# Patient Record
Sex: FEMALE | Race: WHITE | NOT HISPANIC OR LATINO | Employment: OTHER | URBAN - METROPOLITAN AREA
[De-identification: names, ages, dates, MRNs, and addresses within clinical notes are randomized per-mention and may not be internally consistent; named-entity substitution may affect disease eponyms.]

---

## 2018-04-26 LAB — VIT B12 SERPL-MCNC: 491 PG/ML (ref 180–914)

## 2018-09-21 ENCOUNTER — TRANSCRIBE ORDERS (OUTPATIENT)
Dept: ADMINISTRATIVE | Facility: HOSPITAL | Age: 73
End: 2018-09-21

## 2018-09-21 ENCOUNTER — APPOINTMENT (OUTPATIENT)
Dept: LAB | Facility: HOSPITAL | Age: 73
End: 2018-09-21
Payer: MEDICARE

## 2018-09-21 DIAGNOSIS — E78.2 MIXED HYPERLIPIDEMIA: ICD-10-CM

## 2018-09-21 DIAGNOSIS — E11.9 TYPE 2 DIABETES MELLITUS WITHOUT COMPLICATION, WITH LONG-TERM CURRENT USE OF INSULIN (HCC): ICD-10-CM

## 2018-09-21 DIAGNOSIS — R53.83 FATIGUE, UNSPECIFIED TYPE: Primary | ICD-10-CM

## 2018-09-21 DIAGNOSIS — E55.9 VITAMIN D DEFICIENCY: ICD-10-CM

## 2018-09-21 DIAGNOSIS — Z79.4 TYPE 2 DIABETES MELLITUS WITHOUT COMPLICATION, WITH LONG-TERM CURRENT USE OF INSULIN (HCC): ICD-10-CM

## 2018-09-21 DIAGNOSIS — R53.83 FATIGUE, UNSPECIFIED TYPE: ICD-10-CM

## 2018-09-21 LAB
25(OH)D3 SERPL-MCNC: 16.4 NG/ML (ref 30–100)
ALBUMIN SERPL BCP-MCNC: 3.9 G/DL (ref 3.5–5.7)
ALP SERPL-CCNC: 34 U/L (ref 55–165)
ALT SERPL W P-5'-P-CCNC: 17 U/L (ref 7–52)
ANION GAP SERPL CALCULATED.3IONS-SCNC: 9 MMOL/L (ref 4–13)
AST SERPL W P-5'-P-CCNC: 15 U/L (ref 13–39)
BACTERIA UR QL AUTO: ABNORMAL /HPF
BASOPHILS # BLD AUTO: 0 THOUSANDS/ΜL (ref 0–0.1)
BASOPHILS NFR BLD AUTO: 0 % (ref 0–1)
BILIRUB DIRECT SERPL-MCNC: 0.2 MG/DL (ref 0–0.2)
BILIRUB SERPL-MCNC: 0.5 MG/DL (ref 0.2–1)
BILIRUB UR QL STRIP: NEGATIVE
BUN SERPL-MCNC: 24 MG/DL (ref 7–25)
CALCIUM SERPL-MCNC: 9.3 MG/DL (ref 8.6–10.5)
CHLORIDE SERPL-SCNC: 101 MMOL/L (ref 98–107)
CHOLEST SERPL-MCNC: 168 MG/DL (ref 0–200)
CLARITY UR: ABNORMAL
CO2 SERPL-SCNC: 27 MMOL/L (ref 21–31)
COLOR UR: YELLOW
CREAT SERPL-MCNC: 1.2 MG/DL (ref 0.6–1.2)
EOSINOPHIL # BLD AUTO: 0.1 THOUSAND/ΜL (ref 0–0.61)
EOSINOPHIL NFR BLD AUTO: 1 % (ref 0–6)
ERYTHROCYTE [DISTWIDTH] IN BLOOD BY AUTOMATED COUNT: 14.1 % (ref 11.6–15.1)
EST. AVERAGE GLUCOSE BLD GHB EST-MCNC: 166 MG/DL
FERRITIN SERPL-MCNC: 88 NG/ML (ref 8–388)
GFR SERPL CREATININE-BSD FRML MDRD: 45 ML/MIN/1.73SQ M
GLUCOSE P FAST SERPL-MCNC: 133 MG/DL (ref 65–99)
GLUCOSE UR STRIP-MCNC: NEGATIVE MG/DL
HBA1C MFR BLD: 7.4 % (ref 4.2–6.3)
HCT VFR BLD AUTO: 39.7 % (ref 37–47)
HDLC SERPL-MCNC: 42 MG/DL (ref 40–60)
HGB BLD-MCNC: 12.8 G/DL (ref 11.5–15.4)
HGB UR QL STRIP.AUTO: ABNORMAL
IRON SATN MFR SERPL: 22 %
IRON SERPL-MCNC: 71 UG/DL (ref 50–170)
KETONES UR STRIP-MCNC: NEGATIVE MG/DL
LDLC SERPL CALC-MCNC: 83 MG/DL (ref 0–100)
LEUKOCYTE ESTERASE UR QL STRIP: ABNORMAL
LYMPHOCYTES # BLD AUTO: 1.9 THOUSANDS/ΜL (ref 0.6–4.47)
LYMPHOCYTES NFR BLD AUTO: 23 % (ref 14–44)
MCH RBC QN AUTO: 30.3 PG (ref 26.8–34.3)
MCHC RBC AUTO-ENTMCNC: 32.2 G/DL (ref 31.4–37.4)
MCV RBC AUTO: 94 FL (ref 82–98)
MONOCYTES # BLD AUTO: 0.5 THOUSAND/ΜL (ref 0.17–1.22)
MONOCYTES NFR BLD AUTO: 7 % (ref 4–12)
NEUTROPHILS # BLD AUTO: 5.6 THOUSANDS/ΜL (ref 1.85–7.62)
NEUTS SEG NFR BLD AUTO: 69 % (ref 43–75)
NITRITE UR QL STRIP: POSITIVE
NON-SQ EPI CELLS URNS QL MICRO: ABNORMAL /HPF
NONHDLC SERPL-MCNC: 126 MG/DL
NRBC BLD AUTO-RTO: 0 /100 WBCS
PH UR STRIP.AUTO: 6 [PH] (ref 5–8)
PLATELET # BLD AUTO: 211 THOUSANDS/UL (ref 149–390)
PMV BLD AUTO: 8.8 FL (ref 8.9–12.7)
POTASSIUM SERPL-SCNC: 4.2 MMOL/L (ref 3.5–5.5)
PROT SERPL-MCNC: 6.9 G/DL (ref 6.4–8.9)
PROT UR STRIP-MCNC: NEGATIVE MG/DL
RBC # BLD AUTO: 4.22 MILLION/UL (ref 3.81–5.12)
RBC #/AREA URNS AUTO: ABNORMAL /HPF
SODIUM SERPL-SCNC: 137 MMOL/L (ref 134–143)
SP GR UR STRIP.AUTO: 1.02 (ref 1–1.03)
T4 FREE SERPL-MCNC: 1.22 NG/DL (ref 0.76–1.46)
TIBC SERPL-MCNC: 329 UG/DL (ref 250–450)
TRIGL SERPL-MCNC: 216 MG/DL (ref 44–166)
TSH SERPL DL<=0.05 MIU/L-ACNC: 2.14 UIU/ML (ref 0.45–5.33)
UROBILINOGEN UR QL STRIP.AUTO: 0.2 E.U./DL
VIT B12 SERPL-MCNC: 468 PG/ML (ref 100–900)
WBC # BLD AUTO: 8.2 THOUSAND/UL (ref 4.31–10.16)
WBC #/AREA URNS AUTO: ABNORMAL /HPF

## 2018-09-21 PROCEDURE — 82570 ASSAY OF URINE CREATININE: CPT | Performed by: NURSE PRACTITIONER

## 2018-09-21 PROCEDURE — 80048 BASIC METABOLIC PNL TOTAL CA: CPT

## 2018-09-21 PROCEDURE — 81001 URINALYSIS AUTO W/SCOPE: CPT | Performed by: NURSE PRACTITIONER

## 2018-09-21 PROCEDURE — 83550 IRON BINDING TEST: CPT

## 2018-09-21 PROCEDURE — 82043 UR ALBUMIN QUANTITATIVE: CPT | Performed by: NURSE PRACTITIONER

## 2018-09-21 PROCEDURE — 83036 HEMOGLOBIN GLYCOSYLATED A1C: CPT

## 2018-09-21 PROCEDURE — 85025 COMPLETE CBC W/AUTO DIFF WBC: CPT

## 2018-09-21 PROCEDURE — 82607 VITAMIN B-12: CPT

## 2018-09-21 PROCEDURE — 82306 VITAMIN D 25 HYDROXY: CPT

## 2018-09-21 PROCEDURE — 83540 ASSAY OF IRON: CPT

## 2018-09-21 PROCEDURE — 84443 ASSAY THYROID STIM HORMONE: CPT

## 2018-09-21 PROCEDURE — 80076 HEPATIC FUNCTION PANEL: CPT

## 2018-09-21 PROCEDURE — 82728 ASSAY OF FERRITIN: CPT

## 2018-09-21 PROCEDURE — 84439 ASSAY OF FREE THYROXINE: CPT

## 2018-09-21 PROCEDURE — 80061 LIPID PANEL: CPT

## 2018-09-21 PROCEDURE — 36415 COLL VENOUS BLD VENIPUNCTURE: CPT

## 2018-09-22 LAB
CREAT UR-MCNC: 218 MG/DL
MICROALBUMIN UR-MCNC: 16.8 MG/L (ref 0–20)
MICROALBUMIN/CREAT 24H UR: 8 MG/G CREATININE (ref 0–30)

## 2018-12-04 ENCOUNTER — TRANSCRIBE ORDERS (OUTPATIENT)
Dept: ADMINISTRATIVE | Facility: HOSPITAL | Age: 73
End: 2018-12-04

## 2018-12-04 DIAGNOSIS — Z12.39 SCREENING BREAST EXAMINATION: Primary | ICD-10-CM

## 2018-12-12 ENCOUNTER — HOSPITAL ENCOUNTER (OUTPATIENT)
Dept: MAMMOGRAPHY | Facility: HOSPITAL | Age: 73
Discharge: HOME | End: 2018-12-12
Payer: MEDICARE

## 2018-12-12 DIAGNOSIS — Z12.39 SCREENING BREAST EXAMINATION: ICD-10-CM

## 2018-12-12 PROCEDURE — 77063 BREAST TOMOSYNTHESIS BI: CPT

## 2018-12-12 PROCEDURE — 77067 SCR MAMMO BI INCL CAD: CPT

## 2019-01-23 ENCOUNTER — APPOINTMENT (OUTPATIENT)
Dept: LAB | Facility: HOSPITAL | Age: 74
End: 2019-01-23
Payer: MEDICARE

## 2019-01-23 ENCOUNTER — TRANSCRIBE ORDERS (OUTPATIENT)
Dept: ADMINISTRATIVE | Facility: HOSPITAL | Age: 74
End: 2019-01-23

## 2019-01-23 DIAGNOSIS — R53.83 FATIGUE, UNSPECIFIED TYPE: Primary | ICD-10-CM

## 2019-01-23 DIAGNOSIS — E11.9 TYPE 2 DIABETES MELLITUS WITHOUT COMPLICATION, WITHOUT LONG-TERM CURRENT USE OF INSULIN (HCC): ICD-10-CM

## 2019-01-23 DIAGNOSIS — E55.9 VITAMIN D DEFICIENCY: ICD-10-CM

## 2019-01-23 DIAGNOSIS — E78.2 MIXED HYPERLIPIDEMIA: ICD-10-CM

## 2019-01-23 DIAGNOSIS — R53.83 FATIGUE, UNSPECIFIED TYPE: ICD-10-CM

## 2019-01-23 LAB
25(OH)D3 SERPL-MCNC: 45.1 NG/ML (ref 30–100)
ALBUMIN SERPL BCP-MCNC: 4.1 G/DL (ref 3.5–5.7)
ALP SERPL-CCNC: 36 U/L (ref 55–165)
ALT SERPL W P-5'-P-CCNC: 16 U/L (ref 7–52)
ANION GAP SERPL CALCULATED.3IONS-SCNC: 9 MMOL/L (ref 4–13)
AST SERPL W P-5'-P-CCNC: 13 U/L (ref 13–39)
BACTERIA UR QL AUTO: ABNORMAL /HPF
BILIRUB DIRECT SERPL-MCNC: 0.1 MG/DL (ref 0–0.2)
BILIRUB SERPL-MCNC: 0.3 MG/DL (ref 0.2–1)
BILIRUB UR QL STRIP: NEGATIVE
BUN SERPL-MCNC: 20 MG/DL (ref 7–25)
CALCIUM SERPL-MCNC: 9.2 MG/DL (ref 8.6–10.5)
CAOX CRY URNS QL MICRO: ABNORMAL /HPF
CHLORIDE SERPL-SCNC: 101 MMOL/L (ref 98–107)
CHOLEST SERPL-MCNC: 169 MG/DL (ref 0–200)
CLARITY UR: ABNORMAL
CO2 SERPL-SCNC: 27 MMOL/L (ref 21–31)
COLOR UR: YELLOW
CREAT SERPL-MCNC: 1.18 MG/DL (ref 0.6–1.2)
CREAT UR-MCNC: 231 MG/DL
ERYTHROCYTE [DISTWIDTH] IN BLOOD BY AUTOMATED COUNT: 14.2 % (ref 11.6–15.1)
FERRITIN SERPL-MCNC: 79 NG/ML (ref 8–388)
GFR SERPL CREATININE-BSD FRML MDRD: 46 ML/MIN/1.73SQ M
GLUCOSE P FAST SERPL-MCNC: 176 MG/DL (ref 65–99)
GLUCOSE UR STRIP-MCNC: NEGATIVE MG/DL
HCT VFR BLD AUTO: 39.8 % (ref 37–47)
HDLC SERPL-MCNC: 49 MG/DL (ref 40–60)
HGB BLD-MCNC: 13.1 G/DL (ref 11.5–15.4)
HGB UR QL STRIP.AUTO: ABNORMAL
IRON SATN MFR SERPL: 21 %
IRON SERPL-MCNC: 72 UG/DL (ref 50–170)
KETONES UR STRIP-MCNC: ABNORMAL MG/DL
LDLC SERPL CALC-MCNC: 80 MG/DL (ref 0–100)
LEUKOCYTE ESTERASE UR QL STRIP: ABNORMAL
MCH RBC QN AUTO: 30.6 PG (ref 26.8–34.3)
MCHC RBC AUTO-ENTMCNC: 33.1 G/DL (ref 31.4–37.4)
MCV RBC AUTO: 93 FL (ref 82–98)
MICROALBUMIN UR-MCNC: 64.6 MG/L (ref 0–20)
MICROALBUMIN/CREAT 24H UR: 28 MG/G CREATININE (ref 0–30)
MUCOUS THREADS UR QL AUTO: ABNORMAL
NITRITE UR QL STRIP: NEGATIVE
NON-SQ EPI CELLS URNS QL MICRO: ABNORMAL /HPF
NONHDLC SERPL-MCNC: 120 MG/DL
PH UR STRIP.AUTO: 5.5 [PH] (ref 5–8)
PLATELET # BLD AUTO: 216 THOUSANDS/UL (ref 149–390)
PMV BLD AUTO: 8.6 FL (ref 8.9–12.7)
POTASSIUM SERPL-SCNC: 4.2 MMOL/L (ref 3.5–5.5)
PROT SERPL-MCNC: 7 G/DL (ref 6.4–8.9)
PROT UR STRIP-MCNC: NEGATIVE MG/DL
RBC # BLD AUTO: 4.29 MILLION/UL (ref 3.81–5.12)
RBC #/AREA URNS AUTO: ABNORMAL /HPF
SODIUM SERPL-SCNC: 137 MMOL/L (ref 134–143)
SP GR UR STRIP.AUTO: >=1.03 (ref 1–1.03)
T4 FREE SERPL-MCNC: 1.25 NG/DL (ref 0.76–1.46)
TIBC SERPL-MCNC: 345 UG/DL (ref 250–450)
TRIGL SERPL-MCNC: 198 MG/DL (ref 44–166)
TSH SERPL DL<=0.05 MIU/L-ACNC: 2.01 UIU/ML (ref 0.45–5.33)
UROBILINOGEN UR QL STRIP.AUTO: 0.2 E.U./DL
VIT B12 SERPL-MCNC: 450 PG/ML (ref 100–900)
WBC # BLD AUTO: 6.5 THOUSAND/UL (ref 4.31–10.16)
WBC #/AREA URNS AUTO: ABNORMAL /HPF

## 2019-01-23 PROCEDURE — 80061 LIPID PANEL: CPT

## 2019-01-23 PROCEDURE — 80076 HEPATIC FUNCTION PANEL: CPT

## 2019-01-23 PROCEDURE — 85027 COMPLETE CBC AUTOMATED: CPT

## 2019-01-23 PROCEDURE — 83550 IRON BINDING TEST: CPT

## 2019-01-23 PROCEDURE — 36415 COLL VENOUS BLD VENIPUNCTURE: CPT

## 2019-01-23 PROCEDURE — 84443 ASSAY THYROID STIM HORMONE: CPT

## 2019-01-23 PROCEDURE — 82728 ASSAY OF FERRITIN: CPT

## 2019-01-23 PROCEDURE — 82306 VITAMIN D 25 HYDROXY: CPT

## 2019-01-23 PROCEDURE — 83036 HEMOGLOBIN GLYCOSYLATED A1C: CPT

## 2019-01-23 PROCEDURE — 82570 ASSAY OF URINE CREATININE: CPT | Performed by: NURSE PRACTITIONER

## 2019-01-23 PROCEDURE — 82043 UR ALBUMIN QUANTITATIVE: CPT | Performed by: NURSE PRACTITIONER

## 2019-01-23 PROCEDURE — 82607 VITAMIN B-12: CPT

## 2019-01-23 PROCEDURE — 81001 URINALYSIS AUTO W/SCOPE: CPT | Performed by: NURSE PRACTITIONER

## 2019-01-23 PROCEDURE — 83540 ASSAY OF IRON: CPT

## 2019-01-23 PROCEDURE — 80048 BASIC METABOLIC PNL TOTAL CA: CPT

## 2019-01-23 PROCEDURE — 84439 ASSAY OF FREE THYROXINE: CPT

## 2019-01-24 LAB
EST. AVERAGE GLUCOSE BLD GHB EST-MCNC: 160 MG/DL
HBA1C MFR BLD: 7.2 % (ref 4.2–6.3)

## 2019-05-31 ENCOUNTER — TRANSCRIBE ORDERS (OUTPATIENT)
Dept: ADMINISTRATIVE | Facility: HOSPITAL | Age: 74
End: 2019-05-31

## 2019-05-31 ENCOUNTER — APPOINTMENT (OUTPATIENT)
Dept: LAB | Facility: HOSPITAL | Age: 74
End: 2019-05-31
Attending: INTERNAL MEDICINE
Payer: MEDICARE

## 2019-05-31 DIAGNOSIS — E11.9 TYPE 2 DIABETES MELLITUS WITHOUT COMPLICATION, WITHOUT LONG-TERM CURRENT USE OF INSULIN (HCC): ICD-10-CM

## 2019-05-31 DIAGNOSIS — R53.83 FATIGUE, UNSPECIFIED TYPE: ICD-10-CM

## 2019-05-31 DIAGNOSIS — E78.2 MIXED HYPERLIPIDEMIA: ICD-10-CM

## 2019-05-31 DIAGNOSIS — E55.9 VITAMIN D DEFICIENCY: ICD-10-CM

## 2019-05-31 DIAGNOSIS — R53.83 FATIGUE, UNSPECIFIED TYPE: Primary | ICD-10-CM

## 2019-05-31 LAB
25(OH)D3 SERPL-MCNC: 37.5 NG/ML (ref 30–100)
ALBUMIN SERPL BCP-MCNC: 4 G/DL (ref 3.5–5.7)
ALP SERPL-CCNC: 25 U/L (ref 55–165)
ALT SERPL W P-5'-P-CCNC: 20 U/L (ref 7–52)
ANION GAP SERPL CALCULATED.3IONS-SCNC: 9 MMOL/L (ref 4–13)
AST SERPL W P-5'-P-CCNC: 19 U/L (ref 13–39)
BACTERIA UR QL AUTO: ABNORMAL /HPF
BASOPHILS # BLD AUTO: 0 THOUSANDS/ΜL (ref 0–0.1)
BASOPHILS NFR BLD AUTO: 1 % (ref 0–1)
BILIRUB DIRECT SERPL-MCNC: 0.1 MG/DL (ref 0–0.2)
BILIRUB SERPL-MCNC: 0.4 MG/DL (ref 0.2–1)
BILIRUB UR QL STRIP: NEGATIVE
BUN SERPL-MCNC: 21 MG/DL (ref 7–25)
CALCIUM SERPL-MCNC: 9.8 MG/DL (ref 8.6–10.5)
CHLORIDE SERPL-SCNC: 103 MMOL/L (ref 98–107)
CHOLEST SERPL-MCNC: 160 MG/DL (ref 0–200)
CLARITY UR: ABNORMAL
CO2 SERPL-SCNC: 26 MMOL/L (ref 21–31)
COLOR UR: YELLOW
CREAT SERPL-MCNC: 1.34 MG/DL (ref 0.6–1.2)
CREAT UR-MCNC: 202 MG/DL
EOSINOPHIL # BLD AUTO: 0.1 THOUSAND/ΜL (ref 0–0.61)
EOSINOPHIL NFR BLD AUTO: 1 % (ref 0–6)
ERYTHROCYTE [DISTWIDTH] IN BLOOD BY AUTOMATED COUNT: 14.7 % (ref 11.6–15.1)
EST. AVERAGE GLUCOSE BLD GHB EST-MCNC: 157 MG/DL
FERRITIN SERPL-MCNC: 101 NG/ML (ref 8–388)
GFR SERPL CREATININE-BSD FRML MDRD: 39 ML/MIN/1.73SQ M
GLUCOSE P FAST SERPL-MCNC: 155 MG/DL (ref 65–99)
GLUCOSE UR STRIP-MCNC: NEGATIVE MG/DL
HBA1C MFR BLD: 7.1 % (ref 4.2–6.3)
HCT VFR BLD AUTO: 38 % (ref 37–47)
HDLC SERPL-MCNC: 43 MG/DL (ref 40–60)
HGB BLD-MCNC: 12.7 G/DL (ref 11.5–15.4)
HGB UR QL STRIP.AUTO: ABNORMAL
IRON SATN MFR SERPL: 24 %
IRON SERPL-MCNC: 88 UG/DL (ref 50–170)
KETONES UR STRIP-MCNC: NEGATIVE MG/DL
LDLC SERPL CALC-MCNC: 85 MG/DL (ref 0–100)
LEUKOCYTE ESTERASE UR QL STRIP: ABNORMAL
LYMPHOCYTES # BLD AUTO: 1.7 THOUSANDS/ΜL (ref 0.6–4.47)
LYMPHOCYTES NFR BLD AUTO: 24 % (ref 14–44)
MCH RBC QN AUTO: 30.9 PG (ref 26.8–34.3)
MCHC RBC AUTO-ENTMCNC: 33.3 G/DL (ref 31.4–37.4)
MCV RBC AUTO: 93 FL (ref 82–98)
MICROALBUMIN UR-MCNC: 46.3 MG/L (ref 0–20)
MICROALBUMIN/CREAT 24H UR: 23 MG/G CREATININE (ref 0–30)
MONOCYTES # BLD AUTO: 0.5 THOUSAND/ΜL (ref 0.17–1.22)
MONOCYTES NFR BLD AUTO: 7 % (ref 4–12)
NEUTROPHILS # BLD AUTO: 4.7 THOUSANDS/ΜL (ref 1.85–7.62)
NEUTS SEG NFR BLD AUTO: 67 % (ref 43–75)
NITRITE UR QL STRIP: POSITIVE
NON-SQ EPI CELLS URNS QL MICRO: ABNORMAL /HPF
NONHDLC SERPL-MCNC: 117 MG/DL
PH UR STRIP.AUTO: 6 [PH]
PLATELET # BLD AUTO: 226 THOUSANDS/UL (ref 149–390)
PMV BLD AUTO: 9.1 FL (ref 8.9–12.7)
POTASSIUM SERPL-SCNC: 4.3 MMOL/L (ref 3.5–5.5)
PROT SERPL-MCNC: 6.8 G/DL (ref 6.4–8.9)
PROT UR STRIP-MCNC: NEGATIVE MG/DL
RBC # BLD AUTO: 4.1 MILLION/UL (ref 3.81–5.12)
RBC #/AREA URNS AUTO: ABNORMAL /HPF
SODIUM SERPL-SCNC: 138 MMOL/L (ref 134–143)
SP GR UR STRIP.AUTO: 1.02 (ref 1–1.03)
T4 FREE SERPL-MCNC: 1.31 NG/DL (ref 0.76–1.46)
TIBC SERPL-MCNC: 365 UG/DL (ref 250–450)
TRIGL SERPL-MCNC: 160 MG/DL (ref 44–166)
TSH SERPL DL<=0.05 MIU/L-ACNC: 1.85 UIU/ML (ref 0.45–5.33)
UROBILINOGEN UR QL STRIP.AUTO: 0.2 E.U./DL
VIT B12 SERPL-MCNC: 356 PG/ML (ref 100–900)
WBC # BLD AUTO: 7 THOUSAND/UL (ref 4.31–10.16)
WBC #/AREA URNS AUTO: ABNORMAL /HPF

## 2019-05-31 PROCEDURE — 85025 COMPLETE CBC W/AUTO DIFF WBC: CPT

## 2019-05-31 PROCEDURE — 82607 VITAMIN B-12: CPT

## 2019-05-31 PROCEDURE — 80061 LIPID PANEL: CPT

## 2019-05-31 PROCEDURE — 81001 URINALYSIS AUTO W/SCOPE: CPT | Performed by: INTERNAL MEDICINE

## 2019-05-31 PROCEDURE — 82043 UR ALBUMIN QUANTITATIVE: CPT | Performed by: INTERNAL MEDICINE

## 2019-05-31 PROCEDURE — 83540 ASSAY OF IRON: CPT

## 2019-05-31 PROCEDURE — 80076 HEPATIC FUNCTION PANEL: CPT

## 2019-05-31 PROCEDURE — 82306 VITAMIN D 25 HYDROXY: CPT

## 2019-05-31 PROCEDURE — 82570 ASSAY OF URINE CREATININE: CPT | Performed by: INTERNAL MEDICINE

## 2019-05-31 PROCEDURE — 83550 IRON BINDING TEST: CPT

## 2019-05-31 PROCEDURE — 83036 HEMOGLOBIN GLYCOSYLATED A1C: CPT

## 2019-05-31 PROCEDURE — 84439 ASSAY OF FREE THYROXINE: CPT

## 2019-05-31 PROCEDURE — 80048 BASIC METABOLIC PNL TOTAL CA: CPT

## 2019-05-31 PROCEDURE — 84443 ASSAY THYROID STIM HORMONE: CPT

## 2019-05-31 PROCEDURE — 36415 COLL VENOUS BLD VENIPUNCTURE: CPT

## 2019-05-31 PROCEDURE — 82728 ASSAY OF FERRITIN: CPT

## 2019-10-30 ENCOUNTER — TELEPHONE (OUTPATIENT)
Dept: NEPHROLOGY | Facility: CLINIC | Age: 74
End: 2019-10-30

## 2019-10-30 DIAGNOSIS — E11.9 TYPE 2 DIABETES MELLITUS WITHOUT COMPLICATION, WITHOUT LONG-TERM CURRENT USE OF INSULIN (HCC): Primary | ICD-10-CM

## 2019-10-30 NOTE — TELEPHONE ENCOUNTER
Patient called she needs a refill on:      Victoza 18mg/3ml solution pen-inject    Inject 1 2 milligrams subcutaneously daily        patient is out of medication

## 2019-11-13 ENCOUNTER — TRANSCRIBE ORDERS (OUTPATIENT)
Dept: URGENT CARE | Facility: HOSPITAL | Age: 74
End: 2019-11-13

## 2019-11-13 ENCOUNTER — APPOINTMENT (OUTPATIENT)
Dept: LAB | Facility: HOSPITAL | Age: 74
End: 2019-11-13
Payer: MEDICARE

## 2019-11-13 DIAGNOSIS — N18.30 CHRONIC KIDNEY DISEASE, STAGE III (MODERATE) (HCC): ICD-10-CM

## 2019-11-13 DIAGNOSIS — E03.9 MYXEDEMA HEART DISEASE: ICD-10-CM

## 2019-11-13 DIAGNOSIS — D51.9 ANEMIA DUE TO VITAMIN B12 DEFICIENCY, UNSPECIFIED B12 DEFICIENCY TYPE: ICD-10-CM

## 2019-11-13 DIAGNOSIS — I51.9 MYXEDEMA HEART DISEASE: ICD-10-CM

## 2019-11-13 DIAGNOSIS — D51.9 ANEMIA DUE TO VITAMIN B12 DEFICIENCY, UNSPECIFIED B12 DEFICIENCY TYPE: Primary | ICD-10-CM

## 2019-11-13 LAB
ALBUMIN SERPL BCP-MCNC: 3.7 G/DL (ref 3.5–5)
ALP SERPL-CCNC: 34 U/L (ref 46–116)
ALT SERPL W P-5'-P-CCNC: 35 U/L (ref 12–78)
ANION GAP SERPL CALCULATED.3IONS-SCNC: 8 MMOL/L (ref 4–13)
AST SERPL W P-5'-P-CCNC: 22 U/L (ref 5–45)
BACTERIA UR QL AUTO: ABNORMAL /HPF
BASOPHILS # BLD AUTO: 0.06 THOUSANDS/ΜL (ref 0–0.1)
BASOPHILS NFR BLD AUTO: 1 % (ref 0–1)
BILIRUB SERPL-MCNC: 0.34 MG/DL (ref 0.2–1)
BILIRUB UR QL STRIP: NEGATIVE
BUN SERPL-MCNC: 18 MG/DL (ref 5–25)
CALCIUM SERPL-MCNC: 9.7 MG/DL (ref 8.3–10.1)
CHLORIDE SERPL-SCNC: 104 MMOL/L (ref 100–108)
CHOLEST SERPL-MCNC: 159 MG/DL (ref 50–200)
CLARITY UR: ABNORMAL
CO2 SERPL-SCNC: 26 MMOL/L (ref 21–32)
COLOR UR: YELLOW
CREAT SERPL-MCNC: 1.3 MG/DL (ref 0.6–1.3)
EOSINOPHIL # BLD AUTO: 0.08 THOUSAND/ΜL (ref 0–0.61)
EOSINOPHIL NFR BLD AUTO: 1 % (ref 0–6)
ERYTHROCYTE [DISTWIDTH] IN BLOOD BY AUTOMATED COUNT: 13.7 % (ref 11.6–15.1)
GFR SERPL CREATININE-BSD FRML MDRD: 41 ML/MIN/1.73SQ M
GLUCOSE P FAST SERPL-MCNC: 155 MG/DL (ref 65–99)
GLUCOSE UR STRIP-MCNC: NEGATIVE MG/DL
HCT VFR BLD AUTO: 38.7 % (ref 34.8–46.1)
HDLC SERPL-MCNC: 38 MG/DL
HGB BLD-MCNC: 12 G/DL (ref 11.5–15.4)
HGB UR QL STRIP.AUTO: NEGATIVE
IMM GRANULOCYTES # BLD AUTO: 0.04 THOUSAND/UL (ref 0–0.2)
IMM GRANULOCYTES NFR BLD AUTO: 1 % (ref 0–2)
KETONES UR STRIP-MCNC: NEGATIVE MG/DL
LDLC SERPL CALC-MCNC: 82 MG/DL (ref 0–100)
LEUKOCYTE ESTERASE UR QL STRIP: ABNORMAL
LYMPHOCYTES # BLD AUTO: 1.94 THOUSANDS/ΜL (ref 0.6–4.47)
LYMPHOCYTES NFR BLD AUTO: 26 % (ref 14–44)
MCH RBC QN AUTO: 30.2 PG (ref 26.8–34.3)
MCHC RBC AUTO-ENTMCNC: 31 G/DL (ref 31.4–37.4)
MCV RBC AUTO: 98 FL (ref 82–98)
MONOCYTES # BLD AUTO: 0.58 THOUSAND/ΜL (ref 0.17–1.22)
MONOCYTES NFR BLD AUTO: 8 % (ref 4–12)
NEUTROPHILS # BLD AUTO: 4.92 THOUSANDS/ΜL (ref 1.85–7.62)
NEUTS SEG NFR BLD AUTO: 63 % (ref 43–75)
NITRITE UR QL STRIP: POSITIVE
NON-SQ EPI CELLS URNS QL MICRO: ABNORMAL /HPF
NONHDLC SERPL-MCNC: 121 MG/DL
NRBC BLD AUTO-RTO: 0 /100 WBCS
PH UR STRIP.AUTO: 5.5 [PH]
PLATELET # BLD AUTO: 217 THOUSANDS/UL (ref 149–390)
PMV BLD AUTO: 11.2 FL (ref 8.9–12.7)
POTASSIUM SERPL-SCNC: 4.4 MMOL/L (ref 3.5–5.3)
PROT SERPL-MCNC: 6.9 G/DL (ref 6.4–8.2)
PROT UR STRIP-MCNC: NEGATIVE MG/DL
RBC # BLD AUTO: 3.97 MILLION/UL (ref 3.81–5.12)
RBC #/AREA URNS AUTO: ABNORMAL /HPF
SODIUM SERPL-SCNC: 138 MMOL/L (ref 136–145)
SP GR UR STRIP.AUTO: 1.02 (ref 1–1.03)
TRIGL SERPL-MCNC: 194 MG/DL
TSH SERPL DL<=0.05 MIU/L-ACNC: 1.9 UIU/ML (ref 0.36–3.74)
UROBILINOGEN UR QL STRIP.AUTO: 0.2 E.U./DL
WBC # BLD AUTO: 7.62 THOUSAND/UL (ref 4.31–10.16)
WBC #/AREA URNS AUTO: ABNORMAL /HPF

## 2019-11-13 PROCEDURE — 81001 URINALYSIS AUTO W/SCOPE: CPT | Performed by: NURSE PRACTITIONER

## 2019-11-13 PROCEDURE — 80061 LIPID PANEL: CPT

## 2019-11-13 PROCEDURE — 80053 COMPREHEN METABOLIC PANEL: CPT

## 2019-11-13 PROCEDURE — 85025 COMPLETE CBC W/AUTO DIFF WBC: CPT | Performed by: NURSE PRACTITIONER

## 2019-11-13 PROCEDURE — 36415 COLL VENOUS BLD VENIPUNCTURE: CPT | Performed by: NURSE PRACTITIONER

## 2019-11-13 PROCEDURE — 84443 ASSAY THYROID STIM HORMONE: CPT

## 2019-11-20 ENCOUNTER — OFFICE VISIT (OUTPATIENT)
Dept: NEPHROLOGY | Facility: CLINIC | Age: 74
End: 2019-11-20
Payer: MEDICARE

## 2019-11-20 VITALS
HEART RATE: 92 BPM | SYSTOLIC BLOOD PRESSURE: 146 MMHG | WEIGHT: 171 LBS | DIASTOLIC BLOOD PRESSURE: 72 MMHG | HEIGHT: 61 IN | BODY MASS INDEX: 32.28 KG/M2

## 2019-11-20 DIAGNOSIS — E78.2 MIXED HYPERLIPIDEMIA: ICD-10-CM

## 2019-11-20 DIAGNOSIS — I10 ESSENTIAL HYPERTENSION: ICD-10-CM

## 2019-11-20 DIAGNOSIS — N18.30 STAGE 3 CHRONIC KIDNEY DISEASE (HCC): Primary | ICD-10-CM

## 2019-11-20 DIAGNOSIS — E11.9 TYPE 2 DIABETES MELLITUS WITHOUT COMPLICATION, WITHOUT LONG-TERM CURRENT USE OF INSULIN (HCC): ICD-10-CM

## 2019-11-20 PROCEDURE — 99214 OFFICE O/P EST MOD 30 MIN: CPT | Performed by: INTERNAL MEDICINE

## 2019-11-20 RX ORDER — AMLODIPINE BESYLATE 5 MG/1
5 TABLET ORAL DAILY
Qty: 90 TABLET | Refills: 3 | Status: SHIPPED | OUTPATIENT
Start: 2019-11-20 | End: 2020-10-27

## 2019-11-20 RX ORDER — LISINOPRIL 20 MG/1
20 TABLET ORAL DAILY
Qty: 90 TABLET | Refills: 3 | Status: SHIPPED | OUTPATIENT
Start: 2019-11-20 | End: 2021-01-22

## 2019-11-20 RX ORDER — METOPROLOL SUCCINATE 50 MG/1
TABLET, EXTENDED RELEASE ORAL
Refills: 0 | COMMUNITY
Start: 2019-09-13 | End: 2020-06-20

## 2019-11-20 RX ORDER — FENOFIBRATE 145 MG/1
145 TABLET, COATED ORAL DAILY
Qty: 90 TABLET | Refills: 3 | Status: SHIPPED | OUTPATIENT
Start: 2019-11-20 | End: 2020-02-24

## 2019-11-20 RX ORDER — LISINOPRIL 20 MG/1
TABLET ORAL
Refills: 0 | COMMUNITY
Start: 2019-10-29 | End: 2019-11-20 | Stop reason: SDUPTHER

## 2019-11-20 RX ORDER — LEVOTHYROXINE SODIUM 0.05 MG/1
TABLET ORAL
Refills: 0 | COMMUNITY
Start: 2019-08-26 | End: 2019-11-20 | Stop reason: SDUPTHER

## 2019-11-20 RX ORDER — LEVOTHYROXINE SODIUM 0.05 MG/1
50 TABLET ORAL DAILY
Qty: 90 TABLET | Refills: 3 | Status: SHIPPED | OUTPATIENT
Start: 2019-11-20 | End: 2020-10-27

## 2019-11-20 RX ORDER — GLIPIZIDE 5 MG/1
5 TABLET ORAL
Qty: 180 TABLET | Refills: 3 | Status: SHIPPED | OUTPATIENT
Start: 2019-11-20 | End: 2020-06-30

## 2019-11-20 RX ORDER — GLIPIZIDE 5 MG/1
TABLET ORAL
Refills: 0 | COMMUNITY
Start: 2019-10-29 | End: 2019-11-20 | Stop reason: SDUPTHER

## 2019-11-20 RX ORDER — FENOFIBRATE 145 MG/1
TABLET, COATED ORAL
Refills: 0 | COMMUNITY
Start: 2019-10-28 | End: 2019-11-20 | Stop reason: SDUPTHER

## 2019-11-20 NOTE — PROGRESS NOTES
[address] [facility] [place], [place]    Name: Andres Lloyd  YOB: 1945      Assessment/Plan:    No problem-specific Assessment & Plan notes found for this encounter           Problem List Items Addressed This Visit        Endocrine    Type 2 diabetes mellitus without complication, without long-term current use of insulin (HCC)    Relevant Medications    metFORMIN (GLUCOPHAGE) 1000 MG tablet    levothyroxine 50 mcg tablet    fenofibrate (TRICOR) 145 mg tablet    glipiZIDE (GLUCOTROL) 5 mg tablet    lisinopril (ZESTRIL) 20 mg tablet    liraglutide (VICTOZA) injection    Other Relevant Orders    CBC and differential    Comprehensive metabolic panel    Lipid panel    Hemoglobin A1C    Microalbumin / creatinine urine ratio    Protein / creatinine ratio, urine    Uric acid    Urinalysis with microscopic       Cardiovascular and Mediastinum    Essential hypertension    Relevant Medications    metoprolol succinate (TOPROL-XL) 50 mg 24 hr tablet    amLODIPine (NORVASC) 5 mg tablet    lisinopril (ZESTRIL) 20 mg tablet    Other Relevant Orders    CBC and differential    Comprehensive metabolic panel    Lipid panel    Hemoglobin A1C    Microalbumin / creatinine urine ratio    Protein / creatinine ratio, urine    Uric acid    Urinalysis with microscopic       Genitourinary    Stage 3 chronic kidney disease ([facility] [address] ) - Primary    Relevant Orders    CBC and differential    Comprehensive metabolic panel    Lipid panel    Hemoglobin A1C    Microalbumin / creatinine urine ratio    Protein / creatinine ratio, urine    Uric acid    Urinalysis with microscopic      Other Visit Diagnoses     Mixed hyperlipidemia        Relevant Medications    levothyroxine 50 mcg tablet    fenofibrate (TRICOR) 145 mg tablet    glipiZIDE (GLUCOTROL) 5 mg tablet    lisinopril (ZESTRIL) 20 mg tablet    Other Relevant Orders    CBC and differential    Comprehensive metabolic panel    Lipid panel    Hemoglobin A1C Microalbumin / creatinine urine ratio    Protein / creatinine ratio, urine    Uric acid    Urinalysis with microscopic            Subjective:      Patient ID: Lesa Lopez is a 76 y o  female  HPI She has been feeling well  DM- sugars range 130-170  She checks before dinner and before breakfast   She is trying to follow a diabetic diet  Blood pressure is fair  She says her her appetite is less  She lost 30 lbs in a year  She has early satiety  She has been on Victoza and eats less  BP fair    The following portions of the patient's history were reviewed and updated as appropriate: allergies, current medications, past family history, past medical history, past social history, past surgical history and problem list     Review of Systems   Constitutional: Negative for activity change, appetite change, fatigue and unexpected weight change  HENT: Negative for congestion, ear discharge, ear pain, trouble swallowing and voice change  Eyes: Negative for pain, discharge and visual disturbance  Respiratory: Negative for cough, chest tightness, shortness of breath and wheezing  Cardiovascular: Negative for chest pain, palpitations and leg swelling  Gastrointestinal: Positive for diarrhea  Negative for abdominal pain, blood in stool, constipation, nausea and vomiting  Bile diarrhea three times a week since her GB was removed   Endocrine: Negative for heat intolerance, polydipsia, polyphagia and polyuria  Genitourinary: Negative for decreased urine volume, difficulty urinating, frequency and urgency  Musculoskeletal: Negative for arthralgias, back pain and gait problem  Skin: Negative for color change and rash  Neurological: Negative for dizziness, weakness and headaches           Social History     Socioeconomic History    Marital status: /Civil Union     Spouse name: Livier Qiu Number of children: None    Years of education: None    Highest education level: None   Occupational History  Occupation: Retired    Social Needs    Financial resource strain: None    Food insecurity:     Worry: None     Inability: None    Transportation needs:     Medical: None     Non-medical: None   Tobacco Use    Smoking status: Former Smoker    Smokeless tobacco: Never Used   Substance and Sexual Activity    Alcohol use: Yes     Comment: Occasionally     Drug use: None     Comment: Denies drug use - As per [employer] Sexual activity: None   Lifestyle    Physical activity:     Days per week: None     Minutes per session: None    Stress: None   Relationships    Social connections:     Talks on phone: None     Gets together: None     Attends Oriental orthodox service: None     Active member of club or organization: None     Attends meetings of clubs or organizations: None     Relationship status: None    Intimate partner violence:     Fear of current or ex partner: None     Emotionally abused: None     Physically abused: None     Forced sexual activity: None   Other Topics Concern    None   Social History Narrative     - Rhae Pencil is [place] and speaks [place], common law marriage    Does not consume caffeine      Past Medical History:   Diagnosis Date    Benign essential hypertension     Chronic kidney disease, stage 2 (mild)     Chronic kidney disease, stage 3 (HCC)     Disorder of gallbladder     Disorder of skin and subcutaneous tissue     Dyspnea     Essential hypertension     Hyperlipidemia     Hypokalemia     Hypothyroidism     Malaise and fatigue     Mixed hyperlipidemia     Morbid obesity ([address] )     Pernicious anemia     Type 2 diabetes mellitus ([address] )      Past Surgical History:   Procedure Laterality Date    BREAST BIOPSY Right     CARPAL TUNNEL RELEASE Bilateral     CHOLECYSTECTOMY      COLONOSCOPY        [address]      scalp        Current Outpatient Medications:     fenofibrate (TRICOR) 145 mg tablet, Take 1 tablet (145 mg total) by mouth daily, Disp: 90 tablet, Rfl: 3    glipiZIDE (GLUCOTROL) 5 mg tablet, Take 1 tablet (5 mg total) by mouth 2 (two) times a day before meals, Disp: 180 tablet, Rfl: 3    levothyroxine 50 mcg tablet, Take 1 tablet (50 mcg total) by mouth daily, Disp: 90 tablet, Rfl: 3    liraglutide (VICTOZA) injection, Inject 0 2 mL (1 2 mg total) under the skin daily, Disp: 6 pen, Rfl: 4    lisinopril (ZESTRIL) 20 mg tablet, Take 1 tablet (20 mg total) by mouth daily, Disp: 90 tablet, Rfl: 3    metFORMIN (GLUCOPHAGE) 1000 MG tablet, , Disp: , Rfl: 0    metoprolol succinate (TOPROL-XL) 50 mg 24 hr tablet, , Disp: , Rfl: 0    amLODIPine (NORVASC) 5 mg tablet, Take 1 tablet (5 mg total) by mouth daily, Disp: 90 tablet, Rfl: 3    Lab Results   Component Value Date    SODIUM 138 11/13/2019    K 4 4 11/13/2019     11/13/2019    CO2 26 11/13/2019    AGAP 8 11/13/2019    BUN 18 11/13/2019    CREATININE 1 30 11/13/2019    GLUF 155 (H) 11/13/2019    CALCIUM 9 7 11/13/2019    AST 22 11/13/2019    ALT 35 11/13/2019    ALKPHOS 34 (L) 11/13/2019    TP 6 9 11/13/2019    TBILI 0 34 11/13/2019    EGFR 41 11/13/2019     Lab Results   Component Value Date    WBC 7 62 11/13/2019    HGB 12 0 11/13/2019    HCT 38 7 11/13/2019    MCV 98 11/13/2019     11/13/2019     Lab Results   Component Value Date    CHOLESTEROL 159 11/13/2019    CHOLESTEROL 160 05/31/2019    CHOLESTEROL 169 01/23/2019     Lab Results   Component Value Date    HDL 38 (L) 11/13/2019    HDL 43 05/31/2019    HDL 49 01/23/2019     Lab Results   Component Value Date    LDLCALC 82 11/13/2019    LDLCALC 85 05/31/2019    LDLCALC 80 01/23/2019     Lab Results   Component Value Date    TRIG 194 (H) 11/13/2019    TRIG 160 05/31/2019    TRIG 198 (H) 01/23/2019     No results found for: [place], [place]  Lab Results   Component Value Date    JUS5DTYIISMG 1 900 11/13/2019     Lab Results   Component Value Date    CALCIUM 9 7 11/13/2019     No results found for: HERMINIA, UPEP  No results found for: JAY [id number]        Objective:      /72 (BP Location: Right arm, Patient Position: Sitting, Cuff Size: Standard)   Pulse 92   Ht 5' 1" (1 549 m)   Wt 77 6 kg (171 lb)   BMI 32 31 kg/m²     160/90 end exam     Physical Exam   Constitutional: She is oriented to person, place, and time  She appears well-developed and well-nourished  No distress  HENT:   Head: Normocephalic  Right Ear: External ear normal    Left Ear: External ear normal    Nose: Nose normal    Mouth/Throat: Oropharynx is clear and moist    Eyes: Pupils are equal, round, and reactive to light  Conjunctivae are normal    Cardiovascular: Normal rate, regular rhythm and normal heart sounds  No murmur heard  Pulmonary/Chest: Effort normal and breath sounds normal  No respiratory distress  She has no wheezes  She has no rales  Abdominal: Soft  Bowel sounds are normal  She exhibits no distension  There is no tenderness  There is no rebound and no guarding  Musculoskeletal: Normal range of motion  She exhibits no edema  Neurological: She is alert and oriented to person, place, and time  Skin: Skin is warm and dry  Capillary refill takes less than 2 seconds  She is not diaphoretic  Psychiatric: She has a normal mood and affect

## 2019-12-31 DIAGNOSIS — E11.9 TYPE 2 DIABETES MELLITUS WITHOUT COMPLICATION, WITHOUT LONG-TERM CURRENT USE OF INSULIN (HCC): Primary | ICD-10-CM

## 2020-02-18 ENCOUNTER — APPOINTMENT (OUTPATIENT)
Dept: LAB | Facility: CLINIC | Age: 75
End: 2020-02-18
Payer: MEDICARE

## 2020-02-18 DIAGNOSIS — E78.2 MIXED HYPERLIPIDEMIA: ICD-10-CM

## 2020-02-18 DIAGNOSIS — I10 ESSENTIAL HYPERTENSION: ICD-10-CM

## 2020-02-18 DIAGNOSIS — N18.30 STAGE 3 CHRONIC KIDNEY DISEASE (HCC): ICD-10-CM

## 2020-02-18 DIAGNOSIS — E11.9 TYPE 2 DIABETES MELLITUS WITHOUT COMPLICATION, WITHOUT LONG-TERM CURRENT USE OF INSULIN (HCC): ICD-10-CM

## 2020-02-18 LAB
ALBUMIN SERPL BCP-MCNC: 3.3 G/DL (ref 3.5–5)
ALP SERPL-CCNC: 34 U/L (ref 46–116)
ALT SERPL W P-5'-P-CCNC: 20 U/L (ref 12–78)
ANION GAP SERPL CALCULATED.3IONS-SCNC: 5 MMOL/L (ref 4–13)
AST SERPL W P-5'-P-CCNC: 14 U/L (ref 5–45)
BACTERIA UR QL AUTO: ABNORMAL /HPF
BASOPHILS # BLD AUTO: 0.03 THOUSANDS/ΜL (ref 0–0.1)
BASOPHILS NFR BLD AUTO: 0 % (ref 0–1)
BILIRUB SERPL-MCNC: 0.27 MG/DL (ref 0.2–1)
BILIRUB UR QL STRIP: NEGATIVE
BUN SERPL-MCNC: 24 MG/DL (ref 5–25)
CALCIUM SERPL-MCNC: 9.3 MG/DL (ref 8.3–10.1)
CHLORIDE SERPL-SCNC: 105 MMOL/L (ref 100–108)
CHOLEST SERPL-MCNC: 159 MG/DL (ref 50–200)
CLARITY UR: ABNORMAL
CO2 SERPL-SCNC: 27 MMOL/L (ref 21–32)
COLOR UR: YELLOW
CREAT SERPL-MCNC: 1.4 MG/DL (ref 0.6–1.3)
CREAT UR-MCNC: 231 MG/DL
CREAT UR-MCNC: 231 MG/DL
EOSINOPHIL # BLD AUTO: 0.1 THOUSAND/ΜL (ref 0–0.61)
EOSINOPHIL NFR BLD AUTO: 1 % (ref 0–6)
ERYTHROCYTE [DISTWIDTH] IN BLOOD BY AUTOMATED COUNT: 13.6 % (ref 11.6–15.1)
EST. AVERAGE GLUCOSE BLD GHB EST-MCNC: 151 MG/DL
GFR SERPL CREATININE-BSD FRML MDRD: 37 ML/MIN/1.73SQ M
GLUCOSE P FAST SERPL-MCNC: 171 MG/DL (ref 65–99)
GLUCOSE UR STRIP-MCNC: NEGATIVE MG/DL
HBA1C MFR BLD: 6.9 %
HCT VFR BLD AUTO: 36.2 % (ref 34.8–46.1)
HDLC SERPL-MCNC: 37 MG/DL
HGB BLD-MCNC: 11.2 G/DL (ref 11.5–15.4)
HGB UR QL STRIP.AUTO: NEGATIVE
HYALINE CASTS #/AREA URNS LPF: ABNORMAL /LPF
IMM GRANULOCYTES # BLD AUTO: 0.02 THOUSAND/UL (ref 0–0.2)
IMM GRANULOCYTES NFR BLD AUTO: 0 % (ref 0–2)
KETONES UR STRIP-MCNC: NEGATIVE MG/DL
LDLC SERPL CALC-MCNC: 86 MG/DL (ref 0–100)
LEUKOCYTE ESTERASE UR QL STRIP: ABNORMAL
LYMPHOCYTES # BLD AUTO: 1.77 THOUSANDS/ΜL (ref 0.6–4.47)
LYMPHOCYTES NFR BLD AUTO: 24 % (ref 14–44)
MCH RBC QN AUTO: 30.4 PG (ref 26.8–34.3)
MCHC RBC AUTO-ENTMCNC: 30.9 G/DL (ref 31.4–37.4)
MCV RBC AUTO: 98 FL (ref 82–98)
MICROALBUMIN UR-MCNC: 39.4 MG/L (ref 0–20)
MICROALBUMIN/CREAT 24H UR: 17 MG/G CREATININE (ref 0–30)
MONOCYTES # BLD AUTO: 0.55 THOUSAND/ΜL (ref 0.17–1.22)
MONOCYTES NFR BLD AUTO: 7 % (ref 4–12)
NEUTROPHILS # BLD AUTO: 5.04 THOUSANDS/ΜL (ref 1.85–7.62)
NEUTS SEG NFR BLD AUTO: 68 % (ref 43–75)
NITRITE UR QL STRIP: POSITIVE
NON-SQ EPI CELLS URNS QL MICRO: ABNORMAL /HPF
NONHDLC SERPL-MCNC: 122 MG/DL
NRBC BLD AUTO-RTO: 0 /100 WBCS
OTHER STN SPEC: ABNORMAL
PH UR STRIP.AUTO: 5.5 [PH]
PLATELET # BLD AUTO: 230 THOUSANDS/UL (ref 149–390)
PMV BLD AUTO: 10.5 FL (ref 8.9–12.7)
POTASSIUM SERPL-SCNC: 4.9 MMOL/L (ref 3.5–5.3)
PROT SERPL-MCNC: 6.9 G/DL (ref 6.4–8.2)
PROT UR STRIP-MCNC: NEGATIVE MG/DL
PROT UR-MCNC: 18 MG/DL
PROT/CREAT UR: 0.08 MG/G{CREAT} (ref 0–0.1)
RBC # BLD AUTO: 3.68 MILLION/UL (ref 3.81–5.12)
RBC #/AREA URNS AUTO: ABNORMAL /HPF
SODIUM SERPL-SCNC: 137 MMOL/L (ref 136–145)
SP GR UR STRIP.AUTO: 1.02 (ref 1–1.03)
TRIGL SERPL-MCNC: 179 MG/DL
URATE SERPL-MCNC: 4.1 MG/DL (ref 2–6.8)
UROBILINOGEN UR QL STRIP.AUTO: 0.2 E.U./DL
WBC # BLD AUTO: 7.51 THOUSAND/UL (ref 4.31–10.16)
WBC #/AREA URNS AUTO: ABNORMAL /HPF

## 2020-02-18 PROCEDURE — 81001 URINALYSIS AUTO W/SCOPE: CPT

## 2020-02-18 PROCEDURE — 82043 UR ALBUMIN QUANTITATIVE: CPT

## 2020-02-18 PROCEDURE — 80053 COMPREHEN METABOLIC PANEL: CPT

## 2020-02-18 PROCEDURE — 80061 LIPID PANEL: CPT

## 2020-02-18 PROCEDURE — 83036 HEMOGLOBIN GLYCOSYLATED A1C: CPT

## 2020-02-18 PROCEDURE — 82570 ASSAY OF URINE CREATININE: CPT

## 2020-02-18 PROCEDURE — 85025 COMPLETE CBC W/AUTO DIFF WBC: CPT

## 2020-02-18 PROCEDURE — 84156 ASSAY OF PROTEIN URINE: CPT

## 2020-02-18 PROCEDURE — 84550 ASSAY OF BLOOD/URIC ACID: CPT

## 2020-02-18 PROCEDURE — 36415 COLL VENOUS BLD VENIPUNCTURE: CPT

## 2020-02-20 DIAGNOSIS — R30.0 DYSURIA: Primary | ICD-10-CM

## 2020-02-21 ENCOUNTER — APPOINTMENT (OUTPATIENT)
Dept: LAB | Facility: CLINIC | Age: 75
End: 2020-02-21
Payer: MEDICARE

## 2020-02-21 DIAGNOSIS — R30.0 DYSURIA: ICD-10-CM

## 2020-02-21 PROCEDURE — 87086 URINE CULTURE/COLONY COUNT: CPT

## 2020-02-21 PROCEDURE — 87077 CULTURE AEROBIC IDENTIFY: CPT

## 2020-02-21 PROCEDURE — 87186 SC STD MICRODIL/AGAR DIL: CPT

## 2020-02-23 LAB — BACTERIA UR CULT: ABNORMAL

## 2020-02-24 ENCOUNTER — OFFICE VISIT (OUTPATIENT)
Dept: NEPHROLOGY | Facility: CLINIC | Age: 75
End: 2020-02-24
Payer: MEDICARE

## 2020-02-24 ENCOUNTER — TELEPHONE (OUTPATIENT)
Dept: NEPHROLOGY | Facility: CLINIC | Age: 75
End: 2020-02-24

## 2020-02-24 VITALS
SYSTOLIC BLOOD PRESSURE: 138 MMHG | HEIGHT: 61 IN | HEART RATE: 86 BPM | WEIGHT: 170 LBS | BODY MASS INDEX: 32.1 KG/M2 | DIASTOLIC BLOOD PRESSURE: 78 MMHG | OXYGEN SATURATION: 98 %

## 2020-02-24 DIAGNOSIS — E78.2 MIXED HYPERLIPIDEMIA: ICD-10-CM

## 2020-02-24 DIAGNOSIS — N39.0 URINARY TRACT INFECTION WITHOUT HEMATURIA, SITE UNSPECIFIED: Primary | ICD-10-CM

## 2020-02-24 DIAGNOSIS — E11.9 TYPE 2 DIABETES MELLITUS WITHOUT COMPLICATION, WITHOUT LONG-TERM CURRENT USE OF INSULIN (HCC): ICD-10-CM

## 2020-02-24 DIAGNOSIS — I10 ESSENTIAL HYPERTENSION: ICD-10-CM

## 2020-02-24 DIAGNOSIS — E03.9 ACQUIRED HYPOTHYROIDISM: ICD-10-CM

## 2020-02-24 DIAGNOSIS — N18.30 STAGE 3 CHRONIC KIDNEY DISEASE (HCC): ICD-10-CM

## 2020-02-24 PROCEDURE — 99214 OFFICE O/P EST MOD 30 MIN: CPT | Performed by: INTERNAL MEDICINE

## 2020-02-24 RX ORDER — AMOXICILLIN AND CLAVULANATE POTASSIUM 500; 125 MG/1; MG/1
1 TABLET, FILM COATED ORAL EVERY 12 HOURS SCHEDULED
Qty: 14 TABLET | Refills: 0 | Status: SHIPPED | OUTPATIENT
Start: 2020-02-24 | End: 2020-03-02

## 2020-02-24 RX ORDER — FENOFIBRATE 40 MG/1
40 TABLET ORAL DAILY
Qty: 90 TABLET | Refills: 3 | Status: SHIPPED | OUTPATIENT
Start: 2020-02-24 | End: 2020-06-30

## 2020-02-24 NOTE — PROGRESS NOTES
[address] [facility] [place], [place]    Name: Supriya Richardson  YOB: 1945      Assessment/Plan:  Kidney function is reduced  She will stop metformin and will reduce the dose of fenofibrate to 40 mg at bedtime  Remainder of labs are fine  She has an E coli urinary tract infection and will take Augmentin for 7 days twice a day with a lot of water  She will take it with food to avoid diarrhea  She will check a urine culture in a week after completing the antibiotic for complete resolution  She has lost 30 lb with the use of Actos and following the diet  Will see her back in start reducing the medications  If she she starts having low fasting sugars will cut back the glipizide dose at night         Problem List Items Addressed This Visit        Endocrine    Type 2 diabetes mellitus without complication, without long-term current use of insulin (HCC)    Relevant Medications    fenofibrate (FENOGLIDE) 40 MG TABS    amoxicillin-clavulanate (AUGMENTIN) 500-125 mg per tablet    Other Relevant Orders    CBC and differential    Comprehensive metabolic panel    LDL cholesterol, direct    Triglycerides    Microalbumin / creatinine urine ratio       Cardiovascular and Mediastinum    Essential hypertension       Genitourinary    Stage 3 chronic kidney disease (HCC)    Urinary tract infection without hematuria - Primary    Relevant Medications    amoxicillin-clavulanate (AUGMENTIN) 500-125 mg per tablet       Other    Mixed hyperlipidemia    Relevant Medications    fenofibrate (FENOGLIDE) 40 MG TABS    Other Relevant Orders    LDL cholesterol, direct    Triglycerides            Subjective:      Patient ID: Supriya Richardson is a 76 y o  female  HPI  She has a history of hypertension and type 2 DM  She lost 30 lbs with Victoza and diet  She is avoiding salt and trying to exercise  She has CKD 3   Last A12 is 6 9  Blood pressure is controlled  She has a UTI 80-89,000 E   Coli sensitive to Augmentin  Uric acid 4 1 MAC 17  PCR neg  LDL cholesterol 86    The following portions of the patient's history were reviewed and updated as appropriate: allergies, current medications, past family history, past medical history, past social history, past surgical history and problem list     Review of Systems   Constitutional: Negative for activity change, appetite change, fatigue and unexpected weight change  HENT: Negative for congestion, ear discharge, ear pain, trouble swallowing and voice change  Eyes: Negative for pain, discharge and visual disturbance  Respiratory: Negative for cough, chest tightness, shortness of breath and wheezing  Cardiovascular: Negative for chest pain, palpitations and leg swelling  Gastrointestinal: Negative for abdominal pain, blood in stool, constipation, diarrhea, nausea and vomiting  Endocrine: Negative for heat intolerance, polydipsia, polyphagia and polyuria  Genitourinary: Positive for difficulty urinating and dysuria  Negative for decreased urine volume, frequency and urgency  Taking cranberry juice   Musculoskeletal: Negative for arthralgias, back pain and gait problem  Skin: Negative for color change and rash  Neurological: Negative for dizziness, weakness and headaches           Social History     Socioeconomic History    Marital status: /Civil Union     Spouse name: Bruna Qiu Number of children: None    Years of education: None    Highest education level: None   Occupational History    Occupation: Retired    Social Needs    Financial resource strain: None    Food insecurity:     Worry: None     Inability: None    Transportation needs:     Medical: None     Non-medical: None   Tobacco Use    Smoking status: Former Smoker    Smokeless tobacco: Never Used   Substance and Sexual Activity    Alcohol use: Yes     Comment: Occasionally     Drug use: None     Comment: Denies drug use - As per [employer] Sexual activity: None Lifestyle    Physical activity:     Days per week: None     Minutes per session: None    Stress: None   Relationships    Social connections:     Talks on phone: None     Gets together: None     Attends Evangelical service: None     Active member of club or organization: None     Attends meetings of clubs or organizations: None     Relationship status: None    Intimate partner violence:     Fear of current or ex partner: None     Emotionally abused: None     Physically abused: None     Forced sexual activity: None   Other Topics Concern    None   Social History Narrative     - Karolina Olson is [place] and speaks [place], common law marriage    Does not consume caffeine      Past Medical History:   Diagnosis Date    Benign essential hypertension     Chronic kidney disease, stage 2 (mild)     Chronic kidney disease, stage 3 (HCC)     Disorder of gallbladder     Disorder of skin and subcutaneous tissue     Dyspnea     Essential hypertension     Hyperlipidemia     Hypokalemia     Hypothyroidism     Malaise and fatigue     Mixed hyperlipidemia     Morbid obesity ([address] )     Pernicious anemia     Type 2 diabetes mellitus ([facility] [address] )      Past Surgical History:   Procedure Laterality Date    BREAST BIOPSY Right     CARPAL TUNNEL RELEASE Bilateral    Gurinder Angle      Dr Katerina Carlson    [facility]        Current Outpatient Medications:     amLODIPine (NORVASC) 5 mg tablet, Take 1 tablet (5 mg total) by mouth daily, Disp: 90 tablet, Rfl: 3    fenofibrate (FENOGLIDE) 40 MG TABS, Take 1 tablet (40 mg total) by mouth daily At bedtime, Disp: 90 tablet, Rfl: 3    glipiZIDE (GLUCOTROL) 5 mg tablet, Take 1 tablet (5 mg total) by mouth 2 (two) times a day before meals, Disp: 180 tablet, Rfl: 3    levothyroxine 50 mcg tablet, Take 1 tablet (50 mcg total) by mouth daily, Disp: 90 tablet, Rfl: 3    liraglutide (VICTOZA) injection, Inject 0 2 mL (1 2 mg total) under the skin daily, Disp: 6 pen, Rfl: 4    lisinopril (ZESTRIL) 20 mg tablet, Take 1 tablet (20 mg total) by mouth daily, Disp: 90 tablet, Rfl: 3    metoprolol succinate (TOPROL-XL) 50 mg 24 hr tablet, , Disp: , Rfl: 0    amoxicillin-clavulanate (AUGMENTIN) 500-125 mg per tablet, Take 1 tablet by mouth every 12 (twelve) hours for 7 days, Disp: 14 tablet, Rfl: 0    Lab Results   Component Value Date    SODIUM 137 02/18/2020    K 4 9 02/18/2020     02/18/2020    CO2 27 02/18/2020    AGAP 5 02/18/2020    BUN 24 02/18/2020    CREATININE 1 40 (H) 02/18/2020    GLUF 171 (H) 02/18/2020    CALCIUM 9 3 02/18/2020    AST 14 02/18/2020    ALT 20 02/18/2020    ALKPHOS 34 (L) 02/18/2020    TP 6 9 02/18/2020    TBILI 0 27 02/18/2020    EGFR 37 02/18/2020     Lab Results   Component Value Date    WBC 7 51 02/18/2020    HGB 11 2 (L) 02/18/2020    HCT 36 2 02/18/2020    MCV 98 02/18/2020     02/18/2020     Lab Results   Component Value Date    CHOLESTEROL 159 02/18/2020    CHOLESTEROL 159 11/13/2019    CHOLESTEROL 160 05/31/2019     Lab Results   Component Value Date    HDL 37 (L) 02/18/2020    HDL 38 (L) 11/13/2019    HDL 43 05/31/2019     Lab Results   Component Value Date    LDLCALC 86 02/18/2020    LDLCALC 82 11/13/2019    LDLCALC 85 05/31/2019     Lab Results   Component Value Date    TRIG 179 (H) 02/18/2020    TRIG 194 (H) 11/13/2019    TRIG 160 05/31/2019     No results found for: CHOLHDL  Lab Results   Component Value Date    [id number] 1 900 11/13/2019     Lab Results   Component Value Date    CALCIUM 9 3 02/18/2020     No results found for: SPEP, UPEP  No results found for: MICROALBUR, [id number]        Objective:      /78 (BP Location: Left arm, Patient Position: Sitting, Cuff Size: Standard)   Pulse 86   Ht 5' 1" (1 549 m)   Wt 77 1 kg (170 lb)   SpO2 98%   BMI 32 12 kg/m²          Physical Exam   Constitutional: She is oriented to person, place, and time   She appears well-developed and well-nourished  No distress  HENT:   Head: Normocephalic  Right Ear: External ear normal    Left Ear: External ear normal    Nose: Nose normal    Mouth/Throat: Oropharynx is clear and moist    Eyes: Pupils are equal, round, and reactive to light  Conjunctivae are normal    Cardiovascular: Normal rate, regular rhythm and normal heart sounds  No murmur heard  Pulmonary/Chest: Effort normal and breath sounds normal  No respiratory distress  She has no wheezes  She has no rales  Abdominal: Soft  Bowel sounds are normal  She exhibits no distension  There is no tenderness  There is no rebound and no guarding  Musculoskeletal: Normal range of motion  She exhibits no edema  Neurological: She is alert and oriented to person, place, and time  Skin: Skin is warm and dry  Capillary refill takes less than 2 seconds  She is not diaphoretic  Psychiatric: She has a normal mood and affect

## 2020-02-24 NOTE — TELEPHONE ENCOUNTER
Pt states that she cannot afford the fenofibrate  It would cost her $115 for a 30 day supply  Please recommend something different for patient

## 2020-03-12 ENCOUNTER — APPOINTMENT (OUTPATIENT)
Dept: LAB | Facility: CLINIC | Age: 75
End: 2020-03-12
Payer: MEDICARE

## 2020-03-12 DIAGNOSIS — N39.0 URINARY TRACT INFECTION WITHOUT HEMATURIA, SITE UNSPECIFIED: ICD-10-CM

## 2020-03-12 DIAGNOSIS — E11.9 TYPE 2 DIABETES MELLITUS WITHOUT COMPLICATION, WITHOUT LONG-TERM CURRENT USE OF INSULIN (HCC): ICD-10-CM

## 2020-03-12 PROCEDURE — 87086 URINE CULTURE/COLONY COUNT: CPT

## 2020-03-14 LAB — BACTERIA UR CULT: NORMAL

## 2020-03-20 ENCOUNTER — TELEPHONE (OUTPATIENT)
Dept: NEPHROLOGY | Facility: CLINIC | Age: 75
End: 2020-03-20

## 2020-03-20 NOTE — TELEPHONE ENCOUNTER
Patient called stating that her blood sugar has been over 200  She states that a couple of times last week it was over 300  She states that at her last visit she was taken off the metformin  She wants to know what she should do?

## 2020-03-23 NOTE — TELEPHONE ENCOUNTER
Patient is wondering if she can just go back on the metformin  She states that her sugars were controlled then

## 2020-03-24 DIAGNOSIS — E11.9 TYPE 2 DIABETES MELLITUS WITHOUT COMPLICATION, WITHOUT LONG-TERM CURRENT USE OF INSULIN (HCC): Primary | ICD-10-CM

## 2020-03-31 ENCOUNTER — TELEPHONE (OUTPATIENT)
Dept: NEPHROLOGY | Facility: CLINIC | Age: 75
End: 2020-03-31

## 2020-03-31 DIAGNOSIS — E11.9 TYPE 2 DIABETES MELLITUS WITHOUT COMPLICATION, WITHOUT LONG-TERM CURRENT USE OF INSULIN (HCC): Primary | ICD-10-CM

## 2020-04-01 ENCOUNTER — TELEPHONE (OUTPATIENT)
Dept: NEPHROLOGY | Facility: CLINIC | Age: 75
End: 2020-04-01

## 2020-04-09 ENCOUNTER — TELEPHONE (OUTPATIENT)
Dept: NEPHROLOGY | Facility: CLINIC | Age: 75
End: 2020-04-09

## 2020-04-14 ENCOUNTER — TELEPHONE (OUTPATIENT)
Dept: NEPHROLOGY | Facility: CLINIC | Age: 75
End: 2020-04-14

## 2020-04-15 ENCOUNTER — TELEPHONE (OUTPATIENT)
Dept: NEPHROLOGY | Facility: CLINIC | Age: 75
End: 2020-04-15

## 2020-04-16 ENCOUNTER — TELEPHONE (OUTPATIENT)
Dept: NEPHROLOGY | Facility: CLINIC | Age: 75
End: 2020-04-16

## 2020-04-17 ENCOUNTER — TELEPHONE (OUTPATIENT)
Dept: NEPHROLOGY | Facility: CLINIC | Age: 75
End: 2020-04-17

## 2020-04-20 ENCOUNTER — TELEPHONE (OUTPATIENT)
Dept: NEPHROLOGY | Facility: CLINIC | Age: 75
End: 2020-04-20

## 2020-04-24 ENCOUNTER — TELEPHONE (OUTPATIENT)
Dept: NEPHROLOGY | Facility: CLINIC | Age: 75
End: 2020-04-24

## 2020-04-24 DIAGNOSIS — E11.9 TYPE 2 DIABETES MELLITUS WITHOUT COMPLICATION, WITHOUT LONG-TERM CURRENT USE OF INSULIN (HCC): ICD-10-CM

## 2020-04-26 RX ORDER — SITAGLIPTIN 50 MG/1
TABLET, FILM COATED ORAL
Qty: 30 TABLET | Refills: 1 | Status: SHIPPED | OUTPATIENT
Start: 2020-04-26 | End: 2020-05-18

## 2020-05-05 ENCOUNTER — TELEPHONE (OUTPATIENT)
Dept: NEPHROLOGY | Facility: CLINIC | Age: 75
End: 2020-05-05

## 2020-05-05 DIAGNOSIS — E11.9 TYPE 2 DIABETES MELLITUS WITHOUT COMPLICATION, WITHOUT LONG-TERM CURRENT USE OF INSULIN (HCC): ICD-10-CM

## 2020-05-16 DIAGNOSIS — E11.9 TYPE 2 DIABETES MELLITUS WITHOUT COMPLICATION, WITHOUT LONG-TERM CURRENT USE OF INSULIN (HCC): ICD-10-CM

## 2020-05-18 RX ORDER — SITAGLIPTIN 50 MG/1
TABLET, FILM COATED ORAL
Qty: 30 TABLET | Refills: 1 | Status: SHIPPED | OUTPATIENT
Start: 2020-05-18 | End: 2020-08-28

## 2020-06-03 ENCOUNTER — TELEPHONE (OUTPATIENT)
Dept: NEPHROLOGY | Facility: CLINIC | Age: 75
End: 2020-06-03

## 2020-06-03 DIAGNOSIS — E11.9 TYPE 2 DIABETES MELLITUS WITHOUT COMPLICATION, WITHOUT LONG-TERM CURRENT USE OF INSULIN (HCC): Primary | ICD-10-CM

## 2020-06-03 RX ORDER — LANCETS 28 GAUGE
EACH MISCELLANEOUS
Qty: 30 EACH | Refills: 5 | Status: SHIPPED | OUTPATIENT
Start: 2020-06-03 | End: 2021-09-07 | Stop reason: SDUPTHER

## 2020-06-03 RX ORDER — BLOOD-GLUCOSE METER
1 KIT MISCELLANEOUS DAILY
Qty: 1 EACH | Refills: 0 | Status: SHIPPED | OUTPATIENT
Start: 2020-06-03

## 2020-06-12 ENCOUNTER — TELEPHONE (OUTPATIENT)
Dept: NEPHROLOGY | Facility: CLINIC | Age: 75
End: 2020-06-12

## 2020-06-15 ENCOUNTER — TELEPHONE (OUTPATIENT)
Dept: NEPHROLOGY | Facility: CLINIC | Age: 75
End: 2020-06-15

## 2020-06-16 ENCOUNTER — TELEPHONE (OUTPATIENT)
Dept: NEPHROLOGY | Facility: CLINIC | Age: 75
End: 2020-06-16

## 2020-06-18 ENCOUNTER — TELEPHONE (OUTPATIENT)
Dept: NEPHROLOGY | Facility: CLINIC | Age: 75
End: 2020-06-18

## 2020-06-19 ENCOUNTER — TELEPHONE (OUTPATIENT)
Dept: NEPHROLOGY | Facility: CLINIC | Age: 75
End: 2020-06-19

## 2020-06-20 DIAGNOSIS — I10 ESSENTIAL HYPERTENSION: Primary | ICD-10-CM

## 2020-06-20 RX ORDER — METOPROLOL SUCCINATE 50 MG/1
TABLET, EXTENDED RELEASE ORAL
Qty: 90 TABLET | Refills: 5 | Status: SHIPPED | OUTPATIENT
Start: 2020-06-20 | End: 2021-09-17

## 2020-06-23 ENCOUNTER — TELEPHONE (OUTPATIENT)
Dept: NEPHROLOGY | Facility: CLINIC | Age: 75
End: 2020-06-23

## 2020-06-24 ENCOUNTER — TELEPHONE (OUTPATIENT)
Dept: NEPHROLOGY | Facility: CLINIC | Age: 75
End: 2020-06-24

## 2020-06-24 ENCOUNTER — APPOINTMENT (OUTPATIENT)
Dept: LAB | Facility: CLINIC | Age: 75
End: 2020-06-24
Payer: MEDICARE

## 2020-06-24 DIAGNOSIS — E78.2 MIXED HYPERLIPIDEMIA: ICD-10-CM

## 2020-06-24 DIAGNOSIS — E11.9 TYPE 2 DIABETES MELLITUS WITHOUT COMPLICATION, WITHOUT LONG-TERM CURRENT USE OF INSULIN (HCC): ICD-10-CM

## 2020-06-24 LAB
ALBUMIN SERPL BCP-MCNC: 3 G/DL (ref 3.5–5)
ALP SERPL-CCNC: 66 U/L (ref 46–116)
ALT SERPL W P-5'-P-CCNC: 29 U/L (ref 12–78)
ANION GAP SERPL CALCULATED.3IONS-SCNC: 2 MMOL/L (ref 4–13)
AST SERPL W P-5'-P-CCNC: 18 U/L (ref 5–45)
BASOPHILS # BLD AUTO: 0.06 THOUSANDS/ΜL (ref 0–0.1)
BASOPHILS NFR BLD AUTO: 1 % (ref 0–1)
BILIRUB SERPL-MCNC: 0.27 MG/DL (ref 0.2–1)
BUN SERPL-MCNC: 17 MG/DL (ref 5–25)
CALCIUM SERPL-MCNC: 8.6 MG/DL (ref 8.3–10.1)
CHLORIDE SERPL-SCNC: 109 MMOL/L (ref 100–108)
CO2 SERPL-SCNC: 26 MMOL/L (ref 21–32)
CREAT SERPL-MCNC: 1.34 MG/DL (ref 0.6–1.3)
EOSINOPHIL # BLD AUTO: 0.05 THOUSAND/ΜL (ref 0–0.61)
EOSINOPHIL NFR BLD AUTO: 1 % (ref 0–6)
ERYTHROCYTE [DISTWIDTH] IN BLOOD BY AUTOMATED COUNT: 13.5 % (ref 11.6–15.1)
GFR SERPL CREATININE-BSD FRML MDRD: 39 ML/MIN/1.73SQ M
GLUCOSE P FAST SERPL-MCNC: 83 MG/DL (ref 65–99)
HCT VFR BLD AUTO: 38.5 % (ref 34.8–46.1)
HGB BLD-MCNC: 11.8 G/DL (ref 11.5–15.4)
IMM GRANULOCYTES # BLD AUTO: 0.02 THOUSAND/UL (ref 0–0.2)
IMM GRANULOCYTES NFR BLD AUTO: 0 % (ref 0–2)
LDLC SERPL DIRECT ASSAY-MCNC: 94 MG/DL (ref 0–100)
LYMPHOCYTES # BLD AUTO: 1.75 THOUSANDS/ΜL (ref 0.6–4.47)
LYMPHOCYTES NFR BLD AUTO: 26 % (ref 14–44)
MCH RBC QN AUTO: 29.6 PG (ref 26.8–34.3)
MCHC RBC AUTO-ENTMCNC: 30.6 G/DL (ref 31.4–37.4)
MCV RBC AUTO: 97 FL (ref 82–98)
MONOCYTES # BLD AUTO: 0.62 THOUSAND/ΜL (ref 0.17–1.22)
MONOCYTES NFR BLD AUTO: 9 % (ref 4–12)
NEUTROPHILS # BLD AUTO: 4.23 THOUSANDS/ΜL (ref 1.85–7.62)
NEUTS SEG NFR BLD AUTO: 63 % (ref 43–75)
NRBC BLD AUTO-RTO: 0 /100 WBCS
PLATELET # BLD AUTO: 132 THOUSANDS/UL (ref 149–390)
PMV BLD AUTO: 12.8 FL (ref 8.9–12.7)
POTASSIUM SERPL-SCNC: 4.2 MMOL/L (ref 3.5–5.3)
PROT SERPL-MCNC: 6.5 G/DL (ref 6.4–8.2)
RBC # BLD AUTO: 3.99 MILLION/UL (ref 3.81–5.12)
SODIUM SERPL-SCNC: 137 MMOL/L (ref 136–145)
TRIGL SERPL-MCNC: 183 MG/DL
WBC # BLD AUTO: 6.73 THOUSAND/UL (ref 4.31–10.16)

## 2020-06-24 PROCEDURE — 84478 ASSAY OF TRIGLYCERIDES: CPT

## 2020-06-24 PROCEDURE — 85025 COMPLETE CBC W/AUTO DIFF WBC: CPT

## 2020-06-24 PROCEDURE — 80053 COMPREHEN METABOLIC PANEL: CPT

## 2020-06-24 PROCEDURE — 36415 COLL VENOUS BLD VENIPUNCTURE: CPT

## 2020-06-24 PROCEDURE — 83721 ASSAY OF BLOOD LIPOPROTEIN: CPT

## 2020-06-29 ENCOUNTER — TELEPHONE (OUTPATIENT)
Dept: NEPHROLOGY | Facility: CLINIC | Age: 75
End: 2020-06-29

## 2020-06-30 ENCOUNTER — OFFICE VISIT (OUTPATIENT)
Dept: NEPHROLOGY | Facility: CLINIC | Age: 75
End: 2020-06-30
Payer: MEDICARE

## 2020-06-30 VITALS
OXYGEN SATURATION: 98 % | SYSTOLIC BLOOD PRESSURE: 162 MMHG | BODY MASS INDEX: 34.74 KG/M2 | HEIGHT: 61 IN | WEIGHT: 184 LBS | TEMPERATURE: 97 F | HEART RATE: 81 BPM | DIASTOLIC BLOOD PRESSURE: 84 MMHG

## 2020-06-30 DIAGNOSIS — E78.2 MIXED HYPERLIPIDEMIA: ICD-10-CM

## 2020-06-30 DIAGNOSIS — E03.9 ACQUIRED HYPOTHYROIDISM: Primary | ICD-10-CM

## 2020-06-30 DIAGNOSIS — N18.30 STAGE 3 CHRONIC KIDNEY DISEASE (HCC): ICD-10-CM

## 2020-06-30 DIAGNOSIS — E11.9 TYPE 2 DIABETES MELLITUS WITHOUT COMPLICATION, WITHOUT LONG-TERM CURRENT USE OF INSULIN (HCC): ICD-10-CM

## 2020-06-30 LAB — SL AMB POCT HEMOGLOBIN AIC: 8.1 (ref ?–6.5)

## 2020-06-30 PROCEDURE — 83036 HEMOGLOBIN GLYCOSYLATED A1C: CPT | Performed by: INTERNAL MEDICINE

## 2020-06-30 PROCEDURE — 99215 OFFICE O/P EST HI 40 MIN: CPT | Performed by: INTERNAL MEDICINE

## 2020-06-30 RX ORDER — GLIPIZIDE 5 MG/1
7.5 TABLET ORAL DAILY
Qty: 45 TABLET | Refills: 3 | Status: SHIPPED | OUTPATIENT
Start: 2020-06-30 | End: 2021-02-22

## 2020-07-06 ENCOUNTER — HOSPITAL ENCOUNTER (OUTPATIENT)
Dept: ULTRASOUND IMAGING | Facility: HOSPITAL | Age: 75
Discharge: HOME | End: 2020-07-06
Attending: INTERNAL MEDICINE
Payer: MEDICARE

## 2020-07-06 ENCOUNTER — APPOINTMENT (OUTPATIENT)
Dept: LAB | Facility: HOSPITAL | Age: 75
End: 2020-07-06
Attending: INTERNAL MEDICINE
Payer: MEDICARE

## 2020-07-06 DIAGNOSIS — N18.30 STAGE 3 CHRONIC KIDNEY DISEASE (HCC): ICD-10-CM

## 2020-07-06 DIAGNOSIS — E11.9 TYPE 2 DIABETES MELLITUS WITHOUT COMPLICATION, WITHOUT LONG-TERM CURRENT USE OF INSULIN (HCC): ICD-10-CM

## 2020-07-06 PROCEDURE — 84681 ASSAY OF C-PEPTIDE: CPT

## 2020-07-06 PROCEDURE — 76770 US EXAM ABDO BACK WALL COMP: CPT

## 2020-07-06 PROCEDURE — 76705 ECHO EXAM OF ABDOMEN: CPT

## 2020-07-06 PROCEDURE — 36415 COLL VENOUS BLD VENIPUNCTURE: CPT

## 2020-07-08 LAB — C PEPTIDE SERPL-MCNC: 8.3 NG/ML (ref 1.1–4.4)

## 2020-07-21 ENCOUNTER — TELEPHONE (OUTPATIENT)
Dept: NEPHROLOGY | Facility: CLINIC | Age: 75
End: 2020-07-21

## 2020-07-21 DIAGNOSIS — N39.0 URINARY TRACT INFECTION WITHOUT HEMATURIA, SITE UNSPECIFIED: Primary | ICD-10-CM

## 2020-07-21 NOTE — TELEPHONE ENCOUNTER
Blood sugars just recently have been elevated  Does not have any symptoms of UTI but will go for U/A to be sure   (please enter order) She will increase her long acting insulin by 4 units

## 2020-07-21 NOTE — TELEPHONE ENCOUNTER
Are these blood sugars elevated just recently, meaning that prior her blood sugars well controlled and now there are no longer controlled? If so, this she feel that she may have a urinary tract infection? If she has no symptoms we should check a urinalysis just to make sure, and then increase her long-acting insulin by 4 units

## 2020-07-27 ENCOUNTER — TRANSCRIBE ORDERS (OUTPATIENT)
Dept: URGENT CARE | Facility: CLINIC | Age: 75
End: 2020-07-27

## 2020-07-27 ENCOUNTER — APPOINTMENT (OUTPATIENT)
Dept: LAB | Facility: CLINIC | Age: 75
End: 2020-07-27
Payer: MEDICARE

## 2020-07-27 DIAGNOSIS — N39.0 URINARY TRACT INFECTION WITHOUT HEMATURIA, SITE UNSPECIFIED: Primary | ICD-10-CM

## 2020-07-27 LAB
BACTERIA UR QL AUTO: ABNORMAL /HPF
BILIRUB UR QL STRIP: NEGATIVE
CLARITY UR: CLEAR
COLOR UR: YELLOW
GLUCOSE UR STRIP-MCNC: ABNORMAL MG/DL
HGB UR QL STRIP.AUTO: NEGATIVE
HYALINE CASTS #/AREA URNS LPF: ABNORMAL /LPF
KETONES UR STRIP-MCNC: NEGATIVE MG/DL
LEUKOCYTE ESTERASE UR QL STRIP: NEGATIVE
NITRITE UR QL STRIP: NEGATIVE
NON-SQ EPI CELLS URNS QL MICRO: ABNORMAL /HPF
PH UR STRIP.AUTO: 6 [PH]
PROT UR STRIP-MCNC: NEGATIVE MG/DL
RBC #/AREA URNS AUTO: ABNORMAL /HPF
SP GR UR STRIP.AUTO: 1.01 (ref 1–1.03)
UROBILINOGEN UR QL STRIP.AUTO: 0.2 E.U./DL
WBC #/AREA URNS AUTO: ABNORMAL /HPF

## 2020-07-27 PROCEDURE — 81001 URINALYSIS AUTO W/SCOPE: CPT | Performed by: INTERNAL MEDICINE

## 2020-07-27 PROCEDURE — 87086 URINE CULTURE/COLONY COUNT: CPT

## 2020-07-31 ENCOUNTER — TELEPHONE (OUTPATIENT)
Dept: NEPHROLOGY | Facility: CLINIC | Age: 75
End: 2020-07-31

## 2020-07-31 NOTE — TELEPHONE ENCOUNTER
Increase lantus by 2 units every other day till the fasting sugar the following morning is 130 range

## 2020-07-31 NOTE — TELEPHONE ENCOUNTER
Patient called with sugars  177 before dinner last night  170 before breakfast today  She would like to know what you'd like her to do

## 2020-08-05 ENCOUNTER — TELEPHONE (OUTPATIENT)
Dept: NEPHROLOGY | Facility: CLINIC | Age: 75
End: 2020-08-05

## 2020-08-05 NOTE — TELEPHONE ENCOUNTER
Patient called asking if you recommend the shingles vaccine and if so can you please place a script in the computer for it

## 2020-08-06 DIAGNOSIS — B02.9 HERPES ZOSTER WITHOUT COMPLICATION: Primary | ICD-10-CM

## 2020-08-06 DIAGNOSIS — Z23 ENCOUNTER FOR ADMINISTRATION OF VACCINE: Primary | ICD-10-CM

## 2020-08-28 DIAGNOSIS — E11.9 TYPE 2 DIABETES MELLITUS WITHOUT COMPLICATION, WITHOUT LONG-TERM CURRENT USE OF INSULIN (HCC): ICD-10-CM

## 2020-08-28 RX ORDER — SITAGLIPTIN 50 MG/1
TABLET, FILM COATED ORAL
Qty: 30 TABLET | Refills: 1 | Status: SHIPPED | OUTPATIENT
Start: 2020-08-28 | End: 2020-11-16

## 2020-09-30 ENCOUNTER — TELEPHONE (OUTPATIENT)
Dept: NEPHROLOGY | Facility: CLINIC | Age: 75
End: 2020-09-30

## 2020-10-08 DIAGNOSIS — N18.31 STAGE 3A CHRONIC KIDNEY DISEASE (HCC): ICD-10-CM

## 2020-10-08 DIAGNOSIS — E78.2 MIXED HYPERLIPIDEMIA: ICD-10-CM

## 2020-10-08 DIAGNOSIS — E03.9 ACQUIRED HYPOTHYROIDISM: Primary | ICD-10-CM

## 2020-10-21 ENCOUNTER — LAB (OUTPATIENT)
Dept: LAB | Facility: CLINIC | Age: 75
End: 2020-10-21
Payer: MEDICARE

## 2020-10-21 DIAGNOSIS — E78.2 MIXED HYPERLIPIDEMIA: ICD-10-CM

## 2020-10-21 DIAGNOSIS — E03.9 ACQUIRED HYPOTHYROIDISM: ICD-10-CM

## 2020-10-21 DIAGNOSIS — N18.31 STAGE 3A CHRONIC KIDNEY DISEASE (HCC): ICD-10-CM

## 2020-10-21 LAB
ALBUMIN SERPL BCP-MCNC: 3.2 G/DL (ref 3.5–5)
ALP SERPL-CCNC: 61 U/L (ref 46–116)
ALT SERPL W P-5'-P-CCNC: 18 U/L (ref 12–78)
ANION GAP SERPL CALCULATED.3IONS-SCNC: 5 MMOL/L (ref 4–13)
AST SERPL W P-5'-P-CCNC: 11 U/L (ref 5–45)
BASOPHILS # BLD AUTO: 0.04 THOUSANDS/ΜL (ref 0–0.1)
BASOPHILS NFR BLD AUTO: 1 % (ref 0–1)
BILIRUB SERPL-MCNC: 0.34 MG/DL (ref 0.2–1)
BUN SERPL-MCNC: 28 MG/DL (ref 5–25)
CALCIUM ALBUM COR SERPL-MCNC: 10 MG/DL (ref 8.3–10.1)
CALCIUM SERPL-MCNC: 9.4 MG/DL (ref 8.3–10.1)
CHLORIDE SERPL-SCNC: 104 MMOL/L (ref 100–108)
CO2 SERPL-SCNC: 28 MMOL/L (ref 21–32)
CREAT SERPL-MCNC: 1.34 MG/DL (ref 0.6–1.3)
EOSINOPHIL # BLD AUTO: 0.12 THOUSAND/ΜL (ref 0–0.61)
EOSINOPHIL NFR BLD AUTO: 2 % (ref 0–6)
ERYTHROCYTE [DISTWIDTH] IN BLOOD BY AUTOMATED COUNT: 14.1 % (ref 11.6–15.1)
GFR SERPL CREATININE-BSD FRML MDRD: 39 ML/MIN/1.73SQ M
GLUCOSE P FAST SERPL-MCNC: 197 MG/DL (ref 65–99)
HCT VFR BLD AUTO: 37.4 % (ref 34.8–46.1)
HGB BLD-MCNC: 11.7 G/DL (ref 11.5–15.4)
IMM GRANULOCYTES # BLD AUTO: 0.04 THOUSAND/UL (ref 0–0.2)
IMM GRANULOCYTES NFR BLD AUTO: 1 % (ref 0–2)
LDLC SERPL DIRECT ASSAY-MCNC: 123 MG/DL (ref 0–100)
LYMPHOCYTES # BLD AUTO: 1.99 THOUSANDS/ΜL (ref 0.6–4.47)
LYMPHOCYTES NFR BLD AUTO: 26 % (ref 14–44)
MCH RBC QN AUTO: 29.3 PG (ref 26.8–34.3)
MCHC RBC AUTO-ENTMCNC: 31.3 G/DL (ref 31.4–37.4)
MCV RBC AUTO: 94 FL (ref 82–98)
MONOCYTES # BLD AUTO: 0.53 THOUSAND/ΜL (ref 0.17–1.22)
MONOCYTES NFR BLD AUTO: 7 % (ref 4–12)
NEUTROPHILS # BLD AUTO: 4.88 THOUSANDS/ΜL (ref 1.85–7.62)
NEUTS SEG NFR BLD AUTO: 63 % (ref 43–75)
NRBC BLD AUTO-RTO: 0 /100 WBCS
PLATELET # BLD AUTO: 192 THOUSANDS/UL (ref 149–390)
PMV BLD AUTO: 11.2 FL (ref 8.9–12.7)
POTASSIUM SERPL-SCNC: 4.3 MMOL/L (ref 3.5–5.3)
PROT SERPL-MCNC: 7.2 G/DL (ref 6.4–8.2)
RBC # BLD AUTO: 4 MILLION/UL (ref 3.81–5.12)
SODIUM SERPL-SCNC: 137 MMOL/L (ref 136–145)
T4 FREE SERPL-MCNC: 1.03 NG/DL (ref 0.76–1.46)
TRIGL SERPL-MCNC: 235 MG/DL
TSH SERPL DL<=0.05 MIU/L-ACNC: 2.72 UIU/ML (ref 0.36–3.74)
URATE SERPL-MCNC: 6.6 MG/DL (ref 2–6.8)
WBC # BLD AUTO: 7.6 THOUSAND/UL (ref 4.31–10.16)

## 2020-10-21 PROCEDURE — 84478 ASSAY OF TRIGLYCERIDES: CPT

## 2020-10-21 PROCEDURE — 84550 ASSAY OF BLOOD/URIC ACID: CPT

## 2020-10-21 PROCEDURE — 80053 COMPREHEN METABOLIC PANEL: CPT

## 2020-10-21 PROCEDURE — 84443 ASSAY THYROID STIM HORMONE: CPT

## 2020-10-21 PROCEDURE — 36415 COLL VENOUS BLD VENIPUNCTURE: CPT

## 2020-10-21 PROCEDURE — 85025 COMPLETE CBC W/AUTO DIFF WBC: CPT

## 2020-10-21 PROCEDURE — 84439 ASSAY OF FREE THYROXINE: CPT

## 2020-10-21 PROCEDURE — 83721 ASSAY OF BLOOD LIPOPROTEIN: CPT

## 2020-10-22 ENCOUNTER — TRANSCRIBE ORDERS (OUTPATIENT)
Dept: URGENT CARE | Facility: CLINIC | Age: 75
End: 2020-10-22

## 2020-10-22 ENCOUNTER — APPOINTMENT (OUTPATIENT)
Dept: LAB | Facility: CLINIC | Age: 75
End: 2020-10-22
Payer: MEDICARE

## 2020-10-22 DIAGNOSIS — N18.31 CHRONIC KIDNEY DISEASE (CKD) STAGE G3A/A1, MODERATELY DECREASED GLOMERULAR FILTRATION RATE (GFR) BETWEEN 45-59 ML/MIN/1.73 SQUARE METER AND ALBUMINURIA CREATININE RATIO LESS THAN 30 MG/G (HCC): Primary | ICD-10-CM

## 2020-10-22 LAB
BACTERIA UR QL AUTO: ABNORMAL /HPF
BILIRUB UR QL STRIP: NEGATIVE
CLARITY UR: CLEAR
COLOR UR: YELLOW
CREAT UR-MCNC: 61.9 MG/DL
CREAT UR-MCNC: 61.9 MG/DL
GLUCOSE UR STRIP-MCNC: ABNORMAL MG/DL
HGB UR QL STRIP.AUTO: NEGATIVE
KETONES UR STRIP-MCNC: NEGATIVE MG/DL
LEUKOCYTE ESTERASE UR QL STRIP: NEGATIVE
MICROALBUMIN UR-MCNC: <5 MG/L (ref 0–20)
MICROALBUMIN/CREAT 24H UR: <8 MG/G CREATININE (ref 0–30)
MUCOUS THREADS UR QL AUTO: ABNORMAL
NITRITE UR QL STRIP: NEGATIVE
NON-SQ EPI CELLS URNS QL MICRO: ABNORMAL /HPF
OTHER STN SPEC: ABNORMAL
PH UR STRIP.AUTO: 6 [PH]
PROT UR STRIP-MCNC: NEGATIVE MG/DL
PROT UR-MCNC: <6 MG/DL
PROT/CREAT UR: <0.1 MG/G{CREAT} (ref 0–0.1)
RBC #/AREA URNS AUTO: ABNORMAL /HPF
SP GR UR STRIP.AUTO: 1.01 (ref 1–1.03)
UROBILINOGEN UR QL STRIP.AUTO: 0.2 E.U./DL
WBC #/AREA URNS AUTO: ABNORMAL /HPF

## 2020-10-22 PROCEDURE — 84156 ASSAY OF PROTEIN URINE: CPT

## 2020-10-22 PROCEDURE — 82570 ASSAY OF URINE CREATININE: CPT

## 2020-10-22 PROCEDURE — 82043 UR ALBUMIN QUANTITATIVE: CPT

## 2020-10-22 PROCEDURE — 81001 URINALYSIS AUTO W/SCOPE: CPT

## 2020-10-26 ENCOUNTER — OFFICE VISIT (OUTPATIENT)
Dept: NEPHROLOGY | Facility: CLINIC | Age: 75
End: 2020-10-26
Payer: MEDICARE

## 2020-10-26 VITALS
WEIGHT: 199.2 LBS | TEMPERATURE: 97.2 F | DIASTOLIC BLOOD PRESSURE: 76 MMHG | HEART RATE: 86 BPM | SYSTOLIC BLOOD PRESSURE: 130 MMHG | OXYGEN SATURATION: 96 % | BODY MASS INDEX: 37.61 KG/M2 | HEIGHT: 61 IN

## 2020-10-26 DIAGNOSIS — I10 ESSENTIAL HYPERTENSION: ICD-10-CM

## 2020-10-26 DIAGNOSIS — E11.9 TYPE 2 DIABETES MELLITUS WITHOUT COMPLICATION, WITHOUT LONG-TERM CURRENT USE OF INSULIN (HCC): Primary | ICD-10-CM

## 2020-10-26 DIAGNOSIS — E78.2 MIXED HYPERLIPIDEMIA: ICD-10-CM

## 2020-10-26 DIAGNOSIS — E03.9 ACQUIRED HYPOTHYROIDISM: ICD-10-CM

## 2020-10-26 DIAGNOSIS — N18.32 STAGE 3B CHRONIC KIDNEY DISEASE (HCC): ICD-10-CM

## 2020-10-26 LAB — SL AMB POCT HEMOGLOBIN AIC: 9 (ref ?–6.5)

## 2020-10-26 PROCEDURE — 99215 OFFICE O/P EST HI 40 MIN: CPT | Performed by: INTERNAL MEDICINE

## 2020-10-26 PROCEDURE — 83036 HEMOGLOBIN GLYCOSYLATED A1C: CPT | Performed by: INTERNAL MEDICINE

## 2020-10-26 RX ORDER — ROSUVASTATIN CALCIUM 10 MG/1
10 TABLET, COATED ORAL DAILY
Qty: 90 TABLET | Refills: 1 | Status: SHIPPED | OUTPATIENT
Start: 2020-10-26 | End: 2021-04-23

## 2020-10-27 DIAGNOSIS — I10 ESSENTIAL HYPERTENSION: ICD-10-CM

## 2020-10-27 DIAGNOSIS — E78.2 MIXED HYPERLIPIDEMIA: ICD-10-CM

## 2020-10-27 DIAGNOSIS — E11.9 TYPE 2 DIABETES MELLITUS WITHOUT COMPLICATION, WITHOUT LONG-TERM CURRENT USE OF INSULIN (HCC): ICD-10-CM

## 2020-10-27 RX ORDER — LEVOTHYROXINE SODIUM 0.05 MG/1
TABLET ORAL
Qty: 90 TABLET | Refills: 3 | Status: SHIPPED | OUTPATIENT
Start: 2020-10-27 | End: 2021-07-27 | Stop reason: SDUPTHER

## 2020-10-27 RX ORDER — AMLODIPINE BESYLATE 5 MG/1
TABLET ORAL
Qty: 90 TABLET | Refills: 3 | Status: SHIPPED | OUTPATIENT
Start: 2020-10-27 | End: 2021-07-27 | Stop reason: SDUPTHER

## 2020-10-27 RX ORDER — INSULIN GLARGINE 100 [IU]/ML
INJECTION, SOLUTION SUBCUTANEOUS
Qty: 15 ML | Refills: 3 | Status: SHIPPED | OUTPATIENT
Start: 2020-10-27 | End: 2021-02-25

## 2020-11-13 DIAGNOSIS — E11.9 TYPE 2 DIABETES MELLITUS WITHOUT COMPLICATION, WITHOUT LONG-TERM CURRENT USE OF INSULIN (HCC): ICD-10-CM

## 2020-11-16 RX ORDER — SITAGLIPTIN 50 MG/1
TABLET, FILM COATED ORAL
Qty: 30 TABLET | Refills: 1 | Status: SHIPPED | OUTPATIENT
Start: 2020-11-16 | End: 2021-02-25

## 2020-12-24 ENCOUNTER — APPOINTMENT (EMERGENCY)
Dept: NON INVASIVE DIAGNOSTICS | Facility: HOSPITAL | Age: 75
End: 2020-12-24
Payer: MEDICARE

## 2020-12-24 ENCOUNTER — TELEPHONE (OUTPATIENT)
Dept: NEPHROLOGY | Facility: CLINIC | Age: 75
End: 2020-12-24

## 2020-12-24 ENCOUNTER — OFFICE VISIT (OUTPATIENT)
Dept: URGENT CARE | Facility: CLINIC | Age: 75
End: 2020-12-24
Payer: MEDICARE

## 2020-12-24 ENCOUNTER — HOSPITAL ENCOUNTER (EMERGENCY)
Facility: HOSPITAL | Age: 75
Discharge: HOME | End: 2020-12-24
Attending: EMERGENCY MEDICINE | Admitting: EMERGENCY MEDICINE
Payer: MEDICARE

## 2020-12-24 VITALS
TEMPERATURE: 97.2 F | OXYGEN SATURATION: 98 % | RESPIRATION RATE: 18 BRPM | HEART RATE: 83 BPM | SYSTOLIC BLOOD PRESSURE: 180 MMHG | WEIGHT: 199 LBS | DIASTOLIC BLOOD PRESSURE: 78 MMHG | HEIGHT: 62 IN | BODY MASS INDEX: 36.62 KG/M2

## 2020-12-24 VITALS
BODY MASS INDEX: 36.62 KG/M2 | RESPIRATION RATE: 16 BRPM | OXYGEN SATURATION: 98 % | HEIGHT: 62 IN | SYSTOLIC BLOOD PRESSURE: 183 MMHG | TEMPERATURE: 97.8 F | DIASTOLIC BLOOD PRESSURE: 82 MMHG | WEIGHT: 199 LBS | HEART RATE: 89 BPM

## 2020-12-24 DIAGNOSIS — R73.09 ELEVATED GLUCOSE: ICD-10-CM

## 2020-12-24 DIAGNOSIS — M79.89 LEG SWELLING: Primary | ICD-10-CM

## 2020-12-24 DIAGNOSIS — M79.89 LEFT LEG SWELLING: Primary | ICD-10-CM

## 2020-12-24 LAB — GLUCOSE SERPL-MCNC: 211 MG/DL (ref 65–140)

## 2020-12-24 PROCEDURE — 93971 EXTREMITY STUDY: CPT

## 2020-12-24 PROCEDURE — G0463 HOSPITAL OUTPT CLINIC VISIT: HCPCS | Performed by: NURSE PRACTITIONER

## 2020-12-24 PROCEDURE — 99284 EMERGENCY DEPT VISIT MOD MDM: CPT | Performed by: EMERGENCY MEDICINE

## 2020-12-24 PROCEDURE — 99284 EMERGENCY DEPT VISIT MOD MDM: CPT

## 2020-12-24 PROCEDURE — 99213 OFFICE O/P EST LOW 20 MIN: CPT | Performed by: NURSE PRACTITIONER

## 2020-12-24 PROCEDURE — 82948 REAGENT STRIP/BLOOD GLUCOSE: CPT

## 2020-12-25 PROCEDURE — 93971 EXTREMITY STUDY: CPT | Performed by: SURGERY

## 2021-01-22 DIAGNOSIS — E11.9 TYPE 2 DIABETES MELLITUS WITHOUT COMPLICATION, WITHOUT LONG-TERM CURRENT USE OF INSULIN (HCC): ICD-10-CM

## 2021-01-22 DIAGNOSIS — E78.2 MIXED HYPERLIPIDEMIA: ICD-10-CM

## 2021-01-22 RX ORDER — LISINOPRIL 20 MG/1
TABLET ORAL
Qty: 90 TABLET | Refills: 3 | Status: SHIPPED | OUTPATIENT
Start: 2021-01-22 | End: 2021-07-27 | Stop reason: SDUPTHER

## 2021-01-22 NOTE — TELEPHONE ENCOUNTER
Tell Layne Chowdhury that she needs to find a new PCP -She was told this  Give her a name of one near her  I will fill her lisinopril

## 2021-02-22 DIAGNOSIS — E11.9 TYPE 2 DIABETES MELLITUS WITHOUT COMPLICATION, WITHOUT LONG-TERM CURRENT USE OF INSULIN (HCC): ICD-10-CM

## 2021-02-22 RX ORDER — GLIPIZIDE 5 MG/1
TABLET ORAL
Qty: 45 TABLET | Refills: 3 | Status: SHIPPED | OUTPATIENT
Start: 2021-02-22 | End: 2021-04-27

## 2021-02-25 ENCOUNTER — TELEPHONE (OUTPATIENT)
Dept: FAMILY MEDICINE CLINIC | Facility: CLINIC | Age: 76
End: 2021-02-25

## 2021-02-25 ENCOUNTER — LAB (OUTPATIENT)
Dept: LAB | Facility: CLINIC | Age: 76
End: 2021-02-25
Payer: MEDICARE

## 2021-02-25 ENCOUNTER — OFFICE VISIT (OUTPATIENT)
Dept: FAMILY MEDICINE CLINIC | Facility: CLINIC | Age: 76
End: 2021-02-25
Payer: MEDICARE

## 2021-02-25 VITALS
HEIGHT: 62 IN | BODY MASS INDEX: 37.39 KG/M2 | TEMPERATURE: 97.4 F | RESPIRATION RATE: 20 BRPM | DIASTOLIC BLOOD PRESSURE: 64 MMHG | SYSTOLIC BLOOD PRESSURE: 126 MMHG | OXYGEN SATURATION: 98 % | HEART RATE: 79 BPM | WEIGHT: 203.2 LBS

## 2021-02-25 DIAGNOSIS — I10 ESSENTIAL HYPERTENSION: ICD-10-CM

## 2021-02-25 DIAGNOSIS — E66.09 CLASS 2 OBESITY DUE TO EXCESS CALORIES WITH BODY MASS INDEX (BMI) OF 37.0 TO 37.9 IN ADULT, UNSPECIFIED WHETHER SERIOUS COMORBIDITY PRESENT: ICD-10-CM

## 2021-02-25 DIAGNOSIS — E11.9 TYPE 2 DIABETES MELLITUS WITHOUT COMPLICATION, WITHOUT LONG-TERM CURRENT USE OF INSULIN (HCC): ICD-10-CM

## 2021-02-25 DIAGNOSIS — E03.9 ACQUIRED HYPOTHYROIDISM: ICD-10-CM

## 2021-02-25 DIAGNOSIS — E11.9 TYPE 2 DIABETES MELLITUS WITHOUT COMPLICATION, WITHOUT LONG-TERM CURRENT USE OF INSULIN (HCC): Primary | ICD-10-CM

## 2021-02-25 DIAGNOSIS — G89.29 CHRONIC MIDLINE LOW BACK PAIN WITHOUT SCIATICA: ICD-10-CM

## 2021-02-25 DIAGNOSIS — E78.2 MIXED HYPERLIPIDEMIA: ICD-10-CM

## 2021-02-25 DIAGNOSIS — M54.50 CHRONIC MIDLINE LOW BACK PAIN WITHOUT SCIATICA: ICD-10-CM

## 2021-02-25 DIAGNOSIS — N18.32 STAGE 3B CHRONIC KIDNEY DISEASE (HCC): ICD-10-CM

## 2021-02-25 DIAGNOSIS — Z00.00 MEDICARE ANNUAL WELLNESS VISIT, SUBSEQUENT: ICD-10-CM

## 2021-02-25 LAB
ALBUMIN SERPL BCP-MCNC: 3.5 G/DL (ref 3.5–5)
ALP SERPL-CCNC: 68 U/L (ref 46–116)
ALT SERPL W P-5'-P-CCNC: 21 U/L (ref 12–78)
ANION GAP SERPL CALCULATED.3IONS-SCNC: 4 MMOL/L (ref 4–13)
AST SERPL W P-5'-P-CCNC: 14 U/L (ref 5–45)
BILIRUB SERPL-MCNC: 0.49 MG/DL (ref 0.2–1)
BUN SERPL-MCNC: 23 MG/DL (ref 5–25)
CALCIUM SERPL-MCNC: 9.6 MG/DL (ref 8.3–10.1)
CHLORIDE SERPL-SCNC: 105 MMOL/L (ref 100–108)
CHOLEST SERPL-MCNC: 107 MG/DL (ref 50–200)
CO2 SERPL-SCNC: 28 MMOL/L (ref 21–32)
CREAT SERPL-MCNC: 1.58 MG/DL (ref 0.6–1.3)
CREAT UR-MCNC: 403 MG/DL
GFR SERPL CREATININE-BSD FRML MDRD: 32 ML/MIN/1.73SQ M
GLUCOSE P FAST SERPL-MCNC: 211 MG/DL (ref 65–99)
HDLC SERPL-MCNC: 40 MG/DL
LDLC SERPL CALC-MCNC: 32 MG/DL (ref 0–100)
MICROALBUMIN UR-MCNC: 55.2 MG/L (ref 0–20)
MICROALBUMIN/CREAT 24H UR: 14 MG/G CREATININE (ref 0–30)
POTASSIUM SERPL-SCNC: 4.7 MMOL/L (ref 3.5–5.3)
PROT SERPL-MCNC: 7.1 G/DL (ref 6.4–8.2)
SL AMB POCT HEMOGLOBIN AIC: 11.1 (ref ?–6.5)
SODIUM SERPL-SCNC: 137 MMOL/L (ref 136–145)
TRIGL SERPL-MCNC: 177 MG/DL

## 2021-02-25 PROCEDURE — 83036 HEMOGLOBIN GLYCOSYLATED A1C: CPT | Performed by: INTERNAL MEDICINE

## 2021-02-25 PROCEDURE — 99204 OFFICE O/P NEW MOD 45 MIN: CPT | Performed by: INTERNAL MEDICINE

## 2021-02-25 PROCEDURE — 80061 LIPID PANEL: CPT

## 2021-02-25 PROCEDURE — 82570 ASSAY OF URINE CREATININE: CPT

## 2021-02-25 PROCEDURE — 93000 ELECTROCARDIOGRAM COMPLETE: CPT | Performed by: INTERNAL MEDICINE

## 2021-02-25 PROCEDURE — 82043 UR ALBUMIN QUANTITATIVE: CPT

## 2021-02-25 PROCEDURE — G0439 PPPS, SUBSEQ VISIT: HCPCS | Performed by: INTERNAL MEDICINE

## 2021-02-25 PROCEDURE — 36415 COLL VENOUS BLD VENIPUNCTURE: CPT

## 2021-02-25 PROCEDURE — 80053 COMPREHEN METABOLIC PANEL: CPT

## 2021-02-25 PROCEDURE — 1123F ACP DISCUSS/DSCN MKR DOCD: CPT | Performed by: INTERNAL MEDICINE

## 2021-02-25 RX ORDER — INSULIN GLARGINE 100 [IU]/ML
46 INJECTION, SOLUTION SUBCUTANEOUS
Qty: 15 ML | Refills: 3
Start: 2021-02-25 | End: 2021-03-25 | Stop reason: SDUPTHER

## 2021-02-25 NOTE — PROGRESS NOTES
[facility]        NAME: Dorina Batista is a 76 y o  female  : 1945    MRN: [id number]  DATE: 2021  TIME: 11:50 AM    Assessment and Plan   1  Type 2 diabetes mellitus without complication, without long-term current use of insulin ([facility])  - A1c is now 11 1, she had stopped Januvia due to renal function  Recommend she increase basal insulin to 46 units and will add linagliptin, refer to DM educator  Follow up 1 month  If BG not improved will add prandial insulin with dinner  -  POCT hemoglobin A1c  -     Ambulatory referral to Diabetic Education; Future  -     Comprehensive metabolic panel; Future; Expected date: 2021  -     Microalbumin / creatinine urine ratio; Future; Expected date: 2021  -     insulin glargine (Lantus SoloStar) 100 units/mL injection pen; Inject 46 Units under the skin daily at bedtime  -     POCT ECG  -     linaGLIPtin 5 MG TABS; Take 5 mg by mouth daily    2  Acquired hypothyroidism  - TSH 2 72 in October    3  Essential hypertension  - ECG sinus rhythm but shows Rt axis deviation, no prior for comparison  No known history of COPD or lung disease  She is a former smoker but quit 1970s   -    POCT ECG    4  Stage 3b chronic kidney disease  - baseline Cr 1 3-1 4, GFR ~ 40  No albuminuria in October  -     Basic metabolic panel; Future    5  Mixed hyperlipidemia  -  in October,   She is Lovaza and statin   -    Lipid Panel with Direct LDL reflex; Future  -     POCT ECG    6  Chronic midline low back pain without sciatica  - suspect lumbar strain, no radiculopathy or myelopathy  Discussed home exercises and PT referral, tylenol prn, avoid NSAIDs due to renal function  7  Medicare annual wellness visit, subsequent  - refer to AWV note    8   Class 2 obesity due to excess calories with body mass index (BMI) of 37 0 to 37 9 in adult, unspecified whether serious comorbidity present  - discussed portion control, limiting sweets and carbs, daily exercise           Chief Complaint     Chief Complaint   Patient presents with    Establish Care     a1c, dm foot, ekg    Medicare Wellness Visit         History of Present Illness       Here to establish care  Previous PCP Dr Karime Evans  Past medical history remarkable for the following:    Diabetes: diagnosed about 10 yrs ago, insulin for past year  Currently 42 units at bedtime  FBG - 202 today, > 200 usually  Glipizide 7 5mg with breakfast  Mon and Thurs checks BG before dinner usually 180    Meals as follows:  B: cereal raisin bran with 2% lactaid, 1/2 banana  L: salad, smoked turkey, croutons, blue cheese dressing  D: fried chicken, 1/2 baked potato, broccoli or cauliflower  Drinks crystal light, seltzer  No soda or juice  Bedtime snack: ice cream  Snacks: chips, peanuts    HLD: she is taking crestor and lovaza    Hypothyroidism: diagnosed over 10 years ago, currently levothyroxine 50 mcg daily  Back pain: located at lower back, started about a month ago  Denies antecedent injury or trauma  Does not radiate  Denies leg weakness  Worse with prolonged sitting, relieved by standing and moving, does not bother her while walking around  Social hx: she has a significant other, they've been together 50 years  Former smoker, quit in 1977  Does not drink alcohol  Review of Systems   Review of Systems   Constitutional: Negative for chills, fatigue, fever and unexpected weight change  Eyes: Negative for pain and visual disturbance  Respiratory: Negative for cough, chest tightness, shortness of breath and wheezing  Cardiovascular: Negative for chest pain, palpitations and leg swelling  Gastrointestinal: Negative for abdominal distention, abdominal pain, constipation, diarrhea, nausea and vomiting  Genitourinary: Negative for difficulty urinating and dysuria  Musculoskeletal: Positive for back pain  Neurological: Negative for dizziness, light-headedness, numbness and headaches  Psychiatric/Behavioral: Negative for dysphoric mood and sleep disturbance  The patient is not nervous/anxious            Current Medications       Current Outpatient Medications:     amLODIPine (NORVASC) 5 mg tablet, take 1 tablet by mouth once daily, Disp: 90 tablet, Rfl: 3    aspirin (ECOTRIN LOW STRENGTH) 81 mg EC tablet, Take 81 mg by mouth daily, Disp: , Rfl:     cholecalciferol (VITAMIN D3) 1,000 units tablet, Take 1,000 Units by mouth 2 (two) times a day, Disp: , Rfl:     folic acid (FOLVITE) 277 mcg tablet, Take 400 mcg by mouth daily, Disp: , Rfl:     glipiZIDE (GLUCOTROL) 5 mg tablet, take 1 and 1/2 tablets by mouth once daily, Disp: 45 tablet, Rfl: 3    glucose monitoring kit (FREESTYLE) monitoring kit, 1 each by Does not apply route daily, Disp: 1 each, Rfl: 0    insulin glargine (Lantus SoloStar) 100 units/mL injection pen, Inject 46 Units under the skin daily at bedtime, Disp: 15 mL, Rfl: 3    levothyroxine 50 mcg tablet, take 1 tablet by mouth once daily, Disp: 90 tablet, Rfl: 3    lisinopril (ZESTRIL) 20 mg tablet, take 1 tablet by mouth once daily, Disp: 90 tablet, Rfl: 3    metoprolol succinate (TOPROL-XL) 50 mg 24 hr tablet, take 1 tablet by mouth once daily, Disp: 90 tablet, Rfl: 5    omega-3-acid ethyl esters (LOVAZA) 1 g capsule, Take 2 g by mouth 2 (two) times a day, Disp: , Rfl:     Psyllium (FIBER) 0 52 g CAPS, Take by mouth, Disp: , Rfl:     rosuvastatin (CRESTOR) 10 MG tablet, Take 1 tablet (10 mg total) by mouth daily At bedtime, Disp: 90 tablet, Rfl: 1    Lancets (FREESTYLE) lancets, Use as instructed - to test daily, Disp: 30 each, Rfl: 5    linaGLIPtin 5 MG TABS, Take 5 mg by mouth daily, Disp: 30 tablet, Rfl: 1    Current Allergies     Allergies as of 02/25/2021    (No Known Allergies)            The following portions of the patient's history were reviewed and updated as appropriate: allergies, current medications, past family history, past medical history, past social history, past surgical history and problem list      Past Medical History:   Diagnosis Date    Benign essential hypertension     Chronic kidney disease, stage 2 (mild)     Chronic kidney disease, stage 3     Disorder of gallbladder     Disorder of skin and subcutaneous tissue     Dyspnea     Essential hypertension     Hyperlipidemia     Hypokalemia     Hypothyroidism     Malaise and fatigue     Mixed hyperlipidemia     Morbid obesity (HCC)     Pernicious anemia     Type 2 diabetes mellitus (HCC)        Past Surgical History:   Procedure Laterality Date    BREAST BIOPSY Right     CARPAL TUNNEL RELEASE Bilateral     CHOLECYSTECTOMY      COLONOSCOPY      Dr Ngoc Vazquez PARTIAL HYSTERECTOMY      SKIN LESION EXCISION      scalp        Family History   Problem Relation Age of Onset    Stroke Mother     Atrial fibrillation Mother     Brain cancer Father     Other Brother         Twin brothers - Open heart    Other Brother         Twin brothers - Open heart         Medications have been verified  Objective   /64   Pulse 79   Temp (!) 97 4 °F (36 3 °C)   Resp 20   Ht 5' 2" (1 575 m)   Wt 92 2 kg (203 lb 3 2 oz)   SpO2 98%   BMI 37 17 kg/m²        Physical Exam     Physical Exam  Vitals signs reviewed  Constitutional:       General: She is not in acute distress  Appearance: Normal appearance  She is obese  HENT:      Head: Normocephalic and atraumatic  Neck:      Musculoskeletal: Full passive range of motion without pain  Thyroid: No thyromegaly  Vascular: No carotid bruit or JVD  Cardiovascular:      Rate and Rhythm: Normal rate and regular rhythm  Pulses: Pulses are weak  Decreased pulses  Dorsalis pedis pulses are 1+ on the right side and 1+ on the left side  Posterior tibial pulses are 1+ on the right side and 1+ on the left side  Heart sounds: S1 normal and S2 normal  No murmur  No friction rub  No gallop      Pulmonary: Effort: Pulmonary effort is normal  No accessory muscle usage  Breath sounds: Normal breath sounds  No wheezing, rhonchi or rales  Abdominal:      General: Abdomen is protuberant  A surgical scar is present  Bowel sounds are normal       Palpations: Abdomen is soft  There is no hepatomegaly  Tenderness: There is no abdominal tenderness  There is no guarding or rebound  Hernia: No hernia is present  Musculoskeletal:      Lumbar back: She exhibits tenderness  She exhibits no bony tenderness and no deformity  Right lower le+ Pitting Edema present  Left lower le+ Pitting Edema present  Comments: SLR negative bilaterally   Feet:      Right foot:      Skin integrity: Callus and dry skin present  Left foot:      Skin integrity: Callus and dry skin present  Lymphadenopathy:      Cervical: No cervical adenopathy  Neurological:      Mental Status: She is alert  Motor: Motor function is intact  Gait: Gait is intact  Psychiatric:         Mood and Affect: Mood and affect normal          Speech: Speech normal          Behavior: Behavior normal  Behavior is cooperative  Thought Content: Thought content normal      Diabetic Foot Exam    Patient's shoes and socks removed  Right Foot/Ankle   Right Foot Inspection  Skin Exam: dry skin, callus and callus                          Toe Exam: ROM and strength within normal limits  Sensory       Monofilament testing: intact  Vascular  Capillary refills: elevated  The right DP pulse is 1+  The right PT pulse is 1+  Left Foot/Ankle  Left Foot Inspection  Skin Exam: dry skin and callus                         Toe Exam: ROM and strength within normal limits                   Sensory       Monofilament: intact  Vascular  Capillary refills: elevated  The left DP pulse is 1+  The left PT pulse is 1+  Assign Risk Category:  No deformity present;  No loss of protective sensation; Weak pulses       Risk: 0          Results:  Lab Results   Component Value Date    SODIUM 137 10/21/2020    K 4 3 10/21/2020     10/21/2020    CO2 28 10/21/2020    BUN 28 (H) 10/21/2020    CREATININE 1 34 (H) 10/21/2020    CALCIUM 9 4 10/21/2020       Lab Results   Component Value Date    HGBA1C 11 1 (A) 02/25/2021       Lab Results   Component Value Date    WBC 7 60 10/21/2020    HGB 11 7 10/21/2020    HCT 37 4 10/21/2020    MCV 94 10/21/2020     10/21/2020

## 2021-02-25 NOTE — PATIENT INSTRUCTIONS
Medicare Preventive Visit Patient Instructions  Thank you for completing your Welcome to Medicare Visit or Medicare Annual Wellness Visit today  Your next wellness visit will be due in one year (2/25/2022)  The screening/preventive services that you may require over the next 5-10 years are detailed below  Some tests may not apply to you based off risk factors and/or age  Screening tests ordered at today's visit but not completed yet may show as past due  Also, please note that scanned in results may not display below  Preventive Screenings:  Service Recommendations Previous Testing/Comments   Colorectal Cancer Screening  * Colonoscopy    * Fecal Occult Blood Test (FOBT)/Fecal Immunochemical Test (FIT)  * Fecal DNA/Cologuard Test  * Flexible Sigmoidoscopy Age: 54-65 years old   Colonoscopy: every 10 years (may be performed more frequently if at higher risk)  OR  FOBT/FIT: every 1 year  OR  Cologuard: every 3 years  OR  Sigmoidoscopy: every 5 years  Screening may be recommended earlier than age 48 if at higher risk for colorectal cancer  Also, an individualized decision between you and your healthcare provider will decide whether screening between the ages of 74-80 would be appropriate  Colonoscopy: Not on file  FOBT/FIT: Not on file  Cologuard: Not on file  Sigmoidoscopy: Not on file         Breast Cancer Screening Age: 36 years old  Frequency: every 1-2 years  Not required if history of left and right mastectomy Mammogram: 12/12/2018       Cervical Cancer Screening Between the ages of 21-29, pap smear recommended once every 3 years  Between the ages of 33-67, can perform pap smear with HPV co-testing every 5 years     Recommendations may differ for women with a history of total hysterectomy, cervical cancer, or abnormal pap smears in past  Pap Smear: Not on file    Screening Not Indicated   Hepatitis C Screening Once for adults born between [facility]  More frequently in patients at high risk for Hepatitis C Hep C Antibody: Not on file       Diabetes Screening 1-2 times per year if you're at risk for diabetes or have pre-diabetes Fasting glucose: 197 mg/dL   A1C: 9 0    Screening Not Indicated  History Diabetes   Cholesterol Screening Once every 5 years if you don't have a lipid disorder  May order more often based on risk factors  Lipid panel: 02/18/2020    Screening Not Indicated  History Lipid Disorder     Other Preventive Screenings Covered by Medicare:  1  Abdominal Aortic Aneurysm (AAA) Screening: covered once if your at risk  You're considered to be at risk if you have a family history of AAA  2  Lung Cancer Screening: covers low dose CT scan once per year if you meet all of the following conditions: (1) Age 50-69; (2) No signs or symptoms of lung cancer; (3) Current smoker or have quit smoking within the last 15 years; (4) You have a tobacco smoking history of at least 30 pack years (packs per day multiplied by number of years you smoked); (5) You get a written order from a healthcare provider  3  Glaucoma Screening: covered annually if you're considered high risk: (1) You have diabetes OR (2) Family history of glaucoma OR (3)  aged 48 and older OR (3)  American aged 72 and older  3  Osteoporosis Screening: covered every 2 years if you meet one of the following conditions: (1) You're estrogen deficient and at risk for osteoporosis based off medical history and other findings; (2) Have a vertebral abnormality; (3) On glucocorticoid therapy for more than 3 months; (4) Have primary hyperparathyroidism; (5) On osteoporosis medications and need to assess response to drug therapy  · Last bone density test (DXA Scan): Not on file  5  HIV Screening: covered annually if you're between the age of 12-76  Also covered annually if you are younger than 13 and older than 72 with risk factors for HIV infection   For pregnant patients, it is covered up to 3 times per pregnancy  Immunizations:  Immunization Recommendations   Influenza Vaccine Annual influenza vaccination during flu season is recommended for all persons aged >= 6 months who do not have contraindications   Pneumococcal Vaccine (Prevnar and Pneumovax)  * Prevnar = PCV13  * Pneumovax = PPSV23   Adults 25-60 years old: 1-3 doses may be recommended based on certain risk factors  Adults 72 years old: Prevnar (PCV13) vaccine recommended followed by Pneumovax (PPSV23) vaccine  If already received PPSV23 since turning 65, then PCV13 recommended at least one year after PPSV23 dose  Hepatitis B Vaccine 3 dose series if at intermediate or high risk (ex: diabetes, end stage renal disease, liver disease)   Tetanus (Td) Vaccine - COST NOT COVERED BY MEDICARE PART B Following completion of primary series, a booster dose should be given every 10 years to maintain immunity against tetanus  Td may also be given as tetanus wound prophylaxis  Tdap Vaccine - COST NOT COVERED BY MEDICARE PART B Recommended at least once for all adults  For pregnant patients, recommended with each pregnancy  Shingles Vaccine (Shingrix) - COST NOT COVERED BY MEDICARE PART B  2 shot series recommended in those aged 48 and above     Health Maintenance Due:      Topic Date Due    Hepatitis C Screening  1945    Colorectal Cancer Screening  05/07/1995     Immunizations Due:      Topic Date Due    DTaP,Tdap,and Td Vaccines (1 - Tdap) 05/07/1966     Advance Directives   What are advance directives? Advance directives are legal documents that state your wishes and plans for medical care  These plans are made ahead of time in case you lose your ability to make decisions for yourself  Advance directives can apply to any medical decision, such as the treatments you want, and if you want to donate organs  What are the types of advance directives? There are many types of advance directives, and each state has rules about how to use them   You may choose a combination of any of the following:  · Living will: This is a written record of the treatment you want  You can also choose which treatments you do not want, which to limit, and which to stop at a certain time  This includes surgery, medicine, IV fluid, and tube feedings  · Durable power of  for healthcare [place] [facility]): This is a written record that states who you want to make healthcare choices for you when you are unable to make them for yourself  This person, called a proxy, is usually a family member or a friend  You may choose more than 1 proxy  · Do not resuscitate (DNR) order:  A DNR order is used in case your heart stops beating or you stop breathing  It is a request not to have certain forms of treatment, such as CPR  A DNR order may be included in other types of advance directives  · Medical directive: This covers the care that you want if you are in a coma, near death, or unable to make decisions for yourself  You can list the treatments you want for each condition  Treatment may include pain medicine, surgery, blood transfusions, dialysis, IV or tube feedings, and a ventilator (breathing machine)  · Values history: This document has questions about your views, beliefs, and how you feel and think about life  This information can help others choose the care that you would choose  Why are advance directives important? An advance directive helps you control your care  Although spoken wishes may be used, it is better to have your wishes written down  Spoken wishes can be misunderstood, or not followed  Treatments may be given even if you do not want them  An advance directive may make it easier for your family to make difficult choices about your care  Fall Prevention    Fall prevention  includes ways to make your home and other areas safer  It also includes ways you can move more carefully to prevent a fall   Health conditions that cause changes in your blood pressure, vision, or muscle strength and coordination may increase your risk for falls  Medicines may also increase your risk for falls if they make you dizzy, weak, or sleepy  Fall prevention tips:   · Stand or sit up slowly  · Use assistive devices as directed  · Wear shoes that fit well and have soles that   · Wear a personal alarm  · Stay active  · Manage your medical conditions  Home Safety Tips:  · Add items to prevent falls in the bathroom  · Keep paths clear  · Install bright lights in your home  · Keep items you use often on shelves within reach  · Paint or place reflective tape on the edges of your stairs  Urinary Incontinence   Urinary incontinence (UI)  is when you lose control of your bladder  UI develops because your bladder cannot store or empty urine properly  The 3 most common types of UI are stress incontinence, urge incontinence, or both  Medicines:   · May be given to help strengthen your bladder control  Report any side effects of medication to your healthcare provider  Do pelvic muscle exercises often:  Your pelvic muscles help you stop urinating  Squeeze these muscles tight for 5 seconds, then relax for 5 seconds  Gradually work up to squeezing for 10 seconds  Do 3 sets of 15 repetitions a day, or as directed  This will help strengthen your pelvic muscles and improve bladder control  Train your bladder:  Go to the bathroom at set times, such as every 2 hours, even if you do not feel the urge to go  You can also try to hold your urine when you feel the urge to go  For example, hold your urine for 5 minutes when you feel the urge to go  As that becomes easier, hold your urine for 10 minutes  Self-care:   · Keep a UI record  Write down how often you leak urine and how much you leak  Make a note of what you were doing when you leaked urine  · Drink liquids as directed  You may need to limit the amount of liquid you drink to help control your urine leakage   Do not drink any liquid right before you go to bed  Limit or do not have drinks that contain caffeine or alcohol  · Prevent constipation  Eat a variety of high-fiber foods  Good examples are high-fiber cereals, beans, vegetables, and whole-grain breads  Walking is the best way to trigger your intestines to have a bowel movement  · Exercise regularly and maintain a healthy weight  Weight loss and exercise will decrease pressure on your bladder and help you control your leakage  · Use a catheter as directed  to help empty your bladder  A catheter is a tiny, plastic tube that is put into your bladder to drain your urine  · Go to behavior therapy as directed  Behavior therapy may be used to help you learn to control your urge to urinate  Weight Management   Why it is important to manage your weight:  Being overweight increases your risk of health conditions such as heart disease, high blood pressure, type 2 diabetes, and certain types of cancer  It can also increase your risk for osteoarthritis, sleep apnea, and other respiratory problems  Aim for a slow, steady weight loss  Even a small amount of weight loss can lower your risk of health problems  How to lose weight safely:  A safe and healthy way to lose weight is to eat fewer calories and get regular exercise  You can lose up about 1 pound a week by decreasing the number of calories you eat by 500 calories each day  Healthy meal plan for weight management:  A healthy meal plan includes a variety of foods, contains fewer calories, and helps you stay healthy  A healthy meal plan includes the following:  · Eat whole-grain foods more often  A healthy meal plan should contain fiber  Fiber is the part of grains, fruits, and vegetables that is not broken down by your body  Whole-grain foods are healthy and provide extra fiber in your diet  Some examples of whole-grain foods are whole-wheat breads and pastas, oatmeal, brown rice, and bulgur    · Eat a variety of vegetables every day  Include dark, leafy greens such as spinach, kale, campbell greens, and mustard greens  Eat yellow and orange vegetables such as carrots, sweet potatoes, and winter squash  · Eat a variety of fruits every day  Choose fresh or canned fruit (canned in its own juice or light syrup) instead of juice  Fruit juice has very little or no fiber  · Eat low-fat dairy foods  Drink fat-free (skim) milk or 1% milk  Eat fat-free yogurt and low-fat cottage cheese  Try low-fat cheeses such as mozzarella and other reduced-fat cheeses  · Choose meat and other protein foods that are low in fat  Choose beans or other legumes such as split peas or lentils  Choose fish, skinless poultry (chicken or turkey), or lean cuts of red meat (beef or pork)  Before you cook meat or poultry, cut off any visible fat  · Use less fat and oil  Try baking foods instead of frying them  Add less fat, such as margarine, sour cream, regular salad dressing and mayonnaise to foods  Eat fewer high-fat foods  Some examples of high-fat foods include french fries, doughnuts, ice cream, and cakes  · Eat fewer sweets  Limit foods and drinks that are high in sugar  This includes candy, cookies, regular soda, and sweetened drinks  Exercise:  Exercise at least 30 minutes per day on most days of the week  Some examples of exercise include walking, biking, dancing, and swimming  You can also fit in more physical activity by taking the stairs instead of the elevator or parking farther away from stores  Ask your healthcare provider about the best exercise plan for you  © Copyright [employer] 2018 Information is for End User's use only and may not be sold, redistributed or otherwise used for commercial purposes   All illustrations and images included in CareNotes® are the copyrighted property of [employer]  or [facility] Preventive Visit Patient Instructions  Thank you for completing your Welcome to Medicare Visit or Medicare Annual Wellness Visit today  Your next wellness visit will be due in one year (2/25/2022)  The screening/preventive services that you may require over the next 5-10 years are detailed below  Some tests may not apply to you based off risk factors and/or age  Screening tests ordered at today's visit but not completed yet may show as past due  Also, please note that scanned in results may not display below  Preventive Screenings:  Service Recommendations Previous Testing/Comments   Colorectal Cancer Screening  * Colonoscopy    * Fecal Occult Blood Test (FOBT)/Fecal Immunochemical Test (FIT)  * Fecal DNA/Cologuard Test  * Flexible Sigmoidoscopy Age: 54-65 years old   Colonoscopy: every 10 years (may be performed more frequently if at higher risk)  OR  FOBT/FIT: every 1 year  OR  Cologuard: every 3 years  OR  Sigmoidoscopy: every 5 years  Screening may be recommended earlier than age 48 if at higher risk for colorectal cancer  Also, an individualized decision between you and your healthcare provider will decide whether screening between the ages of 74-80 would be appropriate  Colonoscopy: Not on file  FOBT/FIT: Not on file  Cologuard: Not on file  Sigmoidoscopy: Not on file         Breast Cancer Screening Age: 36 years old  Frequency: every 1-2 years  Not required if history of left and right mastectomy Mammogram: 12/12/2018       Cervical Cancer Screening Between the ages of 21-29, pap smear recommended once every 3 years  Between the ages of 33-67, can perform pap smear with HPV co-testing every 5 years     Recommendations may differ for women with a history of total hysterectomy, cervical cancer, or abnormal pap smears in past  Pap Smear: Not on file    Screening Not Indicated   Hepatitis C Screening Once for adults born between [facility]  More frequently in patients at high risk for Hepatitis C Hep C Antibody: Not on file       Diabetes Screening 1-2 times per year if you're at risk for diabetes or have pre-diabetes Fasting glucose: 197 mg/dL   A1C: 9 0    Screening Not Indicated  History Diabetes   Cholesterol Screening Once every 5 years if you don't have a lipid disorder  May order more often based on risk factors  Lipid panel: 02/18/2020    Screening Not Indicated  History Lipid Disorder     Other Preventive Screenings Covered by Medicare:  6  Abdominal Aortic Aneurysm (AAA) Screening: covered once if your at risk  You're considered to be at risk if you have a family history of AAA  7  Lung Cancer Screening: covers low dose CT scan once per year if you meet all of the following conditions: (1) Age 50-69; (2) No signs or symptoms of lung cancer; (3) Current smoker or have quit smoking within the last 15 years; (4) You have a tobacco smoking history of at least 30 pack years (packs per day multiplied by number of years you smoked); (5) You get a written order from a healthcare provider  8  Glaucoma Screening: covered annually if you're considered high risk: (1) You have diabetes OR (2) Family history of glaucoma OR (3)  aged 48 and older OR (3)  American aged 72 and older  5  Osteoporosis Screening: covered every 2 years if you meet one of the following conditions: (1) You're estrogen deficient and at risk for osteoporosis based off medical history and other findings; (2) Have a vertebral abnormality; (3) On glucocorticoid therapy for more than 3 months; (4) Have primary hyperparathyroidism; (5) On osteoporosis medications and need to assess response to drug therapy  · Last bone density test (DXA Scan): Not on file  10  HIV Screening: covered annually if you're between the age of 12-76  Also covered annually if you are younger than 13 and older than 72 with risk factors for HIV infection  For pregnant patients, it is covered up to 3 times per pregnancy      Immunizations:  Immunization Recommendations   Influenza Vaccine Annual influenza vaccination during flu season is recommended for all persons aged >= 6 months who do not have contraindications   Pneumococcal Vaccine (Prevnar and Pneumovax)  * Prevnar = PCV13  * Pneumovax = PPSV23   Adults 25-60 years old: 1-3 doses may be recommended based on certain risk factors  Adults 72 years old: Prevnar (PCV13) vaccine recommended followed by Pneumovax (PPSV23) vaccine  If already received PPSV23 since turning 65, then PCV13 recommended at least one year after PPSV23 dose  Hepatitis B Vaccine 3 dose series if at intermediate or high risk (ex: diabetes, end stage renal disease, liver disease)   Tetanus (Td) Vaccine - COST NOT COVERED BY MEDICARE PART B Following completion of primary series, a booster dose should be given every 10 years to maintain immunity against tetanus  Td may also be given as tetanus wound prophylaxis  Tdap Vaccine - COST NOT COVERED BY MEDICARE PART B Recommended at least once for all adults  For pregnant patients, recommended with each pregnancy  Shingles Vaccine (Shingrix) - COST NOT COVERED BY MEDICARE PART B  2 shot series recommended in those aged 48 and above     Health Maintenance Due:      Topic Date Due    Hepatitis C Screening  1945    Colorectal Cancer Screening  05/07/1995     Immunizations Due:      Topic Date Due    DTaP,Tdap,and Td Vaccines (1 - Tdap) 05/07/1966     Advance Directives   What are advance directives? Advance directives are legal documents that state your wishes and plans for medical care  These plans are made ahead of time in case you lose your ability to make decisions for yourself  Advance directives can apply to any medical decision, such as the treatments you want, and if you want to donate organs  What are the types of advance directives? There are many types of advance directives, and each state has rules about how to use them  You may choose a combination of any of the following:  · Living will: This is a written record of the treatment you want   You can also choose which treatments you do not want, which to limit, and which to stop at a certain time  This includes surgery, medicine, IV fluid, and tube feedings  · Durable power of  for healthcare [place] [facility]): This is a written record that states who you want to make healthcare choices for you when you are unable to make them for yourself  This person, called a proxy, is usually a family member or a friend  You may choose more than 1 proxy  · Do not resuscitate (DNR) order:  A DNR order is used in case your heart stops beating or you stop breathing  It is a request not to have certain forms of treatment, such as CPR  A DNR order may be included in other types of advance directives  · Medical directive: This covers the care that you want if you are in a coma, near death, or unable to make decisions for yourself  You can list the treatments you want for each condition  Treatment may include pain medicine, surgery, blood transfusions, dialysis, IV or tube feedings, and a ventilator (breathing machine)  · Values history: This document has questions about your views, beliefs, and how you feel and think about life  This information can help others choose the care that you would choose  Why are advance directives important? An advance directive helps you control your care  Although spoken wishes may be used, it is better to have your wishes written down  Spoken wishes can be misunderstood, or not followed  Treatments may be given even if you do not want them  An advance directive may make it easier for your family to make difficult choices about your care  Fall Prevention    Fall prevention  includes ways to make your home and other areas safer  It also includes ways you can move more carefully to prevent a fall  Health conditions that cause changes in your blood pressure, vision, or muscle strength and coordination may increase your risk for falls   Medicines may also increase your risk for falls if they make you dizzy, weak, or sleepy  Fall prevention tips:   · Stand or sit up slowly  · Use assistive devices as directed  · Wear shoes that fit well and have soles that   · Wear a personal alarm  · Stay active  · Manage your medical conditions  Home Safety Tips:  · Add items to prevent falls in the bathroom  · Keep paths clear  · Install bright lights in your home  · Keep items you use often on shelves within reach  · Paint or place reflective tape on the edges of your stairs  Urinary Incontinence   Urinary incontinence (UI)  is when you lose control of your bladder  UI develops because your bladder cannot store or empty urine properly  The 3 most common types of UI are stress incontinence, urge incontinence, or both  Medicines:   · May be given to help strengthen your bladder control  Report any side effects of medication to your healthcare provider  Do pelvic muscle exercises often:  Your pelvic muscles help you stop urinating  Squeeze these muscles tight for 5 seconds, then relax for 5 seconds  Gradually work up to squeezing for 10 seconds  Do 3 sets of 15 repetitions a day, or as directed  This will help strengthen your pelvic muscles and improve bladder control  Train your bladder:  Go to the bathroom at set times, such as every 2 hours, even if you do not feel the urge to go  You can also try to hold your urine when you feel the urge to go  For example, hold your urine for 5 minutes when you feel the urge to go  As that becomes easier, hold your urine for 10 minutes  Self-care:   · Keep a UI record  Write down how often you leak urine and how much you leak  Make a note of what you were doing when you leaked urine  · Drink liquids as directed  You may need to limit the amount of liquid you drink to help control your urine leakage  Do not drink any liquid right before you go to bed  Limit or do not have drinks that contain caffeine or alcohol  · Prevent constipation    Eat a variety of high-fiber foods  Good examples are high-fiber cereals, beans, vegetables, and whole-grain breads  Walking is the best way to trigger your intestines to have a bowel movement  · Exercise regularly and maintain a healthy weight  Weight loss and exercise will decrease pressure on your bladder and help you control your leakage  · Use a catheter as directed  to help empty your bladder  A catheter is a tiny, plastic tube that is put into your bladder to drain your urine  · Go to behavior therapy as directed  Behavior therapy may be used to help you learn to control your urge to urinate  Weight Management   Why it is important to manage your weight:  Being overweight increases your risk of health conditions such as heart disease, high blood pressure, type 2 diabetes, and certain types of cancer  It can also increase your risk for osteoarthritis, sleep apnea, and other respiratory problems  Aim for a slow, steady weight loss  Even a small amount of weight loss can lower your risk of health problems  How to lose weight safely:  A safe and healthy way to lose weight is to eat fewer calories and get regular exercise  You can lose up about 1 pound a week by decreasing the number of calories you eat by 500 calories each day  Healthy meal plan for weight management:  A healthy meal plan includes a variety of foods, contains fewer calories, and helps you stay healthy  A healthy meal plan includes the following:  · Eat whole-grain foods more often  A healthy meal plan should contain fiber  Fiber is the part of grains, fruits, and vegetables that is not broken down by your body  Whole-grain foods are healthy and provide extra fiber in your diet  Some examples of whole-grain foods are whole-wheat breads and pastas, oatmeal, brown rice, and bulgur  · Eat a variety of vegetables every day  Include dark, leafy greens such as spinach, kale, campbell greens, and mustard greens   Eat yellow and orange vegetables such as carrots, sweet potatoes, and winter squash  · Eat a variety of fruits every day  Choose fresh or canned fruit (canned in its own juice or light syrup) instead of juice  Fruit juice has very little or no fiber  · Eat low-fat dairy foods  Drink fat-free (skim) milk or 1% milk  Eat fat-free yogurt and low-fat cottage cheese  Try low-fat cheeses such as mozzarella and other reduced-fat cheeses  · Choose meat and other protein foods that are low in fat  Choose beans or other legumes such as split peas or lentils  Choose fish, skinless poultry (chicken or turkey), or lean cuts of red meat (beef or pork)  Before you cook meat or poultry, cut off any visible fat  · Use less fat and oil  Try baking foods instead of frying them  Add less fat, such as margarine, sour cream, regular salad dressing and mayonnaise to foods  Eat fewer high-fat foods  Some examples of high-fat foods include french fries, doughnuts, ice cream, and cakes  · Eat fewer sweets  Limit foods and drinks that are high in sugar  This includes candy, cookies, regular soda, and sweetened drinks  Exercise:  Exercise at least 30 minutes per day on most days of the week  Some examples of exercise include walking, biking, dancing, and swimming  You can also fit in more physical activity by taking the stairs instead of the elevator or parking farther away from stores  Ask your healthcare provider about the best exercise plan for you  © Copyright [employer] 2018 Information is for End User's use only and may not be sold, redistributed or otherwise used for commercial purposes  All illustrations and images included in CareNotes® are the copyrighted property of A D A M , Inc  or Gaurav Lewis      Lower Back Exercises   AMBULATORY CARE:   Lower back exercises  help heal and strengthen your back muscles to prevent another injury  Ask your healthcare provider if you need to see a physical therapist for more advanced exercises    Seek care immediately if:   · You have severe pain that prevents you from moving  Contact your healthcare provider if:   · Your pain becomes worse  · You have new pain  · You have questions or concerns about your condition or care  Do lower back exercises safely:   · Do the exercises on a mat or firm surface  (not on a bed) to support your spine and prevent low back pain  · Move slowly and smoothly  Avoid fast or jerky motions  · Breathe normally  Do not hold your breath  · Stop if you feel pain  It is normal to feel some discomfort at first  Regular exercise will help decrease your discomfort over time  Lower back exercises: Your healthcare provider may recommend that you do back exercises 10 to 30 minutes each day  He may also recommend that you do exercises 1 to 3 times each day  Ask your healthcare provider which exercises are best for you and how often to do them  · Ankle pumps:  Lie on your back  Move your foot up (with your toes pointing toward your head)  Then, move your foot down (with your toes pointing away from you)  Repeat this exercise 10 times on each side  · Heel slides:  Lie on your back  Slowly bend one leg and then straighten it  Next, bend the other leg and then straighten it  Repeat 10 times on each side  · Pelvic tilt:  Lie on your back with your knees bent and feet flat on the floor  Place your arms in a relaxed position beside your body  Tighten the muscles of your abdomen and flatten your back against the floor  Hold for 5 seconds  Repeat 5 times  · Back stretch:  Lie on your back with your hands behind your head  Bend your knees and turn the lower half of your body to one side  Hold this position for 10 seconds  Repeat 3 times on each side  · Straight leg raises:  Lie on your back with one leg straight  Bend the other knee  Tighten your abdomen and then slowly lift the straight leg up about 6 to 12 inches off the floor   Hold for 1 to 5 seconds  Lower your leg slowly  Repeat 10 times on each leg  · Knee-to-chest:  Lie on your back with your knees bent and feet flat on the floor  Pull one of your knees toward your chest and hold it there for 5 seconds  Return your leg to the starting position  Lift the other knee toward your chest and hold for 5 seconds  Do this 5 times on each side  · Cat and camel:  Place your hands and knees on the floor  Arch your back upward toward the ceiling and lower your head  Round out your spine as much as you can  Hold for 5 seconds  Lift your head upward and push your chest downward toward the floor  Hold for 5 seconds  Do 3 sets or as directed  · Wall squats:  Stand with your back against a wall  Tighten the muscles of your abdomen  Slowly lower your body until your knees are bent at a 45 degree angle  Hold this position for 5 seconds  Slowly move back up to a standing position  Repeat 10 times  · Curl up:  Lie on your back with your knees bent and feet flat on the floor  Place your hands, palms down, underneath the curve in your lower back  Next, with your elbows on the floor, lift your shoulders and chest 2 to 3 inches  Keep your head in line with your shoulders  Hold this position for 5 seconds  When you can do this exercise without pain for 10 to 15 seconds, you may add a rotation  While your shoulders and chest are lifted off the ground, turn slightly to the left and hold  Repeat on the other side  · Bird dog:  Place your hands and knees on the floor  Keep your wrists directly below your shoulders and your knees directly below your hips  Pull your belly button in toward your spine  Do not flatten or arch your back  Tighten your abdominal muscles  Raise one arm straight out so that it is aligned with your head  Next, raise the leg opposite your arm  Hold this position for 15 seconds  Lower your arm and leg slowly and change sides  Do 5 sets         © Copyright [employer] [facility] Information is for [employer] use only and may not be sold, redistributed or otherwise used for commercial purposes  All illustrations and images included in CareNotes® are the copyrighted property of A D A M , Inc  or Gaurav Murrell   The above information is an  only  It is not intended as medical advice for individual conditions or treatments  Talk to your doctor, nurse or pharmacist before following any medical regimen to see if it is safe and effective for you

## 2021-02-25 NOTE — TELEPHONE ENCOUNTER
She can't take others due to her renal function  Can we try patient assistance programs? I've copied Negra Cheney on this message

## 2021-02-25 NOTE — TELEPHONE ENCOUNTER
Pt called stating that the pharmacy informed her that her rx for linagliptin would cost $500 even with her insurance coverage  Do you want to try a different medicaton?

## 2021-02-25 NOTE — PROGRESS NOTES
Assessment and Plan:     Problem List Items Addressed This Visit        Endocrine    Type 2 diabetes mellitus without complication, without long-term current use of insulin (HCC)    Relevant Medications    insulin glargine (Lantus SoloStar) 100 units/mL injection pen    linaGLIPtin 5 MG TABS    Other Relevant Orders    POCT hemoglobin A1c (Completed)    Ambulatory referral to Diabetic Education    Comprehensive metabolic panel    Microalbumin / creatinine urine ratio    POCT ECG    Acquired hypothyroidism       Cardiovascular and Mediastinum    Essential hypertension    Relevant Orders    POCT ECG       Genitourinary    Stage 3 chronic kidney disease    Relevant Orders    Basic metabolic panel       Other    Mixed hyperlipidemia    Relevant Orders    Lipid Panel with Direct LDL reflex    POCT ECG      Other Visit Diagnoses     Medicare annual wellness visit, subsequent    -  Primary    Chronic midline low back pain without sciatica        Class 2 obesity due to excess calories with body mass index (BMI) of 37 0 to 37 9 in adult, unspecified whether serious comorbidity present            BMI Counseling: Body mass index is 37 17 kg/m²  The BMI is above normal  Nutrition recommendations include encouraging healthy choices of fruits and vegetables, consuming healthier snacks and moderation in carbohydrate intake  Exercise recommendations include moderate physical activity 150 minutes/week  Falls Plan of Care: balance, strength, and gait training instructions were provided  Preventive health issues were discussed with patient, and age appropriate screening tests were ordered as noted in patient's After Visit Summary  Personalized health advice and appropriate referrals for health education or preventive services given if needed, as noted in patient's After Visit Summary       History of Present Illness:     Patient presents for Medicare Annual Wellness visit    Patient Care Team:  Aspen Serna MD as PCP - General (Internal Medicine)     Problem List:     Patient Active Problem List   Diagnosis    Type 2 diabetes mellitus without complication, without long-term current use of insulin ([facility] )    Stage 3 chronic kidney disease    Essential hypertension    Urinary tract infection without hematuria    Mixed hyperlipidemia    Acquired hypothyroidism      Past Medical and Surgical History:     Past Medical History:   Diagnosis Date    Benign essential hypertension     Chronic kidney disease, stage 2 (mild)     Chronic kidney disease, stage 3     Disorder of gallbladder     Disorder of skin and subcutaneous tissue     Dyspnea     Essential hypertension     Hyperlipidemia     Hypokalemia     Hypothyroidism     Malaise and fatigue     Mixed hyperlipidemia     Morbid obesity ([facility] )     Pernicious anemia     Type 2 diabetes mellitus ([facility] )      Past Surgical History:   Procedure Laterality Date    BREAST BIOPSY Right     CARPAL TUNNEL RELEASE Bilateral    [facility] Tom COLONOSCOPY      Dr Jerry Rivas PARTIAL HYSTERECTOMY      SKIN LESION EXCISION      scalp       Family History:     Family History   Problem Relation Age of Onset    Stroke Mother     Atrial fibrillation Mother     Brain cancer Father     Other Brother         Twin brothers - Open heart    Other Brother         Twin brothers - Open heart      Social History:     E-Cigarette/Vaping    E-Cigarette Use Never User      E-Cigarette/Vaping Substances    Nicotine No     THC No     CBD No     Flavoring No     Other No     Unknown No      Social History     Socioeconomic History    Marital status: /Civil Union     Spouse name: Severo Hazel Number of children: None    Years of education: None    Highest education level: None   Occupational History    Occupation: Retired    Social Needs    Financial resource strain: None    Food insecurity     Worry: None     Inability: None    Transportation needs     Medical: None Non-medical: None   Tobacco Use    Smoking status: Former Smoker     Packs/day: 1 00     Years: 14 00     Pack years: 14 00     Types: Cigarettes     Quit date: 0     Years since quittin 1    Smokeless tobacco: Never Used   Substance and Sexual Activity    Alcohol use: Yes     Comment: Occasionally     Drug use: Never     Comment: Denies drug use - As per Khoi Lee     Sexual activity: None   Lifestyle    Physical activity     Days per week: None     Minutes per session: None    Stress: None   Relationships    Social connections     Talks on phone: None     Gets together: None     Attends Yazdanism service: None     Active member of club or organization: None     Attends meetings of clubs or organizations: None     Relationship status: None    Intimate partner violence     Fear of current or ex partner: None     Emotionally abused: None     Physically abused: None     Forced sexual activity: None   Other Topics Concern    None   Social History Narrative     - Himanshu Martins is [place] and speaks Polish, common law marriage    Does not consume caffeine       Medications and Allergies:     Current Outpatient Medications   Medication Sig Dispense Refill    amLODIPine (NORVASC) 5 mg tablet take 1 tablet by mouth once daily 90 tablet 3    aspirin (ECOTRIN LOW STRENGTH) 81 mg EC tablet Take 81 mg by mouth daily      cholecalciferol (VITAMIN D3) 1,000 units tablet Take 1,000 Units by mouth 2 (two) times a day      folic acid (FOLVITE) 377 mcg tablet Take 400 mcg by mouth daily      glipiZIDE (GLUCOTROL) 5 mg tablet take 1 and 1/2 tablets by mouth once daily 45 tablet 3    glucose monitoring kit (FREESTYLE) monitoring kit 1 each by Does not apply route daily 1 each 0    insulin glargine (Lantus SoloStar) 100 units/mL injection pen Inject 46 Units under the skin daily at bedtime 15 mL 3    levothyroxine 50 mcg tablet take 1 tablet by mouth once daily 90 tablet 3    lisinopril (ZESTRIL) 20 mg tablet take 1 tablet by mouth once daily 90 tablet 3    metoprolol succinate (TOPROL-XL) 50 mg 24 hr tablet take 1 tablet by mouth once daily 90 tablet 5    omega-3-acid ethyl esters (LOVAZA) 1 g capsule Take 2 g by mouth 2 (two) times a day      Psyllium (FIBER) 0 52 g CAPS Take by mouth      rosuvastatin (CRESTOR) 10 MG tablet Take 1 tablet (10 mg total) by mouth daily At bedtime 90 tablet 1    Lancets (FREESTYLE) lancets Use as instructed - to test daily 30 each 5    linaGLIPtin 5 MG TABS Take 5 mg by mouth daily 30 tablet 1     No current facility-administered medications for this visit  No Known Allergies   Immunizations: There is no immunization history for the selected administration types on file for this patient  Health Maintenance:         Topic Date Due    Hepatitis C Screening  1945    Colorectal Cancer Screening  05/07/1995         Topic Date Due    DTaP,Tdap,and Td Vaccines (1 - Tdap) 05/07/1966      Medicare Health Risk Assessment:     /64   Pulse 79   Temp (!) 97 4 °F (36 3 °C)   Resp 20   Ht 5' 2" (1 575 m)   Wt 92 2 kg (203 lb 3 2 oz)   SpO2 98%   BMI 37 17 kg/m²      Roise Mail is here for her Subsequent Wellness visit  Health Risk Assessment:   Patient rates overall health as good  Patient feels that their physical health rating is same  Eyesight was rated as same  Hearing was rated as same  Patient feels that their emotional and mental health rating is same  Pain experienced in the last 7 days has been none  Patient states that she has experienced weight loss or gain in last 6 months  Depression Screening:   PHQ-2 Score: 0      Fall Risk Screening: In the past year, patient has experienced: history of falling in past year    Number of falls: 1  Injured during fall?: Yes    Feels unsteady when standing or walking?: No    Worried about falling?: No      Urinary Incontinence Screening:   Patient has leaked urine accidently in the last six months   Wears pads during long trips, leaks with laughter    Home Safety:  Patient does not have trouble with stairs inside or outside of their home  Patient has working smoke alarms and has working carbon monoxide detector  Home safety hazards include: none  Nutrition:   Current diet is Diabetic  Medications:   Patient is currently taking over-the-counter supplements  OTC medications include: see medication list  Patient is able to manage medications  Activities of Daily Living (ADLs)/Instrumental Activities of Daily Living (IADLs):   Walk and transfer into and out of bed and chair?: Yes  Dress and groom yourself?: Yes    Bathe or shower yourself?: Yes    Feed yourself? Yes  Do your laundry/housekeeping?: Yes  Manage your money, pay your bills and track your expenses?: Yes  Make your own meals?: Yes    Do your own shopping?: Yes    Previous Hospitalizations:   Any hospitalizations or ED visits within the last 12 months?: Yes    How many hospitalizations have you had in the last year?: 1-2    Hospitalization Comments: ED in December for leg swelling    Advance Care Planning:   Living will: Yes    Durable POA for healthcare:  Yes    Advanced directive: Yes    Advanced directive counseling given: Yes    Five wishes given: No    Patient declined ACP directive: No    End of Life Decisions reviewed with patient: Yes      Comments: POA is Beck Rome, significant other    Cognitive Screening:   Provider or family/friend/caregiver concerned regarding cognition?: No    PREVENTIVE SCREENINGS      Cardiovascular Screening:    General: Screening Not Indicated and History Lipid Disorder      Diabetes Screening:     General: Screening Not Indicated and History Diabetes      Colorectal Cancer Screening:     General: Screening Current      Breast Cancer Screening:     General: Patient Declines      Cervical Cancer Screening:    General: Screening Not Indicated      Osteoporosis Screening:    General: Screening Current      Abdominal Aortic Aneurysm (AAA) Screening:        General: Screening Not Indicated      Lung Cancer Screening:     General: Screening Not Indicated      Hepatitis C Screening:    General: Screening Current    Other Counseling Topics:   Alcohol use counseling, car/seat belt/driving safety and calcium and vitamin D intake and regular weightbearing exercise         Mounika Larkin MD

## 2021-02-26 ENCOUNTER — TELEPHONE (OUTPATIENT)
Dept: ADMINISTRATIVE | Facility: OTHER | Age: 76
End: 2021-02-26

## 2021-02-26 NOTE — RESULT ENCOUNTER NOTE
Please advise pt that her labs show a slight decrease in kidney function but there is no significant protein in the urine  Triglyceride level have improved so I think the lovaza and crestor are helping  If she cannot afford the Tradjenta, we can increase glipizide and/or add insulin with largest meal  Start by increasing lantus to 46 units, monitor fasting BG in the morning and bedtime BG for one week and call with an update  I also strongly encourage her to meet with diabetes educator   Mirna

## 2021-02-26 NOTE — TELEPHONE ENCOUNTER
----- Message from Charisma Last sent at 2/25/2021 11:47 AM EST -----  Regarding: Care Gap Request  02/25/21 11:47 AM    Cristhian, our patient Evangelist Martinez has had CRC: Colonoscopy completed/performed  Please assist in updating the patient chart by making an External outreach to Dr Jagjit Patricia facility located in [place]       Thank you,  Jewels Patterson

## 2021-02-26 NOTE — TELEPHONE ENCOUNTER
Upon review of the In Basket request we have found as a result of outreach that patient did not have the requested item(s) completed  Not a patient at [facility] and [facility]     Any additional questions or concerns should be emailed to the Practice Liaisons via [email or web address] email, please do not reply via In Basket      Thank you  Cesario Crane MA

## 2021-02-26 NOTE — LETTER
Diabetic Eye Exam Form    Date Requested: 21  Patient: Salud Maya  Patient : 1945   Referring Provider: Gertrude Lindsay MD    Dilated Retinal Exam, Optomap-Iris Exam, or Fundus Photography Done         Yes ([place] one above)         No     Date of Diabetic Eye Exam ______________________________  Left Eye      Exam did show retinopathy    Exam did not show retinopathy         Mild       Moderate       None       Proliferative       Severe     Right Eye     Exam did show retinopathy    Exam did not show retinopathy         Mild       Moderate       None       Proliferative       Severe     Comments __________________________________________________________    Practice Providing Exam ______________________________________________    Exam Performed By (print name) _______________________________________      Provider Signature ___________________________________________________      These reports are needed for  compliance    Please fax this completed form and a copy of the Diabetic Eye Exam report to our office located at [place] [address] as soon as possible to [phone] attention Yola: Phone [phone]    We thank you for your assistance in treating our mutual patient

## 2021-02-26 NOTE — LETTER
Procedure Request Form: Colonoscopy      Date Requested: 21  Patient: Denisse Evansar  Patient : 1945   Referring Provider: Lara Orozco, MD        Date of Procedure ______________________________       The above patient has informed us that they have completed their   most recent Colonoscopy at your facility  Please complete   this form and attach all corresponding procedure reports/results  Comments __________________________________________________________  ____________________________________________________________________  ____________________________________________________________________  ____________________________________________________________________    Facility Completing Procedure _________________________________________    Form Completed By (print name) _______________________________________      Signature __________________________________________________________      These reports are needed for  compliance    Please fax this completed form and a copy of the procedure report to our office located at [place] [address] as soon as possible to [phone] attention Yola: Phone [phone]    We thank you for your assistance in treating our mutual patient

## 2021-02-26 NOTE — TELEPHONE ENCOUNTER
Upon review of the In Basket request we were able to locate, review, and update the patient chart as requested for CRC: Colonoscopy  Any additional questions or concerns should be emailed to the Practice Liaisons via [email or web address] email, please do not reply via In Basket      Thank you  Lencho Evans MA

## 2021-02-26 NOTE — TELEPHONE ENCOUNTER
Upon review of the In Basket request and the patient's chart, initial outreach has been made via fax, please see Contacts section for details       Thank you  Feli Jones MA

## 2021-03-02 ENCOUNTER — PATIENT OUTREACH (OUTPATIENT)
Dept: FAMILY MEDICINE CLINIC | Facility: CLINIC | Age: 76
End: 2021-03-02

## 2021-03-02 ENCOUNTER — TELEPHONE (OUTPATIENT)
Dept: NEPHROLOGY | Facility: CLINIC | Age: 76
End: 2021-03-02

## 2021-03-02 DIAGNOSIS — N25.81 SECONDARY HYPERPARATHYROIDISM OF RENAL ORIGIN (HCC): ICD-10-CM

## 2021-03-02 DIAGNOSIS — E55.9 VITAMIN D DEFICIENCY: ICD-10-CM

## 2021-03-02 DIAGNOSIS — N18.32 STAGE 3B CHRONIC KIDNEY DISEASE (HCC): Primary | ICD-10-CM

## 2021-03-02 NOTE — PROGRESS NOTES
Return call received from Hiro Evans  She obtains her insulin through a patient assistance program also  Required information obtained  Form for Tradjenta patient assistance completed  Hiro Evans will stop at the office to sign the forms

## 2021-03-02 NOTE — TELEPHONE ENCOUNTER
Call from patient checking to see if you want any lab work done before her appt on 3/8? She had recent labs for Dr Henri Kussmaul, please review and add any orders to Epic needed

## 2021-03-02 NOTE — PROGRESS NOTES
Outpatient Care Management Note:  In basket message from PCP obtained  Alf Nelson needs assistance paying for Tradjenta  She is unable to take Januvia due to her renal function  Outreach call to Alf Nelson attempted  Message left for patient to please return call  Contact information left on message  Prescription assistance form printed

## 2021-03-04 ENCOUNTER — LAB (OUTPATIENT)
Dept: LAB | Facility: CLINIC | Age: 76
End: 2021-03-04
Payer: MEDICARE

## 2021-03-04 DIAGNOSIS — N18.32 STAGE 3B CHRONIC KIDNEY DISEASE (HCC): ICD-10-CM

## 2021-03-04 DIAGNOSIS — N25.81 SECONDARY HYPERPARATHYROIDISM OF RENAL ORIGIN (HCC): ICD-10-CM

## 2021-03-04 LAB
25(OH)D3 SERPL-MCNC: 60 NG/ML (ref 30–100)
BACTERIA UR QL AUTO: ABNORMAL /HPF
BASOPHILS # BLD AUTO: 0.07 THOUSANDS/ΜL (ref 0–0.1)
BASOPHILS NFR BLD AUTO: 1 % (ref 0–1)
BILIRUB UR QL STRIP: NEGATIVE
CLARITY UR: CLEAR
COLOR UR: YELLOW
EOSINOPHIL # BLD AUTO: 0.24 THOUSAND/ΜL (ref 0–0.61)
EOSINOPHIL NFR BLD AUTO: 3 % (ref 0–6)
ERYTHROCYTE [DISTWIDTH] IN BLOOD BY AUTOMATED COUNT: 14.3 % (ref 11.6–15.1)
GLUCOSE UR STRIP-MCNC: ABNORMAL MG/DL
HCT VFR BLD AUTO: 39.6 % (ref 34.8–46.1)
HGB BLD-MCNC: 12.2 G/DL (ref 11.5–15.4)
HGB UR QL STRIP.AUTO: NEGATIVE
HYALINE CASTS #/AREA URNS LPF: ABNORMAL /LPF
IMM GRANULOCYTES # BLD AUTO: 0.02 THOUSAND/UL (ref 0–0.2)
IMM GRANULOCYTES NFR BLD AUTO: 0 % (ref 0–2)
KETONES UR STRIP-MCNC: NEGATIVE MG/DL
LEUKOCYTE ESTERASE UR QL STRIP: NEGATIVE
LYMPHOCYTES # BLD AUTO: 2.37 THOUSANDS/ΜL (ref 0.6–4.47)
LYMPHOCYTES NFR BLD AUTO: 30 % (ref 14–44)
MCH RBC QN AUTO: 29.5 PG (ref 26.8–34.3)
MCHC RBC AUTO-ENTMCNC: 30.8 G/DL (ref 31.4–37.4)
MCV RBC AUTO: 96 FL (ref 82–98)
MONOCYTES # BLD AUTO: 0.59 THOUSAND/ΜL (ref 0.17–1.22)
MONOCYTES NFR BLD AUTO: 7 % (ref 4–12)
NEUTROPHILS # BLD AUTO: 4.64 THOUSANDS/ΜL (ref 1.85–7.62)
NEUTS SEG NFR BLD AUTO: 59 % (ref 43–75)
NITRITE UR QL STRIP: NEGATIVE
NON-SQ EPI CELLS URNS QL MICRO: ABNORMAL /HPF
NRBC BLD AUTO-RTO: 0 /100 WBCS
PH UR STRIP.AUTO: 6 [PH]
PLATELET # BLD AUTO: 166 THOUSANDS/UL (ref 149–390)
PMV BLD AUTO: 11.3 FL (ref 8.9–12.7)
PROT UR STRIP-MCNC: NEGATIVE MG/DL
PTH-INTACT SERPL-MCNC: 51.8 PG/ML (ref 18.4–80.1)
RBC # BLD AUTO: 4.13 MILLION/UL (ref 3.81–5.12)
RBC #/AREA URNS AUTO: ABNORMAL /HPF
SP GR UR STRIP.AUTO: 1.02 (ref 1–1.03)
URATE SERPL-MCNC: 6.3 MG/DL (ref 2–6.8)
UROBILINOGEN UR QL STRIP.AUTO: 0.2 E.U./DL
WBC # BLD AUTO: 7.93 THOUSAND/UL (ref 4.31–10.16)
WBC #/AREA URNS AUTO: ABNORMAL /HPF

## 2021-03-04 PROCEDURE — 83970 ASSAY OF PARATHORMONE: CPT

## 2021-03-04 PROCEDURE — 84550 ASSAY OF BLOOD/URIC ACID: CPT

## 2021-03-04 PROCEDURE — 81001 URINALYSIS AUTO W/SCOPE: CPT

## 2021-03-04 PROCEDURE — 36415 COLL VENOUS BLD VENIPUNCTURE: CPT | Performed by: INTERNAL MEDICINE

## 2021-03-04 PROCEDURE — 85025 COMPLETE CBC W/AUTO DIFF WBC: CPT

## 2021-03-04 PROCEDURE — 82306 VITAMIN D 25 HYDROXY: CPT | Performed by: INTERNAL MEDICINE

## 2021-03-05 ENCOUNTER — PATIENT OUTREACH (OUTPATIENT)
Dept: FAMILY MEDICINE CLINIC | Facility: CLINIC | Age: 76
End: 2021-03-05

## 2021-03-05 NOTE — PROGRESS NOTES
Outpatient Care Management Note:  Signed BI patient assistance form for Kirsten Fenton obtained from office and faxed

## 2021-03-08 ENCOUNTER — TELEPHONE (OUTPATIENT)
Dept: FAMILY MEDICINE CLINIC | Facility: CLINIC | Age: 76
End: 2021-03-08

## 2021-03-08 ENCOUNTER — OFFICE VISIT (OUTPATIENT)
Dept: NEPHROLOGY | Facility: CLINIC | Age: 76
End: 2021-03-08
Payer: MEDICARE

## 2021-03-08 VITALS
TEMPERATURE: 98.4 F | BODY MASS INDEX: 37.17 KG/M2 | DIASTOLIC BLOOD PRESSURE: 82 MMHG | WEIGHT: 202 LBS | HEART RATE: 74 BPM | SYSTOLIC BLOOD PRESSURE: 170 MMHG | HEIGHT: 62 IN | OXYGEN SATURATION: 98 %

## 2021-03-08 DIAGNOSIS — E78.2 MIXED HYPERLIPIDEMIA: ICD-10-CM

## 2021-03-08 DIAGNOSIS — N18.32 STAGE 3B CHRONIC KIDNEY DISEASE (HCC): Primary | ICD-10-CM

## 2021-03-08 DIAGNOSIS — E03.9 ACQUIRED HYPOTHYROIDISM: ICD-10-CM

## 2021-03-08 DIAGNOSIS — E11.9 TYPE 2 DIABETES MELLITUS WITHOUT COMPLICATION, WITHOUT LONG-TERM CURRENT USE OF INSULIN (HCC): ICD-10-CM

## 2021-03-08 PROBLEM — E11.22 CKD STAGE 3 DUE TO TYPE 2 DIABETES MELLITUS (HCC): Status: ACTIVE | Noted: 2019-11-20

## 2021-03-08 PROCEDURE — 99214 OFFICE O/P EST MOD 30 MIN: CPT | Performed by: INTERNAL MEDICINE

## 2021-03-08 NOTE — TELEPHONE ENCOUNTER
Called and spoke with pt  She verbalized an understanding to your instructions  She states she is using 46 units of insulin daily  She states she has not been able to get her linagliptin yet

## 2021-03-08 NOTE — PROGRESS NOTES
[facility] MD    Name: Rafael Mcclellan  YOB: 1945      Assessment/Plan:           Problem List Items Addressed This Visit        Endocrine    Type 2 diabetes mellitus without complication, without long-term current use of insulin ([address] )       Lab Results   Component Value Date    HGBA1C 11 1 (A) 02/25/2021   Diabetic control is very poor  We discussed food choices and she snacks on chips and peanuts in between meals           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 mellitus [facility]) - Primary     Lab Results   Component Value Date    EGFR 32 02/25/2021    EGFR 39 10/21/2020    EGFR 39 06/24/2020    CREATININE 1 58 (H) 02/25/2021    CREATININE 1 34 (H) 10/21/2020    CREATININE 1 34 (H) 06/24/2020   She has worsening kidney function with a drop in her GFR to 30 mils per minute  She is also has uncontrolled sugars  She is not spilling any protein in the urine and has a normal kidney ultrasound from last year  Will do a short interval follow-up to evaluate for stability   I have read viewed all of her medications for potential nephrotoxins  She does not take any NSAIDs         Acquired hypothyroidism     Continue levothryoxine            Other    Mixed hyperlipidemia     Aim for an LDL < 70                 Subjective:      Patient ID: Rafael Mcclellan is a 76 y o  female  HPI  Lenkahlil Askew has Stage 2/3 CKD, diabetes, hypertension and dyslipidemia and hypothyroidism  She has low back pain when she is walking around  She is doing PT to strengthen her back    The following portions of the patient's history were reviewed and updated as appropriate: allergies, current medications, past family history, past medical history, past social history, past surgical history and problem list     Review of Systems   Constitutional: Negative for activity change, appetite change, chills, fatigue and fever  HENT: Negative for hearing loss      Eyes: Negative for visual disturbance  Respiratory: Negative for cough, chest tightness and shortness of breath  Cardiovascular: Positive for leg swelling  Negative for chest pain and palpitations  Had left leg swelling after falling OOB and injuring her left knee  A venous duplex was negative  She kept her leg elevated and it resolved in 2 days   Gastrointestinal: Negative for abdominal pain, blood in stool and constipation  Genitourinary: Negative for difficulty urinating, dysuria, hematuria and urgency  Nocturia X 2 with complete emptying   Musculoskeletal: Positive for arthralgias, back pain and myalgias  Neurological: Negative for dizziness, weakness and light-headedness  Hematological: Does not bruise/bleed easily           Social History     Socioeconomic History    Marital status: /Civil Union     Spouse name: Vidhi Leggett Number of children: None    Years of education: None    Highest education level: None   Occupational History    Occupation: Retired    Social Needs    Financial resource strain: None    Food insecurity     Worry: None     Inability: None    Transportation needs     Medical: None     Non-medical: None   Tobacco Use    Smoking status: Former Smoker     Packs/day: 1 00     Years: 14 00     Pack years: 14 00     Types: Cigarettes     Quit date:      Years since quittin 2    Smokeless tobacco: Never Used   Substance and Sexual Activity    Alcohol use: Yes     Comment: Occasionally     Drug use: Never     Comment: Denies drug use - As per [address]     Sexual activity: None   Lifestyle    Physical activity     Days per week: None     Minutes per session: None    Stress: None   Relationships    Social connections     Talks on phone: None     Gets together: None     Attends Zoroastrianism service: None     Active member of club or organization: None     Attends meetings of clubs or organizations: None     Relationship status: None    Intimate partner violence     Fear of current or ex partner: None     Emotionally abused: None     Physically abused: None     Forced sexual activity: None   Other Topics Concern    None   Social History Narrative     - Art Pacheco is [place] and speaks Khmer, common law marriage    Does not consume caffeine      Past Medical History:   Diagnosis Date    Benign essential hypertension     Chronic kidney disease, stage 2 (mild)     Chronic kidney disease, stage 3     Disorder of gallbladder     Disorder of skin and subcutaneous tissue     Dyspnea     Essential hypertension     Hyperlipidemia     Hypokalemia     Hypothyroidism     Malaise and fatigue     Mixed hyperlipidemia     Morbid obesity ([address] )     Pernicious anemia     Type 2 diabetes mellitus (HCC)      Past Surgical History:   Procedure Laterality Date    BREAST BIOPSY Right     CARPAL TUNNEL RELEASE Bilateral     CHOLECYSTECTOMY      COLONOSCOPY      Dr Rocky Shelley PARTIAL HYSTERECTOMY      SKIN LESION EXCISION      scalp        Current Outpatient Medications:     amLODIPine (NORVASC) 5 mg tablet, take 1 tablet by mouth once daily, Disp: 90 tablet, Rfl: 3    aspirin (ECOTRIN LOW STRENGTH) 81 mg EC tablet, Take 81 mg by mouth daily, Disp: , Rfl:     cholecalciferol (VITAMIN D3) 1,000 units tablet, Take 1,000 Units by mouth 2 (two) times a day, Disp: , Rfl:     folic acid (FOLVITE) 854 mcg tablet, Take 400 mcg by mouth daily, Disp: , Rfl:     glipiZIDE (GLUCOTROL) 5 mg tablet, take 1 and 1/2 tablets by mouth once daily (Patient taking differently: 1 tablet in the am and 1 tablet in the pm), Disp: 45 tablet, Rfl: 3    glucose monitoring kit (FREESTYLE) monitoring kit, 1 each by Does not apply route daily, Disp: 1 each, Rfl: 0    insulin glargine (Lantus SoloStar) 100 units/mL injection pen, Inject 46 Units under the skin daily at bedtime, Disp: 15 mL, Rfl: 3    Lancets (FREESTYLE) lancets, Use as instructed - to test daily, Disp: 30 each, Rfl: 5    levothyroxine 50 mcg tablet, take 1 tablet by mouth once daily, Disp: 90 tablet, Rfl: 3    linaGLIPtin 5 MG TABS, Take 5 mg by mouth daily, Disp: 30 tablet, Rfl: 1    lisinopril (ZESTRIL) 20 mg tablet, take 1 tablet by mouth once daily, Disp: 90 tablet, Rfl: 3    metoprolol succinate (TOPROL-XL) 50 mg 24 hr tablet, take 1 tablet by mouth once daily, Disp: 90 tablet, Rfl: 5    omega-3-acid ethyl esters (LOVAZA) 1 g capsule, Take 2 g by mouth 2 (two) times a day, Disp: , Rfl:     Psyllium (FIBER) 0 52 g CAPS, Take by mouth, Disp: , Rfl:     rosuvastatin (CRESTOR) 10 MG tablet, Take 1 tablet (10 mg total) by mouth daily At bedtime, Disp: 90 tablet, Rfl: 1    Lab Results   Component Value Date    SODIUM 137 02/25/2021    K 4 7 02/25/2021     02/25/2021    CO2 28 02/25/2021    AGAP 4 02/25/2021    BUN 23 02/25/2021    CREATININE 1 58 (H) 02/25/2021    GLUF 211 (H) 02/25/2021    CALCIUM 9 6 02/25/2021    AST 14 02/25/2021    ALT 21 02/25/2021    ALKPHOS 68 02/25/2021    TP 7 1 02/25/2021    TBILI 0 49 02/25/2021    EGFR 32 02/25/2021     Lab Results   Component Value Date    WBC 7 93 03/04/2021    HGB 12 2 03/04/2021    HCT 39 6 03/04/2021    MCV 96 03/04/2021     03/04/2021     Lab Results   Component Value Date    CHOLESTEROL 107 02/25/2021    CHOLESTEROL 159 02/18/2020    CHOLESTEROL 159 11/13/2019     Lab Results   Component Value Date    HDL 40 02/25/2021    HDL 37 (L) 02/18/2020    HDL 38 (L) 11/13/2019     Lab Results   Component Value Date    LDLCALC 32 02/25/2021    LDLCALC 86 02/18/2020    LDLCALC 82 11/13/2019     Lab Results   Component Value Date    TRIG 177 (H) 02/25/2021    TRIG 235 (H) 10/21/2020    TRIG 183 (H) 06/24/2020     No results found for: [place], [place]  Lab Results   Component Value Date    YCF2OYTKQJER 2 720 10/21/2020     Lab Results   Component Value Date    PTH 51 8 03/04/2021    CALCIUM 9 6 02/25/2021     No results found for: SPEP, UPEP  No results found for: Dana Cosme  10/20 creat 1 34 -eGFR 39 ml/min  MAC neg  Vitamin D 60   LDL 32    US kidneys 7//20 WNL  A1c 11 1    Objective:      /82 (BP Location: Left arm, Patient Position: Sitting, Cuff Size: Large)   Pulse 74   Temp 98 4 °F (36 9 °C) (Temporal)   Ht 5' 2" (1 575 m)   Wt 91 6 kg (202 lb)   SpO2 98%   BMI 36 95 kg/m²     End exam 170/80   Home /65     Physical Exam  Constitutional:       General: She is not in acute distress  Appearance: She is obese  She is not toxic-appearing  HENT:      Head: Atraumatic  Right Ear: External ear normal       Left Ear: External ear normal    Eyes:      Extraocular Movements: Extraocular movements intact  Pupils: Pupils are equal, round, and reactive to light  Neck:      Musculoskeletal: Normal range of motion  No neck rigidity  Vascular: No carotid bruit  Cardiovascular:      Heart sounds: No murmur  Pulmonary:      Effort: Pulmonary effort is normal  No respiratory distress  Breath sounds: Normal breath sounds  No wheezing  Abdominal:      General: There is no distension  Tenderness: There is no abdominal tenderness  Musculoskeletal:      Left lower leg: No edema  Lymphadenopathy:      Cervical: No cervical adenopathy  Skin:     General: Skin is warm and dry  Neurological:      General: No focal deficit present  Mental Status: She is alert  Gait: Gait normal    Psychiatric:         Mood and Affect: Mood normal          Behavior: Behavior normal          Thought Content:  Thought content normal          Judgment: Judgment normal

## 2021-03-08 NOTE — ASSESSMENT & PLAN NOTE
Lab Results   Component Value Date    EGFR 32 02/25/2021    EGFR 39 10/21/2020    EGFR 39 06/24/2020    CREATININE 1 58 (H) 02/25/2021    CREATININE 1 34 (H) 10/21/2020    CREATININE 1 34 (H) 06/24/2020   She has worsening kidney function with a drop in her GFR to 30 mils per minute  She is also has uncontrolled sugars  She is not spilling any protein in the urine and has a normal kidney ultrasound from last year  Will do a short interval follow-up to evaluate for stability   I have read viewed all of her medications for potential nephrotoxins      She does not take any NSAIDs

## 2021-03-08 NOTE — TELEPHONE ENCOUNTER
Pt is calling with her glucose readings for the week  They are as follows:    3/1/21: 213 morning  257 afternoon    3/2/21: 147 morning  220 afternoon    3/3/21: 199 morning  200 afternoon    3/4/21 143 morning  198 afternoon    3/5/21: 163 morning  212 afternoon    3/6/21 188 morning  201 afternoon    3/7/21 155 morning  180 afternoon     3/8/21 142 morning

## 2021-03-08 NOTE — TELEPHONE ENCOUNTER
Sugars in morning are fasting  After speaking with you, I will call pt and advise her of the following:    Increase glipizide to 1 tablet (5 mg) in morning with breakfast and 1 tablet (5 mg) in afternoon with dinner    Confirm she was able to get linagliptin    Confirm she is using 46 units of insulin daily

## 2021-03-09 ENCOUNTER — PATIENT OUTREACH (OUTPATIENT)
Dept: FAMILY MEDICINE CLINIC | Facility: CLINIC | Age: 76
End: 2021-03-09

## 2021-03-09 DIAGNOSIS — E11.9 TYPE 2 DIABETES MELLITUS WITHOUT COMPLICATION, WITHOUT LONG-TERM CURRENT USE OF INSULIN (HCC): Primary | ICD-10-CM

## 2021-03-09 NOTE — PROGRESS NOTES
Outpatient Care Management Note:  Call placed to [facility] patient assistance program to check on status of application for Tradjenta  Application processed during call  Regulo Villegas will receive a 90 day supply of the medication  They are requiring her to apply for MA  If this is declined, they will provide a one year supply  Regulo Villegas made aware of same  She is accepting of Rene Suárez, the CHW, assisting with the MA application

## 2021-03-15 ENCOUNTER — PATIENT OUTREACH (OUTPATIENT)
Dept: CASE MANAGEMENT | Facility: HOSPITAL | Age: 76
End: 2021-03-15

## 2021-03-16 ENCOUNTER — OFFICE VISIT (OUTPATIENT)
Dept: DIABETES SERVICES | Facility: CLINIC | Age: 76
End: 2021-03-16
Payer: MEDICARE

## 2021-03-16 VITALS — HEIGHT: 62 IN | WEIGHT: 202 LBS | BODY MASS INDEX: 37.17 KG/M2

## 2021-03-16 DIAGNOSIS — E11.9 TYPE 2 DIABETES MELLITUS WITHOUT COMPLICATION, WITHOUT LONG-TERM CURRENT USE OF INSULIN (HCC): ICD-10-CM

## 2021-03-16 PROCEDURE — 97802 MEDICAL NUTRITION INDIV IN: CPT | Performed by: DIETITIAN, REGISTERED

## 2021-03-16 NOTE — PROGRESS NOTES
CHW made successful outreach with patient  CHW introduced self and role  CHW explained she will be assisting with Medical assistance application  Mirna Simon is agreeable and thankful for the help   CHW will submit application VIA compass and follow up with patient

## 2021-03-16 NOTE — PROGRESS NOTES
Medical Nutrition Therapy        Assessment    Visit Type: Initial visit    Chief complaint Benitez Salinas reports having diabetes for several years but says she doesn't have "the bad kind"  She brought in her BG readings, morning readings are 137-207 and predinner 190-240  HPI: Freddy diet history reveals excessive carbohydrates at times and some meals are too close together  Her breakfast is excessive, especially when including a dense cereal and fruit nectar to drink  She does include fruits, vegetables, whole grains, and mostly lean proteins  She does split portions of some items with her significant other  Currently meals range from 35 to 90 grams of carbohydrate  Explained basic pathophysiology of diabetes and impact of diet on blood glucose levels  Together we discussed what foods contain CHO, reading a food label, and serving sizes  Used the portion booklet to teach Benitez Salinas more about food groups and basic carbohydrate counting  Created an individualized meal plan for Benitez Salinas with 3 meals and 1 snacks providing 45 g carb per meal and 15-20 g carb per snack  This plan will help promote weight loss  Put together sample meals for Jina's reference  Benitez Salinas demonstrated good understanding, Benitez Salinas will call with questions or for more education  Ht Readings from Last 1 Encounters:   03/08/21 5' 2" (1 575 m)     Wt Readings from Last 2 Encounters:   03/08/21 91 6 kg (202 lb)   02/25/21 92 2 kg (203 lb 3 2 oz)     Weight Change: Yes has gained about 30# in the past year    Medical Diagnosis/ICD10: E11 65    Barriers to Learning: no barriers    Do you follow any special diet presently?: Yes - trying to stop snacking, was having ice cream after dinner   Drinks crystal light and sarah  Who shops: patient  Who cooks: patient    Food Log: Completed via the method of food recall  Sleeps until 9  Breakfast: usually eats around 10, 1 cup raisin bran with 1/2 banana and lowfat Lactaid milk, small juice glass (2 oz ) of og nectar or apple juice for pills OR skips  Morning Snack:if no breakfast, takes pills with a little juice  Lunch:12-1:30 (later if she ate breakfast), grilled cheese sandwich w/ ham on 2 whole wheat w/ I can't believe it's not butter, 5 chips, diet peach tea, sometimes might have grapes  Afternoon Snack: none  Dinner: 5:30-6, 1/2 pork chop w/ a little breading, pan fried in a little oil, 1/2 baked potato, vegetables like broccoli or string beans, corn  Evening Snack:8:30-9, about 1 cup of ice cream - coffee or might have a few little pretzels  Beverages: Crystal light, seltzer water or club soda w/ ice   Eating out/Take out:did not discuss  Exercise has low back pain, can do some seated exercises from PT    Calorie needs 1390kcals/day Carbs: 45g/meal, 15-20g/snack         Nutrition Diagnosis:  Inconsistent carbohydrate intake  intake related to Physiological causes requiring careful timing and consistency in the amount of carbohydrate (i e  diabetes mellitus, hypoglycemia) as evidenced by  Estimated carbohydrate intake that is different from recommended types or ingested on an irregular basis    Intervention: plate method, carbohydrate counting, meal timing, meal planning, individualized meal plan, monitoring portion control and food diary     Treatment Goals: Patient understands education and recommendations, Patient will consume 3 meals a day and Patient will exercise    Monitoring and evaluation:    Term code indicator  FH 1 3 2 Food Intake Criteria: Eat 3 meals daily 4-5 hours apart  Term code indicator  CH 2 2 Treatments/Therapy/Alternative Medicine Criteria: Do PT exercises daily, add walking with grocery cart when out at stores    Patients Response to Instruction:  Comprehensiongood  Motivationgood  Expected Compliancegood    Thank you for coming to the [facility] for education today  Please feel free to call with any questions or concerns      Alberta Bills  [address] [place] [address] [address] [facility] [id number] [phone]

## 2021-03-16 NOTE — PATIENT INSTRUCTIONS
1  Follow meal plan  2  Take Glipizide 15 minutes before meals  3  Keep a 3 day food diary  4   Return for follow-up in 2 months

## 2021-03-19 ENCOUNTER — PATIENT OUTREACH (OUTPATIENT)
Dept: FAMILY MEDICINE CLINIC | Facility: CLINIC | Age: 76
End: 2021-03-19

## 2021-03-19 NOTE — PROGRESS NOTES
Outpatient Care Management Note:  Follow up call placed to [facility] to verify that she received her Tradjenta  Message left for patient to please return call  Contact information left on message  She has been contacted by the CHW and her application for MA has been started

## 2021-03-19 NOTE — PROGRESS NOTES
Outpatient Care Management Note:  Received voicemail from [facility]  She did receive her Tradjenta through the patient assistance program   She plans on starting it today  A 90 day supply was received  She was seen recently by the DM educator  [facility] will be away most of the day and unavailable for a return call

## 2021-03-24 DIAGNOSIS — E11.9 TYPE 2 DIABETES MELLITUS WITHOUT COMPLICATION, WITHOUT LONG-TERM CURRENT USE OF INSULIN (HCC): ICD-10-CM

## 2021-03-25 ENCOUNTER — OFFICE VISIT (OUTPATIENT)
Dept: FAMILY MEDICINE CLINIC | Facility: CLINIC | Age: 76
End: 2021-03-25
Payer: MEDICARE

## 2021-03-25 VITALS
BODY MASS INDEX: 37.47 KG/M2 | SYSTOLIC BLOOD PRESSURE: 124 MMHG | WEIGHT: 203.6 LBS | TEMPERATURE: 97.4 F | RESPIRATION RATE: 20 BRPM | DIASTOLIC BLOOD PRESSURE: 62 MMHG | HEART RATE: 75 BPM | HEIGHT: 62 IN | OXYGEN SATURATION: 98 %

## 2021-03-25 DIAGNOSIS — I10 ESSENTIAL HYPERTENSION: ICD-10-CM

## 2021-03-25 DIAGNOSIS — E11.9 TYPE 2 DIABETES MELLITUS WITHOUT COMPLICATION, WITHOUT LONG-TERM CURRENT USE OF INSULIN (HCC): Primary | ICD-10-CM

## 2021-03-25 DIAGNOSIS — N18.30 CKD STAGE 3 DUE TO TYPE 2 DIABETES MELLITUS (HCC): ICD-10-CM

## 2021-03-25 DIAGNOSIS — E11.22 CKD STAGE 3 DUE TO TYPE 2 DIABETES MELLITUS (HCC): ICD-10-CM

## 2021-03-25 LAB — SL AMB POCT HEMOGLOBIN AIC: 10.6 (ref ?–6.5)

## 2021-03-25 PROCEDURE — 99214 OFFICE O/P EST MOD 30 MIN: CPT | Performed by: INTERNAL MEDICINE

## 2021-03-25 PROCEDURE — 83036 HEMOGLOBIN GLYCOSYLATED A1C: CPT | Performed by: INTERNAL MEDICINE

## 2021-03-25 RX ORDER — INSULIN GLARGINE 100 [IU]/ML
INJECTION, SOLUTION SUBCUTANEOUS
Qty: 15 ML | Refills: 3 | Status: SHIPPED | OUTPATIENT
Start: 2021-03-25 | End: 2021-04-27

## 2021-03-25 RX ORDER — INSULIN GLARGINE 100 [IU]/ML
46 INJECTION, SOLUTION SUBCUTANEOUS
Qty: 15 ML | Refills: 5 | Status: SHIPPED | OUTPATIENT
Start: 2021-03-25 | End: 2021-03-25

## 2021-03-25 NOTE — PATIENT INSTRUCTIONS
Continue Lantus 46 units at bedtime, glipizide 5mg twice daily with meals, linagliptin       See Dr Caryle Forth for eye exam for diabetes

## 2021-03-25 NOTE — PROGRESS NOTES
[address] [place] [address] Primary Care        NAME: Rafael Mcclellan is a 76 y o  female  : 1945    MRN: [id number]  DATE: 2021  TIME: 11:34 AM    Assessment and Plan   1  Type 2 diabetes mellitus without complication, without long-term current use of insulin ([facility])  - A1c has improved from 11 1, continue lantus 46 units, portion control, limiting sweets and carbs, also recently started tradjenta  Needs dilated eye exam, follow up 1 month    -   POCT hemoglobin A1c  -     insulin glargine (Lantus SoloStar) 100 units/mL injection pen; Inject 46 Units under the skin daily at bedtime  -     Ambulatory referral to Ophthalmology; Future    2  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 mellitus ([facility] )  - saw Dr Bernice Lynn recently, renal function has declined, no proteinuria    3  Essential hypertension  -BP is well-controlled today,            Chief Complaint     Chief Complaint   Patient presents with    Follow-up         History of Present Illness       Here for 1 month diabetes follow up  Met with diabetic educator: cut down on snacks, limiting candies and ice cream  Lantus 46 units at bedtime   today  Glipizide 5mg BID, just received tradjenta a few days ago  No hypoglycemia  Not exercising formally, does leg stretches at home and likes to walk in warmer weather  Review of Systems   Review of Systems   Constitutional: Negative for appetite change, chills, fatigue and fever  Respiratory: Negative for cough, chest tightness and shortness of breath  Gastrointestinal: Negative for abdominal pain, diarrhea, nausea and vomiting           Current Medications       Current Outpatient Medications:     amLODIPine (NORVASC) 5 mg tablet, take 1 tablet by mouth once daily, Disp: 90 tablet, Rfl: 3    aspirin (ECOTRIN LOW STRENGTH) 81 mg EC tablet, Take 81 mg by mouth daily, Disp: , Rfl:     folic acid (FOLVITE) 903 mcg tablet, Take 400 mcg by mouth daily, Disp: , Rfl:     glipiZIDE (GLUCOTROL) 5 mg tablet, take 1 and 1/2 tablets by mouth once daily (Patient taking differently: 1 tablet in the am and 1 tablet in the pm), Disp: 45 tablet, Rfl: 3    glucose monitoring kit (FREESTYLE) monitoring kit, 1 each by Does not apply route daily, Disp: 1 each, Rfl: 0    insulin glargine (Lantus SoloStar) 100 units/mL injection pen, Inject 46 Units under the skin daily at bedtime, Disp: 15 mL, Rfl: 5    Lancets (FREESTYLE) lancets, Use as instructed - to test daily, Disp: 30 each, Rfl: 5    levothyroxine 50 mcg tablet, take 1 tablet by mouth once daily, Disp: 90 tablet, Rfl: 3    linaGLIPtin 5 MG TABS, Take 5 mg by mouth daily, Disp: 30 tablet, Rfl: 1    lisinopril (ZESTRIL) 20 mg tablet, take 1 tablet by mouth once daily, Disp: 90 tablet, Rfl: 3    metoprolol succinate (TOPROL-XL) 50 mg 24 hr tablet, take 1 tablet by mouth once daily, Disp: 90 tablet, Rfl: 5    omega-3-acid ethyl esters (LOVAZA) 1 g capsule, Take 2 g by mouth 2 (two) times a day, Disp: , Rfl:     Psyllium (FIBER) 0 52 g CAPS, Take by mouth, Disp: , Rfl:     rosuvastatin (CRESTOR) 10 MG tablet, Take 1 tablet (10 mg total) by mouth daily At bedtime, Disp: 90 tablet, Rfl: 1    cholecalciferol (VITAMIN D3) 1,000 units tablet, Take 1,000 Units by mouth 2 (two) times a day, Disp: , Rfl:     Current Allergies     Allergies as of 03/25/2021    (No Known Allergies)            The following portions of the patient's history were reviewed and updated as appropriate: allergies, current medications, past family history, past medical history, past social history, past surgical history and problem list      Past Medical History:   Diagnosis Date    Benign essential hypertension     Chronic kidney disease, stage 2 (mild)     Chronic kidney disease, stage 3     Disorder of gallbladder     Disorder of skin and subcutaneous tissue     Dyspnea     Essential hypertension     Hyperlipidemia     Hypokalemia     Hypothyroidism     Malaise and fatigue     Mixed hyperlipidemia     Morbid obesity (HCC)     Pernicious anemia     Type 2 diabetes mellitus (HCC)        Past Surgical History:   Procedure Laterality Date    BREAST BIOPSY Right     CARPAL TUNNEL RELEASE Bilateral     CHOLECYSTECTOMY      COLONOSCOPY      Dr Marc Bansal PARTIAL HYSTERECTOMY      SKIN LESION EXCISION      scalp        Family History   Problem Relation Age of Onset    Stroke Mother     Atrial fibrillation Mother     Brain cancer Father     Other Brother         Twin brothers - Open heart    Other Brother         Twin brothers - Open heart         Medications have been verified  Objective   /62   Pulse 75   Temp (!) 97 4 °F (36 3 °C)   Resp 20   Ht 5' 2" (1 575 m)   Wt 92 4 kg (203 lb 9 6 oz)   SpO2 98%   BMI 37 24 kg/m²        Physical Exam     Physical Exam  Vitals signs reviewed  Constitutional:       General: She is not in acute distress  Appearance: She is obese  Cardiovascular:      Rate and Rhythm: Normal rate and regular rhythm  Pulses: Normal pulses  Heart sounds: Normal heart sounds  No murmur  No friction rub  No gallop  Pulmonary:      Effort: Pulmonary effort is normal  No respiratory distress  Breath sounds: Normal breath sounds  No wheezing, rhonchi or rales  Neurological:      General: No focal deficit present  Mental Status: She is alert  Psychiatric:         Mood and Affect: Mood normal          Behavior: Behavior normal          Thought Content:  Thought content normal          Judgment: Judgment normal              Results:  Lab Results   Component Value Date    SODIUM 137 02/25/2021    K 4 7 02/25/2021     02/25/2021    CO2 28 02/25/2021    BUN 23 02/25/2021    CREATININE 1 58 (H) 02/25/2021    CALCIUM 9 6 02/25/2021       Lab Results   Component Value Date    HGBA1C 10 6 (A) 03/25/2021       Lab Results   Component Value Date    WBC 7 93 03/04/2021    HGB 12 2 03/04/2021    HCT 39 6 03/04/2021    MCV 96 03/04/2021     03/04/2021

## 2021-03-30 ENCOUNTER — PATIENT OUTREACH (OUTPATIENT)
Dept: CASE MANAGEMENT | Facility: HOSPITAL | Age: 76
End: 2021-03-30

## 2021-03-30 NOTE — PROGRESS NOTES
JAIMIEW called pt to verify income info   MA application pending but will not be able to fully submit until SS income is documented   LVM    Advised pt to give a call back     e-Form # [id number]

## 2021-04-01 LAB
LEFT EYE DIABETIC RETINOPATHY: NORMAL
RIGHT EYE DIABETIC RETINOPATHY: NORMAL

## 2021-04-02 ENCOUNTER — TELEPHONE (OUTPATIENT)
Dept: FAMILY MEDICINE CLINIC | Facility: CLINIC | Age: 76
End: 2021-04-02

## 2021-04-02 ENCOUNTER — PATIENT OUTREACH (OUTPATIENT)
Dept: FAMILY MEDICINE CLINIC | Facility: CLINIC | Age: 76
End: 2021-04-02

## 2021-04-02 NOTE — PROGRESS NOTES
Outpatient Care Management Note:  Follow up call to [facility] attempted  Message left for patient to please return call  Contact information left on message

## 2021-04-02 NOTE — TELEPHONE ENCOUNTER
Have her cut back the glipizide to once daily with breakfast, since she's now on the tradjenta may not need higher doses of glipizide  Keep watching BG and call if fasting still 80s

## 2021-04-02 NOTE — TELEPHONE ENCOUNTER
Pt called and stated that her sugar was 81 this morning, before breakfast   She wanted to let you know that she took 1 of the glipizide  And tested her sugar after breakfast was 115    She was told to call if sugar was low    They did change her medication on the glipizide from 1 1/2 to 2 pills    And she started the tradjenta 1 week ago    Bill Doran it was 105  The day before 135    Please advise    Phone: [phone]  Call her back to make sure she was ok to change the glipizide pills for the day

## 2021-04-16 ENCOUNTER — PATIENT OUTREACH (OUTPATIENT)
Dept: CASE MANAGEMENT | Facility: HOSPITAL | Age: 76
End: 2021-04-16

## 2021-04-16 ENCOUNTER — PATIENT OUTREACH (OUTPATIENT)
Dept: FAMILY MEDICINE CLINIC | Facility: CLINIC | Age: 76
End: 2021-04-16

## 2021-04-16 NOTE — PROGRESS NOTES
Outpatient Care Management Note:  Follow up call to [facility] attempted  Message left for patient to please return call  Contact information left on message

## 2021-04-19 NOTE — PROGRESS NOTES
CHW spoke with APURVA Escudero   Pt has not returned voicemail  Still requiring SSI information     CHW called and left  again for response

## 2021-04-20 ENCOUNTER — PATIENT OUTREACH (OUTPATIENT)
Dept: FAMILY MEDICINE CLINIC | Facility: CLINIC | Age: 76
End: 2021-04-20

## 2021-04-20 NOTE — PROGRESS NOTES
Outpatient Care Management Note:  In basket message sent to PCP to make her aware that myself and the CHW have left multiple messages asking for a return call so that SSI verification can be obtained to complete MA application  PCP also made aware that Jade Craig was only provided a 90 day supply of Tradjenta pending outcome of MA application  Jade Craig is aware of the need for MA to be processed in order for patient assistance to continue

## 2021-04-23 DIAGNOSIS — E78.2 MIXED HYPERLIPIDEMIA: ICD-10-CM

## 2021-04-23 RX ORDER — ROSUVASTATIN CALCIUM 10 MG/1
TABLET, COATED ORAL
Qty: 90 TABLET | Refills: 1 | Status: SHIPPED | OUTPATIENT
Start: 2021-04-23 | End: 2021-07-27 | Stop reason: SDUPTHER

## 2021-04-24 ENCOUNTER — APPOINTMENT (OUTPATIENT)
Dept: LAB | Facility: CLINIC | Age: 76
End: 2021-04-24
Payer: MEDICARE

## 2021-04-24 DIAGNOSIS — E78.2 MIXED HYPERLIPIDEMIA: ICD-10-CM

## 2021-04-24 DIAGNOSIS — E11.9 TYPE 2 DIABETES MELLITUS WITHOUT COMPLICATION, WITHOUT LONG-TERM CURRENT USE OF INSULIN (HCC): ICD-10-CM

## 2021-04-24 DIAGNOSIS — N18.32 STAGE 3B CHRONIC KIDNEY DISEASE (HCC): ICD-10-CM

## 2021-04-24 LAB
ALBUMIN SERPL BCP-MCNC: 3.4 G/DL (ref 3.5–5)
ALP SERPL-CCNC: 50 U/L (ref 46–116)
ALT SERPL W P-5'-P-CCNC: 20 U/L (ref 12–78)
ANION GAP SERPL CALCULATED.3IONS-SCNC: 7 MMOL/L (ref 4–13)
AST SERPL W P-5'-P-CCNC: 12 U/L (ref 5–45)
BASOPHILS # BLD AUTO: 0.04 THOUSANDS/ΜL (ref 0–0.1)
BASOPHILS NFR BLD AUTO: 1 % (ref 0–1)
BILIRUB SERPL-MCNC: 0.4 MG/DL (ref 0.2–1)
BUN SERPL-MCNC: 20 MG/DL (ref 5–25)
CALCIUM ALBUM COR SERPL-MCNC: 9.7 MG/DL (ref 8.3–10.1)
CALCIUM SERPL-MCNC: 9.2 MG/DL (ref 8.3–10.1)
CHLORIDE SERPL-SCNC: 106 MMOL/L (ref 100–108)
CO2 SERPL-SCNC: 25 MMOL/L (ref 21–32)
CREAT SERPL-MCNC: 1.39 MG/DL (ref 0.6–1.3)
EOSINOPHIL # BLD AUTO: 0.11 THOUSAND/ΜL (ref 0–0.61)
EOSINOPHIL NFR BLD AUTO: 2 % (ref 0–6)
ERYTHROCYTE [DISTWIDTH] IN BLOOD BY AUTOMATED COUNT: 14.1 % (ref 11.6–15.1)
GFR SERPL CREATININE-BSD FRML MDRD: 37 ML/MIN/1.73SQ M
GLUCOSE P FAST SERPL-MCNC: 164 MG/DL (ref 65–99)
HCT VFR BLD AUTO: 40.4 % (ref 34.8–46.1)
HGB BLD-MCNC: 12.8 G/DL (ref 11.5–15.4)
IMM GRANULOCYTES # BLD AUTO: 0.03 THOUSAND/UL (ref 0–0.2)
IMM GRANULOCYTES NFR BLD AUTO: 0 % (ref 0–2)
LDLC SERPL DIRECT ASSAY-MCNC: 54 MG/DL (ref 0–100)
LYMPHOCYTES # BLD AUTO: 1.8 THOUSANDS/ΜL (ref 0.6–4.47)
LYMPHOCYTES NFR BLD AUTO: 24 % (ref 14–44)
MCH RBC QN AUTO: 30 PG (ref 26.8–34.3)
MCHC RBC AUTO-ENTMCNC: 31.7 G/DL (ref 31.4–37.4)
MCV RBC AUTO: 95 FL (ref 82–98)
MONOCYTES # BLD AUTO: 0.48 THOUSAND/ΜL (ref 0.17–1.22)
MONOCYTES NFR BLD AUTO: 7 % (ref 4–12)
NEUTROPHILS # BLD AUTO: 4.93 THOUSANDS/ΜL (ref 1.85–7.62)
NEUTS SEG NFR BLD AUTO: 66 % (ref 43–75)
NRBC BLD AUTO-RTO: 0 /100 WBCS
PLATELET # BLD AUTO: 187 THOUSANDS/UL (ref 149–390)
PMV BLD AUTO: 10.9 FL (ref 8.9–12.7)
POTASSIUM SERPL-SCNC: 4.6 MMOL/L (ref 3.5–5.3)
PROT SERPL-MCNC: 6.9 G/DL (ref 6.4–8.2)
RBC # BLD AUTO: 4.27 MILLION/UL (ref 3.81–5.12)
SODIUM SERPL-SCNC: 138 MMOL/L (ref 136–145)
URATE SERPL-MCNC: 5.6 MG/DL (ref 2–6.8)
WBC # BLD AUTO: 7.39 THOUSAND/UL (ref 4.31–10.16)

## 2021-04-24 PROCEDURE — 83721 ASSAY OF BLOOD LIPOPROTEIN: CPT

## 2021-04-24 PROCEDURE — 36415 COLL VENOUS BLD VENIPUNCTURE: CPT

## 2021-04-24 PROCEDURE — 84550 ASSAY OF BLOOD/URIC ACID: CPT

## 2021-04-24 PROCEDURE — 80053 COMPREHEN METABOLIC PANEL: CPT

## 2021-04-24 PROCEDURE — 85025 COMPLETE CBC W/AUTO DIFF WBC: CPT

## 2021-04-27 ENCOUNTER — OFFICE VISIT (OUTPATIENT)
Dept: FAMILY MEDICINE CLINIC | Facility: CLINIC | Age: 76
End: 2021-04-27
Payer: MEDICARE

## 2021-04-27 VITALS
TEMPERATURE: 98.3 F | DIASTOLIC BLOOD PRESSURE: 66 MMHG | HEIGHT: 62 IN | SYSTOLIC BLOOD PRESSURE: 122 MMHG | OXYGEN SATURATION: 99 % | WEIGHT: 196.8 LBS | RESPIRATION RATE: 20 BRPM | BODY MASS INDEX: 36.22 KG/M2 | HEART RATE: 91 BPM

## 2021-04-27 DIAGNOSIS — I10 ESSENTIAL HYPERTENSION: ICD-10-CM

## 2021-04-27 DIAGNOSIS — E11.9 TYPE 2 DIABETES MELLITUS WITHOUT COMPLICATION, WITHOUT LONG-TERM CURRENT USE OF INSULIN (HCC): Primary | ICD-10-CM

## 2021-04-27 DIAGNOSIS — E11.22 CKD STAGE 3 DUE TO TYPE 2 DIABETES MELLITUS (HCC): ICD-10-CM

## 2021-04-27 DIAGNOSIS — N18.30 CKD STAGE 3 DUE TO TYPE 2 DIABETES MELLITUS (HCC): ICD-10-CM

## 2021-04-27 LAB — SL AMB POCT HEMOGLOBIN AIC: 8.9 (ref ?–6.5)

## 2021-04-27 PROCEDURE — 83036 HEMOGLOBIN GLYCOSYLATED A1C: CPT | Performed by: INTERNAL MEDICINE

## 2021-04-27 PROCEDURE — 99214 OFFICE O/P EST MOD 30 MIN: CPT | Performed by: INTERNAL MEDICINE

## 2021-04-27 RX ORDER — INSULIN GLARGINE 100 [IU]/ML
46 INJECTION, SOLUTION SUBCUTANEOUS
Qty: 15 ML | Refills: 3
Start: 2021-04-27 | End: 2021-07-27 | Stop reason: SDUPTHER

## 2021-04-27 RX ORDER — GLIPIZIDE 5 MG/1
5 TABLET ORAL DAILY
Qty: 30 TABLET | Refills: 3
Start: 2021-04-27 | End: 2021-06-18 | Stop reason: SDUPTHER

## 2021-04-27 NOTE — PROGRESS NOTES
[facility]        NAME: Héctor Schwartz is a 76 y o  female  : 1945    MRN: [id number]  DATE: 2021  TIME: 3:34 PM    Assessment and Plan   1  Type 2 diabetes mellitus without complication, without long-term current use of insulin ([facility])  - A1c improved today, continue current dose of insulin, tradjenta and glipizide  She is agreeable to MA for helping with cost of tradjenta  Follow up again in 1 month  -   POCT hemoglobin A1c  -     insulin glargine (Lantus SoloStar) 100 units/mL injection pen; Inject 46 Units under the skin daily at bedtime  -     glipiZIDE (GLUCOTROL) 5 mg tablet; Take 1 tablet (5 mg total) by mouth daily    2  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 mellitus ([facility] [address] )  - recent labs show stable renal function  3  Essential hypertension  - well-controlled           Chief Complaint     Chief Complaint   Patient presents with    Follow-up         History of Present Illness       Here for 1 month diabetes follow up  Insulin 46 units at bedtime, tradjenta and glipizide once daily, fasting BG 130s  Lowest BG 98  Denies hypoglycemia symptoms like diaphoresis, dizziness, tremors  Lost 7 lbs since last visit, reports decreased appetite, cutting down on snacks  Walking more, at least 30 minutes per day  Had eye exam recently, no retinopathy, only early cataracts  Review of Systems   Review of Systems   Constitutional: Positive for activity change, appetite change and unexpected weight change  Negative for chills, fatigue and fever  Eyes: Negative for visual disturbance  Respiratory: Negative for cough and shortness of breath  Cardiovascular: Negative for chest pain, palpitations and leg swelling  Gastrointestinal: Negative for abdominal pain, diarrhea, nausea and vomiting  Neurological: Negative for dizziness, light-headedness and headaches           Current Medications       Current Outpatient Medications:     amLODIPine (NORVASC) 5 mg tablet, take 1 tablet by mouth once daily, Disp: 90 tablet, Rfl: 3    aspirin (ECOTRIN LOW STRENGTH) 81 mg EC tablet, Take 81 mg by mouth daily, Disp: , Rfl:     cholecalciferol (VITAMIN D3) 1,000 units tablet, Take 800 Units by mouth daily, Disp: , Rfl:     folic acid (FOLVITE) 588 mcg tablet, Take 400 mcg by mouth daily, Disp: , Rfl:     glipiZIDE (GLUCOTROL) 5 mg tablet, Take 1 tablet (5 mg total) by mouth daily, Disp: 30 tablet, Rfl: 3    glucose monitoring kit (FREESTYLE) monitoring kit, 1 each by Does not apply route daily, Disp: 1 each, Rfl: 0    insulin glargine (Lantus SoloStar) 100 units/mL injection pen, Inject 46 Units under the skin daily at bedtime, Disp: 15 mL, Rfl: 3    Lancets (FREESTYLE) lancets, Use as instructed - to test daily, Disp: 30 each, Rfl: 5    levothyroxine 50 mcg tablet, take 1 tablet by mouth once daily, Disp: 90 tablet, Rfl: 3    linaGLIPtin 5 MG TABS, Take 5 mg by mouth daily, Disp: 30 tablet, Rfl: 1    lisinopril (ZESTRIL) 20 mg tablet, take 1 tablet by mouth once daily, Disp: 90 tablet, Rfl: 3    metoprolol succinate (TOPROL-XL) 50 mg 24 hr tablet, take 1 tablet by mouth once daily, Disp: 90 tablet, Rfl: 5    omega-3-acid ethyl esters (LOVAZA) 1 g capsule, Take 2 g by mouth 2 (two) times a day, Disp: , Rfl:     Psyllium (FIBER) 0 52 g CAPS, Take by mouth, Disp: , Rfl:     rosuvastatin (CRESTOR) 10 MG tablet, take 1 tablet by mouth once daily, Disp: 90 tablet, Rfl: 1    Current Allergies     Allergies as of 04/27/2021    (No Known Allergies)            The following portions of the patient's history were reviewed and updated as appropriate: allergies, current medications, past family history, past medical history, past social history, past surgical history and problem list      Past Medical History:   Diagnosis Date    Benign essential hypertension     Chronic kidney disease, stage 2 (mild)     Chronic kidney disease, stage 3 (HCC)     Disorder of gallbladder     Disorder of skin and subcutaneous tissue     Dyspnea     Essential hypertension     Hyperlipidemia     Hypokalemia     Hypothyroidism     Malaise and fatigue     Mixed hyperlipidemia     Morbid obesity (HCC)     Pernicious anemia     Type 2 diabetes mellitus (HCC)        Past Surgical History:   Procedure Laterality Date    BREAST BIOPSY Right     CARPAL TUNNEL RELEASE Bilateral     CHOLECYSTECTOMY      COLONOSCOPY      Dr Bidnu Pereira PARTIAL HYSTERECTOMY      SKIN LESION EXCISION      scalp        Family History   Problem Relation Age of Onset    Stroke Mother     Atrial fibrillation Mother     Brain cancer Father     Other Brother         Twin brothers - Open heart    Other Brother         Twin brothers - Open heart         Medications have been verified  Objective   /66   Pulse 91   Temp 98 3 °F (36 8 °C)   Resp 20   Ht 5' 2" (1 575 m)   Wt 89 3 kg (196 lb 12 8 oz)   SpO2 99%   BMI 36 00 kg/m²        Physical Exam     Physical Exam  Vitals signs reviewed  Constitutional:       General: She is not in acute distress  Appearance: Normal appearance  She is obese  Cardiovascular:      Rate and Rhythm: Normal rate and regular rhythm  Heart sounds: S1 normal and S2 normal  No murmur  No friction rub  No gallop  Pulmonary:      Effort: Pulmonary effort is normal  No accessory muscle usage  Breath sounds: No wheezing, rhonchi or rales  Neurological:      General: No focal deficit present  Mental Status: She is alert  Psychiatric:         Mood and Affect: Mood and affect normal          Speech: Speech normal          Behavior: Behavior normal  Behavior is cooperative  Thought Content:  Thought content normal              Results:  Lab Results   Component Value Date    SODIUM 138 04/24/2021    K 4 6 04/24/2021     04/24/2021    CO2 25 04/24/2021    BUN 20 04/24/2021    CREATININE 1 39 (H) 04/24/2021    CALCIUM 9 2 04/24/2021       Lab Results   Component Value Date    HGBA1C 8 9 (A) 04/27/2021       Lab Results   Component Value Date    WBC 7 39 04/24/2021    HGB 12 8 04/24/2021    HCT 40 4 04/24/2021    MCV 95 04/24/2021     04/24/2021

## 2021-04-27 NOTE — PATIENT INSTRUCTIONS
Please continue current medications and insulin for diabetes  Follow up in 1 month  Call sooner if having low sugars < 70 or high > 200  Reduce vitamin D3 to 1,000 units once daily

## 2021-04-28 ENCOUNTER — PATIENT OUTREACH (OUTPATIENT)
Dept: CASE MANAGEMENT | Facility: HOSPITAL | Age: 76
End: 2021-04-28

## 2021-04-28 ENCOUNTER — PATIENT OUTREACH (OUTPATIENT)
Dept: FAMILY MEDICINE CLINIC | Facility: CLINIC | Age: 76
End: 2021-04-28

## 2021-04-28 NOTE — PROGRESS NOTES
Outpatient Care Management Note:   Reviewed office note from 4/27/2021  Dr Zuhair Carter did discuss importance of completing MA application with Alex Valenzuela at the office vist in order for her to continue receiving Tradjenta or being approved for MA  Peroffice note, Pippadenise Valenzuela is willing to complete application  Reached out to CHW to request that she make another outreach attempt for same

## 2021-04-28 NOTE — PROGRESS NOTES
CHW contacted pt to follow up with request for SS statements  Pt has not returned voicemail  CHW left VM for pt to call back  CHW spoke with pt  Pt willing to share financial information to complete her MA application  Pt and CHW will coordinate a time to meet for the documents  Monday or Tuesday May 3rd and 4th works for the pt       CHW will follow up

## 2021-05-04 ENCOUNTER — PATIENT OUTREACH (OUTPATIENT)
Dept: CASE MANAGEMENT | Facility: HOSPITAL | Age: 76
End: 2021-05-04

## 2021-05-05 ENCOUNTER — PATIENT OUTREACH (OUTPATIENT)
Dept: FAMILY MEDICINE CLINIC | Facility: CLINIC | Age: 76
End: 2021-05-05

## 2021-05-05 NOTE — PROGRESS NOTES
Outpatient Care Management Note:  Follow up call placed to [facility]  Made her aware that the form dropped off to the PCP office for her MA application would be submitted by [place]  Reviewed the need to submit a denial, if denied, to drug company in order for them to continue to supply the medication  If she is eligible for MA, her medications will be obtained through her pharmacy  Verbalized understanding  Her blood sugar this morning was 134  Vernon Kahn Denies any other needs at this time  Encouraged to call with any questions or concerns

## 2021-05-05 NOTE — PROGRESS NOTES
CHW reached pt to meet for statements and documents to submit to [facility] for MA application  Pt stated she is in the outskirts of [facility] and preferred to drop them off at the PCP  RN care manager will  the documents and send to CHW

## 2021-05-06 ENCOUNTER — VBI (OUTPATIENT)
Dept: ADMINISTRATIVE | Facility: OTHER | Age: 76
End: 2021-05-06

## 2021-05-06 NOTE — TELEPHONE ENCOUNTER
Upon review of the In Basket request we were able to locate, review, and update the patient chart as requested for Diabetic Eye Exam     Any additional questions or concerns should be emailed to the Practice Liaisons via [email or web address] email, please do not reply via In Basket      Thank you  Susannah Nelson

## 2021-05-10 ENCOUNTER — TELEPHONE (OUTPATIENT)
Dept: CASE MANAGEMENT | Facility: HOSPITAL | Age: 76
End: 2021-05-10

## 2021-05-10 ENCOUNTER — PATIENT OUTREACH (OUTPATIENT)
Dept: CASE MANAGEMENT | Facility: HOSPITAL | Age: 76
End: 2021-05-10

## 2021-05-10 NOTE — PROGRESS NOTES
Pts MA application #[id number] is fully submitted -- SS statement uploaded       Called pt to advise    CHW and Pt will follow up once the county updates the status of application

## 2021-05-19 ENCOUNTER — PATIENT OUTREACH (OUTPATIENT)
Dept: FAMILY MEDICINE CLINIC | Facility: CLINIC | Age: 76
End: 2021-05-19

## 2021-05-19 NOTE — PROGRESS NOTES
Outpatient Care Management Note:  Follow up call attempted to [facility]  Message left for patient to please return call  Contact information left on message

## 2021-05-20 ENCOUNTER — PATIENT OUTREACH (OUTPATIENT)
Dept: CASE MANAGEMENT | Facility: HOSPITAL | Age: 76
End: 2021-05-20

## 2021-05-27 ENCOUNTER — OFFICE VISIT (OUTPATIENT)
Dept: FAMILY MEDICINE CLINIC | Facility: CLINIC | Age: 76
End: 2021-05-27
Payer: MEDICARE

## 2021-05-27 ENCOUNTER — PATIENT OUTREACH (OUTPATIENT)
Dept: FAMILY MEDICINE CLINIC | Facility: CLINIC | Age: 76
End: 2021-05-27

## 2021-05-27 VITALS
WEIGHT: 198.6 LBS | OXYGEN SATURATION: 99 % | TEMPERATURE: 97.4 F | HEIGHT: 62 IN | BODY MASS INDEX: 36.55 KG/M2 | SYSTOLIC BLOOD PRESSURE: 124 MMHG | HEART RATE: 78 BPM | DIASTOLIC BLOOD PRESSURE: 80 MMHG | RESPIRATION RATE: 20 BRPM

## 2021-05-27 DIAGNOSIS — E11.9 TYPE 2 DIABETES MELLITUS WITHOUT COMPLICATION, WITHOUT LONG-TERM CURRENT USE OF INSULIN (HCC): Primary | ICD-10-CM

## 2021-05-27 DIAGNOSIS — I10 ESSENTIAL HYPERTENSION: ICD-10-CM

## 2021-05-27 DIAGNOSIS — Z78.9 NEED FOR COMMUNITY RESOURCE: Primary | ICD-10-CM

## 2021-05-27 PROCEDURE — 99213 OFFICE O/P EST LOW 20 MIN: CPT | Performed by: INTERNAL MEDICINE

## 2021-05-27 NOTE — PROGRESS NOTES
[facility]        NAME: Russell Perez is a 68 y o  female  : 1945    MRN: [id number]  DATE: May 27, 2021  TIME: 11:30 AM    Assessment and Plan   1  Type 2 diabetes mellitus without complication, without long-term current use of insulin ([facility])  - FBG improved, she did not bring her meter or glucose logs for review  Continue lantus 46 units, tradjenta and glipizide  Will alert SW of MA denial  May be able to reapply for PAP  She states that she sent her denial letter to   Encouraged to continue low carb diet, avoiding sweets  2  Essential hypertension  -controlled            Chief Complaint     Chief Complaint   Patient presents with    Follow-up     one month follow up         History of Present Illness       Here for 1 month diabetes follow up diabetes  Reports FBG improved  She is watching her diet, limiting carbs, snacking less, drinking water only  , usually < 150  No lows  Currently taking glipizide, tradjenta and lantus 46 units at bedtime  Was denied MA so she can't afford tradjenta when this supply runs out  Had COVID-19 booster on Tuesday  [place] achy after, arm was sore  No fevers/chills  Review of Systems   Review of Systems   Constitutional: Negative for appetite change, chills, fatigue, fever and unexpected weight change  Respiratory: Negative for cough and shortness of breath  Cardiovascular: Negative for chest pain, palpitations and leg swelling  Gastrointestinal: Negative for abdominal pain, constipation, diarrhea, nausea and vomiting           Current Medications       Current Outpatient Medications:     amLODIPine (NORVASC) 5 mg tablet, take 1 tablet by mouth once daily, Disp: 90 tablet, Rfl: 3    aspirin (ECOTRIN LOW STRENGTH) 81 mg EC tablet, Take 81 mg by mouth daily, Disp: , Rfl:     folic acid (FOLVITE) 117 mcg tablet, Take 400 mcg by mouth daily, Disp: , Rfl:     glipiZIDE (GLUCOTROL) 5 mg tablet, Take 1 tablet (5 mg total) by mouth daily, Disp: 30 tablet, Rfl: 3    glucose monitoring kit (FREESTYLE) monitoring kit, 1 each by Does not apply route daily, Disp: 1 each, Rfl: 0    insulin glargine (Lantus SoloStar) 100 units/mL injection pen, Inject 46 Units under the skin daily at bedtime, Disp: 15 mL, Rfl: 3    Lancets (FREESTYLE) lancets, Use as instructed - to test daily, Disp: 30 each, Rfl: 5    levothyroxine 50 mcg tablet, take 1 tablet by mouth once daily, Disp: 90 tablet, Rfl: 3    linaGLIPtin 5 MG TABS, Take 5 mg by mouth daily, Disp: 30 tablet, Rfl: 1    lisinopril (ZESTRIL) 20 mg tablet, take 1 tablet by mouth once daily, Disp: 90 tablet, Rfl: 3    metoprolol succinate (TOPROL-XL) 50 mg 24 hr tablet, take 1 tablet by mouth once daily, Disp: 90 tablet, Rfl: 5    omega-3-acid ethyl esters (LOVAZA) 1 g capsule, Take 2 g by mouth 2 (two) times a day, Disp: , Rfl:     Psyllium (FIBER) 0 52 g CAPS, Take by mouth, Disp: , Rfl:     rosuvastatin (CRESTOR) 10 MG tablet, take 1 tablet by mouth once daily, Disp: 90 tablet, Rfl: 1    cholecalciferol (VITAMIN D3) 1,000 units tablet, Take 800 Units by mouth daily, Disp: , Rfl:     Current Allergies     Allergies as of 05/27/2021    (No Known Allergies)            The following portions of the patient's history were reviewed and updated as appropriate: allergies, current medications, past family history, past medical history, past social history, past surgical history and problem list      Past Medical History:   Diagnosis Date    Benign essential hypertension     Chronic kidney disease, stage 2 (mild)     Chronic kidney disease, stage 3 (HCC)     Disorder of gallbladder     Disorder of skin and subcutaneous tissue     Dyspnea     Essential hypertension     Hyperlipidemia     Hypokalemia     Hypothyroidism     Malaise and fatigue     Mixed hyperlipidemia     Morbid obesity (HCC)     Pernicious anemia     Type 2 diabetes mellitus (HCC)        Past Surgical History:   Procedure Laterality Date    BREAST BIOPSY Right     CARPAL TUNNEL RELEASE Bilateral     CHOLECYSTECTOMY      COLONOSCOPY      Dr Jordy Gomes PARTIAL HYSTERECTOMY      SKIN LESION EXCISION      scalp        Family History   Problem Relation Age of Onset    Stroke Mother     Atrial fibrillation Mother     Brain cancer Father     Other Brother         Twin brothers - Open heart    Other Brother         Twin brothers - Open heart         Medications have been verified  Objective   /80   Pulse 78   Temp (!) 97 4 °F (36 3 °C)   Resp 20   Ht 5' 2" (1 575 m)   Wt 90 1 kg (198 lb 9 6 oz)   SpO2 99%   BMI 36 32 kg/m²        Physical Exam     Physical Exam  Vitals signs reviewed  Constitutional:       General: She is not in acute distress  Appearance: Normal appearance  She is obese  Cardiovascular:      Rate and Rhythm: Normal rate and regular rhythm  Heart sounds: S1 normal and S2 normal  No murmur  No friction rub  No gallop  Comments: Trace pitting edema at ankles  Pulmonary:      Effort: Pulmonary effort is normal  No accessory muscle usage  Breath sounds: Normal breath sounds  No wheezing, rhonchi or rales  Abdominal:      General: Abdomen is flat  Bowel sounds are normal       Palpations: Abdomen is soft  Tenderness: There is no abdominal tenderness  There is no guarding or rebound  Neurological:      General: No focal deficit present  Mental Status: She is alert  Psychiatric:         Mood and Affect: Mood and affect normal          Speech: Speech normal          Behavior: Behavior normal  Behavior is cooperative  Thought Content:  Thought content normal              Results:  Lab Results   Component Value Date    SODIUM 138 04/24/2021    K 4 6 04/24/2021     04/24/2021    CO2 25 04/24/2021    BUN 20 04/24/2021    CREATININE 1 39 (H) 04/24/2021    CALCIUM 9 2 04/24/2021       Lab Results   Component Value Date    HGBA1C 8 9 (A) 04/27/2021       Lab Results   Component Value Date    WBC 7 39 04/24/2021    HGB 12 8 04/24/2021    HCT 40 4 04/24/2021    MCV 95 04/24/2021     04/24/2021

## 2021-05-28 ENCOUNTER — PATIENT OUTREACH (OUTPATIENT)
Dept: FAMILY MEDICINE CLINIC | Facility: CLINIC | Age: 76
End: 2021-05-28

## 2021-05-28 NOTE — PROGRESS NOTES
OP VIKKI received referral from Dr Jamaica Alva via in-basket requesting to assist patient with a Tradjenta PAP application due to patient denial from MA     OP SW left message for patient and will follow

## 2021-06-01 ENCOUNTER — PATIENT OUTREACH (OUTPATIENT)
Dept: FAMILY MEDICINE CLINIC | Facility: CLINIC | Age: 76
End: 2021-06-01

## 2021-06-01 NOTE — PROGRESS NOTES
OP SW received referral from Dr Emely Soliz via in-basket requesting to assist patient with a [employer] PAP application due to patient denial from MA  This is the 2nd attempt to contact patient      OP SW left message for patient and will follow

## 2021-06-02 ENCOUNTER — PATIENT OUTREACH (OUTPATIENT)
Dept: FAMILY MEDICINE CLINIC | Facility: CLINIC | Age: 76
End: 2021-06-02

## 2021-06-02 ENCOUNTER — PATIENT OUTREACH (OUTPATIENT)
Dept: CASE MANAGEMENT | Facility: HOSPITAL | Age: 76
End: 2021-06-02

## 2021-06-02 NOTE — PROGRESS NOTES
CHW checked status of MA application    Per compass     Health Care Coverage  Name Status  Cruz Jacobs In Process    However per PCP pt received notice   She was denied for MA and was able to communicate that to the drug company for medication assistance     Per RN/SW no further needs and episodes were closed none

## 2021-06-02 NOTE — PROGRESS NOTES
Outpatient Care Management Note:  Follow up call to [facility] attempted  Per office notes, MA was declined and [facility] has sent the declination letter to [facility] for continued patient assistance for her Tradjenta  As [facility] had no further needs, closing care management episode at this time

## 2021-06-02 NOTE — PROGRESS NOTES
OP SW notified by Outpatient CM RN that the [address] application has been completed once patient received MA denial  Per chart, patient has sent MA denial to [facility] PAP  No further needs   OP SW to close referral

## 2021-06-07 ENCOUNTER — PATIENT OUTREACH (OUTPATIENT)
Dept: FAMILY MEDICINE CLINIC | Facility: CLINIC | Age: 76
End: 2021-06-07

## 2021-06-07 NOTE — PROGRESS NOTES
Outpatient Care Management Note:  In basket message received from Dr Amarilis Mustafa  She is asking that I follow up with  cares to verify that there is nothing further that needs to be done for [facility] to continue receiving her Tradjenta  [facility] did verbalize to Dr Amarilis Mustafa that her MA application was denied and the denial letter sent to [facility] cares  Call placed to [facility] cares  They have received the denial letter however it was for SNAP benefits and not MA  Call placed to [facility]  I made her aware of the above information  Per [facility], she received a 10 page form that needs to be filled out  She is not willing to complete the form and "give them all that information "  She is aware that [address] [place] [address] will not longer supply Tradjenta without an MA denial   She is asking that Dr Amarilis [place] place her on a different medication she is able to afford  Advised her that I would make Dr Amarilis Mustafa aware of her wishes  She is not interested in any information or education on her Diabetes at this time  In basket message sent to Dr Amarilis Mustafa making her aware of above

## 2021-06-09 ENCOUNTER — PATIENT OUTREACH (OUTPATIENT)
Dept: CASE MANAGEMENT | Facility: HOSPITAL | Age: 76
End: 2021-06-09

## 2021-06-09 NOTE — PROGRESS NOTES
CHW submitted a PACE application for the pt: This email was sent to you by the PACE/PACENET application system and verifies that PACE has received the submitted application  The application will be processed during the next few days  Applicant Last Name [facility] Address [address] [facility], [address] [place] [address], [address] [place] [address]          If we need additional information, we will contact the applicant(s)/POA by letter or telephone  If the applicant(s) is determined to be eligible for PACE or PACENET, the application will be approved and an enrollment ID card will be mailed to the cardholder(s)  If you need assistance, please contact us at [phone]  Thank you for completing a PACE/PACENET application online

## 2021-06-17 DIAGNOSIS — E11.9 TYPE 2 DIABETES MELLITUS WITHOUT COMPLICATION, WITHOUT LONG-TERM CURRENT USE OF INSULIN (HCC): ICD-10-CM

## 2021-06-17 NOTE — TELEPHONE ENCOUNTER
She wanted to let the Dr know that she has been taking the glipizide  1 in the morning and 1 at night and her sugars are below 150    She wants to know if she can get a 90 day supply called in

## 2021-06-18 NOTE — TELEPHONE ENCOUNTER
Called and spoke with patient  Confirmed she is taking medication twice daily with meals  Patient requesting 90 day supply to be sent to [employer] in [facility]  Please approve medication

## 2021-06-19 RX ORDER — GLIPIZIDE 5 MG/1
5 TABLET ORAL 2 TIMES DAILY
Qty: 180 TABLET | Refills: 1 | Status: SHIPPED | OUTPATIENT
Start: 2021-06-19 | End: 2021-12-15 | Stop reason: SDUPTHER

## 2021-07-27 ENCOUNTER — OFFICE VISIT (OUTPATIENT)
Dept: FAMILY MEDICINE CLINIC | Facility: CLINIC | Age: 76
End: 2021-07-27
Payer: MEDICARE

## 2021-07-27 VITALS
OXYGEN SATURATION: 98 % | HEART RATE: 76 BPM | WEIGHT: 200.4 LBS | BODY MASS INDEX: 36.88 KG/M2 | SYSTOLIC BLOOD PRESSURE: 128 MMHG | RESPIRATION RATE: 18 BRPM | HEIGHT: 62 IN | DIASTOLIC BLOOD PRESSURE: 62 MMHG | TEMPERATURE: 98.3 F

## 2021-07-27 DIAGNOSIS — Z79.4 TYPE 2 DIABETES MELLITUS WITH STAGE 3A CHRONIC KIDNEY DISEASE, WITH LONG-TERM CURRENT USE OF INSULIN (HCC): Primary | ICD-10-CM

## 2021-07-27 DIAGNOSIS — E11.22 TYPE 2 DIABETES MELLITUS WITH STAGE 3A CHRONIC KIDNEY DISEASE, WITH LONG-TERM CURRENT USE OF INSULIN (HCC): Primary | ICD-10-CM

## 2021-07-27 DIAGNOSIS — I10 ESSENTIAL HYPERTENSION: ICD-10-CM

## 2021-07-27 DIAGNOSIS — N18.31 TYPE 2 DIABETES MELLITUS WITH STAGE 3A CHRONIC KIDNEY DISEASE, WITH LONG-TERM CURRENT USE OF INSULIN (HCC): Primary | ICD-10-CM

## 2021-07-27 DIAGNOSIS — E11.22 CKD STAGE 3 DUE TO TYPE 2 DIABETES MELLITUS (HCC): ICD-10-CM

## 2021-07-27 DIAGNOSIS — E78.2 MIXED HYPERLIPIDEMIA: ICD-10-CM

## 2021-07-27 DIAGNOSIS — N18.30 CKD STAGE 3 DUE TO TYPE 2 DIABETES MELLITUS (HCC): ICD-10-CM

## 2021-07-27 LAB — SL AMB POCT HEMOGLOBIN AIC: 10.3 (ref ?–6.5)

## 2021-07-27 PROCEDURE — 99213 OFFICE O/P EST LOW 20 MIN: CPT | Performed by: INTERNAL MEDICINE

## 2021-07-27 PROCEDURE — 83036 HEMOGLOBIN GLYCOSYLATED A1C: CPT | Performed by: INTERNAL MEDICINE

## 2021-07-27 RX ORDER — LEVOTHYROXINE SODIUM 0.05 MG/1
50 TABLET ORAL DAILY
Qty: 90 TABLET | Refills: 3 | Status: SHIPPED | OUTPATIENT
Start: 2021-07-27

## 2021-07-27 RX ORDER — INSULIN GLARGINE 100 [IU]/ML
50 INJECTION, SOLUTION SUBCUTANEOUS
Qty: 15 ML | Refills: 3 | Status: SHIPPED | OUTPATIENT
Start: 2021-07-27 | End: 2021-09-07 | Stop reason: SDUPTHER

## 2021-07-27 RX ORDER — AMLODIPINE BESYLATE 5 MG/1
5 TABLET ORAL DAILY
Qty: 90 TABLET | Refills: 3 | Status: SHIPPED | OUTPATIENT
Start: 2021-07-27

## 2021-07-27 RX ORDER — ROSUVASTATIN CALCIUM 10 MG/1
10 TABLET, COATED ORAL DAILY
Qty: 90 TABLET | Refills: 1 | Status: SHIPPED | OUTPATIENT
Start: 2021-07-27 | End: 2022-04-15 | Stop reason: SDUPTHER

## 2021-07-27 RX ORDER — LISINOPRIL 20 MG/1
20 TABLET ORAL DAILY
Qty: 90 TABLET | Refills: 3 | Status: SHIPPED | OUTPATIENT
Start: 2021-07-27

## 2021-07-27 NOTE — PROGRESS NOTES
[facility]        NAME: Grace Pereyra is a 68 y o  female  : 1945    MRN: [id number]  DATE: 2021  TIME: 11:22 AM    Assessment and Plan   1  Type 2 diabetes mellitus without complication, without long-term current use of insulin ([facility])  -A1c has increased to 10 3, off tradjenta  We discussed diabetic diet, increase basal insulin to 50 units  Follow up 1 month  -   insulin glargine (Lantus SoloStar) 100 units/mL injection pen; Inject 50 Units under the skin daily at bedtime  -     lisinopril (ZESTRIL) 20 mg tablet; Take 1 tablet (20 mg total) by mouth daily    2  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 mellitus ([facility] [address] )    3  Mixed hyperlipidemia  -     rosuvastatin (CRESTOR) 10 MG tablet; Take 1 tablet (10 mg total) by mouth daily  -     lisinopril (ZESTRIL) 20 mg tablet; Take 1 tablet (20 mg total) by mouth daily      4  Essential hypertension  -     amLODIPine (NORVASC) 5 mg tablet; Take 1 tablet (5 mg total) by mouth daily             Chief Complaint     Chief Complaint   Patient presents with    Follow-up         History of Present Illness       Here for diabetes follow up  Ran out of [employer] in , doesn't not want to complete financial assistance paperwork for additional refills  Currently glipizide 5mg BID, lantus 46 units at bedtime  -170s  Denies hypoglycemia  Admits to snacking too much, eating sweets, too many carbs  Gained 5 lbs since April, not walking right now due to heat  Review of Systems   Review of Systems   Constitutional: Negative for appetite change, chills, fatigue, fever and unexpected weight change  Respiratory: Negative for cough, chest tightness and shortness of breath  Cardiovascular: Negative for chest pain, palpitations and leg swelling  Neurological: Negative for dizziness, light-headedness and numbness           Current Medications       Current Outpatient Medications:     amLODIPine (NORVASC) 5 mg tablet, Take 1 tablet (5 mg total) by mouth daily, Disp: 90 tablet, Rfl: 3    aspirin (ECOTRIN LOW STRENGTH) 81 mg EC tablet, Take 81 mg by mouth daily, Disp: , Rfl:     folic acid (FOLVITE) 030 mcg tablet, Take 400 mcg by mouth daily, Disp: , Rfl:     glipiZIDE (GLUCOTROL) 5 mg tablet, Take 1 tablet (5 mg total) by mouth 2 (two) times a day, Disp: 180 tablet, Rfl: 1    glucose monitoring kit (FREESTYLE) monitoring kit, 1 each by Does not apply route daily, Disp: 1 each, Rfl: 0    insulin glargine (Lantus SoloStar) 100 units/mL injection pen, Inject 50 Units under the skin daily at bedtime, Disp: 15 mL, Rfl: 3    Lancets (FREESTYLE) lancets, Use as instructed - to test daily, Disp: 30 each, Rfl: 5    levothyroxine 50 mcg tablet, Take 1 tablet (50 mcg total) by mouth daily, Disp: 90 tablet, Rfl: 3    lisinopril (ZESTRIL) 20 mg tablet, Take 1 tablet (20 mg total) by mouth daily, Disp: 90 tablet, Rfl: 3    metoprolol succinate (TOPROL-XL) 50 mg 24 hr tablet, take 1 tablet by mouth once daily, Disp: 90 tablet, Rfl: 5    omega-3-acid ethyl esters (LOVAZA) 1 g capsule, Take 2 g by mouth 2 (two) times a day, Disp: , Rfl:     Psyllium (FIBER) 0 52 g CAPS, Take by mouth, Disp: , Rfl:     rosuvastatin (CRESTOR) 10 MG tablet, Take 1 tablet (10 mg total) by mouth daily, Disp: 90 tablet, Rfl: 1    cholecalciferol (VITAMIN D3) 1,000 units tablet, Take 800 Units by mouth daily (Patient not taking: Reported on 7/27/2021), Disp: , Rfl:     Current Allergies     Allergies as of 07/27/2021    (No Known Allergies)            The following portions of the patient's history were reviewed and updated as appropriate: allergies, current medications, past family history, past medical history, past social history, past surgical history and problem list      Past Medical History:   Diagnosis Date    Benign essential hypertension     Chronic kidney disease, stage 2 (mild)     Chronic kidney disease, stage 3 (HCC)     Disorder of gallbladder     Disorder of skin and subcutaneous tissue     Dyspnea     Essential hypertension     Hyperlipidemia     Hypokalemia     Hypothyroidism     Malaise and fatigue     Mixed hyperlipidemia     Morbid obesity (HCC)     Pernicious anemia     Type 2 diabetes mellitus (HCC)        Past Surgical History:   Procedure Laterality Date    BREAST BIOPSY Right     CARPAL TUNNEL RELEASE Bilateral     CHOLECYSTECTOMY      COLONOSCOPY      Dr Vu Oh PARTIAL HYSTERECTOMY      SKIN LESION EXCISION      scalp        Family History   Problem Relation Age of Onset    Stroke Mother     Atrial fibrillation Mother     Brain cancer Father     Other Brother         Twin brothers - Open heart    Other Brother         Twin brothers - Open heart         Medications have been verified  Objective   /62 (BP Location: Left arm, Patient Position: Sitting, Cuff Size: Standard)   Pulse 76   Temp 98 3 °F (36 8 °C)   Resp 18   Ht 5' 2" (1 575 m)   Wt 90 9 kg (200 lb 6 4 oz)   SpO2 98%   BMI 36 65 kg/m²        Physical Exam     Physical Exam  Vitals reviewed  Constitutional:       General: She is not in acute distress  Appearance: She is obese  Cardiovascular:      Rate and Rhythm: Normal rate and regular rhythm  Heart sounds: S1 normal and S2 normal  No murmur heard  No friction rub  No gallop  Pulmonary:      Effort: Pulmonary effort is normal  No accessory muscle usage  Breath sounds: Normal breath sounds  No wheezing, rhonchi or rales  Neurological:      General: No focal deficit present  Mental Status: She is alert  Psychiatric:         Mood and Affect: Mood and affect normal          Speech: Speech normal          Behavior: Behavior normal  Behavior is cooperative  Thought Content:  Thought content normal              Results:  Lab Results   Component Value Date    SODIUM 138 04/24/2021    K 4 6 04/24/2021     04/24/2021    CO2 25 04/24/2021    BUN 20 04/24/2021 CREATININE 1 39 (H) 04/24/2021    CALCIUM 9 2 04/24/2021       Lab Results   Component Value Date    HGBA1C 8 9 (A) 04/27/2021       Lab Results   Component Value Date    WBC 7 39 04/24/2021    HGB 12 8 04/24/2021    HCT 40 4 04/24/2021    MCV 95 04/24/2021     04/24/2021

## 2021-07-27 NOTE — PATIENT INSTRUCTIONS
Please increase your Lantus to 50 units at bedtime  Avoid sweets, watch carbohydrate intake  Follow up 1 month

## 2021-08-31 PROBLEM — E66.01 SEVERE OBESITY (BMI 35.0-39.9) WITH COMORBIDITY (HCC): Status: ACTIVE | Noted: 2021-08-31

## 2021-09-03 ENCOUNTER — APPOINTMENT (OUTPATIENT)
Dept: RADIOLOGY | Facility: CLINIC | Age: 76
End: 2021-09-03
Payer: MEDICARE

## 2021-09-03 DIAGNOSIS — M54.30 SCIATICA, UNSPECIFIED LATERALITY: ICD-10-CM

## 2021-09-03 PROCEDURE — 72100 X-RAY EXAM L-S SPINE 2/3 VWS: CPT

## 2021-09-07 ENCOUNTER — OFFICE VISIT (OUTPATIENT)
Dept: FAMILY MEDICINE CLINIC | Facility: CLINIC | Age: 76
End: 2021-09-07
Payer: MEDICARE

## 2021-09-07 VITALS
DIASTOLIC BLOOD PRESSURE: 60 MMHG | HEART RATE: 77 BPM | WEIGHT: 196 LBS | BODY MASS INDEX: 36.07 KG/M2 | RESPIRATION RATE: 19 BRPM | TEMPERATURE: 99.3 F | HEIGHT: 62 IN | SYSTOLIC BLOOD PRESSURE: 130 MMHG | OXYGEN SATURATION: 98 %

## 2021-09-07 DIAGNOSIS — N18.31 TYPE 2 DIABETES MELLITUS WITH STAGE 3A CHRONIC KIDNEY DISEASE, WITH LONG-TERM CURRENT USE OF INSULIN (HCC): Primary | ICD-10-CM

## 2021-09-07 DIAGNOSIS — N18.30 CKD STAGE 3 DUE TO TYPE 2 DIABETES MELLITUS (HCC): ICD-10-CM

## 2021-09-07 DIAGNOSIS — E66.01 SEVERE OBESITY (BMI 35.0-39.9) WITH COMORBIDITY (HCC): ICD-10-CM

## 2021-09-07 DIAGNOSIS — E03.9 ACQUIRED HYPOTHYROIDISM: ICD-10-CM

## 2021-09-07 DIAGNOSIS — Z12.31 ENCOUNTER FOR SCREENING MAMMOGRAM FOR MALIGNANT NEOPLASM OF BREAST: ICD-10-CM

## 2021-09-07 DIAGNOSIS — E11.22 CKD STAGE 3 DUE TO TYPE 2 DIABETES MELLITUS (HCC): ICD-10-CM

## 2021-09-07 DIAGNOSIS — E11.22 TYPE 2 DIABETES MELLITUS WITH STAGE 3A CHRONIC KIDNEY DISEASE, WITH LONG-TERM CURRENT USE OF INSULIN (HCC): Primary | ICD-10-CM

## 2021-09-07 DIAGNOSIS — Z79.4 TYPE 2 DIABETES MELLITUS WITH STAGE 3A CHRONIC KIDNEY DISEASE, WITH LONG-TERM CURRENT USE OF INSULIN (HCC): Primary | ICD-10-CM

## 2021-09-07 LAB — SL AMB POCT HEMOGLOBIN AIC: 9.7 (ref ?–6.5)

## 2021-09-07 PROCEDURE — 83036 HEMOGLOBIN GLYCOSYLATED A1C: CPT | Performed by: INTERNAL MEDICINE

## 2021-09-07 PROCEDURE — 99214 OFFICE O/P EST MOD 30 MIN: CPT | Performed by: INTERNAL MEDICINE

## 2021-09-07 RX ORDER — LANCETS 28 GAUGE
EACH MISCELLANEOUS
Qty: 100 EACH | Refills: 5 | Status: SHIPPED | OUTPATIENT
Start: 2021-09-07

## 2021-09-07 RX ORDER — INSULIN GLARGINE 100 [IU]/ML
50 INJECTION, SOLUTION SUBCUTANEOUS
Qty: 15 ML | Refills: 3 | Status: SHIPPED | OUTPATIENT
Start: 2021-09-07 | End: 2022-04-11 | Stop reason: SDUPTHER

## 2021-09-07 NOTE — PROGRESS NOTES
[facility]        NAME: Frankey Plant is a 68 y o  female  : 1945    MRN: [id number]  DATE: 2021  TIME: 3:23 PM    Assessment and Plan   1  Type 2 diabetes mellitus with stage 3a chronic kidney disease, with long-term current use of insulin (HCC)  -A1c improving but still high at 9 7, continue basal insulin and glipizide BID, cannot afford cost of newer medications  Continue lifestyle changes, follow up 1 month  -   POCT hemoglobin A1c  -     insulin glargine (Lantus SoloStar) 100 units/mL injection pen; Inject 50 Units under the skin daily at bedtime  -     CBC and differential; Future  -     Comprehensive metabolic panel; Future  -     Lancets (freestyle) lancets; Use as instructed -glucose monitor BID  -     Glucometer test strips    2  Encounter for screening mammogram for malignant neoplasm of breast  -     Mammo screening bilateral w 3d & cad; Future; Expected date: 2021    3  Severe obesity (BMI 35 0-39  9) with comorbidity ([address] )  - she is walking more, eliminating snacks, following low-carb diet    4  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 mellitus (HCC)  -     CBC and differential; Future  -     Comprehensive metabolic panel; Future  -     Urinalysis with microscopic    5  Acquired hypothyroidism  -     TSH, 3rd generation with Free T4 reflex; Future              Chief Complaint     Chief Complaint   Patient presents with    Follow-up     Patient states she is doing well  No concerns for today  States her sugars at home have been lower  Has lost weight,          History of Present Illness       Here for diabetes follow up   today, 124 yesterday, highest BG 140s  Checking BID - fasting and bedtime most days  Completely eliminated bread, using lettuce wraps for sandwiches instead  Walking more, lost 5 lbs since last visit  Denies hypoglycemia  Current regimen: Lantus 50 units at bedtime, glipizide twice daily, couldn't afford tradjenta         Review of Systems   Review of Systems   Constitutional: Negative for appetite change, chills, fatigue and fever  Respiratory: Negative for cough, chest tightness and shortness of breath  Cardiovascular: Negative for chest pain, palpitations and leg swelling  Gastrointestinal: Negative for abdominal pain, constipation, diarrhea, nausea and vomiting  Musculoskeletal: Positive for back pain  Neurological: Negative for dizziness, light-headedness and headaches           Current Medications       Current Outpatient Medications:     amLODIPine (NORVASC) 5 mg tablet, Take 1 tablet (5 mg total) by mouth daily, Disp: 90 tablet, Rfl: 3    aspirin (ECOTRIN LOW STRENGTH) 81 mg EC tablet, Take 81 mg by mouth daily, Disp: , Rfl:     folic acid (FOLVITE) 911 mcg tablet, Take 400 mcg by mouth daily, Disp: , Rfl:     glipiZIDE (GLUCOTROL) 5 mg tablet, Take 1 tablet (5 mg total) by mouth 2 (two) times a day, Disp: 180 tablet, Rfl: 1    glucose monitoring kit (FREESTYLE) monitoring kit, 1 each by Does not apply route daily, Disp: 1 each, Rfl: 0    insulin glargine (Lantus SoloStar) 100 units/mL injection pen, Inject 50 Units under the skin daily at bedtime, Disp: 15 mL, Rfl: 3    Lancets (freestyle) lancets, Use as instructed -glucose monitor BID, Disp: 100 each, Rfl: 5    levothyroxine 50 mcg tablet, Take 1 tablet (50 mcg total) by mouth daily, Disp: 90 tablet, Rfl: 3    lisinopril (ZESTRIL) 20 mg tablet, Take 1 tablet (20 mg total) by mouth daily, Disp: 90 tablet, Rfl: 3    metoprolol succinate (TOPROL-XL) 50 mg 24 hr tablet, take 1 tablet by mouth once daily, Disp: 90 tablet, Rfl: 5    omega-3-acid ethyl esters (LOVAZA) 1 g capsule, Take 2 g by mouth 2 (two) times a day, Disp: , Rfl:     Psyllium (FIBER) 0 52 g CAPS, Take by mouth, Disp: , Rfl:     rosuvastatin (CRESTOR) 10 MG tablet, Take 1 tablet (10 mg total) by mouth daily, Disp: 90 tablet, Rfl: 1    cholecalciferol (VITAMIN D3) 1,000 units tablet, Take 800 Units by mouth daily (Patient not taking: Reported on 7/27/2021), Disp: , Rfl:     Current Allergies     Allergies as of 09/07/2021    (No Known Allergies)            The following portions of the patient's history were reviewed and updated as appropriate: allergies, current medications, past family history, past medical history, past social history, past surgical history and problem list      Past Medical History:   Diagnosis Date    Benign essential hypertension     Chronic kidney disease, stage 2 (mild)     Chronic kidney disease, stage 3 (HCC)     Disorder of gallbladder     Disorder of skin and subcutaneous tissue     Dyspnea     Essential hypertension     Hyperlipidemia     Hypokalemia     Hypothyroidism     Malaise and fatigue     Mixed hyperlipidemia     Morbid obesity ([address] )     Pernicious anemia     Type 2 diabetes mellitus ([address] )        Past Surgical History:   Procedure Laterality Date    BREAST BIOPSY Right     CARPAL TUNNEL RELEASE Bilateral     CHOLECYSTECTOMY      COLONOSCOPY      Dr Vickie Barrios PARTIAL HYSTERECTOMY      SKIN LESION EXCISION      scalp        Family History   Problem Relation Age of Onset    Stroke Mother     Atrial fibrillation Mother     Brain cancer Father     Other Brother         Twin brothers - Open heart    Other Brother         Twin brothers - Open heart         Medications have been verified  Objective   /60   Pulse 77   Temp 99 3 °F (37 4 °C)   Resp 19   Ht 5' 2" (1 575 m)   Wt 88 9 kg (196 lb)   SpO2 98%   BMI 35 85 kg/m²        Physical Exam     Physical Exam  Vitals reviewed  Constitutional:       General: She is not in acute distress  Appearance: She is obese  Cardiovascular:      Rate and Rhythm: Normal rate and regular rhythm  Heart sounds: S1 normal and S2 normal  No murmur heard  No friction rub  No gallop         Comments: Trace pitting edema at ankles  Pulmonary:      Effort: Pulmonary effort is normal  No accessory muscle usage  Breath sounds: Normal breath sounds  No wheezing, rhonchi or rales  Abdominal:      General: Abdomen is flat  Bowel sounds are normal       Palpations: Abdomen is soft  Tenderness: There is no abdominal tenderness  There is no guarding or rebound  Neurological:      General: No focal deficit present  Mental Status: She is alert  Psychiatric:         Mood and Affect: Mood and affect normal          Speech: Speech normal          Behavior: Behavior normal  Behavior is cooperative  Thought Content:  Thought content normal              Results:  Lab Results   Component Value Date    SODIUM 138 04/24/2021    K 4 6 04/24/2021     04/24/2021    CO2 25 04/24/2021    BUN 20 04/24/2021    CREATININE 1 39 (H) 04/24/2021    CALCIUM 9 2 04/24/2021       Lab Results   Component Value Date    HGBA1C 10 3 (A) 07/27/2021       Lab Results   Component Value Date    WBC 7 39 04/24/2021    HGB 12 8 04/24/2021    HCT 40 4 04/24/2021    MCV 95 04/24/2021     04/24/2021

## 2021-09-15 ENCOUNTER — HOSPITAL ENCOUNTER (OUTPATIENT)
Dept: MAMMOGRAPHY | Facility: HOSPITAL | Age: 76
Discharge: HOME | End: 2021-09-15
Attending: INTERNAL MEDICINE
Payer: MEDICARE

## 2021-09-15 VITALS — BODY MASS INDEX: 35.88 KG/M2 | WEIGHT: 195 LBS | HEIGHT: 62 IN

## 2021-09-15 DIAGNOSIS — Z12.31 ENCOUNTER FOR SCREENING MAMMOGRAM FOR MALIGNANT NEOPLASM OF BREAST: ICD-10-CM

## 2021-09-15 PROCEDURE — 77067 SCR MAMMO BI INCL CAD: CPT

## 2021-09-15 PROCEDURE — 77063 BREAST TOMOSYNTHESIS BI: CPT

## 2021-09-16 DIAGNOSIS — I10 ESSENTIAL HYPERTENSION: ICD-10-CM

## 2021-09-17 RX ORDER — METOPROLOL SUCCINATE 50 MG/1
TABLET, EXTENDED RELEASE ORAL
Qty: 90 TABLET | Refills: 5 | Status: SHIPPED | OUTPATIENT
Start: 2021-09-17 | End: 2021-09-20

## 2021-09-19 DIAGNOSIS — I10 ESSENTIAL HYPERTENSION: ICD-10-CM

## 2021-09-20 RX ORDER — METOPROLOL SUCCINATE 50 MG/1
TABLET, EXTENDED RELEASE ORAL
Qty: 90 TABLET | Refills: 5 | Status: SHIPPED | OUTPATIENT
Start: 2021-09-20

## 2021-10-01 ENCOUNTER — APPOINTMENT (OUTPATIENT)
Dept: LAB | Facility: CLINIC | Age: 76
End: 2021-10-01
Payer: MEDICARE

## 2021-10-01 DIAGNOSIS — N18.30 CKD STAGE 3 DUE TO TYPE 2 DIABETES MELLITUS (HCC): ICD-10-CM

## 2021-10-01 DIAGNOSIS — E11.22 TYPE 2 DIABETES MELLITUS WITH STAGE 3A CHRONIC KIDNEY DISEASE, WITH LONG-TERM CURRENT USE OF INSULIN (HCC): ICD-10-CM

## 2021-10-01 DIAGNOSIS — N18.31 TYPE 2 DIABETES MELLITUS WITH STAGE 3A CHRONIC KIDNEY DISEASE, WITH LONG-TERM CURRENT USE OF INSULIN (HCC): ICD-10-CM

## 2021-10-01 DIAGNOSIS — E03.9 ACQUIRED HYPOTHYROIDISM: ICD-10-CM

## 2021-10-01 DIAGNOSIS — E11.22 CKD STAGE 3 DUE TO TYPE 2 DIABETES MELLITUS (HCC): ICD-10-CM

## 2021-10-01 DIAGNOSIS — Z79.4 TYPE 2 DIABETES MELLITUS WITH STAGE 3A CHRONIC KIDNEY DISEASE, WITH LONG-TERM CURRENT USE OF INSULIN (HCC): ICD-10-CM

## 2021-10-01 LAB
ALBUMIN SERPL BCP-MCNC: 3.1 G/DL (ref 3.5–5)
ALP SERPL-CCNC: 59 U/L (ref 46–116)
ALT SERPL W P-5'-P-CCNC: 23 U/L (ref 12–78)
ANION GAP SERPL CALCULATED.3IONS-SCNC: 5 MMOL/L (ref 4–13)
AST SERPL W P-5'-P-CCNC: 12 U/L (ref 5–45)
BACTERIA UR QL AUTO: NORMAL /HPF
BASOPHILS # BLD AUTO: 0.05 THOUSANDS/ΜL (ref 0–0.1)
BASOPHILS NFR BLD AUTO: 1 % (ref 0–1)
BILIRUB SERPL-MCNC: 0.5 MG/DL (ref 0.2–1)
BILIRUB UR QL STRIP: NEGATIVE
BUN SERPL-MCNC: 20 MG/DL (ref 5–25)
CALCIUM ALBUM COR SERPL-MCNC: 10.3 MG/DL (ref 8.3–10.1)
CALCIUM SERPL-MCNC: 9.6 MG/DL (ref 8.3–10.1)
CHLORIDE SERPL-SCNC: 103 MMOL/L (ref 100–108)
CLARITY UR: CLEAR
CO2 SERPL-SCNC: 29 MMOL/L (ref 21–32)
COLOR UR: YELLOW
CREAT SERPL-MCNC: 1.29 MG/DL (ref 0.6–1.3)
EOSINOPHIL # BLD AUTO: 0.15 THOUSAND/ΜL (ref 0–0.61)
EOSINOPHIL NFR BLD AUTO: 2 % (ref 0–6)
ERYTHROCYTE [DISTWIDTH] IN BLOOD BY AUTOMATED COUNT: 14 % (ref 11.6–15.1)
GFR SERPL CREATININE-BSD FRML MDRD: 40 ML/MIN/1.73SQ M
GLUCOSE P FAST SERPL-MCNC: 139 MG/DL (ref 65–99)
GLUCOSE UR STRIP-MCNC: NEGATIVE MG/DL
HCT VFR BLD AUTO: 40.3 % (ref 34.8–46.1)
HGB BLD-MCNC: 12.5 G/DL (ref 11.5–15.4)
HGB UR QL STRIP.AUTO: NEGATIVE
HYALINE CASTS #/AREA URNS LPF: NORMAL /LPF
IMM GRANULOCYTES # BLD AUTO: 0.02 THOUSAND/UL (ref 0–0.2)
IMM GRANULOCYTES NFR BLD AUTO: 0 % (ref 0–2)
KETONES UR STRIP-MCNC: NEGATIVE MG/DL
LEUKOCYTE ESTERASE UR QL STRIP: NEGATIVE
LYMPHOCYTES # BLD AUTO: 2.42 THOUSANDS/ΜL (ref 0.6–4.47)
LYMPHOCYTES NFR BLD AUTO: 25 % (ref 14–44)
MCH RBC QN AUTO: 29.9 PG (ref 26.8–34.3)
MCHC RBC AUTO-ENTMCNC: 31 G/DL (ref 31.4–37.4)
MCV RBC AUTO: 96 FL (ref 82–98)
MONOCYTES # BLD AUTO: 0.66 THOUSAND/ΜL (ref 0.17–1.22)
MONOCYTES NFR BLD AUTO: 7 % (ref 4–12)
NEUTROPHILS # BLD AUTO: 6.29 THOUSANDS/ΜL (ref 1.85–7.62)
NEUTS SEG NFR BLD AUTO: 65 % (ref 43–75)
NITRITE UR QL STRIP: NEGATIVE
NON-SQ EPI CELLS URNS QL MICRO: NORMAL /HPF
NRBC BLD AUTO-RTO: 0 /100 WBCS
PH UR STRIP.AUTO: 6.5 [PH]
PLATELET # BLD AUTO: 174 THOUSANDS/UL (ref 149–390)
PMV BLD AUTO: 11.2 FL (ref 8.9–12.7)
POTASSIUM SERPL-SCNC: 4 MMOL/L (ref 3.5–5.3)
PROT SERPL-MCNC: 6.9 G/DL (ref 6.4–8.2)
PROT UR STRIP-MCNC: NEGATIVE MG/DL
RBC # BLD AUTO: 4.18 MILLION/UL (ref 3.81–5.12)
RBC #/AREA URNS AUTO: NORMAL /HPF
SODIUM SERPL-SCNC: 137 MMOL/L (ref 136–145)
SP GR UR STRIP.AUTO: 1.01 (ref 1–1.03)
TSH SERPL DL<=0.05 MIU/L-ACNC: 2.13 UIU/ML (ref 0.36–3.74)
UROBILINOGEN UR QL STRIP.AUTO: 0.2 E.U./DL
WBC # BLD AUTO: 9.59 THOUSAND/UL (ref 4.31–10.16)
WBC #/AREA URNS AUTO: NORMAL /HPF

## 2021-10-01 PROCEDURE — 80053 COMPREHEN METABOLIC PANEL: CPT

## 2021-10-01 PROCEDURE — 36415 COLL VENOUS BLD VENIPUNCTURE: CPT

## 2021-10-01 PROCEDURE — 85025 COMPLETE CBC W/AUTO DIFF WBC: CPT

## 2021-10-01 PROCEDURE — 84443 ASSAY THYROID STIM HORMONE: CPT

## 2021-10-01 PROCEDURE — 81001 URINALYSIS AUTO W/SCOPE: CPT

## 2021-10-07 ENCOUNTER — OFFICE VISIT (OUTPATIENT)
Dept: FAMILY MEDICINE CLINIC | Facility: CLINIC | Age: 76
End: 2021-10-07
Payer: MEDICARE

## 2021-10-07 VITALS
TEMPERATURE: 98.5 F | HEIGHT: 62 IN | BODY MASS INDEX: 36.77 KG/M2 | DIASTOLIC BLOOD PRESSURE: 70 MMHG | OXYGEN SATURATION: 98 % | RESPIRATION RATE: 18 BRPM | HEART RATE: 80 BPM | WEIGHT: 199.8 LBS | SYSTOLIC BLOOD PRESSURE: 130 MMHG

## 2021-10-07 DIAGNOSIS — E11.9 TYPE 2 DIABETES MELLITUS WITHOUT COMPLICATION, WITHOUT LONG-TERM CURRENT USE OF INSULIN (HCC): Primary | ICD-10-CM

## 2021-10-07 DIAGNOSIS — Z23 ENCOUNTER FOR IMMUNIZATION: ICD-10-CM

## 2021-10-07 LAB — SL AMB POCT HEMOGLOBIN AIC: 9.8 (ref ?–6.5)

## 2021-10-07 PROCEDURE — G0008 ADMIN INFLUENZA VIRUS VAC: HCPCS

## 2021-10-07 PROCEDURE — 90662 IIV NO PRSV INCREASED AG IM: CPT

## 2021-10-07 PROCEDURE — 83036 HEMOGLOBIN GLYCOSYLATED A1C: CPT | Performed by: INTERNAL MEDICINE

## 2021-10-07 PROCEDURE — 99213 OFFICE O/P EST LOW 20 MIN: CPT | Performed by: INTERNAL MEDICINE

## 2021-12-15 DIAGNOSIS — E11.9 TYPE 2 DIABETES MELLITUS WITHOUT COMPLICATION, WITHOUT LONG-TERM CURRENT USE OF INSULIN (HCC): ICD-10-CM

## 2021-12-15 RX ORDER — GLIPIZIDE 5 MG/1
5 TABLET ORAL 2 TIMES DAILY
Qty: 180 TABLET | Refills: 0 | Status: SHIPPED | OUTPATIENT
Start: 2021-12-15 | End: 2022-02-14 | Stop reason: SDUPTHER

## 2022-01-16 ENCOUNTER — HOSPITAL ENCOUNTER (EMERGENCY)
Facility: HOSPITAL | Age: 77
Discharge: HOME | End: 2022-01-16
Attending: EMERGENCY MEDICINE
Payer: MEDICARE

## 2022-01-16 ENCOUNTER — HOSPITAL ENCOUNTER (EMERGENCY)
Dept: NON INVASIVE DIAGNOSTICS | Facility: HOSPITAL | Age: 77
Discharge: HOME | End: 2022-01-16
Payer: MEDICARE

## 2022-01-16 ENCOUNTER — APPOINTMENT (EMERGENCY)
Dept: RADIOLOGY | Facility: HOSPITAL | Age: 77
End: 2022-01-16
Payer: MEDICARE

## 2022-01-16 VITALS
SYSTOLIC BLOOD PRESSURE: 181 MMHG | OXYGEN SATURATION: 97 % | DIASTOLIC BLOOD PRESSURE: 72 MMHG | TEMPERATURE: 97.8 F | WEIGHT: 187 LBS | HEIGHT: 62 IN | HEART RATE: 76 BPM | BODY MASS INDEX: 34.41 KG/M2 | RESPIRATION RATE: 18 BRPM

## 2022-01-16 DIAGNOSIS — M79.604 RIGHT LEG PAIN: Primary | ICD-10-CM

## 2022-01-16 LAB
ALBUMIN SERPL BCP-MCNC: 4.1 G/DL (ref 3.5–5)
ALP SERPL-CCNC: 47 U/L (ref 34–104)
ALT SERPL W P-5'-P-CCNC: 13 U/L (ref 7–52)
ANION GAP SERPL CALCULATED.3IONS-SCNC: 6 MMOL/L (ref 4–13)
AST SERPL W P-5'-P-CCNC: 16 U/L (ref 13–39)
BASOPHILS # BLD AUTO: 0.02 THOUSANDS/ΜL (ref 0–0.1)
BASOPHILS NFR BLD AUTO: 0 % (ref 0–1)
BILIRUB SERPL-MCNC: 0.45 MG/DL (ref 0.2–1)
BUN SERPL-MCNC: 24 MG/DL (ref 5–25)
CALCIUM SERPL-MCNC: 9.8 MG/DL (ref 8.4–10.2)
CHLORIDE SERPL-SCNC: 103 MMOL/L (ref 96–108)
CO2 SERPL-SCNC: 29 MMOL/L (ref 21–32)
CREAT SERPL-MCNC: 1.48 MG/DL (ref 0.6–1.3)
D DIMER PPP FEU-MCNC: 0.98 UG/ML FEU
EOSINOPHIL # BLD AUTO: 0.06 THOUSAND/ΜL (ref 0–0.61)
EOSINOPHIL NFR BLD AUTO: 1 % (ref 0–6)
ERYTHROCYTE [DISTWIDTH] IN BLOOD BY AUTOMATED COUNT: 13.6 % (ref 11.6–15.1)
GFR SERPL CREATININE-BSD FRML MDRD: 34 ML/MIN/1.73SQ M
GLUCOSE SERPL-MCNC: 127 MG/DL (ref 65–140)
HCT VFR BLD AUTO: 42.8 % (ref 34.8–46.1)
HGB BLD-MCNC: 13.4 G/DL (ref 11.5–15.4)
IMM GRANULOCYTES # BLD AUTO: 0.03 THOUSAND/UL (ref 0–0.2)
IMM GRANULOCYTES NFR BLD AUTO: 0 % (ref 0–2)
LIPASE SERPL-CCNC: 69 U/L (ref 11–82)
LYMPHOCYTES # BLD AUTO: 1.75 THOUSANDS/ΜL (ref 0.6–4.47)
LYMPHOCYTES NFR BLD AUTO: 19 % (ref 14–44)
MCH RBC QN AUTO: 29.5 PG (ref 26.8–34.3)
MCHC RBC AUTO-ENTMCNC: 31.3 G/DL (ref 31.4–37.4)
MCV RBC AUTO: 94 FL (ref 82–98)
MONOCYTES # BLD AUTO: 0.78 THOUSAND/ΜL (ref 0.17–1.22)
MONOCYTES NFR BLD AUTO: 8 % (ref 4–12)
NEUTROPHILS # BLD AUTO: 6.78 THOUSANDS/ΜL (ref 1.85–7.62)
NEUTS SEG NFR BLD AUTO: 72 % (ref 43–75)
NRBC BLD AUTO-RTO: 0 /100 WBCS
PLATELET # BLD AUTO: 172 THOUSANDS/UL (ref 149–390)
PMV BLD AUTO: 10.2 FL (ref 8.9–12.7)
POTASSIUM SERPL-SCNC: 3.9 MMOL/L (ref 3.5–5.3)
PROT SERPL-MCNC: 7.1 G/DL (ref 6.4–8.4)
RBC # BLD AUTO: 4.55 MILLION/UL (ref 3.81–5.12)
SODIUM SERPL-SCNC: 138 MMOL/L (ref 135–147)
WBC # BLD AUTO: 9.42 THOUSAND/UL (ref 4.31–10.16)

## 2022-01-16 PROCEDURE — 80053 COMPREHEN METABOLIC PANEL: CPT | Performed by: PHYSICIAN ASSISTANT

## 2022-01-16 PROCEDURE — 99284 EMERGENCY DEPT VISIT MOD MDM: CPT | Performed by: PHYSICIAN ASSISTANT

## 2022-01-16 PROCEDURE — 85025 COMPLETE CBC W/AUTO DIFF WBC: CPT | Performed by: PHYSICIAN ASSISTANT

## 2022-01-16 PROCEDURE — 36415 COLL VENOUS BLD VENIPUNCTURE: CPT | Performed by: PHYSICIAN ASSISTANT

## 2022-01-16 PROCEDURE — 73501 X-RAY EXAM HIP UNI 1 VIEW: CPT

## 2022-01-16 PROCEDURE — 73560 X-RAY EXAM OF KNEE 1 OR 2: CPT

## 2022-01-16 PROCEDURE — 93971 EXTREMITY STUDY: CPT

## 2022-01-16 PROCEDURE — 85379 FIBRIN DEGRADATION QUANT: CPT | Performed by: PHYSICIAN ASSISTANT

## 2022-01-16 PROCEDURE — 99284 EMERGENCY DEPT VISIT MOD MDM: CPT

## 2022-01-16 PROCEDURE — 83690 ASSAY OF LIPASE: CPT | Performed by: PHYSICIAN ASSISTANT

## 2022-01-17 PROCEDURE — 93971 EXTREMITY STUDY: CPT | Performed by: SURGERY

## 2022-01-18 ENCOUNTER — OFFICE VISIT (OUTPATIENT)
Dept: OBGYN CLINIC | Facility: CLINIC | Age: 77
End: 2022-01-18
Payer: MEDICARE

## 2022-01-18 VITALS
BODY MASS INDEX: 35.33 KG/M2 | DIASTOLIC BLOOD PRESSURE: 76 MMHG | HEIGHT: 62 IN | SYSTOLIC BLOOD PRESSURE: 141 MMHG | WEIGHT: 192 LBS | HEART RATE: 86 BPM

## 2022-01-18 DIAGNOSIS — M17.11 PRIMARY OSTEOARTHRITIS OF RIGHT KNEE: Primary | ICD-10-CM

## 2022-01-18 PROCEDURE — 99203 OFFICE O/P NEW LOW 30 MIN: CPT | Performed by: ORTHOPAEDIC SURGERY

## 2022-01-18 PROCEDURE — 20610 DRAIN/INJ JOINT/BURSA W/O US: CPT | Performed by: ORTHOPAEDIC SURGERY

## 2022-01-18 RX ORDER — TRIAMCINOLONE ACETONIDE 40 MG/ML
80 INJECTION, SUSPENSION INTRA-ARTICULAR; INTRAMUSCULAR
Status: COMPLETED | OUTPATIENT
Start: 2022-01-18 | End: 2022-01-18

## 2022-01-18 RX ORDER — BUPIVACAINE HYDROCHLORIDE 2.5 MG/ML
4 INJECTION, SOLUTION INFILTRATION; PERINEURAL
Status: COMPLETED | OUTPATIENT
Start: 2022-01-18 | End: 2022-01-18

## 2022-01-18 RX ADMIN — BUPIVACAINE HYDROCHLORIDE 4 ML: 2.5 INJECTION, SOLUTION INFILTRATION; PERINEURAL at 15:30

## 2022-01-18 RX ADMIN — TRIAMCINOLONE ACETONIDE 80 MG: 40 INJECTION, SUSPENSION INTRA-ARTICULAR; INTRAMUSCULAR at 15:30

## 2022-01-18 NOTE — PROGRESS NOTES
Assessment:  1  Primary osteoarthritis of right knee         Plan:  The patient was provided with right knee steroid injection  The patient tolerated the procedure well  The patient should follow up in 6 weeks  To do next visit:  Return in about 6 weeks (around 3/1/2022)  The above stated was discussed in layman's terms and the patient expressed understanding  All questions were answered to the patient's satisfaction  The patient has degenerative joint disease of her right knee  Under aseptic technique, the right knee was injected with Kenalog and Marcaine  She tolerated procedure quite well  Return back in 6 weeks for re-evaluation  If there is any problems for that visit, she will not hesitate to let us know      Scribe Attestation    I,:  Cyndi Burnett am acting as a scribe while in the presence of the attending physician :       I,:  Aissatou Cook, DO personally performed the services described in this documentation    as scribed in my presence :             Subjective:   Jordy Chambers is a 68 y o  female who presents for initial evaluation of right knee  She is here from the ED  She did fall several months ago  Today she complains of right anterior thigh, knee and shin tingling sensation and pain with resolved low back pain  She rates her pain at 5/10 and 9/10 at its worse  Walking aggravates while sitting alleviates  She has tried IBU with some benefit  She has worked with a chiropractor with benefit to low back  She denies past lumbar or knee injections or surgery          Review of systems negative unless otherwise specified in HPI    Past Medical History:   Diagnosis Date    Benign essential hypertension     Chronic kidney disease, stage 2 (mild)     Chronic kidney disease, stage 3 (HCC)     Disorder of gallbladder     Disorder of skin and subcutaneous tissue     Dyspnea     Essential hypertension     Hyperlipidemia     Hypokalemia     Hypothyroidism     Malaise and fatigue     Mixed hyperlipidemia     Morbid obesity (HCC)     Pernicious anemia     Type 2 diabetes mellitus ([facility] [address] [facility]        Past Surgical History:   Procedure Laterality Date    BREAST BIOPSY Right     CARPAL TUNNEL RELEASE Bilateral     CHOLECYSTECTOMY      COLONOSCOPY      Dr Toure Asael      scalp        Family History   Problem Relation Age of Onset    Stroke Mother     Atrial fibrillation Mother     Brain cancer Father     Other Brother         Twin brothers - Open heart    Other Brother         Twin brothers - Open heart    No Known Problems Maternal Grandmother     No Known Problems Paternal Grandmother        Social History     Occupational History    Occupation: Retired    Tobacco Use    Smoking status: Former Smoker     Packs/day: 1 00     Years: 14 00     Pack years: 14 00     Types: Cigarettes     Quit date:      Years since quittin 0    Smokeless tobacco: Never Used   Vaping Use    Vaping Use: Never used   Substance and Sexual Activity    Alcohol use: Yes     Comment: Occasionally     Drug use: Never     Comment: Denies drug use - As per [employer]     Sexual activity: Not on file         Current Outpatient Medications:     amLODIPine (NORVASC) 5 mg tablet, Take 1 tablet (5 mg total) by mouth daily, Disp: 90 tablet, Rfl: 3    aspirin (ECOTRIN LOW STRENGTH) 81 mg EC tablet, Take 81 mg by mouth daily, Disp: , Rfl:     folic acid (FOLVITE) 165 mcg tablet, Take 400 mcg by mouth daily, Disp: , Rfl:     glipiZIDE (GLUCOTROL) 5 mg tablet, Take 1 tablet (5 mg total) by mouth 2 (two) times a day, Disp: 180 tablet, Rfl: 0    glucose monitoring kit (FREESTYLE) monitoring kit, 1 each by Does not apply route daily, Disp: 1 each, Rfl: 0    insulin glargine (Lantus SoloStar) 100 units/mL injection pen, Inject 50 Units under the skin daily at bedtime, Disp: 15 mL, Rfl: 3    Lancets (freestyle) lancets, Use as instructed -glucose monitor BID, Disp: 100 each, Rfl: 5    levothyroxine 50 mcg tablet, Take 1 tablet (50 mcg total) by mouth daily, Disp: 90 tablet, Rfl: 3    lisinopril (ZESTRIL) 20 mg tablet, Take 1 tablet (20 mg total) by mouth daily, Disp: 90 tablet, Rfl: 3    metoprolol succinate (TOPROL-XL) 50 mg 24 hr tablet, take 1 tablet by mouth once daily, Disp: 90 tablet, Rfl: 5    omega-3-acid ethyl esters (LOVAZA) 1 g capsule, Take 2 g by mouth 2 (two) times a day, Disp: , Rfl:     Psyllium (FIBER) 0 52 g CAPS, Take by mouth, Disp: , Rfl:     rosuvastatin (CRESTOR) 10 MG tablet, Take 1 tablet (10 mg total) by mouth daily, Disp: 90 tablet, Rfl: 1    cholecalciferol (VITAMIN D3) 1,000 units tablet, Take 800 Units by mouth daily (Patient not taking: Reported on 7/27/2021), Disp: , Rfl:     No Known Allergies         Vitals:    01/18/22 1512   BP: 141/76   Pulse: 86       Objective:  Physical exam  · General: Awake, Alert, Oriented  · Eyes: Pupils equal, round and reactive to light  · Heart: regular rate and rhythm  · Lungs: No audible wheezing  · Abdomen: soft                    Ortho Exam  Right knee:  TTP over joint line  No erythema or ecchymosis  No effusion or swelling  Normal strength  Good ROM   Calf compartments soft and supple  Sensation intact  Toes are warm sensate and mobile        Diagnostics, reviewed and taken today if performed as documented: The attending physician has personally reviewed the pertinent films in PACS and interpretation is as follows:  Right knee x-ray:  Mild arthritic changes  Procedures, if performed today:    Large joint arthrocentesis: R knee  Universal Protocol:  Consent: Verbal consent obtained  Risks and benefits: risks, benefits and alternatives were discussed  Consent given by: patient  Time out: Immediately prior to procedure a "time out" was called to verify the correct patient, procedure, equipment, support staff and site/side marked as required    Timeout called at: 1/18/2022 3:29 PM   Patient understanding: patient states understanding of the procedure being performed  Site marked: the operative site was marked  Patient identity confirmed: verbally with patient    Supporting Documentation  Indications: pain   Procedure Details  Location: knee - R knee  Preparation: Patient was prepped and draped in the usual sterile fashion  Needle size: 22 G  Ultrasound guidance: no  Approach: anterolateral  Medications administered: 4 mL bupivacaine 0 25 %; 80 mg triamcinolone acetonide 40 mg/mL    Patient tolerance: patient tolerated the procedure well with no immediate complications  Dressing:  Sterile dressing applied            Portions of the record may have been created with voice recognition software  Occasional wrong word or "sound a like" substitutions may have occurred due to the inherent limitations of voice recognition software  Read the chart carefully and recognize, using context, where substitutions have occurred

## 2022-01-19 NOTE — ED PROVIDER NOTES
History  Chief Complaint   Patient presents with    Knee Pain     x 1 week  no injury     27-year-old female presents the emergency department seeking evaluation for right leg pain ongoing for 1 week  Pain is primarily in the area of the knee ascending the thigh  Patient expresses concern for DVT  She does have a history for DVT  No reported injury or trauma to the area  She is ambulatory with a limp  Overall the patient is well-appearing in no acute distress  Patient does express concern for recent unintentional weight loss  Allergies reviewed          Prior to Admission Medications   Prescriptions Last Dose Informant Patient Reported? Taking?    Lancets (freestyle) lancets   No No   Sig: Use as instructed -glucose monitor BID   Psyllium (FIBER) 0 52 g CAPS  Self Yes No   Sig: Take by mouth   amLODIPine (NORVASC) 5 mg tablet  Self No No   Sig: Take 1 tablet (5 mg total) by mouth daily   aspirin (ECOTRIN LOW STRENGTH) 81 mg EC tablet  Self Yes No   Sig: Take 81 mg by mouth daily   cholecalciferol (VITAMIN D3) 1,000 units tablet  Self Yes No   Sig: Take 800 Units by mouth daily   Patient not taking: Reported on 8528   folic acid (FOLVITE) 404 mcg tablet  Self Yes No   Sig: Take 400 mcg by mouth daily   glipiZIDE (GLUCOTROL) 5 mg tablet   No No   Sig: Take 1 tablet (5 mg total) by mouth 2 (two) times a day   glucose monitoring kit (FREESTYLE) monitoring kit  Self No No   Si each by Does not apply route daily   insulin glargine (Lantus SoloStar) 100 units/mL injection pen   No No   Sig: Inject 50 Units under the skin daily at bedtime   levothyroxine 50 mcg tablet  Self No No   Sig: Take 1 tablet (50 mcg total) by mouth daily   lisinopril (ZESTRIL) 20 mg tablet  Self No No   Sig: Take 1 tablet (20 mg total) by mouth daily   metoprolol succinate (TOPROL-XL) 50 mg 24 hr tablet   No No   Sig: take 1 tablet by mouth once daily   omega-3-acid ethyl esters (LOVAZA) 1 g capsule  Self Yes No   Sig: Take 2 g by mouth 2 (two) times a day   rosuvastatin (CRESTOR) 10 MG tablet  Self No No   Sig: Take 1 tablet (10 mg total) by mouth daily      Facility-Administered Medications: None       Past Medical History:   Diagnosis Date    Benign essential hypertension     Chronic kidney disease, stage 2 (mild)     Chronic kidney disease, stage 3 (HCC)     Disorder of gallbladder     Disorder of skin and subcutaneous tissue     Dyspnea     Essential hypertension     Hyperlipidemia     Hypokalemia     Hypothyroidism     Malaise and fatigue     Mixed hyperlipidemia     Morbid obesity (HCC)     Pernicious anemia     Type 2 diabetes mellitus (HCC)        Past Surgical History:   Procedure Laterality Date    BREAST BIOPSY Right     CARPAL TUNNEL RELEASE Bilateral     CHOLECYSTECTOMY      COLONOSCOPY      Dr Brittany Treviño HYSTERECTOMY      PARTIAL HYSTERECTOMY      SKIN LESION EXCISION      scalp        Family History   Problem Relation Age of Onset    Stroke Mother     Atrial fibrillation Mother     Brain cancer Father     Other Brother         Twin brothers - Open heart    Other Brother         Twin brothers - Open heart    No Known Problems Maternal Grandmother     No Known Problems Paternal Grandmother      I have reviewed and agree with the history as documented      E-Cigarette/Vaping    E-Cigarette Use Never User      E-Cigarette/Vaping Substances    Nicotine No     THC No     CBD No     Flavoring No     Other No     Unknown No      Social History     Tobacco Use    Smoking status: Former Smoker     Packs/day: 1 00     Years: 14 00     Pack years: 14 00     Types: Cigarettes     Quit date:      Years since quittin 0    Smokeless tobacco: Never Used   Vaping Use    Vaping Use: Never used   Substance Use Topics    Alcohol use: Yes     Comment: Occasionally     Drug use: Never     Comment: Denies drug use - As per [employer]        Review of Systems   Constitutional: Negative for chills and fever    Respiratory: Negative for chest tightness and shortness of breath  Cardiovascular: Negative for chest pain  Gastrointestinal: Negative for abdominal pain  Musculoskeletal: Positive for arthralgias  Neurological: Negative for weakness and numbness  All other systems reviewed and are negative  Physical Exam  Physical Exam  Vitals and nursing note reviewed  Constitutional:       General: She is not in acute distress  Appearance: She is well-developed  She is not diaphoretic  HENT:      Head: Normocephalic and atraumatic  Right Ear: External ear normal       Left Ear: External ear normal       Nose: Nose normal    Eyes:      Conjunctiva/sclera: Conjunctivae normal       Pupils: Pupils are equal, round, and reactive to light  Cardiovascular:      Rate and Rhythm: Normal rate and regular rhythm  Heart sounds: Normal heart sounds  No murmur heard  No friction rub  No gallop  Pulmonary:      Effort: Pulmonary effort is normal  No respiratory distress  Breath sounds: Normal breath sounds  No stridor  No wheezing or rales  Abdominal:      General: Bowel sounds are normal  There is no distension  Palpations: Abdomen is soft  Tenderness: There is no abdominal tenderness  There is no guarding  Musculoskeletal:         General: No tenderness  Normal range of motion  Cervical back: Normal range of motion  Comments: Right posterior knee and right medial thigh tenderness  No palpable cord  Skin:     General: Skin is warm  Capillary Refill: Capillary refill takes less than 2 seconds  Neurological:      Mental Status: She is alert and oriented to person, place, and time           Vital Signs  ED Triage Vitals   Temperature Pulse Respirations Blood Pressure SpO2   01/16/22 1030 01/16/22 1030 01/16/22 1030 01/16/22 1031 01/16/22 1030   97 8 °F (36 6 °C) 76 18 (!) 181/72 97 %      Temp Source Heart Rate Source Patient Position - Orthostatic VS BP Location FiO2 (%)   01/16/22 1030 -- -- -- --   Temporal          Pain Score       01/16/22 1030       6           Vitals:    01/16/22 1030 01/16/22 1031   BP:  (!) 181/72   Pulse: 76          Visual Acuity      ED Medications  Medications - No data to display    Diagnostic Studies  Results Reviewed     Procedure Component Value Units Date/Time    D-dimer, quantitative [[id number]]  (Abnormal) Collected: 01/16/22 1206    Lab Status: Final result Specimen: Blood from Arm, Right Updated: 01/16/22 1346     D-Dimer, Quant 0 98 ug/ml FEU     Lipase [[id number]]  (Normal) Collected: 01/16/22 1206    Lab Status: Final result Specimen: Blood from Arm, Right Updated: 01/16/22 1259     Lipase 69 u/L     Comprehensive metabolic panel [[id number]]  (Abnormal) Collected: 01/16/22 1206    Lab Status: Final result Specimen: Blood from Arm, Right Updated: 01/16/22 1259     Sodium 138 mmol/L      Potassium 3 9 mmol/L      Chloride 103 mmol/L      CO2 29 mmol/L      ANION GAP 6 mmol/L      BUN 24 mg/dL      Creatinine 1 48 mg/dL      Glucose 127 mg/dL      Calcium 9 8 mg/dL      AST 16 U/L      ALT 13 U/L      Alkaline Phosphatase 47 U/L      Total Protein 7 1 g/dL      Albumin 4 1 g/dL      Total Bilirubin 0 45 mg/dL      eGFR 34 ml/min/1 73sq m     Narrative:      Abdiel guidelines for Chronic Kidney Disease (CKD):     Stage 1 with normal or high GFR (GFR > 90 mL/min/1 73 square meters)    Stage 2 Mild CKD (GFR = 60-89 mL/min/1 73 square meters)    Stage 3A Moderate CKD (GFR = 45-59 mL/min/1 73 square meters)    Stage 3B Moderate CKD (GFR = 30-44 mL/min/1 73 square meters)    Stage 4 Severe CKD (GFR = 15-29 mL/min/1 73 square meters)    Stage 5 End Stage CKD (GFR <15 mL/min/1 73 square meters)  Note: GFR calculation is accurate only with a steady state creatinine    CBC and differential [[id number]]  (Abnormal) Collected: 01/16/22 1206    Lab Status: Final result Specimen: Blood from Arm, Right Updated: 01/16/22 1214     WBC 9 42 Thousand/uL      RBC 4 55 Million/uL      Hemoglobin 13 4 g/dL      Hematocrit 42 8 %      MCV 94 fL      MCH 29 5 pg      MCHC 31 3 g/dL      RDW 13 6 %      MPV 10 2 fL      Platelets 933 Thousands/uL      nRBC 0 /100 WBCs      Neutrophils Relative 72 %      Immat GRANS % 0 %      Lymphocytes Relative 19 %      Monocytes Relative 8 %      Eosinophils Relative 1 %      Basophils Relative 0 %      Neutrophils Absolute 6 78 Thousands/µL      Immature Grans Absolute 0 03 Thousand/uL      Lymphocytes Absolute 1 75 Thousands/µL      Monocytes Absolute 0 78 Thousand/µL      Eosinophils Absolute 0 06 Thousand/µL      Basophils Absolute 0 02 Thousands/µL                  VAS lower limb venous duplex study, unilateral/limited   Final Result by Lisa Hearn MD (01/17 1520)      XR hip/pelv 1 vw RIGHT if performed   Final Result by Emanuel Golden MD (01/17 6803)      No acute osseous abnormality  Workstation performed: [id number]         XR knee 1 or 2 views right   Final Result by Emanuel Golden MD (01/17 5269)      No acute osseous abnormality  Workstation performed: [id number]                    Procedures  Procedures         ED Course  ED Course as of 01/19/22 1157   Sun Jan 16, 2022   1127 Concern for right leg DVT  Patient also complains of unexplained weight loss 2 lb per day for the last several days  Will check basic labs  SBIRT 22yo+      Most Recent Value   SBIRT (22 yo +)    In order to provide better care to our patients, we are screening all of our patients for alcohol and drug use  Would it be okay to ask you these screening questions? No Filed at: 01/16/2022 1048                    [facility]  Number of Diagnoses or Management Options  Right leg pain  Diagnosis management comments: Labs and imaging reviewed  No DVT on preliminary read  Recommend close follow-up with PCP         Amount and/or Complexity of Data Reviewed  Clinical lab tests: ordered and reviewed  Tests in the radiology section of CPT®: ordered and reviewed    Risk of Complications, Morbidity, and/or Mortality  Presenting problems: low  Diagnostic procedures: low  Management options: low    Patient Progress  Patient progress: stable      Disposition  Final diagnoses:   Right leg pain     Time reflects when diagnosis was documented in both MDM as applicable and the Disposition within this note     Time User Action Codes Description Comment    1/16/2022  3:00 PM May Lucia [[id number]] Right leg pain       ED Disposition     ED Disposition Condition Date/Time Comment    Discharge Stable Sun Jan 16, 2022  3:00 PM Juliet Norris discharge to home/self care              Follow-up Information     Follow up With Specialties Details Why Brandan Gates MD Internal Medicine   [facility] [address]  [phone]            Discharge Medication List as of 1/16/2022  3:24 PM      CONTINUE these medications which have NOT CHANGED    Details   amLODIPine (NORVASC) 5 mg tablet Take 1 tablet (5 mg total) by mouth daily, Starting Tue 7/27/2021, Normal      aspirin (ECOTRIN LOW STRENGTH) 81 mg EC tablet Take 81 mg by mouth daily, Historical Med      cholecalciferol (VITAMIN D3) 1,000 units tablet Take 800 Units by mouth daily, Historical Med      folic acid (FOLVITE) 880 mcg tablet Take 400 mcg by mouth daily, Historical Med      glipiZIDE (GLUCOTROL) 5 mg tablet Take 1 tablet (5 mg total) by mouth 2 (two) times a day, Starting Wed 12/15/2021, Normal      glucose monitoring kit (FREESTYLE) monitoring kit 1 each by Does not apply route daily, Starting Wed 6/3/2020, Normal      insulin glargine (Lantus SoloStar) 100 units/mL injection pen Inject 50 Units under the skin daily at bedtime, Starting Tue 9/7/2021, Normal      Lancets (freestyle) lancets Use as instructed -glucose monitor BID, Normal      levothyroxine 50 mcg tablet Take 1 tablet (50 mcg total) by mouth daily, Starting Tue 7/27/2021, Normal      lisinopril (ZESTRIL) 20 mg tablet Take 1 tablet (20 mg total) by mouth daily, Starting Tue 7/27/2021, Normal      metoprolol succinate (TOPROL-XL) 50 mg 24 hr tablet take 1 tablet by mouth once daily, Normal      omega-3-acid ethyl esters (LOVAZA) 1 g capsule Take 2 g by mouth 2 (two) times a day, Historical Med      Psyllium (FIBER) 0 52 g CAPS Take by mouth, Historical Med      rosuvastatin (CRESTOR) 10 MG tablet Take 1 tablet (10 mg total) by mouth daily, Starting Tue 7/27/2021, Normal             No discharge procedures on file      PDMP Review     None          ED Provider  Electronically Signed by           Lisa Correa PA-C  01/19/22 2637

## 2022-01-21 ENCOUNTER — TELEPHONE (OUTPATIENT)
Dept: OBGYN CLINIC | Facility: HOSPITAL | Age: 77
End: 2022-01-21

## 2022-01-21 NOTE — TELEPHONE ENCOUNTER
Patient sees Dr Curtis Most  Patient is calling in stating that she was seen in the office on 1/18 and she thinks her boyfriend might have misplaced a 5x7 book read and she is asking if this has been found or not? Please advise  Patient was also given an injection and she is still not having any relief at all as well  Please advise        Call back# [phone]

## 2022-01-21 NOTE — TELEPHONE ENCOUNTER
Patient made aware to give the injection 2 weeks to work  In the meantime Heat/ice 20 on 20 off, compression, elevation, tylenol and NSAIDS if able to take them  She verbalized [employer] staff will look for [employer]

## 2022-01-25 ENCOUNTER — APPOINTMENT (OUTPATIENT)
Dept: LAB | Facility: CLINIC | Age: 77
End: 2022-01-25
Payer: MEDICARE

## 2022-01-25 ENCOUNTER — OFFICE VISIT (OUTPATIENT)
Dept: FAMILY MEDICINE CLINIC | Facility: CLINIC | Age: 77
End: 2022-01-25
Payer: MEDICARE

## 2022-01-25 VITALS
BODY MASS INDEX: 35.7 KG/M2 | SYSTOLIC BLOOD PRESSURE: 136 MMHG | WEIGHT: 194 LBS | OXYGEN SATURATION: 99 % | TEMPERATURE: 97.5 F | HEIGHT: 62 IN | RESPIRATION RATE: 18 BRPM | HEART RATE: 73 BPM | DIASTOLIC BLOOD PRESSURE: 84 MMHG

## 2022-01-25 DIAGNOSIS — E11.9 TYPE 2 DIABETES MELLITUS WITHOUT COMPLICATION, WITHOUT LONG-TERM CURRENT USE OF INSULIN (HCC): ICD-10-CM

## 2022-01-25 DIAGNOSIS — N18.30 CKD STAGE 3 DUE TO TYPE 2 DIABETES MELLITUS (HCC): ICD-10-CM

## 2022-01-25 DIAGNOSIS — M79.604 ACUTE LEG PAIN, RIGHT: Primary | ICD-10-CM

## 2022-01-25 DIAGNOSIS — E11.22 CKD STAGE 3 DUE TO TYPE 2 DIABETES MELLITUS (HCC): ICD-10-CM

## 2022-01-25 DIAGNOSIS — E03.9 ACQUIRED HYPOTHYROIDISM: ICD-10-CM

## 2022-01-25 DIAGNOSIS — E66.01 SEVERE OBESITY (BMI 35.0-39.9) WITH COMORBIDITY (HCC): ICD-10-CM

## 2022-01-25 LAB
ALBUMIN SERPL BCP-MCNC: 3.5 G/DL (ref 3.5–5)
ALP SERPL-CCNC: 50 U/L (ref 46–116)
ALT SERPL W P-5'-P-CCNC: 19 U/L (ref 12–78)
ANION GAP SERPL CALCULATED.3IONS-SCNC: 6 MMOL/L (ref 4–13)
AST SERPL W P-5'-P-CCNC: 14 U/L (ref 5–45)
BILIRUB SERPL-MCNC: 0.6 MG/DL (ref 0.2–1)
BUN SERPL-MCNC: 29 MG/DL (ref 5–25)
CALCIUM SERPL-MCNC: 9.7 MG/DL (ref 8.3–10.1)
CHLORIDE SERPL-SCNC: 105 MMOL/L (ref 100–108)
CHOLEST SERPL-MCNC: 109 MG/DL
CO2 SERPL-SCNC: 28 MMOL/L (ref 21–32)
CREAT SERPL-MCNC: 1.45 MG/DL (ref 0.6–1.3)
EST. AVERAGE GLUCOSE BLD GHB EST-MCNC: 237 MG/DL
GFR SERPL CREATININE-BSD FRML MDRD: 35 ML/MIN/1.73SQ M
GLUCOSE P FAST SERPL-MCNC: 66 MG/DL (ref 65–99)
HBA1C MFR BLD: 9.9 %
HDLC SERPL-MCNC: 36 MG/DL
LDLC SERPL CALC-MCNC: 44 MG/DL (ref 0–100)
POTASSIUM SERPL-SCNC: 3.9 MMOL/L (ref 3.5–5.3)
PROT SERPL-MCNC: 7.2 G/DL (ref 6.4–8.2)
SL AMB POCT HEMOGLOBIN AIC: 10 (ref ?–6.5)
SODIUM SERPL-SCNC: 139 MMOL/L (ref 136–145)
TRIGL SERPL-MCNC: 143 MG/DL
TSH SERPL DL<=0.05 MIU/L-ACNC: 1.76 UIU/ML (ref 0.36–3.74)

## 2022-01-25 PROCEDURE — 84443 ASSAY THYROID STIM HORMONE: CPT

## 2022-01-25 PROCEDURE — 80061 LIPID PANEL: CPT

## 2022-01-25 PROCEDURE — 80053 COMPREHEN METABOLIC PANEL: CPT

## 2022-01-25 PROCEDURE — 99214 OFFICE O/P EST MOD 30 MIN: CPT | Performed by: INTERNAL MEDICINE

## 2022-01-25 PROCEDURE — 36415 COLL VENOUS BLD VENIPUNCTURE: CPT

## 2022-01-25 PROCEDURE — 83036 HEMOGLOBIN GLYCOSYLATED A1C: CPT | Performed by: INTERNAL MEDICINE

## 2022-01-25 PROCEDURE — 83036 HEMOGLOBIN GLYCOSYLATED A1C: CPT

## 2022-01-25 NOTE — PROGRESS NOTES
[facility]        NAME: Juan Alberto Heller is a 68 y o  female  : 1945    MRN: [id number]  DATE: 2022  TIME: 10:45 AM    Assessment and Plan   1  Acute leg pain, right  - reviewed xrays and venous duplex  Reports exertional thigh pain, some tenderness along knee joint on exam, calf pain and possible paresthesias  Will check arterial US and IGOR to rule out PAD  May also be due to lumbar degenerative disease, strength intact, negative SLR     -  VAS IGOR & waveform analysis, multiple levels; Future; Expected date: 2022    2  Type 2 diabetes mellitus without complication, without long-term current use of insulin ([facility])  -reports FBG at goal, 100  POC A1c inconsistent with this, will check serum A1c       -  POCT hemoglobin A1c  -     Comprehensive metabolic panel; Future  -     Lipid Panel with Direct LDL reflex; Future  -     HEMOGLOBIN A1C W/ EAG ESTIMATION; Future    3  Acquired hypothyroidism  -     TSH, 3rd generation with Free T4 reflex; Future    4  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 mellitus ([facility] )  -advised her to stop NSAIDs, use tylenol only for pain  Recent Cr increased from prior     -    Comprehensive metabolic panel; Future  -     Lipid Panel with Direct LDL reflex; Future    5  Severe obesity (BMI 35 0-39  9) with comorbidity ([facility] )  - reports unintentional weight loss  She is trying to eat less due to obesity and diabetes  On chart review, weight is relatively stable ~195-200  Will check TSH, follow up in 1 month for AWV  Chief Complaint     Chief Complaint   Patient presents with    Follow-up     states BG's are coming down, around 100   Weight Loss     states she lost 10 lbs in a week without trying    Leg Pain     while R leg up to the groin, went to ED, ruled out DVT  then was at Dr Kemal Hull and reviewed  he says it is osteoarthritis and had an injection  states it did not help  states it feels "like theres bugs in her legs"           History of Present Illness       Here for ER follow up  Developed right leg pain 2 weeks ago  Located in thigh, groin and knee and lower leg  Described as feeling like "worms" in lower leg  Pain is constant and relieved by elevating, worsened by sitting and activity and relieved by rest  Does not bother her while lying supine or sleeping  Denies recent falls or injury  Went to ED 01/17, duplex and xrays negative  Saw Dr Abdoulaye Cheek 01/17 and given knee injection without any relief  Taking ibuprofen 2 tabs 3 times per day  Diabetes: -112, denies hypoglycemia  Currently lantus 50 units at bedtime, glipizide 5mg BID  Review of Systems   Review of Systems   Constitutional: Positive for appetite change and unexpected weight change  Negative for chills and fever  HENT: Negative for trouble swallowing  Respiratory: Negative for cough, chest tightness and shortness of breath  Cardiovascular: Negative for chest pain, palpitations and leg swelling  Gastrointestinal: Positive for constipation  Negative for abdominal pain, diarrhea, nausea and vomiting  Musculoskeletal: Positive for arthralgias and myalgias  Negative for back pain  Neurological: Positive for numbness  Negative for weakness  Hematological: Negative for adenopathy  Does not bruise/bleed easily           Current Medications       Current Outpatient Medications:     amLODIPine (NORVASC) 5 mg tablet, Take 1 tablet (5 mg total) by mouth daily, Disp: 90 tablet, Rfl: 3    aspirin (ECOTRIN LOW STRENGTH) 81 mg EC tablet, Take 81 mg by mouth daily, Disp: , Rfl:     cholecalciferol (VITAMIN D3) 1,000 units tablet, Take 800 Units by mouth daily  , Disp: , Rfl:     folic acid (FOLVITE) 105 mcg tablet, Take 400 mcg by mouth daily, Disp: , Rfl:     glipiZIDE (GLUCOTROL) 5 mg tablet, Take 1 tablet (5 mg total) by mouth 2 (two) times a day, Disp: 180 tablet, Rfl: 0    glucose monitoring kit (FREESTYLE) monitoring kit, 1 each by Does not apply route daily, Disp: 1 each, Rfl: 0    insulin glargine (Lantus SoloStar) 100 units/mL injection pen, Inject 50 Units under the skin daily at bedtime, Disp: 15 mL, Rfl: 3    Lancets (freestyle) lancets, Use as instructed -glucose monitor BID, Disp: 100 each, Rfl: 5    levothyroxine 50 mcg tablet, Take 1 tablet (50 mcg total) by mouth daily, Disp: 90 tablet, Rfl: 3    lisinopril (ZESTRIL) 20 mg tablet, Take 1 tablet (20 mg total) by mouth daily, Disp: 90 tablet, Rfl: 3    metoprolol succinate (TOPROL-XL) 50 mg 24 hr tablet, take 1 tablet by mouth once daily, Disp: 90 tablet, Rfl: 5    omega-3-acid ethyl esters (LOVAZA) 1 g capsule, Take 2 g by mouth 2 (two) times a day, Disp: , Rfl:     Psyllium (FIBER) 0 52 g CAPS, Take by mouth, Disp: , Rfl:     rosuvastatin (CRESTOR) 10 MG tablet, Take 1 tablet (10 mg total) by mouth daily, Disp: 90 tablet, Rfl: 1    Current Allergies     Allergies as of 01/25/2022    (No Known Allergies)            The following portions of the patient's history were reviewed and updated as appropriate: allergies, current medications, past family history, past medical history, past social history, past surgical history and problem list      Past Medical History:   Diagnosis Date    Benign essential hypertension     Chronic kidney disease, stage 2 (mild)     Chronic kidney disease, stage 3 (HCC)     Disorder of gallbladder     Disorder of skin and subcutaneous tissue     Dyspnea     Essential hypertension     Hyperlipidemia     Hypokalemia     Hypothyroidism     Malaise and fatigue     Mixed hyperlipidemia     Morbid obesity (HCC)     Pernicious anemia     Type 2 diabetes mellitus (HCC)        Past Surgical History:   Procedure Laterality Date    BREAST BIOPSY Right     CARPAL TUNNEL RELEASE Bilateral     CHOLECYSTECTOMY      COLONOSCOPY      Dr Ward Vasquez HYSTERECTOMY      PARTIAL HYSTERECTOMY      SKIN LESION EXCISION      scalp        Family History   Problem Relation Age of Onset    Stroke Mother     Atrial fibrillation Mother     Brain cancer Father     Other Brother         Twin brothers - Open heart    Other Brother         Twin brothers - Open heart    No Known Problems Maternal Grandmother     No Known Problems Paternal Grandmother          Medications have been verified  Objective   /84   Pulse 73   Temp 97 5 °F (36 4 °C)   Resp 18   Ht 5' 2" (1 575 m)   Wt 88 kg (194 lb)   SpO2 99%   BMI 35 48 kg/m²        Physical Exam     Physical Exam  Vitals reviewed  Constitutional:       General: She is not in acute distress  Appearance: She is obese  Cardiovascular:      Rate and Rhythm: Normal rate and regular rhythm  Pulses:           Dorsalis pedis pulses are 1+ on the right side and 1+ on the left side  Posterior tibial pulses are 1+ on the right side and 1+ on the left side  Heart sounds: S1 normal and S2 normal  No friction rub  No gallop  Pulmonary:      Effort: Pulmonary effort is normal  No accessory muscle usage  Breath sounds: Normal breath sounds  No wheezing, rhonchi or rales  Musculoskeletal:      Cervical back: Normal       Thoracic back: Normal       Lumbar back: Tenderness present  No deformity  Negative right straight leg raise test and negative left straight leg raise test       Right knee: Normal range of motion  Tenderness present over the medial joint line, lateral joint line and patellar tendon  Instability Tests: Medial Moraima test negative and lateral Moraima test negative  Left knee: Normal       Right lower leg: Tenderness present  No edema  Left lower leg: Normal  No edema  Neurological:      Mental Status: She is alert  Motor: Motor function is intact  No weakness or atrophy  Gait: Gait is intact  Psychiatric:         Mood and Affect: Mood and affect normal          Speech: Speech normal          Behavior: Behavior normal  Behavior is cooperative  Results:  Lab Results   Component Value Date    SODIUM 138 01/16/2022    K 3 9 01/16/2022     01/16/2022    CO2 29 01/16/2022    BUN 24 01/16/2022    CREATININE 1 48 (H) 01/16/2022    GLUC 127 01/16/2022    CALCIUM 9 8 01/16/2022       Lab Results   Component Value Date    HGBA1C 10 0 (A) 01/25/2022       Lab Results   Component Value Date    WBC 9 42 01/16/2022    HGB 13 4 01/16/2022    HCT 42 8 01/16/2022    MCV 94 01/16/2022     01/16/2022     Xray knee 01/16/22      FINDINGS:     There is no acute fracture or dislocation      There is no joint effusion      No significant degenerative changes      No lytic or blastic osseous lesion      Soft tissues are unremarkable      IMPRESSION:     No acute osseous abnormality      Xray hips 01/16/22  FINDINGS:     There is no acute fracture or dislocation      No significant hip degenerative changes      No lytic or blastic osseous lesion      Soft tissues are unremarkable      Degenerative changes visualized lower lumbar spine      IMPRESSION:     No acute osseous abnormality  LE venous duplex 01/16/22     CONCLUSION:     Impression:  RIGHT LOWER LIMB  No evidence of acute or chronic deep vein thrombosis  No evidence of superficial thrombophlebitis noted

## 2022-01-25 NOTE — PATIENT INSTRUCTIONS
Stop ibuprofen or motrin and use tylenol extra-strength 2 tabs every 8hrs as needed for pain  Get blood work and ultrasound of leg

## 2022-02-07 ENCOUNTER — TELEPHONE (OUTPATIENT)
Dept: FAMILY MEDICINE CLINIC | Facility: CLINIC | Age: 77
End: 2022-02-07

## 2022-02-07 DIAGNOSIS — N18.30 CKD STAGE 3 DUE TO TYPE 2 DIABETES MELLITUS (HCC): Primary | ICD-10-CM

## 2022-02-07 DIAGNOSIS — E11.22 CKD STAGE 3 DUE TO TYPE 2 DIABETES MELLITUS (HCC): Primary | ICD-10-CM

## 2022-02-07 DIAGNOSIS — M79.604 ACUTE LEG PAIN, RIGHT: ICD-10-CM

## 2022-02-07 NOTE — TELEPHONE ENCOUNTER
Kary Turcios with the Vascular Lab at [address] [place] [address] called asking if pt needs Duplex along with IGOR scheduled for tomorrow       OV note states "Will check arterial US and IGOR to rule out PAD"    Please advise    Stacey's call back number is [phone]

## 2022-02-08 ENCOUNTER — HOSPITAL ENCOUNTER (OUTPATIENT)
Dept: NON INVASIVE DIAGNOSTICS | Facility: HOSPITAL | Age: 77
Discharge: HOME | End: 2022-02-08
Payer: MEDICARE

## 2022-02-08 DIAGNOSIS — E11.9 TYPE 2 DIABETES MELLITUS WITHOUT COMPLICATION, WITHOUT LONG-TERM CURRENT USE OF INSULIN (HCC): Primary | ICD-10-CM

## 2022-02-08 DIAGNOSIS — M79.604 ACUTE LEG PAIN, RIGHT: ICD-10-CM

## 2022-02-08 PROCEDURE — 93925 LOWER EXTREMITY STUDY: CPT

## 2022-02-08 PROCEDURE — 93923 UPR/LXTR ART STDY 3+ LVLS: CPT

## 2022-02-08 NOTE — TELEPHONE ENCOUNTER
patient stated that she is in need of her needles for her Lantuss solo star, patient verified the Gauge and the pharmacy that is being used,

## 2022-02-10 PROCEDURE — 93925 LOWER EXTREMITY STUDY: CPT | Performed by: SURGERY

## 2022-02-10 PROCEDURE — 93922 UPR/L XTREMITY ART 2 LEVELS: CPT | Performed by: SURGERY

## 2022-02-14 DIAGNOSIS — E11.9 TYPE 2 DIABETES MELLITUS WITHOUT COMPLICATION, WITHOUT LONG-TERM CURRENT USE OF INSULIN (HCC): ICD-10-CM

## 2022-02-14 RX ORDER — GLIPIZIDE 5 MG/1
5 TABLET ORAL
Qty: 180 TABLET | Refills: 0 | Status: SHIPPED | OUTPATIENT
Start: 2022-02-14 | End: 2022-02-28 | Stop reason: SDUPTHER

## 2022-02-14 NOTE — TELEPHONE ENCOUNTER
Patient requesting refill(s) of: Glipizide 5mg    Last filled: 12/15/21 #180 x0  Last appt: 1/25/22  Next appt: 2/28/22  Pharmacy: [facility]

## 2022-02-28 ENCOUNTER — OFFICE VISIT (OUTPATIENT)
Dept: FAMILY MEDICINE CLINIC | Facility: CLINIC | Age: 77
End: 2022-02-28
Payer: MEDICARE

## 2022-02-28 VITALS
DIASTOLIC BLOOD PRESSURE: 80 MMHG | OXYGEN SATURATION: 98 % | HEART RATE: 75 BPM | SYSTOLIC BLOOD PRESSURE: 124 MMHG | WEIGHT: 185.8 LBS | BODY MASS INDEX: 34.19 KG/M2 | RESPIRATION RATE: 18 BRPM | TEMPERATURE: 97.5 F | HEIGHT: 62 IN

## 2022-02-28 DIAGNOSIS — M54.41 CHRONIC RIGHT-SIDED LOW BACK PAIN WITH RIGHT-SIDED SCIATICA: ICD-10-CM

## 2022-02-28 DIAGNOSIS — Z00.00 MEDICARE ANNUAL WELLNESS VISIT, SUBSEQUENT: ICD-10-CM

## 2022-02-28 DIAGNOSIS — I10 ESSENTIAL HYPERTENSION: ICD-10-CM

## 2022-02-28 DIAGNOSIS — E11.22 CKD STAGE 3 DUE TO TYPE 2 DIABETES MELLITUS (HCC): ICD-10-CM

## 2022-02-28 DIAGNOSIS — N18.30 CKD STAGE 3 DUE TO TYPE 2 DIABETES MELLITUS (HCC): ICD-10-CM

## 2022-02-28 DIAGNOSIS — M79.604 RIGHT LEG PAIN: ICD-10-CM

## 2022-02-28 DIAGNOSIS — Z78.0 ASYMPTOMATIC POSTMENOPAUSAL STATE: ICD-10-CM

## 2022-02-28 DIAGNOSIS — G89.29 CHRONIC RIGHT-SIDED LOW BACK PAIN WITH RIGHT-SIDED SCIATICA: ICD-10-CM

## 2022-02-28 DIAGNOSIS — E03.9 ACQUIRED HYPOTHYROIDISM: ICD-10-CM

## 2022-02-28 DIAGNOSIS — E11.9 TYPE 2 DIABETES MELLITUS WITHOUT COMPLICATION, WITHOUT LONG-TERM CURRENT USE OF INSULIN (HCC): Primary | ICD-10-CM

## 2022-02-28 LAB — SL AMB POCT HEMOGLOBIN AIC: 8.6 (ref ?–6.5)

## 2022-02-28 PROCEDURE — 99214 OFFICE O/P EST MOD 30 MIN: CPT | Performed by: INTERNAL MEDICINE

## 2022-02-28 PROCEDURE — G0439 PPPS, SUBSEQ VISIT: HCPCS | Performed by: INTERNAL MEDICINE

## 2022-02-28 PROCEDURE — 1123F ACP DISCUSS/DSCN MKR DOCD: CPT | Performed by: INTERNAL MEDICINE

## 2022-02-28 PROCEDURE — 83036 HEMOGLOBIN GLYCOSYLATED A1C: CPT | Performed by: INTERNAL MEDICINE

## 2022-02-28 RX ORDER — GLIPIZIDE 10 MG/1
10 TABLET ORAL
Qty: 180 TABLET | Refills: 1 | Status: SHIPPED | OUTPATIENT
Start: 2022-02-28 | End: 2022-04-18

## 2022-02-28 NOTE — PROGRESS NOTES
[facility]        NAME: Joyce Beckwith is a 68 y o  female  : 1945    MRN: [id number]  DATE: 2022  TIME: 10:39 AM    Assessment and Plan   1  Type 2 diabetes mellitus without complication, without long-term current use of insulin (HCC)  - A1c improved, now 8 6  Encouraged her to continue low-carb diet, will continue glipizide BID meals as long as renal function is stable, she cannot afford tradjenta  Continue insulin 50 units QD  Gets labs urine microalbumin before follow up in 3 months        -  POCT hemoglobin A1c  -     Hemoglobin A1C; Future; Expected date: 2022  -     Comprehensive metabolic panel; Future; Expected date: 2022  -     CBC and differential; Future; Expected date: 2022  -     Microalbumin / creatinine urine ratio; Future; Expected date: 2022  -     glipiZIDE (GLUCOTROL) 10 mg tablet; Take 1 tablet (10 mg total) by mouth 2 (two) times a day before meals    2  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 mellitus (HCC)  - Cr stable at 1 4,   -     Hemoglobin A1C; Future; Expected date: 2022  -     Comprehensive metabolic panel; Future; Expected date: 2022  -     CBC and differential; Future; Expected date: 2022  -     Microalbumin / creatinine urine ratio; Future; Expected date: 2022    3  Acquired hypothyroidism  - recent TSH normal,     4  Essential hypertension  - Bp controlled    5  Medicare annual wellness visit, subsequent    6  Right leg pain  - describes paresthesias, suspect this may be from lumbar degenerative disease, given xray results  IGOR normal, pressures with healing range  We discussed trial of PT and if no relief consider MRI lumbar spine and/or EMG  Right knee OA may also be contributing to pain  -   Ambulatory Referral to Physical Therapy; Future    7   Chronic right-sided low back pain with right-sided sciatica  -she does report some low back pain, xrays in September showed degenerative changes, anteriolisthesis, facet arthritis    -   Ambulatory Referral to Physical Therapy; Future    8  Asymptomatic postmenopausal state  -overdue for DEXA, do not have results from previous studies  -     DXA bone density spine hip and pelvis; Future; Expected date: 02/28/2022             Chief Complaint     Chief Complaint   Patient presents with   [facility] Wellness Visit    Leg Pain     right leg pain since 1/17  no DVT  History of Present Illness       Here for follow up diabetes, CKD and AWV  Still having right leg pain and knee pain  States that CSI has helped somewhat but still experiences numbness/tingling in right leg from ankle to thigh, worse with walking and activity, relieved by sitting and lying down  Thinks it may have improved a little since last visit  She does report a little low back pain as well, previously went to chiropractor and had relief  Diabetes: checks FBG daily, recently   2hr post-prandial BG 160s  Glipizide increased to BID meals after last visit  She's also reduced carbs, eating smaller breakfast, lost 10 lbs  Review of Systems   Review of Systems   Constitutional: Positive for appetite change and unexpected weight change  Negative for chills and fever  Respiratory: Negative for cough, chest tightness and shortness of breath  Cardiovascular: Negative for chest pain, palpitations and leg swelling  Gastrointestinal: Negative for abdominal pain, constipation, diarrhea, nausea and vomiting  Genitourinary: Negative for difficulty urinating  Musculoskeletal: Positive for arthralgias and back pain  Neurological: Negative for dizziness, weakness, light-headedness and numbness           Current Medications       Current Outpatient Medications:     amLODIPine (NORVASC) 5 mg tablet, Take 1 tablet (5 mg total) by mouth daily, Disp: 90 tablet, Rfl: 3    aspirin (ECOTRIN LOW STRENGTH) 81 mg EC tablet, Take 81 mg by mouth daily, Disp: , Rfl:     cholecalciferol (VITAMIN D3) 1,000 units tablet, Take 800 Units by mouth daily  , Disp: , Rfl:     folic acid (FOLVITE) 783 mcg tablet, Take 400 mcg by mouth daily, Disp: , Rfl:     glipiZIDE (GLUCOTROL) 10 mg tablet, Take 1 tablet (10 mg total) by mouth 2 (two) times a day before meals, Disp: 180 tablet, Rfl: 1    glucose monitoring kit (FREESTYLE) monitoring kit, 1 each by Does not apply route daily, Disp: 1 each, Rfl: 0    insulin glargine (Lantus SoloStar) 100 units/mL injection pen, Inject 50 Units under the skin daily at bedtime, Disp: 15 mL, Rfl: 3    Insulin Pen Needle 32G X 6 MM MISC, Use in the morning, Disp: 100 each, Rfl: 5    Lancets (freestyle) lancets, Use as instructed -glucose monitor BID, Disp: 100 each, Rfl: 5    levothyroxine 50 mcg tablet, Take 1 tablet (50 mcg total) by mouth daily, Disp: 90 tablet, Rfl: 3    lisinopril (ZESTRIL) 20 mg tablet, Take 1 tablet (20 mg total) by mouth daily, Disp: 90 tablet, Rfl: 3    metoprolol succinate (TOPROL-XL) 50 mg 24 hr tablet, take 1 tablet by mouth once daily, Disp: 90 tablet, Rfl: 5    omega-3-acid ethyl esters (LOVAZA) 1 g capsule, Take 2 g by mouth 2 (two) times a day, Disp: , Rfl:     Psyllium (FIBER) 0 52 g CAPS, Take by mouth, Disp: , Rfl:     rosuvastatin (CRESTOR) 10 MG tablet, Take 1 tablet (10 mg total) by mouth daily, Disp: 90 tablet, Rfl: 1    Current Allergies     Allergies as of 02/28/2022    (No Known Allergies)            The following portions of the patient's history were reviewed and updated as appropriate: allergies, current medications, past family history, past medical history, past social history, past surgical history and problem list      Past Medical History:   Diagnosis Date    Benign essential hypertension     Chronic kidney disease, stage 2 (mild)     Chronic kidney disease, stage 3 (HCC)     Disorder of gallbladder     Disorder of skin and subcutaneous tissue     Dyspnea     Essential hypertension     Hyperlipidemia     Hypokalemia     Hypothyroidism     Malaise and fatigue     Mixed hyperlipidemia     Morbid obesity (HCC)     Pernicious anemia     Type 2 diabetes mellitus (HCC)        Past Surgical History:   Procedure Laterality Date    BREAST BIOPSY Right     CARPAL TUNNEL RELEASE Bilateral     CHOLECYSTECTOMY      COLONOSCOPY      Dr Nina Stanley      scalp        Family History   Problem Relation Age of Onset    Stroke Mother     Atrial fibrillation Mother     Brain cancer Father     Other Brother         Twin brothers - Open heart    Other Brother         Twin brothers - Open heart    No Known Problems Maternal Grandmother     No Known Problems Paternal Grandmother          Medications have been verified  Objective   /80   Pulse 75   Temp 97 5 °F (36 4 °C)   Resp 18   Ht 5' 2" (1 575 m)   Wt 84 3 kg (185 lb 12 8 oz)   SpO2 98%   BMI 33 98 kg/m²        Physical Exam     Physical Exam  Vitals reviewed  Constitutional:       General: She is not in acute distress  Appearance: She is obese  HENT:      Head: Normocephalic and atraumatic  Right Ear: Tympanic membrane, ear canal and external ear normal       Left Ear: Tympanic membrane, ear canal and external ear normal       Mouth/Throat:      Pharynx: No oropharyngeal exudate or posterior oropharyngeal erythema  Eyes:      General: No scleral icterus  Conjunctiva/sclera: Conjunctivae normal    Neck:      Vascular: No carotid bruit  Cardiovascular:      Rate and Rhythm: Normal rate and regular rhythm  Pulses: Pulses are weak  Dorsalis pedis pulses are 1+ on the right side and 1+ on the left side  Posterior tibial pulses are 1+ on the right side and 1+ on the left side  Heart sounds: Normal heart sounds  No murmur heard  No friction rub  No gallop  Pulmonary:      Effort: Pulmonary effort is normal  No respiratory distress  Breath sounds: Normal breath sounds  No wheezing, rhonchi or rales  Abdominal:      General: Abdomen is flat  Bowel sounds are normal       Palpations: Abdomen is soft  Tenderness: There is no abdominal tenderness  There is no guarding or rebound  Hernia: No hernia is present  Feet:      Right foot:      Skin integrity: Dry skin present  No ulcer, skin breakdown, erythema, warmth or callus  Left foot:      Skin integrity: Dry skin present  No ulcer, skin breakdown, erythema, warmth or callus  Lymphadenopathy:      Cervical: No cervical adenopathy  Skin:     General: Skin is dry  Capillary Refill: Capillary refill takes 2 to 3 seconds  Neurological:      General: No focal deficit present  Mental Status: She is alert  Psychiatric:         Mood and Affect: Mood normal          Behavior: Behavior normal          Thought Content: Thought content normal          Judgment: Judgment normal          Diabetic Foot Exam    Patient's shoes and socks removed  Right Foot/Ankle   Right Foot Inspection  Skin Exam: skin normal, skin intact and dry skin  No warmth, no callus, no erythema, no maceration, no abnormal color, no pre-ulcer, no ulcer and no callus  Toe Exam: ROM and strength within normal limits  Sensory   Proprioception: intact  Monofilament testing: intact    Vascular  Capillary refills: elevated  The right DP pulse is 1+  The right PT pulse is 1+  Left Foot/Ankle  Left Foot Inspection  Skin Exam: skin normal, skin intact and dry skin  No warmth, no erythema, no maceration, normal color, no pre-ulcer, no ulcer and no callus  Toe Exam: ROM and strength within normal limits  Sensory   Proprioception: intact  Monofilament testing: intact    Vascular  Capillary refills: elevated  The left DP pulse is 1+  The left PT pulse is 1+       Assign Risk Category  No deformity present  No loss of protective sensation  Weak pulses  Risk: 0      Results:  Lab Results Component Value Date    SODIUM 139 01/25/2022    K 3 9 01/25/2022     01/25/2022    CO2 28 01/25/2022    BUN 29 (H) 01/25/2022    CREATININE 1 45 (H) 01/25/2022    GLUC 127 01/16/2022    CALCIUM 9 7 01/25/2022       Lab Results   Component Value Date    HGBA1C 8 6 (A) 02/28/2022       Lab Results   Component Value Date    WBC 9 42 01/16/2022    HGB 13 4 01/16/2022    HCT 42 8 01/16/2022    MCV 94 01/16/2022     01/16/2022

## 2022-02-28 NOTE — PATIENT INSTRUCTIONS
Medicare Preventive Visit Patient Instructions  Thank you for completing your Welcome to Medicare Visit or Medicare Annual Wellness Visit today  Your next wellness visit will be due in one year (3/1/2023)  The screening/preventive services that you may require over the next 5-10 years are detailed below  Some tests may not apply to you based off risk factors and/or age  Screening tests ordered at today's visit but not completed yet may show as past due  Also, please note that scanned in results may not display below  Preventive Screenings:  Service Recommendations Previous Testing/Comments   Colorectal Cancer Screening  * Colonoscopy    * Fecal Occult Blood Test (FOBT)/Fecal Immunochemical Test (FIT)  * Fecal DNA/Cologuard Test  * Flexible Sigmoidoscopy Age: 54-65 years old   Colonoscopy: every 10 years (may be performed more frequently if at higher risk)  OR  FOBT/FIT: every 1 year  OR  Cologuard: every 3 years  OR  Sigmoidoscopy: every 5 years  Screening may be recommended earlier than age 48 if at higher risk for colorectal cancer  Also, an individualized decision between you and your healthcare provider will decide whether screening between the ages of 74-80 would be appropriate  Colonoscopy: 10/09/2014  FOBT/FIT: Not on file  Cologuard: Not on file  Sigmoidoscopy: Not on file          Breast Cancer Screening Age: 36 years old  Frequency: every 1-2 years  Not required if history of left and right mastectomy Mammogram: 09/15/2021    Screening Current   Cervical Cancer Screening Between the ages of 21-29, pap smear recommended once every 3 years  Between the ages of 33-67, can perform pap smear with HPV co-testing every 5 years     Recommendations may differ for women with a history of total hysterectomy, cervical cancer, or abnormal pap smears in past  Pap Smear: Not on file    Screening Not Indicated   Hepatitis C Screening Once for adults born between 1945 and 1965  More frequently in patients at high risk for Hepatitis C Hep C Antibody: Not on file        Diabetes Screening 1-2 times per year if you're at risk for diabetes or have pre-diabetes Fasting glucose: 66 mg/dL   A1C: 9 9 %    Screening Not Indicated  History Diabetes   Cholesterol Screening Once every 5 years if you don't have a lipid disorder  May order more often based on risk factors  Lipid panel: 01/25/2022    Screening Not Indicated  History Lipid Disorder     Other Preventive Screenings Covered by Medicare:  1  Abdominal Aortic Aneurysm (AAA) Screening: covered once if your at risk  You're considered to be at risk if you have a family history of AAA  2  Lung Cancer Screening: covers low dose CT scan once per year if you meet all of the following conditions: (1) Age 50-69; (2) No signs or symptoms of lung cancer; (3) Current smoker or have quit smoking within the last 15 years; (4) You have a tobacco smoking history of at least 30 pack years (packs per day multiplied by number of years you smoked); (5) You get a written order from a healthcare provider  3  Glaucoma Screening: covered annually if you're considered high risk: (1) You have diabetes OR (2) Family history of glaucoma OR (3)  aged 48 and older OR (3)  American aged 72 and older  3  Osteoporosis Screening: covered every 2 years if you meet one of the following conditions: (1) You're estrogen deficient and at risk for osteoporosis based off medical history and other findings; (2) Have a vertebral abnormality; (3) On glucocorticoid therapy for more than 3 months; (4) Have primary hyperparathyroidism; (5) On osteoporosis medications and need to assess response to drug therapy  · Last bone density test (DXA Scan): Not on file  5  HIV Screening: covered annually if you're between the age of 12-76  Also covered annually if you are younger than 13 and older than 72 with risk factors for HIV infection   For pregnant patients, it is covered up to 3 times per pregnancy  Immunizations:  Immunization Recommendations   Influenza Vaccine Annual influenza vaccination during flu season is recommended for all persons aged >= 6 months who do not have contraindications   Pneumococcal Vaccine (Prevnar and Pneumovax)  * Prevnar = PCV13  * Pneumovax = PPSV23   Adults 25-60 years old: 1-3 doses may be recommended based on certain risk factors  Adults 72 years old: Prevnar (PCV13) vaccine recommended followed by Pneumovax (PPSV23) vaccine  If already received PPSV23 since turning 65, then PCV13 recommended at least one year after PPSV23 dose  Hepatitis B Vaccine 3 dose series if at intermediate or high risk (ex: diabetes, end stage renal disease, liver disease)   Tetanus (Td) Vaccine - COST NOT COVERED BY MEDICARE PART B Following completion of primary series, a booster dose should be given every 10 years to maintain immunity against tetanus  Td may also be given as tetanus wound prophylaxis  Tdap Vaccine - COST NOT COVERED BY MEDICARE PART B Recommended at least once for all adults  For pregnant patients, recommended with each pregnancy  Shingles Vaccine (Shingrix) - COST NOT COVERED BY MEDICARE PART B  2 shot series recommended in those aged 48 and above     Health Maintenance Due:      Topic Date Due    Hepatitis C Screening  Never done    Breast Cancer Screening: Mammogram  09/15/2022     Immunizations Due:      Topic Date Due    DTaP,Tdap,and Td Vaccines (1 - Tdap) Never done    COVID-19 Vaccine (3 - Booster for Marveen Bunting series) 10/25/2021     Advance Directives   What are advance directives? Advance directives are legal documents that state your wishes and plans for medical care  These plans are made ahead of time in case you lose your ability to make decisions for yourself  Advance directives can apply to any medical decision, such as the treatments you want, and if you want to donate organs  What are the types of advance directives?   There are many types of advance directives, and each state has rules about how to use them  You may choose a combination of any of the following:  · Living will: This is a written record of the treatment you want  You can also choose which treatments you do not want, which to limit, and which to stop at a certain time  This includes surgery, medicine, IV fluid, and tube feedings  · Durable power of  for healthcare [place] [facility]): This is a written record that states who you want to make healthcare choices for you when you are unable to make them for yourself  This person, called a proxy, is usually a family member or a friend  You may choose more than 1 proxy  · Do not resuscitate (DNR) order:  A DNR order is used in case your heart stops beating or you stop breathing  It is a request not to have certain forms of treatment, such as CPR  A DNR order may be included in other types of advance directives  · Medical directive: This covers the care that you want if you are in a coma, near death, or unable to make decisions for yourself  You can list the treatments you want for each condition  Treatment may include pain medicine, surgery, blood transfusions, dialysis, IV or tube feedings, and a ventilator (breathing machine)  · Values history: This document has questions about your views, beliefs, and how you feel and think about life  This information can help others choose the care that you would choose  Why are advance directives important? An advance directive helps you control your care  Although spoken wishes may be used, it is better to have your wishes written down  Spoken wishes can be misunderstood, or not followed  Treatments may be given even if you do not want them  An advance directive may make it easier for your family to make difficult choices about your care     Weight Management   Why it is important to manage your weight:  Being overweight increases your risk of health conditions such as heart disease, high blood pressure, type 2 diabetes, and certain types of cancer  It can also increase your risk for osteoarthritis, sleep apnea, and other respiratory problems  Aim for a slow, steady weight loss  Even a small amount of weight loss can lower your risk of health problems  How to lose weight safely:  A safe and healthy way to lose weight is to eat fewer calories and get regular exercise  You can lose up about 1 pound a week by decreasing the number of calories you eat by 500 calories each day  Healthy meal plan for weight management:  A healthy meal plan includes a variety of foods, contains fewer calories, and helps you stay healthy  A healthy meal plan includes the following:  · Eat whole-grain foods more often  A healthy meal plan should contain fiber  Fiber is the part of grains, fruits, and vegetables that is not broken down by your body  Whole-grain foods are healthy and provide extra fiber in your diet  Some examples of whole-grain foods are whole-wheat breads and pastas, oatmeal, brown rice, and bulgur  · Eat a variety of vegetables every day  Include dark, leafy greens such as spinach, kale, campbell greens, and mustard greens  Eat yellow and orange vegetables such as carrots, sweet potatoes, and winter squash  · Eat a variety of fruits every day  Choose fresh or canned fruit (canned in its own juice or light syrup) instead of juice  Fruit juice has very little or no fiber  · Eat low-fat dairy foods  Drink fat-free (skim) milk or 1% milk  Eat fat-free yogurt and low-fat cottage cheese  Try low-fat cheeses such as mozzarella and other reduced-fat cheeses  · Choose meat and other protein foods that are low in fat  Choose beans or other legumes such as split peas or lentils  Choose fish, skinless poultry (chicken or turkey), or lean cuts of red meat (beef or pork)  Before you cook meat or poultry, cut off any visible fat  · Use less fat and oil  Try baking foods instead of frying them   Add less fat, such as margarine, sour cream, regular salad dressing and mayonnaise to foods  Eat fewer high-fat foods  Some examples of high-fat foods include french fries, doughnuts, ice cream, and cakes  · Eat fewer sweets  Limit foods and drinks that are high in sugar  This includes candy, cookies, regular soda, and sweetened drinks  Exercise:  Exercise at least 30 minutes per day on most days of the week  Some examples of exercise include walking, biking, dancing, and swimming  You can also fit in more physical activity by taking the stairs instead of the elevator or parking farther away from stores  Ask your healthcare provider about the best exercise plan for you  © Copyright [employer] 2018 Information is for End User's use only and may not be sold, redistributed or otherwise used for commercial purposes  All illustrations and images included in CareNotes® are the copyrighted property of [employer]  or [facility] Preventive Visit Patient Instructions  Thank you for completing your Welcome to Medicare Visit or Medicare Annual Wellness Visit today  Your next wellness visit will be due in one year (3/1/2023)  The screening/preventive services that you may require over the next 5-10 years are detailed below  Some tests may not apply to you based off risk factors and/or age  Screening tests ordered at today's visit but not completed yet may show as past due  Also, please note that scanned in results may not display below    Preventive Screenings:  Service Recommendations Previous Testing/Comments   Colorectal Cancer Screening  * Colonoscopy    * Fecal Occult Blood Test (FOBT)/Fecal Immunochemical Test (FIT)  * Fecal DNA/Cologuard Test  * Flexible Sigmoidoscopy Age: 54-65 years old   Colonoscopy: every 10 years (may be performed more frequently if at higher risk)  OR  FOBT/FIT: every 1 year  OR  Cologuard: every 3 years  OR  Sigmoidoscopy: every 5 years  Screening may be recommended earlier than age 50 if at higher risk for colorectal cancer  Also, an individualized decision between you and your healthcare provider will decide whether screening between the ages of 74-80 would be appropriate  Colonoscopy: 10/09/2014  FOBT/FIT: Not on file  Cologuard: Not on file  Sigmoidoscopy: Not on file    Screening Current     Breast Cancer Screening Age: 36 years old  Frequency: every 1-2 years  Not required if history of left and right mastectomy Mammogram: 09/15/2021    Screening Current   Cervical Cancer Screening Between the ages of 21-29, pap smear recommended once every 3 years  Between the ages of 33-67, can perform pap smear with HPV co-testing every 5 years  Recommendations may differ for women with a history of total hysterectomy, cervical cancer, or abnormal pap smears in past  Pap Smear: Not on file    Screening Not Indicated   Hepatitis C Screening Once for adults born between 1945 and 1965  More frequently in patients at high risk for Hepatitis C Hep C Antibody: Not on file    Screening Not Indicated   Diabetes Screening 1-2 times per year if you're at risk for diabetes or have pre-diabetes Fasting glucose: 66 mg/dL   A1C: 8 6    Screening Not Indicated  History Diabetes   Cholesterol Screening Once every 5 years if you don't have a lipid disorder  May order more often based on risk factors  Lipid panel: 01/25/2022    Screening Not Indicated  History Lipid Disorder     Other Preventive Screenings Covered by Medicare:  6  Abdominal Aortic Aneurysm (AAA) Screening: covered once if your at risk  You're considered to be at risk if you have a family history of AAA    7  Lung Cancer Screening: covers low dose CT scan once per year if you meet all of the following conditions: (1) Age 50-69; (2) No signs or symptoms of lung cancer; (3) Current smoker or have quit smoking within the last 15 years; (4) You have a tobacco smoking history of at least 30 pack years (packs per day multiplied by number of years you smoked); (5) You get a written order from a healthcare provider  8  Glaucoma Screening: covered annually if you're considered high risk: (1) You have diabetes OR (2) Family history of glaucoma OR (3)  aged 48 and older OR (3)  American aged 72 and older  5  Osteoporosis Screening: covered every 2 years if you meet one of the following conditions: (1) You're estrogen deficient and at risk for osteoporosis based off medical history and other findings; (2) Have a vertebral abnormality; (3) On glucocorticoid therapy for more than 3 months; (4) Have primary hyperparathyroidism; (5) On osteoporosis medications and need to assess response to drug therapy  · Last bone density test (DXA Scan): Not on file  10  HIV Screening: covered annually if you're between the age of 12-76  Also covered annually if you are younger than 13 and older than 72 with risk factors for HIV infection  For pregnant patients, it is covered up to 3 times per pregnancy  Immunizations:  Immunization Recommendations   Influenza Vaccine Annual influenza vaccination during flu season is recommended for all persons aged >= 6 months who do not have contraindications   Pneumococcal Vaccine (Prevnar and Pneumovax)  * Prevnar = PCV13  * Pneumovax = PPSV23   Adults 25-60 years old: 1-3 doses may be recommended based on certain risk factors  Adults 72 years old: Prevnar (PCV13) vaccine recommended followed by Pneumovax (PPSV23) vaccine  If already received PPSV23 since turning 65, then PCV13 recommended at least one year after PPSV23 dose  Hepatitis B Vaccine 3 dose series if at intermediate or high risk (ex: diabetes, end stage renal disease, liver disease)   Tetanus (Td) Vaccine - COST NOT COVERED BY MEDICARE PART B Following completion of primary series, a booster dose should be given every 10 years to maintain immunity against tetanus  Td may also be given as tetanus wound prophylaxis     Tdap Vaccine - COST NOT COVERED BY MEDICARE PART B Recommended at least once for all adults  For pregnant patients, recommended with each pregnancy  Shingles Vaccine (Shingrix) - COST NOT COVERED BY MEDICARE PART B  2 shot series recommended in those aged 48 and above     Health Maintenance Due:      Topic Date Due    Hepatitis C Screening  Never done    Breast Cancer Screening: Mammogram  09/15/2022     Immunizations Due:      Topic Date Due    DTaP,Tdap,and Td Vaccines (1 - Tdap) Never done    COVID-19 Vaccine (3 - Booster for Alverna Cartagena series) 10/25/2021     Advance Directives   What are advance directives? Advance directives are legal documents that state your wishes and plans for medical care  These plans are made ahead of time in case you lose your ability to make decisions for yourself  Advance directives can apply to any medical decision, such as the treatments you want, and if you want to donate organs  What are the types of advance directives? There are many types of advance directives, and each state has rules about how to use them  You may choose a combination of any of the following:  · Living will: This is a written record of the treatment you want  You can also choose which treatments you do not want, which to limit, and which to stop at a certain time  This includes surgery, medicine, IV fluid, and tube feedings  · Durable power of  for healthcare [facility] [facility]): This is a written record that states who you want to make healthcare choices for you when you are unable to make them for yourself  This person, called a proxy, is usually a family member or a friend  You may choose more than 1 proxy  · Do not resuscitate (DNR) order:  A DNR order is used in case your heart stops beating or you stop breathing  It is a request not to have certain forms of treatment, such as CPR  A DNR order may be included in other types of advance directives  · Medical directive:   This covers the care that you want if you are in a coma, near death, or unable to make decisions for yourself  You can list the treatments you want for each condition  Treatment may include pain medicine, surgery, blood transfusions, dialysis, IV or tube feedings, and a ventilator (breathing machine)  · Values history: This document has questions about your views, beliefs, and how you feel and think about life  This information can help others choose the care that you would choose  Why are advance directives important? An advance directive helps you control your care  Although spoken wishes may be used, it is better to have your wishes written down  Spoken wishes can be misunderstood, or not followed  Treatments may be given even if you do not want them  An advance directive may make it easier for your family to make difficult choices about your care  Weight Management   Why it is important to manage your weight:  Being overweight increases your risk of health conditions such as heart disease, high blood pressure, type 2 diabetes, and certain types of cancer  It can also increase your risk for osteoarthritis, sleep apnea, and other respiratory problems  Aim for a slow, steady weight loss  Even a small amount of weight loss can lower your risk of health problems  How to lose weight safely:  A safe and healthy way to lose weight is to eat fewer calories and get regular exercise  You can lose up about 1 pound a week by decreasing the number of calories you eat by 500 calories each day  Healthy meal plan for weight management:  A healthy meal plan includes a variety of foods, contains fewer calories, and helps you stay healthy  A healthy meal plan includes the following:  · Eat whole-grain foods more often  A healthy meal plan should contain fiber  Fiber is the part of grains, fruits, and vegetables that is not broken down by your body  Whole-grain foods are healthy and provide extra fiber in your diet   Some examples of whole-grain foods are whole-wheat breads and pastas, oatmeal, brown rice, and bulgur  · Eat a variety of vegetables every day  Include dark, leafy greens such as spinach, kale, campbell greens, and mustard greens  Eat yellow and orange vegetables such as carrots, sweet potatoes, and winter squash  · Eat a variety of fruits every day  Choose fresh or canned fruit (canned in its own juice or light syrup) instead of juice  Fruit juice has very little or no fiber  · Eat low-fat dairy foods  Drink fat-free (skim) milk or 1% milk  Eat fat-free yogurt and low-fat cottage cheese  Try low-fat cheeses such as mozzarella and other reduced-fat cheeses  · Choose meat and other protein foods that are low in fat  Choose beans or other legumes such as split peas or lentils  Choose fish, skinless poultry (chicken or turkey), or lean cuts of red meat (beef or pork)  Before you cook meat or poultry, cut off any visible fat  · Use less fat and oil  Try baking foods instead of frying them  Add less fat, such as margarine, sour cream, regular salad dressing and mayonnaise to foods  Eat fewer high-fat foods  Some examples of high-fat foods include french fries, doughnuts, ice cream, and cakes  · Eat fewer sweets  Limit foods and drinks that are high in sugar  This includes candy, cookies, regular soda, and sweetened drinks  Exercise:  Exercise at least 30 minutes per day on most days of the week  Some examples of exercise include walking, biking, dancing, and swimming  You can also fit in more physical activity by taking the stairs instead of the elevator or parking farther away from stores  Ask your healthcare provider about the best exercise plan for you  © Copyright [employer] 2018 Information is for End User's use only and may not be sold, redistributed or otherwise used for commercial purposes   All illustrations and images included in CareNotes® are the copyrighted property of [employer]  or [address] [place] [address]

## 2022-02-28 NOTE — PROGRESS NOTES
Assessment and Plan:     Problem List Items Addressed This Visit        Endocrine    Type 2 diabetes mellitus without complication, without long-term current use of insulin (HCC) - Primary    Relevant Medications    glipiZIDE (GLUCOTROL) 10 mg tablet    Other Relevant Orders    POCT hemoglobin A1c (Completed)    Hemoglobin A1C    Comprehensive metabolic panel    CBC and differential    Microalbumin / creatinine urine ratio    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 mellitus (HCC)    Relevant Medications    glipiZIDE (GLUCOTROL) 10 mg tablet    Other Relevant Orders    Hemoglobin A1C    Comprehensive metabolic panel    CBC and differential    Microalbumin / creatinine urine ratio    Acquired hypothyroidism       Cardiovascular and Mediastinum    Essential hypertension      Other Visit Diagnoses     Medicare annual wellness visit, subsequent        Right leg pain        Relevant Orders    Ambulatory Referral to Physical Therapy    Chronic right-sided low back pain with right-sided sciatica        Relevant Orders    Ambulatory Referral to Physical Therapy    Asymptomatic postmenopausal state        Relevant Orders    DXA bone density spine hip and pelvis          BMI Counseling: Body mass index is 33 98 kg/m²  The BMI is above normal  Nutrition recommendations include decreasing portion sizes, encouraging healthy choices of fruits and vegetables, consuming healthier snacks and moderation in carbohydrate intake  Exercise recommendations include moderate physical activity 150 minutes/week  Rationale for BMI follow-up plan is due to patient being overweight or obese  Depression Screening and Follow-up Plan: Patient was screened for depression during today's encounter  They screened negative with a PHQ-2 score of 0  Preventive health issues were discussed with patient, and age appropriate screening tests were ordered as noted in patient's After Visit Summary    Personalized health advice and appropriate referrals for health education or preventive services given if needed, as noted in patient's After Visit Summary  History of Present Illness:     Patient presents for Medicare Annual Wellness visit    Patient Care Team:  Marie Meredith MD as PCP - General (Internal Medicine)     Problem List:     Patient Active Problem List   Diagnosis    Type 2 diabetes mellitus without complication, without long-term current use of insulin ([facility] )    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 mellitus ([facility] )    Essential hypertension    Urinary tract infection without hematuria    Mixed hyperlipidemia    Acquired hypothyroidism    Severe obesity (BMI 35 0-39  9) with comorbidity [facility])      Past Medical and Surgical History:     Past Medical History:   Diagnosis Date    Benign essential hypertension     Chronic kidney disease, stage 2 (mild)     Chronic kidney disease, stage 3 (HCC)     Disorder of gallbladder     Disorder of skin and subcutaneous tissue     Dyspnea     Essential hypertension     Hyperlipidemia     Hypokalemia     Hypothyroidism     Malaise and fatigue     Mixed hyperlipidemia     Morbid obesity (HCC)     Pernicious anemia     Type 2 diabetes mellitus ([address] )      Past Surgical History:   Procedure Laterality Date    BREAST BIOPSY Right     CARPAL TUNNEL RELEASE Bilateral     CHOLECYSTECTOMY      COLONOSCOPY      Dr Jaleel Tran HYSTERECTOMY      PARTIAL HYSTERECTOMY      SKIN LESION EXCISION      scalp       Family History:     Family History   Problem Relation Age of Onset    Stroke Mother     Atrial fibrillation Mother     Brain cancer Father     Other Brother         Twin brothers - Open heart    Other Brother         Twin brothers - Open heart    No Known Problems Maternal Grandmother     No Known Problems Paternal Grandmother       Social History:     Social History     Socioeconomic History    Marital status: /Civil Union     Spouse name: Carolindidarien Niece Number of children: None    Years of education: None    Highest education level: None   Occupational History    Occupation: Retired    Tobacco Use    Smoking status: Former Smoker     Packs/day: 1 00     Years: 14 00     Pack years: 14 00     Types: Cigarettes     Quit date:      Years since quittin 1    Smokeless tobacco: Never Used   Vaping Use    Vaping Use: Never used   Substance and Sexual Activity    Alcohol use: Yes     Comment: Occasionally     Drug use: Never     Comment: Denies drug use - As per [address] [place]     Sexual activity: None   Other Topics Concern    None   Social History Narrative     - Mikey Orta is [place] and speaks Maldivian, common law marriage    Does not consume caffeine      Social Determinants of Health     Financial Resource Strain: Not on file   Food Insecurity: Not on file   Transportation Needs: Not on file   Physical Activity: Not on file   Stress: Not on file   Social Connections: Not on file   Intimate Partner Violence: Not on file   Housing Stability: Not on file      Medications and Allergies:     Current Outpatient Medications   Medication Sig Dispense Refill    amLODIPine (NORVASC) 5 mg tablet Take 1 tablet (5 mg total) by mouth daily 90 tablet 3    aspirin (ECOTRIN LOW STRENGTH) 81 mg EC tablet Take 81 mg by mouth daily      cholecalciferol (VITAMIN D3) 1,000 units tablet Take 800 Units by mouth daily        folic acid (FOLVITE) 663 mcg tablet Take 400 mcg by mouth daily      glipiZIDE (GLUCOTROL) 10 mg tablet Take 1 tablet (10 mg total) by mouth 2 (two) times a day before meals 180 tablet 1    glucose monitoring kit (FREESTYLE) monitoring kit 1 each by Does not apply route daily 1 each 0    insulin glargine (Lantus SoloStar) 100 units/mL injection pen Inject 50 Units under the skin daily at bedtime 15 mL 3    Insulin Pen Needle 32G X 6 MM MISC Use in the morning 100 each 5    Lancets (freestyle) lancets Use as instructed -glucose monitor  each 5    levothyroxine 50 mcg tablet Take 1 tablet (50 mcg total) by mouth daily 90 tablet 3    lisinopril (ZESTRIL) 20 mg tablet Take 1 tablet (20 mg total) by mouth daily 90 tablet 3    metoprolol succinate (TOPROL-XL) 50 mg 24 hr tablet take 1 tablet by mouth once daily 90 tablet 5    omega-3-acid ethyl esters (LOVAZA) 1 g capsule Take 2 g by mouth 2 (two) times a day      Psyllium (FIBER) 0 52 g CAPS Take by mouth      rosuvastatin (CRESTOR) 10 MG tablet Take 1 tablet (10 mg total) by mouth daily 90 tablet 1     No current facility-administered medications for this visit  No Known Allergies   Immunizations:     Immunization History   Administered Date(s) Administered    COVID-19 MODERNA VACC 0 5 ML IM 04/27/2021, 05/25/2021    INFLUENZA 10/06/2014, 09/23/2015, 09/07/2016, 09/12/2017, 09/26/2018, 10/08/2020    Influenza, high dose seasonal 0 7 mL 10/08/2020, 10/07/2021    Pneumococcal Polysaccharide PPV23 10/06/2014    influenza, trivalent, adjuvanted 09/16/2019      Health Maintenance:         Topic Date Due    Hepatitis C Screening  Never done    Breast Cancer Screening: Mammogram  09/15/2022         Topic Date Due    DTaP,Tdap,and Td Vaccines (1 - Tdap) Never done    COVID-19 Vaccine (3 - Booster for Moderna series) 10/25/2021      Medicare Health Risk Assessment:     /80   Pulse 75   Temp 97 5 °F (36 4 °C)   Resp 18   Ht 5' 2" (1 575 m)   Wt 84 3 kg (185 lb 12 8 oz)   SpO2 98%   BMI 33 98 kg/m²      Noreen Reid is here for her Subsequent Wellness visit  Last Medicare Wellness visit information reviewed, patient interviewed and updates made to the record today  Health Risk Assessment:   Patient rates overall health as good  Patient feels that their physical health rating is slightly worse  Patient is very satisfied with their life  Eyesight was rated as same  Hearing was rated as same  Patient feels that their emotional and mental health rating is same  Patients states they are never, rarely angry   Patient states they are sometimes unusually tired/fatigued  Pain experienced in the last 7 days has been some  Patient's pain rating has been 5/10  Patient states that she has experienced weight loss or gain in last 6 months  Depression Screening:   PHQ-2 Score: 0      Fall Risk Screening: In the past year, patient has experienced: no history of falling in past year      Urinary Incontinence Screening:   Patient has not leaked urine accidently in the last six months  Home Safety:  Patient does not have trouble with stairs inside or outside of their home  Patient has working smoke alarms and has working carbon monoxide detector  Home safety hazards include: none  Nutrition:   Current diet is Diabetic  Medications:   Patient is currently taking over-the-counter supplements  OTC medications include: tylenol extra strength  Patient is able to manage medications  Activities of Daily Living (ADLs)/Instrumental Activities of Daily Living (IADLs):   Walk and transfer into and out of bed and chair?: Yes  Dress and groom yourself?: Yes    Bathe or shower yourself?: Yes    Feed yourself?  Yes  Do your laundry/housekeeping?: Yes  Manage your money, pay your bills and track your expenses?: Yes  Make your own meals?: Yes    Do your own shopping?: Yes    Previous Hospitalizations:   Any hospitalizations or ED visits within the last 12 months?: Yes    How many hospitalizations have you had in the last year?: 1-2    Advance Care Planning:   Living will: Yes    Durable POA for healthcare: No    Advanced directive: Yes      Cognitive Screening:   Provider or family/friend/caregiver concerned regarding cognition?: No    PREVENTIVE SCREENINGS      Cardiovascular Screening:    General: Screening Not Indicated and History Lipid Disorder      Diabetes Screening:     General: Screening Not Indicated and History Diabetes      Colorectal Cancer Screening:     General: Screening Current      Breast Cancer Screening:     General: Screening Current      Cervical Cancer Screening:    General: Screening Not Indicated      Osteoporosis Screening:    General: Risks and Benefits Discussed      Abdominal Aortic Aneurysm (AAA) Screening:        General: Screening Not Indicated      Lung Cancer Screening:     General: Screening Not Indicated      Hepatitis C Screening:    General: Screening Not Indicated    Screening, Brief Intervention, and Referral to Treatment (SBIRT)    Screening  Typical number of drinks in a day: 0  Typical number of drinks in a week: 0  Interpretation: Low risk drinking behavior  Single Item Drug Screening:  How often have you used an illegal drug (including marijuana) or a prescription medication for non-medical reasons in the past year? never    Single Item Drug Screen Score: 0  Interpretation: Negative screen for possible drug use disorder    Brief Intervention  Alcohol & drug use screenings were reviewed  No concerns regarding substance use disorder identified  Other Counseling Topics:   Car/seat belt/driving safety, skin self-exam, sunscreen and calcium and vitamin D intake and regular weightbearing exercise         Royer Cerda MD

## 2022-03-04 ENCOUNTER — TELEPHONE (OUTPATIENT)
Dept: FAMILY MEDICINE CLINIC | Facility: CLINIC | Age: 77
End: 2022-03-04

## 2022-03-04 ENCOUNTER — OFFICE VISIT (OUTPATIENT)
Dept: OBGYN CLINIC | Facility: CLINIC | Age: 77
End: 2022-03-04
Payer: MEDICARE

## 2022-03-04 VITALS
HEART RATE: 82 BPM | BODY MASS INDEX: 34.04 KG/M2 | DIASTOLIC BLOOD PRESSURE: 69 MMHG | WEIGHT: 185 LBS | HEIGHT: 62 IN | SYSTOLIC BLOOD PRESSURE: 146 MMHG

## 2022-03-04 DIAGNOSIS — M54.41 CHRONIC RIGHT-SIDED LOW BACK PAIN WITH RIGHT-SIDED SCIATICA: ICD-10-CM

## 2022-03-04 DIAGNOSIS — M79.604 RIGHT LEG PAIN: Primary | ICD-10-CM

## 2022-03-04 DIAGNOSIS — G89.29 CHRONIC RIGHT-SIDED LOW BACK PAIN WITH RIGHT-SIDED SCIATICA: ICD-10-CM

## 2022-03-04 DIAGNOSIS — M17.11 PRIMARY OSTEOARTHRITIS OF RIGHT KNEE: Primary | ICD-10-CM

## 2022-03-04 PROCEDURE — 99213 OFFICE O/P EST LOW 20 MIN: CPT | Performed by: ORTHOPAEDIC SURGERY

## 2022-03-04 NOTE — PROGRESS NOTES
ASSESSMENT/PLAN:    Diagnoses and all orders for this visit:    Primary osteoarthritis of right knee  -     Injection Procedure Prior Authorization; Future        The patient is still complaining of right knee discomfort  Possible treatment options were discussed with the patient  We will get her approved for viscosupplementation  The patient will follow up with our office once she gets approval   She is acceptable to this plan  Return for Viscosupplementation  The patient is still having arthritic discomfort along her right knee  The corticosteroid injection lasted a short period of time  Would recommend trying viscosupplementation before any further invasive treatment will be discussed  Return back once this is approved  It has any further problems, she will not hesitate let us know      _____________________________________________________  CHIEF COMPLAINT:  Chief Complaint   Patient presents with    Right Knee - Follow-up         SUBJECTIVE:  Tita Boucher is a 68 y o  female who presents to our office for follow-up visit  Last visit, she was given a corticosteroid injection for her right knee  Patient has a history of primary osteoarthritis of her right knee  She states the injection did help for a very short amount of time  She denies any fever or chills  She denies any new falls or trauma        The following portions of the patient's history were reviewed and updated as appropriate: allergies, current medications, past family history, past medical history, past social history, past surgical history and problem list     PAST MEDICAL HISTORY:  Past Medical History:   Diagnosis Date    Benign essential hypertension     Chronic kidney disease, stage 2 (mild)     Chronic kidney disease, stage 3 ([address] )     Disorder of gallbladder     Disorder of skin and subcutaneous tissue     Dyspnea     Essential hypertension     Hyperlipidemia     Hypokalemia     Hypothyroidism     Malaise and fatigue     Mixed hyperlipidemia     Morbid obesity (HCC)     Pernicious anemia     Type 2 diabetes mellitus (HCC)        PAST SURGICAL HISTORY:  Past Surgical History:   Procedure Laterality Date    BREAST BIOPSY Right     CARPAL TUNNEL RELEASE Bilateral    Reesa Hilding COLONOSCOPY      Dr Violeta Grey HYSTERECTOMY      PARTIAL HYSTERECTOMY      SKIN LESION EXCISION      scalp        FAMILY HISTORY:  Family History   Problem Relation Age of Onset    Stroke Mother     Atrial fibrillation Mother     Brain cancer Father     Other Brother         Twin brothers - Open heart    Other Brother         Twin brothers - Open heart    No Known Problems Maternal Grandmother     No Known Problems Paternal Grandmother        SOCIAL HISTORY:  Social History     Tobacco Use    Smoking status: Former Smoker     Packs/day: 1 00     Years: 14 00     Pack years: 14 00     Types: Cigarettes     Quit date:      Years since quittin 2    Smokeless tobacco: Never Used   Vaping Use    Vaping Use: Never used   Substance Use Topics    Alcohol use: Yes     Comment: Occasionally     Drug use: Never     Comment: Denies drug use - As per [facility]        MEDICATIONS:    Current Outpatient Medications:     amLODIPine (NORVASC) 5 mg tablet, Take 1 tablet (5 mg total) by mouth daily, Disp: 90 tablet, Rfl: 3    aspirin (ECOTRIN LOW STRENGTH) 81 mg EC tablet, Take 81 mg by mouth daily, Disp: , Rfl:     cholecalciferol (VITAMIN D3) 1,000 units tablet, Take 800 Units by mouth daily  , Disp: , Rfl:     folic acid (FOLVITE) 153 mcg tablet, Take 400 mcg by mouth daily, Disp: , Rfl:     glipiZIDE (GLUCOTROL) 10 mg tablet, Take 1 tablet (10 mg total) by mouth 2 (two) times a day before meals, Disp: 180 tablet, Rfl: 1    glucose monitoring kit (FREESTYLE) monitoring kit, 1 each by Does not apply route daily, Disp: 1 each, Rfl: 0    insulin glargine (Lantus SoloStar) 100 units/mL injection pen, Inject 50 Units under the skin daily at bedtime, Disp: 15 mL, Rfl: 3    Insulin Pen Needle 32G X 6 MM MISC, Use in the morning, Disp: 100 each, Rfl: 5    Lancets (freestyle) lancets, Use as instructed -glucose monitor BID, Disp: 100 each, Rfl: 5    levothyroxine 50 mcg tablet, Take 1 tablet (50 mcg total) by mouth daily, Disp: 90 tablet, Rfl: 3    lisinopril (ZESTRIL) 20 mg tablet, Take 1 tablet (20 mg total) by mouth daily, Disp: 90 tablet, Rfl: 3    metoprolol succinate (TOPROL-XL) 50 mg 24 hr tablet, take 1 tablet by mouth once daily, Disp: 90 tablet, Rfl: 5    omega-3-acid ethyl esters (LOVAZA) 1 g capsule, Take 2 g by mouth 2 (two) times a day, Disp: , Rfl:     Psyllium (FIBER) 0 52 g CAPS, Take by mouth, Disp: , Rfl:     rosuvastatin (CRESTOR) 10 MG tablet, Take 1 tablet (10 mg total) by mouth daily, Disp: 90 tablet, Rfl: 1    ALLERGIES:  No Known Allergies    ROS:  Review of Systems     Constitutional: Negative for fatigue, fever or loss of appetite  HENT: Negative  Respiratory: Negative for shortness of breath, dyspnea  Cardiovascular: Negative for chest pain/tightness  Gastrointestinal: Negative for abdominal pain, N/V  Endocrine: Negative for cold/heat intolerance, unexplained weight loss/gain  Genitourinary: Negative for flank pain, dysuria, hematuria  Musculoskeletal: Positive for arthralgia   Skin: Negative for rash  Neurological: Negative for numbness or tingling  Psychiatric/Behavioral: Negative for agitation  _____________________________________________________  PHYSICAL EXAMINATION:    Blood pressure 146/69, pulse 82, height 5' 2" (1 575 m), weight 83 9 kg (185 lb)  Constitutional: Oriented to person, place, and time  Appears well-developed and well-nourished  No distress  HENT:   Head: Normocephalic  Eyes: Conjunctivae are normal  Right eye exhibits no discharge  Left eye exhibits no discharge  No scleral icterus  Cardiovascular: Normal rate      Pulmonary/Chest: Effort normal  Neurological: Alert and oriented to person, place, and time  Skin: Skin is warm and dry  No rash noted  Not diaphoretic  No erythema  No pallor  Psychiatric: Normal mood and affect  Behavior is normal  Judgment and thought content normal       MUSCULOSKELETAL EXAMINATION:   Physical Exam  Ortho Exam    Right lower extremity is neurovascularly intact  Toes are pink and mobile   Compartments are soft  No warmth, erythema or ecchymosis  ROM of knee is from 5-115 degrees  Negative Lachman, drawer or pivot shift  No medial instability  Medial joint line tenderness, slight lateral joint line tenderness  Patellofemoral crepitation  Objective:  BP Readings from Last 1 Encounters:   03/04/22 146/69      Wt Readings from Last 1 Encounters:   03/04/22 83 9 kg (185 lb)        BMI:   Estimated body mass index is 33 84 kg/m² as calculated from the following:    Height as of this encounter: 5' 2" (1 575 m)  Weight as of this encounter: 83 9 kg (185 lb)            Ilan Barrios PA-C

## 2022-03-04 NOTE — TELEPHONE ENCOUNTER
Patient called briefly spoke with Dr Clotilde Valdez at her last appointment 02/28/2022 about possibly ordering a MRI of patients Back to rule out a reason for her leg pain  Patient would like to go forward with the MRI for as soon as possible   Please order and call patient and let her know [phone] Thank You

## 2022-03-15 ENCOUNTER — HOSPITAL ENCOUNTER (OUTPATIENT)
Dept: MRI IMAGING | Facility: HOSPITAL | Age: 77
Discharge: HOME | End: 2022-03-15
Attending: INTERNAL MEDICINE
Payer: MEDICARE

## 2022-03-15 DIAGNOSIS — G89.29 CHRONIC RIGHT-SIDED LOW BACK PAIN WITH RIGHT-SIDED SCIATICA: ICD-10-CM

## 2022-03-15 DIAGNOSIS — M54.41 CHRONIC RIGHT-SIDED LOW BACK PAIN WITH RIGHT-SIDED SCIATICA: ICD-10-CM

## 2022-03-15 PROCEDURE — 72148 MRI LUMBAR SPINE W/O DYE: CPT

## 2022-03-15 PROCEDURE — G1004 CDSM NDSC: HCPCS

## 2022-04-11 DIAGNOSIS — E11.22 TYPE 2 DIABETES MELLITUS WITH STAGE 3A CHRONIC KIDNEY DISEASE, WITH LONG-TERM CURRENT USE OF INSULIN (HCC): ICD-10-CM

## 2022-04-11 DIAGNOSIS — N18.31 TYPE 2 DIABETES MELLITUS WITH STAGE 3A CHRONIC KIDNEY DISEASE, WITH LONG-TERM CURRENT USE OF INSULIN (HCC): ICD-10-CM

## 2022-04-11 DIAGNOSIS — Z79.4 TYPE 2 DIABETES MELLITUS WITH STAGE 3A CHRONIC KIDNEY DISEASE, WITH LONG-TERM CURRENT USE OF INSULIN (HCC): ICD-10-CM

## 2022-04-11 RX ORDER — INSULIN GLARGINE 100 [IU]/ML
50 INJECTION, SOLUTION SUBCUTANEOUS
Qty: 15 ML | Refills: 3 | Status: SHIPPED | OUTPATIENT
Start: 2022-04-11

## 2022-04-11 NOTE — TELEPHONE ENCOUNTER
Patient requesting refill(s) of: Lantus Solostar 100 IU/mL inject 50 IU daily     Last filled: 9/7/2021 #15 mL x 3  Last appt:  Next appt:  Pharmacy: [facility]

## 2022-04-15 DIAGNOSIS — E78.2 MIXED HYPERLIPIDEMIA: ICD-10-CM

## 2022-04-15 RX ORDER — ROSUVASTATIN CALCIUM 10 MG/1
10 TABLET, COATED ORAL DAILY
Qty: 90 TABLET | Refills: 3 | Status: SHIPPED | OUTPATIENT
Start: 2022-04-15

## 2022-04-15 NOTE — TELEPHONE ENCOUNTER
Patient called with concerns of BS in the AM      Wednesday 101  Thursday 89  Friday 69 @ 11am    Patient ate left over spaghetti about 45 minutes after waking  BS is now 135  Spoke to Emery, she would like patient to continue to keep track of BS, keep a log  If BS is staying in 100 range, patient should take 1/2 tablet of of glipizide rather than whole and make sure it eat when taking medication  Made an appt with Dr Haley Shore for Monday at 8:40 to discuss

## 2022-04-18 ENCOUNTER — OFFICE VISIT (OUTPATIENT)
Dept: FAMILY MEDICINE CLINIC | Facility: CLINIC | Age: 77
End: 2022-04-18
Payer: MEDICARE

## 2022-04-18 VITALS
HEIGHT: 62 IN | BODY MASS INDEX: 34.85 KG/M2 | SYSTOLIC BLOOD PRESSURE: 122 MMHG | DIASTOLIC BLOOD PRESSURE: 82 MMHG | TEMPERATURE: 97.3 F | HEART RATE: 72 BPM | WEIGHT: 189.4 LBS | RESPIRATION RATE: 18 BRPM | OXYGEN SATURATION: 98 %

## 2022-04-18 DIAGNOSIS — E11.9 TYPE 2 DIABETES MELLITUS WITHOUT COMPLICATION, WITHOUT LONG-TERM CURRENT USE OF INSULIN (HCC): Primary | ICD-10-CM

## 2022-04-18 DIAGNOSIS — M51.26 LUMBAR DISC HERNIATION: ICD-10-CM

## 2022-04-18 PROCEDURE — 99214 OFFICE O/P EST MOD 30 MIN: CPT | Performed by: INTERNAL MEDICINE

## 2022-04-18 RX ORDER — GLIPIZIDE 10 MG/1
5 TABLET ORAL
Qty: 180 TABLET | Refills: 1
Start: 2022-04-18 | End: 2022-05-31 | Stop reason: SDUPTHER

## 2022-04-18 NOTE — PATIENT INSTRUCTIONS
Continue glipizide 1/2 tablet twice daily, call if you have any low sugar < 70  Continue insulin 50 units at bedtime  Hypoglycemia in a Person with Diabetes   WHAT YOU NEED TO KNOW:   What is hypoglycemia? Hypoglycemia is a serious condition that happens when your blood glucose (sugar) level drops too low  The blood sugar level is usually too high in a person with diabetes, but the level can also drop too low  It is important to follow your diabetes management plan to keep your blood sugar level steady  What increases my risk for hypoglycemia? · Binge eating, eating large amounts of carbohydrates in processed foods such as potato chips    · A missed meal, or a meal eaten later than usual    · Vomiting    · Certain medicines, including insulin or other diabetes medicine    · More exercise than usual, without extra food    · Alcohol use    · Pregnancy, older age    · Decreased liver or kidney function    · Not knowing your symptoms are symptoms of hypoglycemia    What are the signs and symptoms of hypoglycemia? · Headache, hunger, or shakiness    · Trouble thinking or moodiness    · Sweating, or a pounding heartbeat    · Forgetfulness, confusion, or double vision    · Weakness or trouble walking    · Numbness and tingling around your mouth    · Seizures, coma, or loss of consciousness    How do I manage hypoglycemia? · Check your blood sugar level right away if you have symptoms of hypoglycemia  Hypoglycemia usually happens when your blood sugar level is 70 mg/dL or below  Ask your diabetes care team what blood sugar level is too low for you  · If your blood sugar level is too low, eat or drink 15 grams of fast-acting carbohydrate  Examples of this amount of fast-acting carbohydrate are 4 ounces (½ cup) of fruit juice or 4 ounces of regular soda  Other examples are 2 tablespoons of raisins or 1 tube of glucose gel  Check your blood sugar level 15 minutes later  Sit still as you wait   If the level is still low (less than 100 mg/dL), have another 15 grams of carbohydrate  When the level returns to 100 mg/dL, eat a meal if it is time  If your meal time is more than 1 hour away, eat a snack  The snack should contain carbohydrates, such as the following:    ? 3/4 cup of cereal    ? 1 cup of skim or low fat milk    ? 6 soda crackers    ? 1/2 of a turkey sandwich    ? 15 fat-free chips  This will help prevent another drop in blood sugar  Always carefully follow your diabetes care team's instructions on how to treat low blood sugar levels  · Always carry a source of fast-acting carbohydrate  If you have symptoms of hypoglycemia and you do not have a blood glucose meter, have a source of fast-acting carbohydrate anyway  Avoid carbohydrate foods that are high in fat  The fat content may make the carbohydrate take longer to increase your blood sugar level  Ask your diabetes care team if you should carry a glucagon kit  Glucagon is a medicine that is injected when you develop severe hypoglycemia and become unconscious  Check the expiration date every month and replace it before it expires  · Teach others how to help you if you have symptoms of hypoglycemia  Tell them about the symptoms of hypoglycemia  Ask them to give you a source of fast-acting carbohydrate if you cannot get it yourself  Ask them to give you a glucagon injection if you have signs of hypoglycemia and you become unconscious or have a seizure  Ask them to call the local emergency number (911 in the [address] [place] [address],[address])   This is an emergency  Tell them never to try to make you swallow anything if you faint or have a seizure  · Wear medical alert jewelry  or carry a card that says you have diabetes  Ask where to get these items  How do I prevent hypoglycemia? · Take diabetes medicine as directed  Take your medicine at the right time and in the right amount   Do not  double the amount of medicine you take unless instructed by your diabetes care team  You may need oral diabetes medicine, insulin, or both to help control your blood sugar levels  Your healthcare provider will teach you how and when to take oral diabetes medicine  You will also be taught about side effects oral diabetes medicine can cause  Insulin may be added if oral diabetes medicine becomes less effective over time  Insulin may be injected, or given through a pump or pen  You and your care team will discuss which method is best for you  ? An insulin pump  is an implanted device that gives your insulin 24 hours a day  An insulin pump prevents the need for multiple insulin injections in a day  ? An insulin pen  is a device prefilled with the right amount of insulin  · Eat meals and snacks as directed  Talk to your dietitian or diabetes care team about a meal plan that is right for you  Do not skip meals  · Check your blood sugar level as directed  Ask your diabetes care team what your blood sugar levels should be before and after you eat  Ask when and how often to check your blood sugar level  You may need to check a drop of blood in a glucose test machine  You may need to check at least 3 times each day  Record your blood sugar level results and take the record with you when you see your care team  They may use it to make changes to your medicine, food, or exercise schedules  Your care team provider may recommend a continuous glucose monitor (CGM)  A CGM is a device that is worn at all times  The CGM checks your blood sugar every 5 minutes  It sends results to an electronic device such as a smart phone  · Check your blood sugar level before you exercise  Physical activity, such as exercise, can decrease your blood sugar level  If your blood sugar level is less than 100 mg/dL, have a carbohydrate snack  Examples are 4 to 6 crackers, ½ banana, 8 ounces (1 cup) of nonfat or 1% milk, or 4 ounces (½ cup) of juice   If you will be active for more than 1 hour, you may need to check your blood sugar level every 30 minutes  Your diabetes care team may also recommend that you check your blood sugar level after your activity  · Know the risks if you choose to drink alcohol  Alcohol can cause your blood sugar levels to be low if you use insulin  Alcohol can cause high blood sugar levels and weight gain if you drink too much  Women 21 years or older and men 72 years or older should limit alcohol to 1 drink a day  Men aged 24 to 59 years should limit alcohol to 2 drinks a day  A drink of alcohol is 12 ounces of beer, 5 ounces of wine, or 1½ ounces of liquor  Have someone call your local emergency number (911 in the [address]) if:   · You have a seizure or pass out  · Your blood sugar is less than 50 mg/dL and does not respond to treatment  · You feel you are going to pass out  · You have trouble thinking clearly  When should I call my doctor or diabetes care team?   · You have had symptoms of low blood sugar several times  · You have questions about the amount of insulin or diabetes medicine you are taking  · You have questions or concerns about your condition or care  CARE AGREEMENT:   You have the right to help plan your care  Learn about your health condition and how it may be treated  Discuss treatment options with your healthcare providers to decide what care you want to receive  You always have the right to refuse treatment  The above information is an  only  It is not intended as medical advice for individual conditions or treatments  Talk to your doctor, nurse or pharmacist before following any medical regimen to see if it is safe and effective for you  © Copyright [employer] 2022 Information is for End User's use only and may not be sold, redistributed or otherwise used for commercial purposes   All illustrations and images included in CareNotes® are the copyrighted property of A D A M , Inc  or [facility] [employer]

## 2022-04-18 NOTE — PROGRESS NOTES
[facility]        NAME: Wilhemena Habermann is a 68 y o  female  : 1945    MRN: [id number]  DATE: 2022  TIME: 9:39 AM    Assessment and Plan   1  Type 2 diabetes mellitus without complication, without long-term current use of insulin ([facility])  -reduce glipizide to 5mg BID meals, advised not to take if not eating, continue basal insulin 50 units QHS  Has follow up and A1c check in 1 month  She will call if fasting hypoglycemia occurs (FBG < 70) of if BG consistently > 200s  -     glipiZIDE (GLUCOTROL) 10 mg tablet; Take 0 5 tablets (5 mg total) by mouth 2 (two) times a day before meals    2  Lumbar disc herniation  - MRI showed right disc herniation impinging on L3 nerve root, which does correspond to her lateral thigh and right knee pain  Strength is intact, she does not have symptoms of cord compression  Continue conservative management with chiropractor and/or PT  Follow up in May            Chief Complaint     Chief Complaint   Patient presents with    Diabetes     states she is getting low BG readings  provided sugar log  taking before breakfast and before dinner         History of Present Illness       Here for follow up diabetes  Had low BG 69 Friday morning, felt lightheaded and diaphoretic  Ate 1/2 cup spaghetti and apple juice and symptoms resolved, BG improved to 120s  Felt well throughout the day  Called here Friday and instructed to reduce glipizide to 1/2 tab (5mg) twice daily  Since then her FBG have improved - , pre-dinner 150-170s  She is feeling much better  Still using lantus 50 units at bedtime  Has been eating less, trying to lose weight  Still having right knee pain, but leg numbness has improved with seeing chiropractor  She did have steroid injection of right knee with Dr Anderson Mcmanus but did not get much relief with this  Cancelled additional injections         Review of Systems   Review of Systems   Constitutional: Negative for appetite change, fatigue, fever and unexpected weight change  Respiratory: Negative for cough, chest tightness and shortness of breath  Cardiovascular: Negative for chest pain, palpitations and leg swelling  Gastrointestinal: Negative for abdominal pain, constipation, diarrhea and vomiting  Genitourinary: Negative for decreased urine volume, difficulty urinating and urgency  Musculoskeletal: Positive for arthralgias and back pain  Neurological: Negative for weakness and numbness           Current Medications       Current Outpatient Medications:     amLODIPine (NORVASC) 5 mg tablet, Take 1 tablet (5 mg total) by mouth daily, Disp: 90 tablet, Rfl: 3    aspirin (ECOTRIN LOW STRENGTH) 81 mg EC tablet, Take 81 mg by mouth daily, Disp: , Rfl:     cholecalciferol (VITAMIN D3) 1,000 units tablet, Take 800 Units by mouth daily  , Disp: , Rfl:     folic acid (FOLVITE) 119 mcg tablet, Take 400 mcg by mouth daily, Disp: , Rfl:     glipiZIDE (GLUCOTROL) 10 mg tablet, Take 0 5 tablets (5 mg total) by mouth 2 (two) times a day before meals, Disp: 180 tablet, Rfl: 1    glucose monitoring kit (FREESTYLE) monitoring kit, 1 each by Does not apply route daily, Disp: 1 each, Rfl: 0    insulin glargine (Lantus SoloStar) 100 units/mL injection pen, Inject 50 Units under the skin daily at bedtime, Disp: 15 mL, Rfl: 3    Insulin Pen Needle 32G X 6 MM MISC, Use in the morning, Disp: 100 each, Rfl: 5    Lancets (freestyle) lancets, Use as instructed -glucose monitor BID, Disp: 100 each, Rfl: 5    levothyroxine 50 mcg tablet, Take 1 tablet (50 mcg total) by mouth daily, Disp: 90 tablet, Rfl: 3    lisinopril (ZESTRIL) 20 mg tablet, Take 1 tablet (20 mg total) by mouth daily, Disp: 90 tablet, Rfl: 3    metoprolol succinate (TOPROL-XL) 50 mg 24 hr tablet, take 1 tablet by mouth once daily, Disp: 90 tablet, Rfl: 5    omega-3-acid ethyl esters (LOVAZA) 1 g capsule, Take 2 g by mouth 2 (two) times a day, Disp: , Rfl:     Psyllium (FIBER) 0 52 g CAPS, Take by mouth, Disp: , Rfl:     rosuvastatin (CRESTOR) 10 MG tablet, Take 1 tablet (10 mg total) by mouth daily, Disp: 90 tablet, Rfl: 3    Current Allergies     Allergies as of 04/18/2022    (No Known Allergies)            The following portions of the patient's history were reviewed and updated as appropriate: allergies, current medications, past family history, past medical history, past social history, past surgical history and problem list      Past Medical History:   Diagnosis Date    Benign essential hypertension     Chronic kidney disease, stage 2 (mild)     Chronic kidney disease, stage 3 (HCC)     Disorder of gallbladder     Disorder of skin and subcutaneous tissue     Dyspnea     Essential hypertension     Hyperlipidemia     Hypokalemia     Hypothyroidism     Malaise and fatigue     Mixed hyperlipidemia     Morbid obesity ([address] )     Pernicious anemia     Type 2 diabetes mellitus ([facility] [address] )        Past Surgical History:   Procedure Laterality Date    BREAST BIOPSY Right     CARPAL TUNNEL RELEASE Bilateral     CHOLECYSTECTOMY      COLONOSCOPY      Dr Billy Soriano PARTIAL HYSTERECTOMY      SKIN LESION EXCISION      scalp        Family History   Problem Relation Age of Onset    Stroke Mother     Atrial fibrillation Mother     Brain cancer Father     Other Brother         Twin brothers - Open heart    Other Brother         Twin brothers - Open heart    No Known Problems Maternal Grandmother     No Known Problems Paternal Grandmother          Medications have been verified  Objective   /82   Pulse 72   Temp (!) 97 3 °F (36 3 °C)   Resp 18   Ht 5' 2" (1 575 m)   Wt 85 9 kg (189 lb 6 4 oz)   SpO2 98%   BMI 34 64 kg/m²        Physical Exam     Physical Exam  Vitals reviewed  Constitutional:       General: She is not in acute distress  Appearance: She is obese  Cardiovascular:      Rate and Rhythm: Normal rate and regular rhythm        Heart sounds: S1 normal and S2 normal  No murmur heard  No friction rub  No gallop  Pulmonary:      Effort: Pulmonary effort is normal  No accessory muscle usage  Breath sounds: Normal breath sounds  No wheezing, rhonchi or rales  Neurological:      Mental Status: She is alert  Motor: Motor function is intact  No weakness, atrophy or abnormal muscle tone  Gait: Gait is intact  Psychiatric:         Mood and Affect: Mood and affect normal          Speech: Speech normal          Behavior: Behavior normal  Behavior is cooperative  Results:  Lab Results   Component Value Date    SODIUM 139 01/25/2022    K 3 9 01/25/2022     01/25/2022    CO2 28 01/25/2022    BUN 29 (H) 01/25/2022    CREATININE 1 45 (H) 01/25/2022    GLUC 127 01/16/2022    CALCIUM 9 7 01/25/2022       Lab Results   Component Value Date    HGBA1C 8 6 (A) 02/28/2022       Lab Results   Component Value Date    WBC 9 42 01/16/2022    HGB 13 4 01/16/2022    HCT 42 8 01/16/2022    MCV 94 01/16/2022     01/16/2022       MRI Lumbar Spine 03/15/22    FINDINGS:     VERTEBRAL BODIES:  There are 5 lumbar type vertebral bodies  Anterolisthesis L4-5  Degenerative marrow signal superior endplate L3      SACRUM:  Normal signal within the sacrum  No evidence of insufficiency or stress fracture      DISTAL CORD AND CONUS:  Normal size and signal within the distal cord and conus      PARASPINAL SOFT TISSUES:  Paraspinal soft tissues are unremarkable      LOWER THORACIC DISC SPACES:  Normal disc height and signal   No disc herniation, canal stenosis or foraminal narrowing      LUMBAR DISC SPACES:     L1-L2:  Normal      L2-L3:  Inferiorly extruded right paramedian disc herniation likely impinges the descending right L3 nerve root  Correlate for right L3 radiculopathy  Marginal osteophytes contribute to mild right foraminal narrowing      L3-L4:  Mild annular bulge and mild facet hypertrophy    No significant central or foraminal narrowing      L4-L5:  Severe facet degenerative change accounts for slight anterolisthesis and uncovering of the posterior disc margin  Moderate left and mild right foraminal narrowing  Mild to moderate central stenosis      L5-S1:  No significant central or foraminal narrowing  Mild facet hypertrophy      IMPRESSION:     Inferiorly extruded right paramedian disc herniation L2-3 likely impinges the descending right L3 nerve root    Correlate for right L3 radiculopathy      Severe facet degenerative change L4-5 results in anterolisthesis and uncovering the posterior disc margin with resultant mild to moderate central stenosis, moderate left, and mild right foraminal narrowing

## 2022-04-20 ENCOUNTER — HOSPITAL ENCOUNTER (OUTPATIENT)
Dept: BONE DENSITY | Facility: HOSPITAL | Age: 77
Discharge: HOME | End: 2022-04-20
Attending: INTERNAL MEDICINE
Payer: MEDICARE

## 2022-04-20 DIAGNOSIS — Z78.0 ASYMPTOMATIC POSTMENOPAUSAL STATE: ICD-10-CM

## 2022-04-20 PROCEDURE — 77080 DXA BONE DENSITY AXIAL: CPT

## 2022-05-18 ENCOUNTER — TELEPHONE (OUTPATIENT)
Dept: OBGYN CLINIC | Facility: CLINIC | Age: 77
End: 2022-05-18

## 2022-05-18 NOTE — TELEPHONE ENCOUNTER
Dr Portillo  RE: Gel injections  #: [phone]      Patient called back into the office, she is looking to find out the price of the gel injections  She understands that per auth Medicare covers 80% but she would like to find out exactly how much this is if possible  Please call her back to discuss

## 2022-05-18 NOTE — TELEPHONE ENCOUNTER
Patient calling back stating she called earlier to find out about the cost of gel injections  She spoke with her secondary insurance-[employer] and they advised her they would cover the 20% that Medicare does not cover  Please call patient back       cb # [phone]

## 2022-05-25 ENCOUNTER — APPOINTMENT (OUTPATIENT)
Dept: LAB | Facility: CLINIC | Age: 77
End: 2022-05-25
Payer: MEDICARE

## 2022-05-25 DIAGNOSIS — N18.30 CKD STAGE 3 DUE TO TYPE 2 DIABETES MELLITUS (HCC): ICD-10-CM

## 2022-05-25 DIAGNOSIS — E11.9 TYPE 2 DIABETES MELLITUS WITHOUT COMPLICATION, WITHOUT LONG-TERM CURRENT USE OF INSULIN (HCC): ICD-10-CM

## 2022-05-25 DIAGNOSIS — E11.22 CKD STAGE 3 DUE TO TYPE 2 DIABETES MELLITUS (HCC): ICD-10-CM

## 2022-05-25 LAB
ALBUMIN SERPL BCP-MCNC: 3.2 G/DL (ref 3.5–5)
ALP SERPL-CCNC: 47 U/L (ref 46–116)
ALT SERPL W P-5'-P-CCNC: 21 U/L (ref 12–78)
ANION GAP SERPL CALCULATED.3IONS-SCNC: 5 MMOL/L (ref 4–13)
AST SERPL W P-5'-P-CCNC: 12 U/L (ref 5–45)
BASOPHILS # BLD AUTO: 0.05 THOUSANDS/ΜL (ref 0–0.1)
BASOPHILS NFR BLD AUTO: 1 % (ref 0–1)
BILIRUB SERPL-MCNC: 0.46 MG/DL (ref 0.2–1)
BUN SERPL-MCNC: 27 MG/DL (ref 5–25)
CALCIUM ALBUM COR SERPL-MCNC: 10.6 MG/DL (ref 8.3–10.1)
CALCIUM SERPL-MCNC: 10 MG/DL (ref 8.3–10.1)
CHLORIDE SERPL-SCNC: 105 MMOL/L (ref 100–108)
CO2 SERPL-SCNC: 29 MMOL/L (ref 21–32)
CREAT SERPL-MCNC: 1.41 MG/DL (ref 0.6–1.3)
CREAT UR-MCNC: 41.5 MG/DL
EOSINOPHIL # BLD AUTO: 0.12 THOUSAND/ΜL (ref 0–0.61)
EOSINOPHIL NFR BLD AUTO: 1 % (ref 0–6)
ERYTHROCYTE [DISTWIDTH] IN BLOOD BY AUTOMATED COUNT: 13.2 % (ref 11.6–15.1)
EST. AVERAGE GLUCOSE BLD GHB EST-MCNC: 217 MG/DL
GFR SERPL CREATININE-BSD FRML MDRD: 35 ML/MIN/1.73SQ M
GLUCOSE P FAST SERPL-MCNC: 111 MG/DL (ref 65–99)
HBA1C MFR BLD: 9.2 %
HCT VFR BLD AUTO: 38.5 % (ref 34.8–46.1)
HGB BLD-MCNC: 12.2 G/DL (ref 11.5–15.4)
IMM GRANULOCYTES # BLD AUTO: 0.04 THOUSAND/UL (ref 0–0.2)
IMM GRANULOCYTES NFR BLD AUTO: 1 % (ref 0–2)
LYMPHOCYTES # BLD AUTO: 2.2 THOUSANDS/ΜL (ref 0.6–4.47)
LYMPHOCYTES NFR BLD AUTO: 26 % (ref 14–44)
MCH RBC QN AUTO: 31.1 PG (ref 26.8–34.3)
MCHC RBC AUTO-ENTMCNC: 31.7 G/DL (ref 31.4–37.4)
MCV RBC AUTO: 98 FL (ref 82–98)
MICROALBUMIN UR-MCNC: <5 MG/L (ref 0–20)
MICROALBUMIN/CREAT 24H UR: <12 MG/G CREATININE (ref 0–30)
MONOCYTES # BLD AUTO: 0.6 THOUSAND/ΜL (ref 0.17–1.22)
MONOCYTES NFR BLD AUTO: 7 % (ref 4–12)
NEUTROPHILS # BLD AUTO: 5.52 THOUSANDS/ΜL (ref 1.85–7.62)
NEUTS SEG NFR BLD AUTO: 64 % (ref 43–75)
NRBC BLD AUTO-RTO: 0 /100 WBCS
PLATELET # BLD AUTO: 170 THOUSANDS/UL (ref 149–390)
PMV BLD AUTO: 10.7 FL (ref 8.9–12.7)
POTASSIUM SERPL-SCNC: 4.5 MMOL/L (ref 3.5–5.3)
PROT SERPL-MCNC: 6.8 G/DL (ref 6.4–8.2)
RBC # BLD AUTO: 3.92 MILLION/UL (ref 3.81–5.12)
SODIUM SERPL-SCNC: 139 MMOL/L (ref 136–145)
WBC # BLD AUTO: 8.53 THOUSAND/UL (ref 4.31–10.16)

## 2022-05-25 PROCEDURE — 80053 COMPREHEN METABOLIC PANEL: CPT

## 2022-05-25 PROCEDURE — 85025 COMPLETE CBC W/AUTO DIFF WBC: CPT

## 2022-05-25 PROCEDURE — 82570 ASSAY OF URINE CREATININE: CPT

## 2022-05-25 PROCEDURE — 36415 COLL VENOUS BLD VENIPUNCTURE: CPT

## 2022-05-25 PROCEDURE — 83036 HEMOGLOBIN GLYCOSYLATED A1C: CPT

## 2022-05-25 PROCEDURE — 82043 UR ALBUMIN QUANTITATIVE: CPT

## 2022-05-26 ENCOUNTER — PROCEDURE VISIT (OUTPATIENT)
Dept: OBGYN CLINIC | Facility: CLINIC | Age: 77
End: 2022-05-26
Payer: MEDICARE

## 2022-05-26 VITALS
HEART RATE: 80 BPM | WEIGHT: 189 LBS | BODY MASS INDEX: 34.78 KG/M2 | SYSTOLIC BLOOD PRESSURE: 133 MMHG | DIASTOLIC BLOOD PRESSURE: 70 MMHG | HEIGHT: 62 IN

## 2022-05-26 DIAGNOSIS — M17.11 PRIMARY OSTEOARTHRITIS OF RIGHT KNEE: Primary | ICD-10-CM

## 2022-05-26 PROCEDURE — 20610 DRAIN/INJ JOINT/BURSA W/O US: CPT | Performed by: PHYSICIAN ASSISTANT

## 2022-05-26 RX ORDER — HYALURONATE SODIUM 10 MG/ML
20 SYRINGE (ML) INTRAARTICULAR
Status: COMPLETED | OUTPATIENT
Start: 2022-05-26 | End: 2022-05-26

## 2022-05-26 RX ADMIN — Medication 20 MG: at 15:12

## 2022-05-26 NOTE — PROGRESS NOTES
ASSESSMENT/PLAN:    Diagnoses and all orders for this visit:    Primary osteoarthritis of right knee    Other orders  -     Large joint arthrocentesis        The patient's right knee was injected with her 1st set Euflexxa  She tolerated the injection quite well  She will follow up with our office next week for 2nd set  The patient is acceptable to this plan  Return in about 1 week (around 6/2/2022)  _____________________________________________________  CHIEF COMPLAINT:  Chief Complaint   Patient presents with    Right Knee - Follow-up         SUBJECTIVE:  Yessy Wheeler is a 68 y o  female who presents to our office for viscosupplementation of her right knee  She is here today to begin Euflexxa injections  She still complains of right knee pain  She denies any numbness or tingling  She denies any fever or chills        The following portions of the patient's history were reviewed and updated as appropriate: allergies, current medications, past family history, past medical history, past social history, past surgical history and problem list     PAST MEDICAL HISTORY:  Past Medical History:   Diagnosis Date    Benign essential hypertension     Chronic kidney disease, stage 2 (mild)     Chronic kidney disease, stage 3 ([address] )     Disorder of gallbladder     Disorder of skin and subcutaneous tissue     Dyspnea     Essential hypertension     Hyperlipidemia     Hypokalemia     Hypothyroidism     Malaise and fatigue     Mixed hyperlipidemia     Morbid obesity ([address] )     Pernicious anemia     Type 2 diabetes mellitus ([address] )        PAST SURGICAL HISTORY:  Past Surgical History:   Procedure Laterality Date    BREAST BIOPSY Right     CARPAL TUNNEL RELEASE Bilateral    [address] [facility] COLONOSCOPY      Dr Niels eHrnandez HYSTERECTOMY      PARTIAL HYSTERECTOMY      SKIN LESION EXCISION      scalp        FAMILY HISTORY:  Family History   Problem Relation Age of Onset    Stroke Mother     Atrial fibrillation Mother     Brain cancer Father     Other Brother         Twin brothers - Open heart    Other Brother         Twin brothers - Open heart    No Known Problems Maternal Grandmother     No Known Problems Paternal Grandmother        SOCIAL HISTORY:  Social History     Tobacco Use    Smoking status: Former Smoker     Packs/day: 1 00     Years: 14 00     Pack years: 14 00     Types: Cigarettes     Quit date:      Years since quittin 4    Smokeless tobacco: Never Used   Vaping Use    Vaping Use: Never used   Substance Use Topics    Alcohol use: Yes     Comment: Occasionally     Drug use: Never     Comment: Denies drug use - As per [employer]        MEDICATIONS:    Current Outpatient Medications:     amLODIPine (NORVASC) 5 mg tablet, Take 1 tablet (5 mg total) by mouth daily, Disp: 90 tablet, Rfl: 3    aspirin (ECOTRIN LOW STRENGTH) 81 mg EC tablet, Take 81 mg by mouth daily, Disp: , Rfl:     cholecalciferol (VITAMIN D3) 1,000 units tablet, Take 800 Units by mouth daily  , Disp: , Rfl:     folic acid (FOLVITE) 798 mcg tablet, Take 400 mcg by mouth daily, Disp: , Rfl:     glipiZIDE (GLUCOTROL) 10 mg tablet, Take 0 5 tablets (5 mg total) by mouth 2 (two) times a day before meals, Disp: 180 tablet, Rfl: 1    glucose monitoring kit (FREESTYLE) monitoring kit, 1 each by Does not apply route daily, Disp: 1 each, Rfl: 0    insulin glargine (Lantus SoloStar) 100 units/mL injection pen, Inject 50 Units under the skin daily at bedtime, Disp: 15 mL, Rfl: 3    Insulin Pen Needle 32G X 6 MM MISC, Use in the morning, Disp: 100 each, Rfl: 5    Lancets (freestyle) lancets, Use as instructed -glucose monitor BID, Disp: 100 each, Rfl: 5    levothyroxine 50 mcg tablet, Take 1 tablet (50 mcg total) by mouth daily, Disp: 90 tablet, Rfl: 3    lisinopril (ZESTRIL) 20 mg tablet, Take 1 tablet (20 mg total) by mouth daily, Disp: 90 tablet, Rfl: 3    metoprolol succinate (TOPROL-XL) 50 mg 24 hr tablet, take 1 tablet by mouth once daily, Disp: 90 tablet, Rfl: 5    omega-3-acid ethyl esters (LOVAZA) 1 g capsule, Take 2 g by mouth 2 (two) times a day, Disp: , Rfl:     Psyllium (FIBER) 0 52 g CAPS, Take by mouth, Disp: , Rfl:     rosuvastatin (CRESTOR) 10 MG tablet, Take 1 tablet (10 mg total) by mouth daily, Disp: 90 tablet, Rfl: 3    ALLERGIES:  No Known Allergies    ROS:  Review of Systems     Constitutional: Negative for fatigue, fever or loss of appetite  HENT: Negative  Respiratory: Negative for shortness of breath, dyspnea  Cardiovascular: Negative for chest pain/tightness  Gastrointestinal: Negative for abdominal pain, N/V  Endocrine: Negative for cold/heat intolerance, unexplained weight loss/gain  Genitourinary: Negative for flank pain, dysuria, hematuria  Musculoskeletal: Positive for arthralgia   Skin: Negative for rash  Neurological: Negative for numbness or tingling  Psychiatric/Behavioral: Negative for agitation  _____________________________________________________  PHYSICAL EXAMINATION:    Blood pressure 133/70, pulse 80, height 5' 2" (1 575 m), weight 85 7 kg (189 lb)  Constitutional: Oriented to person, place, and time  Appears well-developed and well-nourished  No distress  HENT:   Head: Normocephalic  Eyes: Conjunctivae are normal  Right eye exhibits no discharge  Left eye exhibits no discharge  No scleral icterus  Cardiovascular: Normal rate  Pulmonary/Chest: Effort normal    Neurological: Alert and oriented to person, place, and time  Skin: Skin is warm and dry  No rash noted  Not diaphoretic  No erythema  No pallor  Psychiatric: Normal mood and affect   Behavior is normal  Judgment and thought content normal       MUSCULOSKELETAL EXAMINATION:   Physical Exam  Ortho Exam    Right lower extremity is neurovascularly intact  Toes are pink and mobile   Compartments are soft  No warmth, erythema or ecchymosis  ROM of knee is from 5-115 degrees  Negative Lachmanshantelle littleer or pivot shift  No medial instability  Medial joint line tenderness, slight lateral joint line tenderness  Patellofemoral crepitation  Objective:  BP Readings from Last 1 Encounters:   05/26/22 133/70      Wt Readings from Last 1 Encounters:   05/26/22 85 7 kg (189 lb)        BMI:   Estimated body mass index is 34 57 kg/m² as calculated from the following:    Height as of this encounter: 5' 2" (1 575 m)  Weight as of this encounter: 85 7 kg (189 lb)  PROCEDURES PERFORMED:  Large joint arthrocentesis: R knee  Universal Protocol:  Risks and benefits: risks, benefits and alternatives were discussed  Consent given by: patient  Patient understanding: patient states understanding of the procedure being performed  Site marked: the operative site was marked  Radiology Images displayed and confirmed   If images not available, report reviewed: imaging studies available    Supporting Documentation  Indications: pain   Procedure Details  Location: knee - R knee  Preparation: Patient was prepped and draped in the usual sterile fashion  Needle size: 22 G  Ultrasound guidance: no  Approach: lateral  Medications administered: 20 mg Sodium Hyaluronate 20 MG/2ML    Patient tolerance: patient tolerated the procedure well with no immediate complications  Dressing:  Sterile dressing applied

## 2022-05-31 ENCOUNTER — OFFICE VISIT (OUTPATIENT)
Dept: FAMILY MEDICINE CLINIC | Facility: CLINIC | Age: 77
End: 2022-05-31
Payer: MEDICARE

## 2022-05-31 VITALS
BODY MASS INDEX: 35.55 KG/M2 | TEMPERATURE: 98.2 F | OXYGEN SATURATION: 98 % | WEIGHT: 193.2 LBS | HEIGHT: 62 IN | HEART RATE: 71 BPM | DIASTOLIC BLOOD PRESSURE: 78 MMHG | RESPIRATION RATE: 18 BRPM | SYSTOLIC BLOOD PRESSURE: 126 MMHG

## 2022-05-31 DIAGNOSIS — E11.22 TYPE 2 DIABETES MELLITUS WITH STAGE 3B CHRONIC KIDNEY DISEASE, WITH LONG-TERM CURRENT USE OF INSULIN (HCC): Primary | ICD-10-CM

## 2022-05-31 DIAGNOSIS — E11.22 CKD STAGE 3 DUE TO TYPE 2 DIABETES MELLITUS (HCC): ICD-10-CM

## 2022-05-31 DIAGNOSIS — N18.32 TYPE 2 DIABETES MELLITUS WITH STAGE 3B CHRONIC KIDNEY DISEASE, WITH LONG-TERM CURRENT USE OF INSULIN (HCC): Primary | ICD-10-CM

## 2022-05-31 DIAGNOSIS — I10 ESSENTIAL HYPERTENSION: ICD-10-CM

## 2022-05-31 DIAGNOSIS — E03.9 ACQUIRED HYPOTHYROIDISM: ICD-10-CM

## 2022-05-31 DIAGNOSIS — Z79.4 TYPE 2 DIABETES MELLITUS WITH STAGE 3B CHRONIC KIDNEY DISEASE, WITH LONG-TERM CURRENT USE OF INSULIN (HCC): Primary | ICD-10-CM

## 2022-05-31 DIAGNOSIS — N18.30 CKD STAGE 3 DUE TO TYPE 2 DIABETES MELLITUS (HCC): ICD-10-CM

## 2022-05-31 PROCEDURE — 99214 OFFICE O/P EST MOD 30 MIN: CPT | Performed by: INTERNAL MEDICINE

## 2022-05-31 RX ORDER — GLIPIZIDE 10 MG/1
10 TABLET ORAL
Qty: 180 TABLET | Refills: 1
Start: 2022-05-31

## 2022-05-31 NOTE — PROGRESS NOTES
[facility]        NAME: Evangelist Martinez is a 68 y o  female  : 1945    MRN: [id number]  DATE: May 31, 2022  TIME: 6:32 PM    Assessment and Plan   1  Type 2 diabetes mellitus with stage 3b chronic kidney disease, with long-term current use of insulin (HCC)  -poorly controlled, A1c has increased again, advised her to increase glipizide with meals, FBG seems better controlled so will hold off on increasing insulin dose  May need prandial insulin if A1c does not improve  She is working on portion control, low carb diet and increased physical activity    -   Hemoglobin A1C; Future; Expected date: 2022  -     Comprehensive metabolic panel; Future; Expected date: 2022  -     glipiZIDE (GLUCOTROL) 10 mg tablet; Take 1 tablet (10 mg total) by mouth 2 (two) times a day before meals    2  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 mellitus (HCC)  -     Comprehensive metabolic panel; Future; Expected date: 2022    3  Acquired hypothyroidism  -     TSH, 3rd generation with Free T4 reflex; Future; Expected date: 2022    4  Essential hypertension  -     Comprehensive metabolic panel; Future; Expected date: 2022             Chief Complaint     Chief Complaint   Patient presents with    Follow-up     Discuss results  History of Present Illness       66yo female with type 2 diabetes, HTN, CKD stage 3b, hypothyroidism here for 3 month diabetes follow up  Diabetes: decreased glipizide to 5mg with breakfast and bedtime, 50 units of insulin at bedtime  FBG   Bedtime/postprandial BG 170s-180s  Seeing Ortho for right knee pain, getting injections which seem to be helping  Review of Systems   Review of Systems   Constitutional: Negative for appetite change, chills, fatigue, fever and unexpected weight change  Respiratory: Negative for cough, chest tightness and shortness of breath  Cardiovascular: Negative for chest pain, palpitations and leg swelling  Gastrointestinal: Negative for abdominal pain, diarrhea, nausea and vomiting  Neurological: Negative for dizziness, light-headedness and headaches           Current Medications       Current Outpatient Medications:     amLODIPine (NORVASC) 5 mg tablet, Take 1 tablet (5 mg total) by mouth daily, Disp: 90 tablet, Rfl: 3    aspirin (ECOTRIN LOW STRENGTH) 81 mg EC tablet, Take 81 mg by mouth daily, Disp: , Rfl:     cholecalciferol (VITAMIN D3) 1,000 units tablet, Take 800 Units by mouth daily  , Disp: , Rfl:     folic acid (FOLVITE) 133 mcg tablet, Take 400 mcg by mouth daily, Disp: , Rfl:     glipiZIDE (GLUCOTROL) 10 mg tablet, Take 1 tablet (10 mg total) by mouth 2 (two) times a day before meals, Disp: 180 tablet, Rfl: 1    glucose monitoring kit (FREESTYLE) monitoring kit, 1 each by Does not apply route daily, Disp: 1 each, Rfl: 0    insulin glargine (Lantus SoloStar) 100 units/mL injection pen, Inject 50 Units under the skin daily at bedtime, Disp: 15 mL, Rfl: 3    Insulin Pen Needle 32G X 6 MM MISC, Use in the morning, Disp: 100 each, Rfl: 5    Lancets (freestyle) lancets, Use as instructed -glucose monitor BID, Disp: 100 each, Rfl: 5    levothyroxine 50 mcg tablet, Take 1 tablet (50 mcg total) by mouth daily, Disp: 90 tablet, Rfl: 3    lisinopril (ZESTRIL) 20 mg tablet, Take 1 tablet (20 mg total) by mouth daily, Disp: 90 tablet, Rfl: 3    metoprolol succinate (TOPROL-XL) 50 mg 24 hr tablet, take 1 tablet by mouth once daily, Disp: 90 tablet, Rfl: 5    omega-3-acid ethyl esters (LOVAZA) 1 g capsule, Take 2 g by mouth 2 (two) times a day, Disp: , Rfl:     Psyllium (FIBER) 0 52 g CAPS, Take by mouth, Disp: , Rfl:     rosuvastatin (CRESTOR) 10 MG tablet, Take 1 tablet (10 mg total) by mouth daily, Disp: 90 tablet, Rfl: 3    Current Allergies     Allergies as of 05/31/2022    (No Known Allergies)            The following portions of the patient's history were reviewed and updated as appropriate: allergies, current medications, past family history, past medical history, past social history, past surgical history and problem list      Past Medical History:   Diagnosis Date    Benign essential hypertension     Chronic kidney disease, stage 2 (mild)     Chronic kidney disease, stage 3 (HCC)     Disorder of gallbladder     Disorder of skin and subcutaneous tissue     Dyspnea     Essential hypertension     Hyperlipidemia     Hypokalemia     Hypothyroidism     Malaise and fatigue     Mixed hyperlipidemia     Morbid obesity ([address] )     Pernicious anemia     Type 2 diabetes mellitus (HCC)        Past Surgical History:   Procedure Laterality Date    BREAST BIOPSY Right     CARPAL TUNNEL RELEASE Bilateral     CHOLECYSTECTOMY      COLONOSCOPY      Dr Elvia Downing HYSTERECTOMY      PARTIAL HYSTERECTOMY      SKIN LESION EXCISION      scalp        Family History   Problem Relation Age of Onset    Stroke Mother     Atrial fibrillation Mother     Brain cancer Father     Other Brother         Twin brothers - Open heart    Other Brother         Twin brothers - Open heart    No Known Problems Maternal Grandmother     No Known Problems Paternal Grandmother          Medications have been verified  Objective   /78   Pulse 71   Temp 98 2 °F (36 8 °C)   Resp 18   Ht 5' 2" (1 575 m)   Wt 87 6 kg (193 lb 3 2 oz)   SpO2 98%   BMI 35 34 kg/m²        Physical Exam     Physical Exam  Vitals reviewed  Constitutional:       General: She is not in acute distress  Appearance: She is obese  Neck:      Vascular: No carotid bruit  Cardiovascular:      Rate and Rhythm: Normal rate and regular rhythm  Heart sounds: S1 normal and S2 normal  No murmur heard  No friction rub  No gallop  Pulmonary:      Effort: Pulmonary effort is normal  No accessory muscle usage  Breath sounds: Normal breath sounds  No wheezing, rhonchi or rales     Neurological:      General: No focal deficit present  Mental Status: She is alert  Psychiatric:         Mood and Affect: Mood and affect normal          Speech: Speech normal          Behavior: Behavior normal  Behavior is cooperative               Results:  Lab Results   Component Value Date    SODIUM 139 05/25/2022    K 4 5 05/25/2022     05/25/2022    CO2 29 05/25/2022    BUN 27 (H) 05/25/2022    CREATININE 1 41 (H) 05/25/2022    GLUC 127 01/16/2022    CALCIUM 10 0 05/25/2022       Lab Results   Component Value Date    HGBA1C 9 2 (H) 05/25/2022       Lab Results   Component Value Date    WBC 8 53 05/25/2022    HGB 12 2 05/25/2022    HCT 38 5 05/25/2022    MCV 98 05/25/2022     05/25/2022

## 2022-05-31 NOTE — PATIENT INSTRUCTIONS
Please increase glipizide to 10mg with breakfast and dinner, must be taken with food  Continue 50 units of insulin at bedtime  Get blood work before follow up in 3 months, call if you have any low sugars < 70  Basic Carbohydrate Counting   AMBULATORY CARE:   Carbohydrate counting  is a way to plan your meals by counting the amount of carbohydrate in foods  Carbohydrates are the sugars, starches, and fiber found in fruit, grains, vegetables, and milk products  Carbohydrates increase your blood sugar levels  Carbohydrate counting can help you eat the right amount of carbohydrate to keep your blood sugar levels under control  What you need to know about planning meals using carbohydrate counting:  A dietitian or healthcare provider will help you develop a healthy meal plan that works best for you  You will be taught how much carbohydrate to eat or drink for each meal and snack  Your meal plan will be based on your age, weight, usual food intake, and physical activity level  If you have diabetes, it will also include your blood sugar levels and diabetes medicine  Once you know how much carbohydrate you should eat, you can decide what type of food you want to eat  You will need to know what foods contain carbohydrate and how much they contain  Keep track of the amount of carbohydrate in meals and snacks in order to follow your meal plan  Do not avoid carbohydrates or skip meals  Your blood sugar may fall too low if you do not eat enough carbohydrate or you skip meals  Foods that contain carbohydrate:   Breads:  Each serving of food listed below contains about 15 g of carbohydrate   1 slice of bread (1 ounce) or 1 flour or corn tortilla (6 inch)    ½ of a hamburger bun or ¼ of a large bagel (about 1 ounce)    1 pancake (about 4 inches across and ¼ inch thick)    Cereals and grains:  Serving sizes of ready-to-eat cereals vary   Look at the serving size and the total carbohydrate amount listed on the food label  Each serving of food listed below contains about 15 g of carbohydrate   ¾ cup of dry, unsweetened, ready-to-eat cereal or ¼ cup of low-fat granola     ½ cup of oatmeal or other cooked cereal     ? cup of cooked rice or pasta    Starchy vegetables and beans:  Each serving of food listed below contains about 15 g of carbohydrate   ½ cup of corn, green peas, sweet potatoes, or mashed potatoes    ¼ of a large baked potato    ½ cup of beans, lentils, and peas (garbanzo, brown, kidney, white, split, black-eyed)    Crackers and snacks:  Each serving of food listed below contains about 15 g of carbohydrate   3 rosemary cracker squares or 8 animal crackers     6 saltine-type crackers    3 cups of popcorn or ¾ ounce of pretzels, potato chips, or tortilla chips    Fruit:  Each serving of food listed below contains about 15 g of carbohydrate   1 small (4 ounce) piece of fresh fruit or ¾ to 1 cup of fresh fruit    ½ cup of canned or frozen fruit, packed in natural juice    ½ cup (4 ounces) of unsweetened fruit juice    2 tablespoons of dried fruit    Desserts or sugary foods:  Each serving of food listed below contains about 15 g of carbohydrate   2-inch square unfrosted cake or brownie     2 small cookies    ½ cup of ice cream, frozen yogurt, or nondairy frozen yogurt    ¼ cup of sherbet or sorbet    1 tablespoon of regular syrup, jam, or jelly    2 tablespoons of light syrup    Milk and yogurt:  Foods from the milk group contain about 12 g of carbohydrate per serving  1 cup of fat-free or low-fat milk    1 cup of soy milk    ? cup of fat-free, yogurt sweetened with artificial sweetener    Non-starchy vegetables:  Each serving contains about 5 g of carbohydrate   Three servings of non-starch vegetables count as 1 carbohydrate serving  ½ cup of cooked vegetables or 1 cup of raw vegetables   This includes beets, broccoli, cabbage, cauliflower, cucumber, mushrooms, tomatoes, and zucchini    ½ cup of vegetable juice    How to use carbohydrate counting to plan meals:   Count carbohydrate amounts using serving sizes:      Pasta dinner example: You plan to have pasta, tossed salad, and an 8-ounce glass of milk  Your healthcare provider tells you that you may have 4 carbohydrate servings for dinner  One carbohydrate serving of pasta is ? cup  One cup of pasta will equal 3 carbohydrate servings  An 8-ounce glass of milk will count as 1 carbohydrate serving  These amounts of food would equal 4 carbohydrate servings  One cup of tossed salad does not count toward your carbohydrate servings  Count carbohydrate amounts using food labels:  Find the total amount of carbohydrate in a packaged food by reading the food label  Food labels tell you the serving size of the food and the total carbohydrate amount in each serving  Find the serving size on the food label and then decide how many servings you will eat  Multiply the number of servings you plan to eat by the carbohydrate amount per serving  Granola bar snack example: Your meal plan allows you to have 2 carbohydrate servings (30 grams) of carbohydrate for a snack  You plan to eat 1 package of granola bars, which contains 2 bars  According to the food label, the serving size of food in this package is 1 bar  Each serving (1 bar) contains 25 grams of carbohydrate  The total amount of carbohydrate in this package of granola bars would be 50 g  Based on your meal plan, you should eat only 1 bar  Follow up with your doctor as directed:  Write down your questions so you remember to ask them during your visits  © Copyright [employer] 2022 Information is for End User's use only and may not be sold, redistributed or otherwise used for commercial purposes  All illustrations and images included in CareNotes® are the copyrighted property of A [employer] M [employer]  or [facility] Raphael Murrell   The above information is an  only   It is not intended as medical advice for individual conditions or treatments  Talk to your doctor, nurse or pharmacist before following any medical regimen to see if it is safe and effective for you

## 2022-06-02 ENCOUNTER — PROCEDURE VISIT (OUTPATIENT)
Dept: OBGYN CLINIC | Facility: CLINIC | Age: 77
End: 2022-06-02
Payer: MEDICARE

## 2022-06-02 VITALS
DIASTOLIC BLOOD PRESSURE: 71 MMHG | HEIGHT: 62 IN | BODY MASS INDEX: 35.51 KG/M2 | SYSTOLIC BLOOD PRESSURE: 122 MMHG | HEART RATE: 76 BPM | WEIGHT: 193 LBS

## 2022-06-02 DIAGNOSIS — M17.11 PRIMARY OSTEOARTHRITIS OF RIGHT KNEE: Primary | ICD-10-CM

## 2022-06-02 PROCEDURE — 20610 DRAIN/INJ JOINT/BURSA W/O US: CPT | Performed by: PHYSICIAN ASSISTANT

## 2022-06-02 RX ORDER — HYALURONATE SODIUM 10 MG/ML
20 SYRINGE (ML) INTRAARTICULAR
Status: COMPLETED | OUTPATIENT
Start: 2022-06-02 | End: 2022-06-02

## 2022-06-02 RX ADMIN — Medication 20 MG: at 14:22

## 2022-06-02 NOTE — PROGRESS NOTES
ASSESSMENT/PLAN:    Diagnoses and all orders for this visit:    Primary osteoarthritis of right knee    Other orders  -     Large joint arthrocentesis        The patient's right knee was injected with her 2nd set of Euflexxa  She tolerated the injection quite well  She will follow up with our office next week for her 3rd and final set  The patient is acceptable to this plan  Return in about 1 week (around 6/9/2022)  _____________________________________________________  CHIEF COMPLAINT:  Chief Complaint   Patient presents with    Right Knee - Follow-up         SUBJECTIVE:  Adriana Torres is a 68 y o  female who presents to our office for viscosupplementation of her right knee  She is here today for her 2nd set of Euflexxa  She tolerated last week's injection without issue  She denies any numbness or tingling  She denies any fever or chills        The following portions of the patient's history were reviewed and updated as appropriate: allergies, current medications, past family history, past medical history, past social history, past surgical history and problem list     PAST MEDICAL HISTORY:  Past Medical History:   Diagnosis Date    Benign essential hypertension     Chronic kidney disease, stage 2 (mild)     Chronic kidney disease, stage 3 ([address] )     Disorder of gallbladder     Disorder of skin and subcutaneous tissue     Dyspnea     Essential hypertension     Hyperlipidemia     Hypokalemia     Hypothyroidism     Malaise and fatigue     Mixed hyperlipidemia     Morbid obesity ([address] )     Pernicious anemia     Type 2 diabetes mellitus ([address] )        PAST SURGICAL HISTORY:  Past Surgical History:   Procedure Laterality Date    BREAST BIOPSY Right     CARPAL TUNNEL RELEASE Bilateral    [facility] HYSTERECTOMY      PARTIAL HYSTERECTOMY      SKIN LESION EXCISION      scalp        FAMILY HISTORY:  Family History   Problem Relation Age of Onset    Stroke Mother     Atrial fibrillation Mother     Brain cancer Father     Other Brother         Twin brothers - Open heart    Other Brother         Twin brothers - Open heart    No Known Problems Maternal Grandmother     No Known Problems Paternal Grandmother        SOCIAL HISTORY:  Social History     Tobacco Use    Smoking status: Former Smoker     Packs/day: 1 00     Years: 14 00     Pack years: 14 00     Types: Cigarettes     Quit date:      Years since quittin 4    Smokeless tobacco: Never Used   Vaping Use    Vaping Use: Never used   Substance Use Topics    Alcohol use: Yes     Comment: Occasionally     Drug use: Never     Comment: Denies drug use - As per [employer]        MEDICATIONS:    Current Outpatient Medications:     amLODIPine (NORVASC) 5 mg tablet, Take 1 tablet (5 mg total) by mouth daily, Disp: 90 tablet, Rfl: 3    aspirin (ECOTRIN LOW STRENGTH) 81 mg EC tablet, Take 81 mg by mouth daily, Disp: , Rfl:     cholecalciferol (VITAMIN D3) 1,000 units tablet, Take 800 Units by mouth daily  , Disp: , Rfl:     folic acid (FOLVITE) 446 mcg tablet, Take 400 mcg by mouth daily, Disp: , Rfl:     glipiZIDE (GLUCOTROL) 10 mg tablet, Take 1 tablet (10 mg total) by mouth 2 (two) times a day before meals, Disp: 180 tablet, Rfl: 1    glucose monitoring kit (FREESTYLE) monitoring kit, 1 each by Does not apply route daily, Disp: 1 each, Rfl: 0    insulin glargine (Lantus SoloStar) 100 units/mL injection pen, Inject 50 Units under the skin daily at bedtime, Disp: 15 mL, Rfl: 3    Insulin Pen Needle 32G X 6 MM MISC, Use in the morning, Disp: 100 each, Rfl: 5    Lancets (freestyle) lancets, Use as instructed -glucose monitor BID, Disp: 100 each, Rfl: 5    levothyroxine 50 mcg tablet, Take 1 tablet (50 mcg total) by mouth daily, Disp: 90 tablet, Rfl: 3    lisinopril (ZESTRIL) 20 mg tablet, Take 1 tablet (20 mg total) by mouth daily, Disp: 90 tablet, Rfl: 3    metoprolol succinate (TOPROL-XL) 50 mg 24 hr tablet, take 1 tablet by mouth once daily, Disp: 90 tablet, Rfl: 5    omega-3-acid ethyl esters (LOVAZA) 1 g capsule, Take 2 g by mouth 2 (two) times a day, Disp: , Rfl:     Psyllium (FIBER) 0 52 g CAPS, Take by mouth, Disp: , Rfl:     rosuvastatin (CRESTOR) 10 MG tablet, Take 1 tablet (10 mg total) by mouth daily, Disp: 90 tablet, Rfl: 3    ALLERGIES:  No Known Allergies    ROS:  Review of Systems     Constitutional: Negative for fatigue, fever or loss of appetite  HENT: Negative  Respiratory: Negative for shortness of breath, dyspnea  Cardiovascular: Negative for chest pain/tightness  Gastrointestinal: Negative for abdominal pain, N/V  Endocrine: Negative for cold/heat intolerance, unexplained weight loss/gain  Genitourinary: Negative for flank pain, dysuria, hematuria  Musculoskeletal: Positive for arthralgia   Skin: Negative for rash  Neurological: Negative for numbness or tingling  Psychiatric/Behavioral: Negative for agitation  _____________________________________________________  PHYSICAL EXAMINATION:    Blood pressure 122/71, pulse 76, height 5' 2" (1 575 m), weight 87 5 kg (193 lb)  Constitutional: Oriented to person, place, and time  Appears well-developed and well-nourished  No distress  HENT:   Head: Normocephalic  Eyes: Conjunctivae are normal  Right eye exhibits no discharge  Left eye exhibits no discharge  No scleral icterus  Cardiovascular: Normal rate  Pulmonary/Chest: Effort normal    Neurological: Alert and oriented to person, place, and time  Skin: Skin is warm and dry  No rash noted  Not diaphoretic  No erythema  No pallor  Psychiatric: Normal mood and affect   Behavior is normal  Judgment and thought content normal       MUSCULOSKELETAL EXAMINATION:   Physical Exam  Ortho Exam    Right lower extremity is neurovascularly intact  Toes are pink and mobile   Compartments are soft  No warmth, erythema or ecchymosis  ROM of knee is from 5-115 degrees  Negative Lachman, drawer or pivot shift  No medial instability  Medial joint line tenderness, slight lateral joint line tenderness  Patellofemoral crepitation  Objective:  BP Readings from Last 1 Encounters:   06/02/22 122/71      Wt Readings from Last 1 Encounters:   06/02/22 87 5 kg (193 lb)        BMI:   Estimated body mass index is 35 3 kg/m² as calculated from the following:    Height as of this encounter: 5' 2" (1 575 m)  Weight as of this encounter: 87 5 kg (193 lb)  PROCEDURES PERFORMED:  Large joint arthrocentesis: R knee  Universal Protocol:  Risks and benefits: risks, benefits and alternatives were discussed  Consent given by: patient  Patient understanding: patient states understanding of the procedure being performed  Site marked: the operative site was marked  Radiology Images displayed and confirmed   If images not available, report reviewed: imaging studies available    Supporting Documentation  Indications: pain   Procedure Details  Location: knee - R knee  Preparation: Patient was prepped and draped in the usual sterile fashion  Needle size: 22 G  Ultrasound guidance: no  Approach: lateral  Medications administered: 20 mg Sodium Hyaluronate 20 MG/2ML    Patient tolerance: patient tolerated the procedure well with no immediate complications  Dressing:  Sterile dressing applied

## 2022-06-09 ENCOUNTER — PROCEDURE VISIT (OUTPATIENT)
Dept: OBGYN CLINIC | Facility: CLINIC | Age: 77
End: 2022-06-09
Payer: MEDICARE

## 2022-06-09 VITALS
HEIGHT: 62 IN | BODY MASS INDEX: 35.3 KG/M2 | HEART RATE: 83 BPM | SYSTOLIC BLOOD PRESSURE: 128 MMHG | DIASTOLIC BLOOD PRESSURE: 70 MMHG

## 2022-06-09 DIAGNOSIS — M47.816 OSTEOARTHRITIS OF LUMBAR SPINE, UNSPECIFIED SPINAL OSTEOARTHRITIS COMPLICATION STATUS: ICD-10-CM

## 2022-06-09 DIAGNOSIS — M17.11 PRIMARY OSTEOARTHRITIS OF RIGHT KNEE: Primary | ICD-10-CM

## 2022-06-09 DIAGNOSIS — M51.26 HERNIATED LUMBAR INTERVERTEBRAL DISC: ICD-10-CM

## 2022-06-09 PROCEDURE — 20610 DRAIN/INJ JOINT/BURSA W/O US: CPT | Performed by: PHYSICIAN ASSISTANT

## 2022-06-09 PROCEDURE — 99213 OFFICE O/P EST LOW 20 MIN: CPT | Performed by: PHYSICIAN ASSISTANT

## 2022-06-09 RX ORDER — HYALURONATE SODIUM 10 MG/ML
20 SYRINGE (ML) INTRAARTICULAR
Status: COMPLETED | OUTPATIENT
Start: 2022-06-09 | End: 2022-06-09

## 2022-06-09 RX ADMIN — Medication 20 MG: at 14:28

## 2022-06-09 NOTE — PROGRESS NOTES
ASSESSMENT/PLAN:    Diagnoses and all orders for this visit:    Primary osteoarthritis of right knee    Osteoarthritis of lumbar spine, unspecified spinal osteoarthritis complication status  -     Ambulatory Referral to Pain Management; Future    Herniated lumbar intervertebral disc  -     Ambulatory Referral to Pain Management; Future    Other orders  -     Large joint arthrocentesis        The patient's right knee was injected with her 3rd and final set of Euflexxa  She tolerated the injection quite well  She will follow up with our office in 6 weeks for strength and motion check  The patient is acceptable to this plan  The patient does complain of lower back pain  She denies any radiation of her symptoms  In the past she has had an MRI performed which was consistent with degenerative disc disease of her lumbar spine with herniated discs  We will refer her to Spine and Pain Management as per her request     Return in about 6 weeks (around 7/21/2022)  _____________________________________________________  CHIEF COMPLAINT:  Chief Complaint   Patient presents with    Right Knee - Follow-up         SUBJECTIVE:  Greyson Chambers is a 68 y o  female who presents to our office for viscosupplementation of her right knee  She is here today for her 3rd and final set of Euflexxa  She tolerated last week's injection without issue  She denies any numbness or tingling  She denies any fever or chills        The following portions of the patient's history were reviewed and updated as appropriate: allergies, current medications, past family history, past medical history, past social history, past surgical history and problem list     PAST MEDICAL HISTORY:  Past Medical History:   Diagnosis Date    Benign essential hypertension     Chronic kidney disease, stage 2 (mild)     Chronic kidney disease, stage 3 ([address] )     Disorder of gallbladder     Disorder of skin and subcutaneous tissue     Dyspnea     Essential hypertension     Hyperlipidemia     Hypokalemia     Hypothyroidism     Malaise and fatigue     Mixed hyperlipidemia     Morbid obesity (HCC)     Pernicious anemia     Type 2 diabetes mellitus ([facility] [address] [facility]        PAST SURGICAL HISTORY:  Past Surgical History:   Procedure Laterality Date    BREAST BIOPSY Right     CARPAL TUNNEL RELEASE Bilateral    [facility] COLONOSCOPY      [facility] HYSTERECTOMY      PARTIAL HYSTERECTOMY      SKIN LESION EXCISION      scalp        FAMILY HISTORY:  Family History   Problem Relation Age of Onset    Stroke Mother     Atrial fibrillation Mother     Brain cancer Father     Other Brother         Twin brothers - Open heart    Other Brother         Twin brothers - Open heart    No Known Problems Maternal Grandmother     No Known Problems Paternal Grandmother        SOCIAL HISTORY:  Social History     Tobacco Use    Smoking status: Former Smoker     Packs/day: 1 00     Years: 14 00     Pack years: 14 00     Types: Cigarettes     Quit date:      Years since quittin 4    Smokeless tobacco: Never Used   Vaping Use    Vaping Use: Never used   Substance Use Topics    Alcohol use: Yes     Comment: Occasionally     Drug use: Never     Comment: Denies drug use - As per [employer]        MEDICATIONS:    Current Outpatient Medications:     amLODIPine (NORVASC) 5 mg tablet, Take 1 tablet (5 mg total) by mouth daily, Disp: 90 tablet, Rfl: 3    aspirin (ECOTRIN LOW STRENGTH) 81 mg EC tablet, Take 81 mg by mouth daily, Disp: , Rfl:     cholecalciferol (VITAMIN D3) 1,000 units tablet, Take 800 Units by mouth daily  , Disp: , Rfl:     folic acid (FOLVITE) 644 mcg tablet, Take 400 mcg by mouth daily, Disp: , Rfl:     glipiZIDE (GLUCOTROL) 10 mg tablet, Take 1 tablet (10 mg total) by mouth 2 (two) times a day before meals, Disp: 180 tablet, Rfl: 1    glucose monitoring kit (FREESTYLE) monitoring kit, 1 each by Does not apply route daily, Disp: 1 each, Rfl: 0   insulin glargine (Lantus SoloStar) 100 units/mL injection pen, Inject 50 Units under the skin daily at bedtime, Disp: 15 mL, Rfl: 3    Insulin Pen Needle 32G X 6 MM MISC, Use in the morning, Disp: 100 each, Rfl: 5    Lancets (freestyle) lancets, Use as instructed -glucose monitor BID, Disp: 100 each, Rfl: 5    levothyroxine 50 mcg tablet, Take 1 tablet (50 mcg total) by mouth daily, Disp: 90 tablet, Rfl: 3    lisinopril (ZESTRIL) 20 mg tablet, Take 1 tablet (20 mg total) by mouth daily, Disp: 90 tablet, Rfl: 3    metoprolol succinate (TOPROL-XL) 50 mg 24 hr tablet, take 1 tablet by mouth once daily, Disp: 90 tablet, Rfl: 5    omega-3-acid ethyl esters (LOVAZA) 1 g capsule, Take 2 g by mouth 2 (two) times a day, Disp: , Rfl:     Psyllium (FIBER) 0 52 g CAPS, Take by mouth, Disp: , Rfl:     rosuvastatin (CRESTOR) 10 MG tablet, Take 1 tablet (10 mg total) by mouth daily, Disp: 90 tablet, Rfl: 3    ALLERGIES:  No Known Allergies    ROS:  Review of Systems     Constitutional: Negative for fatigue, fever or loss of appetite  HENT: Negative  Respiratory: Negative for shortness of breath, dyspnea  Cardiovascular: Negative for chest pain/tightness  Gastrointestinal: Negative for abdominal pain, N/V  Endocrine: Negative for cold/heat intolerance, unexplained weight loss/gain  Genitourinary: Negative for flank pain, dysuria, hematuria  Musculoskeletal: Positive for arthralgia   Skin: Negative for rash  Neurological: Negative for numbness or tingling  Psychiatric/Behavioral: Negative for agitation  _____________________________________________________  PHYSICAL EXAMINATION:    Blood pressure 128/70, pulse 83, height 5' 2" (1 575 m)  Constitutional: Oriented to person, place, and time  Appears well-developed and well-nourished  No distress  HENT:   Head: Normocephalic  Eyes: Conjunctivae are normal  Right eye exhibits no discharge  Left eye exhibits no discharge  No scleral icterus  Cardiovascular: Normal rate  Pulmonary/Chest: Effort normal    Neurological: Alert and oriented to person, place, and time  Skin: Skin is warm and dry  No rash noted  Not diaphoretic  No erythema  No pallor  Psychiatric: Normal mood and affect  Behavior is normal  Judgment and thought content normal       MUSCULOSKELETAL EXAMINATION:   Physical Exam  Ortho Exam    Right lower extremity is neurovascularly intact  Toes are pink and mobile   Compartments are soft  No warmth, erythema or ecchymosis  ROM of knee is from 5-115 degrees  Negative Lachman, drawer or pivot shift  No medial instability  Medial joint line tenderness, slight lateral joint line tenderness  Patellofemoral crepitation  Objective:  BP Readings from Last 1 Encounters:   06/09/22 128/70      Wt Readings from Last 1 Encounters:   06/02/22 87 5 kg (193 lb)        BMI:   Estimated body mass index is 35 3 kg/m² as calculated from the following:    Height as of this encounter: 5' 2" (1 575 m)  Weight as of 6/2/22: 87 5 kg (193 lb)        PROCEDURES PERFORMED:  Large joint arthrocentesis: R knee  Universal Protocol:  Risks and benefits: risks, benefits and alternatives were discussed  Consent given by: patient  Patient understanding: patient states understanding of the procedure being performed  Site marked: the operative site was marked  Supporting Documentation  Indications: pain   Procedure Details  Location: knee - R knee  Preparation: Patient was prepped and draped in the usual sterile fashion  Needle size: 22 G  Ultrasound guidance: no  Approach: lateral  Medications administered: 20 mg Sodium Hyaluronate 20 MG/2ML    Patient tolerance: patient tolerated the procedure well with no immediate complications  Dressing:  Sterile dressing applied

## 2022-07-12 ENCOUNTER — EVALUATION (OUTPATIENT)
Dept: PHYSICAL THERAPY | Facility: CLINIC | Age: 77
End: 2022-07-12
Payer: MEDICARE

## 2022-07-12 DIAGNOSIS — M54.41 CHRONIC RIGHT-SIDED LOW BACK PAIN WITH RIGHT-SIDED SCIATICA: Primary | ICD-10-CM

## 2022-07-12 DIAGNOSIS — G89.29 CHRONIC RIGHT-SIDED LOW BACK PAIN WITH RIGHT-SIDED SCIATICA: Primary | ICD-10-CM

## 2022-07-12 DIAGNOSIS — R26.9 GAIT ABNORMALITY: ICD-10-CM

## 2022-07-12 PROCEDURE — 97162 PT EVAL MOD COMPLEX 30 MIN: CPT | Performed by: PHYSICAL THERAPIST

## 2022-07-12 PROCEDURE — 97112 NEUROMUSCULAR REEDUCATION: CPT | Performed by: PHYSICAL THERAPIST

## 2022-07-12 NOTE — PROGRESS NOTES
PT Evaluation     Today's date: 2022  Patient name: Marjan Segovia  : 1945  MRN: [id number]  Referring provider: Tal Lu MD  Dx:   Encounter Diagnosis     ICD-10-CM    1  Chronic right-sided low back pain with right-sided sciatica  M54 41     G89 29    2  Gait abnormality  R26 9        Start Time: 0930  Stop Time: 1015  Total time in clinic (min): 45 minutes    Assessment  Assessment details: Marjan Segovia was seen for an initial PT evaluation today  Patient is a 68 y o  female with diagnosis of low back pain and past medical history significant for DM type 2, CKD stage 3, HTN, obesity, dyspnea, CTR bilat, hysterectomy  Moderate complexity evaluation  due to number of participation restrictions, functional outcome measure of 43% limitation, and evolving clinical presentation  Findings today show tightness of bilateral hamstrings and quads with weakness of hip extensors and limited core stability contributing to limitation in functional mobility and walking  Skilled PT indicated to treat at this time to address above stated deficits to return patient to PLOF  Impairments: abnormal gait, abnormal muscle firing, abnormal muscle tone, abnormal or restricted ROM, abnormal movement, activity intolerance, impaired balance, impaired physical strength, lacks appropriate home exercise program, pain with function, poor posture  and poor body mechanics  Functional limitations: lifting, pushing, pulling, bending, twisting, walking, stairs  Goals  STG (6 weeks)  1  Patient will have reported 0/10 pain in low back at rest    2  Improve patient's lumbar flexion to full for increased ability to forward bend  3  Increase patient's bilateral single leg balance to 3 seconds for increased stability on stairs  LTG (12 weeks)  1  Patient's LE strength will be equal bilaterally for ability to ambulate and return to functional activities at PLOF     2  Patient will be able to walk in community with 0/10 pain in low back  3  Patient will be independent with home exercise program for continued maintenance post PT discharge  Plan  Plan details: Progress note in 4 weeks  Patient would benefit from: skilled physical therapy  Planned modality interventions: unattended electrical stimulation, thermotherapy: hydrocollator packs, cryotherapy and traction  Planned therapy interventions: manual therapy, neuromuscular re-education, self care, therapeutic activities, therapeutic exercise, home exercise program and gait training  Frequency: 2x week  Duration in weeks: 12  Plan of Care beginning date: 2022  Plan of Care expiration date: 10/12/2022  Treatment plan discussed with: patient and PTA        Subjective Evaluation    History of Present Illness  Date of onset: 2022  Mechanism of injury: Zelda Price is a 68 y o  female who presents to outpatient Physical Therapy today with complaints of low back pain  States she fell out of bed several months ago while she was dreaming  After fall had increased LE and LBP  States LE is now feeling better after receiving injections in knee from ortho  Recently referred by ortho to pain management for LBP and has an initial appointment 22  To f/u with PCP at the end of August  C/o pain and difficulty with lifting, walking, daily chores  Has been seeing DCM 1x/month with minimal improvement  Has been working on flexion distraction and cold laser     Pain  Current pain ratin  At best pain ratin  At worst pain ratin  Location: R low back  Quality: dull ache (annoying)    Social Support  Steps to enter house: yes  6  Stairs in house: yes   L[facility] in: [facility]  Lives with: significant other    Employment status: not working  Hand dominance: right    Treatments  Previous treatment: chiropractic  Patient Goals  Patient goals for therapy: decreased pain  Patient goal: walk pain free and attempt to lose weight        Objective     Concurrent Complaints  Positive for history of trauma (fall out of bed)  Negative for bladder dysfunction, bowel dysfunction and saddle (S4) numbness    Neurological Testing     Sensation     Lumbar   Left   Intact: light touch    Right   Intact: light touch    Reflexes   Left   Babinski sign: negative    Right   Babinski sign: negative    Active Range of Motion     Lumbar   Flexion: Active lumbar flexion: fingertips to ankles  Restriction level: minimal  Extension:  Restriction level: minimal  Left lateral flexion:  Restriction level: minimal  Right lateral flexion:  Restriction level: minimal  Mechanical Assessment    Cervical      Thoracic      Lumbar    Standing flexion: repeated movements   Pain location:no change  Standing extension: repeated movements  Pain location: no change    Strength/Myotome Testing     Left Hip   Planes of Motion   Flexion: 5    Right Hip   Planes of Motion   Flexion: 5    Left Knee   Flexion: 5  Extension: 5    Right Knee   Flexion: 5  Extension: 4    Left Ankle/Foot   Dorsiflexion: 5    Right Ankle/Foot   Dorsiflexion: 5    Muscle Activation     Additional Muscle Activation Details  Activation of TrA with resisted SLR R= mod L=mod  Initial activation moderate, reduced to trace with resistance  Tests     Lumbar     Left   Negative passive SLR  Right   Negative passive SLR       Additional Tests Details  Hamstring 90/90 SLR R=(45) L=(55)  (-) supine to sit test  No change in symptoms with manual lumbar traction      General Comments:      Lumbar Comments  Gait: slowed velocity, forward lean    FOTO: 57% function, 64% predicted function              Precautions: none noted  Progress note: 8/12  POC: 10/12    Manuals 7/12                                       Neuro Re-Ed        PPT 10x       kegels :10x10       Core brace  *      Knee fall out  *      Supine march  *      SLR with core brace                                Ther Ex        Nustep  *      Bridge :05x10               LTR        Quadruped LTR Supine ball squeeze        Supine clamshell                QL stretch        Piriformis stretch        Hip flexor stretch  *      Hamstring stretch Seated :30x3       Ther Activity        Step ups        Sit to stand        Gait Training                        Modalities                        Access Code: [email or web address]  URL: [email or web address]  Date: 07/12/2022  Prepared by: Macario Felty    Exercises  Supine Posterior Pelvic Tilt - 3 x daily - 7 x weekly - 1 sets - 10 reps - 5 sec hold  Supine Bridge - 3 x daily - 7 x weekly - 1 sets - 10 reps - 5 sec hold  Seated Hamstring Stretch - 3 x daily - 7 x weekly - 3 sets - 3 reps - 30 sec hold

## 2022-07-20 ENCOUNTER — OFFICE VISIT (OUTPATIENT)
Dept: PHYSICAL THERAPY | Facility: CLINIC | Age: 77
End: 2022-07-20
Payer: MEDICARE

## 2022-07-20 DIAGNOSIS — G89.29 CHRONIC RIGHT-SIDED LOW BACK PAIN WITH RIGHT-SIDED SCIATICA: Primary | ICD-10-CM

## 2022-07-20 DIAGNOSIS — R26.9 GAIT ABNORMALITY: ICD-10-CM

## 2022-07-20 DIAGNOSIS — M54.41 CHRONIC RIGHT-SIDED LOW BACK PAIN WITH RIGHT-SIDED SCIATICA: Primary | ICD-10-CM

## 2022-07-20 PROCEDURE — 97112 NEUROMUSCULAR REEDUCATION: CPT | Performed by: PHYSICAL THERAPIST

## 2022-07-20 PROCEDURE — 97110 THERAPEUTIC EXERCISES: CPT | Performed by: PHYSICAL THERAPIST

## 2022-07-20 NOTE — PROGRESS NOTES
Daily Note     Today's date: 2022  Patient name: Daiana Niño  : 1945  MRN: [id number]  Referring provider: Adam Mason MD  Dx:   Encounter Diagnosis     ICD-10-CM    1  Chronic right-sided low back pain with right-sided sciatica  M54 41     G89 29    2  Gait abnormality  R26 9        Start Time: 1525  Stop Time: 1605  Total time in clinic (min): 40 minutes    Subjective: States she feels pretty good today, minimal c/o back pain  States she canceled pain management f/u, would like to attempt PT first        Objective: See treatment diary below      Assessment: Tolerated treatment well  Showed progress with ability to active TrA today vs at initial evaluation  No reported pain or issues during session today  Patient would benefit from continued PT working on core strength and stability  Plan: Continue per plan of care  Progress treatment as tolerated  Precautions: none noted  Progress note:   POC: 10/12    Manuals                                       Neuro Re-Ed        UBE  *standing on blue foam 4 min alt 100/60      PPT 10x       kegels :10x10 :10x10      Core brace  *:05x10      Knee fall out  *10x      Supine march  10x      SLR with core brace                                Ther Ex        Nustep  L1 10 min      Bridge :05x10 :05x10              LTR  20x      Quadruped LTR        Supine ball squeeze  2x10      Supine clamshell  Green supine 10x              QL stretch        Piriformis stretch        Hip flexor stretch  *at steps 4x:15      Hamstring stretch Seated :30x3 Seated :30x3      Ther Activity        Step ups        Sit to stand        Gait Training                        Modalities                        Access Code: [email or web address]  URL: [email or web address]  Date: 2022  Prepared by: Gladys Escoto    Exercises  Supine Posterior Pelvic Tilt - 3 x daily - 7 x weekly - 1 sets - 10 reps - 5 sec hold  Supine Bridge - 3 x daily - 7 x weekly - 1 sets - 10 reps - 5 sec hold  Seated Hamstring Stretch - 3 x daily - 7 x weekly - 3 sets - 3 reps - 30 sec hold

## 2022-07-21 ENCOUNTER — OFFICE VISIT (OUTPATIENT)
Dept: PHYSICAL THERAPY | Facility: CLINIC | Age: 77
End: 2022-07-21
Payer: MEDICARE

## 2022-07-21 DIAGNOSIS — G89.29 CHRONIC RIGHT-SIDED LOW BACK PAIN WITH RIGHT-SIDED SCIATICA: Primary | ICD-10-CM

## 2022-07-21 DIAGNOSIS — R26.9 GAIT ABNORMALITY: ICD-10-CM

## 2022-07-21 DIAGNOSIS — M54.41 CHRONIC RIGHT-SIDED LOW BACK PAIN WITH RIGHT-SIDED SCIATICA: Primary | ICD-10-CM

## 2022-07-21 PROCEDURE — 97110 THERAPEUTIC EXERCISES: CPT

## 2022-07-21 PROCEDURE — 97112 NEUROMUSCULAR REEDUCATION: CPT

## 2022-07-21 PROCEDURE — 97530 THERAPEUTIC ACTIVITIES: CPT

## 2022-07-21 NOTE — PROGRESS NOTES
Daily Note     Today's date: 2022  Patient name: Brett Alva  : 1945  MRN: [id number]  Referring provider: Roberto Nyhan, MD  Dx:   Encounter Diagnosis     ICD-10-CM    1  Chronic right-sided low back pain with right-sided sciatica  M54 41     G89 29    2  Gait abnormality  R26 9        Start Time: 1524          Subjective: Patient states she has no pain today  Objective: See treatment diary below    Patient's home exercise program updated to include additional exercises  Handout issued and explained with demonstration  Patient accepts new exercises  Assessment: Tolerated treatment well  Patient would benefit from continued PT for stretching and strengthening  Patient was able to add exercises to her program with little difficulty and discomfort  She gets a little right thigh discomfort with Nustep  She seemed to understand all education on new exercises  Patient felt ok when she left department  Plan: Continue per plan of care  Progress treatment as tolerated         Precautions: none noted  Progress note:   POC: 10/12    Manuals                                      Neuro Re-Ed        UBE  *standing on blue foam 4 min alt 100/60 standing on blue foam 4 min alt 100/60     PPT 10x       kegels :10x10 :10x10 :10x10     Core brace  *:05x10 :05x10     Knee fall out  *10x x10     Supine march  10x x10     SLR with core brace   x5 B/L                             Ther Ex        Nustep  L1 10 min L1 x10 min     Bridge :05x10 :05x10 :05x10             LTR  20x x20     Quadruped LTR        Supine ball squeeze  2x10 2x10     Supine clamshell  Green supine 10x Green x15             QL stretch   *:15x5     Piriformis stretch        Hip flexor stretch  *at steps 4x:15 at steps 4x:15     Hamstring stretch Seated :30x3 Seated :30x3 At step :15x5     Ther Activity        Step ups   *6" x10 fwd     Sit to stand   *2x5     Gait Training                        Modalities Access Code: [email or web address]  URL: [email or web address]  Date: 07/21/2022  Prepared by: Chely Jacobo    Exercises  · Supine Posterior Pelvic Tilt - 3 x daily - 7 x weekly - 1 sets - 10 reps - 5 sec hold  · Supine Bridge - 3 x daily - 7 x weekly - 1 sets - 10 reps - 5 sec hold  · Seated Hamstring Stretch - 3 x daily - 7 x weekly - 3 sets - 3 reps - 30 sec hold  · Supine Straight Leg Raises - 2 x daily - 7 x weekly - 2 sets - 5 reps  · Seated Alternating Side Stretch with Arm Overhead - 2 x daily - 7 x weekly - 1 sets - 10 reps - 3-5 sec hold  · Sit to Stand - 2 x daily - 7 x weekly - 1 sets - 10 reps

## 2022-07-26 ENCOUNTER — OFFICE VISIT (OUTPATIENT)
Dept: OBGYN CLINIC | Facility: CLINIC | Age: 77
End: 2022-07-26
Payer: MEDICARE

## 2022-07-26 VITALS
SYSTOLIC BLOOD PRESSURE: 124 MMHG | HEIGHT: 62 IN | BODY MASS INDEX: 36.07 KG/M2 | WEIGHT: 196 LBS | DIASTOLIC BLOOD PRESSURE: 68 MMHG | HEART RATE: 90 BPM

## 2022-07-26 DIAGNOSIS — M17.11 PRIMARY OSTEOARTHRITIS OF RIGHT KNEE: Primary | ICD-10-CM

## 2022-07-26 PROCEDURE — 99213 OFFICE O/P EST LOW 20 MIN: CPT | Performed by: ORTHOPAEDIC SURGERY

## 2022-07-26 NOTE — PROGRESS NOTES
ASSESSMENT/PLAN:    Diagnoses and all orders for this visit:    Primary osteoarthritis of right knee        The patient is virtually asymptomatic today  She can continue physical therapy for her lower back  She will follow up with our office as needed  The patient is acceptable to this plan  Return if symptoms worsen or fail to improve  The patient is symptom free in regards her right knee  She has full strength full motion  Continue home exercise program   Continue stretching  Follow up on as-needed basis  If her condition changes, she will not hesitate to let us know      _____________________________________________________  CHIEF COMPLAINT:  Chief Complaint   Patient presents with    Right Knee - Follow-up         SUBJECTIVE:  Khloe Orellana is a 68 y o  female who presents to our office for follow-up visit  The patient is 6 weeks status post viscosupplementation of her right knee with Euflexxa  She states that since the injections her symptoms have greatly improved  She denies any significant knee pain today  She denies any numbness or tingling  She denies any fever or chills      The following portions of the patient's history were reviewed and updated as appropriate: allergies, current medications, past family history, past medical history, past social history, past surgical history and problem list     PAST MEDICAL HISTORY:  Past Medical History:   Diagnosis Date    Benign essential hypertension     Chronic kidney disease, stage 2 (mild)     Chronic kidney disease, stage 3 ([address] )     Disorder of gallbladder     Disorder of skin and subcutaneous tissue     Dyspnea     Essential hypertension     Hyperlipidemia     Hypokalemia     Hypothyroidism     Malaise and fatigue     Mixed hyperlipidemia     Morbid obesity ([address] )     Pernicious anemia     Type 2 diabetes mellitus ([address] )        PAST SURGICAL HISTORY:  Past Surgical History:   Procedure Laterality Date    BREAST BIOPSY Right  CARPAL TUNNEL RELEASE Bilateral     CHOLECYSTECTOMY      COLONOSCOPY      Dr Bindu Pereira HYSTERECTOMY      PARTIAL HYSTERECTOMY      SKIN LESION EXCISION      scalp        FAMILY HISTORY:  Family History   Problem Relation Age of Onset    Stroke Mother     Atrial fibrillation Mother     Brain cancer Father     Other Brother         Twin brothers - Open heart    Other Brother         Twin brothers - Open heart    No Known Problems Maternal Grandmother     No Known Problems Paternal Grandmother        SOCIAL HISTORY:  Social History     Tobacco Use    Smoking status: Former Smoker     Packs/day: 1 00     Years: 14 00     Pack years: 14 00     Types: Cigarettes     Quit date:      Years since quittin 5    Smokeless tobacco: Never Used   Vaping Use    Vaping Use: Never used   Substance Use Topics    Alcohol use: Yes     Comment: Occasionally     Drug use: Never     Comment: Denies drug use - As per [employer]        MEDICATIONS:    Current Outpatient Medications:     amLODIPine (NORVASC) 5 mg tablet, Take 1 tablet (5 mg total) by mouth daily, Disp: 90 tablet, Rfl: 3    aspirin (ECOTRIN LOW STRENGTH) 81 mg EC tablet, Take 81 mg by mouth daily, Disp: , Rfl:     cholecalciferol (VITAMIN D3) 1,000 units tablet, Take 800 Units by mouth daily  , Disp: , Rfl:     folic acid (FOLVITE) 061 mcg tablet, Take 400 mcg by mouth daily, Disp: , Rfl:     glipiZIDE (GLUCOTROL) 10 mg tablet, Take 1 tablet (10 mg total) by mouth 2 (two) times a day before meals, Disp: 180 tablet, Rfl: 1    glucose monitoring kit (FREESTYLE) monitoring kit, 1 each by Does not apply route daily, Disp: 1 each, Rfl: 0    insulin glargine (Lantus SoloStar) 100 units/mL injection pen, Inject 50 Units under the skin daily at bedtime, Disp: 15 mL, Rfl: 3    Insulin Pen Needle 32G X 6 MM MISC, Use in the morning, Disp: 100 each, Rfl: 5    Lancets (freestyle) lancets, Use as instructed -glucose monitor BID, Disp: 100 each, Rfl: 5   levothyroxine 50 mcg tablet, Take 1 tablet (50 mcg total) by mouth daily, Disp: 90 tablet, Rfl: 3    lisinopril (ZESTRIL) 20 mg tablet, Take 1 tablet (20 mg total) by mouth daily, Disp: 90 tablet, Rfl: 3    metoprolol succinate (TOPROL-XL) 50 mg 24 hr tablet, take 1 tablet by mouth once daily, Disp: 90 tablet, Rfl: 5    omega-3-acid ethyl esters (LOVAZA) 1 g capsule, Take 2 g by mouth 2 (two) times a day, Disp: , Rfl:     Psyllium (FIBER) 0 52 g CAPS, Take by mouth, Disp: , Rfl:     rosuvastatin (CRESTOR) 10 MG tablet, Take 1 tablet (10 mg total) by mouth daily, Disp: 90 tablet, Rfl: 3    ALLERGIES:  No Known Allergies    ROS:  Review of Systems     Constitutional: Negative for fatigue, fever or loss of appetite  HENT: Negative  Respiratory: Negative for shortness of breath, dyspnea  Cardiovascular: Negative for chest pain/tightness  Gastrointestinal: Negative for abdominal pain, N/V  Endocrine: Negative for cold/heat intolerance, unexplained weight loss/gain  Genitourinary: Negative for flank pain, dysuria, hematuria  Musculoskeletal:  Negative for arthralgia   Skin: Negative for rash  Neurological: Negative for numbness or tingling  Psychiatric/Behavioral: Negative for agitation  _____________________________________________________  PHYSICAL EXAMINATION:    Blood pressure 124/68, pulse 90, height 5' 2" (1 575 m), weight 88 9 kg (196 lb)  Constitutional: Oriented to person, place, and time  Appears well-developed and well-nourished  No distress  HENT:   Head: Normocephalic  Eyes: Conjunctivae are normal  Right eye exhibits no discharge  Left eye exhibits no discharge  No scleral icterus  Cardiovascular: Normal rate  Pulmonary/Chest: Effort normal    Neurological: Alert and oriented to person, place, and time  Skin: Skin is warm and dry  No rash noted  Not diaphoretic  No erythema  No pallor  Psychiatric: Normal mood and affect   Behavior is normal  Judgment and thought content normal       MUSCULOSKELETAL EXAMINATION:   Physical Exam  Ortho Exam    Right lower extremity is neurovascularly intact  Toes are pink and mobile   Compartments are soft  No warmth, erythema or ecchymosis  ROM of knee is from 5-115 degrees  Negative Lachman, drawer or pivot shift  No medial instability  No medial joint line tenderness, slight lateral joint line tenderness  Patellofemoral crepitation  Objective:  BP Readings from Last 1 Encounters:   07/26/22 124/68      Wt Readings from Last 1 Encounters:   07/26/22 88 9 kg (196 lb)        BMI:   Estimated body mass index is 35 85 kg/m² as calculated from the following:    Height as of this encounter: 5' 2" (1 575 m)  Weight as of this encounter: 88 9 kg (196 lb)          Scribe Attestation    I,:  Tanvir Quispe PA-C am acting as a scribe while in the presence of the attending physician :       I,:  Aretha Rogers, DO personally performed the services described in this documentation    as scribed in my presence :

## 2022-07-27 ENCOUNTER — OFFICE VISIT (OUTPATIENT)
Dept: PHYSICAL THERAPY | Facility: CLINIC | Age: 77
End: 2022-07-27
Payer: MEDICARE

## 2022-07-27 DIAGNOSIS — M54.41 CHRONIC RIGHT-SIDED LOW BACK PAIN WITH RIGHT-SIDED SCIATICA: Primary | ICD-10-CM

## 2022-07-27 DIAGNOSIS — G89.29 CHRONIC RIGHT-SIDED LOW BACK PAIN WITH RIGHT-SIDED SCIATICA: Primary | ICD-10-CM

## 2022-07-27 DIAGNOSIS — R26.9 GAIT ABNORMALITY: ICD-10-CM

## 2022-07-27 PROCEDURE — 97112 NEUROMUSCULAR REEDUCATION: CPT

## 2022-07-27 PROCEDURE — 97530 THERAPEUTIC ACTIVITIES: CPT

## 2022-07-27 PROCEDURE — 97110 THERAPEUTIC EXERCISES: CPT

## 2022-07-27 NOTE — PROGRESS NOTES
Daily Note     Today's date: 2022  Patient name: Jon Flynn  : 1945  MRN: [id number]  Referring provider: Bassem Goldberg MD  Dx:   Encounter Diagnosis     ICD-10-CM    1  Chronic right-sided low back pain with right-sided sciatica  M54 41     G89 29    2  Gait abnormality  R26 9        Start Time: 1517          Subjective: Patient states her pain is a 1/10 in the low back today  She has only a few flair ups of pain when she bends over  Objective: See treatment diary below    Esthela's home exercise program updated to include additional exercises  Handout issued and explained with demonstration  Patient accepts new exercises  Green theraband issued for home use  Assessment: Tolerated treatment well  Patient would benefit from continued PT for stretching and strengthening  Patient was able to add exercises to her program  She seemed to understand all education on exercises  Patient felt ok when she left department  Plan: Continue per plan of care  Progress treatment as tolerated         Precautions: none noted  Progress note:   POC: 10/12    Manuals                                     Neuro Re-Ed        UBE  *standing on blue foam 4 min alt 100/60 standing on blue foam 4 min alt 100/60 standing on blue foam 4 min alt 100/60    PPT 10x       kegels :10x10 :10x10 :10x10 :10x10    Core brace  *:05x10 :05x10 :05x10    Knee fall out  *10x x10 x10    Supine march  10x x10 x10    SLR with core brace   x5 B/L 2x5 B/L                            Ther Ex        Nustep  L1 10 min L1 x10 min L1 x10 min    Bridge :05x10 :05x10 :05x10 :05x10            LTR  20x x20 x20    Quadruped LTR        Supine ball squeeze  2x10 2x10 2x10    Supine clamshell  Green supine 10x Green x15 Green x20            QL stretch   *:15x5 :15x5    Piriformis stretch        Hip flexor stretch  *at steps 4x:15 at steps 4x:15 At step :15x5    Hamstring stretch Seated :30x3 Seated :30x3 At step :15x5 At step :15x5    Ther Activity        Step ups   *6" x10 fwd 6" x10 ea fwd/ side    Sit to stand   *2x5 2x5    Gait Training                        Modalities                          Access Code: [email or web address]  URL: [email or web address]  Date: 07/21/2022  Prepared by: Chely Jacobo    Exercises  · Supine Posterior Pelvic Tilt - 3 x daily - 7 x weekly - 1 sets - 10 reps - 5 sec hold  · Supine Bridge - 3 x daily - 7 x weekly - 1 sets - 10 reps - 5 sec hold  · Seated Hamstring Stretch - 3 x daily - 7 x weekly - 3 sets - 3 reps - 30 sec hold  · Supine Straight Leg Raises - 2 x daily - 7 x weekly - 2 sets - 5 reps  · Seated Alternating Side Stretch with Arm Overhead - 2 x daily - 7 x weekly - 1 sets - 10 reps - 3-5 sec hold  · Sit to Stand - 2 x daily - 7 x weekly - 1 sets - 10 reps

## 2022-07-28 ENCOUNTER — OFFICE VISIT (OUTPATIENT)
Dept: PHYSICAL THERAPY | Facility: CLINIC | Age: 77
End: 2022-07-28
Payer: MEDICARE

## 2022-07-28 DIAGNOSIS — M54.41 CHRONIC RIGHT-SIDED LOW BACK PAIN WITH RIGHT-SIDED SCIATICA: Primary | ICD-10-CM

## 2022-07-28 DIAGNOSIS — R26.9 GAIT ABNORMALITY: ICD-10-CM

## 2022-07-28 DIAGNOSIS — G89.29 CHRONIC RIGHT-SIDED LOW BACK PAIN WITH RIGHT-SIDED SCIATICA: Primary | ICD-10-CM

## 2022-07-28 PROCEDURE — 97110 THERAPEUTIC EXERCISES: CPT

## 2022-07-28 PROCEDURE — 97112 NEUROMUSCULAR REEDUCATION: CPT

## 2022-07-28 PROCEDURE — 97530 THERAPEUTIC ACTIVITIES: CPT

## 2022-07-28 NOTE — PROGRESS NOTES
Daily Note     Today's date: 2022  Patient name: Enedina Palafox  : 1945  MRN: [id number]  Referring provider: Curt Roberts MD  Dx:   Encounter Diagnosis     ICD-10-CM    1  Chronic right-sided low back pain with right-sided sciatica  M54 41     G89 29    2  Gait abnormality  R26 9        Start Time: 1515          Subjective: Patient states she was tired after last visit and needed 1/2 hour power nap  Today she is a little sore in the right inner thigh (2/10 pain)  Objective: See treatment diary below      Assessment: Tolerated treatment well  Patient would benefit from continued PT for stretching and strengthening  Patient performed her exercises with little difficulty and discomfort  She felt ok by the end of the session and her pain was a little less  Plan: Continue per plan of care  Progress treatment as tolerated         Precautions: none noted  Progress note:   POC: 10/12    Manuals                                    Neuro Re-Ed        UBE  *standing on blue foam 4 min alt 100/60 standing on blue foam 4 min alt 100/60 standing on blue foam 4 min alt 100/60 standing on blue foam 4 min alt 100/60   PPT 10x       kegels :10x10 :10x10 :10x10 :10x10 :10x10   Core brace  *:05x10 :05x10 :05x10 :05x10   Knee fall out  *10x x10 x10 x10   Supine march  10x x10 x10 x10   SLR with core brace   x5 B/L 2x5 B/L 2x5 B/L                           Ther Ex        Nustep s=8  L1 10 min L1 x10 min L1 x10 min L1 x10 min   Bridge :05x10 :05x10 :05x10 :05x10 :05x10           LTR  20x x20 x20 x20   Quadruped LTR        Supine ball squeeze  2x10 2x10 2x10 2x10   Supine clamshell  Green supine 10x Green x15 Green x20 Green x20           QL stretch   *:15x5 :15x5 :15x5   Piriformis stretch        Hip flexor stretch  *at steps 4x:15 at steps 4x:15 At step :15x5 At step :15x5   Hamstring stretch Seated :30x3 Seated :30x3 At step :15x5 At step :15x5 At step :15x5   Ther Activity Step ups   *6" x10 fwd 6" x10 ea fwd/ side 6" x10 ea fwd/ side   Sit to stand   *2x5 2x5 chair Chair 2x5   Gait Training                        Modalities                          Access Code: [email or web address]  URL: [email or web address]  Date: 07/21/2022  Prepared by: Nasrin Jacobo    Exercises  · Supine Posterior Pelvic Tilt - 3 x daily - 7 x weekly - 1 sets - 10 reps - 5 sec hold  · Supine Bridge - 3 x daily - 7 x weekly - 1 sets - 10 reps - 5 sec hold  · Seated Hamstring Stretch - 3 x daily - 7 x weekly - 3 sets - 3 reps - 30 sec hold  · Supine Straight Leg Raises - 2 x daily - 7 x weekly - 2 sets - 5 reps  · Seated Alternating Side Stretch with Arm Overhead - 2 x daily - 7 x weekly - 1 sets - 10 reps - 3-5 sec hold  · Sit to Stand - 2 x daily - 7 x weekly - 1 sets - 10 reps

## 2022-08-03 ENCOUNTER — OFFICE VISIT (OUTPATIENT)
Dept: PHYSICAL THERAPY | Facility: CLINIC | Age: 77
End: 2022-08-03
Payer: MEDICARE

## 2022-08-03 DIAGNOSIS — M54.41 CHRONIC RIGHT-SIDED LOW BACK PAIN WITH RIGHT-SIDED SCIATICA: Primary | ICD-10-CM

## 2022-08-03 DIAGNOSIS — R26.9 GAIT ABNORMALITY: ICD-10-CM

## 2022-08-03 DIAGNOSIS — G89.29 CHRONIC RIGHT-SIDED LOW BACK PAIN WITH RIGHT-SIDED SCIATICA: Primary | ICD-10-CM

## 2022-08-03 PROCEDURE — 97112 NEUROMUSCULAR REEDUCATION: CPT | Performed by: PHYSICAL THERAPIST

## 2022-08-03 PROCEDURE — 97110 THERAPEUTIC EXERCISES: CPT | Performed by: PHYSICAL THERAPIST

## 2022-08-03 PROCEDURE — 97530 THERAPEUTIC ACTIVITIES: CPT | Performed by: PHYSICAL THERAPIST

## 2022-08-03 NOTE — PROGRESS NOTES
Daily Note     Today's date: 8/3/2022  Patient name: Russell Perez  : 1945  MRN: [id number]  Referring provider: Laura Craft MD  Dx:   Encounter Diagnosis     ICD-10-CM    1  Chronic right-sided low back pain with right-sided sciatica  M54 41     G89 29    2  Gait abnormality  R26 9        Start Time: 1530  Stop Time: 1615  Total time in clinic (min): 45 minutes    Subjective: No new complaints, states she if feeling a little better  Difficulty with walking distances due to low back pain and occasionally into right glut  Objective: See treatment diary below      Assessment: Tolerated treatment well  No increased symptoms throughout session today  Showing improved tolerance to exercises with improved mechanics  Patient would benefit from continued PT working on functional core strength and stability  Plan: Continue per plan of care  Progress treatment as tolerated         Precautions: none noted  Progress note:   POC: 10/12    Manuals 8/3 7/20 7/21 7/27 7/28                                   Neuro Re-Ed        UBE standing on blue foam 4 min alt 100/60 *standing on blue foam 4 min alt 100/60 standing on blue foam 4 min alt 100/60 standing on blue foam 4 min alt 100/60 standing on blue foam 4 min alt 100/60   PPT        kegels :10x10 :10x10 :10x10 :10x10 :10x10   Core brace :05x10 *:05x10 :05x10 :05x10 :05x10   Knee fall out 10x *10x x10 x10 x10   Supine march 10x 10x x10 x10 x10   SLR with core brace 2x5 B/L  x5 B/L 2x5 B/L 2x5 B/L                           Ther Ex        Nustep s=8 L1 x10 min L1 10 min L1 x10 min L1 x10 min L1 x10 min   Bridge :05x10 :05x10 :05x10 :05x10 :05x10           LTR 20x 20x x20 x20 x20   Quadruped LTR        Supine ball squeeze 2x10 2x10 2x10 2x10 2x10   Supine clamshell Green 20x Green supine 10x Green x15 Green x20 Green x20           QL stretch :15x5  *:15x5 :15x5 :15x5   Piriformis stretch        Hip flexor stretch At step :15x5 *at steps 4x:15 at steps 4x: 15 At step :15x5 At step :15x5   Hamstring stretch At step :15x5 Seated :30x3 At step :15x5 At step :15x5 At step :15x5   Ther Activity        Step ups At steps 15x bilat fwd/lat  *6" x10 fwd 6" x10 ea fwd/ side 6" x10 ea fwd/ side   Sit to stand 2x5  *2x5 2x5 chair Chair 2x5   Gait Training                        Modalities                          Access Code: [email or web address]  URL: [email or web address]  Date: 07/21/2022  Prepared by: Mavis Najjar Frohnheiser    Exercises  · Supine Posterior Pelvic Tilt - 3 x daily - 7 x weekly - 1 sets - 10 reps - 5 sec hold  · Supine Bridge - 3 x daily - 7 x weekly - 1 sets - 10 reps - 5 sec hold  · Seated Hamstring Stretch - 3 x daily - 7 x weekly - 3 sets - 3 reps - 30 sec hold  · Supine Straight Leg Raises - 2 x daily - 7 x weekly - 2 sets - 5 reps  · Seated Alternating Side Stretch with Arm Overhead - 2 x daily - 7 x weekly - 1 sets - 10 reps - 3-5 sec hold  · Sit to Stand - 2 x daily - 7 x weekly - 1 sets - 10 reps

## 2022-08-04 ENCOUNTER — OFFICE VISIT (OUTPATIENT)
Dept: PHYSICAL THERAPY | Facility: CLINIC | Age: 77
End: 2022-08-04
Payer: MEDICARE

## 2022-08-04 DIAGNOSIS — M54.41 CHRONIC RIGHT-SIDED LOW BACK PAIN WITH RIGHT-SIDED SCIATICA: Primary | ICD-10-CM

## 2022-08-04 DIAGNOSIS — R26.9 GAIT ABNORMALITY: ICD-10-CM

## 2022-08-04 DIAGNOSIS — G89.29 CHRONIC RIGHT-SIDED LOW BACK PAIN WITH RIGHT-SIDED SCIATICA: Primary | ICD-10-CM

## 2022-08-04 PROCEDURE — 97530 THERAPEUTIC ACTIVITIES: CPT

## 2022-08-04 PROCEDURE — 97112 NEUROMUSCULAR REEDUCATION: CPT

## 2022-08-04 PROCEDURE — 97110 THERAPEUTIC EXERCISES: CPT

## 2022-08-04 NOTE — PROGRESS NOTES
Daily Note     Today's date: 2022  Patient name: Arthur Duarte  : 1945  MRN: [id number]  Referring provider: Alisha Zuniga MD  Dx:   Encounter Diagnosis     ICD-10-CM    1  Chronic right-sided low back pain with right-sided sciatica  M54 41     G89 29    2  Gait abnormality  R26 9        Start Time: 1527          Subjective: Patient feels tired today,but no pain  Objective: See treatment diary below      Assessment: Tolerated treatment well  Patient would benefit from continued PT for stretching and strengthening  Patient felt ok, but tired by the end of the session  Plan: Continue per plan of care  Progress treatment as tolerated         Precautions: none noted  Progress note:   POC: 10/12    Manuals 8/3 8/4  7/27 7/28                                   Neuro Re-Ed        UBE standing on blue foam 4 min alt 100/60 standing on blue foam 4 min alt 100/60  standing on blue foam 4 min alt 100/60 standing on blue foam 4 min alt 100/60   PPT        kegels :10x10 :10x10  :10x10 :10x10   Core brace :05x10 :05x10  :05x10 :05x10   Knee fall out 10x x10  x10 x10   Supine march 10x x10  x10 x10   SLR with core brace 2x5 B/L x10 B/L  2x5 B/L 2x5 B/L                           Ther Ex        Nustep s=8 L1 x10 min L1 x10 min  L1 x10 min L1 x10 min   Bridge :05x10 :05x10  :05x10 :05x10           LTR 20x x20  x20 x20   Quadruped LTR        Supine ball squeeze 2x10 2x10  2x10 2x10   Supine clamshell Green 20x Green x20  Green x20 Green x20           QL stretch :15x5 :15x5  :15x5 :15x5   Piriformis stretch        Hip flexor stretch At step :15x5 At step :15x5  At step :15x5 At step :15x5   Hamstring stretch At step :15x5 At step :15x5  At step :15x5 At step :15x5   Ther Activity        Step ups At steps 15x bilat fwd/lat At steps 15x bilat fwd/lat  6" x10 ea fwd/ side 6" x10 ea fwd/ side   Sit to stand 2x5 2x5  2x5 chair Chair 2x5   Gait Training                        Modalities Access Code: [email or web address]  URL: [email or web address]  Date: 07/21/2022  Prepared by: Lauren Jacobo    Exercises  · Supine Posterior Pelvic Tilt - 3 x daily - 7 x weekly - 1 sets - 10 reps - 5 sec hold  · Supine Bridge - 3 x daily - 7 x weekly - 1 sets - 10 reps - 5 sec hold  · Seated Hamstring Stretch - 3 x daily - 7 x weekly - 3 sets - 3 reps - 30 sec hold  · Supine Straight Leg Raises - 2 x daily - 7 x weekly - 2 sets - 5 reps  · Seated Alternating Side Stretch with Arm Overhead - 2 x daily - 7 x weekly - 1 sets - 10 reps - 3-5 sec hold  · Sit to Stand - 2 x daily - 7 x weekly - 1 sets - 10 reps

## 2022-08-10 ENCOUNTER — OFFICE VISIT (OUTPATIENT)
Dept: PHYSICAL THERAPY | Facility: CLINIC | Age: 77
End: 2022-08-10
Payer: MEDICARE

## 2022-08-10 DIAGNOSIS — R26.9 GAIT ABNORMALITY: ICD-10-CM

## 2022-08-10 DIAGNOSIS — M54.41 CHRONIC RIGHT-SIDED LOW BACK PAIN WITH RIGHT-SIDED SCIATICA: Primary | ICD-10-CM

## 2022-08-10 DIAGNOSIS — G89.29 CHRONIC RIGHT-SIDED LOW BACK PAIN WITH RIGHT-SIDED SCIATICA: Primary | ICD-10-CM

## 2022-08-10 PROCEDURE — 97530 THERAPEUTIC ACTIVITIES: CPT

## 2022-08-10 PROCEDURE — 97110 THERAPEUTIC EXERCISES: CPT

## 2022-08-10 PROCEDURE — 97112 NEUROMUSCULAR REEDUCATION: CPT

## 2022-08-10 NOTE — PROGRESS NOTES
Daily Note     Today's date: 8/10/2022  Patient name: Tita Boucher  : 1945  MRN: [id number]  Referring provider: Edward Loera MD  Dx:   Encounter Diagnosis     ICD-10-CM    1  Chronic right-sided low back pain with right-sided sciatica  M54 41     G89 29    2  Gait abnormality  R26 9        Start Time: 1522          Subjective: Patient states she has no pain and she feels good  She still has pain in the low back when she has to walk long distances  Objective: See treatment diary below      Assessment: Tolerated treatment well  Patient would benefit from continued PT for stretching and strengthening  She was able to perform her exercises with little difficulty and no pain  She felt good when she left department and had no pain  Plan: Continue per plan of care  Progress note during next visit  Progress treatment as tolerated         Precautions: none noted  Progress note:   POC: 10/12    Manuals 8/3 8/4 8/10  7/28                                   Neuro Re-Ed        UBE standing on blue foam 4 min alt 100/60 standing on blue foam 4 min alt 100/60 standing on blue foam 4 min alt 100/60  standing on blue foam 4 min alt 100/60   PPT        kegels :10x10 :10x10 :10x10  :10x10   Core brace :05x10 :05x10 :05x10  :05x10   Knee fall out 10x x10 x10  x10   Supine march 10x x10 x10  x10   SLR with core brace 2x5 B/L x10 B/L x10 B/L  2x5 B/L   Posture correction   *:10x10                     Ther Ex        Nustep s=8 L1 x10 min L1 x10 min L1 x10 min  L1 x10 min   Bridge :05x10 :05x10 :05x10  :05x10           LTR 20x x20 x20  x20           Supine ball squeeze 2x10 2x10 2x10  2x10   Supine clamshell Green 20x Green x20 Green x20  Green x20           QL stretch :15x5 :15x5 :15x5  :15x5   Piriformis stretch        Hip flexor stretch At step :15x5 At step :15x5 At step :15x5  At step :15x5   Hamstring stretch At step :15x5 At step :15x5 At step :15x5  At step :15x5   Ther Activity        Step ups At steps 15x bilat fwd/lat At steps 15x bilat fwd/lat 6" x15 ea fwd/ side  6" x10 ea fwd/ side   Sit to stand 2x5 2x5 2x5  Chair 2x5   Gait Training                        Modalities                          Access Code: [email or web address]  URL: [email or web address]  Date: 07/21/2022  Prepared by: Fallon Jacobo    Exercises  · Supine Posterior Pelvic Tilt - 3 x daily - 7 x weekly - 1 sets - 10 reps - 5 sec hold  · Supine Bridge - 3 x daily - 7 x weekly - 1 sets - 10 reps - 5 sec hold  · Seated Hamstring Stretch - 3 x daily - 7 x weekly - 3 sets - 3 reps - 30 sec hold  · Supine Straight Leg Raises - 2 x daily - 7 x weekly - 2 sets - 5 reps  · Seated Alternating Side Stretch with Arm Overhead - 2 x daily - 7 x weekly - 1 sets - 10 reps - 3-5 sec hold  · Sit to Stand - 2 x daily - 7 x weekly - 1 sets - 10 reps

## 2022-08-11 ENCOUNTER — EVALUATION (OUTPATIENT)
Dept: PHYSICAL THERAPY | Facility: CLINIC | Age: 77
End: 2022-08-11
Payer: MEDICARE

## 2022-08-11 DIAGNOSIS — R26.9 GAIT ABNORMALITY: ICD-10-CM

## 2022-08-11 DIAGNOSIS — G89.29 CHRONIC RIGHT-SIDED LOW BACK PAIN WITH RIGHT-SIDED SCIATICA: Primary | ICD-10-CM

## 2022-08-11 DIAGNOSIS — M54.41 CHRONIC RIGHT-SIDED LOW BACK PAIN WITH RIGHT-SIDED SCIATICA: Primary | ICD-10-CM

## 2022-08-11 PROCEDURE — 97112 NEUROMUSCULAR REEDUCATION: CPT | Performed by: PHYSICAL THERAPIST

## 2022-08-11 PROCEDURE — 97110 THERAPEUTIC EXERCISES: CPT | Performed by: PHYSICAL THERAPIST

## 2022-08-11 NOTE — PROGRESS NOTES
PT Re-Evaluation  and PT Discharge    Today's date: 2022  Patient name: Ochoa Lopez  : 1945  MRN: [id number]  Referring provider: Goldy Bhatt MD  Dx:   Encounter Diagnosis     ICD-10-CM    1  Chronic right-sided low back pain with right-sided sciatica  M54 41     G89 29    2  Gait abnormality  R26 9        Start Time: 1530  Stop Time: 1610  Total time in clinic (min): 40 minutes    Assessment  Assessment details: Ochoa Lopez was seen for an initial PT evaluation and 8 f/u sessions  Patient is a 68 y o  female with diagnosis of low back pain and past medical history significant for DM type 2, CKD stage 3, HTN, obesity, dyspnea, CTR bilat, hysterectomy  FOTO functional score shows improvement from 57% at intake to 69% today surpassing predicted value of 64%  Findings of examination today minimal lumbar range of motion restriction with improvement in bilateral LE strength and moderate core strength with reduction of pain intensity and occurrence  Deja Holm has met the majority of goals set at initial evaluation and is independent with her home exercise program  To be discharged from skilled care at this time  STG (6 weeks)  1  Patient will have reported 0/10 pain in low back at rest  - MET   2  Improve patient's lumbar flexion to full for increased ability to forward bend  - MET   3  Increase patient's bilateral single leg balance to 3 seconds for increased stability on stairs  - MET L    LTG (12 weeks)  1  Patient's LE strength will be equal bilaterally for ability to ambulate and return to functional activities at PLOF  - MET   2  Patient will be able to walk in community with 0/10 pain in low back  - PROGRESSING  3  Patient will be independent with home exercise program for continued maintenance post PT discharge   - MET       Plan  Plan details: DC PT to HEP  Plan of Care beginning date: 2022  Treatment plan discussed with: patient and PTA        Subjective Evaluation    History of Present Illness  Date of onset: 2022  Subjective : Patient states 80% improvement since the start of PT  States difficulty walking >30 minutes located in low back occasionally to bilateral gluts  Also c/o some pain with forward bending  Mechanism of injury: Farrukh Bass is a 68 y o  female who presents to outpatient Physical Therapy today with complaints of low back pain  States she fell out of bed several months ago while she was dreaming  After fall had increased LE and LBP  States LE is now feeling better after receiving injections in knee from ortho  Recently referred by ortho to pain management for LBP and has an initial appointment 22  To f/u with PCP at the end of August  C/o pain and difficulty with lifting, walking, daily chores  Has been seeing DCM 1x/month with minimal improvement  Has been working on flexion distraction and cold laser  Pain      Current pain ratin  0  At best pain ratin  0  At worst pain ratin  4  Location: R low back  Quality: dull ache (annoying)    Social Support  Steps to enter house: yes  6  Stairs in house: yes   L[facility] in: [facility]  Lives with: significant other    Employment status: not working  Hand dominance: right    Treatments  Previous treatment: chiropractic  Patient Goals  Patient goals for therapy: decreased pain  Patient goal: walk pain free and attempt to lose weight        Objective     Concurrent Complaints  Positive for history of trauma (fall out of bed)  Negative for bladder dysfunction, bowel dysfunction and saddle (S4) numbness    Neurological Testing     Sensation     Lumbar   Left   Intact: light touch    Right   Intact: light touch    Reflexes   Left   Babinski sign: negative    Right   Babinski sign: negative    Active Range of Motion     Lumbar   Flexion: Active lumbar flexion: fingertips to ankles    Restriction level: minimal  Extension:  Restriction level: minimal  Left lateral flexion: Restriction level: minimal  Right lateral flexion:  Restriction level: minimal  Mechanical Assessment          Lumbar    Standing flexion: repeated movements   Pain location:no change  Standing extension: repeated movements  Pain location: no change    Strength/Myotome Testing  8/11    Left Hip   Planes of Motion   Flexion: 5    Right Hip   Planes of Motion   Flexion: 5    Left Knee   Flexion: 5  Extension: 5    Right Knee   Flexion: 5  Extension: 4    4+     Left Ankle/Foot   Dorsiflexion: 5    Right Ankle/Foot   Dorsiflexion: 5    8/11:bilateral hip strength equal and WNL    Muscle Activation     Additional Muscle Activation Details  Activation of TrA with resisted SLR R= mod L=mod  Initial activation moderate, reduced to trace with resistance  8/11: mod bilaterally, maintained with resistance    Tests     Lumbar     Left   Negative passive SLR  Right   Negative passive SLR       Additional Tests Details  Hamstring 90/90 SLR R=(45) L=(55)  8/11: R=(35) L=(35)  (-) supine to sit test  No change in symptoms with manual lumbar traction    SLB  8/11: R=2 sec L= 5 sec    General Comments:      Lumbar Comments  Gait: slowed velocity, forward lean  8/11: slight forward bend with side shift to R with handbag    FOTO: 57% function, 64% predicted function   8/11: 69%             Precautions: none noted      Manuals 8/3 8/4 8/10 8/11 7/28                                   Neuro Re-Ed    review    UBE standing on blue foam 4 min alt 100/60 standing on blue foam 4 min alt 100/60 standing on blue foam 4 min alt 100/60  standing on blue foam 4 min alt 100/60   PPT        kegels :10x10 :10x10 :10x10  :10x10   Core brace :05x10 :05x10 :05x10  :05x10   Knee fall out 10x x10 x10  x10   Supine march 10x x10 x10  x10   SLR with core brace 2x5 B/L x10 B/L x10 B/L  2x5 B/L   Posture correction   *:10x10                     Ther Ex    review    Nustep s=8 L1 x10 min L1 x10 min L1 x10 min L1 x10 min L1 x10 min   Bridge :05x10 :05x10 :05x10  :05x10           LTR 20x x20 x20  x20           Supine ball squeeze 2x10 2x10 2x10  2x10   Supine clamshell Green 20x Green x20 Green x20  Green x20           QL stretch :15x5 :15x5 :15x5  :15x5   Piriformis stretch        Hip flexor stretch At step :15x5 At step :15x5 At step :15x5  At step :15x5   Hamstring stretch At step :15x5 At step :15x5 At step :15x5  At step :15x5   Ther Activity        Step ups At steps 15x bilat fwd/lat At steps 15x bilat fwd/lat 6" x15 ea fwd/ side  6" x10 ea fwd/ side   Sit to stand 2x5 2x5 2x5  Chair 2x5   Gait Training                        Modalities                          Access Code: [email or web address]  URL: [email or web address]  Date: 07/21/2022  Prepared by: Karin Jacobo    Exercises  · Supine Posterior Pelvic Tilt - 3 x daily - 7 x weekly - 1 sets - 10 reps - 5 sec hold  · Supine Bridge - 3 x daily - 7 x weekly - 1 sets - 10 reps - 5 sec hold  · Seated Hamstring Stretch - 3 x daily - 7 x weekly - 3 sets - 3 reps - 30 sec hold  · Supine Straight Leg Raises - 2 x daily - 7 x weekly - 2 sets - 5 reps  · Seated Alternating Side Stretch with Arm Overhead - 2 x daily - 7 x weekly - 1 sets - 10 reps - 3-5 sec hold  · Sit to Stand - 2 x daily - 7 x weekly - 1 sets - 10 reps

## 2022-08-16 DIAGNOSIS — E11.22 TYPE 2 DIABETES MELLITUS WITH STAGE 3A CHRONIC KIDNEY DISEASE, WITH LONG-TERM CURRENT USE OF INSULIN (HCC): ICD-10-CM

## 2022-08-16 DIAGNOSIS — Z79.4 TYPE 2 DIABETES MELLITUS WITH STAGE 3A CHRONIC KIDNEY DISEASE, WITH LONG-TERM CURRENT USE OF INSULIN (HCC): ICD-10-CM

## 2022-08-16 DIAGNOSIS — N18.31 TYPE 2 DIABETES MELLITUS WITH STAGE 3A CHRONIC KIDNEY DISEASE, WITH LONG-TERM CURRENT USE OF INSULIN (HCC): ICD-10-CM

## 2022-08-16 RX ORDER — INSULIN GLARGINE 100 [IU]/ML
50 INJECTION, SOLUTION SUBCUTANEOUS
Qty: 15 ML | Refills: 5 | Status: SHIPPED | OUTPATIENT
Start: 2022-08-16 | End: 2022-11-14

## 2022-08-16 NOTE — TELEPHONE ENCOUNTER
Patient requesting refill(s) of: Lantus solostar     Last filled: 4/11/22 15mL 3 refills   Last appt: 5/31/2022  Next appt: 8/30/2022  Pharmacy: [facility]

## 2022-08-17 ENCOUNTER — LAB (OUTPATIENT)
Dept: LAB | Facility: CLINIC | Age: 77
End: 2022-08-17
Payer: MEDICARE

## 2022-08-17 DIAGNOSIS — I10 ESSENTIAL HYPERTENSION: ICD-10-CM

## 2022-08-17 DIAGNOSIS — E11.22 TYPE 2 DIABETES MELLITUS WITH STAGE 3B CHRONIC KIDNEY DISEASE, WITH LONG-TERM CURRENT USE OF INSULIN (HCC): ICD-10-CM

## 2022-08-17 DIAGNOSIS — E03.9 ACQUIRED HYPOTHYROIDISM: ICD-10-CM

## 2022-08-17 DIAGNOSIS — E11.22 CKD STAGE 3 DUE TO TYPE 2 DIABETES MELLITUS (HCC): ICD-10-CM

## 2022-08-17 DIAGNOSIS — Z79.4 TYPE 2 DIABETES MELLITUS WITH STAGE 3B CHRONIC KIDNEY DISEASE, WITH LONG-TERM CURRENT USE OF INSULIN (HCC): ICD-10-CM

## 2022-08-17 DIAGNOSIS — N18.32 TYPE 2 DIABETES MELLITUS WITH STAGE 3B CHRONIC KIDNEY DISEASE, WITH LONG-TERM CURRENT USE OF INSULIN (HCC): ICD-10-CM

## 2022-08-17 DIAGNOSIS — N18.30 CKD STAGE 3 DUE TO TYPE 2 DIABETES MELLITUS (HCC): ICD-10-CM

## 2022-08-17 LAB
ALBUMIN SERPL BCP-MCNC: 3 G/DL (ref 3.5–5)
ALP SERPL-CCNC: 56 U/L (ref 46–116)
ALT SERPL W P-5'-P-CCNC: 20 U/L (ref 12–78)
ANION GAP SERPL CALCULATED.3IONS-SCNC: 2 MMOL/L (ref 4–13)
AST SERPL W P-5'-P-CCNC: 15 U/L (ref 5–45)
BILIRUB SERPL-MCNC: 0.35 MG/DL (ref 0.2–1)
BUN SERPL-MCNC: 21 MG/DL (ref 5–25)
CALCIUM ALBUM COR SERPL-MCNC: 9.7 MG/DL (ref 8.3–10.1)
CALCIUM SERPL-MCNC: 8.9 MG/DL (ref 8.3–10.1)
CHLORIDE SERPL-SCNC: 105 MMOL/L (ref 96–108)
CO2 SERPL-SCNC: 31 MMOL/L (ref 21–32)
CREAT SERPL-MCNC: 1.46 MG/DL (ref 0.6–1.3)
EST. AVERAGE GLUCOSE BLD GHB EST-MCNC: 220 MG/DL
GFR SERPL CREATININE-BSD FRML MDRD: 34 ML/MIN/1.73SQ M
GLUCOSE P FAST SERPL-MCNC: 98 MG/DL (ref 65–99)
HBA1C MFR BLD: 9.3 %
POTASSIUM SERPL-SCNC: 4 MMOL/L (ref 3.5–5.3)
PROT SERPL-MCNC: 6.8 G/DL (ref 6.4–8.4)
SODIUM SERPL-SCNC: 138 MMOL/L (ref 135–147)
TSH SERPL DL<=0.05 MIU/L-ACNC: 2.91 UIU/ML (ref 0.45–4.5)

## 2022-08-17 PROCEDURE — 84443 ASSAY THYROID STIM HORMONE: CPT

## 2022-08-17 PROCEDURE — 83036 HEMOGLOBIN GLYCOSYLATED A1C: CPT

## 2022-08-17 PROCEDURE — 36415 COLL VENOUS BLD VENIPUNCTURE: CPT

## 2022-08-17 PROCEDURE — 80053 COMPREHEN METABOLIC PANEL: CPT

## 2022-08-18 DIAGNOSIS — Z79.4 TYPE 2 DIABETES MELLITUS WITH STAGE 3A CHRONIC KIDNEY DISEASE, WITH LONG-TERM CURRENT USE OF INSULIN (HCC): Primary | ICD-10-CM

## 2022-08-18 DIAGNOSIS — N18.31 TYPE 2 DIABETES MELLITUS WITH STAGE 3A CHRONIC KIDNEY DISEASE, WITH LONG-TERM CURRENT USE OF INSULIN (HCC): Primary | ICD-10-CM

## 2022-08-18 DIAGNOSIS — E11.22 TYPE 2 DIABETES MELLITUS WITH STAGE 3A CHRONIC KIDNEY DISEASE, WITH LONG-TERM CURRENT USE OF INSULIN (HCC): Primary | ICD-10-CM

## 2022-08-18 RX ORDER — INSULIN LISPRO 100 [IU]/ML
4 INJECTION, SOLUTION INTRAVENOUS; SUBCUTANEOUS
Qty: 15 ML | Refills: 1 | Status: SHIPPED | OUTPATIENT
Start: 2022-08-18 | End: 2022-08-30 | Stop reason: SDUPTHER

## 2022-08-18 RX ORDER — INSULIN ASPART 100 [IU]/ML
4 INJECTION, SOLUTION INTRAVENOUS; SUBCUTANEOUS
Qty: 15 ML | Refills: 1 | Status: SHIPPED | OUTPATIENT
Start: 2022-08-18 | End: 2022-08-18

## 2022-08-18 NOTE — RESULT ENCOUNTER NOTE
Please advise pt that her labs show A1c remains high at 9 3  kidney function is table, thyroid looks good  At this point, I recommend we add fast-acting insulin with largest meal of the day  Start with 4 units novolog at mealtime  Record BG fasting, before dinner and bedtime starting today until she sees me in 2 weeks  Will send Rx to pharmacy

## 2022-08-19 DIAGNOSIS — Z79.4 TYPE 2 DIABETES MELLITUS WITH STAGE 3B CHRONIC KIDNEY DISEASE, WITH LONG-TERM CURRENT USE OF INSULIN (HCC): ICD-10-CM

## 2022-08-19 DIAGNOSIS — E11.22 TYPE 2 DIABETES MELLITUS WITH STAGE 3B CHRONIC KIDNEY DISEASE, WITH LONG-TERM CURRENT USE OF INSULIN (HCC): ICD-10-CM

## 2022-08-19 DIAGNOSIS — N18.32 TYPE 2 DIABETES MELLITUS WITH STAGE 3B CHRONIC KIDNEY DISEASE, WITH LONG-TERM CURRENT USE OF INSULIN (HCC): ICD-10-CM

## 2022-08-19 RX ORDER — GLIPIZIDE 10 MG/1
10 TABLET ORAL
Qty: 60 TABLET | Refills: 0 | Status: SHIPPED | OUTPATIENT
Start: 2022-08-19 | End: 2022-08-30

## 2022-08-19 NOTE — TELEPHONE ENCOUNTER
Pt called and Dr Kamran Garcia put Cortez Gregg on Humalog, but her question is does she continue the Glipizide 10 mg   I  If she is to continue she will need a refill for the medication    Please advise    Phone: Magdiel Khan

## 2022-08-30 ENCOUNTER — OFFICE VISIT (OUTPATIENT)
Dept: FAMILY MEDICINE CLINIC | Facility: CLINIC | Age: 77
End: 2022-08-30
Payer: MEDICARE

## 2022-08-30 VITALS
BODY MASS INDEX: 35.41 KG/M2 | OXYGEN SATURATION: 98 % | RESPIRATION RATE: 17 BRPM | HEIGHT: 62 IN | DIASTOLIC BLOOD PRESSURE: 62 MMHG | SYSTOLIC BLOOD PRESSURE: 124 MMHG | HEART RATE: 78 BPM | TEMPERATURE: 97.8 F | WEIGHT: 192.4 LBS

## 2022-08-30 DIAGNOSIS — G89.29 CHRONIC RIGHT-SIDED LOW BACK PAIN WITH RIGHT-SIDED SCIATICA: ICD-10-CM

## 2022-08-30 DIAGNOSIS — N18.30 CKD STAGE 3 DUE TO TYPE 2 DIABETES MELLITUS (HCC): ICD-10-CM

## 2022-08-30 DIAGNOSIS — E11.22 CKD STAGE 3 DUE TO TYPE 2 DIABETES MELLITUS (HCC): ICD-10-CM

## 2022-08-30 DIAGNOSIS — Z79.4 TYPE 2 DIABETES MELLITUS WITH STAGE 3A CHRONIC KIDNEY DISEASE, WITH LONG-TERM CURRENT USE OF INSULIN (HCC): ICD-10-CM

## 2022-08-30 DIAGNOSIS — N18.31 TYPE 2 DIABETES MELLITUS WITH STAGE 3A CHRONIC KIDNEY DISEASE, WITH LONG-TERM CURRENT USE OF INSULIN (HCC): ICD-10-CM

## 2022-08-30 DIAGNOSIS — Z79.4 TYPE 2 DIABETES MELLITUS WITH STAGE 3B CHRONIC KIDNEY DISEASE, WITH LONG-TERM CURRENT USE OF INSULIN (HCC): Primary | ICD-10-CM

## 2022-08-30 DIAGNOSIS — M54.41 CHRONIC RIGHT-SIDED LOW BACK PAIN WITH RIGHT-SIDED SCIATICA: ICD-10-CM

## 2022-08-30 DIAGNOSIS — N18.32 TYPE 2 DIABETES MELLITUS WITH STAGE 3B CHRONIC KIDNEY DISEASE, WITH LONG-TERM CURRENT USE OF INSULIN (HCC): Primary | ICD-10-CM

## 2022-08-30 DIAGNOSIS — I10 ESSENTIAL HYPERTENSION: ICD-10-CM

## 2022-08-30 DIAGNOSIS — E11.22 TYPE 2 DIABETES MELLITUS WITH STAGE 3A CHRONIC KIDNEY DISEASE, WITH LONG-TERM CURRENT USE OF INSULIN (HCC): ICD-10-CM

## 2022-08-30 DIAGNOSIS — M51.26 LUMBAR DISC HERNIATION: ICD-10-CM

## 2022-08-30 DIAGNOSIS — E11.22 TYPE 2 DIABETES MELLITUS WITH STAGE 3B CHRONIC KIDNEY DISEASE, WITH LONG-TERM CURRENT USE OF INSULIN (HCC): Primary | ICD-10-CM

## 2022-08-30 PROBLEM — E11.65 TYPE 2 DIABETES MELLITUS WITH HYPERGLYCEMIA, WITH LONG-TERM CURRENT USE OF INSULIN (HCC): Status: ACTIVE | Noted: 2019-11-20

## 2022-08-30 PROCEDURE — 99214 OFFICE O/P EST MOD 30 MIN: CPT | Performed by: INTERNAL MEDICINE

## 2022-08-30 RX ORDER — INSULIN LISPRO 100 [IU]/ML
8 INJECTION, SOLUTION INTRAVENOUS; SUBCUTANEOUS 2 TIMES DAILY WITH MEALS
Qty: 15 ML | Refills: 1
Start: 2022-08-30 | End: 2022-10-20 | Stop reason: SDUPTHER

## 2022-08-30 RX ORDER — GLIPIZIDE 10 MG/1
5 TABLET ORAL
Qty: 60 TABLET | Refills: 0
Start: 2022-08-30 | End: 2022-09-20 | Stop reason: SDUPTHER

## 2022-08-30 NOTE — PROGRESS NOTES
[facility]        NAME: Edward Reynoso is a 68 y o  female  : 1945    MRN: [id number]  DATE: 2022  TIME: 1:45 PM    Assessment and Plan   1  Type 2 diabetes mellitus with stage 3b chronic kidney disease, with long-term current use of insulin ([address] )  -advised to increase prandial insulin to 8 units with lunch and dinner, call with BG log in 1 week  Continue 5mg glipizide unless hypoglycemia, then will stop  -  insulin lispro (HumaLOG KwikPen) 100 units/mL injection pen; Inject 8 Units under the skin 2 (two) times a day with meals  -     glipiZIDE (GLUCOTROL) 10 mg tablet; Take 0 5 tablets (5 mg total) by mouth 2 (two) times a day before meals    2  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 mellitus (HCC)  - stable    3  Essential hypertension  - well-controlled    4  Type 2 diabetes mellitus with stage 3a chronic kidney disease, with long-term current use of insulin ([facility])  -     glipiZIDE (GLUCOTROL) 10 mg tablet; Take 0 5 tablets (5 mg total) by mouth 2 (two) times a day before meals    5  Lumbar disc herniation  -     Ambulatory Referral to Spine Surgery; Future    6  Chronic right-sided low back pain with right-sided sciatica  -     Ambulatory Referral to Spine Surgery; Future             Chief Complaint     Chief Complaint   Patient presents with    Follow-up         History of Present Illness       66yo female with poorly controlled diabetes, CKD stage 3 and HTN here for follow up  Started prandial insulin about a week ago  Currently 4 units with dinner, lantus 50 units at bedtime, glipizide 5mg BID meals  Tried PT for about 6 weeks and did not have any improvement in right sided low back and leg pain  Was advised to follow up with Ortho spine  Review of Systems   Review of Systems   Constitutional: Negative for appetite change, fatigue and fever  Respiratory: Negative for cough, chest tightness and shortness of breath      Cardiovascular: Negative for chest pain, palpitations and leg swelling  Musculoskeletal: Positive for arthralgias, back pain and myalgias  Neurological: Positive for numbness  Negative for weakness           Current Medications       Current Outpatient Medications:     amLODIPine (NORVASC) 5 mg tablet, Take 1 tablet (5 mg total) by mouth daily, Disp: 90 tablet, Rfl: 3    aspirin (ECOTRIN LOW STRENGTH) 81 mg EC tablet, Take 81 mg by mouth daily, Disp: , Rfl:     cholecalciferol (VITAMIN D3) 1,000 units tablet, Take 800 Units by mouth daily  , Disp: , Rfl:     folic acid (FOLVITE) 812 mcg tablet, Take 400 mcg by mouth daily, Disp: , Rfl:     glipiZIDE (GLUCOTROL) 10 mg tablet, Take 0 5 tablets (5 mg total) by mouth 2 (two) times a day before meals, Disp: 60 tablet, Rfl: 0    glucose monitoring kit (FREESTYLE) monitoring kit, 1 each by Does not apply route daily, Disp: 1 each, Rfl: 0    Insulin Glargine Solostar (Lantus SoloStar) 100 UNIT/ML SOPN, Inject 50 Units under the skin daily at bedtime, Disp: 15 mL, Rfl: 5    insulin lispro (HumaLOG KwikPen) 100 units/mL injection pen, Inject 8 Units under the skin 2 (two) times a day with meals, Disp: 15 mL, Rfl: 1    Insulin Pen Needle 32G X 6 MM MISC, Use in the morning, Disp: 100 each, Rfl: 5    Lancets (freestyle) lancets, Use as instructed -glucose monitor BID, Disp: 100 each, Rfl: 5    levothyroxine 50 mcg tablet, Take 1 tablet (50 mcg total) by mouth daily, Disp: 90 tablet, Rfl: 3    lisinopril (ZESTRIL) 20 mg tablet, Take 1 tablet (20 mg total) by mouth daily, Disp: 90 tablet, Rfl: 3    metoprolol succinate (TOPROL-XL) 50 mg 24 hr tablet, take 1 tablet by mouth once daily, Disp: 90 tablet, Rfl: 5    omega-3-acid ethyl esters (LOVAZA) 1 g capsule, Take 2 g by mouth 2 (two) times a day, Disp: , Rfl:     Psyllium (FIBER) 0 52 g CAPS, Take by mouth, Disp: , Rfl:     rosuvastatin (CRESTOR) 10 MG tablet, Take 1 tablet (10 mg total) by mouth daily, Disp: 90 tablet, Rfl: 3    Current Allergies Allergies as of 08/30/2022    (No Known Allergies)            The following portions of the patient's history were reviewed and updated as appropriate: allergies, current medications, past family history, past medical history, past social history, past surgical history and problem list      Past Medical History:   Diagnosis Date    Benign essential hypertension     Chronic kidney disease, stage 2 (mild)     Chronic kidney disease, stage 3 (HCC)     Disorder of gallbladder     Disorder of skin and subcutaneous tissue     Dyspnea     Essential hypertension     Hyperlipidemia     Hypokalemia     Hypothyroidism     Malaise and fatigue     Mixed hyperlipidemia     Morbid obesity ([address] )     Pernicious anemia     Type 2 diabetes mellitus ([facility] )        Past Surgical History:   Procedure Laterality Date    BREAST BIOPSY Right     CARPAL TUNNEL RELEASE Bilateral     CHOLECYSTECTOMY      COLONOSCOPY      Dr Whit Gil PARTIAL HYSTERECTOMY      SKIN LESION EXCISION      scalp        Family History   Problem Relation Age of Onset    Stroke Mother     Atrial fibrillation Mother     Brain cancer Father     Other Brother         Twin brothers - Open heart    Other Brother         Twin brothers - Open heart    No Known Problems Maternal Grandmother     No Known Problems Paternal Grandmother          Medications have been verified  Objective   /62   Pulse 78   Temp 97 8 °F (36 6 °C)   Resp 17   Ht 5' 2" (1 575 m)   Wt 87 3 kg (192 lb 6 4 oz)   SpO2 98%   BMI 35 19 kg/m²        Physical Exam     Physical Exam  Vitals reviewed  Constitutional:       General: She is not in acute distress  Appearance: She is obese  Cardiovascular:      Rate and Rhythm: Normal rate and regular rhythm  Heart sounds: Normal heart sounds  No murmur heard  No friction rub  No gallop  Pulmonary:      Effort: Pulmonary effort is normal  No respiratory distress        Breath sounds: Normal breath sounds  No wheezing, rhonchi or rales  Neurological:      General: No focal deficit present  Mental Status: She is alert  Motor: No weakness  Gait: Gait normal    Psychiatric:         Mood and Affect: Mood normal          Behavior: Behavior normal          Thought Content:  Thought content normal          Judgment: Judgment normal              Results:  Lab Results   Component Value Date    SODIUM 138 08/17/2022    K 4 0 08/17/2022     08/17/2022    CO2 31 08/17/2022    BUN 21 08/17/2022    CREATININE 1 46 (H) 08/17/2022    GLUC 127 01/16/2022    CALCIUM 8 9 08/17/2022       Lab Results   Component Value Date    HGBA1C 9 3 (H) 08/17/2022       Lab Results   Component Value Date    WBC 8 53 05/25/2022    HGB 12 2 05/25/2022    HCT 38 5 05/25/2022    MCV 98 05/25/2022     05/25/2022

## 2022-08-30 NOTE — PATIENT INSTRUCTIONS
Please increase Humalog to 8 units with lunch and dinner  Send blood sugar records in 1 week for review   Call if any low sugars < 70

## 2022-09-01 ENCOUNTER — TELEPHONE (OUTPATIENT)
Dept: FAMILY MEDICINE CLINIC | Facility: CLINIC | Age: 77
End: 2022-09-01

## 2022-09-01 NOTE — TELEPHONE ENCOUNTER
She was in last week and got a referral to spine specialist she received a call to schedule and appt in [place] she said that is too far she asked if she can have a referral to [facility] [address] [facility] [place]

## 2022-09-01 NOTE — TELEPHONE ENCOUNTER
Called and spoke with patient  There is already a referral to [facility] active in her chart  She will call his office and schedule  Advised to contact office if any questions / concerns

## 2022-09-08 ENCOUNTER — TELEPHONE (OUTPATIENT)
Dept: FAMILY MEDICINE CLINIC | Facility: CLINIC | Age: 77
End: 2022-09-08

## 2022-09-08 NOTE — TELEPHONE ENCOUNTER
Adrienne [place], reviewed  Are these reading before she eats or after?  Recommend continuing lispro immediately before she eats a meal

## 2022-09-08 NOTE — TELEPHONE ENCOUNTER
Patient presented to front office with BG readings from 8/31 to 9/6  MR scanned them under Media  Patient has a questions on the sheet asking if she is to take her shot before, during or after meal      Please advise

## 2022-09-20 DIAGNOSIS — E11.22 TYPE 2 DIABETES MELLITUS WITH STAGE 3B CHRONIC KIDNEY DISEASE, WITH LONG-TERM CURRENT USE OF INSULIN (HCC): ICD-10-CM

## 2022-09-20 DIAGNOSIS — N18.31 TYPE 2 DIABETES MELLITUS WITH STAGE 3A CHRONIC KIDNEY DISEASE, WITH LONG-TERM CURRENT USE OF INSULIN (HCC): ICD-10-CM

## 2022-09-20 DIAGNOSIS — E11.22 TYPE 2 DIABETES MELLITUS WITH STAGE 3A CHRONIC KIDNEY DISEASE, WITH LONG-TERM CURRENT USE OF INSULIN (HCC): ICD-10-CM

## 2022-09-20 DIAGNOSIS — Z79.4 TYPE 2 DIABETES MELLITUS WITH STAGE 3A CHRONIC KIDNEY DISEASE, WITH LONG-TERM CURRENT USE OF INSULIN (HCC): ICD-10-CM

## 2022-09-20 DIAGNOSIS — Z79.4 TYPE 2 DIABETES MELLITUS WITH STAGE 3B CHRONIC KIDNEY DISEASE, WITH LONG-TERM CURRENT USE OF INSULIN (HCC): ICD-10-CM

## 2022-09-20 DIAGNOSIS — N18.32 TYPE 2 DIABETES MELLITUS WITH STAGE 3B CHRONIC KIDNEY DISEASE, WITH LONG-TERM CURRENT USE OF INSULIN (HCC): ICD-10-CM

## 2022-09-20 RX ORDER — GLIPIZIDE 10 MG/1
5 TABLET ORAL
Qty: 60 TABLET | Refills: 5 | Status: SHIPPED | OUTPATIENT
Start: 2022-09-20

## 2022-09-20 NOTE — TELEPHONE ENCOUNTER
Patient requesting refill(s) of: glipizide 10 mg take 0 5 tab BID      Last filled: 8/30/2022 #60 x 0  Last appt: 8/30/2022  Next appt: 11/30/2022  Pharmacy: [facility]

## 2022-10-06 LAB
LEFT EYE DIABETIC RETINOPATHY: NORMAL
RIGHT EYE DIABETIC RETINOPATHY: NORMAL

## 2022-10-17 ENCOUNTER — CONSULT (OUTPATIENT)
Dept: PAIN MEDICINE | Facility: CLINIC | Age: 77
End: 2022-10-17
Payer: MEDICARE

## 2022-10-17 VITALS
HEART RATE: 80 BPM | SYSTOLIC BLOOD PRESSURE: 130 MMHG | WEIGHT: 192.8 LBS | BODY MASS INDEX: 35.48 KG/M2 | HEIGHT: 62 IN | DIASTOLIC BLOOD PRESSURE: 72 MMHG

## 2022-10-17 DIAGNOSIS — M48.061 SPINAL STENOSIS OF LUMBAR REGION, UNSPECIFIED WHETHER NEUROGENIC CLAUDICATION PRESENT: Primary | ICD-10-CM

## 2022-10-17 PROCEDURE — 99204 OFFICE O/P NEW MOD 45 MIN: CPT | Performed by: ANESTHESIOLOGY

## 2022-10-17 RX ORDER — DULOXETIN HYDROCHLORIDE 20 MG/1
20 CAPSULE, DELAYED RELEASE ORAL DAILY
Qty: 30 CAPSULE | Refills: 1 | Status: SHIPPED | OUTPATIENT
Start: 2022-10-17 | End: 2022-11-16

## 2022-10-17 NOTE — H&P (VIEW-ONLY)
Assessment:  1  Spinal stenosis of lumbar region, unspecified whether neurogenic claudication present        Plan:  Patient is a 26-year-old female complaints of low back pain, bilateral leg pain right worse than left, bilateral knee pain with chronic pain syndrome secondary to lumbar spinal stenosis, lumbar spondylosis, lumbar radiculopathy, primary osteoarthritis bilateral knees presents to office for initial consultation  Patient has seen Orthopedic surgery and received gel shots in both knees reports knees feel better  She does continue to have low back pain with radiation into butt cheeks right worse than left  MRI lumbar spine does show a right-sided disc extrusion at L2-L3, severe facet arthropathy at L4-5 with mild-to-moderate spinal canal stenosis  1  We will schedule patient for an L4-5 interlaminar epidural steroid injection to alleviate low back pain and leg pain for lumbar spinal stenosis  2  We will trial patient on Cymbalta 20 mg p o  Q h s   3  Patient will continue home exercise regimen  4  Follow-up 1 month after injection    Complete risks and benefits including bleeding, infection, tissue reaction, nerve injury and allergic reaction were discussed  The approach was demonstrated using models and literature was provided  Verbal and written consent was obtained  History of Present Illness: The patient is a 68 y o  female who presents for consultation in regards to Neck Pain, Back Pain, and Knee Pain  Symptoms have been present for 6 months  Symptoms began without any precipitating injury or trauma  Pain is reported to be 5 on the numeric rating scale  Symptoms are felt occasionally and worst in the no typical pattern  Symptoms are characterized as dull/aching, tingling and pressure-like  Symptoms are associated with no weakness  Aggravating factors include walking  Relieving factors include lying down, sitting, exercise and relaxation    No change in symptoms with kneeling, standing, bending, leaning forward, leaning bckward, turning the head, coughing/sneezing and bowel movements  Treatments that have been helpful include physical therapy, home exercise and heat/ice  Medications to relieve symptoms include nothing  Review of Systems:    Review of Systems   Constitutional: Negative for chills, fatigue and fever  HENT: Negative for hearing loss, sinus pain, sore throat and trouble swallowing  Eyes: Negative for pain and visual disturbance  Respiratory: Negative for shortness of breath and wheezing  Cardiovascular: Negative for chest pain and palpitations  Gastrointestinal: Negative for abdominal pain, constipation and nausea  Endocrine: Negative for polydipsia and polyuria  Genitourinary: Negative for difficulty urinating  Musculoskeletal: Negative for arthralgias, gait problem, joint swelling and myalgias  Skin: Negative for rash  Neurological: Negative for dizziness, weakness and headaches  Hematological: Does not bruise/bleed easily  Psychiatric/Behavioral: Negative for dysphoric mood  The patient is not nervous/anxious            Past Medical History:   Diagnosis Date   • Benign essential hypertension    • Chronic kidney disease, stage 2 (mild)    • Chronic kidney disease, stage 3 (HCC)    • Disorder of gallbladder    • Disorder of skin and subcutaneous tissue    • Dyspnea    • Essential hypertension    • Hyperlipidemia    • Hypokalemia    • Hypothyroidism    • Malaise and fatigue    • Mixed hyperlipidemia    • Morbid obesity (HCC)    • Pernicious anemia    • Type 2 diabetes mellitus (HCC)        Past Surgical History:   Procedure Laterality Date   • BREAST BIOPSY Right    • CARPAL TUNNEL RELEASE Bilateral    • CHOLECYSTECTOMY     • COLONOSCOPY      Dr Jeanie Santoyo    • HYSTERECTOMY     • PARTIAL HYSTERECTOMY     • SKIN LESION EXCISION      scalp        Family History   Problem Relation Age of Onset   • Stroke Mother    • Atrial fibrillation Mother    • Brain cancer Father    • Other Brother         Twin brothers - Open heart   • Other Brother         Twin brothers - Open heart   • No Known Problems Maternal Grandmother    • No Known Problems Paternal Grandmother        Social History     Occupational History   • Occupation: Retired    Tobacco Use   • Smoking status: Former Smoker     Packs/day: 1 00     Years: 14 00     Pack years: 14 00     Types: Cigarettes     Quit date:      Years since quittin 8   • Smokeless tobacco: Never Used   Vaping Use   • Vaping Use: Never used   Substance and Sexual Activity   • Alcohol use: Yes     Comment: Occasionally    • Drug use: Never     Comment: Denies drug use - As per [address]    • Sexual activity: Not on file         Current Outpatient Medications:   •  amLODIPine (NORVASC) 5 mg tablet, Take 1 tablet (5 mg total) by mouth daily, Disp: 90 tablet, Rfl: 3  •  aspirin (ECOTRIN LOW STRENGTH) 81 mg EC tablet, Take 81 mg by mouth daily, Disp: , Rfl:   •  cholecalciferol (VITAMIN D3) 1,000 units tablet, Take 800 Units by mouth daily  , Disp: , Rfl:   •  folic acid (FOLVITE) 077 mcg tablet, Take 400 mcg by mouth daily, Disp: , Rfl:   •  glipiZIDE (GLUCOTROL) 10 mg tablet, Take 0 5 tablets (5 mg total) by mouth 2 (two) times a day before meals, Disp: 60 tablet, Rfl: 5  •  glucose monitoring kit (FREESTYLE) monitoring kit, 1 each by Does not apply route daily, Disp: 1 each, Rfl: 0  •  Insulin Glargine Solostar (Lantus SoloStar) 100 UNIT/ML SOPN, Inject 50 Units under the skin daily at bedtime, Disp: 15 mL, Rfl: 5  •  insulin lispro (HumaLOG KwikPen) 100 units/mL injection pen, Inject 8 Units under the skin 2 (two) times a day with meals, Disp: 15 mL, Rfl: 1  •  Insulin Pen Needle 32G X 6 MM MISC, Use in the morning, Disp: 100 each, Rfl: 5  •  Lancets (freestyle) lancets, Use as instructed -glucose monitor BID, Disp: 100 each, Rfl: 5  •  levothyroxine 50 mcg tablet, Take 1 tablet (50 mcg total) by mouth daily, Disp: 90 tablet, Rfl: 3  •  lisinopril (ZESTRIL) 20 mg tablet, Take 1 tablet (20 mg total) by mouth daily, Disp: 90 tablet, Rfl: 3  •  metoprolol succinate (TOPROL-XL) 50 mg 24 hr tablet, take 1 tablet by mouth once daily, Disp: 90 tablet, Rfl: 5  •  omega-3-acid ethyl esters (LOVAZA) 1 g capsule, Take 2 g by mouth 2 (two) times a day, Disp: , Rfl:   •  Psyllium (FIBER) 0 52 g CAPS, Take by mouth, Disp: , Rfl:   •  rosuvastatin (CRESTOR) 10 MG tablet, Take 1 tablet (10 mg total) by mouth daily, Disp: 90 tablet, Rfl: 3    No Known Allergies    Physical Exam:    /72   Pulse 80   Ht 5' 2" (1 575 m)   Wt 87 5 kg (192 lb 12 8 oz)   BMI 35 26 kg/m²     Constitutional: normal, well developed, well nourished, alert, in no distress and non-toxic and no overt pain behavior  and obese  Eyes: anicteric  HEENT: grossly intact  Neck: supple, symmetric, trachea midline and no masses   Pulmonary:even and unlabored  Cardiovascular:No edema or pitting edema present  Skin:Normal without rashes or lesions and well hydrated  Psychiatric:Mood and affect appropriate  Neurologic:Cranial Nerves II-XII grossly intact  Musculoskeletal:antalgic    Imaging  MRI LUMBAR SPINE WITHOUT CONTRAST     INDICATION: M54 41: Lumbago with sciatica, right side  G89 29: Other chronic pain      COMPARISON:  None      TECHNIQUE:  Sagittal T1, sagittal T2, sagittal inversion recovery, axial T1 and axial T2, coronal T2     IMAGE QUALITY:  Diagnostic     FINDINGS:     VERTEBRAL BODIES:  There are 5 lumbar type vertebral bodies  Anterolisthesis L4-5  Degenerative marrow signal superior endplate L3      SACRUM:  Normal signal within the sacrum   No evidence of insufficiency or stress fracture      DISTAL CORD AND CONUS:  Normal size and signal within the distal cord and conus      PARASPINAL SOFT TISSUES:  Paraspinal soft tissues are unremarkable      LOWER THORACIC DISC SPACES:  Normal disc height and signal   No disc herniation, canal stenosis or foraminal narrowing      LUMBAR DISC SPACES:     L1-L2:  Normal      L2-L3:  Inferiorly extruded right paramedian disc herniation likely impinges the descending right L3 nerve root  Correlate for right L3 radiculopathy  Marginal osteophytes contribute to mild right foraminal narrowing      L3-L4:  Mild annular bulge and mild facet hypertrophy  No significant central or foraminal narrowing      L4-L5:  Severe facet degenerative change accounts for slight anterolisthesis and uncovering of the posterior disc margin  Moderate left and mild right foraminal narrowing  Mild to moderate central stenosis      L5-S1:  No significant central or foraminal narrowing  Mild facet hypertrophy      IMPRESSION:     Inferiorly extruded right paramedian disc herniation L2-3 likely impinges the descending right L3 nerve root  Correlate for right L3 radiculopathy      Severe facet degenerative change L4-5 results in anterolisthesis and uncovering the posterior disc margin with resultant mild to moderate central stenosis, moderate left, and mild right foraminal narrowing  No orders of the defined types were placed in this encounter

## 2022-10-17 NOTE — PROGRESS NOTES
Assessment:  1  Spinal stenosis of lumbar region, unspecified whether neurogenic claudication present        Plan:  Patient is a 77-year-old female complaints of low back pain, bilateral leg pain right worse than left, bilateral knee pain with chronic pain syndrome secondary to lumbar spinal stenosis, lumbar spondylosis, lumbar radiculopathy, primary osteoarthritis bilateral knees presents to office for initial consultation  Patient has seen Orthopedic surgery and received gel shots in both knees reports knees feel better  She does continue to have low back pain with radiation into butt cheeks right worse than left  MRI lumbar spine does show a right-sided disc extrusion at L2-L3, severe facet arthropathy at L4-5 with mild-to-moderate spinal canal stenosis  1  We will schedule patient for an L4-5 interlaminar epidural steroid injection to alleviate low back pain and leg pain for lumbar spinal stenosis  2  We will trial patient on Cymbalta 20 mg p o  Q h s   3  Patient will continue home exercise regimen  4  Follow-up 1 month after injection    Complete risks and benefits including bleeding, infection, tissue reaction, nerve injury and allergic reaction were discussed  The approach was demonstrated using models and literature was provided  Verbal and written consent was obtained  History of Present Illness: The patient is a 68 y o  female who presents for consultation in regards to Neck Pain, Back Pain, and Knee Pain  Symptoms have been present for 6 months  Symptoms began without any precipitating injury or trauma  Pain is reported to be 5 on the numeric rating scale  Symptoms are felt occasionally and worst in the no typical pattern  Symptoms are characterized as dull/aching, tingling and pressure-like  Symptoms are associated with no weakness  Aggravating factors include walking  Relieving factors include lying down, sitting, exercise and relaxation    No change in symptoms with kneeling, standing, bending, leaning forward, leaning bckward, turning the head, coughing/sneezing and bowel movements  Treatments that have been helpful include physical therapy, home exercise and heat/ice  Medications to relieve symptoms include nothing  Review of Systems:    Review of Systems   Constitutional: Negative for chills, fatigue and fever  HENT: Negative for hearing loss, sinus pain, sore throat and trouble swallowing  Eyes: Negative for pain and visual disturbance  Respiratory: Negative for shortness of breath and wheezing  Cardiovascular: Negative for chest pain and palpitations  Gastrointestinal: Negative for abdominal pain, constipation and nausea  Endocrine: Negative for polydipsia and polyuria  Genitourinary: Negative for difficulty urinating  Musculoskeletal: Negative for arthralgias, gait problem, joint swelling and myalgias  Skin: Negative for rash  Neurological: Negative for dizziness, weakness and headaches  Hematological: Does not bruise/bleed easily  Psychiatric/Behavioral: Negative for dysphoric mood  The patient is not nervous/anxious            Past Medical History:   Diagnosis Date   • Benign essential hypertension    • Chronic kidney disease, stage 2 (mild)    • Chronic kidney disease, stage 3 (HCC)    • Disorder of gallbladder    • Disorder of skin and subcutaneous tissue    • Dyspnea    • Essential hypertension    • Hyperlipidemia    • Hypokalemia    • Hypothyroidism    • Malaise and fatigue    • Mixed hyperlipidemia    • Morbid obesity (HCC)    • Pernicious anemia    • Type 2 diabetes mellitus (HCC)        Past Surgical History:   Procedure Laterality Date   • BREAST BIOPSY Right    • CARPAL TUNNEL RELEASE Bilateral    • CHOLECYSTECTOMY     • COLONOSCOPY      Dr Pia Mccarthy    • HYSTERECTOMY     • PARTIAL HYSTERECTOMY     • SKIN LESION EXCISION      scalp        Family History   Problem Relation Age of Onset   • Stroke Mother    • Atrial fibrillation Mother    • Brain cancer Father    • Other Brother         Twin brothers - Open heart   • Other Brother         Twin brothers - Open heart   • No Known Problems Maternal Grandmother    • No Known Problems Paternal Grandmother        Social History     Occupational History   • Occupation: Retired    Tobacco Use   • Smoking status: Former Smoker     Packs/day: 1 00     Years: 14 00     Pack years: 14 00     Types: Cigarettes     Quit date:      Years since quittin 8   • Smokeless tobacco: Never Used   Vaping Use   • Vaping Use: Never used   Substance and Sexual Activity   • Alcohol use: Yes     Comment: Occasionally    • Drug use: Never     Comment: Denies drug use - As per [facility]    • Sexual activity: Not on file         Current Outpatient Medications:   •  amLODIPine (NORVASC) 5 mg tablet, Take 1 tablet (5 mg total) by mouth daily, Disp: 90 tablet, Rfl: 3  •  aspirin (ECOTRIN LOW STRENGTH) 81 mg EC tablet, Take 81 mg by mouth daily, Disp: , Rfl:   •  cholecalciferol (VITAMIN D3) 1,000 units tablet, Take 800 Units by mouth daily  , Disp: , Rfl:   •  folic acid (FOLVITE) 698 mcg tablet, Take 400 mcg by mouth daily, Disp: , Rfl:   •  glipiZIDE (GLUCOTROL) 10 mg tablet, Take 0 5 tablets (5 mg total) by mouth 2 (two) times a day before meals, Disp: 60 tablet, Rfl: 5  •  glucose monitoring kit (FREESTYLE) monitoring kit, 1 each by Does not apply route daily, Disp: 1 each, Rfl: 0  •  Insulin Glargine Solostar (Lantus SoloStar) 100 UNIT/ML SOPN, Inject 50 Units under the skin daily at bedtime, Disp: 15 mL, Rfl: 5  •  insulin lispro (HumaLOG KwikPen) 100 units/mL injection pen, Inject 8 Units under the skin 2 (two) times a day with meals, Disp: 15 mL, Rfl: 1  •  Insulin Pen Needle 32G X 6 MM MISC, Use in the morning, Disp: 100 each, Rfl: 5  •  Lancets (freestyle) lancets, Use as instructed -glucose monitor BID, Disp: 100 each, Rfl: 5  •  levothyroxine 50 mcg tablet, Take 1 tablet (50 mcg total) by mouth daily, Disp: 90 tablet, Rfl: 3  •  lisinopril (ZESTRIL) 20 mg tablet, Take 1 tablet (20 mg total) by mouth daily, Disp: 90 tablet, Rfl: 3  •  metoprolol succinate (TOPROL-XL) 50 mg 24 hr tablet, take 1 tablet by mouth once daily, Disp: 90 tablet, Rfl: 5  •  omega-3-acid ethyl esters (LOVAZA) 1 g capsule, Take 2 g by mouth 2 (two) times a day, Disp: , Rfl:   •  Psyllium (FIBER) 0 52 g CAPS, Take by mouth, Disp: , Rfl:   •  rosuvastatin (CRESTOR) 10 MG tablet, Take 1 tablet (10 mg total) by mouth daily, Disp: 90 tablet, Rfl: 3    No Known Allergies    Physical Exam:    /72   Pulse 80   Ht 5' 2" (1 575 m)   Wt 87 5 kg (192 lb 12 8 oz)   BMI 35 26 kg/m²     Constitutional: normal, well developed, well nourished, alert, in no distress and non-toxic and no overt pain behavior  and obese  Eyes: anicteric  HEENT: grossly intact  Neck: supple, symmetric, trachea midline and no masses   Pulmonary:even and unlabored  Cardiovascular:No edema or pitting edema present  Skin:Normal without rashes or lesions and well hydrated  Psychiatric:Mood and affect appropriate  Neurologic:Cranial Nerves II-XII grossly intact  Musculoskeletal:antalgic    Imaging  MRI LUMBAR SPINE WITHOUT CONTRAST     INDICATION: M54 41: Lumbago with sciatica, right side  G89 29: Other chronic pain      COMPARISON:  None      TECHNIQUE:  Sagittal T1, sagittal T2, sagittal inversion recovery, axial T1 and axial T2, coronal T2     IMAGE QUALITY:  Diagnostic     FINDINGS:     VERTEBRAL BODIES:  There are 5 lumbar type vertebral bodies  Anterolisthesis L4-5  Degenerative marrow signal superior endplate L3      SACRUM:  Normal signal within the sacrum   No evidence of insufficiency or stress fracture      DISTAL CORD AND CONUS:  Normal size and signal within the distal cord and conus      PARASPINAL SOFT TISSUES:  Paraspinal soft tissues are unremarkable      LOWER THORACIC DISC SPACES:  Normal disc height and signal   No disc herniation, canal stenosis or foraminal narrowing      LUMBAR DISC SPACES:     L1-L2:  Normal      L2-L3:  Inferiorly extruded right paramedian disc herniation likely impinges the descending right L3 nerve root  Correlate for right L3 radiculopathy  Marginal osteophytes contribute to mild right foraminal narrowing      L3-L4:  Mild annular bulge and mild facet hypertrophy  No significant central or foraminal narrowing      L4-L5:  Severe facet degenerative change accounts for slight anterolisthesis and uncovering of the posterior disc margin  Moderate left and mild right foraminal narrowing  Mild to moderate central stenosis      L5-S1:  No significant central or foraminal narrowing  Mild facet hypertrophy      IMPRESSION:     Inferiorly extruded right paramedian disc herniation L2-3 likely impinges the descending right L3 nerve root  Correlate for right L3 radiculopathy      Severe facet degenerative change L4-5 results in anterolisthesis and uncovering the posterior disc margin with resultant mild to moderate central stenosis, moderate left, and mild right foraminal narrowing  No orders of the defined types were placed in this encounter

## 2022-10-18 ENCOUNTER — TELEPHONE (OUTPATIENT)
Dept: PAIN MEDICINE | Facility: CLINIC | Age: 77
End: 2022-10-18

## 2022-10-18 NOTE — TELEPHONE ENCOUNTER
Caller: Patient    Doctor: Donna Service    Reason for call: pt would like you to call her back    Call back#: [phone]

## 2022-10-18 NOTE — TELEPHONE ENCOUNTER
Caller: patient    Doctor: Evan Wilkes    Reason for call: request for a sooner procedure date    Call back#: [phone]

## 2022-10-19 NOTE — TELEPHONE ENCOUNTER
Caller: patient     Doctor: Meir Carver     Reason for call: request for a sooner procedure date     Call back#: [phone]

## 2022-10-20 DIAGNOSIS — N18.32 TYPE 2 DIABETES MELLITUS WITH STAGE 3B CHRONIC KIDNEY DISEASE, WITH LONG-TERM CURRENT USE OF INSULIN (HCC): ICD-10-CM

## 2022-10-20 DIAGNOSIS — E11.22 TYPE 2 DIABETES MELLITUS WITH STAGE 3B CHRONIC KIDNEY DISEASE, WITH LONG-TERM CURRENT USE OF INSULIN (HCC): ICD-10-CM

## 2022-10-20 DIAGNOSIS — Z79.4 TYPE 2 DIABETES MELLITUS WITH STAGE 3B CHRONIC KIDNEY DISEASE, WITH LONG-TERM CURRENT USE OF INSULIN (HCC): ICD-10-CM

## 2022-10-20 NOTE — TELEPHONE ENCOUNTER
Pt called and needs the new script sent over for her Humalog Kwik Pen  8 units 2 times daily    They never received the one sent from 08/30/2022    Please advise    [address] [facility]    Please advise

## 2022-10-21 ENCOUNTER — HOSPITAL ENCOUNTER (OUTPATIENT)
Dept: RADIOLOGY | Facility: HOSPITAL | Age: 77
End: 2022-10-21
Payer: MEDICARE

## 2022-10-21 VITALS
RESPIRATION RATE: 20 BRPM | HEART RATE: 76 BPM | DIASTOLIC BLOOD PRESSURE: 60 MMHG | SYSTOLIC BLOOD PRESSURE: 135 MMHG | OXYGEN SATURATION: 94 % | TEMPERATURE: 97.5 F

## 2022-10-21 DIAGNOSIS — M48.061 SPINAL STENOSIS OF LUMBAR REGION, UNSPECIFIED WHETHER NEUROGENIC CLAUDICATION PRESENT: ICD-10-CM

## 2022-10-21 PROCEDURE — 62323 NJX INTERLAMINAR LMBR/SAC: CPT | Performed by: ANESTHESIOLOGY

## 2022-10-21 RX ORDER — INSULIN LISPRO 100 [IU]/ML
8 INJECTION, SOLUTION INTRAVENOUS; SUBCUTANEOUS 2 TIMES DAILY WITH MEALS
Qty: 15 ML | Refills: 1 | Status: SHIPPED | OUTPATIENT
Start: 2022-10-21

## 2022-10-21 RX ORDER — METHYLPREDNISOLONE ACETATE 80 MG/ML
80 INJECTION, SUSPENSION INTRA-ARTICULAR; INTRALESIONAL; INTRAMUSCULAR; PARENTERAL; SOFT TISSUE ONCE
Status: COMPLETED | OUTPATIENT
Start: 2022-10-21 | End: 2022-10-21

## 2022-10-21 RX ORDER — LIDOCAINE HYDROCHLORIDE 10 MG/ML
2 INJECTION, SOLUTION EPIDURAL; INFILTRATION; INTRACAUDAL; PERINEURAL ONCE
Status: COMPLETED | OUTPATIENT
Start: 2022-10-21 | End: 2022-10-21

## 2022-10-21 RX ADMIN — LIDOCAINE HYDROCHLORIDE 2 ML: 10 INJECTION, SOLUTION EPIDURAL; INFILTRATION; INTRACAUDAL; PERINEURAL at 13:10

## 2022-10-21 RX ADMIN — METHYLPREDNISOLONE ACETATE 80 MG: 80 INJECTION, SUSPENSION INTRA-ARTICULAR; INTRALESIONAL; INTRAMUSCULAR; PARENTERAL; SOFT TISSUE at 13:12

## 2022-10-21 RX ADMIN — IOHEXOL 1 ML: 300 INJECTION, SOLUTION INTRAVENOUS at 13:11

## 2022-10-21 NOTE — INTERVAL H&P NOTE
Update: (This section must be completed if the H&P was completed greater than 24 hrs to procedure or admission)    H&P reviewed  After examining the patient, I find no changed to the H&P since it had been written  Patient re-evaluated   Accept as history and physical     Prudencio Pope/October 21, 2022/12:51 PM

## 2022-10-21 NOTE — DISCHARGE INSTRUCTIONS
Epidural Steroid Injection   WHAT YOU NEED TO KNOW:   An epidural steroid injection (SHERRY) is a procedure to inject steroid medicine into the epidural space  The epidural space is between your spinal cord and vertebrae  Steroids reduce inflammation and fluid buildup in your spine that may be causing pain  You may be given pain medicine along with the steroids  ACTIVITY  Do not drive or operate machinery today  No strenuous activity today - bending, lifting, etc   You may resume normal activites starting tomorrow - start slowly and as tolerated  You may shower today, but no tub baths or hot tubs  You may have numbness for several hours from the local anesthetic  Please use caution and common sense, especially with weight-bearing activities  CARE OF THE INJECTION SITE  If you have soreness or pain, apply ice to the area today (20 minutes on/20 minutes off)  Starting tomorrow, you may use warm, moist heat or ice if needed  You may have an increase or change in your discomfort for 36-48 hours after your treatment  Apply ice and continue with any pain medication you have been prescribed  Notify the [facility] if you have any of the following: redness, drainage, swelling, headache, stiff neck or fever above 100°F     SPECIAL INSTRUCTIONS  Our office will contact you in approximately 7 days for a progress report  MEDICATIONS  Continue to take all routine medications  Our office may have instructed you to hold some medications  As no general anesthesia was used in today's procedure, you should not experience any side effects related to anesthesia  If you are diabetic, the steroids used in today's injection may temporarily increase your blood sugar levels after the first few days after your injection  Please keep a close eye on your sugars and alert the doctor who manages your diabetes if your sugars are significantly high from your baseline or you are symptomatic       If you have a problem specifically related to your procedure, please call our office at [phone]  Problems not related to your procedure should be directed to your primary care physician

## 2022-10-25 ENCOUNTER — TELEPHONE (OUTPATIENT)
Dept: PAIN MEDICINE | Facility: CLINIC | Age: 77
End: 2022-10-25

## 2022-10-27 DIAGNOSIS — N18.31 TYPE 2 DIABETES MELLITUS WITH STAGE 3A CHRONIC KIDNEY DISEASE, WITH LONG-TERM CURRENT USE OF INSULIN (HCC): ICD-10-CM

## 2022-10-27 DIAGNOSIS — I10 ESSENTIAL HYPERTENSION: ICD-10-CM

## 2022-10-27 DIAGNOSIS — E78.2 MIXED HYPERLIPIDEMIA: ICD-10-CM

## 2022-10-27 DIAGNOSIS — Z79.4 TYPE 2 DIABETES MELLITUS WITH STAGE 3A CHRONIC KIDNEY DISEASE, WITH LONG-TERM CURRENT USE OF INSULIN (HCC): ICD-10-CM

## 2022-10-27 DIAGNOSIS — E11.22 TYPE 2 DIABETES MELLITUS WITH STAGE 3A CHRONIC KIDNEY DISEASE, WITH LONG-TERM CURRENT USE OF INSULIN (HCC): ICD-10-CM

## 2022-10-27 RX ORDER — METOPROLOL SUCCINATE 50 MG/1
50 TABLET, EXTENDED RELEASE ORAL DAILY
Qty: 90 TABLET | Refills: 2 | Status: SHIPPED | OUTPATIENT
Start: 2022-10-27

## 2022-10-27 RX ORDER — LEVOTHYROXINE SODIUM 0.05 MG/1
50 TABLET ORAL DAILY
Qty: 90 TABLET | Refills: 2 | Status: SHIPPED | OUTPATIENT
Start: 2022-10-27

## 2022-10-27 RX ORDER — LISINOPRIL 20 MG/1
20 TABLET ORAL DAILY
Qty: 90 TABLET | Refills: 2 | Status: SHIPPED | OUTPATIENT
Start: 2022-10-27

## 2022-10-27 NOTE — TELEPHONE ENCOUNTER
Pt needs refill Amlodipine 5 mg takes 1 Qd # 90 refill 3  Levothyroxine 50 mcg takes 1 Qd # 90 refill 3  Lisinopril 20 mg takes 1 Qd # 90 refill 3     [facility]    Please advise    Phone [phone]

## 2022-10-27 NOTE — TELEPHONE ENCOUNTER
Patient requesting refill(s) of: Norvasc, Levothyroxine, Lisinopril     All medications last filled: 21 #90x3  Last lester: 22  Next appt: 22  Pharmacy: [id number] with pharmacy  Rx script   Needs new Rx's  Please advise

## 2022-10-28 ENCOUNTER — TELEPHONE (OUTPATIENT)
Dept: OBGYN CLINIC | Facility: HOSPITAL | Age: 77
End: 2022-10-28

## 2022-11-07 DIAGNOSIS — I10 ESSENTIAL HYPERTENSION: ICD-10-CM

## 2022-11-07 RX ORDER — AMLODIPINE BESYLATE 5 MG/1
5 TABLET ORAL DAILY
Qty: 90 TABLET | Refills: 3 | Status: SHIPPED | OUTPATIENT
Start: 2022-11-07

## 2022-11-07 NOTE — TELEPHONE ENCOUNTER
Patient requesting refill(s) of: Amlodipine     Last filled: 7/27/21  Last appt: 8/30/22  Next appt: 11/30/22  Pharmacy: [facility]

## 2022-11-23 ENCOUNTER — APPOINTMENT (OUTPATIENT)
Dept: LAB | Facility: CLINIC | Age: 77
End: 2022-11-23

## 2022-11-23 ENCOUNTER — OFFICE VISIT (OUTPATIENT)
Dept: PAIN MEDICINE | Facility: CLINIC | Age: 77
End: 2022-11-23

## 2022-11-23 VITALS
WEIGHT: 194.6 LBS | BODY MASS INDEX: 35.81 KG/M2 | HEART RATE: 88 BPM | HEIGHT: 62 IN | DIASTOLIC BLOOD PRESSURE: 64 MMHG | SYSTOLIC BLOOD PRESSURE: 125 MMHG

## 2022-11-23 DIAGNOSIS — G89.4 CHRONIC PAIN SYNDROME: Primary | ICD-10-CM

## 2022-11-23 DIAGNOSIS — N18.32 TYPE 2 DIABETES MELLITUS WITH STAGE 3B CHRONIC KIDNEY DISEASE, WITH LONG-TERM CURRENT USE OF INSULIN (HCC): ICD-10-CM

## 2022-11-23 DIAGNOSIS — Z79.4 TYPE 2 DIABETES MELLITUS WITH STAGE 3B CHRONIC KIDNEY DISEASE, WITH LONG-TERM CURRENT USE OF INSULIN (HCC): ICD-10-CM

## 2022-11-23 DIAGNOSIS — M54.16 LUMBAR RADICULOPATHY: ICD-10-CM

## 2022-11-23 DIAGNOSIS — E11.22 TYPE 2 DIABETES MELLITUS WITH STAGE 3B CHRONIC KIDNEY DISEASE, WITH LONG-TERM CURRENT USE OF INSULIN (HCC): ICD-10-CM

## 2022-11-23 DIAGNOSIS — M48.061 SPINAL STENOSIS OF LUMBAR REGION, UNSPECIFIED WHETHER NEUROGENIC CLAUDICATION PRESENT: ICD-10-CM

## 2022-11-23 LAB
EST. AVERAGE GLUCOSE BLD GHB EST-MCNC: 214 MG/DL
HBA1C MFR BLD: 9.1 %

## 2022-11-23 NOTE — H&P (VIEW-ONLY)
Assessment:  1  Chronic pain syndrome    2  Lumbar radiculopathy    3  Spinal stenosis of lumbar region, unspecified whether neurogenic claudication present        Plan:  While the patient was in the office today, I did have a thorough conversation regarding their chronic pain syndrome, medication management, and treatment plan options  Patient is being seen for follow-up visit  She underwent an L4-5 interlaminar epidural steroid injection on 10/21/2022  Overall, she is reporting about 50% improvement in her symptoms  She states that even though her pain has improved her ability to walk or participate in activity is limited due to pain  She would like to repeat the injection if possible  Advised her that we will need to recheck a hemoglobin A1c prior to repeating the injection to assess the stability of her blood sugars  I ordered hemoglobin A1c during today's visit  Patient will be called with results  Continue Cymbalta 20 mg daily  She did not require refill this medication during today's visit  History of Present Illness: The patient is a 68 y o  female who presents for a follow up office visit in regards to Back Pain, Neck Pain, Knee Pain, and Leg Pain  The patient’s current symptoms include complaints of low back pain that radiates down the posterior aspect of both legs  Current pain level is a 5/10  Quality pain is described as pressure-like  Current pain medications includes:  Cymbalta 20 mg daily    The patient reports that this regimen is providing up to 50 % pain relief  The patient is reporting no side effects from this pain medication regimen  I have personally reviewed and/or updated the patient's past medical history, past surgical history, family history, social history, current medications, allergies, and vital signs today  Review of Systems  Review of Systems   Constitutional: Negative for unexpected weight change  HENT: Negative for hearing loss      Eyes: Negative for visual disturbance  Respiratory: Negative for shortness of breath  Cardiovascular: Negative for leg swelling  Gastrointestinal: Negative for constipation  Endocrine: Negative for polyuria  Genitourinary: Negative for difficulty urinating  Musculoskeletal: Positive for gait problem  Negative for joint swelling and myalgias  Skin: Negative for rash  Neurological: Negative for weakness and headaches  Psychiatric/Behavioral: Negative for decreased concentration  All other systems reviewed and are negative          Past Medical History:   Diagnosis Date   • Benign essential hypertension    • Chronic kidney disease, stage 2 (mild)    • Chronic kidney disease, stage 3 (HCC)    • Disorder of gallbladder    • Disorder of skin and subcutaneous tissue    • Dyspnea    • Essential hypertension    • Hyperlipidemia    • Hypokalemia    • Hypothyroidism    • Malaise and fatigue    • Mixed hyperlipidemia    • Morbid obesity (HCC)    • Pernicious anemia    • Type 2 diabetes mellitus ([facility] [address] )        Past Surgical History:   Procedure Laterality Date   • BREAST BIOPSY Right    • CARPAL TUNNEL RELEASE Bilateral    • CHOLECYSTECTOMY     • COLONOSCOPY      Dr Paul Spence    • HYSTERECTOMY     • PARTIAL HYSTERECTOMY     • SKIN LESION EXCISION      scalp        Family History   Problem Relation Age of Onset   • Stroke Mother    • Atrial fibrillation Mother    • Brain cancer Father    • Other Brother         Twin brothers - Open heart   • Other Brother         Twin brothers - Open heart   • No Known Problems Maternal Grandmother    • No Known Problems Paternal Grandmother        Social History     Occupational History   • Occupation: Retired    Tobacco Use   • Smoking status: Former     Packs/day: 1 00     Years: 14 00     Pack years: 14 00     Types: Cigarettes     Quit date:      Years since quittin 9   • Smokeless tobacco: Never   Vaping Use   • Vaping Use: Never used   Substance and Sexual Activity   • Alcohol use: Yes     Comment: Occasionally    • Drug use: Never     Comment: Denies drug use - As per [employer]    • Sexual activity: Not on file         Current Outpatient Medications:   •  amLODIPine (NORVASC) 5 mg tablet, Take 1 tablet (5 mg total) by mouth daily, Disp: 90 tablet, Rfl: 3  •  aspirin (ECOTRIN LOW STRENGTH) 81 mg EC tablet, Take 81 mg by mouth daily, Disp: , Rfl:   •  cholecalciferol (VITAMIN D3) 1,000 units tablet, Take 800 Units by mouth daily  , Disp: , Rfl:   •  DULoxetine (CYMBALTA) 20 mg capsule, Take 1 capsule (20 mg total) by mouth daily, Disp: 30 capsule, Rfl: 1  •  folic acid (FOLVITE) 720 mcg tablet, Take 400 mcg by mouth daily, Disp: , Rfl:   •  glipiZIDE (GLUCOTROL) 10 mg tablet, Take 0 5 tablets (5 mg total) by mouth 2 (two) times a day before meals, Disp: 60 tablet, Rfl: 5  •  glucose monitoring kit (FREESTYLE) monitoring kit, 1 each by Does not apply route daily, Disp: 1 each, Rfl: 0  •  Insulin Glargine Solostar (Lantus SoloStar) 100 UNIT/ML SOPN, Inject 50 Units under the skin daily at bedtime, Disp: 15 mL, Rfl: 5  •  insulin lispro (HumaLOG KwikPen) 100 units/mL injection pen, Inject 8 Units under the skin 2 (two) times a day with meals, Disp: 15 mL, Rfl: 1  •  Insulin Pen Needle 32G X 6 MM MISC, Use in the morning, Disp: 100 each, Rfl: 5  •  Lancets (freestyle) lancets, Use as instructed -glucose monitor BID, Disp: 100 each, Rfl: 5  •  levothyroxine 50 mcg tablet, Take 1 tablet (50 mcg total) by mouth daily, Disp: 90 tablet, Rfl: 2  •  lisinopril (ZESTRIL) 20 mg tablet, Take 1 tablet (20 mg total) by mouth daily, Disp: 90 tablet, Rfl: 2  •  metoprolol succinate (TOPROL-XL) 50 mg 24 hr tablet, Take 1 tablet (50 mg total) by mouth daily, Disp: 90 tablet, Rfl: 2  •  omega-3-acid ethyl esters (LOVAZA) 1 g capsule, Take 2 g by mouth 2 (two) times a day, Disp: , Rfl:   •  Psyllium (FIBER) 0 52 g CAPS, Take by mouth, Disp: , Rfl:   •  rosuvastatin (CRESTOR) 10 MG tablet, Take 1 tablet (10 mg total) by mouth daily, Disp: 90 tablet, Rfl: 3    No Known Allergies    Physical Exam:    Ht 5' 2" (1 575 m)   BMI 35 26 kg/m²     Constitutional:normal, well developed, well nourished, alert, in no distress and non-toxic and no overt pain behavior  Eyes:anicteric  HEENT:grossly intact  Neck:supple, symmetric, trachea midline and no masses   Pulmonary:even and unlabored  Cardiovascular:No edema or pitting edema present  Skin:Normal without rashes or lesions and well hydrated  Psychiatric:Mood and affect appropriate  Neurologic:Cranial Nerves II-XII grossly intact  Musculoskeletal:Gait is slow and guarded  Imaging  No orders to display       No orders of the defined types were placed in this encounter

## 2022-11-23 NOTE — PROGRESS NOTES
Assessment:  1  Chronic pain syndrome    2  Lumbar radiculopathy    3  Spinal stenosis of lumbar region, unspecified whether neurogenic claudication present        Plan:  While the patient was in the office today, I did have a thorough conversation regarding their chronic pain syndrome, medication management, and treatment plan options  Patient is being seen for follow-up visit  She underwent an L4-5 interlaminar epidural steroid injection on 10/21/2022  Overall, she is reporting about 50% improvement in her symptoms  She states that even though her pain has improved her ability to walk or participate in activity is limited due to pain  She would like to repeat the injection if possible  Advised her that we will need to recheck a hemoglobin A1c prior to repeating the injection to assess the stability of her blood sugars  I ordered hemoglobin A1c during today's visit  Patient will be called with results  Continue Cymbalta 20 mg daily  She did not require refill this medication during today's visit  History of Present Illness: The patient is a 68 y o  female who presents for a follow up office visit in regards to Back Pain, Neck Pain, Knee Pain, and Leg Pain  The patient’s current symptoms include complaints of low back pain that radiates down the posterior aspect of both legs  Current pain level is a 5/10  Quality pain is described as pressure-like  Current pain medications includes:  Cymbalta 20 mg daily    The patient reports that this regimen is providing up to 50 % pain relief  The patient is reporting no side effects from this pain medication regimen  I have personally reviewed and/or updated the patient's past medical history, past surgical history, family history, social history, current medications, allergies, and vital signs today  Review of Systems  Review of Systems   Constitutional: Negative for unexpected weight change  HENT: Negative for hearing loss      Eyes: Negative for visual disturbance  Respiratory: Negative for shortness of breath  Cardiovascular: Negative for leg swelling  Gastrointestinal: Negative for constipation  Endocrine: Negative for polyuria  Genitourinary: Negative for difficulty urinating  Musculoskeletal: Positive for gait problem  Negative for joint swelling and myalgias  Skin: Negative for rash  Neurological: Negative for weakness and headaches  Psychiatric/Behavioral: Negative for decreased concentration  All other systems reviewed and are negative          Past Medical History:   Diagnosis Date   • Benign essential hypertension    • Chronic kidney disease, stage 2 (mild)    • Chronic kidney disease, stage 3 (HCC)    • Disorder of gallbladder    • Disorder of skin and subcutaneous tissue    • Dyspnea    • Essential hypertension    • Hyperlipidemia    • Hypokalemia    • Hypothyroidism    • Malaise and fatigue    • Mixed hyperlipidemia    • Morbid obesity (HCC)    • Pernicious anemia    • Type 2 diabetes mellitus ([facility] [address] )        Past Surgical History:   Procedure Laterality Date   • BREAST BIOPSY Right    • CARPAL TUNNEL RELEASE Bilateral    • CHOLECYSTECTOMY     • COLONOSCOPY      Dr Paul Spence    • HYSTERECTOMY     • PARTIAL HYSTERECTOMY     • SKIN LESION EXCISION      scalp        Family History   Problem Relation Age of Onset   • Stroke Mother    • Atrial fibrillation Mother    • Brain cancer Father    • Other Brother         Twin brothers - Open heart   • Other Brother         Twin brothers - Open heart   • No Known Problems Maternal Grandmother    • No Known Problems Paternal Grandmother        Social History     Occupational History   • Occupation: Retired    Tobacco Use   • Smoking status: Former     Packs/day: 1 00     Years: 14 00     Pack years: 14 00     Types: Cigarettes     Quit date:      Years since quittin 9   • Smokeless tobacco: Never   Vaping Use   • Vaping Use: Never used   Substance and Sexual Activity   • Alcohol use: Yes     Comment: Occasionally    • Drug use: Never     Comment: Denies drug use - As per [employer]    • Sexual activity: Not on file         Current Outpatient Medications:   •  amLODIPine (NORVASC) 5 mg tablet, Take 1 tablet (5 mg total) by mouth daily, Disp: 90 tablet, Rfl: 3  •  aspirin (ECOTRIN LOW STRENGTH) 81 mg EC tablet, Take 81 mg by mouth daily, Disp: , Rfl:   •  cholecalciferol (VITAMIN D3) 1,000 units tablet, Take 800 Units by mouth daily  , Disp: , Rfl:   •  DULoxetine (CYMBALTA) 20 mg capsule, Take 1 capsule (20 mg total) by mouth daily, Disp: 30 capsule, Rfl: 1  •  folic acid (FOLVITE) 291 mcg tablet, Take 400 mcg by mouth daily, Disp: , Rfl:   •  glipiZIDE (GLUCOTROL) 10 mg tablet, Take 0 5 tablets (5 mg total) by mouth 2 (two) times a day before meals, Disp: 60 tablet, Rfl: 5  •  glucose monitoring kit (FREESTYLE) monitoring kit, 1 each by Does not apply route daily, Disp: 1 each, Rfl: 0  •  Insulin Glargine Solostar (Lantus SoloStar) 100 UNIT/ML SOPN, Inject 50 Units under the skin daily at bedtime, Disp: 15 mL, Rfl: 5  •  insulin lispro (HumaLOG KwikPen) 100 units/mL injection pen, Inject 8 Units under the skin 2 (two) times a day with meals, Disp: 15 mL, Rfl: 1  •  Insulin Pen Needle 32G X 6 MM MISC, Use in the morning, Disp: 100 each, Rfl: 5  •  Lancets (freestyle) lancets, Use as instructed -glucose monitor BID, Disp: 100 each, Rfl: 5  •  levothyroxine 50 mcg tablet, Take 1 tablet (50 mcg total) by mouth daily, Disp: 90 tablet, Rfl: 2  •  lisinopril (ZESTRIL) 20 mg tablet, Take 1 tablet (20 mg total) by mouth daily, Disp: 90 tablet, Rfl: 2  •  metoprolol succinate (TOPROL-XL) 50 mg 24 hr tablet, Take 1 tablet (50 mg total) by mouth daily, Disp: 90 tablet, Rfl: 2  •  omega-3-acid ethyl esters (LOVAZA) 1 g capsule, Take 2 g by mouth 2 (two) times a day, Disp: , Rfl:   •  Psyllium (FIBER) 0 52 g CAPS, Take by mouth, Disp: , Rfl:   •  rosuvastatin (CRESTOR) 10 MG tablet, Take 1 tablet (10 mg total) by mouth daily, Disp: 90 tablet, Rfl: 3    No Known Allergies    Physical Exam:    Ht 5' 2" (1 575 m)   BMI 35 26 kg/m²     Constitutional:normal, well developed, well nourished, alert, in no distress and non-toxic and no overt pain behavior  Eyes:anicteric  HEENT:grossly intact  Neck:supple, symmetric, trachea midline and no masses   Pulmonary:even and unlabored  Cardiovascular:No edema or pitting edema present  Skin:Normal without rashes or lesions and well hydrated  Psychiatric:Mood and affect appropriate  Neurologic:Cranial Nerves II-XII grossly intact  Musculoskeletal:Gait is slow and guarded  Imaging  No orders to display       No orders of the defined types were placed in this encounter

## 2022-11-25 ENCOUNTER — TELEPHONE (OUTPATIENT)
Dept: PAIN MEDICINE | Facility: CLINIC | Age: 77
End: 2022-11-25

## 2022-11-25 NOTE — TELEPHONE ENCOUNTER
Hemoglobin A1c is high, but slightly better than it was during her last check  I am okay with scheduling a repeat L4-5 interlaminar epidural steroid injection

## 2022-11-25 NOTE — TELEPHONE ENCOUNTER
Caller: Dave Rodriguez    Doctor: Peewee Clayton    Reason for call: patient called for A1C results     Call back#: [phone]

## 2022-11-30 NOTE — TELEPHONE ENCOUNTER
Caller: Amrita Minor    Doctor/Office:[facility]    Caller asked to speak to:       Call was transferred to:

## 2022-12-02 ENCOUNTER — HOSPITAL ENCOUNTER (OUTPATIENT)
Dept: RADIOLOGY | Facility: HOSPITAL | Age: 77
End: 2022-12-02

## 2022-12-02 VITALS
RESPIRATION RATE: 20 BRPM | HEART RATE: 74 BPM | TEMPERATURE: 98.1 F | SYSTOLIC BLOOD PRESSURE: 172 MMHG | DIASTOLIC BLOOD PRESSURE: 73 MMHG | OXYGEN SATURATION: 96 %

## 2022-12-02 DIAGNOSIS — M54.16 LUMBAR RADICULOPATHY: ICD-10-CM

## 2022-12-02 RX ORDER — LIDOCAINE HYDROCHLORIDE 10 MG/ML
2 INJECTION, SOLUTION EPIDURAL; INFILTRATION; INTRACAUDAL; PERINEURAL ONCE
Status: COMPLETED | OUTPATIENT
Start: 2022-12-02 | End: 2022-12-02

## 2022-12-02 RX ORDER — METHYLPREDNISOLONE ACETATE 80 MG/ML
80 INJECTION, SUSPENSION INTRA-ARTICULAR; INTRALESIONAL; INTRAMUSCULAR; PARENTERAL; SOFT TISSUE ONCE
Status: COMPLETED | OUTPATIENT
Start: 2022-12-02 | End: 2022-12-02

## 2022-12-02 RX ADMIN — LIDOCAINE HYDROCHLORIDE 2 ML: 10 INJECTION, SOLUTION EPIDURAL; INFILTRATION; INTRACAUDAL; PERINEURAL at 13:32

## 2022-12-02 RX ADMIN — IOHEXOL 1 ML: 300 INJECTION, SOLUTION INTRAVENOUS at 13:34

## 2022-12-02 RX ADMIN — METHYLPREDNISOLONE ACETATE 80 MG: 80 INJECTION, SUSPENSION INTRA-ARTICULAR; INTRALESIONAL; INTRAMUSCULAR; PARENTERAL; SOFT TISSUE at 13:34

## 2022-12-02 NOTE — DISCHARGE INSTRUCTIONS
Epidural Steroid Injection   WHAT YOU NEED TO KNOW:   An epidural steroid injection (SHERRY) is a procedure to inject steroid medicine into the epidural space  The epidural space is between your spinal cord and vertebrae  Steroids reduce inflammation and fluid buildup in your spine that may be causing pain  You may be given pain medicine along with the steroids  ACTIVITY  Do not drive or operate machinery today  No strenuous activity today - bending, lifting, etc   You may resume normal activites starting tomorrow - start slowly and as tolerated  You may shower today, but no tub baths or hot tubs  You may have numbness for several hours from the local anesthetic  Please use caution and common sense, especially with weight-bearing activities  CARE OF THE INJECTION SITE  If you have soreness or pain, apply ice to the area today (20 minutes on/20 minutes off)  Starting tomorrow, you may use warm, moist heat or ice if needed  You may have an increase or change in your discomfort for 36-48 hours after your treatment  Apply ice and continue with any pain medication you have been prescribed  Notify the [facility] if you have any of the following: redness, drainage, swelling, headache, stiff neck or fever above 100°F     SPECIAL INSTRUCTIONS  Our office will contact you in approximately 7 days for a progress report  MEDICATIONS  Continue to take all routine medications  Our office may have instructed you to hold some medications  As no general anesthesia was used in today's procedure, you should not experience any side effects related to anesthesia  If you are diabetic, the steroids used in today's injection may temporarily increase your blood sugar levels after the first few days after your injection  Please keep a close eye on your sugars and alert the doctor who manages your diabetes if your sugars are significantly high from your baseline or you are symptomatic       If you have a problem specifically related to your procedure, please call our office at [phone]  Problems not related to your procedure should be directed to your primary care physician

## 2022-12-02 NOTE — OP NOTE
[facility] SPINE AND PAIN PROCEDURE Procedure Note    PATIENT NAME: Baldo Handy  : 1945  MRN: [id number]    Pre-op Diagnosis:  1  Lumbar radiculopathy        Post-op Diagnosis:   1  Lumbar radiculopathy        Surgeon:   Vijay Vickers MD    PROCEDURE DESCRIPTION:  Fluoroscopically-guided  L4-5 interlaminar epidural steroid injection under fluoroscopy      After discussing the risks, benefits, and alternatives to the procedure, the patient expressed understanding and wished to proceed  The patient was brought to the fluoroscopy suite and placed in the prone position  A procedural pause was conducted to verify:  correct patient identity, procedure to be performed and as applicable, correct side and site, correct patient position, and availability of implants, special equipment and special requirements  After identifying the L4-5 space fluoroscopically, the skin was sterilely prepped and draped in the usual fashion using Chloraprep skin prep  The skin and subcutaneous tissue were anesthetized with 0 5 % lidocaine  Utilizing a loss of resistance technique and intermittent fluoroscopic guidance, a 3 5” 20 gauge Tuohy needle was advanced into the epidural space  Proper needle positioning was confirmed using multiple fluoroscopic views  After negative aspiration, Omnipaque 300 contrast was injected confirming epidural spread without evidence of intravascular or intrathecal spread  A 4 ml solution consisting of 80 mg Depo-Medrol in sterile saline was injected slowly and incrementally into the epidural space  Following the injection the needle was withdrawn slightly and flushed with 0 5 % lidocaine as it was fully extracted  The patient tolerated the procedure well and there were no apparent complications  After appropriate observation, the patient was dismissed from the clinic in good condition under their own power      Estimated Blood Loss: none    Findings: NONE    Specimens: NONE    Complications: NONE    Anesthesia: NONE     I was present throughout the entire procedure    DISPOSITION:  Patient tolerated the procedure well and sent to recovery room awake, alert, and hemodynamically stable

## 2022-12-02 NOTE — INTERVAL H&P NOTE
Update: (This section must be completed if the H&P was completed greater than 24 hrs to procedure or admission)    H&P reviewed  After examining the patient, I find no changed to the H&P since it had been written  Patient re-evaluated   Accept as history and physical     Prudencio Pope MD/December 2, 2022/1:24 PM

## 2022-12-05 ENCOUNTER — OFFICE VISIT (OUTPATIENT)
Dept: FAMILY MEDICINE CLINIC | Facility: CLINIC | Age: 77
End: 2022-12-05

## 2022-12-05 VITALS
BODY MASS INDEX: 35.37 KG/M2 | OXYGEN SATURATION: 98 % | SYSTOLIC BLOOD PRESSURE: 116 MMHG | HEART RATE: 77 BPM | RESPIRATION RATE: 18 BRPM | HEIGHT: 62 IN | TEMPERATURE: 97.7 F | WEIGHT: 192.2 LBS | DIASTOLIC BLOOD PRESSURE: 72 MMHG

## 2022-12-05 DIAGNOSIS — E11.22 TYPE 2 DIABETES MELLITUS WITH STAGE 3B CHRONIC KIDNEY DISEASE, WITH LONG-TERM CURRENT USE OF INSULIN (HCC): Primary | ICD-10-CM

## 2022-12-05 DIAGNOSIS — N18.32 TYPE 2 DIABETES MELLITUS WITH STAGE 3B CHRONIC KIDNEY DISEASE, WITH LONG-TERM CURRENT USE OF INSULIN (HCC): Primary | ICD-10-CM

## 2022-12-05 DIAGNOSIS — E03.9 ACQUIRED HYPOTHYROIDISM: ICD-10-CM

## 2022-12-05 DIAGNOSIS — E11.22 CKD STAGE 3 DUE TO TYPE 2 DIABETES MELLITUS (HCC): ICD-10-CM

## 2022-12-05 DIAGNOSIS — Z79.4 TYPE 2 DIABETES MELLITUS WITH STAGE 3B CHRONIC KIDNEY DISEASE, WITH LONG-TERM CURRENT USE OF INSULIN (HCC): Primary | ICD-10-CM

## 2022-12-05 DIAGNOSIS — N18.30 CKD STAGE 3 DUE TO TYPE 2 DIABETES MELLITUS (HCC): ICD-10-CM

## 2022-12-05 PROBLEM — N39.0 URINARY TRACT INFECTION WITHOUT HEMATURIA: Status: RESOLVED | Noted: 2020-02-24 | Resolved: 2022-12-05

## 2022-12-05 NOTE — PROGRESS NOTES
[facility]        NAME: Jon Flynn is a 68 y o  female  : 1945    MRN: [id number]  DATE: 2022  TIME: 1:59 PM    Assessment and Plan   1  Type 2 diabetes mellitus with stage 3b chronic kidney disease, with long-term current use of insulin (HCC)  -A1c still above goal, however, home BG seem better controlled, will check A1c and labs again in 3 months before follow up    -   Hemoglobin A1C; Future; Expected date: 2023  -     CBC and differential; Future; Expected date: 2023  -     Comprehensive metabolic panel; Future; Expected date: 2023    2  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 mellitus (HCC)  -     CBC and differential; Future; Expected date: 2023  -     Comprehensive metabolic panel; Future; Expected date: 2023    3  Acquired hypothyroidism  -     TSH, 3rd generation with Free T4 reflex; Future; Expected date: 2023             Chief Complaint     Chief Complaint   Patient presents with   • Follow-up         History of Present Illness       66yo female with HTN, type 2 diabetes, CKD stage 3 here for 3 month follow up  Doing well  She is following with pain management for her back and recent SHERRY x 2 so far  Diabetes: using 50 units of insulin at bedtime, 8 unit humalog with lunch and dinner  FBG 100s, pre-lunch and bedtime usually 150s  Denies any lows < 70  Review of Systems   Review of Systems   Constitutional: Negative for appetite change, fatigue and fever  HENT: Positive for postnasal drip and sore throat  Respiratory: Positive for cough  Negative for shortness of breath  Cardiovascular: Negative for chest pain, palpitations and leg swelling  Gastrointestinal: Negative for abdominal pain, diarrhea, nausea and vomiting  Musculoskeletal: Positive for arthralgias, back pain and neck pain  Neurological: Negative for dizziness, light-headedness and headaches           Current Medications       Current Outpatient Medications:   •  amLODIPine (NORVASC) 5 mg tablet, Take 1 tablet (5 mg total) by mouth daily, Disp: 90 tablet, Rfl: 3  •  aspirin (ECOTRIN LOW STRENGTH) 81 mg EC tablet, Take 81 mg by mouth daily, Disp: , Rfl:   •  cholecalciferol (VITAMIN D3) 1,000 units tablet, Take 800 Units by mouth daily  , Disp: , Rfl:   •  DULoxetine (CYMBALTA) 20 mg capsule, Take 1 capsule (20 mg total) by mouth daily, Disp: 30 capsule, Rfl: 1  •  fluticasone (FLONASE) 50 mcg/act nasal spray, 2 sprays into each nostril daily, Disp: 16 g, Rfl: 6  •  folic acid (FOLVITE) 674 mcg tablet, Take 400 mcg by mouth daily, Disp: , Rfl:   •  glipiZIDE (GLUCOTROL) 10 mg tablet, Take 0 5 tablets (5 mg total) by mouth 2 (two) times a day before meals, Disp: 60 tablet, Rfl: 5  •  glucose monitoring kit (FREESTYLE) monitoring kit, 1 each by Does not apply route daily, Disp: 1 each, Rfl: 0  •  Insulin Glargine Solostar (Lantus SoloStar) 100 UNIT/ML SOPN, Inject 50 Units under the skin daily at bedtime, Disp: 15 mL, Rfl: 5  •  insulin lispro (HumaLOG KwikPen) 100 units/mL injection pen, Inject 8 Units under the skin 2 (two) times a day with meals, Disp: 15 mL, Rfl: 1  •  Insulin Pen Needle 32G X 6 MM MISC, Use in the morning, Disp: 100 each, Rfl: 5  •  Lancets (freestyle) lancets, Use as instructed -glucose monitor BID, Disp: 100 each, Rfl: 5  •  levothyroxine 50 mcg tablet, Take 1 tablet (50 mcg total) by mouth daily, Disp: 90 tablet, Rfl: 2  •  lisinopril (ZESTRIL) 20 mg tablet, Take 1 tablet (20 mg total) by mouth daily, Disp: 90 tablet, Rfl: 2  •  metoprolol succinate (TOPROL-XL) 50 mg 24 hr tablet, Take 1 tablet (50 mg total) by mouth daily, Disp: 90 tablet, Rfl: 2  •  omega-3-acid ethyl esters (LOVAZA) 1 g capsule, Take 2 g by mouth 2 (two) times a day, Disp: , Rfl:   •  Psyllium (FIBER) 0 52 g CAPS, Take by mouth, Disp: , Rfl:   •  rosuvastatin (CRESTOR) 10 MG tablet, Take 1 tablet (10 mg total) by mouth daily, Disp: 90 tablet, Rfl: 3    Current Allergies     Allergies as of 12/05/2022   • (No Known Allergies)            The following portions of the patient's history were reviewed and updated as appropriate: allergies, current medications, past family history, past medical history, past social history, past surgical history and problem list      Past Medical History:   Diagnosis Date   • Benign essential hypertension    • Chronic kidney disease, stage 2 (mild)    • Chronic kidney disease, stage 3 (HCC)    • Disorder of gallbladder    • Disorder of skin and subcutaneous tissue    • Dyspnea    • Essential hypertension    • Hyperlipidemia    • Hypokalemia    • Hypothyroidism    • Malaise and fatigue    • Mixed hyperlipidemia    • Morbid obesity (HCC)    • Pernicious anemia    • Type 2 diabetes mellitus ([facility] [address] )        Past Surgical History:   Procedure Laterality Date   • BREAST BIOPSY Right    • CARPAL TUNNEL RELEASE Bilateral    • CHOLECYSTECTOMY     • COLONOSCOPY      Dr Pia Mccarthy    • HYSTERECTOMY     • PARTIAL HYSTERECTOMY     • SKIN LESION EXCISION      scalp        Family History   Problem Relation Age of Onset   • Stroke Mother    • Atrial fibrillation Mother    • Brain cancer Father    • Other Brother         Twin brothers - Open heart   • Other Brother         Twin brothers - Open heart   • No Known Problems Maternal Grandmother    • No Known Problems Paternal Grandmother          Medications have been verified  Objective   /72   Pulse 77   Temp 97 7 °F (36 5 °C)   Resp 18   Ht 5' 2" (1 575 m)   Wt 87 2 kg (192 lb 3 2 oz)   SpO2 98%   BMI 35 15 kg/m²        Physical Exam     Physical Exam  Vitals reviewed  Constitutional:       General: She is not in acute distress  Appearance: She is obese  Cardiovascular:      Rate and Rhythm: Normal rate and regular rhythm  Heart sounds: No murmur heard  No friction rub  No gallop  Pulmonary:      Effort: Pulmonary effort is normal  No respiratory distress  Breath sounds:  No wheezing, rhonchi or rales  Neurological:      General: No focal deficit present  Mental Status: She is alert  Motor: No weakness  Gait: Gait normal    Psychiatric:         Mood and Affect: Mood normal          Behavior: Behavior normal          Thought Content:  Thought content normal          Judgment: Judgment normal              Results:  Lab Results   Component Value Date    SODIUM 138 08/17/2022    K 4 0 08/17/2022     08/17/2022    CO2 31 08/17/2022    BUN 21 08/17/2022    CREATININE 1 46 (H) 08/17/2022    GLUC 127 01/16/2022    CALCIUM 8 9 08/17/2022       Lab Results   Component Value Date    HGBA1C 9 1 (H) 11/23/2022       Lab Results   Component Value Date    WBC 8 53 05/25/2022    HGB 12 2 05/25/2022    HCT 38 5 05/25/2022    MCV 98 05/25/2022     05/25/2022

## 2022-12-09 ENCOUNTER — TELEPHONE (OUTPATIENT)
Dept: PAIN MEDICINE | Facility: CLINIC | Age: 77
End: 2022-12-09

## 2022-12-09 NOTE — TELEPHONE ENCOUNTER
Caller: Jina Norris  Doctor/office: Dr Pope  #: [phone]    % of improvement: 50%  Pain Scale (1-10): 0/10

## 2022-12-12 ENCOUNTER — OFFICE VISIT (OUTPATIENT)
Dept: OBGYN CLINIC | Facility: CLINIC | Age: 77
End: 2022-12-12

## 2022-12-12 VITALS
HEIGHT: 62 IN | SYSTOLIC BLOOD PRESSURE: 126 MMHG | HEART RATE: 83 BPM | WEIGHT: 196 LBS | BODY MASS INDEX: 36.07 KG/M2 | DIASTOLIC BLOOD PRESSURE: 62 MMHG

## 2022-12-12 DIAGNOSIS — M17.11 PRIMARY OSTEOARTHRITIS OF RIGHT KNEE: Primary | ICD-10-CM

## 2022-12-12 RX ORDER — TRIAMCINOLONE ACETONIDE 40 MG/ML
80 INJECTION, SUSPENSION INTRA-ARTICULAR; INTRAMUSCULAR
Status: COMPLETED | OUTPATIENT
Start: 2022-12-12 | End: 2022-12-12

## 2022-12-12 RX ORDER — BUPIVACAINE HYDROCHLORIDE 2.5 MG/ML
4 INJECTION, SOLUTION INFILTRATION; PERINEURAL
Status: COMPLETED | OUTPATIENT
Start: 2022-12-12 | End: 2022-12-12

## 2022-12-12 RX ADMIN — BUPIVACAINE HYDROCHLORIDE 4 ML: 2.5 INJECTION, SOLUTION INFILTRATION; PERINEURAL at 14:15

## 2022-12-12 RX ADMIN — TRIAMCINOLONE ACETONIDE 80 MG: 40 INJECTION, SUSPENSION INTRA-ARTICULAR; INTRAMUSCULAR at 14:15

## 2022-12-12 NOTE — PROGRESS NOTES
Assessment/Plan:   Diagnoses and all orders for this visit:    Primary osteoarthritis of right knee  -     Injection Procedure Prior Authorization; Future  -     Large joint arthrocentesis    Discussed with patient that today's physical exam is consistent with a flareup of primary osteoarthritis of the right knee  She was offered, and accepted, Marcaine and Kenalog injection to the right knee for relief of pain and inflammation  Patient tolerated treatment well  Patient completed her last course of Visco injections on 6/9/2022, she can therefore repeat this series beginning 1/9/2023  Prior authorization request has been submitted to the system  She will be seen for follow-up sometime after 1/9/2023 for reevaluation and initiation of Visco injection series  Patient expresses understanding is in agreement with this treatment plan  The patient has arthritis of her right knee  Under aseptic technique, the right knee was injected with Kenalog and Marcaine  She tolerated procedure quite well  She wants to get approved again for viscosupplementation  She can start this in January  Return back once this is approved    Subjective:   Patient ID: Héctor Kidney  1945     HPI  Patient is a 68 y o  female who presents for follow-up evaluation of primary osteoarthritis of the right knee  She was last seen in regards this issue on 7/26/2022 at which time she was 6 weeks s/p Visco injection series, and was asymptomatic  On today's presentation she reports that her symptoms have returned and she is now experiencing pain both medially and anteriorly with prolonged weightbearing activity  Reports occasional swelling  She denies any bruising, numbness, tingling, or feelings of instability      The following portions of the patient's history were reviewed and updated as appropriate:  Past medical history, past surgical history, Family history, social history, current medications and allergies    Past Medical History: Diagnosis Date   • Benign essential hypertension    • Chronic kidney disease, stage 2 (mild)    • Chronic kidney disease, stage 3 (HCC)    • Disorder of gallbladder    • Disorder of skin and subcutaneous tissue    • Dyspnea    • Essential hypertension    • Hyperlipidemia    • Hypokalemia    • Hypothyroidism    • Malaise and fatigue    • Mixed hyperlipidemia    • Morbid obesity (HCC)    • Pernicious anemia    • Type 2 diabetes mellitus ([facility] [address] )        Past Surgical History:   Procedure Laterality Date   • BREAST BIOPSY Right    • CARPAL TUNNEL RELEASE Bilateral    • CHOLECYSTECTOMY     • COLONOSCOPY      Dr Thaddeus Short    • HYSTERECTOMY     • PARTIAL HYSTERECTOMY     • SKIN LESION EXCISION      scalp        Family History   Problem Relation Age of Onset   • Stroke Mother    • Atrial fibrillation Mother    • Brain cancer Father    • Other Brother         Twin brothers - Open heart   • Other Brother         Twin brothers - Open heart   • No Known Problems Maternal Grandmother    • No Known Problems Paternal Grandmother        Social History     Socioeconomic History   • Marital status: /Civil Union     Spouse name: Kenyatta Leon    • Number of children: None   • Years of education: None   • Highest education level: None   Occupational History   • Occupation: Retired    Tobacco Use   • Smoking status: Former     Packs/day: 1 00     Years: 14 00     Pack years: 14 00     Types: Cigarettes     Quit date:      Years since quittin 9   • Smokeless tobacco: Never   Vaping Use   • Vaping Use: Never used   Substance and Sexual Activity   • Alcohol use: Yes     Comment: Occasionally    • Drug use: Never     Comment: Denies drug use - As per [employer]    • Sexual activity: None   Other Topics Concern   • None   Social History Narrative     - Kenyatta Leon is [place] and speaks Bulgarian, common law marriage    Does not consume caffeine      Social Determinants of Health     Financial Resource Strain: Not on file   Food Insecurity: Not on file   Transportation Needs: Not on file   Physical Activity: Not on file   Stress: Not on file   Social Connections: Not on file   Intimate Partner Violence: Not on file   Housing Stability: Not on file         Current Outpatient Medications:   •  amLODIPine (NORVASC) 5 mg tablet, Take 1 tablet (5 mg total) by mouth daily, Disp: 90 tablet, Rfl: 3  •  aspirin (ECOTRIN LOW STRENGTH) 81 mg EC tablet, Take 81 mg by mouth daily, Disp: , Rfl:   •  cholecalciferol (VITAMIN D3) 1,000 units tablet, Take 800 Units by mouth daily  , Disp: , Rfl:   •  fluticasone (FLONASE) 50 mcg/act nasal spray, 2 sprays into each nostril daily, Disp: 16 g, Rfl: 6  •  folic acid (FOLVITE) 804 mcg tablet, Take 400 mcg by mouth daily, Disp: , Rfl:   •  glipiZIDE (GLUCOTROL) 10 mg tablet, Take 0 5 tablets (5 mg total) by mouth 2 (two) times a day before meals, Disp: 60 tablet, Rfl: 5  •  glucose monitoring kit (FREESTYLE) monitoring kit, 1 each by Does not apply route daily, Disp: 1 each, Rfl: 0  •  insulin lispro (HumaLOG KwikPen) 100 units/mL injection pen, Inject 8 Units under the skin 2 (two) times a day with meals, Disp: 15 mL, Rfl: 1  •  Insulin Pen Needle 32G X 6 MM MISC, Use in the morning, Disp: 100 each, Rfl: 5  •  Lancets (freestyle) lancets, Use as instructed -glucose monitor BID, Disp: 100 each, Rfl: 5  •  levothyroxine 50 mcg tablet, Take 1 tablet (50 mcg total) by mouth daily, Disp: 90 tablet, Rfl: 2  •  lisinopril (ZESTRIL) 20 mg tablet, Take 1 tablet (20 mg total) by mouth daily, Disp: 90 tablet, Rfl: 2  •  metoprolol succinate (TOPROL-XL) 50 mg 24 hr tablet, Take 1 tablet (50 mg total) by mouth daily, Disp: 90 tablet, Rfl: 2  •  omega-3-acid ethyl esters (LOVAZA) 1 g capsule, Take 2 g by mouth 2 (two) times a day, Disp: , Rfl:   •  Psyllium (FIBER) 0 52 g CAPS, Take by mouth, Disp: , Rfl:   •  rosuvastatin (CRESTOR) 10 MG tablet, Take 1 tablet (10 mg total) by mouth daily, Disp: 90 tablet, Rfl: 3  •  DULoxetine (CYMBALTA) 20 mg capsule, Take 1 capsule (20 mg total) by mouth daily, Disp: 30 capsule, Rfl: 1  •  Insulin Glargine Solostar (Lantus SoloStar) 100 UNIT/ML SOPN, Inject 50 Units under the skin daily at bedtime, Disp: 15 mL, Rfl: 5    No Known Allergies    Review of Systems   Constitutional: Negative for chills, fever and unexpected weight change  HENT: Negative for hearing loss, nosebleeds and sore throat  Eyes: Negative for pain, redness and visual disturbance  Respiratory: Negative for cough, shortness of breath and wheezing  Cardiovascular: Negative for chest pain, palpitations and leg swelling  Gastrointestinal: Negative for abdominal pain, nausea and vomiting  Endocrine: Negative for polydipsia and polyuria  Genitourinary: Negative for dysuria and hematuria  Musculoskeletal:        As noted in HPI   Skin: Negative for rash and wound  Neurological: Negative for dizziness, numbness and headaches  Psychiatric/Behavioral: Negative for decreased concentration and suicidal ideas  The patient is not nervous/anxious           Objective:  /62 (BP Location: Left arm, Patient Position: Sitting, Cuff Size: Large)   Pulse 83   Ht 5' 2" (1 575 m)   Wt 88 9 kg (196 lb)   BMI 35 85 kg/m²     Ortho Exam  Right knee -   Patient ambulates with normal gait pattern  Uses no assistive device  No anatomical deformity  Skin is warm and dry to touch with no signs of erythema, ecchymosis, infection  No soft tissue swelling, No effusion noted  ROM 5° -115°  TTP over medial joint line, mildly TTP over lateral joint line  Flexor and extensor mechanisms intact  Knee is stable to varus and valgus stress  - Lachman's  - Anterior Drawer, - Posterior Drawer  - medial Moraima's, - lateral Moraima's  - Pivot Shift  Patella tracks centrally with palpable crepitus  Calf compartments are soft and supple  2+ TP and DP pulses with brisk capillary refill to the toes  Sural, saphenous, tibial, superficial and deep peroneal motor and sensory distributions intact  Sensation light touch intact distally    Physical Exam  Vitals and nursing note reviewed  Constitutional:       General: She is not in acute distress  Appearance: She is well-developed  HENT:      Head: Normocephalic and atraumatic  Eyes:      Conjunctiva/sclera: Conjunctivae normal    Cardiovascular:      Rate and Rhythm: Normal rate  Pulmonary:      Effort: Pulmonary effort is normal    Musculoskeletal:      Cervical back: Neck supple  Left lower leg: Left lower leg edema:    Skin:     General: Skin is warm and dry  Capillary Refill: Capillary refill takes less than 2 seconds  Neurological:      Mental Status: She is alert and oriented to person, place, and time  Psychiatric:         Mood and Affect: Mood normal          Behavior: Behavior normal           Diagnostic Test Review:  No new imaging reviewed this visit    Large joint arthrocentesis: R knee  Universal Protocol:  Consent: Verbal consent obtained  Risks and benefits: risks, benefits and alternatives were discussed  Consent given by: patient  Time out: Immediately prior to procedure a "time out" was called to verify the correct patient, procedure, equipment, support staff and site/side marked as required    Timeout called at: 12/12/2022 2:08 PM   Patient understanding: patient states understanding of the procedure being performed  Site marked: the operative site was marked  Patient identity confirmed: verbally with patient    Supporting Documentation  Indications: pain and joint swelling   Procedure Details  Location: knee - R knee  Preparation: Patient was prepped and draped in the usual sterile fashion  Needle size: 22 G  Ultrasound guidance: no  Approach: anterolateral  Medications administered: 4 mL bupivacaine 0 25 %; 80 mg triamcinolone acetonide 40 mg/mL    Patient tolerance: patient tolerated the procedure well with no immediate complications  Dressing:  Sterile dressing applied           Scribe Attestation    I,:  Devon Mortimer am acting as a scribe while in the presence of the attending physician :       I,:  Arya Prieto, DO personally performed the services described in this documentation    as scribed in my presence :

## 2022-12-16 DIAGNOSIS — M48.061 SPINAL STENOSIS OF LUMBAR REGION, UNSPECIFIED WHETHER NEUROGENIC CLAUDICATION PRESENT: ICD-10-CM

## 2022-12-16 RX ORDER — DULOXETIN HYDROCHLORIDE 20 MG/1
CAPSULE, DELAYED RELEASE ORAL
Qty: 30 CAPSULE | Refills: 1 | Status: SHIPPED | OUTPATIENT
Start: 2022-12-16

## 2022-12-30 ENCOUNTER — OFFICE VISIT (OUTPATIENT)
Dept: PAIN MEDICINE | Facility: CLINIC | Age: 77
End: 2022-12-30

## 2022-12-30 VITALS
HEIGHT: 62 IN | DIASTOLIC BLOOD PRESSURE: 69 MMHG | HEART RATE: 85 BPM | WEIGHT: 194.8 LBS | SYSTOLIC BLOOD PRESSURE: 118 MMHG | BODY MASS INDEX: 35.85 KG/M2

## 2022-12-30 DIAGNOSIS — M54.16 LUMBAR RADICULOPATHY: ICD-10-CM

## 2022-12-30 DIAGNOSIS — G89.4 CHRONIC PAIN SYNDROME: Primary | ICD-10-CM

## 2022-12-30 DIAGNOSIS — M48.061 SPINAL STENOSIS OF LUMBAR REGION, UNSPECIFIED WHETHER NEUROGENIC CLAUDICATION PRESENT: ICD-10-CM

## 2022-12-30 RX ORDER — UREA 20 %
CREAM (GRAM) TOPICAL
COMMUNITY
Start: 2022-12-29

## 2022-12-30 NOTE — PROGRESS NOTES
Assessment:  1  Chronic pain syndrome    2  Lumbar radiculopathy    3  Spinal stenosis of lumbar region, unspecified whether neurogenic claudication present        Plan:  While the patient was in the office today, I did have a thorough conversation regarding their chronic pain syndrome, medication management, and treatment plan options  Patient is being seen for follow-up visit  She underwent an L4-5 interlaminar epidural steroid injection on 12/2/2022  Patient is reporting almost complete resolution of her back and bilateral leg pain  Current pain level is a 0/10  Patient previously participated in physical therapy and states that she does continue to do lumbar core strengthening/stretching exercises at home  We discussed the importance of a lifelong commitment to daily exercises geared toward lumbar core stretching and strengthening  The patient was agreeable and verbalized an understanding  Repeat L4-5 interlaminar epidural steroid injection as needed  I discussed with the patient that at this point time she can follow up with our office on an as-needed basis  I did review the patient that if her pain symptoms should change, worsen, and/or if she would experience any new symptoms as she would like to be evaluated for, she should give our office a call  The patient was agreeable and verbalized an understanding  History of Present Illness: The patient is a 68 y o  female who presents for a follow up office visit in regards to Neck Pain and Back Pain  The patient’s current symptoms include plaints of low back pain  Current pain level is a 0/10  Current pain medications includes: Cymbalta 20 mg daily  The patient reports that this regimen is providing greater than 50% pain relief  The patient is reporting no side effects from this pain medication regimen      I have personally reviewed and/or updated the patient's past medical history, past surgical history, family history, social history, current medications, allergies, and vital signs today  Review of Systems  Review of Systems   Constitutional: Negative for unexpected weight change  HENT: Negative for hearing loss  Eyes: Negative for visual disturbance  Respiratory: Negative for shortness of breath  Cardiovascular: Negative for leg swelling  Gastrointestinal: Negative for constipation  Endocrine: Negative for polyuria  Genitourinary: Negative for difficulty urinating  Musculoskeletal: Positive for gait problem  Negative for joint swelling and myalgias  Skin: Negative for rash  Neurological: Negative for weakness and headaches  Psychiatric/Behavioral: Negative for decreased concentration  All other systems reviewed and are negative          Past Medical History:   Diagnosis Date   • Benign essential hypertension    • Chronic kidney disease, stage 2 (mild)    • Chronic kidney disease, stage 3 (HCC)    • Disorder of gallbladder    • Disorder of skin and subcutaneous tissue    • Dyspnea    • Essential hypertension    • Hyperlipidemia    • Hypokalemia    • Hypothyroidism    • Malaise and fatigue    • Mixed hyperlipidemia    • Morbid obesity (HCC)    • Pernicious anemia    • Type 2 diabetes mellitus ([facility] [address] )        Past Surgical History:   Procedure Laterality Date   • BREAST BIOPSY Right    • CARPAL TUNNEL RELEASE Bilateral    • CHOLECYSTECTOMY     • COLONOSCOPY      Dr Wilder Curtis    • HYSTERECTOMY     • PARTIAL HYSTERECTOMY     • SKIN LESION EXCISION      scalp        Family History   Problem Relation Age of Onset   • Stroke Mother    • Atrial fibrillation Mother    • Brain cancer Father    • Other Brother         Twin brothers - Open heart   • Other Brother         Twin brothers - Open heart   • No Known Problems Maternal Grandmother    • No Known Problems Paternal Grandmother        Social History     Occupational History   • Occupation: Retired    Tobacco Use   • Smoking status: Former     Packs/day: 1 00 Years: 14 00     Pack years: 14 00     Types: Cigarettes     Quit date: 0     Years since quittin 0   • Smokeless tobacco: Never   Vaping Use   • Vaping Use: Never used   Substance and Sexual Activity   • Alcohol use: Yes     Comment: Occasionally    • Drug use: Never     Comment: Denies drug use - As per [employer]    • Sexual activity: Not on file         Current Outpatient Medications:   •  amLODIPine (NORVASC) 5 mg tablet, Take 1 tablet (5 mg total) by mouth daily, Disp: 90 tablet, Rfl: 3  •  aspirin (ECOTRIN LOW STRENGTH) 81 mg EC tablet, Take 81 mg by mouth daily, Disp: , Rfl:   •  cholecalciferol (VITAMIN D3) 1,000 units tablet, Take 800 Units by mouth daily  , Disp: , Rfl:   •  DULoxetine (CYMBALTA) 20 mg capsule, take 1 capsule by mouth once daily, Disp: 30 capsule, Rfl: 1  •  fluticasone (FLONASE) 50 mcg/act nasal spray, 2 sprays into each nostril daily, Disp: 16 g, Rfl: 6  •  folic acid (FOLVITE) 928 mcg tablet, Take 400 mcg by mouth daily, Disp: , Rfl:   •  glipiZIDE (GLUCOTROL) 10 mg tablet, Take 0 5 tablets (5 mg total) by mouth 2 (two) times a day before meals, Disp: 60 tablet, Rfl: 5  •  glucose monitoring kit (FREESTYLE) monitoring kit, 1 each by Does not apply route daily, Disp: 1 each, Rfl: 0  •  insulin lispro (HumaLOG KwikPen) 100 units/mL injection pen, Inject 8 Units under the skin 2 (two) times a day with meals, Disp: 15 mL, Rfl: 1  •  Insulin Pen Needle 32G X 6 MM MISC, Use in the morning, Disp: 100 each, Rfl: 5  •  Lancets (freestyle) lancets, Use as instructed -glucose monitor BID, Disp: 100 each, Rfl: 5  •  levothyroxine 50 mcg tablet, Take 1 tablet (50 mcg total) by mouth daily, Disp: 90 tablet, Rfl: 2  •  lisinopril (ZESTRIL) 20 mg tablet, Take 1 tablet (20 mg total) by mouth daily, Disp: 90 tablet, Rfl: 2  •  metoprolol succinate (TOPROL-XL) 50 mg 24 hr tablet, Take 1 tablet (50 mg total) by mouth daily, Disp: 90 tablet, Rfl: 2  •  omega-3-acid ethyl esters (LOVAZA) 1 g capsule, Take 2 g by mouth 2 (two) times a day, Disp: , Rfl:   •  Psyllium (FIBER) 0 52 g CAPS, Take by mouth, Disp: , Rfl:   •  rosuvastatin (CRESTOR) 10 MG tablet, Take 1 tablet (10 mg total) by mouth daily, Disp: 90 tablet, Rfl: 3  •  Urea 20 Intensive Hydrating 20 % cream, , Disp: , Rfl:   •  Insulin Glargine Solostar (Lantus SoloStar) 100 UNIT/ML SOPN, Inject 50 Units under the skin daily at bedtime, Disp: 15 mL, Rfl: 5    No Known Allergies    Physical Exam:    /69   Pulse 85   Ht 5' 2" (1 575 m)   Wt 88 4 kg (194 lb 12 8 oz)   BMI 35 63 kg/m²     Constitutional:normal, well developed, well nourished, alert, in no distress and non-toxic and no overt pain behavior  Eyes:anicteric  HEENT:grossly intact  Neck:supple, symmetric, trachea midline and no masses   Pulmonary:even and unlabored  Cardiovascular:No edema or pitting edema present  Skin:Normal without rashes or lesions and well hydrated  Psychiatric:Mood and affect appropriate  Neurologic:Cranial Nerves II-XII grossly intact  Musculoskeletal:normal    Imaging  No orders to display       No orders of the defined types were placed in this encounter

## 2023-01-09 ENCOUNTER — PROCEDURE VISIT (OUTPATIENT)
Dept: OBGYN CLINIC | Facility: CLINIC | Age: 78
End: 2023-01-09

## 2023-01-09 VITALS — WEIGHT: 194 LBS | BODY MASS INDEX: 35.7 KG/M2 | HEIGHT: 62 IN

## 2023-01-09 DIAGNOSIS — Z79.4 TYPE 2 DIABETES MELLITUS WITH STAGE 3B CHRONIC KIDNEY DISEASE, WITH LONG-TERM CURRENT USE OF INSULIN (HCC): Primary | ICD-10-CM

## 2023-01-09 DIAGNOSIS — E11.22 TYPE 2 DIABETES MELLITUS WITH STAGE 3B CHRONIC KIDNEY DISEASE, WITH LONG-TERM CURRENT USE OF INSULIN (HCC): Primary | ICD-10-CM

## 2023-01-09 DIAGNOSIS — M17.11 PRIMARY OSTEOARTHRITIS OF RIGHT KNEE: Primary | ICD-10-CM

## 2023-01-09 DIAGNOSIS — N18.32 TYPE 2 DIABETES MELLITUS WITH STAGE 3B CHRONIC KIDNEY DISEASE, WITH LONG-TERM CURRENT USE OF INSULIN (HCC): Primary | ICD-10-CM

## 2023-01-09 RX ORDER — HYALURONATE SODIUM 10 MG/ML
20 SYRINGE (ML) INTRAARTICULAR
Status: COMPLETED | OUTPATIENT
Start: 2023-01-09 | End: 2023-01-09

## 2023-01-09 RX ADMIN — Medication 20 MG: at 14:07

## 2023-01-09 NOTE — PROGRESS NOTES
68 y o  female presents for Euflexxa injection # 1 to the right knee  She has had these past with success at symptomatic pain relief  the patient has degenerative joint disease of her right knee  Under aseptic technique, the right knee was injected with her 1st set of Euflexxa  She tolerated procedure quite well  Return back next week for 2nd set of injections    If her condition changes, she will not hesitate to let us know    Review of Systems  Review of systems negative unless otherwise specified in HPI    Past Medical History  Past Medical History:   Diagnosis Date   • Benign essential hypertension    • Chronic kidney disease, stage 2 (mild)    • Chronic kidney disease, stage 3 (HCC)    • Disorder of gallbladder    • Disorder of skin and subcutaneous tissue    • Dyspnea    • Essential hypertension    • Hyperlipidemia    • Hypokalemia    • Hypothyroidism    • Malaise and fatigue    • Mixed hyperlipidemia    • Morbid obesity (HCC)    • Pernicious anemia    • Type 2 diabetes mellitus ([facility] )      Past Surgical History  Past Surgical History:   Procedure Laterality Date   • BREAST BIOPSY Right    • CARPAL TUNNEL RELEASE Bilateral    • CHOLECYSTECTOMY     • COLONOSCOPY      Dr Shira Dodge    • HYSTERECTOMY     • PARTIAL HYSTERECTOMY     • SKIN LESION EXCISION      scalp      Current Medications  Current Outpatient Medications on File Prior to Visit   Medication Sig Dispense Refill   • amLODIPine (NORVASC) 5 mg tablet Take 1 tablet (5 mg total) by mouth daily 90 tablet 3   • aspirin (ECOTRIN LOW STRENGTH) 81 mg EC tablet Take 81 mg by mouth daily     • cholecalciferol (VITAMIN D3) 1,000 units tablet Take 800 Units by mouth daily       • DULoxetine (CYMBALTA) 20 mg capsule take 1 capsule by mouth once daily 30 capsule 1   • fluticasone (FLONASE) 50 mcg/act nasal spray 2 sprays into each nostril daily 16 g 6   • folic acid (FOLVITE) 298 mcg tablet Take 400 mcg by mouth daily     • glipiZIDE (GLUCOTROL) 10 mg tablet Take 0 5 tablets (5 mg total) by mouth 2 (two) times a day before meals 60 tablet 5   • glucose monitoring kit (FREESTYLE) monitoring kit 1 each by Does not apply route daily 1 each 0   • Insulin Glargine Solostar (Lantus SoloStar) 100 UNIT/ML SOPN Inject 50 Units under the skin daily at bedtime 15 mL 5   • insulin lispro (HumaLOG KwikPen) 100 units/mL injection pen Inject 8 Units under the skin 2 (two) times a day with meals 15 mL 1   • Insulin Pen Needle 32G X 6 MM MISC Use in the morning 100 each 5   • Lancets (freestyle) lancets Use as instructed -glucose monitor  each 5   • levothyroxine 50 mcg tablet Take 1 tablet (50 mcg total) by mouth daily 90 tablet 2   • lisinopril (ZESTRIL) 20 mg tablet Take 1 tablet (20 mg total) by mouth daily 90 tablet 2   • metoprolol succinate (TOPROL-XL) 50 mg 24 hr tablet Take 1 tablet (50 mg total) by mouth daily 90 tablet 2   • omega-3-acid ethyl esters (LOVAZA) 1 g capsule Take 2 g by mouth 2 (two) times a day     • Psyllium (FIBER) 0 52 g CAPS Take by mouth     • rosuvastatin (CRESTOR) 10 MG tablet Take 1 tablet (10 mg total) by mouth daily 90 tablet 3   • Urea 20 Intensive Hydrating 20 % cream      • [DISCONTINUED] insulin aspart (NovoLOG FlexPen) 100 UNIT/ML injection pen Inject 4 Units under the skin daily with dinner 15 mL 1   • [DISCONTINUED] Januvia 50 MG tablet take 1 tablet by mouth once daily (Patient not taking: No sig reported) 30 tablet 1   • [DISCONTINUED] linaGLIPtin 5 MG TABS Take 5 mg by mouth daily (Patient not taking: Reported on 7/27/2021) 30 tablet 1     No current facility-administered medications on file prior to visit  Recent Labs [facility])  0   Lab Value Date/Time    HCT 38 5 05/25/2022 1009    HGB 12 2 05/25/2022 1009    WBC 8 53 05/25/2022 1009    HGBA1C 9 1 (H) 11/23/2022 1418     Physical exam  There is no height or weight on file to calculate BMI    · General: Awake, Alert, Oriented  · Eyes: Pupils equal, round and reactive to light  · Heart: regular rate and rhythm  · Lungs: No audible wheezing  · Abdomen: soft  right Knee exam  · No effusion erythema warmth or signs of infection  Patient is able get full extension  Flexion to 125 degrees  No gross ligamentous laxity  Grossly neurovascular intact  Imaging  None today    Assessment:  right knee arthritis    Large joint arthrocentesis: R knee  Universal Protocol:  Consent: Verbal consent obtained  Risks and benefits: risks, benefits and alternatives were discussed  Consent given by: patient  Time out: Immediately prior to procedure a "time out" was called to verify the correct patient, procedure, equipment, support staff and site/side marked as required  Timeout called at: 1/9/2023 1:46 PM   Patient understanding: patient states understanding of the procedure being performed  Site marked: the operative site was marked  Patient identity confirmed: verbally with patient    Supporting Documentation  Indications: pain   Procedure Details  Location: knee - R knee  Preparation: Patient was prepped and draped in the usual sterile fashion  Needle size: 20 G  Ultrasound guidance: no  Approach: anterolateral  Medications administered: 20 mg Sodium Hyaluronate 20 MG/2ML    Patient tolerance: patient tolerated the procedure well with no immediate complications  Dressing:  Sterile dressing applied      Plan:   Patient will ice right knee for 20 minutes 1-2 times today  Limit activity for the next 24 hours before resuming normal activity  Weightbearing as tolerated  Patient may use over-the-counter Ibuprofen or Tylenol as needed for discomfort  Follow up in 2 week for repeat evaluation and injection # 2  This note was created with voice recognition software

## 2023-01-09 NOTE — PATIENT INSTRUCTIONS
Patient will ice right knee for 20 minutes 1-2 times today  Limit activity for the next 24 hours before resuming normal activity  Weightbearing as tolerated  Patient may use over-the-counter Ibuprofen or Tylenol as needed for discomfort  Follow up in 2 week for repeat evaluation and injection # 2

## 2023-01-09 NOTE — TELEPHONE ENCOUNTER
Patient called office and left message requesting new script for Freestyle Lite Test Strips to send to [facility]       Sent for approval

## 2023-01-19 RX ORDER — BLOOD-GLUCOSE METER
KIT MISCELLANEOUS
Qty: 100 EACH | Refills: 3 | Status: SHIPPED | OUTPATIENT
Start: 2023-01-19

## 2023-01-23 ENCOUNTER — PROCEDURE VISIT (OUTPATIENT)
Dept: OBGYN CLINIC | Facility: CLINIC | Age: 78
End: 2023-01-23

## 2023-01-23 VITALS
SYSTOLIC BLOOD PRESSURE: 113 MMHG | HEART RATE: 94 BPM | BODY MASS INDEX: 35.7 KG/M2 | WEIGHT: 194 LBS | HEIGHT: 62 IN | DIASTOLIC BLOOD PRESSURE: 58 MMHG

## 2023-01-23 DIAGNOSIS — M17.11 PRIMARY OSTEOARTHRITIS OF RIGHT KNEE: Primary | ICD-10-CM

## 2023-01-23 RX ORDER — HYALURONATE SODIUM 10 MG/ML
20 SYRINGE (ML) INTRAARTICULAR
Status: COMPLETED | OUTPATIENT
Start: 2023-01-23 | End: 2023-01-23

## 2023-01-23 RX ADMIN — Medication 20 MG: at 13:39

## 2023-01-23 NOTE — PROGRESS NOTES
ASSESSMENT/PLAN:    Diagnoses and all orders for this visit:    Primary osteoarthritis of right knee    Other orders  -     Large joint arthrocentesis      The patient's right knee was injected with her second set of Euflexxa  The patient tolerated the injection quite well  She will follow-up with her office next week for her third and final set  The patient is acceptable to this plan  Return in about 1 week (around 1/30/2023)  under aseptic technique, the right knee was injected with her 2nd set of Euflexxa  She tolerated procedure quite well  Return back next week for final set of injections  If her condition changes, she will not hesitate to let us know      _____________________________________________________  CHIEF COMPLAINT:  Chief Complaint   Patient presents with   • Right Knee - Follow-up         SUBJECTIVE:  Fara Ray is a 68 y o  female who presents to our office for viscosupplementation of her right knee  She is here today for her second set of Euflexxa  She tolerated her previous injection without issue  She denies any numbness or tingling  She denies any fever or chills      The following portions of the patient's history were reviewed and updated as appropriate: allergies, current medications, past family history, past medical history, past social history, past surgical history and problem list     PAST MEDICAL HISTORY:  Past Medical History:   Diagnosis Date   • Benign essential hypertension    • Chronic kidney disease, stage 2 (mild)    • Chronic kidney disease, stage 3 ([address] )    • Disorder of gallbladder    • Disorder of skin and subcutaneous tissue    • Dyspnea    • Essential hypertension    • Hyperlipidemia    • Hypokalemia    • Hypothyroidism    • Malaise and fatigue    • Mixed hyperlipidemia    • Morbid obesity ([address] )    • Pernicious anemia    • Type 2 diabetes mellitus ([address] )        PAST SURGICAL HISTORY:  Past Surgical History:   Procedure Laterality Date   • BREAST BIOPSY Right    • CARPAL TUNNEL RELEASE Bilateral    • CHOLECYSTECTOMY     • COLONOSCOPY      Dr [facility]    • HYSTERECTOMY     • PARTIAL HYSTERECTOMY     • SKIN LESION EXCISION      scalp        FAMILY HISTORY:  Family History   Problem Relation Age of Onset   • Stroke Mother    • Atrial fibrillation Mother    • Brain cancer Father    • Other Brother         Twin brothers - Open heart   • Other Brother         Twin brothers - Open heart   • No Known Problems Maternal Grandmother    • No Known Problems Paternal Grandmother        SOCIAL HISTORY:  Social History     Tobacco Use   • Smoking status: Former     Packs/day: 1 00     Years: 14 00     Pack years: 14 00     Types: Cigarettes     Quit date:      Years since quittin 0   • Smokeless tobacco: Never   Vaping Use   • Vaping Use: Never used   Substance Use Topics   • Alcohol use: Yes     Comment: Occasionally    • Drug use: Never     Comment: Denies drug use - As per [employer]        MEDICATIONS:    Current Outpatient Medications:   •  amLODIPine (NORVASC) 5 mg tablet, Take 1 tablet (5 mg total) by mouth daily, Disp: 90 tablet, Rfl: 3  •  aspirin (ECOTRIN LOW STRENGTH) 81 mg EC tablet, Take 81 mg by mouth daily, Disp: , Rfl:   •  cholecalciferol (VITAMIN D3) 1,000 units tablet, Take 800 Units by mouth daily  , Disp: , Rfl:   •  DULoxetine (CYMBALTA) 20 mg capsule, take 1 capsule by mouth once daily, Disp: 30 capsule, Rfl: 1  •  fluticasone (FLONASE) 50 mcg/act nasal spray, 2 sprays into each nostril daily, Disp: 16 g, Rfl: 6  •  folic acid (FOLVITE) 166 mcg tablet, Take 400 mcg by mouth daily, Disp: , Rfl:   •  glipiZIDE (GLUCOTROL) 10 mg tablet, Take 0 5 tablets (5 mg total) by mouth 2 (two) times a day before meals, Disp: 60 tablet, Rfl: 5  •  glucose blood (FREESTYLE LITE) test strip, Use as instructed, Disp: 100 each, Rfl: 3  •  glucose monitoring kit (FREESTYLE) monitoring kit, 1 each by Does not apply route daily, Disp: 1 each, Rfl: 0  •  insulin lispro (HumaLOG KwikPen) 100 units/mL injection pen, Inject 8 Units under the skin 2 (two) times a day with meals, Disp: 15 mL, Rfl: 1  •  Insulin Pen Needle 32G X 6 MM MISC, Use in the morning, Disp: 100 each, Rfl: 5  •  Lancets (freestyle) lancets, Use as instructed -glucose monitor BID, Disp: 100 each, Rfl: 5  •  levothyroxine 50 mcg tablet, Take 1 tablet (50 mcg total) by mouth daily, Disp: 90 tablet, Rfl: 2  •  lisinopril (ZESTRIL) 20 mg tablet, Take 1 tablet (20 mg total) by mouth daily, Disp: 90 tablet, Rfl: 2  •  metoprolol succinate (TOPROL-XL) 50 mg 24 hr tablet, Take 1 tablet (50 mg total) by mouth daily, Disp: 90 tablet, Rfl: 2  •  omega-3-acid ethyl esters (LOVAZA) 1 g capsule, Take 2 g by mouth 2 (two) times a day, Disp: , Rfl:   •  Psyllium (FIBER) 0 52 g CAPS, Take by mouth, Disp: , Rfl:   •  rosuvastatin (CRESTOR) 10 MG tablet, Take 1 tablet (10 mg total) by mouth daily, Disp: 90 tablet, Rfl: 3  •  Urea 20 Intensive Hydrating 20 % cream, , Disp: , Rfl:   •  Insulin Glargine Solostar (Lantus SoloStar) 100 UNIT/ML SOPN, Inject 50 Units under the skin daily at bedtime, Disp: 15 mL, Rfl: 5    ALLERGIES:  No Known Allergies    ROS:  Review of Systems     Constitutional: Negative for fatigue, fever or loss of appetite  HENT: Negative  Respiratory: Negative for shortness of breath, dyspnea  Cardiovascular: Negative for chest pain/tightness  Gastrointestinal: Negative for abdominal pain, N/V  Endocrine: Negative for cold/heat intolerance, unexplained weight loss/gain  Genitourinary: Negative for flank pain, dysuria, hematuria  Musculoskeletal: Positive for arthralgia   Skin: Negative for rash  Neurological: Negative for numbness or tingling  Psychiatric/Behavioral: Negative for agitation  _____________________________________________________  PHYSICAL EXAMINATION:    Blood pressure 113/58, pulse 94, height 5' 2" (1 575 m), weight 88 kg (194 lb)  Constitutional: Oriented to person, place, and time  Appears well-developed and well-nourished  No distress  HENT:   Head: Normocephalic  Eyes: Conjunctivae are normal  Right eye exhibits no discharge  Left eye exhibits no discharge  No scleral icterus  Cardiovascular: Normal rate  Pulmonary/Chest: Effort normal    Neurological: Alert and oriented to person, place, and time  Skin: Skin is warm and dry  No rash noted  Not diaphoretic  No erythema  No pallor  Psychiatric: Normal mood and affect  Behavior is normal  Judgment and thought content normal       MUSCULOSKELETAL EXAMINATION:   Physical Exam  Ortho Exam    Right lower extremity is neurovascularly intact  Toes are pink and mobile   Compartments are soft  No warmth, erythema or ecchymosis  ROM of knee is from 5-115 degrees  Negative Lachman, drawer or pivot shift  No medial instability  Medial joint line tenderness, slight lateral joint line tenderness  Patellofemoral crepitation  Objective:  BP Readings from Last 1 Encounters:   01/23/23 113/58      Wt Readings from Last 1 Encounters:   01/23/23 88 kg (194 lb)        BMI:   Estimated body mass index is 35 48 kg/m² as calculated from the following:    Height as of this encounter: 5' 2" (1 575 m)  Weight as of this encounter: 88 kg (194 lb)        PROCEDURES PERFORMED:  Large joint arthrocentesis: R knee  Universal Protocol:  Risks and benefits: risks, benefits and alternatives were discussed  Consent given by: patient  Site marked: the operative site was marked  Supporting Documentation  Indications: pain   Procedure Details  Location: knee - R knee  Preparation: Patient was prepped and draped in the usual sterile fashion  Needle size: 22 G  Ultrasound guidance: no  Approach: lateral  Medications administered: 20 mg Sodium Hyaluronate 20 MG/2ML    Patient tolerance: patient tolerated the procedure well with no immediate complications  Dressing:  Sterile dressing applied            Scribe Attestation    I,:  Luis Gallagher PA-C am acting as a scribe while in the presence of the attending physician :       I,:  Katty Frankel, DO personally performed the services described in this documentation    as scribed in my presence :

## 2023-01-30 ENCOUNTER — PROCEDURE VISIT (OUTPATIENT)
Dept: OBGYN CLINIC | Facility: CLINIC | Age: 78
End: 2023-01-30

## 2023-01-30 VITALS — WEIGHT: 194 LBS | HEIGHT: 62 IN | BODY MASS INDEX: 35.7 KG/M2

## 2023-01-30 DIAGNOSIS — M17.11 PRIMARY OSTEOARTHRITIS OF RIGHT KNEE: Primary | ICD-10-CM

## 2023-01-30 RX ORDER — HYALURONATE SODIUM 10 MG/ML
20 SYRINGE (ML) INTRAARTICULAR
Status: COMPLETED | OUTPATIENT
Start: 2023-01-30 | End: 2023-01-30

## 2023-01-30 RX ADMIN — Medication 20 MG: at 13:49

## 2023-01-30 NOTE — PROGRESS NOTES
ASSESSMENT/PLAN:    Diagnoses and all orders for this visit:    Primary osteoarthritis of right knee  -     Injection Procedure Prior Authorization; Future    Other orders  -     Large joint arthrocentesis        The patient's right knee was injected with her third and final set of Euflexxa  She tolerated the injection quite well  She will follow-up with our office in 6 months to restart viscosupplementation  The patient is acceptable to this plan  Return in about 6 months (around 7/30/2023)  Under aseptic technique, the right knee was injected with her third set of Euflexxa  She tolerated the procedure quite well  Return back in 6 months for possible reinjection as per her request       _____________________________________________________  CHIEF COMPLAINT:  Chief Complaint   Patient presents with   • Right Knee - Follow-up         SUBJECTIVE:  Shahrzad Ramirez is a 68 y o  female who presents to our office for viscosupplementation of her right knee  She is here today for her third and final set of Euflexxa  She tolerated her previous injection without issue  She denies any numbness or tingling  She denies any fever or chills      The following portions of the patient's history were reviewed and updated as appropriate: allergies, current medications, past family history, past medical history, past social history, past surgical history and problem list     PAST MEDICAL HISTORY:  Past Medical History:   Diagnosis Date   • Benign essential hypertension    • Chronic kidney disease, stage 2 (mild)    • Chronic kidney disease, stage 3 ([address] )    • Disorder of gallbladder    • Disorder of skin and subcutaneous tissue    • Dyspnea    • Essential hypertension    • Hyperlipidemia    • Hypokalemia    • Hypothyroidism    • Malaise and fatigue    • Mixed hyperlipidemia    • Morbid obesity ([address] )    • Pernicious anemia    • Type 2 diabetes mellitus ([address] )        PAST SURGICAL HISTORY:  Past Surgical History: Procedure Laterality Date   • BREAST BIOPSY Right    • CARPAL TUNNEL RELEASE Bilateral    • CHOLECYSTECTOMY     • COLONOSCOPY      Dr Maryjane Matthews    • HYSTERECTOMY     • PARTIAL HYSTERECTOMY     • SKIN LESION EXCISION      scalp        FAMILY HISTORY:  Family History   Problem Relation Age of Onset   • Stroke Mother    • Atrial fibrillation Mother    • Brain cancer Father    • Other Brother         Twin brothers - Open heart   • Other Brother         Twin brothers - Open heart   • No Known Problems Maternal Grandmother    • No Known Problems Paternal Grandmother        SOCIAL HISTORY:  Social History     Tobacco Use   • Smoking status: Former     Packs/day: 1 00     Years: 14 00     Pack years: 14 00     Types: Cigarettes     Quit date:      Years since quittin 1   • Smokeless tobacco: Never   Vaping Use   • Vaping Use: Never used   Substance Use Topics   • Alcohol use: Yes     Comment: Occasionally    • Drug use: Never     Comment: Denies drug use - As per [employer]        MEDICATIONS:    Current Outpatient Medications:   •  amLODIPine (NORVASC) 5 mg tablet, Take 1 tablet (5 mg total) by mouth daily, Disp: 90 tablet, Rfl: 3  •  aspirin (ECOTRIN LOW STRENGTH) 81 mg EC tablet, Take 81 mg by mouth daily, Disp: , Rfl:   •  cholecalciferol (VITAMIN D3) 1,000 units tablet, Take 800 Units by mouth daily  , Disp: , Rfl:   •  DULoxetine (CYMBALTA) 20 mg capsule, take 1 capsule by mouth once daily, Disp: 30 capsule, Rfl: 1  •  fluticasone (FLONASE) 50 mcg/act nasal spray, 2 sprays into each nostril daily, Disp: 16 g, Rfl: 6  •  folic acid (FOLVITE) 705 mcg tablet, Take 400 mcg by mouth daily, Disp: , Rfl:   •  glipiZIDE (GLUCOTROL) 10 mg tablet, Take 0 5 tablets (5 mg total) by mouth 2 (two) times a day before meals, Disp: 60 tablet, Rfl: 5  •  glucose blood (FREESTYLE LITE) test strip, Use as instructed, Disp: 100 each, Rfl: 3  •  glucose monitoring kit (FREESTYLE) monitoring kit, 1 each by Does not apply route daily, Disp: 1 each, Rfl: 0  •  insulin lispro (HumaLOG KwikPen) 100 units/mL injection pen, Inject 8 Units under the skin 2 (two) times a day with meals, Disp: 15 mL, Rfl: 1  •  Insulin Pen Needle 32G X 6 MM MISC, Use in the morning, Disp: 100 each, Rfl: 5  •  Lancets (freestyle) lancets, Use as instructed -glucose monitor BID, Disp: 100 each, Rfl: 5  •  levothyroxine 50 mcg tablet, Take 1 tablet (50 mcg total) by mouth daily, Disp: 90 tablet, Rfl: 2  •  lisinopril (ZESTRIL) 20 mg tablet, Take 1 tablet (20 mg total) by mouth daily, Disp: 90 tablet, Rfl: 2  •  metoprolol succinate (TOPROL-XL) 50 mg 24 hr tablet, Take 1 tablet (50 mg total) by mouth daily, Disp: 90 tablet, Rfl: 2  •  omega-3-acid ethyl esters (LOVAZA) 1 g capsule, Take 2 g by mouth 2 (two) times a day, Disp: , Rfl:   •  Psyllium (FIBER) 0 52 g CAPS, Take by mouth, Disp: , Rfl:   •  rosuvastatin (CRESTOR) 10 MG tablet, Take 1 tablet (10 mg total) by mouth daily, Disp: 90 tablet, Rfl: 3  •  Urea 20 Intensive Hydrating 20 % cream, , Disp: , Rfl:   •  Insulin Glargine Solostar (Lantus SoloStar) 100 UNIT/ML SOPN, Inject 50 Units under the skin daily at bedtime, Disp: 15 mL, Rfl: 5    ALLERGIES:  No Known Allergies    ROS:  Review of Systems     Constitutional: Negative for fatigue, fever or loss of appetite  HENT: Negative  Respiratory: Negative for shortness of breath, dyspnea  Cardiovascular: Negative for chest pain/tightness  Gastrointestinal: Negative for abdominal pain, N/V  Endocrine: Negative for cold/heat intolerance, unexplained weight loss/gain  Genitourinary: Negative for flank pain, dysuria, hematuria  Musculoskeletal: Positive for arthralgia   Skin: Negative for rash  Neurological: Negative for numbness or tingling  Psychiatric/Behavioral: Negative for agitation  _____________________________________________________  PHYSICAL EXAMINATION:    Height 5' 2" (1 575 m), weight 88 kg (194 lb)      Constitutional: Oriented to person, place, and time  Appears well-developed and well-nourished  No distress  HENT:   Head: Normocephalic  Eyes: Conjunctivae are normal  Right eye exhibits no discharge  Left eye exhibits no discharge  No scleral icterus  Cardiovascular: Normal rate  Pulmonary/Chest: Effort normal    Neurological: Alert and oriented to person, place, and time  Skin: Skin is warm and dry  No rash noted  Not diaphoretic  No erythema  No pallor  Psychiatric: Normal mood and affect  Behavior is normal  Judgment and thought content normal       MUSCULOSKELETAL EXAMINATION:   Physical Exam  Ortho Exam    Right lower extremity is neurovascularly intact  Toes are pink and mobile   Compartments are soft  No warmth, erythema or ecchymosis  ROM of knee is from 5-115 degrees  Negative Lachman, drawer or pivot shift  No medial instability  Medial joint line tenderness, slight lateral joint line tenderness  Patellofemoral crepitation  Objective:  BP Readings from Last 1 Encounters:   01/23/23 113/58      Wt Readings from Last 1 Encounters:   01/30/23 88 kg (194 lb)        BMI:   Estimated body mass index is 35 48 kg/m² as calculated from the following:    Height as of this encounter: 5' 2" (1 575 m)  Weight as of this encounter: 88 kg (194 lb)        PROCEDURES PERFORMED:  Large joint arthrocentesis: R knee  Universal Protocol:  Risks and benefits: risks, benefits and alternatives were discussed  Consent given by: patient  Site marked: the operative site was marked  Supporting Documentation  Indications: pain   Procedure Details  Location: knee - R knee  Preparation: Patient was prepped and draped in the usual sterile fashion  Needle size: 22 G  Ultrasound guidance: no  Approach: lateral  Medications administered: 20 mg Sodium Hyaluronate 20 MG/2ML    Patient tolerance: patient tolerated the procedure well with no immediate complications  Dressing:  Sterile dressing applied            Scribe Attestation    I,:  Luis Gallagher NANCY am acting as a scribe while in the presence of the attending physician :       I,:  Karson Guzman, DO personally performed the services described in this documentation    as scribed in my presence :

## 2023-02-15 DIAGNOSIS — M48.061 SPINAL STENOSIS OF LUMBAR REGION, UNSPECIFIED WHETHER NEUROGENIC CLAUDICATION PRESENT: ICD-10-CM

## 2023-02-15 RX ORDER — DULOXETIN HYDROCHLORIDE 20 MG/1
CAPSULE, DELAYED RELEASE ORAL
Qty: 30 CAPSULE | Refills: 1 | Status: SHIPPED | OUTPATIENT
Start: 2023-02-15

## 2023-02-22 DIAGNOSIS — E11.22 TYPE 2 DIABETES MELLITUS WITH STAGE 3A CHRONIC KIDNEY DISEASE, WITH LONG-TERM CURRENT USE OF INSULIN (HCC): ICD-10-CM

## 2023-02-22 DIAGNOSIS — Z79.4 TYPE 2 DIABETES MELLITUS WITH STAGE 3A CHRONIC KIDNEY DISEASE, WITH LONG-TERM CURRENT USE OF INSULIN (HCC): ICD-10-CM

## 2023-02-22 DIAGNOSIS — N18.31 TYPE 2 DIABETES MELLITUS WITH STAGE 3A CHRONIC KIDNEY DISEASE, WITH LONG-TERM CURRENT USE OF INSULIN (HCC): ICD-10-CM

## 2023-02-22 RX ORDER — INSULIN GLARGINE 100 [IU]/ML
50 INJECTION, SOLUTION SUBCUTANEOUS
Qty: 15 ML | Refills: 5 | Status: SHIPPED | OUTPATIENT
Start: 2023-02-22 | End: 2023-05-23

## 2023-02-22 NOTE — TELEPHONE ENCOUNTER
Patient requesting refill(s) of: Lantus 100 IU/mL 50 IU daily at bedtime       Last filled: 8/16/2022 #15 x 5  Last appt: 12/5/2022  Next appt: 3/7/2023  Pharmacy: [facility]

## 2023-02-23 ENCOUNTER — RA CDI HCC (OUTPATIENT)
Dept: OTHER | Facility: HOSPITAL | Age: 78
End: 2023-02-23

## 2023-02-23 NOTE — PROGRESS NOTES
E11 [address]  [facility]  coding opportunities          Chart Reviewed number of suggestions sent to Provider: 1     Patients Insurance     Medicare Insurance: [employer]

## 2023-03-03 ENCOUNTER — APPOINTMENT (OUTPATIENT)
Dept: LAB | Facility: CLINIC | Age: 78
End: 2023-03-03

## 2023-03-03 DIAGNOSIS — N18.30 CKD STAGE 3 DUE TO TYPE 2 DIABETES MELLITUS (HCC): ICD-10-CM

## 2023-03-03 DIAGNOSIS — E11.22 TYPE 2 DIABETES MELLITUS WITH STAGE 3B CHRONIC KIDNEY DISEASE, WITH LONG-TERM CURRENT USE OF INSULIN (HCC): ICD-10-CM

## 2023-03-03 DIAGNOSIS — Z79.4 TYPE 2 DIABETES MELLITUS WITH STAGE 3B CHRONIC KIDNEY DISEASE, WITH LONG-TERM CURRENT USE OF INSULIN (HCC): ICD-10-CM

## 2023-03-03 DIAGNOSIS — E11.22 CKD STAGE 3 DUE TO TYPE 2 DIABETES MELLITUS (HCC): ICD-10-CM

## 2023-03-03 DIAGNOSIS — N18.32 TYPE 2 DIABETES MELLITUS WITH STAGE 3B CHRONIC KIDNEY DISEASE, WITH LONG-TERM CURRENT USE OF INSULIN (HCC): ICD-10-CM

## 2023-03-03 DIAGNOSIS — E03.9 ACQUIRED HYPOTHYROIDISM: ICD-10-CM

## 2023-03-03 LAB
ALBUMIN SERPL BCP-MCNC: 3.3 G/DL (ref 3.5–5)
ALP SERPL-CCNC: 55 U/L (ref 46–116)
ALT SERPL W P-5'-P-CCNC: 21 U/L (ref 12–78)
ANION GAP SERPL CALCULATED.3IONS-SCNC: 3 MMOL/L (ref 4–13)
AST SERPL W P-5'-P-CCNC: 14 U/L (ref 5–45)
BASOPHILS # BLD AUTO: 0.06 THOUSANDS/ÂΜL (ref 0–0.1)
BASOPHILS NFR BLD AUTO: 1 % (ref 0–1)
BILIRUB SERPL-MCNC: 0.46 MG/DL (ref 0.2–1)
BUN SERPL-MCNC: 17 MG/DL (ref 5–25)
CALCIUM ALBUM COR SERPL-MCNC: 10.1 MG/DL (ref 8.3–10.1)
CALCIUM SERPL-MCNC: 9.5 MG/DL (ref 8.3–10.1)
CHLORIDE SERPL-SCNC: 100 MMOL/L (ref 96–108)
CO2 SERPL-SCNC: 30 MMOL/L (ref 21–32)
CREAT SERPL-MCNC: 1.37 MG/DL (ref 0.6–1.3)
EOSINOPHIL # BLD AUTO: 0.15 THOUSAND/ÂΜL (ref 0–0.61)
EOSINOPHIL NFR BLD AUTO: 2 % (ref 0–6)
ERYTHROCYTE [DISTWIDTH] IN BLOOD BY AUTOMATED COUNT: 13.7 % (ref 11.6–15.1)
GFR SERPL CREATININE-BSD FRML MDRD: 37 ML/MIN/1.73SQ M
GLUCOSE P FAST SERPL-MCNC: 266 MG/DL (ref 65–99)
HCT VFR BLD AUTO: 39.9 % (ref 34.8–46.1)
HGB BLD-MCNC: 12.4 G/DL (ref 11.5–15.4)
IMM GRANULOCYTES # BLD AUTO: 0.03 THOUSAND/UL (ref 0–0.2)
IMM GRANULOCYTES NFR BLD AUTO: 0 % (ref 0–2)
LYMPHOCYTES # BLD AUTO: 2.14 THOUSANDS/ÂΜL (ref 0.6–4.47)
LYMPHOCYTES NFR BLD AUTO: 26 % (ref 14–44)
MCH RBC QN AUTO: 30.2 PG (ref 26.8–34.3)
MCHC RBC AUTO-ENTMCNC: 31.1 G/DL (ref 31.4–37.4)
MCV RBC AUTO: 97 FL (ref 82–98)
MONOCYTES # BLD AUTO: 0.65 THOUSAND/ÂΜL (ref 0.17–1.22)
MONOCYTES NFR BLD AUTO: 8 % (ref 4–12)
NEUTROPHILS # BLD AUTO: 5.13 THOUSANDS/ÂΜL (ref 1.85–7.62)
NEUTS SEG NFR BLD AUTO: 63 % (ref 43–75)
NRBC BLD AUTO-RTO: 0 /100 WBCS
PLATELET # BLD AUTO: 146 THOUSANDS/UL (ref 149–390)
PMV BLD AUTO: 11.8 FL (ref 8.9–12.7)
POTASSIUM SERPL-SCNC: 4.2 MMOL/L (ref 3.5–5.3)
PROT SERPL-MCNC: 6.5 G/DL (ref 6.4–8.4)
RBC # BLD AUTO: 4.1 MILLION/UL (ref 3.81–5.12)
SODIUM SERPL-SCNC: 133 MMOL/L (ref 135–147)
TSH SERPL DL<=0.05 MIU/L-ACNC: 2.87 UIU/ML (ref 0.45–4.5)
WBC # BLD AUTO: 8.16 THOUSAND/UL (ref 4.31–10.16)

## 2023-03-04 LAB
EST. AVERAGE GLUCOSE BLD GHB EST-MCNC: 246 MG/DL
HBA1C MFR BLD: 10.2 %

## 2023-03-07 ENCOUNTER — OFFICE VISIT (OUTPATIENT)
Dept: FAMILY MEDICINE CLINIC | Facility: CLINIC | Age: 78
End: 2023-03-07

## 2023-03-07 VITALS
TEMPERATURE: 98.6 F | HEIGHT: 62 IN | BODY MASS INDEX: 36.95 KG/M2 | WEIGHT: 200.8 LBS | DIASTOLIC BLOOD PRESSURE: 74 MMHG | OXYGEN SATURATION: 95 % | SYSTOLIC BLOOD PRESSURE: 118 MMHG | HEART RATE: 92 BPM | RESPIRATION RATE: 18 BRPM

## 2023-03-07 DIAGNOSIS — E11.22 TYPE 2 DIABETES MELLITUS WITH STAGE 3B CHRONIC KIDNEY DISEASE, WITH LONG-TERM CURRENT USE OF INSULIN (HCC): Primary | ICD-10-CM

## 2023-03-07 DIAGNOSIS — E66.01 SEVERE OBESITY (BMI 35.0-39.9) WITH COMORBIDITY (HCC): ICD-10-CM

## 2023-03-07 DIAGNOSIS — R09.89 CAROTID BRUIT, UNSPECIFIED LATERALITY: ICD-10-CM

## 2023-03-07 DIAGNOSIS — E11.22 CKD STAGE 3 DUE TO TYPE 2 DIABETES MELLITUS (HCC): ICD-10-CM

## 2023-03-07 DIAGNOSIS — Z23 ENCOUNTER FOR IMMUNIZATION: ICD-10-CM

## 2023-03-07 DIAGNOSIS — E03.9 ACQUIRED HYPOTHYROIDISM: ICD-10-CM

## 2023-03-07 DIAGNOSIS — Z79.4 TYPE 2 DIABETES MELLITUS WITH STAGE 3B CHRONIC KIDNEY DISEASE, WITH LONG-TERM CURRENT USE OF INSULIN (HCC): Primary | ICD-10-CM

## 2023-03-07 DIAGNOSIS — I10 ESSENTIAL HYPERTENSION: ICD-10-CM

## 2023-03-07 DIAGNOSIS — N18.30 CKD STAGE 3 DUE TO TYPE 2 DIABETES MELLITUS (HCC): ICD-10-CM

## 2023-03-07 DIAGNOSIS — J06.9 UPPER RESPIRATORY TRACT INFECTION, UNSPECIFIED TYPE: ICD-10-CM

## 2023-03-07 DIAGNOSIS — Z12.31 ENCOUNTER FOR SCREENING MAMMOGRAM FOR MALIGNANT NEOPLASM OF BREAST: ICD-10-CM

## 2023-03-07 DIAGNOSIS — I73.9 CLAUDICATION (HCC): ICD-10-CM

## 2023-03-07 DIAGNOSIS — Z00.00 ENCOUNTER FOR ANNUAL WELLNESS VISIT (AWV) IN MEDICARE PATIENT: ICD-10-CM

## 2023-03-07 DIAGNOSIS — N18.32 TYPE 2 DIABETES MELLITUS WITH STAGE 3B CHRONIC KIDNEY DISEASE, WITH LONG-TERM CURRENT USE OF INSULIN (HCC): Primary | ICD-10-CM

## 2023-03-07 RX ORDER — AZITHROMYCIN 250 MG/1
TABLET, FILM COATED ORAL
Qty: 6 TABLET | Refills: 0 | Status: SHIPPED | OUTPATIENT
Start: 2023-03-07 | End: 2023-03-11

## 2023-03-07 RX ORDER — LISINOPRIL 20 MG/1
10 TABLET ORAL DAILY
Qty: 90 TABLET | Refills: 2
Start: 2023-03-07

## 2023-03-07 NOTE — PATIENT INSTRUCTIONS
Please decrease lisinopril to 1/2 tablet or 10mg due to low BP  Medicare Preventive Visit Patient Instructions  Thank you for completing your Welcome to Medicare Visit or Medicare Annual Wellness Visit today  Your next wellness visit will be due in one year (3/7/2024)  The screening/preventive services that you may require over the next 5-10 years are detailed below  Some tests may not apply to you based off risk factors and/or age  Screening tests ordered at today's visit but not completed yet may show as past due  Also, please note that scanned in results may not display below  Preventive Screenings:  Service Recommendations Previous Testing/Comments   Colorectal Cancer Screening  * Colonoscopy    * Fecal Occult Blood Test (FOBT)/Fecal Immunochemical Test (FIT)  * Fecal DNA/Cologuard Test  * Flexible Sigmoidoscopy Age: 39-70 years old   Colonoscopy: every 10 years (may be performed more frequently if at higher risk)  OR  FOBT/FIT: every 1 year  OR  Cologuard: every 3 years  OR  Sigmoidoscopy: every 5 years  Screening may be recommended earlier than age 39 if at higher risk for colorectal cancer  Also, an individualized decision between you and your healthcare provider will decide whether screening between the ages of 74-80 would be appropriate  Colonoscopy: 10/09/2014  FOBT/FIT: Not on file  Cologuard: Not on file  Sigmoidoscopy: Not on file          Breast Cancer Screening Age: 36 years old  Frequency: every 1-2 years  Not required if history of left and right mastectomy Mammogram: 09/15/2021    Screening Current   Cervical Cancer Screening Between the ages of 21-29, pap smear recommended once every 3 years  Between the ages of 33-67, can perform pap smear with HPV co-testing every 5 years     Recommendations may differ for women with a history of total hysterectomy, cervical cancer, or abnormal pap smears in past  Pap Smear: Not on file    Screening Not Indicated   Hepatitis C Screening Once for adults born between [birth year] and 1965  More frequently in patients at high risk for Hepatitis C Hep C Antibody: Not on file        Diabetes Screening 1-2 times per year if you're at risk for diabetes or have pre-diabetes Fasting glucose: 266 mg/dL (3/3/2023)  A1C: 10 2 % (3/3/2023)  Screening Not Indicated  History Diabetes   Cholesterol Screening Once every 5 years if you don't have a lipid disorder  May order more often based on risk factors  Lipid panel: 01/25/2022    Screening Not Indicated  History Lipid Disorder     Other Preventive Screenings Covered by Medicare:  Abdominal Aortic Aneurysm (AAA) Screening: covered once if your at risk  You're considered to be at risk if you have a family history of AAA  Lung Cancer Screening: covers low dose CT scan once per year if you meet all of the following conditions: (1) Age 50-69; (2) No signs or symptoms of lung cancer; (3) Current smoker or have quit smoking within the last 15 years; (4) You have a tobacco smoking history of at least 20 pack years (packs per day multiplied by number of years you smoked); (5) You get a written order from a healthcare provider  Glaucoma Screening: covered annually if you're considered high risk: (1) You have diabetes OR (2) Family history of glaucoma OR (3)  aged 48 and older OR (3)  American aged 72 and older  Osteoporosis Screening: covered every 2 years if you meet one of the following conditions: (1) You're estrogen deficient and at risk for osteoporosis based off medical history and other findings; (2) Have a vertebral abnormality; (3) On glucocorticoid therapy for more than 3 months; (4) Have primary hyperparathyroidism; (5) On osteoporosis medications and need to assess response to drug therapy  Last bone density test (DXA Scan): 04/20/2022  HIV Screening: covered annually if you're between the age of 12-76  Also covered annually if you are younger than 13 and older than 72 with risk factors for HIV infection  For pregnant patients, it is covered up to 3 times per pregnancy  Immunizations:  Immunization Recommendations   Influenza Vaccine Annual influenza vaccination during flu season is recommended for all persons aged >= 6 months who do not have contraindications   Pneumococcal Vaccine   * Pneumococcal conjugate vaccine = PCV13 (Prevnar 13), PCV15 (Vaxneuvance), PCV20 (Prevnar 20)  * Pneumococcal polysaccharide vaccine = PPSV23 (Pneumovax) Adults 25-60 years old: 1-3 doses may be recommended based on certain risk factors  Adults 72 years old: 1-2 doses may be recommended based off what pneumonia vaccine you previously received   Hepatitis B Vaccine 3 dose series if at intermediate or high risk (ex: diabetes, end stage renal disease, liver disease)   Tetanus (Td) Vaccine - COST NOT COVERED BY MEDICARE PART B Following completion of primary series, a booster dose should be given every 10 years to maintain immunity against tetanus  Td may also be given as tetanus wound prophylaxis  Tdap Vaccine - COST NOT COVERED BY MEDICARE PART B Recommended at least once for all adults  For pregnant patients, recommended with each pregnancy  Shingles Vaccine (Shingrix) - COST NOT COVERED BY MEDICARE PART B  2 shot series recommended in those aged 48 and above     Health Maintenance Due:      Topic Date Due    Hepatitis C Screening  Never done    Breast Cancer Screening: Mammogram  09/15/2022    Colorectal Cancer Screening  Discontinued     Immunizations Due:      Topic Date Due    Pneumococcal Vaccine: 65+ Years (2 - PCV) 10/06/2015    COVID-19 Vaccine (3 - Booster for Moderna series) 07/20/2021    Influenza Vaccine (1) 09/01/2022     Advance Directives   What are advance directives? Advance directives are legal documents that state your wishes and plans for medical care  These plans are made ahead of time in case you lose your ability to make decisions for yourself   Advance directives can apply to any medical decision, such as the treatments you want, and if you want to donate organs  What are the types of advance directives? There are many types of advance directives, and each state has rules about how to use them  You may choose a combination of any of the following:  Living will: This is a written record of the treatment you want  You can also choose which treatments you do not want, which to limit, and which to stop at a certain time  This includes surgery, medicine, IV fluid, and tube feedings  Durable power of  for healthcare [facility]): This is a written record that states who you want to make healthcare choices for you when you are unable to make them for yourself  This person, called a proxy, is usually a family member or a friend  You may choose more than 1 proxy  Do not resuscitate (DNR) order:  A DNR order is used in case your heart stops beating or you stop breathing  It is a request not to have certain forms of treatment, such as CPR  A DNR order may be included in other types of advance directives  Medical directive: This covers the care that you want if you are in a coma, near death, or unable to make decisions for yourself  You can list the treatments you want for each condition  Treatment may include pain medicine, surgery, blood transfusions, dialysis, IV or tube feedings, and a ventilator (breathing machine)  Values history: This document has questions about your views, beliefs, and how you feel and think about life  This information can help others choose the care that you would choose  Why are advance directives important? An advance directive helps you control your care  Although spoken wishes may be used, it is better to have your wishes written down  Spoken wishes can be misunderstood, or not followed  Treatments may be given even if you do not want them  An advance directive may make it easier for your family to make difficult choices about your care     Weight Management   Why it is important to manage your weight:  Being overweight increases your risk of health conditions such as heart disease, high blood pressure, type 2 diabetes, and certain types of cancer  It can also increase your risk for osteoarthritis, sleep apnea, and other respiratory problems  Aim for a slow, steady weight loss  Even a small amount of weight loss can lower your risk of health problems  How to lose weight safely:  A safe and healthy way to lose weight is to eat fewer calories and get regular exercise  You can lose up about 1 pound a week by decreasing the number of calories you eat by 500 calories each day  Healthy meal plan for weight management:  A healthy meal plan includes a variety of foods, contains fewer calories, and helps you stay healthy  A healthy meal plan includes the following:  Eat whole-grain foods more often  A healthy meal plan should contain fiber  Fiber is the part of grains, fruits, and vegetables that is not broken down by your body  Whole-grain foods are healthy and provide extra fiber in your diet  Some examples of whole-grain foods are whole-wheat breads and pastas, oatmeal, brown rice, and bulgur  Eat a variety of vegetables every day  Include dark, leafy greens such as spinach, kale, campbell greens, and mustard greens  Eat yellow and orange vegetables such as carrots, sweet potatoes, and winter squash  Eat a variety of fruits every day  Choose fresh or canned fruit (canned in its own juice or light syrup) instead of juice  Fruit juice has very little or no fiber  Eat low-fat dairy foods  Drink fat-free (skim) milk or 1% milk  Eat fat-free yogurt and low-fat cottage cheese  Try low-fat cheeses such as mozzarella and other reduced-fat cheeses  Choose meat and other protein foods that are low in fat  Choose beans or other legumes such as split peas or lentils  Choose fish, skinless poultry (chicken or turkey), or lean cuts of red meat (beef or pork)   Before you cook meat or poultry, cut off any visible fat  Use less fat and oil  Try baking foods instead of frying them  Add less fat, such as margarine, sour cream, regular salad dressing and mayonnaise to foods  Eat fewer high-fat foods  Some examples of high-fat foods include french fries, doughnuts, ice cream, and cakes  Eat fewer sweets  Limit foods and drinks that are high in sugar  This includes candy, cookies, regular soda, and sweetened drinks  Exercise:  Exercise at least 30 minutes per day on most days of the week  Some examples of exercise include walking, biking, dancing, and swimming  You can also fit in more physical activity by taking the stairs instead of the elevator or parking farther away from stores  Ask your healthcare provider about the best exercise plan for you  © Copyright [employer] 2018 Information is for End User's use only and may not be sold, redistributed or otherwise used for commercial purposes   All illustrations and images included in CareNotes® are the copyrighted property of [employer]  or [address] [place] [address]

## 2023-03-07 NOTE — PROGRESS NOTES
Assessment and Plan:     Problem List Items Addressed This Visit        Endocrine    Type 2 diabetes mellitus with stage 3b chronic kidney disease, with long-term current use of insulin ([facility])    - A1c has increased to 10 2, poorly controlled, discussed need for CGM and endocrine at this time  She does not have glucose logs for review today, but I suspect she will need to increase basal and bolus insulin  Also admits to dietary indiscretions, recent URI  Relevant Medications    lisinopril (ZESTRIL) 20 mg tablet    Other Relevant Orders    Ambulatory Referral to Endocrinology    Hemoglobin A1C    Comprehensive metabolic panel    CBC and differential    Lipid Panel with Direct LDL reflex    Microalbumin / creatinine urine ratio    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 mellitus (HCC)    - Cr and GFR stable, she does not currently follow with nephrology, will check labs and urine studies prior to follow up in 4 months   Relevant Orders    Ambulatory Referral to Endocrinology    Comprehensive metabolic panel    CBC and differential    Lipid Panel with Direct LDL reflex    Microalbumin / creatinine urine ratio    Acquired hypothyroidism  - recent TSH normal       Cardiovascular and Mediastinum    Essential hypertension    -BP seems too low recently, 113/58 in January, having issues with lightheadedness, I have advised her to reduce lisinopril to 10mg, follow up in 3 months   Relevant Medications    lisinopril (ZESTRIL) 20 mg tablet    Other Relevant Orders    Lipid Panel with Direct LDL reflex       Other    Severe obesity (BMI 35 0-39  9) with comorbidity [facility])    Relevant Orders    Ambulatory Referral to Endocrinology    Lipid Panel with Direct LDL reflex   Other Visit Diagnoses     Encounter for annual wellness visit (AWV) in Medicare patient    -  Primary    Encounter for screening mammogram for malignant neoplasm of breast        Relevant Orders    Mammo screening bilateral w cad    Encounter for immunization Relevant Orders    influenza vaccine, high-dose, PF 0 7 mL (FLUZONE HIGH-DOSE) (Completed)    Pneumococcal Conjugate Vaccine 20-valent (Pcv20) (Completed)    Claudication (HCC)        Relevant Orders    VAS lower limb arterial duplex, complete bilateral    Carotid bruit, unspecified laterality        Relevant Orders    VAS carotid complete study    Upper respiratory tract infection, unspecified type        Relevant Medications    azithromycin (ZITHROMAX) 250 mg tablet        BMI Counseling: Body mass index is 36 73 kg/m²  The BMI is above normal  Nutrition recommendations include decreasing portion sizes, consuming healthier snacks, limiting drinks that contain sugar and moderation in carbohydrate intake  Rationale for BMI follow-up plan is due to patient being overweight or obese  Depression Screening and Follow-up Plan: Patient was screened for depression during today's encounter  They screened negative with a PHQ-2 score of 0  Falls Plan of Care: balance, strength, and gait training instructions were provided  Patient assessed for orthostatic hypotension  Preventive health issues were discussed with patient, and age appropriate screening tests were ordered as noted in patient's After Visit Summary  Personalized health advice and appropriate referrals for health education or preventive services given if needed, as noted in patient's After Visit Summary  History of Present Illness:     Patient presents for a Medicare Wellness Visit    66yo female with HTN, type 2 diabetes    Diabetes: using lantus 50 units at bedtime, humalog 8 units with meals  FBG ranges from 150s, and stopped checking before meals because she was running out of test strips  Denies any lows  Admits to eating a lot of cookies recently  Also had SHERRY in back for spinal stenosis  Feeling dizzy/lightheaded upon standing lately  Denies recent falls or syncope  Spinal stenosis: SHERRY helped initially but not much anymore   Can barely walk a few steps before her back starts hurting  Lives with significant other and helps take care of him, due to [place]  URI: started about 2 weeks ago with sore throat, rhinorrhea, congestion and cough  Cough is persistent and bringing up mucus  Denies chest tightness, wheezing or SOB  Denies fever/chills, nausea/vomiting or diarrhea but does have fatigue  Multiple home covid tests were negative  Patient Care Team:  Yasmine Morse MD as PCP - General (Internal Medicine)     Review of Systems:     Review of Systems   Constitutional: Positive for activity change, appetite change and fatigue  Negative for chills, fever and unexpected weight change  HENT: Positive for congestion, rhinorrhea and sore throat  Negative for postnasal drip and trouble swallowing  Eyes: Negative for pain, redness, itching and visual disturbance  Respiratory: Positive for cough  Negative for chest tightness, shortness of breath and wheezing  Cardiovascular: Negative for chest pain, palpitations and leg swelling  Gastrointestinal: Negative for abdominal pain, blood in stool, constipation, diarrhea, nausea and vomiting  Endocrine: Negative for cold intolerance, heat intolerance, polydipsia and polyuria  Genitourinary: Negative for dysuria, frequency, hematuria and urgency  Musculoskeletal: Positive for arthralgias, back pain and gait problem  Negative for joint swelling  Skin: Negative for rash  Neurological: Positive for light-headedness and numbness  Negative for dizziness, tremors, weakness and headaches  Psychiatric/Behavioral: Negative for confusion, dysphoric mood, hallucinations and suicidal ideas  The patient is not nervous/anxious           Problem List:     Patient Active Problem List   Diagnosis   • Type 2 diabetes mellitus with stage 3b chronic kidney disease, with long-term current use of insulin ([facility] [address] )   • 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 mellitus ([facility] [address] )   • Essential hypertension   • Mixed hyperlipidemia   • Acquired hypothyroidism   • Severe obesity (BMI 35 0-39  9) with comorbidity ([facility] [address] )   • Spinal stenosis of lumbar region   • Chronic pain syndrome   • Lumbar radiculopathy      Past Medical and Surgical History:     Past Medical History:   Diagnosis Date   • Benign essential hypertension    • Chronic kidney disease, stage 2 (mild)    • Chronic kidney disease, stage 3 (HCC)    • Disorder of gallbladder    • Disorder of skin and subcutaneous tissue    • Dyspnea    • Essential hypertension    • Hyperlipidemia    • Hypokalemia    • Hypothyroidism    • Malaise and fatigue    • Mixed hyperlipidemia    • Morbid obesity (HCC)    • Pernicious anemia    • Type 2 diabetes mellitus ([facility] [address] )      Past Surgical History:   Procedure Laterality Date   • BREAST BIOPSY Right    • CARPAL TUNNEL RELEASE Bilateral    • CHOLECYSTECTOMY     • COLONOSCOPY      Dr Aviva Arana    • HYSTERECTOMY     • PARTIAL HYSTERECTOMY     • SKIN LESION EXCISION      scalp       Family History:     Family History   Problem Relation Age of Onset   • Stroke Mother    • Atrial fibrillation Mother    • Brain cancer Father    • Other Brother         Twin brothers - Open heart   • Other Brother         Twin brothers - Open heart   • No Known Problems Maternal Grandmother    • No Known Problems Paternal Grandmother       Social History:     Social History     Socioeconomic History   • Marital status: /Civil Union     Spouse name: Giselle Mercado    • Number of children: None   • Years of education: None   • Highest education level: None   Occupational History   • Occupation: Retired    Tobacco Use   • Smoking status: Former     Packs/day: 1 00     Years: 14 00     Pack years: 14 00     Types: Cigarettes     Quit date:      Years since quittin 2   • Smokeless tobacco: Never   Vaping Use   • Vaping Use: Never used   Substance and Sexual Activity   • Alcohol use: Yes     Comment: Occasionally    • Drug use: Never     Comment: Denies drug use - As per [employer]    • Sexual activity: None   Other Topics Concern   • None   Social History Narrative     - Beatriz Sánchez is [place] and speaks [place]i, common law marriage    Does not consume caffeine      Social Determinants of Health     Financial Resource Strain: Low Risk    • Difficulty of Paying Living Expenses: Not hard at all   Food Insecurity: Not on file   Transportation Needs: No Transportation Needs   • Lack of Transportation (Medical): No   • Lack of Transportation (Non-Medical):  No   Physical Activity: Not on file   Stress: Not on file   Social Connections: Not on file   Intimate Partner Violence: Not on file   Housing Stability: Not on file      Medications and Allergies:     Current Outpatient Medications   Medication Sig Dispense Refill   • amLODIPine (NORVASC) 5 mg tablet Take 1 tablet (5 mg total) by mouth daily 90 tablet 3   • aspirin (ECOTRIN LOW STRENGTH) 81 mg EC tablet Take 81 mg by mouth daily     • azithromycin (ZITHROMAX) 250 mg tablet Take 2 tablets today then 1 tablet daily x 4 days 6 tablet 0   • cholecalciferol (VITAMIN D3) 1,000 units tablet Take 800 Units by mouth daily       • DULoxetine (CYMBALTA) 20 mg capsule take 1 capsule by mouth once daily 30 capsule 1   • fluticasone (FLONASE) 50 mcg/act nasal spray 2 sprays into each nostril daily 16 g 6   • folic acid (FOLVITE) 804 mcg tablet Take 400 mcg by mouth daily     • glipiZIDE (GLUCOTROL) 10 mg tablet Take 0 5 tablets (5 mg total) by mouth 2 (two) times a day before meals 60 tablet 5   • glucose blood (FREESTYLE LITE) test strip Use as instructed 100 each 3   • glucose monitoring kit (FREESTYLE) monitoring kit 1 each by Does not apply route daily 1 each 0   • Insulin Glargine Solostar (Lantus SoloStar) 100 UNIT/ML SOPN Inject 0 5 mL (50 Units total) under the skin daily at bedtime 15 mL 5   • insulin lispro (HumaLOG KwikPen) 100 units/mL injection pen Inject 8 Units under the skin 2 (two) times a day with meals 15 mL 1   • Insulin Pen Needle 32G X 6 MM MISC Use in the morning 100 each 5   • Lancets (freestyle) lancets Use as instructed -glucose monitor  each 5   • levothyroxine 50 mcg tablet Take 1 tablet (50 mcg total) by mouth daily 90 tablet 2   • lisinopril (ZESTRIL) 20 mg tablet Take 0 5 tablets (10 mg total) by mouth daily 90 tablet 2   • metoprolol succinate (TOPROL-XL) 50 mg 24 hr tablet Take 1 tablet (50 mg total) by mouth daily 90 tablet 2   • omega-3-acid ethyl esters (LOVAZA) 1 g capsule Take 2 g by mouth 2 (two) times a day     • Psyllium (FIBER) 0 52 g CAPS Take by mouth     • rosuvastatin (CRESTOR) 10 MG tablet Take 1 tablet (10 mg total) by mouth daily 90 tablet 3   • Urea 20 Intensive Hydrating 20 % cream        No current facility-administered medications for this visit  No Known Allergies   Immunizations:     Immunization History   Administered Date(s) Administered   • COVID-19 MODERNA VACC 0 5 ML IM 04/27/2021, 05/25/2021   • INFLUENZA 10/06/2014, 09/23/2015, 09/07/2016, 09/12/2017, 09/26/2018, 10/08/2020   • Influenza, high dose seasonal 0 7 mL 10/08/2020, 10/07/2021, 03/07/2023   • Pneumococcal Conjugate Vaccine 20-valent (Pcv20), Polysace 03/07/2023   • Pneumococcal Polysaccharide PPV23 10/06/2014   • influenza, trivalent, adjuvanted 09/16/2019      Health Maintenance:         Topic Date Due   • Hepatitis C Screening  Never done   • Breast Cancer Screening: Mammogram  09/15/2022   • Colorectal Cancer Screening  Discontinued         Topic Date Due   • COVID-19 Vaccine (3 - Booster for [place] series) 07/20/2021      Medicare Screening Tests and Risk Assessments:     Shiloh Frederick is here for her Subsequent Wellness visit  Last Medicare Wellness visit information reviewed, patient interviewed and updates made to the record today  Health Risk Assessment:   Patient rates overall health as good  Patient feels that their physical health rating is same  Patient is satisfied with their life   Eyesight was rated as same  Hearing was rated as same  Patient feels that their emotional and mental health rating is same  Patients states they are never, rarely angry  Patient states they are always unusually tired/fatigued  Pain experienced in the last 7 days has been some  Patient's pain rating has been 3/10  Patient states that she has experienced no weight loss or gain in last 6 months  Back pain currently 3/10, follows with pain management    Depression Screening:   PHQ-2 Score: 0      Fall Risk Screening: In the past year, patient has experienced: no history of falling in past year      Urinary Incontinence Screening:   Patient has not leaked urine accidently in the last six months  Home Safety:  Patient does not have trouble with stairs inside or outside of their home  Patient has working smoke alarms and has working carbon monoxide detector  Home safety hazards include: none  Nutrition:   Current diet is Regular  Medications:   Patient is currently taking over-the-counter supplements  OTC medications include: folic acid, cranberry, tums, nasal spray  Patient is able to manage medications  Activities of Daily Living (ADLs)/Instrumental Activities of Daily Living (IADLs):   Walk and transfer into and out of bed and chair?: Yes  Dress and groom yourself?: Yes    Bathe or shower yourself?: Yes    Feed yourself?  Yes  Do your laundry/housekeeping?: Yes  Manage your money, pay your bills and track your expenses?: Yes  Make your own meals?: Yes    Do your own shopping?: Yes    Previous Hospitalizations:   Any hospitalizations or ED visits within the last 12 months?: No      Advance Care Planning:   Living will: Yes    Durable POA for healthcare: No    Advanced directive: Yes    Advanced directive counseling given: Yes    Five wishes given: Yes    Patient declined ACP directive: No    End of Life Decisions reviewed with patient: Yes    Provider agrees with end of life decisions: Yes      Comments: Significant other, magnus [place]  Her family lives out of state  She has DNR    Cognitive Screening:   Provider or family/friend/caregiver concerned regarding cognition?: No    PREVENTIVE SCREENINGS      Cardiovascular Screening:    General: Screening Not Indicated and History Lipid Disorder      Diabetes Screening:     General: Screening Not Indicated and History Diabetes      Colorectal Cancer Screening:     General: Screening Not Indicated      Breast Cancer Screening:     General: Risks and Benefits Discussed    Due for: Mammogram        Cervical Cancer Screening:    General: Screening Not Indicated      Osteoporosis Screening:    General: Screening Current      Abdominal Aortic Aneurysm (AAA) Screening:        General: Screening Not Indicated      Lung Cancer Screening:     General: Screening Not Indicated      Hepatitis C Screening:    General: Screening Not Indicated    Screening, Brief Intervention, and Referral to Treatment (SBIRT)    Screening  Typical number of drinks in a day: 0  Typical number of drinks in a week: 0  Interpretation: Low risk drinking behavior  Single Item Drug Screening:  How often have you used an illegal drug (including marijuana) or a prescription medication for non-medical reasons in the past year? never    Single Item Drug Screen Score: 0  Interpretation: Negative screen for possible drug use disorder    Brief Intervention  Alcohol & drug use screenings were reviewed  No concerns regarding substance use disorder identified  Other Counseling Topics:   Car/seat belt/driving safety, skin self-exam, sunscreen and regular weightbearing exercise and calcium and vitamin D intake  No results found  Physical Exam:     /74   Pulse 92   Temp 98 6 °F (37 °C)   Resp 18   Ht 5' 2" (1 575 m)   Wt 91 1 kg (200 lb 12 8 oz)   SpO2 95%   BMI 36 73 kg/m²     Physical Exam  Vitals reviewed  Constitutional:       General: She is not in acute distress       Appearance: She is obese    HENT:      Head: Normocephalic and atraumatic  Right Ear: Tympanic membrane, ear canal and external ear normal       Left Ear: Tympanic membrane, ear canal and external ear normal       Mouth/Throat:      Pharynx: No oropharyngeal exudate or posterior oropharyngeal erythema  Cardiovascular:      Rate and Rhythm: Normal rate and regular rhythm  Pulses: Pulses are weak  Dorsalis pedis pulses are 0 on the right side and 0 on the left side  Posterior tibial pulses are 0 on the right side and 1+ on the left side  Heart sounds: No murmur heard  No friction rub  No gallop  Pulmonary:      Effort: Pulmonary effort is normal  No respiratory distress  Breath sounds: Rhonchi present  No wheezing or rales  Abdominal:      General: Abdomen is flat  Bowel sounds are normal  There is no distension  Palpations: Abdomen is soft  There is no mass  Tenderness: There is no abdominal tenderness  There is no guarding or rebound  Feet:      Right foot:      Skin integrity: Callus and dry skin present  No ulcer, skin breakdown, erythema or warmth  Left foot:      Skin integrity: Callus and dry skin present  No ulcer, skin breakdown, erythema or warmth  Lymphadenopathy:      Cervical: No cervical adenopathy  Neurological:      General: No focal deficit present  Mental Status: She is alert  Motor: No weakness  Gait: Gait normal    Psychiatric:         Mood and Affect: Mood normal          Behavior: Behavior normal          Thought Content: Thought content normal          Judgment: Judgment normal        Diabetic Foot Exam    Patient's shoes and socks removed  Right Foot/Ankle   Right Foot Inspection  Skin Exam: skin normal, skin intact, dry skin, callus and callus  No warmth, no erythema, no maceration, no abnormal color, no pre-ulcer and no ulcer  Toe Exam: ROM and strength within normal limits       Sensory   Proprioception: intact  Monofilament testing: intact    Vascular  Capillary refills: < 3 seconds  The right DP pulse is 0  The right PT pulse is 0  Left Foot/Ankle  Left Foot Inspection  Skin Exam: skin normal, skin intact, dry skin and callus  No warmth, no erythema, no maceration, normal color, no pre-ulcer and no ulcer  Toe Exam: ROM and strength within normal limits  Sensory   Proprioception: intact  Monofilament testing: intact    Vascular  Capillary refills: elevated  The left DP pulse is 0  The left PT pulse is 1+       Assign Risk Category  Deformity present  No loss of protective sensation  Weak pulses  Risk: 1      Lyssa Augustin MD

## 2023-03-09 ENCOUNTER — APPOINTMENT (OUTPATIENT)
Dept: RADIOLOGY | Facility: CLINIC | Age: 78
End: 2023-03-09

## 2023-03-09 ENCOUNTER — OFFICE VISIT (OUTPATIENT)
Dept: FAMILY MEDICINE CLINIC | Facility: CLINIC | Age: 78
End: 2023-03-09

## 2023-03-09 ENCOUNTER — TELEPHONE (OUTPATIENT)
Dept: FAMILY MEDICINE CLINIC | Facility: CLINIC | Age: 78
End: 2023-03-09

## 2023-03-09 VITALS
DIASTOLIC BLOOD PRESSURE: 82 MMHG | RESPIRATION RATE: 18 BRPM | HEIGHT: 62 IN | SYSTOLIC BLOOD PRESSURE: 142 MMHG | BODY MASS INDEX: 36.73 KG/M2 | OXYGEN SATURATION: 98 % | HEART RATE: 110 BPM | TEMPERATURE: 98.4 F
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
## 2023-03-09 DIAGNOSIS — M79.651 PAIN OF RIGHT THIGH: ICD-10-CM

## 2023-03-09 DIAGNOSIS — S80.01XA CONTUSION OF RIGHT KNEE, INITIAL ENCOUNTER: ICD-10-CM
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
## 2023-03-09 DIAGNOSIS — M25.559 HIP PAIN: ICD-10-CM

## 2023-03-09 NOTE — PATIENT INSTRUCTIONS
Please get xrays done today  Avoid motrin/ibuprofen/aleve due to kidneys, take tylenol extra-strength 1,000mg 3 times daily/around every 8hrs as needed for pain

## 2023-03-09 NOTE — TELEPHONE ENCOUNTER
Patient called stating that she fell yesterday after tripping over her cat, she though she broke her hip and also hit her lip, she denies hitting her head apart from bumping her lip,she wanted an xray placed as she believed she broke her him and wanted to see if it was, she is able to ambulate and rated the pain as a 8/10,    Informed the patient that the provider recommended the ER if the pain was that significant or if there was any issues with movement, patient denies this and stated that it is not that bad she would like to come to the office to see what Dr Tereso Adkins thinks as its not bruised that she can see of and she only noted a small amount of swelling, Placed in for 1020 today 3/9/23

## 2023-03-09 NOTE — PROGRESS NOTES
[facility]        NAME: Jada Hua is a 68 y o  female  : 1945    MRN: [id number]  DATE: 2023  TIME: 12:52 PM    Assessment and Plan   1  Fall, initial encounter  -     XR hip/pelv 2-3 vws right if performed; Future; Expected date: 2023  -     XR femur 2 vw right; Future; Expected date: 2023  -     XR knee 4+ vw right injury; Future; Expected date: 2023    2  Hip pain  -     XR hip/pelv 2-3 vws right if performed; Future; Expected date: 2023    3  Pain of right thigh  -     XR femur 2 vw right; Future; Expected date: 2023    4  Contusion of right knee, initial encounter  -     XR knee 4+ vw right injury; Future; Expected date: 2023           Chief Complaint     Chief Complaint   Patient presents with   • Fall         History of Present Illness       68yo female with type 2 diabetes, CKD< HTN here for acute, same-day visit due to fall  Pt reports that she fell around 3 pm yesterday  She was walking into her house and tripped over her cat, grabbed onto bar stool to brace herself  Hit upper lip and right knee and hip  Able to get up and ambulate but was having pain doing so  Took ibuprofen with some relief  Did not want to go to the hospital  Denies head trauma, dizziness, lightheadedness, vision changes or nausea/vomiting  Pain is along right outer hip, thigh and anterior knee  Pt drove herself here and when she stood up and got out of her car she felt unsteady so she sat down on the curb, but could not pull herself back up so a bystander notified staff to give her assistance  Review of Systems   Review of Systems   Constitutional: Negative for appetite change, chills, fatigue and fever  Respiratory: Negative for cough, chest tightness and shortness of breath  Cardiovascular: Positive for leg swelling  Negative for chest pain and palpitations  Musculoskeletal: Positive for arthralgias, gait problem and joint swelling  Neurological: Negative for dizziness, weakness, light-headedness, numbness and headaches           Current Medications       Current Outpatient Medications:   •  amLODIPine (NORVASC) 5 mg tablet, Take 1 tablet (5 mg total) by mouth daily, Disp: 90 tablet, Rfl: 3  •  aspirin (ECOTRIN LOW STRENGTH) 81 mg EC tablet, Take 81 mg by mouth daily, Disp: , Rfl:   •  azithromycin (ZITHROMAX) 250 mg tablet, Take 2 tablets today then 1 tablet daily x 4 days, Disp: 6 tablet, Rfl: 0  •  cholecalciferol (VITAMIN D3) 1,000 units tablet, Take 800 Units by mouth daily  , Disp: , Rfl:   •  DULoxetine (CYMBALTA) 20 mg capsule, take 1 capsule by mouth once daily, Disp: 30 capsule, Rfl: 1  •  fluticasone (FLONASE) 50 mcg/act nasal spray, 2 sprays into each nostril daily, Disp: 16 g, Rfl: 6  •  folic acid (FOLVITE) 411 mcg tablet, Take 400 mcg by mouth daily, Disp: , Rfl:   •  glipiZIDE (GLUCOTROL) 10 mg tablet, Take 0 5 tablets (5 mg total) by mouth 2 (two) times a day before meals, Disp: 60 tablet, Rfl: 5  •  glucose blood (FREESTYLE LITE) test strip, Use as instructed, Disp: 100 each, Rfl: 3  •  glucose monitoring kit (FREESTYLE) monitoring kit, 1 each by Does not apply route daily, Disp: 1 each, Rfl: 0  •  Insulin Glargine Solostar (Lantus SoloStar) 100 UNIT/ML SOPN, Inject 0 5 mL (50 Units total) under the skin daily at bedtime, Disp: 15 mL, Rfl: 5  •  insulin lispro (HumaLOG KwikPen) 100 units/mL injection pen, Inject 8 Units under the skin 2 (two) times a day with meals, Disp: 15 mL, Rfl: 1  •  Insulin Pen Needle 32G X 6 MM MISC, Use in the morning, Disp: 100 each, Rfl: 5  •  Lancets (freestyle) lancets, Use as instructed -glucose monitor BID, Disp: 100 each, Rfl: 5  •  levothyroxine 50 mcg tablet, Take 1 tablet (50 mcg total) by mouth daily, Disp: 90 tablet, Rfl: 2  •  lisinopril (ZESTRIL) 20 mg tablet, Take 0 5 tablets (10 mg total) by mouth daily, Disp: 90 tablet, Rfl: 2  •  metoprolol succinate (TOPROL-XL) 50 mg 24 hr tablet, Take 1 tablet (50 mg total) by mouth daily, Disp: 90 tablet, Rfl: 2  •  omega-3-acid ethyl esters (LOVAZA) 1 g capsule, Take 2 g by mouth 2 (two) times a day, Disp: , Rfl:   •  Psyllium (FIBER) 0 52 g CAPS, Take by mouth, Disp: , Rfl:   •  rosuvastatin (CRESTOR) 10 MG tablet, Take 1 tablet (10 mg total) by mouth daily, Disp: 90 tablet, Rfl: 3  •  Urea 20 Intensive Hydrating 20 % cream, , Disp: , Rfl:     Current Allergies     Allergies as of 03/09/2023   • (No Known Allergies)            The following portions of the patient's history were reviewed and updated as appropriate: allergies, current medications, past family history, past medical history, past social history, past surgical history and problem list      Past Medical History:   Diagnosis Date   • Benign essential hypertension    • Chronic kidney disease, stage 2 (mild)    • Chronic kidney disease, stage 3 (HCC)    • Disorder of gallbladder    • Disorder of skin and subcutaneous tissue    • Dyspnea    • Essential hypertension    • Hyperlipidemia    • Hypokalemia    • Hypothyroidism    • Malaise and fatigue    • Mixed hyperlipidemia    • Morbid obesity (HCC)    • Pernicious anemia    • Type 2 diabetes mellitus ([facility] [address] )        Past Surgical History:   Procedure Laterality Date   • BREAST BIOPSY Right    • CARPAL TUNNEL RELEASE Bilateral    • CHOLECYSTECTOMY     • COLONOSCOPY      Dr Stokes Posedean    • HYSTERECTOMY     • PARTIAL HYSTERECTOMY     • SKIN LESION EXCISION      scalp        Family History   Problem Relation Age of Onset   • Stroke Mother    • Atrial fibrillation Mother    • Brain cancer Father    • Other Brother         Twin brothers - Open heart   • Other Brother         Twin brothers - Open heart   • No Known Problems Maternal Grandmother    • No Known Problems Paternal Grandmother          Medications have been verified          Objective   /82   Pulse (!) 110   Temp 98 4 °F (36 9 °C)   Resp 18   Ht 5' 2" (1 575 m)   SpO2 98%   BMI 36 73 kg/m² Physical Exam     Physical Exam  Vitals reviewed  Constitutional:       General: She is not in acute distress  Appearance: She is obese  HENT:      Head: Normocephalic  No raccoon eyes or Lamas's sign  Mouth/Throat:     Cardiovascular:      Rate and Rhythm: Regular rhythm  Tachycardia present  Heart sounds: Murmur (grade 2/6 NIRMALA at RSB) heard  No friction rub  No gallop  Pulmonary:      Effort: Pulmonary effort is normal  No respiratory distress  Breath sounds: Normal breath sounds  No wheezing or rales  Musculoskeletal:      Right hip: Bony tenderness (outer hip, femur) present  No deformity  Normal range of motion  Normal strength  Left hip: Normal       Right knee: Swelling and ecchymosis (patella) present  Tenderness present over the patellar tendon  Neurological:      Mental Status: She is alert  Motor: Motor function is intact  Gait: Gait abnormal (antalgic gait but able to bear weight)  Psychiatric:         Mood and Affect: Mood and affect normal          Speech: Speech normal          Behavior: Behavior normal  Behavior is cooperative               Results:  Lab Results   Component Value Date    SODIUM 133 (L) 03/03/2023    K 4 2 03/03/2023     03/03/2023    CO2 30 03/03/2023    BUN 17 03/03/2023    CREATININE 1 37 (H) 03/03/2023    GLUC 127 01/16/2022    CALCIUM 9 5 03/03/2023       Lab Results   Component Value Date    HGBA1C 10 2 (H) 03/03/2023       Lab Results   Component Value Date    WBC 8 16 03/03/2023    HGB 12 4 03/03/2023    HCT 39 9 03/03/2023    MCV 97 03/03/2023     (L) 03/03/2023

## 2023-03-16 ENCOUNTER — TELEPHONE (OUTPATIENT)
Dept: FAMILY MEDICINE CLINIC | Facility: CLINIC | Age: 78
End: 2023-03-16

## 2023-03-16 NOTE — TELEPHONE ENCOUNTER
We received a fax from [facility] for medication review, Scanned in to Chart and put in PCP's folder up front

## 2023-03-23 ENCOUNTER — HOSPITAL ENCOUNTER (OUTPATIENT)
Dept: NON INVASIVE DIAGNOSTICS | Facility: HOSPITAL | Age: 78
Discharge: HOME | End: 2023-03-23
Attending: INTERNAL MEDICINE

## 2023-03-23 DIAGNOSIS — R09.89 CAROTID BRUIT, UNSPECIFIED LATERALITY: ICD-10-CM

## 2023-03-23 DIAGNOSIS — I73.9 CLAUDICATION (HCC): ICD-10-CM

## 2023-04-25 ENCOUNTER — OFFICE VISIT (OUTPATIENT)
Age: 78
End: 2023-04-25

## 2023-04-25 VITALS
HEART RATE: 81 BPM | SYSTOLIC BLOOD PRESSURE: 124 MMHG | DIASTOLIC BLOOD PRESSURE: 59 MMHG | BODY MASS INDEX: 36.44 KG/M2 | HEIGHT: 62 IN | WEIGHT: 198 LBS

## 2023-04-25 DIAGNOSIS — G89.4 CHRONIC PAIN SYNDROME: ICD-10-CM

## 2023-04-25 DIAGNOSIS — M54.16 LUMBAR RADICULOPATHY: Primary | ICD-10-CM

## 2023-04-25 NOTE — PROGRESS NOTES
Assessment:  1  Lumbar radiculopathy    2  Chronic pain syndrome        Plan:  Patient is a 75-year-old female complains of low back pain, bilateral leg pain right worse than left chronic patient secondary lumbar degenerative disease, lumbar spondylosis, lumbar radiculopathy presents office for follow-up visit  Patient reports returning of low back pain and leg pain  Patient has had significant relief from L4-L5 interlaminar epidural steroid injection in the past upwards of 60% lasting greater than 3 months  At this time we will consider a interventional management  1   We will schedule patient for L4-L5 interlaminar epidural steroid injection  2  We will follow-up in 1 month after injection at which we will discuss neck pain    Complete risks and benefits including bleeding, infection, tissue reaction, nerve injury and allergic reaction were discussed  The approach was demonstrated using models and literature was provided  Verbal and written consent was obtained  History of Present Illness: The patient is a 68 y o  female who presents for a follow up office visit in regards to Neck Pain, Back Pain, and Knee Pain  The patient’s current symptoms include 7/10 intermittent pressure like pain without any particular time pattern    Current pain medications includes:  cymbalta 20mg po BID  The patient reports that this regimen is providing 50% pain relief  The patient is reporting no side effects from this pain medication regimen  I have personally reviewed and/or updated the patient's past medical history, past surgical history, family history, social history, current medications, allergies, and vital signs today  Review of Systems  Review of Systems   Constitutional: Negative for unexpected weight change  HENT: Negative for hearing loss  Eyes: Negative for visual disturbance  Respiratory: Negative for shortness of breath  Cardiovascular: Negative for leg swelling     Gastrointestinal: Negative for constipation  Endocrine: Negative for polyuria  Genitourinary: Negative for difficulty urinating  Musculoskeletal: Positive for gait problem  Negative for joint swelling and myalgias  Skin: Negative for rash  Neurological: Negative for weakness and headaches  Psychiatric/Behavioral: Negative for decreased concentration  All other systems reviewed and are negative          Past Medical History:   Diagnosis Date   • Benign essential hypertension    • Chronic kidney disease, stage 2 (mild)    • Chronic kidney disease, stage 3 (HCC)    • Disorder of gallbladder    • Disorder of skin and subcutaneous tissue    • Dyspnea    • Essential hypertension    • Hyperlipidemia    • Hypokalemia    • Hypothyroidism    • Malaise and fatigue    • Mixed hyperlipidemia    • Morbid obesity (HCC)    • Pernicious anemia    • Type 2 diabetes mellitus ([facility] [address] )        Past Surgical History:   Procedure Laterality Date   • BREAST BIOPSY Right    • CARPAL TUNNEL RELEASE Bilateral    • CHOLECYSTECTOMY     • COLONOSCOPY      Dr Venus Bustos    • HYSTERECTOMY     • PARTIAL HYSTERECTOMY     • SKIN LESION EXCISION      scalp        Family History   Problem Relation Age of Onset   • Stroke Mother    • Atrial fibrillation Mother    • Brain cancer Father    • Other Brother         Twin brothers - Open heart   • Other Brother         Twin brothers - Open heart   • No Known Problems Maternal Grandmother    • No Known Problems Paternal Grandmother        Social History     Occupational History   • Occupation: Retired    Tobacco Use   • Smoking status: Former     Packs/day: 1 00     Years: 14 00     Pack years: 14 00     Types: Cigarettes     Quit date:      Years since quittin 3   • Smokeless tobacco: Never   Vaping Use   • Vaping Use: Never used   Substance and Sexual Activity   • Alcohol use: Yes     Comment: Occasionally    • Drug use: Never     Comment: Denies drug use - As per [employer]    • Sexual activity: Not on file Current Outpatient Medications:   •  amLODIPine (NORVASC) 5 mg tablet, Take 1 tablet (5 mg total) by mouth daily, Disp: 90 tablet, Rfl: 3  •  aspirin (ECOTRIN LOW STRENGTH) 81 mg EC tablet, Take 81 mg by mouth daily, Disp: , Rfl:   •  cholecalciferol (VITAMIN D3) 1,000 units tablet, Take 800 Units by mouth daily  , Disp: , Rfl:   •  DULoxetine (CYMBALTA) 20 mg capsule, take 1 capsule by mouth once daily, Disp: 30 capsule, Rfl: 1  •  fluticasone (FLONASE) 50 mcg/act nasal spray, 2 sprays into each nostril daily, Disp: 16 g, Rfl: 6  •  folic acid (FOLVITE) 976 mcg tablet, Take 400 mcg by mouth daily, Disp: , Rfl:   •  glipiZIDE (GLUCOTROL) 10 mg tablet, Take 0 5 tablets (5 mg total) by mouth 2 (two) times a day before meals, Disp: 60 tablet, Rfl: 5  •  glucose blood (FREESTYLE LITE) test strip, Use as instructed, Disp: 100 each, Rfl: 3  •  glucose monitoring kit (FREESTYLE) monitoring kit, 1 each by Does not apply route daily, Disp: 1 each, Rfl: 0  •  Insulin Glargine Solostar (Lantus SoloStar) 100 UNIT/ML SOPN, Inject 0 5 mL (50 Units total) under the skin daily at bedtime, Disp: 15 mL, Rfl: 5  •  insulin lispro (HumaLOG KwikPen) 100 units/mL injection pen, Inject 8 Units under the skin 2 (two) times a day with meals, Disp: 15 mL, Rfl: 1  •  Insulin Pen Needle 32G X 6 MM MISC, Use in the morning, Disp: 100 each, Rfl: 5  •  Lancets (freestyle) lancets, Use as instructed -glucose monitor BID, Disp: 100 each, Rfl: 5  •  levothyroxine 50 mcg tablet, Take 1 tablet (50 mcg total) by mouth daily, Disp: 90 tablet, Rfl: 2  •  lisinopril (ZESTRIL) 20 mg tablet, Take 0 5 tablets (10 mg total) by mouth daily, Disp: 90 tablet, Rfl: 2  •  metoprolol succinate (TOPROL-XL) 50 mg 24 hr tablet, Take 1 tablet (50 mg total) by mouth daily, Disp: 90 tablet, Rfl: 2  •  omega-3-acid ethyl esters (LOVAZA) 1 g capsule, Take 2 g by mouth 2 (two) times a day, Disp: , Rfl:   •  Psyllium (FIBER) 0 52 g CAPS, Take by mouth, Disp: , Rfl: "  •  rosuvastatin (CRESTOR) 10 MG tablet, Take 1 tablet (10 mg total) by mouth daily, Disp: 90 tablet, Rfl: 3  •  Urea 20 Intensive Hydrating 20 % cream, , Disp: , Rfl:     No Known Allergies    Physical Exam:    Ht 5' 2\" (1 575 m)   Wt 89 8 kg (198 lb)   BMI 36 21 kg/m²     Constitutional:normal, well developed, well nourished, alert, in no distress and non-toxic and no overt pain behavior  and obese  Eyes:anicteric  HEENT:grossly intact  Neck:supple, symmetric, trachea midline and no masses   Pulmonary:even and unlabored  Cardiovascular:No edema or pitting edema present  Skin:Normal without rashes or lesions and well hydrated  Psychiatric:Mood and affect appropriate  Neurologic:Cranial Nerves II-XII grossly intact  Musculoskeletal:antalgic    Imaging  No orders to display       No orders of the defined types were placed in this encounter          "

## 2023-04-25 NOTE — PROGRESS NOTES
Assessment:  1  Lumbar radiculopathy    2  Chronic pain syndrome        Plan:  Patient is a 59-year-old female complains of low back pain, bilateral leg pain right worse than left chronic patient secondary lumbar degenerative disease, lumbar spondylosis, lumbar radiculopathy presents office for follow-up visit  Patient reports returning of low back pain and leg pain  Patient has had significant relief from L4-L5 interlaminar epidural steroid injection in the past upwards of 60% lasting greater than 3 months  At this time we will consider a interventional management  1   We will schedule patient for L4-L5 interlaminar epidural steroid injection  2  We will follow-up in 1 month after injection at which we will discuss neck pain    Complete risks and benefits including bleeding, infection, tissue reaction, nerve injury and allergic reaction were discussed  The approach was demonstrated using models and literature was provided  Verbal and written consent was obtained  History of Present Illness: The patient is a 68 y o  female who presents for a follow up office visit in regards to Neck Pain, Back Pain, and Knee Pain  The patient’s current symptoms include 7/10 intermittent pressure like pain without any particular time pattern    Current pain medications includes:  cymbalta 20mg po BID  The patient reports that this regimen is providing 50% pain relief  The patient is reporting no side effects from this pain medication regimen  I have personally reviewed and/or updated the patient's past medical history, past surgical history, family history, social history, current medications, allergies, and vital signs today  Review of Systems  Review of Systems   Constitutional: Negative for unexpected weight change  HENT: Negative for hearing loss  Eyes: Negative for visual disturbance  Respiratory: Negative for shortness of breath  Cardiovascular: Negative for leg swelling     Gastrointestinal: Negative for constipation  Endocrine: Negative for polyuria  Genitourinary: Negative for difficulty urinating  Musculoskeletal: Positive for gait problem  Negative for joint swelling and myalgias  Skin: Negative for rash  Neurological: Negative for weakness and headaches  Psychiatric/Behavioral: Negative for decreased concentration  All other systems reviewed and are negative          Past Medical History:   Diagnosis Date   • Benign essential hypertension    • Chronic kidney disease, stage 2 (mild)    • Chronic kidney disease, stage 3 (HCC)    • Disorder of gallbladder    • Disorder of skin and subcutaneous tissue    • Dyspnea    • Essential hypertension    • Hyperlipidemia    • Hypokalemia    • Hypothyroidism    • Malaise and fatigue    • Mixed hyperlipidemia    • Morbid obesity (HCC)    • Pernicious anemia    • Type 2 diabetes mellitus ([facility] [address] )        Past Surgical History:   Procedure Laterality Date   • BREAST BIOPSY Right    • CARPAL TUNNEL RELEASE Bilateral    • CHOLECYSTECTOMY     • COLONOSCOPY      Dr Roque Pichardo    • HYSTERECTOMY     • PARTIAL HYSTERECTOMY     • SKIN LESION EXCISION      scalp        Family History   Problem Relation Age of Onset   • Stroke Mother    • Atrial fibrillation Mother    • Brain cancer Father    • Other Brother         Twin brothers - Open heart   • Other Brother         Twin brothers - Open heart   • No Known Problems Maternal Grandmother    • No Known Problems Paternal Grandmother        Social History     Occupational History   • Occupation: Retired    Tobacco Use   • Smoking status: Former     Packs/day: 1 00     Years: 14 00     Pack years: 14 00     Types: Cigarettes     Quit date:      Years since quittin 3   • Smokeless tobacco: Never   Vaping Use   • Vaping Use: Never used   Substance and Sexual Activity   • Alcohol use: Yes     Comment: Occasionally    • Drug use: Never     Comment: Denies drug use - As per [employer]    • Sexual activity: Not on file Current Outpatient Medications:   •  amLODIPine (NORVASC) 5 mg tablet, Take 1 tablet (5 mg total) by mouth daily, Disp: 90 tablet, Rfl: 3  •  aspirin (ECOTRIN LOW STRENGTH) 81 mg EC tablet, Take 81 mg by mouth daily, Disp: , Rfl:   •  cholecalciferol (VITAMIN D3) 1,000 units tablet, Take 800 Units by mouth daily  , Disp: , Rfl:   •  DULoxetine (CYMBALTA) 20 mg capsule, take 1 capsule by mouth once daily, Disp: 30 capsule, Rfl: 1  •  fluticasone (FLONASE) 50 mcg/act nasal spray, 2 sprays into each nostril daily, Disp: 16 g, Rfl: 6  •  folic acid (FOLVITE) 892 mcg tablet, Take 400 mcg by mouth daily, Disp: , Rfl:   •  glipiZIDE (GLUCOTROL) 10 mg tablet, Take 0 5 tablets (5 mg total) by mouth 2 (two) times a day before meals, Disp: 60 tablet, Rfl: 5  •  glucose blood (FREESTYLE LITE) test strip, Use as instructed, Disp: 100 each, Rfl: 3  •  glucose monitoring kit (FREESTYLE) monitoring kit, 1 each by Does not apply route daily, Disp: 1 each, Rfl: 0  •  Insulin Glargine Solostar (Lantus SoloStar) 100 UNIT/ML SOPN, Inject 0 5 mL (50 Units total) under the skin daily at bedtime, Disp: 15 mL, Rfl: 5  •  insulin lispro (HumaLOG KwikPen) 100 units/mL injection pen, Inject 8 Units under the skin 2 (two) times a day with meals, Disp: 15 mL, Rfl: 1  •  Insulin Pen Needle 32G X 6 MM MISC, Use in the morning, Disp: 100 each, Rfl: 5  •  Lancets (freestyle) lancets, Use as instructed -glucose monitor BID, Disp: 100 each, Rfl: 5  •  levothyroxine 50 mcg tablet, Take 1 tablet (50 mcg total) by mouth daily, Disp: 90 tablet, Rfl: 2  •  lisinopril (ZESTRIL) 20 mg tablet, Take 0 5 tablets (10 mg total) by mouth daily, Disp: 90 tablet, Rfl: 2  •  metoprolol succinate (TOPROL-XL) 50 mg 24 hr tablet, Take 1 tablet (50 mg total) by mouth daily, Disp: 90 tablet, Rfl: 2  •  omega-3-acid ethyl esters (LOVAZA) 1 g capsule, Take 2 g by mouth 2 (two) times a day, Disp: , Rfl:   •  Psyllium (FIBER) 0 52 g CAPS, Take by mouth, Disp: , Rfl: "  •  rosuvastatin (CRESTOR) 10 MG tablet, Take 1 tablet (10 mg total) by mouth daily, Disp: 90 tablet, Rfl: 3  •  Urea 20 Intensive Hydrating 20 % cream, , Disp: , Rfl:     No Known Allergies    Physical Exam:    Ht 5' 2\" (1 575 m)   Wt 89 8 kg (198 lb)   BMI 36 21 kg/m²     Constitutional:normal, well developed, well nourished, alert, in no distress and non-toxic and no overt pain behavior  and obese  Eyes:anicteric  HEENT:grossly intact  Neck:supple, symmetric, trachea midline and no masses   Pulmonary:even and unlabored  Cardiovascular:No edema or pitting edema present  Skin:Normal without rashes or lesions and well hydrated  Psychiatric:Mood and affect appropriate  Neurologic:Cranial Nerves II-XII grossly intact  Musculoskeletal:antalgic    Imaging  No orders to display       No orders of the defined types were placed in this encounter        "

## 2023-05-16 ENCOUNTER — TELEPHONE (OUTPATIENT)
Dept: FAMILY MEDICINE CLINIC | Facility: CLINIC | Age: 78
End: 2023-05-16

## 2023-05-16 NOTE — TELEPHONE ENCOUNTER
Received a fax form [facility]   Statement of certifying physician      Scanned to chart and put in folder

## 2023-06-02 ENCOUNTER — TELEPHONE (OUTPATIENT)
Dept: OBGYN CLINIC | Facility: HOSPITAL | Age: 78
End: 2023-06-02

## 2023-06-02 NOTE — TELEPHONE ENCOUNTER
Caller: patient  Doctor: Christy Avendaño     Reason for call: patient asking if she can have a cortisone injection prior to VISCO in August?    Call back#: [phone]

## 2023-06-14 ENCOUNTER — TELEPHONE (OUTPATIENT)
Dept: PAIN MEDICINE | Facility: MEDICAL CENTER | Age: 78
End: 2023-06-14

## 2023-06-16 ENCOUNTER — HOSPITAL ENCOUNTER (OUTPATIENT)
Dept: RADIOLOGY | Facility: HOSPITAL | Age: 78
End: 2023-06-16
Payer: MEDICARE

## 2023-06-16 VITALS
HEART RATE: 87 BPM | SYSTOLIC BLOOD PRESSURE: 140 MMHG | TEMPERATURE: 97.8 F | RESPIRATION RATE: 20 BRPM | OXYGEN SATURATION: 95 % | DIASTOLIC BLOOD PRESSURE: 81 MMHG

## 2023-06-16 DIAGNOSIS — G89.4 CHRONIC PAIN SYNDROME: ICD-10-CM

## 2023-06-16 DIAGNOSIS — M54.16 LUMBAR RADICULOPATHY: ICD-10-CM

## 2023-06-16 PROCEDURE — 62323 NJX INTERLAMINAR LMBR/SAC: CPT | Performed by: ANESTHESIOLOGY

## 2023-06-16 RX ORDER — METHYLPREDNISOLONE ACETATE 80 MG/ML
80 INJECTION, SUSPENSION INTRA-ARTICULAR; INTRALESIONAL; INTRAMUSCULAR; PARENTERAL; SOFT TISSUE ONCE
Status: COMPLETED | OUTPATIENT
Start: 2023-06-16 | End: 2023-06-16

## 2023-06-16 RX ORDER — LIDOCAINE HYDROCHLORIDE 10 MG/ML
2 INJECTION, SOLUTION EPIDURAL; INFILTRATION; INTRACAUDAL; PERINEURAL ONCE
Status: COMPLETED | OUTPATIENT
Start: 2023-06-16 | End: 2023-06-16

## 2023-06-16 RX ADMIN — METHYLPREDNISOLONE ACETATE 80 MG: 80 INJECTION, SUSPENSION INTRA-ARTICULAR; INTRALESIONAL; INTRAMUSCULAR; PARENTERAL; SOFT TISSUE at 14:03

## 2023-06-16 RX ADMIN — IOHEXOL 1 ML: 300 INJECTION, SOLUTION INTRAVENOUS at 14:03

## 2023-06-16 RX ADMIN — LIDOCAINE HYDROCHLORIDE 2 ML: 10 INJECTION, SOLUTION EPIDURAL; INFILTRATION; INTRACAUDAL; PERINEURAL at 13:59

## 2023-06-16 NOTE — DISCHARGE INSTRUCTIONS
Epidural Steroid Injection   WHAT YOU NEED TO KNOW:   An epidural steroid injection (SHERRY) is a procedure to inject steroid medicine into the epidural space  The epidural space is between your spinal cord and vertebrae  Steroids reduce inflammation and fluid buildup in your spine that may be causing pain  You may be given pain medicine along with the steroids  ACTIVITY  Do not drive or operate machinery today  No strenuous activity today - bending, lifting, etc   You may resume normal activites starting tomorrow - start slowly and as tolerated  You may shower today, but no tub baths or hot tubs  You may have numbness for several hours from the local anesthetic  Please use caution and common sense, especially with weight-bearing activities  CARE OF THE INJECTION SITE  If you have soreness or pain, apply ice to the area today (20 minutes on/20 minutes off)  Starting tomorrow, you may use warm, moist heat or ice if needed  You may have an increase or change in your discomfort for 36-48 hours after your treatment  Apply ice and continue with any pain medication you have been prescribed  Notify the [facility] if you have any of the following: redness, drainage, swelling, headache, stiff neck or fever above 100°F     SPECIAL INSTRUCTIONS  Our office will contact you in approximately 7 days for a progress report  MEDICATIONS  Continue to take all routine medications  Our office may have instructed you to hold some medications  As no general anesthesia was used in today's procedure, you should not experience any side effects related to anesthesia  If you are diabetic, the steroids used in today's injection may temporarily increase your blood sugar levels after the first few days after your injection  Please keep a close eye on your sugars and alert the doctor who manages your diabetes if your sugars are significantly high from your baseline or you are symptomatic       If you have a problem specifically related to your procedure, please call our office at [phone]  Problems not related to your procedure should be directed to your primary care physician

## 2023-06-16 NOTE — H&P
Assessment:  1  Lumbar radiculopathy  FL spine and pain procedure    FL spine and pain procedure      2  Chronic pain syndrome  FL spine and pain procedure    FL spine and pain procedure          Plan:  To Agee is a 66 y o  female with complaints of low back and leg pain presents to surgical center for procedure  We will perform a L4-L5 interlaminar epidural steroid injection  2  Follow-up 1 month after injection    Complete risks and benefits including bleeding, infection, tissue reaction, nerve injury and allergic reaction were discussed  The approach was demonstrated using models and literature was provided  Verbal and written consent was obtained  My impressions and treatment recommendations were discussed in detail with the patient who verbalized understanding and had no further questions  Discharge instructions were provided  I personally saw and examined the patient and I agree with the above discussed plan of care  Orders Placed This Encounter   Procedures    FL spine and pain procedure     L4-5 interlaminar epidural steroid injection     Standing Status:   Standing     Number of Occurrences:   1     Order Specific Question:   Reason for Exam:     Answer:   Lumbar radiculopathy     Order Specific Question:   Anticoagulant hold needed? Answer:   No     No orders of the defined types were placed in this encounter  History of Present Illness:  To Agee is a 66 y o  female who presents for a follow up office visit in regards to low back and leg pain  The patient’s current symptoms include 8 out of 10 constant doses in, throbbing, cramping, burning pain without a particular time pattern  I have personally reviewed and/or updated the patient's past medical history, past surgical history, family history, social history, current medications, allergies, and vital signs today  Review of Systems   Musculoskeletal: Positive for arthralgias and back pain     All other systems reviewed and are negative  Patient Active Problem List   Diagnosis    Type 2 diabetes mellitus with stage 3b chronic kidney disease, with long-term current use of insulin ([facility] [address] )    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 mellitus ([facility] )    Essential hypertension    Mixed hyperlipidemia    Acquired hypothyroidism    Severe obesity (BMI 35 0-39  9) [facility]bidity [facility] )    Spinal stenosis of lumbar region    Chronic pain syndrome    Lumbar radiculopathy       Past Medical History:   Diagnosis Date    Benign essential hypertension     Chronic kidney disease, stage 2 (mild)     Chronic kidney disease, stage 3 (HCC)     Disorder of gallbladder     Disorder of skin and subcutaneous tissue     Dyspnea     Essential hypertension     Hyperlipidemia     Hypokalemia     Hypothyroidism     Malaise and fatigue     Mixed hyperlipidemia     Morbid obesity (HCC)     Pernicious anemia     Type 2 diabetes mellitus ([facility] )        Past Surgical History:   Procedure Laterality Date    BREAST BIOPSY Right     CARPAL TUNNEL RELEASE Bilateral     CHOLECYSTECTOMY      COLONOSCOPY      Dr Hallie German      scalp        Family History   Problem Relation Age of Onset    Stroke Mother     Atrial fibrillation Mother     Brain cancer Father     Other Brother         Twin brothers - Open heart    Other Brother         Twin brothers - Open heart    No Known Problems Maternal Grandmother     No Known Problems Paternal Grandmother        Social History     Occupational History    Occupation: Retired    Tobacco Use    Smoking status: Former     Packs/day: 1 00     Years: 14 00     Total pack years: 14 00     Types: Cigarettes     Quit date:      Years since quittin 4    Smokeless tobacco: Never   Vaping Use    Vaping Use: Never used   Substance and Sexual Activity    Alcohol use: Yes     Comment: Occasionally     Drug use: Never     Comment: Denies drug use - As per Khoi Lee     Sexual activity: Not on file       Current Outpatient Medications on File Prior to Encounter   Medication Sig    amLODIPine (NORVASC) 5 mg tablet Take 1 tablet (5 mg total) by mouth daily    aspirin (ECOTRIN LOW STRENGTH) 81 mg EC tablet Take 81 mg by mouth daily    cholecalciferol (VITAMIN D3) 1,000 units tablet Take 800 Units by mouth daily      DULoxetine (CYMBALTA) 20 mg capsule take 1 capsule by mouth once daily    fluticasone (FLONASE) 50 mcg/act nasal spray 2 sprays into each nostril daily    folic acid (FOLVITE) 841 mcg tablet Take 400 mcg by mouth daily    glipiZIDE (GLUCOTROL) 10 mg tablet Take 0 5 tablets (5 mg total) by mouth 2 (two) times a day before meals    glucose blood (FREESTYLE LITE) test strip Use as instructed    glucose monitoring kit (FREESTYLE) monitoring kit 1 each by Does not apply route daily    Insulin Glargine Solostar (Lantus SoloStar) 100 UNIT/ML SOPN Inject 0 5 mL (50 Units total) under the skin daily at bedtime    insulin lispro (HumaLOG KwikPen) 100 units/mL injection pen Inject 8 Units under the skin 2 (two) times a day with meals    Insulin Pen Needle 32G X 6 MM MISC Use in the morning    Lancets (freestyle) lancets Use as instructed -glucose monitor BID    levothyroxine 50 mcg tablet Take 1 tablet (50 mcg total) by mouth daily    lisinopril (ZESTRIL) 20 mg tablet Take 0 5 tablets (10 mg total) by mouth daily    metoprolol succinate (TOPROL-XL) 50 mg 24 hr tablet Take 1 tablet (50 mg total) by mouth daily    omega-3-acid ethyl esters (LOVAZA) 1 g capsule Take 2 g by mouth 2 (two) times a day    Psyllium (FIBER) 0 52 g CAPS Take by mouth    rosuvastatin (CRESTOR) 10 MG tablet Take 1 tablet (10 mg total) by mouth daily    Urea 20 Intensive Hydrating 20 % cream     [DISCONTINUED] insulin aspart (NovoLOG FlexPen) 100 UNIT/ML injection pen Inject 4 Units under the skin daily with dinner    [DISCONTINUED] Januvia 50 MG tablet take 1 tablet by mouth once daily (Patient not taking: No sig reported)    [DISCONTINUED] linaGLIPtin 5 MG TABS Take 5 mg by mouth daily (Patient not taking: Reported on 7/27/2021)     No current facility-administered medications on file prior to encounter  No Known Allergies    Physical Exam:    /81 (BP Location: Right arm)   Pulse 87   Temp 97 8 °F (36 6 °C) (Temporal)   Resp 20   SpO2 95%     Constitutional:normal, well developed, well nourished, alert, in no distress and non-toxic and no overt pain behavior   and obese  Eyes:anicteric  HEENT:grossly intact  Neck:supple, symmetric, trachea midline and no masses   Pulmonary:even and unlabored  Cardiovascular:No edema or pitting edema present  Skin:Normal without rashes or lesions and well hydrated  Psychiatric:Mood and affect appropriate  Neurologic:Cranial Nerves II-XII grossly intact  Musculoskeletal:normal

## 2023-06-20 ENCOUNTER — CONSULT (OUTPATIENT)
Dept: VASCULAR SURGERY | Facility: CLINIC | Age: 78
End: 2023-06-20
Payer: MEDICARE

## 2023-06-20 ENCOUNTER — TELEPHONE (OUTPATIENT)
Dept: VASCULAR SURGERY | Facility: CLINIC | Age: 78
End: 2023-06-20

## 2023-06-20 VITALS
HEIGHT: 62 IN | SYSTOLIC BLOOD PRESSURE: 124 MMHG | HEART RATE: 80 BPM | TEMPERATURE: 97.3 F | BODY MASS INDEX: 35.81 KG/M2 | WEIGHT: 194.6 LBS | DIASTOLIC BLOOD PRESSURE: 68 MMHG | OXYGEN SATURATION: 97 %

## 2023-06-20 DIAGNOSIS — I73.9 PAD (PERIPHERAL ARTERY DISEASE) (HCC): ICD-10-CM

## 2023-06-20 DIAGNOSIS — I65.23 ASYMPTOMATIC BILATERAL CAROTID ARTERY STENOSIS: Primary | ICD-10-CM

## 2023-06-20 DIAGNOSIS — E78.2 MIXED HYPERLIPIDEMIA: ICD-10-CM

## 2023-06-20 PROCEDURE — 99204 OFFICE O/P NEW MOD 45 MIN: CPT | Performed by: SURGERY

## 2023-06-20 NOTE — TELEPHONE ENCOUNTER
This is a reminder; patient is due for CV and LEADS 6/2025   Please call patient and schedule the following by the dates provided  Patient's appointment(s) are due on or after 6/2025      Dopplers  [] Abdominal Aorta Iliac (AOIL)  [x] Carotid (CV) due on or after 6/2025  [] Celiac and/or Mesenteric  [] Endovascular Aortic Repair (EVAR)   [] Hemodialysis Access (HD)   [x] Lower Limb Arterial (LETICIA) due on or after 6/2025  [] Lower Limb Venous (LEV)  [] Lower Limb Venous Duplex with Reflux (LEVDR)  [] Renal Artery  [] Upper Limb Arterial (UEA)    [] Upper Limb Venous (UEV)              [] IGOR and Waveform analysis     Advanced Imaging   [] CTA head/neck    [] CTA abdomen    [] CTA abdomen & pelvis    [] CT abdomen with/ without contrast  [] CT abdomen with contrast  [] CT abdomen without contrast    [] CT abdomen & pelvis with/ without contrast  [] CT abdomen & pelvis with contrast  [] CT abdomen & pelvis without contrast    Office Visit   [] New patient, patient last seen over 3 years ago  [] Risk factor modification (RFM)   [x] Follow up due on or after 6/2025  [] Lost to follow up (LTFU)

## 2023-06-20 NOTE — PROGRESS NOTES
"Assessment/Plan:    Pt is a 65 yo F w/ DM, CKD< hypothryoidism, HTN, PAD, asymptomatic B ICA stenosis, lumbar radiculopathy, spinal stenosis, obesity, HLD    Asymptomatic bilateral carotid artery stenosis  -     VAS carotid complete study; Future  -asymptomatic; no hx of CVA  -reviewed car DU which shows B ICA <50% stenosis  -advised or common stroke symptoms and to go to ER if these occur  -will continue surveillance on a 2 year basis    PAD (peripheral artery disease) (HCC)  -     VAS lower limb arterial duplex, complete bilateral; Future  -asymptomatic; palpable DP pulses B  -reviewed LEADs which show R: 0 97/107/115  And L: 0 98/172/96 w/ R SFA moderate stenosis, L SFA >75% stenosis and R tibial disease  -advised to return if she has limiting claudication of nonhealing wounds  -will repeat LEADs in 2 years for surveillance    Mixed hyperlipidemia  Medications  -continue ASA/statin     Subjective:      Patient ID: Angelica Obregon is a 66 y o  female  New Patient referred from PCP DR Kylee Jennings for carotid stenosis had CV /LETICIA done 3/23/23  Pt denies claudication, rest pain or open wounds  PT reports cramping and swollen BLE and a feeling of \"tiredness in both legs\" and dizziness from time to time  Pt is taking ASA 81 mg and Rosuvastatin  Pt is a former smoker  HPI:    Patient referred for vascular evaluation  Patient complains of back pain with sciatica  She sees Dr Ramos Ray for this  Had her third injection for this and is having some improvement  She also complains of cramping in the thighs and yaa horses  She gets this after sitting on the toilet for a while  This is improved with walking  She can walk for about 1 hours  She denies cramping or leg pain with walking  About 20 years ago, she had what sounds like phlebectomies of the L leg  Denies hx of blood clotting  Denies hx of CVA    Denies sudden changes in speech, facial droop, unilateral weakness/numbness, " "amaurosis  Pt reports she had the carotid DU evaluation because of cervical spinal pain but indication says dizziness and bruit  Former smoker, quit in     Takes ASA/statin      The following portions of the patient's history were reviewed and updated as appropriate: allergies, current medications, past family history, past medical history, past social history, past surgical history and problem list     Review of Systems   Constitutional: Negative  HENT: Negative  Eyes: Negative  Respiratory: Negative  Cardiovascular: Positive for leg swelling  Gastrointestinal: Negative  Endocrine: Negative  Genitourinary: Negative  Musculoskeletal: Positive for back pain  Skin: Negative  Allergic/Immunologic: Negative  Neurological: Positive for dizziness  Hematological: Negative  Psychiatric/Behavioral: Negative  Objective:      /68 (BP Location: Right arm, Patient Position: Sitting, Cuff Size: Standard)   Pulse 80   Temp (!) 97 3 °F (36 3 °C) (Tympanic)   Ht 5' 2\" (1 575 m)   Wt 88 3 kg (194 lb 9 6 oz)   SpO2 97%   BMI 35 59 kg/m²          Physical Exam  Cardiovascular:      Rate and Rhythm: Normal rate and regular rhythm  Pulses:           Radial pulses are 0 on the right side and 2+ on the left side  Dorsalis pedis pulses are 2+ on the right side and 2+ on the left side  Posterior tibial pulses are 0 on the right side and 0 on the left side  Heart sounds: No murmur heard  Comments: No carotid bruits B  Pulmonary:      Effort: No respiratory distress  Breath sounds: No wheezing or rales  Musculoskeletal:      Right lower le+ Edema present  Left lower le+ Edema present  Skin:     Comments:  There is a cluster of varicosities on the medial L distal ankle and mid calf medially; no stasis changes, no wounds; mild edema of the leg , ankle, and toes; no wounds           I have reviewed and made appropriate changes to the " "review of systems input by the medical assistant  Vitals:    23 1325 23 1327   BP: 122/62 124/68   BP Location: Left arm Right arm   Patient Position: Sitting Sitting   Cuff Size: Standard Standard   Pulse: 80    Temp: (!) 97 3 °F (36 3 °C)    TempSrc: Tympanic    SpO2: 97%    Weight: 88 3 kg (194 lb 9 6 oz)    Height: 5' 2\" (1 575 m)        Patient Active Problem List   Diagnosis   • Type 2 diabetes mellitus with stage 3b chronic kidney disease, with long-term current use of insulin (HCC)   • 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 mellitus ([facility] )   • Essential hypertension   • Mixed hyperlipidemia   • Acquired hypothyroidism   • Severe obesity (BMI 35 0-39  9) with comorbidity ([facility] )   • Spinal stenosis of lumbar region   • Chronic pain syndrome   • Lumbar radiculopathy       Past Surgical History:   Procedure Laterality Date   • BREAST BIOPSY Right    • CARPAL TUNNEL RELEASE Bilateral    • CHOLECYSTECTOMY     • COLONOSCOPY      Dr Josiane Rodriguez    • HYSTERECTOMY     • PARTIAL HYSTERECTOMY     • SKIN LESION EXCISION      scalp        Family History   Problem Relation Age of Onset   • Stroke Mother    • Atrial fibrillation Mother    • Brain cancer Father    • Other Brother         Twin brothers - Open heart   • Other Brother         Twin brothers - Open heart   • No Known Problems Maternal Grandmother    • No Known Problems Paternal Grandmother        Social History     Socioeconomic History   • Marital status: /Civil Union     Spouse name: Soren Elam    • Number of children: Not on file   • Years of education: Not on file   • Highest education level: Not on file   Occupational History   • Occupation: Retired    Tobacco Use   • Smoking status: Former     Packs/day: 1 00     Years: 14 00     Total pack years: 14 00     Types: Cigarettes     Quit date:      Years since quittin 4   • Smokeless tobacco: Never   Vaping Use   • Vaping Use: Never used   Substance and Sexual Activity   • Alcohol use: Yes     " Comment: Occasionally    • Drug use: Never     Comment: Denies drug use - As per [employer]    • Sexual activity: Not on file   Other Topics Concern   • Not on file   Social History Narrative     - Lizabeth Balderas is [place] and speaks Dominican, common law marriage    Does not consume caffeine      Social Determinants of Health     Financial Resource Strain: Low Risk  (3/7/2023)    Overall Financial Resource Strain (CARDIA)    • Difficulty of Paying Living Expenses: Not hard at all   Food Insecurity: Not on file   Transportation Needs: No Transportation Needs (3/7/2023)    PRAPARE - Transportation    • Lack of Transportation (Medical): No    • Lack of Transportation (Non-Medical):  No   Physical Activity: Not on file   Stress: Not on file   Social Connections: Not on file   Intimate Partner Violence: Not on file   Housing Stability: Not on file       No Known Allergies      Current Outpatient Medications:   •  amLODIPine (NORVASC) 5 mg tablet, Take 1 tablet (5 mg total) by mouth daily, Disp: 90 tablet, Rfl: 3  •  aspirin (ECOTRIN LOW STRENGTH) 81 mg EC tablet, Take 81 mg by mouth daily, Disp: , Rfl:   •  cholecalciferol (VITAMIN D3) 1,000 units tablet, Take 800 Units by mouth daily  , Disp: , Rfl:   •  DULoxetine (CYMBALTA) 20 mg capsule, take 1 capsule by mouth once daily, Disp: 30 capsule, Rfl: 1  •  fluticasone (FLONASE) 50 mcg/act nasal spray, 2 sprays into each nostril daily, Disp: 16 g, Rfl: 6  •  folic acid (FOLVITE) 118 mcg tablet, Take 400 mcg by mouth daily, Disp: , Rfl:   •  glipiZIDE (GLUCOTROL) 10 mg tablet, Take 0 5 tablets (5 mg total) by mouth 2 (two) times a day before meals, Disp: 60 tablet, Rfl: 5  •  glucose blood (FREESTYLE LITE) test strip, Use as instructed, Disp: 100 each, Rfl: 3  •  glucose monitoring kit (FREESTYLE) monitoring kit, 1 each by Does not apply route daily, Disp: 1 each, Rfl: 0  •  Insulin Pen Needle 32G X 6 MM MISC, Use in the morning, Disp: 100 each, Rfl: 5  •  Lancets (freestyle) lancets, Use as instructed -glucose monitor BID, Disp: 100 each, Rfl: 5  •  levothyroxine 50 mcg tablet, Take 1 tablet (50 mcg total) by mouth daily, Disp: 90 tablet, Rfl: 2  •  lisinopril (ZESTRIL) 20 mg tablet, Take 0 5 tablets (10 mg total) by mouth daily, Disp: 90 tablet, Rfl: 2  •  metoprolol succinate (TOPROL-XL) 50 mg 24 hr tablet, Take 1 tablet (50 mg total) by mouth daily, Disp: 90 tablet, Rfl: 2  •  omega-3-acid ethyl esters (LOVAZA) 1 g capsule, Take 2 g by mouth 2 (two) times a day, Disp: , Rfl:   •  Psyllium (FIBER) 0 52 g CAPS, Take by mouth, Disp: , Rfl:   •  rosuvastatin (CRESTOR) 10 MG tablet, Take 1 tablet (10 mg total) by mouth daily, Disp: 90 tablet, Rfl: 3  •  Urea 20 Intensive Hydrating 20 % cream, , Disp: , Rfl:   •  Insulin Glargine Solostar (Lantus SoloStar) 100 UNIT/ML SOPN, Inject 0 5 mL (50 Units total) under the skin daily at bedtime, Disp: 15 mL, Rfl: 5  •  insulin lispro (HumaLOG KwikPen) 100 units/mL injection pen, Inject 8 Units under the skin 2 (two) times a day with meals (Patient not taking: Reported on 6/20/2023), Disp: 15 mL, Rfl: 1

## 2023-06-20 NOTE — PATIENT INSTRUCTIONS
1) PAD  -you have mild PAD and we will monitor this with ultrasound on a 2 year basis  -if you have limiting calf cramping with walking or wounds on the legs that won't heal, please call for an appointment    2) Carotid stenosis  -you have mild carotid disease and we will monitor this with ultrasund on a 2 year basis  -if you have stroke symptoms, go directly to the ER    3) Medications  -continue your aspirin and statin medications

## 2023-06-23 ENCOUNTER — TELEPHONE (OUTPATIENT)
Dept: PAIN MEDICINE | Facility: CLINIC | Age: 78
End: 2023-06-23

## 2023-06-26 ENCOUNTER — OFFICE VISIT (OUTPATIENT)
Dept: OBGYN CLINIC | Facility: CLINIC | Age: 78
End: 2023-06-26
Payer: MEDICARE

## 2023-06-26 VITALS
HEIGHT: 62 IN | WEIGHT: 194 LBS | HEART RATE: 89 BPM | BODY MASS INDEX: 35.7 KG/M2 | SYSTOLIC BLOOD PRESSURE: 106 MMHG | DIASTOLIC BLOOD PRESSURE: 61 MMHG

## 2023-06-26 DIAGNOSIS — M17.11 PRIMARY OSTEOARTHRITIS OF RIGHT KNEE: Primary | ICD-10-CM

## 2023-06-26 PROCEDURE — 20610 DRAIN/INJ JOINT/BURSA W/O US: CPT | Performed by: ORTHOPAEDIC SURGERY

## 2023-06-26 PROCEDURE — 99213 OFFICE O/P EST LOW 20 MIN: CPT | Performed by: ORTHOPAEDIC SURGERY

## 2023-06-26 RX ORDER — TRIAMCINOLONE ACETONIDE 40 MG/ML
80 INJECTION, SUSPENSION INTRA-ARTICULAR; INTRAMUSCULAR
Status: COMPLETED | OUTPATIENT
Start: 2023-06-26 | End: 2023-06-26

## 2023-06-26 RX ORDER — BUPIVACAINE HYDROCHLORIDE 2.5 MG/ML
4 INJECTION, SOLUTION INFILTRATION; PERINEURAL
Status: COMPLETED | OUTPATIENT
Start: 2023-06-26 | End: 2023-06-26

## 2023-06-26 RX ADMIN — BUPIVACAINE HYDROCHLORIDE 4 ML: 2.5 INJECTION, SOLUTION INFILTRATION; PERINEURAL at 13:00

## 2023-06-26 RX ADMIN — TRIAMCINOLONE ACETONIDE 80 MG: 40 INJECTION, SUSPENSION INTRA-ARTICULAR; INTRAMUSCULAR at 13:00

## 2023-06-26 NOTE — PROGRESS NOTES
Assessment/Plan:   There are no diagnoses linked to this encounter  Reviewed physical exam with patient on today's visit  Her symptoms are consistent with a flare-up of primary osteoarthritis of her right knee  She was offered, and accepted, an injection(s) of Kenalog and Marcaine to her Right knee for symptomatic relief of pain and inflammation  Patient tolerated the treatment(s) well  Ice and post injection protocol advised  Weightbearing activities as tolerated  She will be seen for follow-up in August for Visco supplementation injections  Patient expresses understanding and is in agreement with this treatment plan  The patient was given the opportunity to ask questions or present concerns  Under aseptic technique, the right knee was injected with Kenalog and Marcaine  She tolerated procedure quite well  She can start viscosupplementation after July 30  If her condition changes, she would not hesitate to let us know    Subjective:   Patient ID: Jonathan Villela  1945     HPI  Patient is a 66 y o  female who presents for follow-up evaluation of her right knee  She presents today for a CS injection for treatment of osteoarthritis of her right knee  The patient previously received visco supplementation injections, which provided symptomatic relief  However, her symptoms have since returned  She states that she experiences pain with weightbearing activity and repetitive knee flexion  She states that her pain is most significant when ambulating stairs  She denies numbness, tingling, weakness, or feelings of instability       The following portions of the patient's history were reviewed and updated as appropriate:  Past medical history, past surgical history, Family history, social history, current medications and allergies    Past Medical History:   Diagnosis Date   • Benign essential hypertension    • Chronic kidney disease, stage 2 (mild)    • Chronic kidney disease, stage 3 ([address] )    • Disorder of gallbladder    • Disorder of skin and subcutaneous tissue    • Dyspnea    • Essential hypertension    • Hyperlipidemia    • Hypokalemia    • Hypothyroidism    • Malaise and fatigue    • Mixed hyperlipidemia    • Morbid obesity (HCC)    • Pernicious anemia    • Type 2 diabetes mellitus (HCC)        Past Surgical History:   Procedure Laterality Date   • BREAST BIOPSY Right    • CARPAL TUNNEL RELEASE Bilateral    • CHOLECYSTECTOMY     • COLONOSCOPY      Dr Neo Finn    • HYSTERECTOMY     • PARTIAL HYSTERECTOMY     • SKIN LESION EXCISION      scalp        Family History   Problem Relation Age of Onset   • Stroke Mother    • Atrial fibrillation Mother    • Brain cancer Father    • Other Brother         Twin brothers - Open heart   • Other Brother         Twin brothers - Open heart   • No Known Problems Maternal Grandmother    • No Known Problems Paternal Grandmother        Social History     Socioeconomic History   • Marital status: /Civil Union     Spouse name: Lucien Bender    • Number of children: None   • Years of education: None   • Highest education level: None   Occupational History   • Occupation: Retired    Tobacco Use   • Smoking status: Former     Packs/day: 1 00     Years: 14 00     Total pack years: 14 00     Types: Cigarettes     Quit date:      Years since quittin 5   • Smokeless tobacco: Never   Vaping Use   • Vaping Use: Never used   Substance and Sexual Activity   • Alcohol use: Yes     Comment: Occasionally    • Drug use: Never     Comment: Denies drug use - As per [facility]    • Sexual activity: None   Other Topics Concern   • None   Social History Narrative     - Lucien Bender is [place] and speaks [place], common law marriage    Does not consume caffeine      Social Determinants of Health     Financial Resource Strain: Low Risk  (3/7/2023)    Overall Financial Resource Strain (CARDIA)    • Difficulty of Paying Living Expenses: Not hard at all   Food Insecurity: Not on file   Transportation Needs: No Transportation Needs (3/7/2023)    PRAPARE - Transportation    • Lack of Transportation (Medical): No    • Lack of Transportation (Non-Medical):  No   Physical Activity: Not on file   Stress: Not on file   Social Connections: Not on file   Intimate Partner Violence: Not on file   Housing Stability: Not on file         Current Outpatient Medications:   •  amLODIPine (NORVASC) 5 mg tablet, Take 1 tablet (5 mg total) by mouth daily, Disp: 90 tablet, Rfl: 3  •  aspirin (ECOTRIN LOW STRENGTH) 81 mg EC tablet, Take 81 mg by mouth daily, Disp: , Rfl:   •  cholecalciferol (VITAMIN D3) 1,000 units tablet, Take 800 Units by mouth daily  , Disp: , Rfl:   •  DULoxetine (CYMBALTA) 20 mg capsule, take 1 capsule by mouth once daily, Disp: 30 capsule, Rfl: 1  •  fluticasone (FLONASE) 50 mcg/act nasal spray, 2 sprays into each nostril daily, Disp: 16 g, Rfl: 6  •  folic acid (FOLVITE) 736 mcg tablet, Take 400 mcg by mouth daily, Disp: , Rfl:   •  glipiZIDE (GLUCOTROL) 10 mg tablet, Take 0 5 tablets (5 mg total) by mouth 2 (two) times a day before meals, Disp: 60 tablet, Rfl: 5  •  glucose blood (FREESTYLE LITE) test strip, Use as instructed, Disp: 100 each, Rfl: 3  •  glucose monitoring kit (FREESTYLE) monitoring kit, 1 each by Does not apply route daily, Disp: 1 each, Rfl: 0  •  Insulin Pen Needle 32G X 6 MM MISC, Use in the morning, Disp: 100 each, Rfl: 5  •  Lancets (freestyle) lancets, Use as instructed -glucose monitor BID, Disp: 100 each, Rfl: 5  •  levothyroxine 50 mcg tablet, Take 1 tablet (50 mcg total) by mouth daily, Disp: 90 tablet, Rfl: 2  •  lisinopril (ZESTRIL) 20 mg tablet, Take 0 5 tablets (10 mg total) by mouth daily, Disp: 90 tablet, Rfl: 2  •  metoprolol succinate (TOPROL-XL) 50 mg 24 hr tablet, Take 1 tablet (50 mg total) by mouth daily, Disp: 90 tablet, Rfl: 2  •  omega-3-acid ethyl esters (LOVAZA) 1 g capsule, Take 2 g by mouth 2 (two) times a day, Disp: , Rfl:   •  Psyllium (FIBER) 0 52 g CAPS, Take by "mouth, Disp: , Rfl:   •  rosuvastatin (CRESTOR) 10 MG tablet, Take 1 tablet (10 mg total) by mouth daily, Disp: 90 tablet, Rfl: 3  •  Urea 20 Intensive Hydrating 20 % cream, , Disp: , Rfl:   •  Insulin Glargine Solostar (Lantus SoloStar) 100 UNIT/ML SOPN, Inject 0 5 mL (50 Units total) under the skin daily at bedtime, Disp: 15 mL, Rfl: 5  •  insulin lispro (HumaLOG KwikPen) 100 units/mL injection pen, Inject 8 Units under the skin 2 (two) times a day with meals (Patient not taking: Reported on 6/26/2023), Disp: 15 mL, Rfl: 1    No Known Allergies    Review of Systems   Constitutional: Negative for chills, fever and unexpected weight change  HENT: Negative for hearing loss, nosebleeds and sore throat  Eyes: Negative for pain, redness and visual disturbance  Respiratory: Negative for cough, shortness of breath and wheezing  Cardiovascular: Negative for chest pain, palpitations and leg swelling  Gastrointestinal: Negative for abdominal pain, nausea and vomiting  Endocrine: Negative for polydipsia and polyuria  Genitourinary: Negative for dysuria and hematuria  Skin: Negative for rash and wound  Neurological: Negative for dizziness, numbness and headaches  Psychiatric/Behavioral: Negative for decreased concentration and suicidal ideas  The patient is not nervous/anxious  All other systems reviewed and are negative         Objective:  /61 (BP Location: Left arm, Patient Position: Sitting, Cuff Size: Standard)   Pulse 89   Ht 5' 2\" (1 575 m)   Wt 88 kg (194 lb)   BMI 35 48 kg/m²     Ortho Exam  right knee(s) -   Patient ambulates with antalgic gait pattern  Uses No assistive device  Genu Varus anatomical deformity  Skin is warm and dry to touch with no signs of erythema, ecchymosis, or infection   Mild generalized soft tissue swelling or effusion noted  ROM (5° - 115°)   MMT: 4/5 throughout  TTP over medial joint line, TTP over lateral joint line, TTP over pes anserine bursa, no popliteal " fullness appreciated on exam   Flexor and extensor mechanisms are intact   Knee is  stable to varus and valgus stress  - Lachman's  - Anterior Drawer, - Posterior Drawer  - Medial Moraima's, - Lateral Moraima's  Patella tracks centrally with palpable crepitus  Calf compartments are soft and supple  - Carlos Eduardo's sign  2+ DP and PT pulses with brisk capillary refill to the toes  Sural, saphenous, tibial, superficial, and deep peroneal motor and sensory distributions intact  Sensation light touch intact distally       Physical Exam  HENT:      Head: Normocephalic and atraumatic  Nose: Nose normal    Eyes:      Conjunctiva/sclera: Conjunctivae normal    Cardiovascular:      Rate and Rhythm: Normal rate  Pulmonary:      Effort: Pulmonary effort is normal    Musculoskeletal:      Cervical back: Neck supple  Skin:     General: Skin is warm and dry  Capillary Refill: Capillary refill takes less than 2 seconds  Neurological:      Mental Status: She is alert and oriented to person, place, and time  Psychiatric:         Mood and Affect: Mood normal          Behavior: Behavior normal           Diagnostic Test Review:  No new imaging on today's visit  Large joint arthrocentesis: R knee  Universal Protocol:  Consent: Verbal consent obtained  Risks and benefits: risks, benefits and alternatives were discussed  Consent given by: patient  Timeout called at: 6/26/2023 1:01 PM   Patient understanding: patient states understanding of the procedure being performed  Site marked: the operative site was marked  Patient identity confirmed: verbally with patient    Supporting Documentation  Indications: pain and joint swelling   Procedure Details  Location: knee - R knee  Needle gauge: 21 G    Ultrasound guidance: no  Approach: anterolateral  Medications administered: 4 mL bupivacaine 0 25 %; 80 mg triamcinolone acetonide 40 mg/mL    Patient tolerance: patient tolerated the procedure well with no immediate complications  Dressing:  Sterile dressing applied           Scribe Attestation    I,:  Bryn Sanchez am acting as a scribe while in the presence of the attending physician :       I,:  Ed Wagoner DO personally performed the services described in this documentation    as scribed in my presence :

## 2023-07-08 DIAGNOSIS — M48.061 SPINAL STENOSIS OF LUMBAR REGION, UNSPECIFIED WHETHER NEUROGENIC CLAUDICATION PRESENT: ICD-10-CM

## 2023-07-11 RX ORDER — DULOXETIN HYDROCHLORIDE 20 MG/1
CAPSULE, DELAYED RELEASE ORAL
Qty: 30 CAPSULE | Refills: 1 | Status: SHIPPED | OUTPATIENT
Start: 2023-07-11 | End: 2023-07-20

## 2023-07-14 ENCOUNTER — APPOINTMENT (OUTPATIENT)
Dept: LAB | Facility: CLINIC | Age: 78
End: 2023-07-14
Payer: MEDICARE

## 2023-07-14 DIAGNOSIS — E11.22 CKD STAGE 3 DUE TO TYPE 2 DIABETES MELLITUS (HCC): ICD-10-CM

## 2023-07-14 DIAGNOSIS — E11.22 TYPE 2 DIABETES MELLITUS WITH STAGE 3B CHRONIC KIDNEY DISEASE, WITH LONG-TERM CURRENT USE OF INSULIN (HCC): ICD-10-CM

## 2023-07-14 DIAGNOSIS — N18.30 CKD STAGE 3 DUE TO TYPE 2 DIABETES MELLITUS (HCC): ICD-10-CM

## 2023-07-14 DIAGNOSIS — Z79.4 TYPE 2 DIABETES MELLITUS WITH STAGE 3B CHRONIC KIDNEY DISEASE, WITH LONG-TERM CURRENT USE OF INSULIN (HCC): ICD-10-CM

## 2023-07-14 DIAGNOSIS — E66.01 SEVERE OBESITY (BMI 35.0-39.9) WITH COMORBIDITY (HCC): ICD-10-CM

## 2023-07-14 DIAGNOSIS — N18.32 TYPE 2 DIABETES MELLITUS WITH STAGE 3B CHRONIC KIDNEY DISEASE, WITH LONG-TERM CURRENT USE OF INSULIN (HCC): ICD-10-CM

## 2023-07-14 DIAGNOSIS — I10 ESSENTIAL HYPERTENSION: ICD-10-CM

## 2023-07-14 LAB
ALBUMIN SERPL BCP-MCNC: 3.1 G/DL (ref 3.5–5)
ALP SERPL-CCNC: 56 U/L (ref 46–116)
ALT SERPL W P-5'-P-CCNC: 17 U/L (ref 12–78)
ANION GAP SERPL CALCULATED.3IONS-SCNC: 2 MMOL/L
AST SERPL W P-5'-P-CCNC: 11 U/L (ref 5–45)
BASOPHILS # BLD AUTO: 0.04 THOUSANDS/ÂΜL (ref 0–0.1)
BASOPHILS NFR BLD AUTO: 1 % (ref 0–1)
BILIRUB SERPL-MCNC: 0.4 MG/DL (ref 0.2–1)
BUN SERPL-MCNC: 24 MG/DL (ref 5–25)
CALCIUM ALBUM COR SERPL-MCNC: 9.9 MG/DL (ref 8.3–10.1)
CALCIUM SERPL-MCNC: 9.2 MG/DL (ref 8.3–10.1)
CHLORIDE SERPL-SCNC: 108 MMOL/L (ref 96–108)
CHOLEST SERPL-MCNC: 93 MG/DL
CO2 SERPL-SCNC: 29 MMOL/L (ref 21–32)
CREAT SERPL-MCNC: 1.56 MG/DL (ref 0.6–1.3)
CREAT UR-MCNC: 137 MG/DL
EOSINOPHIL # BLD AUTO: 0.11 THOUSAND/ÂΜL (ref 0–0.61)
EOSINOPHIL NFR BLD AUTO: 1 % (ref 0–6)
ERYTHROCYTE [DISTWIDTH] IN BLOOD BY AUTOMATED COUNT: 15.8 % (ref 11.6–15.1)
EST. AVERAGE GLUCOSE BLD GHB EST-MCNC: 240 MG/DL
GFR SERPL CREATININE-BSD FRML MDRD: 31 ML/MIN/1.73SQ M
GLUCOSE P FAST SERPL-MCNC: 93 MG/DL (ref 65–99)
HBA1C MFR BLD: 10 %
HCT VFR BLD AUTO: 40.5 % (ref 34.8–46.1)
HDLC SERPL-MCNC: 57 MG/DL
HGB BLD-MCNC: 12.4 G/DL (ref 11.5–15.4)
IMM GRANULOCYTES # BLD AUTO: 0.06 THOUSAND/UL (ref 0–0.2)
IMM GRANULOCYTES NFR BLD AUTO: 1 % (ref 0–2)
LDLC SERPL CALC-MCNC: 13 MG/DL (ref 0–100)
LYMPHOCYTES # BLD AUTO: 1.91 THOUSANDS/ÂΜL (ref 0.6–4.47)
LYMPHOCYTES NFR BLD AUTO: 25 % (ref 14–44)
MCH RBC QN AUTO: 30.1 PG (ref 26.8–34.3)
MCHC RBC AUTO-ENTMCNC: 30.6 G/DL (ref 31.4–37.4)
MCV RBC AUTO: 98 FL (ref 82–98)
MICROALBUMIN UR-MCNC: 10.7 MG/L (ref 0–20)
MICROALBUMIN/CREAT 24H UR: 8 MG/G CREATININE (ref 0–30)
MONOCYTES # BLD AUTO: 0.72 THOUSAND/ÂΜL (ref 0.17–1.22)
MONOCYTES NFR BLD AUTO: 9 % (ref 4–12)
NEUTROPHILS # BLD AUTO: 4.96 THOUSANDS/ÂΜL (ref 1.85–7.62)
NEUTS SEG NFR BLD AUTO: 63 % (ref 43–75)
NRBC BLD AUTO-RTO: 0 /100 WBCS
PLATELET # BLD AUTO: 156 THOUSANDS/UL (ref 149–390)
PMV BLD AUTO: 10.9 FL (ref 8.9–12.7)
POTASSIUM SERPL-SCNC: 4.7 MMOL/L (ref 3.5–5.3)
PROT SERPL-MCNC: 6.9 G/DL (ref 6.4–8.4)
PTH-INTACT SERPL-MCNC: 30.4 PG/ML (ref 12–88)
RBC # BLD AUTO: 4.12 MILLION/UL (ref 3.81–5.12)
SODIUM SERPL-SCNC: 139 MMOL/L (ref 135–147)
TRIGL SERPL-MCNC: 114 MG/DL
WBC # BLD AUTO: 7.8 THOUSAND/UL (ref 4.31–10.16)

## 2023-07-14 PROCEDURE — 36415 COLL VENOUS BLD VENIPUNCTURE: CPT

## 2023-07-14 PROCEDURE — 82570 ASSAY OF URINE CREATININE: CPT

## 2023-07-14 PROCEDURE — 85025 COMPLETE CBC W/AUTO DIFF WBC: CPT

## 2023-07-14 PROCEDURE — 82043 UR ALBUMIN QUANTITATIVE: CPT

## 2023-07-14 PROCEDURE — 80053 COMPREHEN METABOLIC PANEL: CPT

## 2023-07-14 PROCEDURE — 83970 ASSAY OF PARATHORMONE: CPT

## 2023-07-14 PROCEDURE — 83036 HEMOGLOBIN GLYCOSYLATED A1C: CPT

## 2023-07-14 PROCEDURE — 80061 LIPID PANEL: CPT

## 2023-07-19 ENCOUNTER — OFFICE VISIT (OUTPATIENT)
Dept: FAMILY MEDICINE CLINIC | Facility: CLINIC | Age: 78
End: 2023-07-19
Payer: MEDICARE

## 2023-07-19 VITALS
HEART RATE: 78 BPM | RESPIRATION RATE: 18 BRPM | TEMPERATURE: 97.2 F | SYSTOLIC BLOOD PRESSURE: 122 MMHG | HEIGHT: 62 IN | DIASTOLIC BLOOD PRESSURE: 80 MMHG | WEIGHT: 189.6 LBS | OXYGEN SATURATION: 98 % | BODY MASS INDEX: 34.89 KG/M2

## 2023-07-19 DIAGNOSIS — I10 ESSENTIAL HYPERTENSION: ICD-10-CM

## 2023-07-19 DIAGNOSIS — E11.22 CKD STAGE 3 DUE TO TYPE 2 DIABETES MELLITUS (HCC): ICD-10-CM

## 2023-07-19 DIAGNOSIS — N18.30 CKD STAGE 3 DUE TO TYPE 2 DIABETES MELLITUS (HCC): ICD-10-CM

## 2023-07-19 DIAGNOSIS — E11.22 TYPE 2 DIABETES MELLITUS WITH STAGE 3B CHRONIC KIDNEY DISEASE, WITH LONG-TERM CURRENT USE OF INSULIN (HCC): Primary | ICD-10-CM

## 2023-07-19 DIAGNOSIS — E66.01 SEVERE OBESITY (BMI 35.0-39.9) WITH COMORBIDITY (HCC): ICD-10-CM

## 2023-07-19 DIAGNOSIS — N18.32 TYPE 2 DIABETES MELLITUS WITH STAGE 3B CHRONIC KIDNEY DISEASE, WITH LONG-TERM CURRENT USE OF INSULIN (HCC): Primary | ICD-10-CM

## 2023-07-19 DIAGNOSIS — Z79.4 TYPE 2 DIABETES MELLITUS WITH STAGE 3B CHRONIC KIDNEY DISEASE, WITH LONG-TERM CURRENT USE OF INSULIN (HCC): Primary | ICD-10-CM

## 2023-07-19 DIAGNOSIS — E03.9 ACQUIRED HYPOTHYROIDISM: ICD-10-CM

## 2023-07-19 PROCEDURE — 99214 OFFICE O/P EST MOD 30 MIN: CPT | Performed by: INTERNAL MEDICINE

## 2023-07-19 NOTE — PROGRESS NOTES
[facility]        NAME: Sohan Speaker is a 66 y.o. female  : 1945    MRN: [id number]  DATE: 2023  TIME: 8:19 AM    Assessment and Plan   1. Type 2 diabetes mellitus with stage 3b chronic kidney disease, with long-term current use of insulin (HCC)  -A1c remains uncontrolled, FBG appears at goal recently. She has received several steroid injections in spine and knee recently. Pt also stopped her prandial insulin. Discussed need for endocrinology follow up, especially with borderline stage 4 CKD. -  Hemoglobin A1C; Future; Expected date: 10/19/2023  -     Comprehensive metabolic panel; Future; Expected date: 10/19/2023  -     CBC and differential; Future; Expected date: 10/19/2023    2. 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 mellitus (HCC)  -GFR borderline stage 4, will refer back to nephrology    -   Comprehensive metabolic panel; Future; Expected date: 10/19/2023  -     CBC and differential; Future; Expected date: 10/19/2023  -     Ambulatory Referral to Nephrology; Future    3. Essential hypertension  -     Comprehensive metabolic panel; Future; Expected date: 10/19/2023  -     CBC and differential; Future; Expected date: 10/19/2023    4. Severe obesity (BMI 35.0-39. 9) with comorbidity ([address])    5. Acquired hypothyroidism  -     TSH, 3rd generation with Free T4 reflex; Future; Expected date: 10/19/2023             Chief Complaint     Chief Complaint   Patient presents with   • Follow-up         History of Present Illness       70yo female with HTN, type 2 diabetes, CKD stage 3b, spinal stenosis here for 3 month follow up. Reports that she stopped humalog due to dizziness - felt head spinning when she turned over in bed.  today, but did have some lows 66 while on it. Now using lantus only, as well as glipizide. Received several steroid injections in back and knee recently, which she thinks is what has raised her A1c. Otherwise, eating less and lost 10 lbs.  Cannot exercise much due to her knee. Feels stressed caring for significant other with advanced dementia. Most of his family isn't local.     Hasn't scheduled with Endocrine yet. Review of Systems   Review of Systems   Constitutional: Positive for fatigue. Negative for chills and fever. Respiratory: Negative for chest tightness and shortness of breath. Cardiovascular: Negative for chest pain, palpitations and leg swelling. Gastrointestinal: Negative for abdominal pain, constipation, diarrhea, nausea and vomiting. Musculoskeletal: Positive for arthralgias, back pain and joint swelling. Neurological: Negative for dizziness, light-headedness and headaches.          Current Medications       Current Outpatient Medications:   •  amLODIPine (NORVASC) 5 mg tablet, Take 1 tablet (5 mg total) by mouth daily, Disp: 90 tablet, Rfl: 3  •  aspirin (ECOTRIN LOW STRENGTH) 81 mg EC tablet, Take 81 mg by mouth daily, Disp: , Rfl:   •  cholecalciferol (VITAMIN D3) 1,000 units tablet, Take 800 Units by mouth daily  , Disp: , Rfl:   •  DULoxetine (CYMBALTA) 20 mg capsule, take 1 capsule by mouth once daily, Disp: 30 capsule, Rfl: 1  •  fluticasone (FLONASE) 50 mcg/act nasal spray, 2 sprays into each nostril daily, Disp: 16 g, Rfl: 6  •  folic acid (FOLVITE) 254 mcg tablet, Take 400 mcg by mouth daily, Disp: , Rfl:   •  glipiZIDE (GLUCOTROL) 10 mg tablet, Take 0.5 tablets (5 mg total) by mouth 2 (two) times a day before meals, Disp: 60 tablet, Rfl: 5  •  glucose blood (FREESTYLE LITE) test strip, Use as instructed, Disp: 100 each, Rfl: 3  •  glucose monitoring kit (FREESTYLE) monitoring kit, 1 each by Does not apply route daily, Disp: 1 each, Rfl: 0  •  Insulin Glargine Solostar (Lantus SoloStar) 100 UNIT/ML SOPN, Inject 0.5 mL (50 Units total) under the skin daily at bedtime, Disp: 15 mL, Rfl: 5  •  Insulin Pen Needle 32G X 6 MM MISC, Use in the morning, Disp: 100 each, Rfl: 5  •  Lancets (freestyle) lancets, Use as instructed -glucose monitor BID, Disp: 100 each, Rfl: 5  •  levothyroxine 50 mcg tablet, Take 1 tablet (50 mcg total) by mouth daily, Disp: 90 tablet, Rfl: 2  •  lisinopril (ZESTRIL) 20 mg tablet, Take 0.5 tablets (10 mg total) by mouth daily, Disp: 90 tablet, Rfl: 2  •  metoprolol succinate (TOPROL-XL) 50 mg 24 hr tablet, Take 1 tablet (50 mg total) by mouth daily, Disp: 90 tablet, Rfl: 2  •  omega-3-acid ethyl esters (LOVAZA) 1 g capsule, Take 2 g by mouth 2 (two) times a day, Disp: , Rfl:   •  Psyllium (FIBER) 0.52 g CAPS, Take by mouth, Disp: , Rfl:   •  rosuvastatin (CRESTOR) 10 MG tablet, Take 1 tablet (10 mg total) by mouth daily, Disp: 90 tablet, Rfl: 3  •  Urea 20 Intensive Hydrating 20 % cream, , Disp: , Rfl:     Current Allergies     Allergies as of 07/19/2023   • (No Known Allergies)            The following portions of the patient's history were reviewed and updated as appropriate: allergies, current medications, past family history, past medical history, past social history, past surgical history and problem list.     Past Medical History:   Diagnosis Date   • Benign essential hypertension    • Chronic kidney disease, stage 2 (mild)    • Chronic kidney disease, stage 3 (HCC)    • Disorder of gallbladder    • Disorder of skin and subcutaneous tissue    • Dyspnea    • Essential hypertension    • Hyperlipidemia    • Hypokalemia    • Hypothyroidism    • Malaise and fatigue    • Mixed hyperlipidemia    • Morbid obesity (HCC)    • Pernicious anemia    • Type 2 diabetes mellitus (HCC)        Past Surgical History:   Procedure Laterality Date   • BREAST BIOPSY Right    • CARPAL TUNNEL RELEASE Bilateral    • CHOLECYSTECTOMY     • COLONOSCOPY      Dr. Mak Pollock    • HYSTERECTOMY     • PARTIAL HYSTERECTOMY     • SKIN LESION EXCISION      scalp        Family History   Problem Relation Age of Onset   • Stroke Mother    • Atrial fibrillation Mother    • Brain cancer Father    • Other Brother         Twin brothers - Open heart   • Other Brother         Twin brothers - Open heart   • No Known Problems Maternal Grandmother    • No Known Problems Paternal Grandmother          Medications have been verified. Objective   /80   Pulse 78   Temp (!) 97.2 °F (36.2 °C)   Resp 18   Ht 5' 2" (1.575 m)   Wt 86 kg (189 lb 9.6 oz)   SpO2 98%   BMI 34.68 kg/m²        Physical Exam     Physical Exam  Vitals reviewed. Constitutional:       General: She is not in acute distress. Appearance: She is obese. Cardiovascular:      Rate and Rhythm: Normal rate and regular rhythm. Heart sounds: No murmur heard. No friction rub. No gallop. Pulmonary:      Effort: Pulmonary effort is normal. No respiratory distress. Breath sounds: No wheezing, rhonchi or rales. Abdominal:      General: Abdomen is flat. Bowel sounds are normal. There is no distension. Palpations: Abdomen is soft. There is no mass. Tenderness: There is no abdominal tenderness. There is no guarding or rebound. Hernia: No hernia is present. Neurological:      General: No focal deficit present. Mental Status: She is alert. Psychiatric:         Mood and Affect: Mood normal.         Behavior: Behavior normal.         Thought Content:  Thought content normal.         Judgment: Judgment normal.             Results:  Lab Results   Component Value Date    SODIUM 139 07/14/2023    K 4.7 07/14/2023     07/14/2023    CO2 29 07/14/2023    BUN 24 07/14/2023    CREATININE 1.56 (H) 07/14/2023    GLUC 127 01/16/2022    CALCIUM 9.2 07/14/2023       Lab Results   Component Value Date    HGBA1C 10.0 (H) 07/14/2023       Lab Results   Component Value Date    WBC 7.80 07/14/2023    HGB 12.4 07/14/2023    HCT 40.5 07/14/2023    MCV 98 07/14/2023     07/14/2023

## 2023-07-20 DIAGNOSIS — N18.31 TYPE 2 DIABETES MELLITUS WITH STAGE 3A CHRONIC KIDNEY DISEASE, WITH LONG-TERM CURRENT USE OF INSULIN (HCC): ICD-10-CM

## 2023-07-20 DIAGNOSIS — M48.061 SPINAL STENOSIS OF LUMBAR REGION, UNSPECIFIED WHETHER NEUROGENIC CLAUDICATION PRESENT: ICD-10-CM

## 2023-07-20 DIAGNOSIS — E11.22 TYPE 2 DIABETES MELLITUS WITH STAGE 3A CHRONIC KIDNEY DISEASE, WITH LONG-TERM CURRENT USE OF INSULIN (HCC): ICD-10-CM

## 2023-07-20 DIAGNOSIS — Z79.4 TYPE 2 DIABETES MELLITUS WITH STAGE 3A CHRONIC KIDNEY DISEASE, WITH LONG-TERM CURRENT USE OF INSULIN (HCC): ICD-10-CM

## 2023-07-20 DIAGNOSIS — E78.2 MIXED HYPERLIPIDEMIA: ICD-10-CM

## 2023-07-20 RX ORDER — LEVOTHYROXINE SODIUM 0.05 MG/1
50 TABLET ORAL DAILY
Qty: 90 TABLET | Refills: 2 | Status: SHIPPED | OUTPATIENT
Start: 2023-07-20

## 2023-07-20 RX ORDER — DULOXETIN HYDROCHLORIDE 20 MG/1
CAPSULE, DELAYED RELEASE ORAL
Qty: 90 CAPSULE | Refills: 1 | Status: SHIPPED | OUTPATIENT
Start: 2023-07-20

## 2023-07-20 NOTE — TELEPHONE ENCOUNTER
Patient requesting refill(s) of: levothyroxine 50 mcg daily    Last filled: 10/27/2022 #90 x 2  Last appt: 7/19/2023  Next appt: 10/23/2023  Pharmacy: [facility]

## 2023-08-08 ENCOUNTER — PROCEDURE VISIT (OUTPATIENT)
Dept: OBGYN CLINIC | Facility: CLINIC | Age: 78
End: 2023-08-08
Payer: MEDICARE

## 2023-08-08 VITALS
SYSTOLIC BLOOD PRESSURE: 103 MMHG | WEIGHT: 191 LBS | BODY MASS INDEX: 35.15 KG/M2 | HEART RATE: 86 BPM | DIASTOLIC BLOOD PRESSURE: 62 MMHG | HEIGHT: 62 IN

## 2023-08-08 DIAGNOSIS — M17.11 PRIMARY OSTEOARTHRITIS OF RIGHT KNEE: Primary | ICD-10-CM

## 2023-08-08 PROCEDURE — 20610 DRAIN/INJ JOINT/BURSA W/O US: CPT | Performed by: ORTHOPAEDIC SURGERY

## 2023-08-08 RX ORDER — HYALURONATE SODIUM 10 MG/ML
20 SYRINGE (ML) INTRAARTICULAR
Status: COMPLETED | OUTPATIENT
Start: 2023-08-08 | End: 2023-08-08

## 2023-08-08 RX ADMIN — Medication 20 MG: at 13:45

## 2023-08-08 NOTE — PROGRESS NOTES
Assessment/Plan:  Diagnoses and all orders for this visit:    Primary osteoarthritis of right knee  -     Large joint arthrocentesis: R knee       Patient was provided with 1st of 3 shot Euflexxa viscosupplementation injection series for treatment of primary osteoarthritis of the right knee(s). Patient tolerated treatment(s) well. She will be seen for follow-up in 1 week for 2nd injection. Patient expresses understanding and is in agreement with this treatment plan. The patient has degenerative joint disease of her right knee. Under aseptic technique, the right knee was injected with her first set of Euflexxa. She tolerated procedure quite well. Return back next week for her second set    Subjective:    Patient info: Sheldon Olivera 66 y.o. female     HPI    Patient presents today for re-evaluation and initiation of Euflexxa viscosupplementation injection series for treatment of primary osteoarthritis of the right knee knee(s). Patient was last seen in regards to this issue on 6/26/2023, at which time she received a corticosteroid injection for relief of pain and inflammation. . On today's presentation she reports having experienced mild interval improvement. Previous treatments have included viscosupplementation injections as well as CS injections, and she reports that she gets significant relief from the combination of the 2 therapies. She rates her pain level at 4-5/10. Patient denies any adverse reaction to previous injections including fever, chills, headache, nausea, dizziness, or malaise. Patient's medical history has been reviewed in detail and updated the computerized patient record.     Past Medical History:   Diagnosis Date   • Benign essential hypertension    • Chronic kidney disease, stage 2 (mild)    • Chronic kidney disease, stage 3 (HCC)    • Disorder of gallbladder    • Disorder of skin and subcutaneous tissue    • Dyspnea    • Essential hypertension    • Hyperlipidemia    • Hypokalemia • Hypothyroidism    • Malaise and fatigue    • Mixed hyperlipidemia    • Morbid obesity (HCC)    • Pernicious anemia    • Type 2 diabetes mellitus (HCC)        Past Surgical History:   Procedure Laterality Date   • BREAST BIOPSY Right    • CARPAL TUNNEL RELEASE Bilateral    • CHOLECYSTECTOMY     • COLONOSCOPY      Dr. David Sheehan    • HYSTERECTOMY     • PARTIAL HYSTERECTOMY     • SKIN LESION EXCISION      scalp        Family History   Problem Relation Age of Onset   • Stroke Mother    • Atrial fibrillation Mother    • Brain cancer Father    • Other Brother         Twin brothers - Open heart   • Other Brother         Twin brothers - Open heart   • No Known Problems Maternal Grandmother    • No Known Problems Paternal Grandmother        Social History     Socioeconomic History   • Marital status: /Civil Union     Spouse name: Miller Wilson    • Number of children: None   • Years of education: None   • Highest education level: None   Occupational History   • Occupation: Retired    Tobacco Use   • Smoking status: Former     Packs/day: 1.00     Years: 14.00     Total pack years: 14.00     Types: Cigarettes     Quit date:      Years since quittin.6   • Smokeless tobacco: Never   Vaping Use   • Vaping Use: Never used   Substance and Sexual Activity   • Alcohol use: Yes     Comment: Occasionally    • Drug use: Never     Comment: Denies drug use - As per [facility]    • Sexual activity: None   Other Topics Concern   • None   Social History Narrative     - Miller Wilson is [place] and speaks [place], common law marriage    Does not consume caffeine      Social Determinants of Health     Financial Resource Strain: Low Risk  (3/7/2023)    Overall Financial Resource Strain (CARDIA)    • Difficulty of Paying Living Expenses: Not hard at all   Food Insecurity: Not on file   Transportation Needs: No Transportation Needs (3/7/2023)    PRAPARE - Transportation    • Lack of Transportation (Medical):  No    • Lack of Transportation (Non-Medical):  No   Physical Activity: Not on file   Stress: Not on file   Social Connections: Not on file   Intimate Partner Violence: Not on file   Housing Stability: Not on file         Current Outpatient Medications:   •  amLODIPine (NORVASC) 5 mg tablet, Take 1 tablet (5 mg total) by mouth daily, Disp: 90 tablet, Rfl: 3  •  aspirin (ECOTRIN LOW STRENGTH) 81 mg EC tablet, Take 81 mg by mouth daily, Disp: , Rfl:   •  cholecalciferol (VITAMIN D3) 1,000 units tablet, Take 800 Units by mouth daily  , Disp: , Rfl:   •  DULoxetine (CYMBALTA) 20 mg capsule, take 1 capsule by mouth once daily, Disp: 90 capsule, Rfl: 1  •  fluticasone (FLONASE) 50 mcg/act nasal spray, 2 sprays into each nostril daily, Disp: 16 g, Rfl: 6  •  folic acid (FOLVITE) 134 mcg tablet, Take 400 mcg by mouth daily, Disp: , Rfl:   •  glipiZIDE (GLUCOTROL) 10 mg tablet, Take 0.5 tablets (5 mg total) by mouth 2 (two) times a day before meals, Disp: 60 tablet, Rfl: 5  •  glucose blood (FREESTYLE LITE) test strip, Use as instructed, Disp: 100 each, Rfl: 3  •  glucose monitoring kit (FREESTYLE) monitoring kit, 1 each by Does not apply route daily, Disp: 1 each, Rfl: 0  •  Insulin Pen Needle 32G X 6 MM MISC, Use in the morning, Disp: 100 each, Rfl: 5  •  Lancets (freestyle) lancets, Use as instructed -glucose monitor BID, Disp: 100 each, Rfl: 5  •  levothyroxine 50 mcg tablet, Take 1 tablet (50 mcg total) by mouth daily, Disp: 90 tablet, Rfl: 2  •  lisinopril (ZESTRIL) 20 mg tablet, Take 0.5 tablets (10 mg total) by mouth daily, Disp: 90 tablet, Rfl: 2  •  metoprolol succinate (TOPROL-XL) 50 mg 24 hr tablet, Take 1 tablet (50 mg total) by mouth daily, Disp: 90 tablet, Rfl: 2  •  omega-3-acid ethyl esters (LOVAZA) 1 g capsule, Take 2 g by mouth 2 (two) times a day, Disp: , Rfl:   •  Psyllium (FIBER) 0.52 g CAPS, Take by mouth, Disp: , Rfl:   •  rosuvastatin (CRESTOR) 10 MG tablet, Take 1 tablet (10 mg total) by mouth daily, Disp: 90 tablet, Rfl: 3  • Urea 20 Intensive Hydrating 20 % cream, , Disp: , Rfl:   •  Insulin Glargine Solostar (Lantus SoloStar) 100 UNIT/ML SOPN, Inject 0.5 mL (50 Units total) under the skin daily at bedtime, Disp: 15 mL, Rfl: 5    No Known Allergies    Review of Systems     Objective :  /62 (BP Location: Left arm, Patient Position: Sitting, Cuff Size: Large)   Pulse 86   Ht 5' 2" (1.575 m)   Wt 86.6 kg (191 lb)   BMI 34.93 kg/m²     Ortho Exam  Right knee(s) -   Patient ambulates with steady gait pattern  No anatomical deformity  Skin is warm and dry to touch with no signs of erythema, ecchymosis, infection  No soft tissue swelling, no effusion noted  ROM 5-115  TTP over medial joint line(s), mildly TTP over lateral joint line(s)  Flexor and extensor mechanisms intact  Knee is stable to varus and valgus stress  Knee is stable to anterior and posterior stress  Patella tracks centrally with palpable crepitus  Calf compartments are soft and supple  2+ TP and DP pulses with brisk capillary refill to the toes  Sural, saphenous, tibial, superficial and deep peroneal motor and sensory distributions intact  Sensation light touch intact distally    Physical Exam  Vitals and nursing note reviewed. Constitutional:       General: She is not in acute distress. Appearance: She is well-developed. HENT:      Head: Normocephalic and atraumatic. Eyes:      Conjunctiva/sclera: Conjunctivae normal.   Cardiovascular:      Rate and Rhythm: Normal rate. Pulmonary:      Effort: Pulmonary effort is normal.   Musculoskeletal:      Cervical back: Neck supple. Skin:     General: Skin is warm and dry. Capillary Refill: Capillary refill takes less than 2 seconds. Neurological:      Mental Status: She is alert and oriented to person, place, and time.    Psychiatric:         Mood and Affect: Mood normal.         Behavior: Behavior normal.          Diagnostic Test Review:  No new imaging reviewed this visit    Large joint arthrocentesis: R knee  Universal Protocol:  Consent: Verbal consent obtained. Risks and benefits: risks, benefits and alternatives were discussed  Consent given by: patient  Time out: Immediately prior to procedure a "time out" was called to verify the correct patient, procedure, equipment, support staff and site/side marked as required. Timeout called at: 8/8/2023 1:40 PM.  Patient understanding: patient states understanding of the procedure being performed  Site marked: the operative site was marked  Radiology Images displayed and confirmed. If images not available, report reviewed: imaging studies available  Patient identity confirmed: verbally with patient    Supporting Documentation  Indications: pain and joint swelling   Procedure Details  Location: knee - R knee  Preparation: Patient was prepped and draped in the usual sterile fashion  Needle gauge: 21G.   Ultrasound guidance: no  Approach: anterolateral  Medications administered: 20 mg Sodium Hyaluronate (Viscosup) 20 MG/2ML    Patient tolerance: patient tolerated the procedure well with no immediate complications  Dressing:  Sterile dressing applied             Scribe Attestation    I,:  Madison Hallman am acting as a scribe while in the presence of the attending physician.:       I,:  Ish Sanchez DO personally performed the services described in this documentation    as scribed in my presence.:

## 2023-08-15 ENCOUNTER — PROCEDURE VISIT (OUTPATIENT)
Dept: OBGYN CLINIC | Facility: CLINIC | Age: 78
End: 2023-08-15
Payer: MEDICARE

## 2023-08-15 VITALS
DIASTOLIC BLOOD PRESSURE: 67 MMHG | HEART RATE: 85 BPM | SYSTOLIC BLOOD PRESSURE: 110 MMHG | BODY MASS INDEX: 35.15 KG/M2 | WEIGHT: 191 LBS | HEIGHT: 62 IN

## 2023-08-15 DIAGNOSIS — M17.11 PRIMARY OSTEOARTHRITIS OF RIGHT KNEE: Primary | ICD-10-CM

## 2023-08-15 PROCEDURE — 20610 DRAIN/INJ JOINT/BURSA W/O US: CPT | Performed by: ORTHOPAEDIC SURGERY

## 2023-08-15 RX ORDER — HYALURONATE SODIUM 10 MG/ML
20 SYRINGE (ML) INTRAARTICULAR
Status: COMPLETED | OUTPATIENT
Start: 2023-08-15 | End: 2023-08-15

## 2023-08-15 RX ADMIN — Medication 20 MG: at 13:45

## 2023-08-15 NOTE — PROGRESS NOTES
ASSESSMENT/PLAN:    Diagnoses and all orders for this visit:    Primary osteoarthritis of right knee    Other orders  -     Large joint arthrocentesis        The patient's right knee was injected with her second set of Euflexxa. She tolerated the injection quite well. She will follow-up with our office next week for her third and final set. The patient is acceptable to this plan. Return in about 1 week (around 8/22/2023). Patient has degenerative joint disease of her right knee. Under aseptic technique, the right knee was injected with her second set of Euflexxa. She tolerated procedure quite well. Return back next week for final set      _____________________________________________________  CHIEF COMPLAINT:  Chief Complaint   Patient presents with   • Right Knee - Follow-up         SUBJECTIVE:  Su Virk is a 66 y.o. female who presents to our office for viscosupplementation of her right knee. She is here today for her second set of Euflexxa. She tolerated her previous injection without issue. She denies any numbness or tingling. She denies any fever or chills.     The following portions of the patient's history were reviewed and updated as appropriate: allergies, current medications, past family history, past medical history, past social history, past surgical history and problem list.    PAST MEDICAL HISTORY:  Past Medical History:   Diagnosis Date   • Benign essential hypertension    • Chronic kidney disease, stage 2 (mild)    • Chronic kidney disease, stage 3 ([address])    • Disorder of gallbladder    • Disorder of skin and subcutaneous tissue    • Dyspnea    • Essential hypertension    • Hyperlipidemia    • Hypokalemia    • Hypothyroidism    • Malaise and fatigue    • Mixed hyperlipidemia    • Morbid obesity (HCC)    • Pernicious anemia    • Type 2 diabetes mellitus ([address])        PAST SURGICAL HISTORY:  Past Surgical History:   Procedure Laterality Date   • BREAST BIOPSY Right    • CARPAL TUNNEL RELEASE Bilateral    • CHOLECYSTECTOMY     • COLONOSCOPY      Dr. Neal Khan    • HYSTERECTOMY     • PARTIAL HYSTERECTOMY     • SKIN LESION EXCISION      scalp        FAMILY HISTORY:  Family History   Problem Relation Age of Onset   • Stroke Mother    • Atrial fibrillation Mother    • Brain cancer Father    • Other Brother         Twin brothers - Open heart   • Other Brother         Twin brothers - Open heart   • No Known Problems Maternal Grandmother    • No Known Problems Paternal Grandmother        SOCIAL HISTORY:  Social History     Tobacco Use   • Smoking status: Former     Packs/day: 1.00     Years: 14.00     Total pack years: 14.00     Types: Cigarettes     Quit date: 0     Years since quittin.6   • Smokeless tobacco: Never   Vaping Use   • Vaping Use: Never used   Substance Use Topics   • Alcohol use: Yes     Comment: Occasionally    • Drug use: Never     Comment: Denies drug use - As per [facility]        MEDICATIONS:    Current Outpatient Medications:   •  amLODIPine (NORVASC) 5 mg tablet, Take 1 tablet (5 mg total) by mouth daily, Disp: 90 tablet, Rfl: 3  •  aspirin (ECOTRIN LOW STRENGTH) 81 mg EC tablet, Take 81 mg by mouth daily, Disp: , Rfl:   •  cholecalciferol (VITAMIN D3) 1,000 units tablet, Take 800 Units by mouth daily  , Disp: , Rfl:   •  DULoxetine (CYMBALTA) 20 mg capsule, take 1 capsule by mouth once daily, Disp: 90 capsule, Rfl: 1  •  fluticasone (FLONASE) 50 mcg/act nasal spray, 2 sprays into each nostril daily, Disp: 16 g, Rfl: 6  •  folic acid (FOLVITE) 706 mcg tablet, Take 400 mcg by mouth daily, Disp: , Rfl:   •  glipiZIDE (GLUCOTROL) 10 mg tablet, Take 0.5 tablets (5 mg total) by mouth 2 (two) times a day before meals, Disp: 60 tablet, Rfl: 5  •  glucose blood (FREESTYLE LITE) test strip, Use as instructed, Disp: 100 each, Rfl: 3  •  glucose monitoring kit (FREESTYLE) monitoring kit, 1 each by Does not apply route daily, Disp: 1 each, Rfl: 0  •  Insulin Pen Needle 32G X 6 MM MISC, Use in the morning, Disp: 100 each, Rfl: 5  •  Lancets (freestyle) lancets, Use as instructed -glucose monitor BID, Disp: 100 each, Rfl: 5  •  levothyroxine 50 mcg tablet, Take 1 tablet (50 mcg total) by mouth daily, Disp: 90 tablet, Rfl: 2  •  lisinopril (ZESTRIL) 20 mg tablet, Take 0.5 tablets (10 mg total) by mouth daily, Disp: 90 tablet, Rfl: 2  •  metoprolol succinate (TOPROL-XL) 50 mg 24 hr tablet, Take 1 tablet (50 mg total) by mouth daily, Disp: 90 tablet, Rfl: 2  •  omega-3-acid ethyl esters (LOVAZA) 1 g capsule, Take 2 g by mouth 2 (two) times a day, Disp: , Rfl:   •  Psyllium (FIBER) 0.52 g CAPS, Take by mouth, Disp: , Rfl:   •  rosuvastatin (CRESTOR) 10 MG tablet, Take 1 tablet (10 mg total) by mouth daily, Disp: 90 tablet, Rfl: 3  •  Urea 20 Intensive Hydrating 20 % cream, , Disp: , Rfl:   •  Insulin Glargine Solostar (Lantus SoloStar) 100 UNIT/ML SOPN, Inject 0.5 mL (50 Units total) under the skin daily at bedtime, Disp: 15 mL, Rfl: 5    ALLERGIES:  No Known Allergies    ROS:  Review of Systems     Constitutional: Negative for fatigue, fever or loss of appetite. HENT: Negative. Respiratory: Negative for shortness of breath, dyspnea. Cardiovascular: Negative for chest pain/tightness. Gastrointestinal: Negative for abdominal pain, N/V. Endocrine: Negative for cold/heat intolerance, unexplained weight loss/gain. Genitourinary: Negative for flank pain, dysuria, hematuria. Musculoskeletal: Positive for arthralgia   Skin: Negative for rash. Neurological: Negative for numbness or tingling  Psychiatric/Behavioral: Negative for agitation. _____________________________________________________  PHYSICAL EXAMINATION:    Blood pressure 110/67, pulse 85, height 5' 2" (1.575 m), weight 86.6 kg (191 lb). Constitutional: Oriented to person, place, and time. Appears well-developed and well-nourished. No distress. HENT:   Head: Normocephalic.    Eyes: Conjunctivae are normal. Right eye exhibits no discharge. Left eye exhibits no discharge. No scleral icterus. Cardiovascular: Normal rate. Pulmonary/Chest: Effort normal.   Neurological: Alert and oriented to person, place, and time. Skin: Skin is warm and dry. No rash noted. Not diaphoretic. No erythema. No pallor. Psychiatric: Normal mood and affect. Behavior is normal. Judgment and thought content normal.      MUSCULOSKELETAL EXAMINATION:   Physical Exam  Ortho Exam    Right lower extremity is neurovascularly intact  Toes are pink and mobile   Compartments are soft  No warmth, erythema or ecchymosis  ROM of knee is from 5-115 degrees  Negative Lachman, drawer or pivot shift  No medial instability  Medial joint line tenderness, slight lateral joint line tenderness  Patellofemoral crepitation  Objective:  BP Readings from Last 1 Encounters:   08/15/23 110/67      Wt Readings from Last 1 Encounters:   08/15/23 86.6 kg (191 lb)        BMI:   Estimated body mass index is 34.93 kg/m² as calculated from the following:    Height as of this encounter: 5' 2" (1.575 m). Weight as of this encounter: 86.6 kg (191 lb).       PROCEDURES PERFORMED:  Large joint arthrocentesis: R knee  Universal Protocol:  Risks and benefits: risks, benefits and alternatives were discussed  Consent given by: patient  Site marked: the operative site was marked  Supporting Documentation  Indications: pain   Procedure Details  Location: knee - R knee  Preparation: Patient was prepped and draped in the usual sterile fashion  Needle size: 22 G  Ultrasound guidance: no  Approach: lateral  Medications administered: 20 mg Sodium Hyaluronate (Viscosup) 20 MG/2ML    Patient tolerance: patient tolerated the procedure well with no immediate complications  Dressing:  Sterile dressing applied            Scribe Attestation    I,:  Christelle Nina PA-C am acting as a scribe while in the presence of the attending physician.:       I,:  Neris Lenz DO personally performed the services described in this documentation    as scribed in my presence.:

## 2023-08-22 ENCOUNTER — PROCEDURE VISIT (OUTPATIENT)
Dept: OBGYN CLINIC | Facility: CLINIC | Age: 78
End: 2023-08-22
Payer: MEDICARE

## 2023-08-22 VITALS
WEIGHT: 191 LBS | HEART RATE: 85 BPM | SYSTOLIC BLOOD PRESSURE: 124 MMHG | DIASTOLIC BLOOD PRESSURE: 49 MMHG | HEIGHT: 62 IN | BODY MASS INDEX: 35.15 KG/M2

## 2023-08-22 DIAGNOSIS — M17.11 PRIMARY OSTEOARTHRITIS OF RIGHT KNEE: Primary | ICD-10-CM

## 2023-08-22 PROCEDURE — 20610 DRAIN/INJ JOINT/BURSA W/O US: CPT | Performed by: ORTHOPAEDIC SURGERY

## 2023-08-22 RX ORDER — HYALURONATE SODIUM 10 MG/ML
20 SYRINGE (ML) INTRAARTICULAR
Status: COMPLETED | OUTPATIENT
Start: 2023-08-22 | End: 2023-08-22

## 2023-08-22 RX ADMIN — Medication 20 MG: at 13:45

## 2023-08-22 NOTE — PROGRESS NOTES
ASSESSMENT/PLAN:    Diagnoses and all orders for this visit:    Primary osteoarthritis of right knee    Other orders  -     Large joint arthrocentesis        Patient's right knee was injected with her third and final set of Euflexxa. She tolerated the injection quite well. Follow-up office in 6 months to resume viscosupplementation. She is acceptable to this plan. Return in about 1 week (around 8/29/2023). The patient has degenerative joint disease of her right knee. Under aseptic technique, the right knee was injected with her final set of Euflexxa. She tolerated the procedure quite well. Return back in 6 months to restart the injections      _____________________________________________________  CHIEF COMPLAINT:  Chief Complaint   Patient presents with   • Right Knee - Follow-up         SUBJECTIVE:  Vivian Drake is a 66 y.o. female who presents to our office for viscosupplementation of her right knee. She is here today for her third and final set of Euflexxa. She tolerated her previous injection without issue. She denies any numbness or tingling. She denies any fever or chills.     The following portions of the patient's history were reviewed and updated as appropriate: allergies, current medications, past family history, past medical history, past social history, past surgical history and problem list.    PAST MEDICAL HISTORY:  Past Medical History:   Diagnosis Date   • Benign essential hypertension    • Chronic kidney disease, stage 2 (mild)    • Chronic kidney disease, stage 3 ([address])    • Disorder of gallbladder    • Disorder of skin and subcutaneous tissue    • Dyspnea    • Essential hypertension    • Hyperlipidemia    • Hypokalemia    • Hypothyroidism    • Malaise and fatigue    • Mixed hyperlipidemia    • Morbid obesity ([address])    • Pernicious anemia    • Type 2 diabetes mellitus ([address])        PAST SURGICAL HISTORY:  Past Surgical History:   Procedure Laterality Date   • BREAST BIOPSY Right    • No AVS for this visit   CARPAL TUNNEL RELEASE Bilateral    • CHOLECYSTECTOMY     • COLONOSCOPY      Dr. Irish Reilly    • HYSTERECTOMY     • PARTIAL HYSTERECTOMY     • SKIN LESION EXCISION      scalp        FAMILY HISTORY:  Family History   Problem Relation Age of Onset   • Stroke Mother    • Atrial fibrillation Mother    • Brain cancer Father    • Other Brother         Twin brothers - Open heart   • Other Brother         Twin brothers - Open heart   • No Known Problems Maternal Grandmother    • No Known Problems Paternal Grandmother        SOCIAL HISTORY:  Social History     Tobacco Use   • Smoking status: Former     Packs/day: 1.00     Years: 14.00     Total pack years: 14.00     Types: Cigarettes     Quit date: 0     Years since quittin.6   • Smokeless tobacco: Never   Vaping Use   • Vaping Use: Never used   Substance Use Topics   • Alcohol use: Yes     Comment: Occasionally    • Drug use: Never     Comment: Denies drug use - As per [facility]        MEDICATIONS:    Current Outpatient Medications:   •  amLODIPine (NORVASC) 5 mg tablet, Take 1 tablet (5 mg total) by mouth daily, Disp: 90 tablet, Rfl: 3  •  aspirin (ECOTRIN LOW STRENGTH) 81 mg EC tablet, Take 81 mg by mouth daily, Disp: , Rfl:   •  cholecalciferol (VITAMIN D3) 1,000 units tablet, Take 800 Units by mouth daily  , Disp: , Rfl:   •  DULoxetine (CYMBALTA) 20 mg capsule, take 1 capsule by mouth once daily, Disp: 90 capsule, Rfl: 1  •  fluticasone (FLONASE) 50 mcg/act nasal spray, 2 sprays into each nostril daily, Disp: 16 g, Rfl: 6  •  folic acid (FOLVITE) 305 mcg tablet, Take 400 mcg by mouth daily, Disp: , Rfl:   •  glipiZIDE (GLUCOTROL) 10 mg tablet, Take 0.5 tablets (5 mg total) by mouth 2 (two) times a day before meals, Disp: 60 tablet, Rfl: 5  •  glucose blood (FREESTYLE LITE) test strip, Use as instructed, Disp: 100 each, Rfl: 3  •  glucose monitoring kit (FREESTYLE) monitoring kit, 1 each by Does not apply route daily, Disp: 1 each, Rfl: 0  •  Insulin Pen Needle 32G X 6 MM MISC, Use in the morning, Disp: 100 each, Rfl: 5  •  Lancets (freestyle) lancets, Use as instructed -glucose monitor BID, Disp: 100 each, Rfl: 5  •  levothyroxine 50 mcg tablet, Take 1 tablet (50 mcg total) by mouth daily, Disp: 90 tablet, Rfl: 2  •  lisinopril (ZESTRIL) 20 mg tablet, Take 0.5 tablets (10 mg total) by mouth daily, Disp: 90 tablet, Rfl: 2  •  metoprolol succinate (TOPROL-XL) 50 mg 24 hr tablet, Take 1 tablet (50 mg total) by mouth daily, Disp: 90 tablet, Rfl: 2  •  omega-3-acid ethyl esters (LOVAZA) 1 g capsule, Take 2 g by mouth 2 (two) times a day, Disp: , Rfl:   •  Psyllium (FIBER) 0.52 g CAPS, Take by mouth, Disp: , Rfl:   •  rosuvastatin (CRESTOR) 10 MG tablet, Take 1 tablet (10 mg total) by mouth daily, Disp: 90 tablet, Rfl: 3  •  Urea 20 Intensive Hydrating 20 % cream, , Disp: , Rfl:   •  Insulin Glargine Solostar (Lantus SoloStar) 100 UNIT/ML SOPN, Inject 0.5 mL (50 Units total) under the skin daily at bedtime, Disp: 15 mL, Rfl: 5    ALLERGIES:  No Known Allergies    ROS:  Review of Systems     Constitutional: Negative for fatigue, fever or loss of appetite. HENT: Negative. Respiratory: Negative for shortness of breath, dyspnea. Cardiovascular: Negative for chest pain/tightness. Gastrointestinal: Negative for abdominal pain, N/V. Endocrine: Negative for cold/heat intolerance, unexplained weight loss/gain. Genitourinary: Negative for flank pain, dysuria, hematuria. Musculoskeletal: Positive for arthralgia   Skin: Negative for rash. Neurological: Negative for numbness or tingling  Psychiatric/Behavioral: Negative for agitation. _____________________________________________________  PHYSICAL EXAMINATION:    Blood pressure (!) 124/49, pulse 85, height 5' 2" (1.575 m), weight 86.6 kg (191 lb). Constitutional: Oriented to person, place, and time. Appears well-developed and well-nourished. No distress. HENT:   Head: Normocephalic.    Eyes: Conjunctivae are normal. Right eye exhibits no discharge. Left eye exhibits no discharge. No scleral icterus. Cardiovascular: Normal rate. Pulmonary/Chest: Effort normal.   Neurological: Alert and oriented to person, place, and time. Skin: Skin is warm and dry. No rash noted. Not diaphoretic. No erythema. No pallor. Psychiatric: Normal mood and affect. Behavior is normal. Judgment and thought content normal.      MUSCULOSKELETAL EXAMINATION:   Physical Exam  Ortho Exam    Right lower extremity is neurovascularly intact  Toes are pink and mobile   Compartments are soft  No warmth, erythema or ecchymosis  ROM of knee is from 5-115 degrees  Negative Lachman, drawer or pivot shift  No medial instability  Medial joint line tenderness, slight lateral joint line tenderness  Patellofemoral crepitation  Objective:  BP Readings from Last 1 Encounters:   08/22/23 (!) 124/49      Wt Readings from Last 1 Encounters:   08/22/23 86.6 kg (191 lb)        BMI:   Estimated body mass index is 34.93 kg/m² as calculated from the following:    Height as of this encounter: 5' 2" (1.575 m). Weight as of this encounter: 86.6 kg (191 lb).       PROCEDURES PERFORMED:  Large joint arthrocentesis: R knee  Universal Protocol:  Risks and benefits: risks, benefits and alternatives were discussed  Consent given by: patient  Site marked: the operative site was marked  Supporting Documentation  Indications: pain   Procedure Details  Location: knee - R knee  Preparation: Patient was prepped and draped in the usual sterile fashion  Needle size: 22 G  Ultrasound guidance: no  Approach: lateral  Medications administered: 20 mg Sodium Hyaluronate (Viscosup) 20 MG/2ML    Patient tolerance: patient tolerated the procedure well with no immediate complications  Dressing:  Sterile dressing applied            Scribe Attestation    I,:  Leeanne Culver PA-C am acting as a scribe while in the presence of the attending physician.:       I,:  Angelia Calderon,  personally performed the services described in this documentation    as scribed in my presence.:

## 2023-09-01 ENCOUNTER — OFFICE VISIT (OUTPATIENT)
Dept: NEPHROLOGY | Facility: CLINIC | Age: 78
End: 2023-09-01

## 2023-09-01 VITALS
HEIGHT: 62 IN | HEART RATE: 83 BPM | DIASTOLIC BLOOD PRESSURE: 64 MMHG | WEIGHT: 191 LBS | SYSTOLIC BLOOD PRESSURE: 120 MMHG | BODY MASS INDEX: 35.15 KG/M2 | OXYGEN SATURATION: 97 %

## 2023-09-01 DIAGNOSIS — N18.30 CKD STAGE 3 DUE TO TYPE 2 DIABETES MELLITUS (HCC): ICD-10-CM

## 2023-09-01 DIAGNOSIS — M48.061 SPINAL STENOSIS OF LUMBAR REGION WITHOUT NEUROGENIC CLAUDICATION: ICD-10-CM

## 2023-09-01 DIAGNOSIS — E03.9 ACQUIRED HYPOTHYROIDISM: ICD-10-CM

## 2023-09-01 DIAGNOSIS — E11.22 CKD STAGE 3 DUE TO TYPE 2 DIABETES MELLITUS (HCC): ICD-10-CM

## 2023-09-01 DIAGNOSIS — Z79.4 TYPE 2 DIABETES MELLITUS WITH HYPERGLYCEMIA, WITH LONG-TERM CURRENT USE OF INSULIN (HCC): ICD-10-CM

## 2023-09-01 DIAGNOSIS — I73.9 PAD (PERIPHERAL ARTERY DISEASE) (HCC): ICD-10-CM

## 2023-09-01 DIAGNOSIS — Z79.4 TYPE 2 DIABETES MELLITUS WITH STAGE 3B CHRONIC KIDNEY DISEASE, WITH LONG-TERM CURRENT USE OF INSULIN (HCC): ICD-10-CM

## 2023-09-01 DIAGNOSIS — I10 ESSENTIAL HYPERTENSION: Primary | ICD-10-CM

## 2023-09-01 DIAGNOSIS — E11.65 TYPE 2 DIABETES MELLITUS WITH HYPERGLYCEMIA, WITH LONG-TERM CURRENT USE OF INSULIN (HCC): ICD-10-CM

## 2023-09-01 DIAGNOSIS — N18.32 TYPE 2 DIABETES MELLITUS WITH STAGE 3B CHRONIC KIDNEY DISEASE, WITH LONG-TERM CURRENT USE OF INSULIN (HCC): ICD-10-CM

## 2023-09-01 DIAGNOSIS — E11.22 TYPE 2 DIABETES MELLITUS WITH STAGE 3B CHRONIC KIDNEY DISEASE, WITH LONG-TERM CURRENT USE OF INSULIN (HCC): ICD-10-CM

## 2023-09-01 PROBLEM — R05.3 CHRONIC COUGH: Status: ACTIVE | Noted: 2023-09-01

## 2023-09-01 RX ORDER — VALSARTAN 160 MG/1
160 TABLET ORAL DAILY
Qty: 30 TABLET | Refills: 5 | Status: SHIPPED | OUTPATIENT
Start: 2023-09-01

## 2023-09-01 RX ORDER — NATEGLINIDE 60 MG/1
60 TABLET ORAL
Qty: 90 TABLET | Refills: 3 | Status: SHIPPED | OUTPATIENT
Start: 2023-09-01

## 2023-09-01 NOTE — ASSESSMENT & PLAN NOTE
Lab Results   Component Value Date    HGBA1C 10.0 (H) 07/14/2023   Has very uncontrolled diabetes. Reduced renal function we will stop glipizide and place her on nateglinide 120 mg with meals.   Start Hope Schmid which should help with glucose control  Would suggest Ozempic for weight loss  We will refer to endocrinology for diabetic counseling

## 2023-09-01 NOTE — ASSESSMENT & PLAN NOTE
Lab Results   Component Value Date    HGBA1C 10.0 (H) 07/14/2023   Creatinine has worsened to 1.5 mg/dl eGFR 31 ml/min  Will obtain a kidney ultrasound and a UA  MAC was neg  Will try Farxiga 5mg daily  She thinks she may have been on Jardiance before but will retry. Concerned about the risk of urinary tract infections and infections.   She needs to use a fragrance free soap in her vaginal area

## 2023-09-01 NOTE — PATIENT INSTRUCTIONS
Stop glipizide  Take nateglinide three times a day with meals. If you don't eat a meal, don't take it  Take Gail daily  Use fragrance free soap in vaginal area daily  Have labs in 3 months  See endocrinology  Stop lisinoril and take valsartan 160mg daily.  Hopefully your cough will disappear in 3 days

## 2023-09-01 NOTE — PROGRESS NOTES
[facility] [place], [place]    Name: Mackenzie Perea  YOB: 1945      Assessment/Plan:       Problem List Items Addressed This Visit        Endocrine    Type 2 diabetes mellitus with stage 3b chronic kidney disease, with long-term current use of insulin ([address])       Lab Results   Component Value Date    HGBA1C 10.0 (H) 07/14/2023   Has very uncontrolled diabetes. Reduced renal function we will stop glipizide and place her on nateglinide 120 mg with meals. Start Viona La which should help with glucose control  Would suggest Ozempic for weight loss  We will refer to endocrinology for diabetic counseling         Relevant Medications    dapagliflozin (Farxiga) 5 MG TABS    nateglinide (STARLIX) 60 mg tablet    valsartan (DIOVAN) 160 mg tablet    Other Relevant Orders    Albumin / creatinine urine ratio    Urinalysis with microscopic    Comprehensive metabolic panel    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 mellitus ([address])       Lab Results   Component Value Date    HGBA1C 10.0 (H) 07/14/2023   Creatinine has worsened to 1.5 mg/dl eGFR 31 ml/min  Will obtain a kidney ultrasound and a UA  MAC was neg  Will try Farxiga 5mg daily  She thinks she may have been on Jardiance before but will retry. Concerned about the risk of urinary tract infections and infections.   She needs to use a fragrance free soap in her vaginal area         Relevant Medications    dapagliflozin (Farxiga) 5 MG TABS    nateglinide (STARLIX) 60 mg tablet    valsartan (DIOVAN) 160 mg tablet    Other Relevant Orders    Albumin / creatinine urine ratio    Urinalysis with microscopic    Comprehensive metabolic panel    Acquired hypothyroidism     Under  Your supervision            Cardiovascular and Mediastinum    Essential hypertension - Primary     Controlled         Relevant Medications    valsartan (DIOVAN) 160 mg tablet    PAD (peripheral artery disease) ([address])     Has seen vascular surgery and undergoing active surveillance  His carotid stenosis            Other    Spinal stenosis of lumbar region     Had epidural injections X 3 with improvement        Other Visit Diagnoses     Type 2 diabetes mellitus with hyperglycemia, with long-term current use of insulin ([facility])        Relevant Medications    dapagliflozin (Farxiga) 5 MG TABS    nateglinide (STARLIX) 60 mg tablet    Other Relevant Orders    Ambulatory Referral to Endocrinology            Subjective:      Patient ID: Prince Hernandez is a 66 y.o. female. referred by Dr Jayshree Horton    HPI She has CKD 3B, DM -2 with long term current use of insulin for past 10-15 years without retinopathy , hypertension and hypothyroidism  She says her sugars have been low in the morning sometimes in the 80's  She has not been seen here for 2 years    The following portions of the patient's history were reviewed and updated as appropriate: allergies, current medications, past family history, past medical history, past social history, past surgical history and problem list.    Review of Systems   Constitutional: Positive for fatigue. HENT: Negative for hearing loss. Eyes: Negative for visual disturbance. Respiratory: Negative for cough and shortness of breath. Cardiovascular: Negative for chest pain, palpitations and leg swelling. Gastrointestinal: Negative for abdominal pain, blood in stool, constipation and diarrhea. Genitourinary: Negative for decreased urine volume, difficulty urinating, dysuria, frequency and hematuria. Musculoskeletal: Positive for arthralgias and back pain. Has a herniated disc for which she had 3 epidurals   Neurological: Positive for dizziness. Negative for weakness, light-headedness and headaches. Feels like she falls to the left side and she stopped Humalog  She cannot rise quickly to a standing position   Hematological: Does not bruise/bleed easily. Psychiatric/Behavioral: Negative for dysphoric mood.          Social History Socioeconomic History   • Marital status: /Civil Union     Spouse name: Dave Bolanos    • Number of children: None   • Years of education: None   • Highest education level: None   Occupational History   • Occupation: Retired    Tobacco Use   • Smoking status: Former     Packs/day: 1.00     Years: 14.00     Total pack years: 14.00     Types: Cigarettes     Quit date:      Years since quittin.6   • Smokeless tobacco: Never   Vaping Use   • Vaping Use: Never used   Substance and Sexual Activity   • Alcohol use: Yes     Comment: Occasionally    • Drug use: Never     Comment: Denies drug use - As per Bryce Henderson    • Sexual activity: None   Other Topics Concern   • None   Social History Narrative     - Dave Bolanos is [place] and speaks [place], common law marriage    Does not consume caffeine      Social Determinants of Health     Financial Resource Strain: Low Risk  (3/7/2023)    Overall Financial Resource Strain (CARDIA)    • Difficulty of Paying Living Expenses: Not hard at all   Food Insecurity: Not on file   Transportation Needs: No Transportation Needs (3/7/2023)    PRAPARE - Transportation    • Lack of Transportation (Medical): No    • Lack of Transportation (Non-Medical):  No   Physical Activity: Not on file   Stress: Not on file   Social Connections: Not on file   Intimate Partner Violence: Not on file   Housing Stability: Not on file     Past Medical History:   Diagnosis Date   • Benign essential hypertension    • Chronic kidney disease, stage 2 (mild)    • Chronic kidney disease, stage 3 ([facility])    • Disorder of gallbladder    • Disorder of skin and subcutaneous tissue    • Dyspnea    • Essential hypertension    • Hyperlipidemia    • Hypokalemia    • Hypothyroidism    • Malaise and fatigue    • Mixed hyperlipidemia    • Morbid obesity ([facility])    • Pernicious anemia    • Type 2 diabetes mellitus ([address])      Past Surgical History:   Procedure Laterality Date   • BREAST BIOPSY Right    • CARPAL TUNNEL RELEASE Bilateral    • CHOLECYSTECTOMY     • COLONOSCOPY      Dr. Ajay Dickson    • HYSTERECTOMY     • PARTIAL HYSTERECTOMY     • SKIN LESION EXCISION      scalp        Current Outpatient Medications:   •  amLODIPine (NORVASC) 5 mg tablet, Take 1 tablet (5 mg total) by mouth daily, Disp: 90 tablet, Rfl: 3  •  aspirin (ECOTRIN LOW STRENGTH) 81 mg EC tablet, Take 81 mg by mouth daily, Disp: , Rfl:   •  cholecalciferol (VITAMIN D3) 1,000 units tablet, Take 800 Units by mouth daily  , Disp: , Rfl:   •  dapagliflozin (Farxiga) 5 MG TABS, Take 1 tablet (5 mg total) by mouth daily, Disp: 30 tablet, Rfl: 3  •  DULoxetine (CYMBALTA) 20 mg capsule, take 1 capsule by mouth once daily, Disp: 90 capsule, Rfl: 1  •  fluticasone (FLONASE) 50 mcg/act nasal spray, 2 sprays into each nostril daily, Disp: 16 g, Rfl: 6  •  folic acid (FOLVITE) 982 mcg tablet, Take 400 mcg by mouth daily, Disp: , Rfl:   •  glucose blood (FREESTYLE LITE) test strip, Use as instructed, Disp: 100 each, Rfl: 3  •  glucose monitoring kit (FREESTYLE) monitoring kit, 1 each by Does not apply route daily, Disp: 1 each, Rfl: 0  •  Insulin Pen Needle 32G X 6 MM MISC, Use in the morning, Disp: 100 each, Rfl: 5  •  Lancets (freestyle) lancets, Use as instructed -glucose monitor BID, Disp: 100 each, Rfl: 5  •  levothyroxine 50 mcg tablet, Take 1 tablet (50 mcg total) by mouth daily, Disp: 90 tablet, Rfl: 2  •  metoprolol succinate (TOPROL-XL) 50 mg 24 hr tablet, Take 1 tablet (50 mg total) by mouth daily, Disp: 90 tablet, Rfl: 2  •  nateglinide (STARLIX) 60 mg tablet, Take 1 tablet (60 mg total) by mouth 3 (three) times a day before meals, Disp: 90 tablet, Rfl: 3  •  omega-3-acid ethyl esters (LOVAZA) 1 g capsule, Take 2 g by mouth 2 (two) times a day, Disp: , Rfl:   •  Psyllium (FIBER) 0.52 g CAPS, Take by mouth, Disp: , Rfl:   •  rosuvastatin (CRESTOR) 10 MG tablet, Take 1 tablet (10 mg total) by mouth daily, Disp: 90 tablet, Rfl: 3  •  Urea 20 Intensive Hydrating 20 % cream, , Disp: , Rfl:   •  valsartan (DIOVAN) 160 mg tablet, Take 1 tablet (160 mg total) by mouth daily, Disp: 30 tablet, Rfl: 5  •  Insulin Glargine Solostar (Lantus SoloStar) 100 UNIT/ML SOPN, Inject 0.5 mL (50 Units total) under the skin daily at bedtime, Disp: 15 mL, Rfl: 5    Lab Results   Component Value Date    SODIUM 139 07/14/2023    K 4.7 07/14/2023     07/14/2023    CO2 29 07/14/2023    AGAP 2 07/14/2023    BUN 24 07/14/2023    CREATININE 1.56 (H) 07/14/2023    GLUC 127 01/16/2022    GLUF 93 07/14/2023    CALCIUM 9.2 07/14/2023    AST 11 07/14/2023    ALT 17 07/14/2023    ALKPHOS 56 07/14/2023    TP 6.9 07/14/2023    TBILI 0.40 07/14/2023    EGFR 31 07/14/2023     Lab Results   Component Value Date    WBC 7.80 07/14/2023    HGB 12.4 07/14/2023    HCT 40.5 07/14/2023    MCV 98 07/14/2023     07/14/2023     Lab Results   Component Value Date    CHOLESTEROL 93 07/14/2023    CHOLESTEROL 109 01/25/2022    CHOLESTEROL 107 02/25/2021     Lab Results   Component Value Date    HDL 57 07/14/2023    HDL 36 (L) 01/25/2022    HDL 40 02/25/2021     Lab Results   Component Value Date    LDLCALC 13 07/14/2023    LDLCALC 44 01/25/2022    LDLCALC 32 02/25/2021     Lab Results   Component Value Date    TRIG 114 07/14/2023    TRIG 143 01/25/2022    TRIG 177 (H) 02/25/2021     No results found for: "CHOLHDL"  Lab Results   Component Value Date    BWB8WWGXKYBW 2.870 03/03/2023     Lab Results   Component Value Date    PTH 30.4 07/14/2023    CALCIUM 9.2 07/14/2023     No results found for: "SPEP", "UPEP"  No results found for: "Darius Brendan", "Anisa Ashlie"     2020  Right kidney  Normal echogenicity and contour. No suspicious masses detected. No hydronephrosis. No shadowing calculi. No perinephric fluid collections.     Left kidney  Normal echogenicity and contour. No suspicious masses detected. No hydronephrosis. No shadowing calculi.   No perinephric fluid collections.     URETERS:  Nonvisualized.     BLADDER: Normally distended. No focal thickening or mass lesions.     IMPRESSION:     Unremarkable ultrasound study of the kidneys           Objective:      /64 (BP Location: Left arm, Patient Position: Sitting, Cuff Size: Large)   Pulse 83   Ht 5' 2" (1.575 m)   Wt 86.6 kg (191 lb)   SpO2 97%   BMI 34.93 kg/m²          Physical Exam  Constitutional:       General: She is not in acute distress. Appearance: She is obese. She is not toxic-appearing. HENT:      Head: Normocephalic and atraumatic. Right Ear: External ear normal.      Left Ear: External ear normal.   Eyes:      Extraocular Movements: Extraocular movements intact. Conjunctiva/sclera: Conjunctivae normal.   Neck:      Vascular: No carotid bruit. Cardiovascular:      Rate and Rhythm: Normal rate and regular rhythm. Pulmonary:      Effort: Pulmonary effort is normal.      Breath sounds: Normal breath sounds. Abdominal:      General: Bowel sounds are normal.      Palpations: Abdomen is soft. Tenderness: There is no abdominal tenderness. Musculoskeletal:         General: Normal range of motion. Right lower leg: No edema. Left lower leg: No edema. Lymphadenopathy:      Cervical: No cervical adenopathy. Skin:     General: Skin is warm and dry. Neurological:      General: No focal deficit present. Mental Status: She is alert and oriented to person, place, and time. Psychiatric:         Mood and Affect: Mood normal.         Behavior: Behavior normal.         Thought Content:  Thought content normal.         Judgment: Judgment normal.

## 2023-09-05 DIAGNOSIS — E11.22 TYPE 2 DIABETES MELLITUS WITH STAGE 3A CHRONIC KIDNEY DISEASE, WITH LONG-TERM CURRENT USE OF INSULIN (HCC): ICD-10-CM

## 2023-09-05 DIAGNOSIS — N18.31 TYPE 2 DIABETES MELLITUS WITH STAGE 3A CHRONIC KIDNEY DISEASE, WITH LONG-TERM CURRENT USE OF INSULIN (HCC): ICD-10-CM

## 2023-09-05 DIAGNOSIS — Z79.4 TYPE 2 DIABETES MELLITUS WITH STAGE 3A CHRONIC KIDNEY DISEASE, WITH LONG-TERM CURRENT USE OF INSULIN (HCC): ICD-10-CM

## 2023-09-05 RX ORDER — INSULIN GLARGINE 100 [IU]/ML
50 INJECTION, SOLUTION SUBCUTANEOUS
Qty: 15 ML | Refills: 5 | Status: SHIPPED | OUTPATIENT
Start: 2023-09-05 | End: 2023-12-04

## 2023-09-05 NOTE — TELEPHONE ENCOUNTER
Patient requesting refill(s) of: Lantus 100 IU/mL, take 50 IU daily    Last filled: 2/22/2023 #15 mL x 5  Last appt: 7/19/2023  Next appt: 10/23/2023  Pharmacy: [facility]

## 2023-09-07 DIAGNOSIS — I10 ESSENTIAL HYPERTENSION: ICD-10-CM

## 2023-09-07 RX ORDER — METOPROLOL SUCCINATE 50 MG/1
50 TABLET, EXTENDED RELEASE ORAL DAILY
Qty: 90 TABLET | Refills: 2 | Status: SHIPPED | OUTPATIENT
Start: 2023-09-07

## 2023-09-07 NOTE — TELEPHONE ENCOUNTER
Patient requesting refill(s) of: metoprolol succinate 50 mg daily    Last filled: 10/27/2022 #90 x 2  Last appt: 7/19/2023  Next appt: 10/23/2023  Pharmacy: [facility]

## 2023-09-13 DIAGNOSIS — N18.32 TYPE 2 DIABETES MELLITUS WITH STAGE 3B CHRONIC KIDNEY DISEASE, WITH LONG-TERM CURRENT USE OF INSULIN (HCC): ICD-10-CM

## 2023-09-13 DIAGNOSIS — N18.30 CKD STAGE 3 DUE TO TYPE 2 DIABETES MELLITUS (HCC): ICD-10-CM

## 2023-09-13 DIAGNOSIS — Z79.4 TYPE 2 DIABETES MELLITUS WITH STAGE 3B CHRONIC KIDNEY DISEASE, WITH LONG-TERM CURRENT USE OF INSULIN (HCC): ICD-10-CM

## 2023-09-13 DIAGNOSIS — E11.22 TYPE 2 DIABETES MELLITUS WITH STAGE 3B CHRONIC KIDNEY DISEASE, WITH LONG-TERM CURRENT USE OF INSULIN (HCC): ICD-10-CM

## 2023-09-13 DIAGNOSIS — E11.22 CKD STAGE 3 DUE TO TYPE 2 DIABETES MELLITUS (HCC): ICD-10-CM

## 2023-09-14 RX ORDER — NATEGLINIDE 60 MG/1
60 TABLET ORAL
Qty: 270 TABLET | Refills: 3 | Status: SHIPPED | OUTPATIENT
Start: 2023-09-14

## 2023-09-14 RX ORDER — VALSARTAN 160 MG/1
160 TABLET ORAL DAILY
Qty: 90 TABLET | Refills: 3 | Status: SHIPPED | OUTPATIENT
Start: 2023-09-14

## 2023-09-26 ENCOUNTER — APPOINTMENT (OUTPATIENT)
Dept: LAB | Facility: CLINIC | Age: 78
End: 2023-09-26
Payer: MEDICARE

## 2023-09-26 ENCOUNTER — CONSULT (OUTPATIENT)
Dept: ENDOCRINOLOGY | Facility: CLINIC | Age: 78
End: 2023-09-26
Payer: MEDICARE

## 2023-09-26 VITALS
OXYGEN SATURATION: 99 % | HEART RATE: 82 BPM | TEMPERATURE: 98.6 F | HEIGHT: 62 IN | WEIGHT: 191 LBS | SYSTOLIC BLOOD PRESSURE: 128 MMHG | DIASTOLIC BLOOD PRESSURE: 62 MMHG | BODY MASS INDEX: 35.15 KG/M2 | RESPIRATION RATE: 16 BRPM

## 2023-09-26 DIAGNOSIS — N18.32 TYPE 2 DIABETES MELLITUS WITH STAGE 3B CHRONIC KIDNEY DISEASE, WITH LONG-TERM CURRENT USE OF INSULIN (HCC): ICD-10-CM

## 2023-09-26 DIAGNOSIS — Z79.4 TYPE 2 DIABETES MELLITUS WITH STAGE 3B CHRONIC KIDNEY DISEASE, WITH LONG-TERM CURRENT USE OF INSULIN (HCC): Primary | ICD-10-CM

## 2023-09-26 DIAGNOSIS — E03.9 ACQUIRED HYPOTHYROIDISM: ICD-10-CM

## 2023-09-26 DIAGNOSIS — E11.22 CKD STAGE 3 DUE TO TYPE 2 DIABETES MELLITUS (HCC): ICD-10-CM

## 2023-09-26 DIAGNOSIS — N18.32 TYPE 2 DIABETES MELLITUS WITH STAGE 3B CHRONIC KIDNEY DISEASE, WITH LONG-TERM CURRENT USE OF INSULIN (HCC): Primary | ICD-10-CM

## 2023-09-26 DIAGNOSIS — Z79.4 TYPE 2 DIABETES MELLITUS WITH STAGE 3B CHRONIC KIDNEY DISEASE, WITH LONG-TERM CURRENT USE OF INSULIN (HCC): ICD-10-CM

## 2023-09-26 DIAGNOSIS — E11.22 TYPE 2 DIABETES MELLITUS WITH STAGE 3B CHRONIC KIDNEY DISEASE, WITH LONG-TERM CURRENT USE OF INSULIN (HCC): Primary | ICD-10-CM

## 2023-09-26 DIAGNOSIS — E66.01 SEVERE OBESITY (BMI 35.0-39.9) WITH COMORBIDITY (HCC): ICD-10-CM

## 2023-09-26 DIAGNOSIS — I10 ESSENTIAL HYPERTENSION: ICD-10-CM

## 2023-09-26 DIAGNOSIS — N18.30 CKD STAGE 3 DUE TO TYPE 2 DIABETES MELLITUS (HCC): ICD-10-CM

## 2023-09-26 DIAGNOSIS — E11.22 TYPE 2 DIABETES MELLITUS WITH STAGE 3B CHRONIC KIDNEY DISEASE, WITH LONG-TERM CURRENT USE OF INSULIN (HCC): ICD-10-CM

## 2023-09-26 LAB
ALBUMIN SERPL BCP-MCNC: 3.6 G/DL (ref 3.5–5)
ALP SERPL-CCNC: 49 U/L (ref 34–104)
ALT SERPL W P-5'-P-CCNC: 10 U/L (ref 7–52)
ANION GAP SERPL CALCULATED.3IONS-SCNC: 8 MMOL/L
AST SERPL W P-5'-P-CCNC: 11 U/L (ref 13–39)
BACTERIA UR QL AUTO: ABNORMAL /HPF
BILIRUB SERPL-MCNC: 0.42 MG/DL (ref 0.2–1)
BILIRUB UR QL STRIP: NEGATIVE
BUN SERPL-MCNC: 29 MG/DL (ref 5–25)
CALCIUM SERPL-MCNC: 10.3 MG/DL (ref 8.4–10.2)
CHLORIDE SERPL-SCNC: 99 MMOL/L (ref 96–108)
CLARITY UR: CLEAR
CO2 SERPL-SCNC: 30 MMOL/L (ref 21–32)
COLOR UR: ABNORMAL
CREAT SERPL-MCNC: 1.49 MG/DL (ref 0.6–1.3)
CREAT UR-MCNC: 115.6 MG/DL
GFR SERPL CREATININE-BSD FRML MDRD: 33 ML/MIN/1.73SQ M
GLUCOSE P FAST SERPL-MCNC: 184 MG/DL (ref 65–99)
GLUCOSE UR STRIP-MCNC: ABNORMAL MG/DL
HGB UR QL STRIP.AUTO: NEGATIVE
HYALINE CASTS #/AREA URNS LPF: ABNORMAL /LPF
KETONES UR STRIP-MCNC: NEGATIVE MG/DL
LEUKOCYTE ESTERASE UR QL STRIP: NEGATIVE
MICROALBUMIN UR-MCNC: <7 MG/L
MICROALBUMIN/CREAT 24H UR: <6 MG/G CREATININE (ref 0–30)
MUCOUS THREADS UR QL AUTO: ABNORMAL
NITRITE UR QL STRIP: NEGATIVE
NON-SQ EPI CELLS URNS QL MICRO: ABNORMAL /HPF
PH UR STRIP.AUTO: 6.5 [PH]
POTASSIUM SERPL-SCNC: 4.3 MMOL/L (ref 3.5–5.3)
PROT SERPL-MCNC: 6.4 G/DL (ref 6.4–8.4)
PROT UR STRIP-MCNC: ABNORMAL MG/DL
RBC #/AREA URNS AUTO: ABNORMAL /HPF
SODIUM SERPL-SCNC: 137 MMOL/L (ref 135–147)
SP GR UR STRIP.AUTO: 1.01 (ref 1–1.03)
UROBILINOGEN UR STRIP-ACNC: <2 MG/DL
WBC #/AREA URNS AUTO: ABNORMAL /HPF

## 2023-09-26 PROCEDURE — 36415 COLL VENOUS BLD VENIPUNCTURE: CPT

## 2023-09-26 PROCEDURE — 99204 OFFICE O/P NEW MOD 45 MIN: CPT | Performed by: NURSE PRACTITIONER

## 2023-09-26 PROCEDURE — 80053 COMPREHEN METABOLIC PANEL: CPT

## 2023-09-26 NOTE — PROGRESS NOTES
New Patient Progress Note      Chief Complaint   Patient presents with   • Diabetes Type 2     Pt checks bg every morning 124, this morning it was 198, pt loves sweets and ice cream.Dx 10+ yrs ago. Impression & Plan:    Problem List Items Addressed This Visit        Endocrine    Type 2 diabetes mellitus with stage 3b chronic kidney disease, with long-term current use of insulin ([address]) - Primary     Patient is uncontrolled. Counseled on pathophysiology of diabetes. Counseled on negative, long-term effects associated with uncontrolled diabetes including neuropathy, nephropathy, retinopathy, heart attack, and stroke. Patient needs a CGM. Will order her a freestyle hanna 2. The patient is a candidate for CGM use: Indications: Diabetes Type 2  The patient is treated with insulin daily  SMBG data is being used to make adjustments to the insulin regimen  At this time, recommend starting GLP-1, Ozempic. Patient will benefit from increased glycemic control, weight loss, and CV protection. She denies any history of pancreatitis, medullary thyroid cancer, or MEN-2. Reviewed mechanism of action as well as potential side effects, namely nausea and GI upset. Inject 0.25 mg once weekly for 4 weeks then increase to 0.5 mg once weekly. Januvia was already tried and did not provide sufficient coverage for her hyperglycemia. She cannot take metformin due to her GFR of 31. Given her age, I would prefer to avoid adding mealtime insulin as this will increase her risk of hypoglycemia. I did review the importance of checking her blood sugar more frequently while we are titrating up her Ozempic as she may require less insulin once she is on the 0.5 mg once weekly. However, given how high her blood sugars tend to run, I do not anticipate this will be true at the 0.5 mg dose. Referral given for medical nutrition therapy. Strongly encourage patient to schedule an appointment.   Patient is to notify me if Ozempic is cost prohibitive, she has any side effects, or continues to have persistent hyperglycemia or any episodes of hypoglycemia. Reviewed appropriate treatment for hypoglycemia. Follow-up in 3 months. Lab Results   Component Value Date    HGBA1C 10.0 (H) 07/14/2023            Relevant Medications    semaglutide, 0.25 or 0.5 mg/dose, (Ozempic) 2 mg/1.5 mL injection pen    Other Relevant Orders    Ambulatory referral to Diabetic Education    Hemoglobin A1C    Comprehensive metabolic panel    Acquired hypothyroidism     Stable on 50 mcg of levothyroxine daily. Continue. Cardiovascular and Mediastinum    Essential hypertension     Be stable at 128/62. Continue current regimen. Other    Severe obesity (BMI 35.0-39. 9) with comorbidity ([address])     Unseld on the relationship between obesity and insulin resistance. Recommend starting GLP-1 Ozempic. Patient will benefit from increased glycemic control, weight loss, and cardiovascular protection. Orders Placed This Encounter   Procedures   • Hemoglobin A1C     Standing Status:   Future     Standing Expiration Date:   9/26/2024   • Comprehensive metabolic panel     This is a patient instruction: Patient fasting for 8 hours or longer recommended. Standing Status:   Future     Standing Expiration Date:   9/26/2024   • Ambulatory referral to Diabetic Education     Standing Status:   Future     Standing Expiration Date:   9/26/2024     Referral Priority:   Routine     Referral Type:   Consult - AMB     Referral Reason:   Specialty Services Required     Requested Specialty:   Diabetes Services     Number of Visits Requested:   1     Expiration Date:   9/26/2024       History of Present Illness:   Caitlin Godoy is a 66 y.o. female with hypertension, hyperlipidemia, hypothyroidism, and type 2 diabetes presenting to the office today to establish care. Past medical history significant for CKD stage III, obesity, and PAD.      Hemoglobin A1C   Latest Ref Rng Normal 3.8-5.6%; PreDiabetic 5.7-6.4%; Diabetic >=6.5%; Glycemic control for adults with diabetes <7.0% %   11/23/2022 9.1 (H)    3/3/2023 10.2 (H)    7/14/2023 10.0 (H)       eAG, EST AVG Glucose   Latest Ref Rng mg/dl   11/23/2022 214    3/3/2023 246    7/14/2023 240       Legend:  (H) High     eGFR   Latest Ref Rng ml/min/1.73sq m   3/3/2023 37    7/14/2023 31          Home blood glucose readings: checking daily; 198 this morning, "normally in the 120s"    Current regimen:   Nateglinide 60 g 3 times daily prior to meals (patient not taking)  Lantus 50 units nightly  Farxiga 5 mg daily (patient not taking)    Patient reports that she is not taking Starlix as prescribed due to feelings of dizziness after taking this. This was not correlated with any blood sugars less than 70 mg/dL. She is not taking prescribed Gail as this was cost prohibitive. Previous medications:  Glipizide-discontinued in favor of [facility]- price  Liraglutide-does not recall why this was discontinued. She denies any trouble with the medication  Patient has previously used fast acting insulin-Humalog. Patient reports that she was initially diagnosed through routine blood work with type 2 diabetes in approximately 1994. Denies complications of neuropathy and retinopathy. Denies macrovascular complications of heart attack and stroke however, she does have a family history of stroke in her mother and coronary artery disease in both of her brothers. She reports her brothers are prediabetic. Denies history of type 2 diabetes in parents or grandparents. She is using a freestyle glucometer to test her blood sugars once daily in the morning. Denies any recent episodes of hypoglycemia or symptoms of hypoglycemia. Denies symptoms of hyperglycemia including polydipsia, polyuria, and blurred vision. She believes her elevated A1c may be related to recent need for epidural steroid injections.   She is also under a great deal of stress at home due to her partner having dementia. She has met with a diabetes educator in the past to discuss her diet. She does not drink soda. She does drink Crystal light peach tea and water. She does admit to having a sweet tooth and enjoying chocolate and ice cream.    She reports that she often skips breakfast.  She will have an open face sandwich (tuna) for lunch. Sometimes she will have cereal (honey nut Cheerios, shredded wheat, raisin Bran). Sometimes she will have soup. For dinner, she and her partner will sometimes cook. They typically have protein, vegetables and if they will have a starch they split between them. Most patients physical activity comes from shopping or household chores. To date on her vaccinations. He follows with ophthalmology and podiatry. Ophthalmology: UTD; 10/5/23- Dr. Dontrell Rosales    Podiatry/Foot care: Dr. Leticia Schafer q 9 weeks    Dentist: Up-to-date    Diabetes education/nutrition: referral given    For hypertension, she is taking 160 mg of valsartan daily and 5 mg of amlodipine daily. She denies headache, pedal edema, and cough. For hyperlipidemia, she is taking 10 mg of rosuvastatin nightly. She denies myalgias. Hypothyroidism, she is taking 50 mcg of levothyroxine daily. TSH from 3/3/2023 stable. Patient Active Problem List   Diagnosis   • Type 2 diabetes mellitus with stage 3b chronic kidney disease, with long-term current use of insulin ([address])   • 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 mellitus ([address])   • Essential hypertension   • Mixed hyperlipidemia   • Acquired hypothyroidism   • Severe obesity (BMI 35.0-39. 9) with comorbidity ([address])   • Spinal stenosis of lumbar region   • Chronic pain syndrome   • Lumbar radiculopathy   • PAD (peripheral artery disease) (HCC)   • Asymptomatic bilateral carotid artery stenosis   • Chronic cough      Past Medical History:   Diagnosis Date   • Benign essential hypertension    • Chronic kidney disease, stage 3 ([address]) • Disorder of gallbladder    • Disorder of skin and subcutaneous tissue    • Dyspnea    • Essential hypertension    • Hyperlipidemia    • Hypokalemia    • Malaise and fatigue    • Mixed hyperlipidemia    • Morbid obesity (HCC)    • Pernicious anemia       Past Surgical History:   Procedure Laterality Date   • BREAST BIOPSY Right    • CARPAL TUNNEL RELEASE Bilateral    • CHOLECYSTECTOMY     • COLONOSCOPY      Dr. Radha Louis    • HYSTERECTOMY     • PARTIAL HYSTERECTOMY     • SKIN LESION EXCISION      scalp       Family History   Problem Relation Age of Onset   • Stroke Mother    • Atrial fibrillation Mother    • Brain cancer Father    • Other Brother         Twin brothers - Open heart   • Other Brother         Twin brothers - Open heart   • No Known Problems Maternal Grandmother    • No Known Problems Paternal Grandmother      Social History     Tobacco Use   • Smoking status: Former     Packs/day: 1.00     Years: 14.00     Total pack years: 14.00     Types: Cigarettes     Quit date: 0     Years since quittin.7   • Smokeless tobacco: Never   Substance Use Topics   • Alcohol use: Yes     Comment: Occasionally      No Known Allergies      Current Outpatient Medications:   •  amLODIPine (NORVASC) 5 mg tablet, Take 1 tablet (5 mg total) by mouth daily, Disp: 90 tablet, Rfl: 3  •  aspirin (ECOTRIN LOW STRENGTH) 81 mg EC tablet, Take 81 mg by mouth daily, Disp: , Rfl:   •  cholecalciferol (VITAMIN D3) 1,000 units tablet, Take 800 Units by mouth daily  , Disp: , Rfl:   •  DULoxetine (CYMBALTA) 20 mg capsule, take 1 capsule by mouth once daily, Disp: 90 capsule, Rfl: 1  •  fluticasone (FLONASE) 50 mcg/act nasal spray, 2 sprays into each nostril daily, Disp: 16 g, Rfl: 6  •  folic acid (FOLVITE) 764 mcg tablet, Take 400 mcg by mouth daily, Disp: , Rfl:   •  glucose blood (FREESTYLE LITE) test strip, Use as instructed, Disp: 100 each, Rfl: 3  •  glucose monitoring kit (FREESTYLE) monitoring kit, 1 each by Does not apply route daily, Disp: 1 each, Rfl: 0  •  Insulin Glargine Solostar (Lantus SoloStar) 100 UNIT/ML SOPN, Inject 0.5 mL (50 Units total) under the skin daily at bedtime, Disp: 15 mL, Rfl: 5  •  Insulin Pen Needle 32G X 6 MM MISC, Use in the morning, Disp: 100 each, Rfl: 5  •  Lancets (freestyle) lancets, Use as instructed -glucose monitor BID, Disp: 100 each, Rfl: 5  •  levothyroxine 50 mcg tablet, Take 1 tablet (50 mcg total) by mouth daily, Disp: 90 tablet, Rfl: 2  •  metoprolol succinate (TOPROL-XL) 50 mg 24 hr tablet, Take 1 tablet (50 mg total) by mouth daily, Disp: 90 tablet, Rfl: 2  •  omega-3-acid ethyl esters (LOVAZA) 1 g capsule, Take 2 g by mouth 2 (two) times a day, Disp: , Rfl:   •  Psyllium (FIBER) 0.52 g CAPS, Take by mouth, Disp: , Rfl:   •  rosuvastatin (CRESTOR) 10 MG tablet, Take 1 tablet (10 mg total) by mouth daily, Disp: 90 tablet, Rfl: 3  •  semaglutide, 0.25 or 0.5 mg/dose, (Ozempic) 2 mg/1.5 mL injection pen, Inject 0.25 mg once weekly for 4 weeks then increase to 0.5 mg once weekly. , Disp: 3 mL, Rfl: 0  •  Urea 20 Intensive Hydrating 20 % cream, , Disp: , Rfl:   •  nateglinide (STARLIX) 60 mg tablet, Take 1 tablet (60 mg total) by mouth 3 (three) times a day before meals (Patient not taking: Reported on 9/26/2023), Disp: 270 tablet, Rfl: 3  •  valsartan (DIOVAN) 160 mg tablet, Take 1 tablet (160 mg total) by mouth daily (Patient not taking: Reported on 9/26/2023), Disp: 90 tablet, Rfl: 3    Review of Systems   Constitutional: Negative for activity change, appetite change, fatigue and unexpected weight change. HENT: Negative for dental problem, sore throat, trouble swallowing and voice change. Eyes: Negative for visual disturbance. Respiratory: Negative for cough, chest tightness and shortness of breath. Cardiovascular: Negative for chest pain, palpitations and leg swelling. Gastrointestinal: Negative for constipation, diarrhea, nausea and vomiting.    Endocrine: Negative for cold intolerance, heat intolerance, polydipsia, polyphagia and polyuria. Genitourinary: Negative for frequency. Musculoskeletal: Positive for arthralgias, back pain and myalgias. Negative for gait problem. Skin: Negative for wound. Allergic/Immunologic: Negative for environmental allergies and food allergies. Neurological: Negative for dizziness, weakness, light-headedness, numbness and headaches. Psychiatric/Behavioral: Negative for decreased concentration, dysphoric mood and sleep disturbance. The patient is not nervous/anxious. Physical Exam:  Body mass index is 34.93 kg/m². /62   Pulse 82   Temp 98.6 °F (37 °C)   Resp 16   Ht 5' 2" (1.575 m)   Wt 86.6 kg (191 lb)   SpO2 99%   BMI 34.93 kg/m²    Wt Readings from Last 3 Encounters:   09/26/23 86.6 kg (191 lb)   09/01/23 86.6 kg (191 lb)   08/22/23 86.6 kg (191 lb)       Physical Exam  Vitals reviewed. Constitutional:       General: She is not in acute distress. Appearance: She is well-developed. She is obese. She is not ill-appearing. HENT:      Head: Normocephalic and atraumatic. Eyes:      Pupils: Pupils are equal, round, and reactive to light. Neck:      Thyroid: No thyromegaly. Cardiovascular:      Rate and Rhythm: Normal rate and regular rhythm. Pulses: Normal pulses. Heart sounds: Normal heart sounds. Pulmonary:      Effort: Pulmonary effort is normal.      Breath sounds: Normal breath sounds. Abdominal:      General: Bowel sounds are normal. There is no distension. Palpations: Abdomen is soft. Tenderness: There is no abdominal tenderness. Musculoskeletal:      Cervical back: Normal range of motion and neck supple. Right lower leg: No edema. Left lower leg: No edema. Lymphadenopathy:      Cervical: No cervical adenopathy. Skin:     General: Skin is warm and dry. Capillary Refill: Capillary refill takes less than 2 seconds.    Neurological:      Mental Status: She is alert and oriented to person, place, and time. Gait: Gait normal.   Psychiatric:         Mood and Affect: Mood normal.         Behavior: Behavior normal.           Labs:   Lab Results   Component Value Date    HGBA1C 10.0 (H) 07/14/2023    HGBA1C 10.2 (H) 03/03/2023    HGBA1C 9.1 (H) 11/23/2022     Lab Results   Component Value Date    CREATININE 1.56 (H) 07/14/2023    CREATININE 1.37 (H) 03/03/2023    CREATININE 1.46 (H) 08/17/2022    BUN 24 07/14/2023    K 4.7 07/14/2023     07/14/2023    CO2 29 07/14/2023     eGFR   Date Value Ref Range Status   07/14/2023 31 ml/min/1.73sq m Final     Lab Results   Component Value Date    HDL 57 07/14/2023    TRIG 114 07/14/2023     Lab Results   Component Value Date    ALT 17 07/14/2023    AST 11 07/14/2023    ALKPHOS 56 07/14/2023     Lab Results   Component Value Date    AAM2HDEGJUEQ 2.870 03/03/2023    ESA6LQDYXPYV 2.910 08/17/2022    HXG0BBBTOJCT 1.760 01/25/2022     Lab Results   Component Value Date    FREET4 1.03 10/21/2020             Discussed with the patient and all questioned fully answered. She will call me if any problems arise. Follow-up appointment in 3 months. Counseled patient on diagnostic results, prognosis, risk and benefit of treatment options, instruction for management, importance of treatment compliance, Risk  factor reduction and impressions    Patient Instructions   1. Ok to stop starlix and farxiga. Start Ozempic. Inject 0.25 mg once weekly for 4 weeks then increase to 0.5 mg once weekly and stay there until I see you again. 2. I gave a referral to meet with the diabetes educator. I do recommend you go. 3. We will order you a continuous glucose monitor, The Freestyle Bobby 2.  4. Follow up in 3 months. 5. Let me know if Ozempic is too costly. 6. Let me know if you have any low blood sugars.       [facility]– [place] [address],[place]. [address]

## 2023-09-26 NOTE — ASSESSMENT & PLAN NOTE
Unseld on the relationship between obesity and insulin resistance. Recommend starting GLP-1 Ozempic. Patient will benefit from increased glycemic control, weight loss, and cardiovascular protection.

## 2023-09-26 NOTE — PATIENT INSTRUCTIONS
Ok to stop starlix and farxiga. Start Ozempic. Inject 0.25 mg once weekly for 4 weeks then increase to 0.5 mg once weekly and stay there until I see you again. I gave a referral to meet with the diabetes educator. I do recommend you go. We will order you a continuous glucose monitor, The Freestyle Bobby 2. Follow up in 3 months. Let me know if Ozempic is too costly. Let me know if you have any low blood sugars.

## 2023-09-26 NOTE — ASSESSMENT & PLAN NOTE
Patient is uncontrolled. Counseled on pathophysiology of diabetes. Counseled on negative, long-term effects associated with uncontrolled diabetes including neuropathy, nephropathy, retinopathy, heart attack, and stroke. Patient needs a CGM. Will order her a freestyle hanna 2. The patient is a candidate for CGM use: Indications: Diabetes Type 2  The patient is treated with insulin daily  SMBG data is being used to make adjustments to the insulin regimen  At this time, recommend starting GLP-1, Ozempic. Patient will benefit from increased glycemic control, weight loss, and CV protection. She denies any history of pancreatitis, medullary thyroid cancer, or MEN-2. Reviewed mechanism of action as well as potential side effects, namely nausea and GI upset. Inject 0.25 mg once weekly for 4 weeks then increase to 0.5 mg once weekly. Januvia was already tried and did not provide sufficient coverage for her hyperglycemia. She cannot take metformin due to her GFR of 31. Given her age, I would prefer to avoid adding mealtime insulin as this will increase her risk of hypoglycemia. I did review the importance of checking her blood sugar more frequently while we are titrating up her Ozempic as she may require less insulin once she is on the 0.5 mg once weekly. However, given how high her blood sugars tend to run, I do not anticipate this will be true at the 0.5 mg dose. Referral given for medical nutrition therapy. Strongly encourage patient to schedule an appointment. Patient is to notify me if [place] is cost prohibitive, she has any side effects, or continues to have persistent hyperglycemia or any episodes of hypoglycemia. Reviewed appropriate treatment for hypoglycemia. Follow-up in 3 months.     Lab Results   Component Value Date    HGBA1C 10.0 (H) 07/14/2023

## 2023-09-27 ENCOUNTER — TELEPHONE (OUTPATIENT)
Dept: GASTROENTEROLOGY | Facility: CLINIC | Age: 78
End: 2023-09-27

## 2023-09-27 DIAGNOSIS — E83.52 HYPERCALCEMIA: Primary | ICD-10-CM

## 2023-09-27 DIAGNOSIS — E55.9 VITAMIN D DEFICIENCY: ICD-10-CM

## 2023-09-27 NOTE — TELEPHONE ENCOUNTER
Pt called that Chanell Rosado will cost her $158 which she stated isn't too bad. She is asking if she is supposed to stop her other insulin?

## 2023-09-28 NOTE — TELEPHONE ENCOUNTER
Pleased to hear it. She should continue to take her Lantus at 50 units. She needs to pay close attention to her sugars, especially when she increases the Ozempic to the 0.5 mg once weekly. If she has any blood sugars less than 80 mg/dL, let us know and we will reduce her Lantus dose. Thank you.

## 2023-10-05 LAB
LEFT EYE DIABETIC RETINOPATHY: NORMAL
RIGHT EYE DIABETIC RETINOPATHY: NORMAL

## 2023-10-17 DIAGNOSIS — I10 ESSENTIAL HYPERTENSION: ICD-10-CM

## 2023-10-17 NOTE — TELEPHONE ENCOUNTER
Patient requesting refill(s) of: amlodipine     Last filled:11/7/2022  Last appt:7/19/2023  Next appt:10/23/2023  Pharmacy:    [facility]

## 2023-10-18 ENCOUNTER — RA CDI HCC (OUTPATIENT)
Dept: OTHER | Facility: HOSPITAL | Age: 78
End: 2023-10-18

## 2023-10-18 ENCOUNTER — TELEPHONE (OUTPATIENT)
Dept: FAMILY MEDICINE CLINIC | Facility: CLINIC | Age: 78
End: 2023-10-18

## 2023-10-18 RX ORDER — AMLODIPINE BESYLATE 5 MG/1
5 TABLET ORAL DAILY
Qty: 90 TABLET | Refills: 3 | Status: SHIPPED | OUTPATIENT
Start: 2023-10-18

## 2023-10-18 NOTE — TELEPHONE ENCOUNTER
Patient called requesting to speak with Aleah Ramsey. She is stating she was supposed to receive a script for a [address], but she has not received anything. I did not see a script for it in her medications. She wants to know if we are still ordering it for her?

## 2023-10-20 NOTE — TELEPHONE ENCOUNTER
[address] [facility] ([place])   [address] [facility]  just now  submitted to Uriel Rebollar (Diabetes 1)      FreeStyle Bobby 3 System, Continuous Glucose Monitoring Package  Bobby 3 Sensor, change every 14 days - CGMS    Quantity  1  Prescription Details  Directions for CGM use - Use per  directions  Day Supply - 90  Supplier  [facility] (Diabetes 1)

## 2023-10-21 LAB
DME PARACHUTE DELIVERY DATE REQUESTED: NORMAL
DME PARACHUTE ITEM DESCRIPTION: NORMAL
DME PARACHUTE ORDER STATUS: NORMAL
DME PARACHUTE SUPPLIER NAME: NORMAL
DME PARACHUTE SUPPLIER PHONE: NORMAL

## 2023-10-26 ENCOUNTER — APPOINTMENT (OUTPATIENT)
Dept: LAB | Facility: CLINIC | Age: 78
End: 2023-10-26
Payer: MEDICARE

## 2023-10-26 DIAGNOSIS — E83.52 HYPERCALCEMIA: ICD-10-CM

## 2023-10-26 DIAGNOSIS — Z79.4 TYPE 2 DIABETES MELLITUS WITH STAGE 3B CHRONIC KIDNEY DISEASE, WITH LONG-TERM CURRENT USE OF INSULIN (HCC): ICD-10-CM

## 2023-10-26 DIAGNOSIS — E03.9 ACQUIRED HYPOTHYROIDISM: ICD-10-CM

## 2023-10-26 DIAGNOSIS — I10 ESSENTIAL HYPERTENSION: ICD-10-CM

## 2023-10-26 DIAGNOSIS — E11.22 TYPE 2 DIABETES MELLITUS WITH STAGE 3B CHRONIC KIDNEY DISEASE, WITH LONG-TERM CURRENT USE OF INSULIN (HCC): ICD-10-CM

## 2023-10-26 DIAGNOSIS — E55.9 VITAMIN D DEFICIENCY: ICD-10-CM

## 2023-10-26 DIAGNOSIS — E11.22 CKD STAGE 3 DUE TO TYPE 2 DIABETES MELLITUS (HCC): ICD-10-CM

## 2023-10-26 DIAGNOSIS — N18.32 TYPE 2 DIABETES MELLITUS WITH STAGE 3B CHRONIC KIDNEY DISEASE, WITH LONG-TERM CURRENT USE OF INSULIN (HCC): ICD-10-CM

## 2023-10-26 DIAGNOSIS — N18.30 CKD STAGE 3 DUE TO TYPE 2 DIABETES MELLITUS (HCC): ICD-10-CM

## 2023-10-26 LAB
25(OH)D3 SERPL-MCNC: 37.5 NG/ML (ref 30–100)
BASOPHILS # BLD AUTO: 0.03 THOUSANDS/ÂΜL (ref 0–0.1)
BASOPHILS NFR BLD AUTO: 0 % (ref 0–1)
CA-I BLD-SCNC: 1.17 MMOL/L (ref 1.12–1.32)
CALCIUM PRE 500 MG CA PO UR-SCNC: 10.1 MG/DL
EOSINOPHIL # BLD AUTO: 0.16 THOUSAND/ÂΜL (ref 0–0.61)
EOSINOPHIL NFR BLD AUTO: 2 % (ref 0–6)
ERYTHROCYTE [DISTWIDTH] IN BLOOD BY AUTOMATED COUNT: 13.1 % (ref 11.6–15.1)
EST. AVERAGE GLUCOSE BLD GHB EST-MCNC: 237 MG/DL
HBA1C MFR BLD: 9.9 %
HCT VFR BLD AUTO: 39.1 % (ref 34.8–46.1)
HGB BLD-MCNC: 12.2 G/DL (ref 11.5–15.4)
IMM GRANULOCYTES # BLD AUTO: 0.03 THOUSAND/UL (ref 0–0.2)
IMM GRANULOCYTES NFR BLD AUTO: 0 % (ref 0–2)
LYMPHOCYTES # BLD AUTO: 1.59 THOUSANDS/ÂΜL (ref 0.6–4.47)
LYMPHOCYTES NFR BLD AUTO: 19 % (ref 14–44)
MCH RBC QN AUTO: 30.3 PG (ref 26.8–34.3)
MCHC RBC AUTO-ENTMCNC: 31.2 G/DL (ref 31.4–37.4)
MCV RBC AUTO: 97 FL (ref 82–98)
MONOCYTES # BLD AUTO: 0.62 THOUSAND/ÂΜL (ref 0.17–1.22)
MONOCYTES NFR BLD AUTO: 8 % (ref 4–12)
NEUTROPHILS # BLD AUTO: 5.82 THOUSANDS/ÂΜL (ref 1.85–7.62)
NEUTS SEG NFR BLD AUTO: 71 % (ref 43–75)
NRBC BLD AUTO-RTO: 0 /100 WBCS
PLATELET # BLD AUTO: 152 THOUSANDS/UL (ref 149–390)
PMV BLD AUTO: 11 FL (ref 8.9–12.7)
PTH-INTACT SERPL-MCNC: 49.1 PG/ML (ref 12–88)
RBC # BLD AUTO: 4.03 MILLION/UL (ref 3.81–5.12)
TSH SERPL DL<=0.05 MIU/L-ACNC: 1.9 UIU/ML (ref 0.45–4.5)
WBC # BLD AUTO: 8.25 THOUSAND/UL (ref 4.31–10.16)

## 2023-10-26 PROCEDURE — 82306 VITAMIN D 25 HYDROXY: CPT

## 2023-10-26 PROCEDURE — 83970 ASSAY OF PARATHORMONE: CPT

## 2023-10-26 PROCEDURE — 80053 COMPREHEN METABOLIC PANEL: CPT

## 2023-10-26 PROCEDURE — 82330 ASSAY OF CALCIUM: CPT

## 2023-10-26 PROCEDURE — 82340 ASSAY OF CALCIUM IN URINE: CPT

## 2023-10-26 PROCEDURE — 36415 COLL VENOUS BLD VENIPUNCTURE: CPT

## 2023-10-26 PROCEDURE — 85025 COMPLETE CBC W/AUTO DIFF WBC: CPT

## 2023-10-26 PROCEDURE — 83036 HEMOGLOBIN GLYCOSYLATED A1C: CPT

## 2023-10-26 PROCEDURE — 84443 ASSAY THYROID STIM HORMONE: CPT

## 2023-10-27 ENCOUNTER — TELEPHONE (OUTPATIENT)
Dept: ENDOCRINOLOGY | Facility: CLINIC | Age: 78
End: 2023-10-27

## 2023-10-27 LAB
ALBUMIN SERPL BCP-MCNC: 3.8 G/DL (ref 3.5–5)
ALP SERPL-CCNC: 43 U/L (ref 34–104)
ALT SERPL W P-5'-P-CCNC: 10 U/L (ref 7–52)
ANION GAP SERPL CALCULATED.3IONS-SCNC: 8 MMOL/L
AST SERPL W P-5'-P-CCNC: 13 U/L (ref 13–39)
BILIRUB SERPL-MCNC: 0.42 MG/DL (ref 0.2–1)
BUN SERPL-MCNC: 19 MG/DL (ref 5–25)
CALCIUM SERPL-MCNC: 9 MG/DL (ref 8.4–10.2)
CHLORIDE SERPL-SCNC: 98 MMOL/L (ref 96–108)
CO2 SERPL-SCNC: 27 MMOL/L (ref 21–32)
CREAT SERPL-MCNC: 1.59 MG/DL (ref 0.6–1.3)
GFR SERPL CREATININE-BSD FRML MDRD: 30 ML/MIN/1.73SQ M
GLUCOSE SERPL-MCNC: 419 MG/DL (ref 65–140)
POTASSIUM SERPL-SCNC: 4.3 MMOL/L (ref 3.5–5.3)
PROT SERPL-MCNC: 6.9 G/DL (ref 6.4–8.4)
SODIUM SERPL-SCNC: 133 MMOL/L (ref 135–147)

## 2023-10-27 NOTE — TELEPHONE ENCOUNTER
Pt is calling to see if she is going to be getting a call from Medicare regarding the [place] 3?      Pt also stating that Insurance will NOT cover Lantus come January 2024

## 2023-10-27 NOTE — TELEPHONE ENCOUNTER
Problem: Hyperglycemia (high blood sugar)     What is your blood sugar reading? 302 when she wakes up in the morning, yesterday (300 or above see below)    How long has it been high? Since yesterday    Is this a new occurrence? YES    Has your diabetic medication been changed recently? NO    Have you skipped a dose of insulin or diabetes pills? NO    Are you taking  any new medications (such as steroids)? NO    Have you been following your diabetic diet? YES    Have you been sick recently or showing any signs of infection?  PT HAS A COLD, DID NOT GET THE FLU SHOT     How are you feeling now? (list symptoms) SHE FEELS FINE, NOT DIZZY        Above 300- Please have MA consult with provider

## 2023-10-31 NOTE — TELEPHONE ENCOUNTER
Please call patient to inquire how blood sugars are running now. If they are still high, I will adjust her medication. It appears that she would be due to increase her Ozempic to the 1 mg dose, which is likely where we would start in addition to increasing basal insulin for a few days. Thank you.

## 2023-11-08 ENCOUNTER — OFFICE VISIT (OUTPATIENT)
Dept: FAMILY MEDICINE CLINIC | Facility: CLINIC | Age: 78
End: 2023-11-08
Payer: MEDICARE

## 2023-11-08 VITALS
WEIGHT: 190.2 LBS | BODY MASS INDEX: 35 KG/M2 | HEIGHT: 62 IN | HEART RATE: 73 BPM | DIASTOLIC BLOOD PRESSURE: 86 MMHG | RESPIRATION RATE: 18 BRPM | TEMPERATURE: 97.7 F | SYSTOLIC BLOOD PRESSURE: 128 MMHG | OXYGEN SATURATION: 100 %

## 2023-11-08 DIAGNOSIS — E03.9 ACQUIRED HYPOTHYROIDISM: ICD-10-CM

## 2023-11-08 DIAGNOSIS — J06.9 VIRAL UPPER RESPIRATORY TRACT INFECTION: ICD-10-CM

## 2023-11-08 DIAGNOSIS — N18.32 TYPE 2 DIABETES MELLITUS WITH STAGE 3B CHRONIC KIDNEY DISEASE, WITH LONG-TERM CURRENT USE OF INSULIN (HCC): Primary | ICD-10-CM

## 2023-11-08 DIAGNOSIS — E11.22 TYPE 2 DIABETES MELLITUS WITH STAGE 3B CHRONIC KIDNEY DISEASE, WITH LONG-TERM CURRENT USE OF INSULIN (HCC): Primary | ICD-10-CM

## 2023-11-08 DIAGNOSIS — I10 ESSENTIAL HYPERTENSION: ICD-10-CM

## 2023-11-08 DIAGNOSIS — Z23 ENCOUNTER FOR IMMUNIZATION: ICD-10-CM

## 2023-11-08 DIAGNOSIS — E11.22 CKD STAGE 3 DUE TO TYPE 2 DIABETES MELLITUS (HCC): ICD-10-CM

## 2023-11-08 DIAGNOSIS — N18.30 CKD STAGE 3 DUE TO TYPE 2 DIABETES MELLITUS (HCC): ICD-10-CM

## 2023-11-08 DIAGNOSIS — Z79.4 TYPE 2 DIABETES MELLITUS WITH STAGE 3B CHRONIC KIDNEY DISEASE, WITH LONG-TERM CURRENT USE OF INSULIN (HCC): Primary | ICD-10-CM

## 2023-11-08 PROCEDURE — G0008 ADMIN INFLUENZA VIRUS VAC: HCPCS

## 2023-11-08 PROCEDURE — 99214 OFFICE O/P EST MOD 30 MIN: CPT | Performed by: INTERNAL MEDICINE

## 2023-11-08 PROCEDURE — 90662 IIV NO PRSV INCREASED AG IM: CPT

## 2023-11-08 NOTE — PROGRESS NOTES
[facility]        NAME: Arnulfo Santizo is a 66 y.o. female  : 1945    MRN: [id number]  DATE: 2023  TIME: 8:09 AM    Assessment and Plan   1. Type 2 diabetes mellitus with stage 3b chronic kidney disease, with long-term current use of insulin ([facility])  - not controlled recent serum . Given a sample of [employer] today since this will formulary preferred in 2024, scheduled sooner follow up with Endocrine due to not using Ozempic or prandial insulin and having hyperglycemia. Also hasn't received CGM that was ordered in September for her     2. 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 mellitus ([facility])  - recently re-established with nephrology,     3. Acquired hypothyroidism  - TSH at goal    4. Essential hypertension    5. Viral upper respiratory tract infection    6. Encounter for immunization  -     influenza vaccine, high-dose, PF 0.7 mL (FLUZONE HIGH-DOSE)             Chief Complaint     Chief Complaint   Patient presents with   • Follow-up         History of Present Illness       70yo female with uncontrolled diabetes here for 3 month follow up. Reports BG have been elevated recently, > 200s. Using lantus 50 units daily, hasn't started ozempic yet. Briefly used humalog with meals but felt dizzy and stopped it. Not taking Starlix either. Reports that her insurance notified her they will no longer cover lantus in January, preferred are [facility] [place]. Humalog will also not be covered, Lumyev preferred. Had URI recently with congestion, cough. Feeling better, no chest tightness or SOB. Coughs mostly at night, otherwise feeling well. Review of Systems   Review of Systems   Constitutional:  Negative for chills, fatigue and fever. Respiratory:  Positive for cough. Negative for chest tightness and shortness of breath. Neurological:  Negative for dizziness and light-headedness.          Current Medications       Current Outpatient Medications:   • amLODIPine (NORVASC) 5 mg tablet, Take 1 tablet (5 mg total) by mouth daily, Disp: 90 tablet, Rfl: 3  •  aspirin (ECOTRIN LOW STRENGTH) 81 mg EC tablet, Take 81 mg by mouth daily, Disp: , Rfl:   •  cholecalciferol (VITAMIN D3) 1,000 units tablet, Take 800 Units by mouth daily  , Disp: , Rfl:   •  DULoxetine (CYMBALTA) 20 mg capsule, take 1 capsule by mouth once daily, Disp: 90 capsule, Rfl: 1  •  fluticasone (FLONASE) 50 mcg/act nasal spray, 2 sprays into each nostril daily, Disp: 16 g, Rfl: 6  •  folic acid (FOLVITE) 973 mcg tablet, Take 400 mcg by mouth daily, Disp: , Rfl:   •  glucose blood (FREESTYLE LITE) test strip, Use as instructed, Disp: 100 each, Rfl: 3  •  glucose monitoring kit (FREESTYLE) monitoring kit, 1 each by Does not apply route daily, Disp: 1 each, Rfl: 0  •  Insulin Glargine Solostar (Lantus SoloStar) 100 UNIT/ML SOPN, Inject 0.5 mL (50 Units total) under the skin daily at bedtime, Disp: 15 mL, Rfl: 5  •  Insulin Pen Needle 32G X 6 MM MISC, Use in the morning, Disp: 100 each, Rfl: 5  •  Lancets (freestyle) lancets, Use as instructed -glucose monitor BID, Disp: 100 each, Rfl: 5  •  levothyroxine 50 mcg tablet, Take 1 tablet (50 mcg total) by mouth daily, Disp: 90 tablet, Rfl: 2  •  metoprolol succinate (TOPROL-XL) 50 mg 24 hr tablet, Take 1 tablet (50 mg total) by mouth daily, Disp: 90 tablet, Rfl: 2  •  omega-3-acid ethyl esters (LOVAZA) 1 g capsule, Take 2 g by mouth 2 (two) times a day, Disp: , Rfl:   •  Psyllium (FIBER) 0.52 g CAPS, Take by mouth, Disp: , Rfl:   •  rosuvastatin (CRESTOR) 10 MG tablet, Take 1 tablet (10 mg total) by mouth daily, Disp: 90 tablet, Rfl: 3  •  Urea 20 Intensive Hydrating 20 % cream, , Disp: , Rfl:   •  valsartan (DIOVAN) 160 mg tablet, Take 1 tablet (160 mg total) by mouth daily, Disp: 90 tablet, Rfl: 3  •  insulin detemir (Levemir FlexPen) 100 Units/mL injection pen, Inject 50 Units under the skin daily at bedtime (Patient not taking: Reported on 11/8/2023), Disp: 45 mL, Rfl: 0  •  nateglinide (STARLIX) 60 mg tablet, Take 1 tablet (60 mg total) by mouth 3 (three) times a day before meals (Patient not taking: Reported on 9/26/2023), Disp: 270 tablet, Rfl: 3  •  semaglutide, 0.25 or 0.5 mg/dose, (Ozempic) 2 mg/1.5 mL injection pen, Inject 0.25 mg once weekly for 4 weeks then increase to 0.5 mg once weekly. (Patient not taking: Reported on 11/8/2023), Disp: 3 mL, Rfl: 0    Current Allergies     Allergies as of 11/08/2023   • (No Known Allergies)            The following portions of the patient's history were reviewed and updated as appropriate: allergies, current medications, past family history, past medical history, past social history, past surgical history and problem list.     Past Medical History:   Diagnosis Date   • Benign essential hypertension    • Chronic kidney disease, stage 3 ([address])    • Disorder of gallbladder    • Disorder of skin and subcutaneous tissue    • Dyspnea    • Essential hypertension    • Hyperlipidemia    • Hypokalemia    • Malaise and fatigue    • Mixed hyperlipidemia    • Morbid obesity ([address])    • Pernicious anemia        Past Surgical History:   Procedure Laterality Date   • BREAST BIOPSY Right    • CARPAL TUNNEL RELEASE Bilateral    • CHOLECYSTECTOMY     • COLONOSCOPY      Dr. Gonzalo Lombardo    • HYSTERECTOMY     • PARTIAL HYSTERECTOMY     • SKIN LESION EXCISION      scalp        Family History   Problem Relation Age of Onset   • Stroke Mother    • Atrial fibrillation Mother    • Brain cancer Father    • Other Brother         Twin brothers - Open heart   • Other Brother         Twin brothers - Open heart   • No Known Problems Maternal Grandmother    • No Known Problems Paternal Grandmother          Medications have been verified. Objective   /86   Pulse 73   Temp 97.7 °F (36.5 °C)   Resp 18   Ht 5' 2" (1.575 m)   Wt 86.3 kg (190 lb 3.2 oz)   SpO2 100%   BMI 34.79 kg/m²        Physical Exam     Physical Exam  Vitals reviewed. Constitutional:       General: She is not in acute distress. Appearance: She is obese. Cardiovascular:      Rate and Rhythm: Normal rate and regular rhythm. Heart sounds: No murmur heard. No friction rub. No gallop. Pulmonary:      Effort: Pulmonary effort is normal. No respiratory distress. Breath sounds: No wheezing, rhonchi or rales. Neurological:      General: No focal deficit present. Mental Status: She is alert. Psychiatric:         Mood and Affect: Mood normal.         Behavior: Behavior normal.         Thought Content:  Thought content normal.         Judgment: Judgment normal.             Results:  Lab Results   Component Value Date    SODIUM 133 (L) 10/26/2023    K 4.3 10/26/2023    CL 98 10/26/2023    CO2 27 10/26/2023    BUN 19 10/26/2023    CREATININE 1.59 (H) 10/26/2023    GLUC 419 (H) 10/26/2023    CALCIUM 9.0 10/26/2023       Lab Results   Component Value Date    HGBA1C 9.9 (H) 10/26/2023       Lab Results   Component Value Date    WBC 8.25 10/26/2023    HGB 12.2 10/26/2023    HCT 39.1 10/26/2023    MCV 97 10/26/2023     10/26/2023

## 2023-11-10 ENCOUNTER — OFFICE VISIT (OUTPATIENT)
Dept: ENDOCRINOLOGY | Facility: CLINIC | Age: 78
End: 2023-11-10
Payer: MEDICARE

## 2023-11-10 VITALS
HEART RATE: 83 BPM | SYSTOLIC BLOOD PRESSURE: 120 MMHG | TEMPERATURE: 97.8 F | RESPIRATION RATE: 18 BRPM | BODY MASS INDEX: 34.78 KG/M2 | WEIGHT: 189 LBS | OXYGEN SATURATION: 97 % | HEIGHT: 62 IN | DIASTOLIC BLOOD PRESSURE: 60 MMHG

## 2023-11-10 DIAGNOSIS — N18.30 CKD STAGE 3 DUE TO TYPE 2 DIABETES MELLITUS (HCC): ICD-10-CM

## 2023-11-10 DIAGNOSIS — E11.22 TYPE 2 DIABETES MELLITUS WITH STAGE 3B CHRONIC KIDNEY DISEASE, WITH LONG-TERM CURRENT USE OF INSULIN (HCC): Primary | ICD-10-CM

## 2023-11-10 DIAGNOSIS — I10 ESSENTIAL HYPERTENSION: ICD-10-CM

## 2023-11-10 DIAGNOSIS — I73.9 PAD (PERIPHERAL ARTERY DISEASE) (HCC): ICD-10-CM

## 2023-11-10 DIAGNOSIS — E11.22 CKD STAGE 3 DUE TO TYPE 2 DIABETES MELLITUS (HCC): ICD-10-CM

## 2023-11-10 DIAGNOSIS — E78.2 MIXED HYPERLIPIDEMIA: ICD-10-CM

## 2023-11-10 DIAGNOSIS — Z79.4 TYPE 2 DIABETES MELLITUS WITH STAGE 3B CHRONIC KIDNEY DISEASE, WITH LONG-TERM CURRENT USE OF INSULIN (HCC): Primary | ICD-10-CM

## 2023-11-10 DIAGNOSIS — E66.01 SEVERE OBESITY (BMI 35.0-39.9) WITH COMORBIDITY (HCC): ICD-10-CM

## 2023-11-10 DIAGNOSIS — N18.32 TYPE 2 DIABETES MELLITUS WITH STAGE 3B CHRONIC KIDNEY DISEASE, WITH LONG-TERM CURRENT USE OF INSULIN (HCC): Primary | ICD-10-CM

## 2023-11-10 DIAGNOSIS — E03.9 ACQUIRED HYPOTHYROIDISM: ICD-10-CM

## 2023-11-10 PROCEDURE — 99214 OFFICE O/P EST MOD 30 MIN: CPT | Performed by: NURSE PRACTITIONER

## 2023-11-10 NOTE — PROGRESS NOTES
Established Patient Progress Note      Chief Complaint   Patient presents with    Diabetes Type 2    Hypothyroidism    Obesity        Impression & Plan:    Problem List Items Addressed This Visit          Endocrine    Type 2 diabetes mellitus with stage 3b chronic kidney disease, with long-term current use of insulin ([address]) - Primary     Patient remains uncontrolled. I do have some concerns about patient's memory at this time. Therefore, I will aim to keep regimen as simple as possible. Start Ozempic as discussed at previous visit. We reviewed MOA again today as well as s/e and how to avoid said s/e. I also demonstrated how to use. Patient needs a CGM. She is to notify the office once she has received this. We will need to change patient's basal insulin away from levemir as this is being d/c'd. She did bring her medicare booklet and it appears that [employer] are preferred. Will transition when she has used the remainder of her levemir. Until she receives her CGM, instructed to test blood sugars at least twice daily or if symptomatic of hypoglycemia. Once she has increase Ozempic to 0.5 mg once weekly, recommend reducing levemir to 44 units nightly to prevent hypoglycemia. Continue to focus on consistent carbohydrate diet. Engage in physical activity as tolerated. F/U in 8 weeks. Lab Results   Component Value Date    HGBA1C 9.9 (H) 10/26/2023          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 mellitus ([address])     Recommend patient reach out to nephrology to discuss use of valsartan. Continue farxiga. Reviewed the importance of blood sugar control to prevent worsening kidney function. Lab Results   Component Value Date    HGBA1C 9.9 (H) 10/26/2023          Acquired hypothyroidism     Stable. Continue 50 mcg of LT4 daily. Repeat TFT in 6 months. Cardiovascular and Mediastinum    Essential hypertension     /60. PAD (peripheral artery disease) ([address])     Following with vascular surgery. Other    Mixed hyperlipidemia     Continue statin. Severe obesity (BMI 35.0-39. 9) with comorbidity ([address])     Reviewed MOA of Ozempic. Follow healthy diet and engage in physical activity as tolerated. No orders of the defined types were placed in this encounter. History of Present Illness:   Ginny Ceron is a 66 y.o. female with hypertension, hyperlipidemia, hypothyroidism, and type 2 diabetes with long term use of insulin since 1994. Reports complications of diabetic nephropathy and CKD stage III. Denies neuropathy and retinopathy. Denies MI and CVA. Denies recent illness or hospitalizations. Denies recent severe hypoglycemic or severe hyperglycemic episodes. Denies any issues with her current regimen. home glucose monitoring: are performed sporadically. Past medical history significant for obesity and peripheral arterial disease. At patient's initial appointment on 9/26/2023, we ordered her a continuous glucose monitor, the freestyle hanna 2. Unfortunately, she has not received this yet. Both patient and staff are working on this. I recommended that she start Ozempic as Januvia was already tried and did not provide sufficient coverage for hyperglycemia. She is unable to take metformin due to CKD. She was referred to medical nutrition therapy. She has not yet scheduled an appointment with them. A1c remains elevated. She was to continue with 50 units of lantus nightly and 60 mg of Starlix three times daily along with farxiga- the last two being prescribed be nephrology. I received a message from patient's PCP that patient had stopped taking her Humalog and Starlix. She has also not yet started her Ozempic. Hemoglobin A1C   Latest Ref Rng Normal 4.0-5.6%; PreDiabetic 5.7-6.4%;  Diabetic >=6.5%; Glycemic control for adults with diabetes <7.0% %   3/3/2023 10.2 (H)    7/14/2023 10.0 (H)    10/26/2023 9.9 (H)       eAG, EST AVG Glucose   Latest Ref Rng mg/dl 3/3/2023 246    7/14/2023 240    10/26/2023 237       Legend:  (H) High    Home blood glucose readings: None for review     Current regimen: Lantus 50 units nightly  Starlix 60 mg 3 times daily prior to meals- not taking; patient reports this made her feel "dizzy"  Ozempic- despite receiving education about this at initial visit as well as written instruction on how to take, patient did not recall so wished to discuss again prior to initiating. She is also not taking previous prescribed humalog. She reports feeling well but is aware that blood sugars are "too high". Last Eye Exam: UTD  Last Foot Exam: UTD    For HTN, she is taking 5 mg of amlodipine daily and 50 mg of metoprolol succinate daily. She reports she is NOT taking 160 mg of valsartan daily which as prescribed by nephrology in September of 2023. For HLD, she is taking 10 mg of rosuvastatin nightly. She denies myalgias. For hypothyroidism, she is taking 50 mg of LT4 daily. She reports taking this consistently. TFT are stable. Patient Active Problem List   Diagnosis    Type 2 diabetes mellitus with stage 3b chronic kidney disease, with long-term current use of insulin ([address])    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 mellitus ([address])    Essential hypertension    Mixed hyperlipidemia    Acquired hypothyroidism    Severe obesity (BMI 35.0-39. 9) with comorbidity ([address])    Spinal stenosis of lumbar region    Chronic pain syndrome    Lumbar radiculopathy    PAD (peripheral artery disease) (HCC)    Asymptomatic bilateral carotid artery stenosis    Chronic cough      Past Medical History:   Diagnosis Date    Benign essential hypertension     Chronic kidney disease, stage 3 (HCC)     Disorder of gallbladder     Disorder of skin and subcutaneous tissue     Dyspnea     Essential hypertension     Hyperlipidemia     Hypokalemia     Malaise and fatigue     Mixed hyperlipidemia     Morbid obesity (HCC)     Pernicious anemia       Past Surgical History: Procedure Laterality Date    BREAST BIOPSY Right     CARPAL TUNNEL RELEASE Bilateral     CHOLECYSTECTOMY      COLONOSCOPY      Dr. Anastasia Sullivan      scalp       Family History   Problem Relation Age of Onset    Stroke Mother     Atrial fibrillation Mother     Brain cancer Father     Other Brother         Twin brothers - Open heart    Other Brother         Twin brothers - Open heart    No Known Problems Maternal Grandmother     No Known Problems Paternal Grandmother      Social History     Tobacco Use    Smoking status: Former     Packs/day: 1.00     Years: 14.00     Total pack years: 14.00     Types: Cigarettes     Quit date:      Years since quittin.8    Smokeless tobacco: Never   Substance Use Topics    Alcohol use: Yes     Comment: Occasionally      No Known Allergies      Current Outpatient Medications:     amLODIPine (NORVASC) 5 mg tablet, Take 1 tablet (5 mg total) by mouth daily, Disp: 90 tablet, Rfl: 3    aspirin (ECOTRIN LOW STRENGTH) 81 mg EC tablet, Take 81 mg by mouth daily, Disp: , Rfl:     cholecalciferol (VITAMIN D3) 1,000 units tablet, Take 800 Units by mouth daily  , Disp: , Rfl:     DULoxetine (CYMBALTA) 20 mg capsule, take 1 capsule by mouth once daily, Disp: 90 capsule, Rfl: 1    fluticasone (FLONASE) 50 mcg/act nasal spray, 2 sprays into each nostril daily, Disp: 16 g, Rfl: 6    folic acid (FOLVITE) 929 mcg tablet, Take 400 mcg by mouth daily, Disp: , Rfl:     glucose blood (FREESTYLE LITE) test strip, Use as instructed, Disp: 100 each, Rfl: 3    glucose monitoring kit (FREESTYLE) monitoring kit, 1 each by Does not apply route daily, Disp: 1 each, Rfl: 0    Insulin Glargine Solostar (Lantus SoloStar) 100 UNIT/ML SOPN, Inject 0.5 mL (50 Units total) under the skin daily at bedtime, Disp: 15 mL, Rfl: 5    Insulin Pen Needle 32G X 6 MM MISC, Use in the morning, Disp: 100 each, Rfl: 5    Lancets (freestyle) lancets, Use as instructed -glucose monitor BID, Disp: 100 each, Rfl: 5    levothyroxine 50 mcg tablet, Take 1 tablet (50 mcg total) by mouth daily, Disp: 90 tablet, Rfl: 2    metoprolol succinate (TOPROL-XL) 50 mg 24 hr tablet, Take 1 tablet (50 mg total) by mouth daily, Disp: 90 tablet, Rfl: 2    omega-3-acid ethyl esters (LOVAZA) 1 g capsule, Take 2 g by mouth 2 (two) times a day, Disp: , Rfl:     Psyllium (FIBER) 0.52 g CAPS, Take by mouth, Disp: , Rfl:     rosuvastatin (CRESTOR) 10 MG tablet, Take 1 tablet (10 mg total) by mouth daily, Disp: 90 tablet, Rfl: 3    semaglutide, 0.25 or 0.5 mg/dose, (Ozempic) 2 mg/1.5 mL injection pen, Inject 0.25 mg once weekly for 4 weeks then increase to 0.5 mg once weekly. , Disp: 3 mL, Rfl: 0    Urea 20 Intensive Hydrating 20 % cream, , Disp: , Rfl:     valsartan (DIOVAN) 160 mg tablet, Take 1 tablet (160 mg total) by mouth daily (Patient not taking: Reported on 11/10/2023), Disp: 90 tablet, Rfl: 3    Review of Systems   Constitutional:  Negative for activity change, appetite change, fatigue and unexpected weight change. HENT:  Negative for dental problem, sore throat, trouble swallowing and voice change. Eyes:  Negative for visual disturbance. Respiratory:  Negative for cough, chest tightness and shortness of breath. Cardiovascular:  Negative for chest pain, palpitations and leg swelling. Gastrointestinal:  Negative for constipation, diarrhea, nausea and vomiting. Endocrine: Negative for cold intolerance, heat intolerance, polydipsia, polyphagia and polyuria. Genitourinary:  Negative for frequency. Musculoskeletal:  Positive for arthralgias. Negative for back pain, gait problem and myalgias. Skin:  Negative for wound. Allergic/Immunologic: Negative for environmental allergies and food allergies. Neurological:  Negative for dizziness, weakness, light-headedness, numbness and headaches. Psychiatric/Behavioral:  Positive for decreased concentration.  Negative for dysphoric mood and sleep disturbance. The patient is not nervous/anxious. Physical Exam:  Body mass index is 34.57 kg/m². /60 (BP Location: Left arm, Patient Position: Sitting, Cuff Size: Standard)   Pulse 83   Temp 97.8 °F (36.6 °C) (Temporal)   Resp 18   Ht 5' 2" (1.575 m)   Wt 85.7 kg (189 lb)   SpO2 97%   BMI 34.57 kg/m²    Wt Readings from Last 3 Encounters:   11/10/23 85.7 kg (189 lb)   11/08/23 86.3 kg (190 lb 3.2 oz)   10/16/23 87.1 kg (192 lb)       Physical Exam  Vitals reviewed. Constitutional:       General: She is not in acute distress. Appearance: She is well-developed. She is obese. She is not ill-appearing. HENT:      Head: Normocephalic and atraumatic. Eyes:      Pupils: Pupils are equal, round, and reactive to light. Neck:      Thyroid: No thyromegaly. Cardiovascular:      Rate and Rhythm: Normal rate and regular rhythm. Pulses: Normal pulses. Heart sounds: Normal heart sounds. Pulmonary:      Effort: Pulmonary effort is normal.      Breath sounds: Normal breath sounds. Abdominal:      General: Bowel sounds are normal. There is no distension. Palpations: Abdomen is soft. Tenderness: There is no abdominal tenderness. Musculoskeletal:      Cervical back: Normal range of motion and neck supple. Right lower leg: No edema. Left lower leg: No edema. Lymphadenopathy:      Cervical: No cervical adenopathy. Skin:     General: Skin is warm and dry. Capillary Refill: Capillary refill takes less than 2 seconds. Neurological:      Mental Status: She is alert and oriented to person, place, and time.       Gait: Gait normal.   Psychiatric:         Mood and Affect: Mood normal.         Behavior: Behavior normal.           Labs:   Lab Results   Component Value Date    HGBA1C 9.9 (H) 10/26/2023    HGBA1C 10.0 (H) 07/14/2023    HGBA1C 10.2 (H) 03/03/2023     Lab Results   Component Value Date    CREATININE 1.59 (H) 10/26/2023 CREATININE 1.49 (H) 09/26/2023    CREATININE 1.56 (H) 07/14/2023    BUN 19 10/26/2023    K 4.3 10/26/2023    CL 98 10/26/2023    CO2 27 10/26/2023     eGFR   Date Value Ref Range Status   10/26/2023 30 ml/min/1.73sq m Final     Lab Results   Component Value Date    HDL 57 07/14/2023    TRIG 114 07/14/2023     Lab Results   Component Value Date    ALT 10 10/26/2023    AST 13 10/26/2023    ALKPHOS 43 10/26/2023     Lab Results   Component Value Date    YIA9CEVOPAGP 1.898 10/26/2023    [id number] 2.870 03/03/2023    [id number] 2.910 08/17/2022     Lab Results   Component Value Date    FREET4 1.03 10/21/2020             Discussed with the patient and all questioned fully answered. She will call me if any problems arise. Follow-up appointment in 2 months. Counseled patient on diagnostic results, prognosis, risk and benefit of treatment options, instruction for management, importance of treatment compliance, Risk  factor reduction and impressions    Patient Instructions   Start Ozempic. Inject 0.25 mg once weekly for 4 weeks then increase to 0.5 mg once weekly. Your first pen will last for 6 weeks. When you open your new pen, inject 0.5 mg once weekly and stay there. Those pens will last for 1 month (4 injections). 2. Reduce your Lantus/levemir to 44 units nightly. 3. We will work on getting you your continuous glucose monitor (freestyle hanna 2). Once you receive this, please call the office and we will get you in for a download of the blood sugar information and I will make further recommendations for your insulin regimen. Remember, with your ozempic, avoid high fat and high sugar foods as these are more likely to upset your stomach while taking this medication. Once you have your [facility], please call the office and we will teach you how to use it. I will ask the Education department to call you.      [address],[place]. [address]

## 2023-11-10 NOTE — PATIENT INSTRUCTIONS
Start Ozempic. Inject 0.25 mg once weekly for 4 weeks then increase to 0.5 mg once weekly. Your first pen will last for 6 weeks. When you open your new pen, inject 0.5 mg once weekly and stay there. Those pens will last for 1 month (4 injections). 2. Reduce your Lantus/levemir to 44 units nightly. 3. We will work on getting you your continuous glucose monitor (freestyle hanna 2). Once you receive this, please call the office and we will get you in for a download of the blood sugar information and I will make further recommendations for your insulin regimen. Remember, with your ozempic, avoid high fat and high sugar foods as these are more likely to upset your stomach while taking this medication. Once you have your [facility], please call the office and we will teach you how to use it. I will ask the Education department to call you.

## 2023-11-13 NOTE — ASSESSMENT & PLAN NOTE
Recommend patient reach out to nephrology to discuss use of valsartan. Continue farxiga. Reviewed the importance of blood sugar control to prevent worsening kidney function.    Lab Results   Component Value Date    HGBA1C 9.9 (H) 10/26/2023

## 2023-11-13 NOTE — ASSESSMENT & PLAN NOTE
Patient remains uncontrolled. I do have some concerns about patient's memory at this time. Therefore, I will aim to keep regimen as simple as possible. Start Ozempic as discussed at previous visit. We reviewed MOA again today as well as s/e and how to avoid said s/e. I also demonstrated how to use. Patient needs a CGM. She is to notify the office once she has received this. We will need to change patient's basal insulin away from levemir as this is being d/c'd. She did bring her medicare booklet and it appears that [facility] are preferred. Will transition when she has used the remainder of her levemir. Until she receives her CGM, instructed to test blood sugars at least twice daily or if symptomatic of hypoglycemia. Once she has increase Ozempic to 0.5 mg once weekly, recommend reducing levemir to 44 units nightly to prevent hypoglycemia. Continue to focus on consistent carbohydrate diet. Engage in physical activity as tolerated. F/U in 8 weeks.    Lab Results   Component Value Date    HGBA1C 9.9 (H) 10/26/2023

## 2023-11-19 ENCOUNTER — APPOINTMENT (EMERGENCY)
Dept: RADIOLOGY | Facility: HOSPITAL | Age: 78
DRG: 640 | End: 2023-11-19
Payer: MEDICARE

## 2023-11-19 ENCOUNTER — HOSPITAL ENCOUNTER (INPATIENT)
Facility: HOSPITAL | Age: 78
LOS: 9 days | Discharge: HOME HEALTH | DRG: 640 | End: 2023-11-28
Attending: EMERGENCY MEDICINE | Admitting: HOSPITALIST
Payer: MEDICARE

## 2023-11-19 ENCOUNTER — APPOINTMENT (EMERGENCY)
Dept: CT IMAGING | Facility: HOSPITAL | Age: 78
DRG: 640 | End: 2023-11-19
Payer: MEDICARE

## 2023-11-19 DIAGNOSIS — R53.1 GENERALIZED WEAKNESS: ICD-10-CM

## 2023-11-19 DIAGNOSIS — Z79.4 TYPE 2 DIABETES MELLITUS WITH STAGE 3B CHRONIC KIDNEY DISEASE, WITH LONG-TERM CURRENT USE OF INSULIN (HCC): Chronic | ICD-10-CM

## 2023-11-19 DIAGNOSIS — E83.52 HYPERCALCEMIA: Primary | ICD-10-CM

## 2023-11-19 DIAGNOSIS — E11.22 TYPE 2 DIABETES MELLITUS WITH STAGE 3B CHRONIC KIDNEY DISEASE, WITH LONG-TERM CURRENT USE OF INSULIN (HCC): Chronic | ICD-10-CM

## 2023-11-19 DIAGNOSIS — N18.32 TYPE 2 DIABETES MELLITUS WITH STAGE 3B CHRONIC KIDNEY DISEASE, WITH LONG-TERM CURRENT USE OF INSULIN (HCC): Chronic | ICD-10-CM

## 2023-11-19 DIAGNOSIS — S62.609B OPEN FINGER FRACTURE: ICD-10-CM
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
## 2023-11-19 DIAGNOSIS — S61.209A: ICD-10-CM

## 2023-11-19 DIAGNOSIS — G93.40 ENCEPHALOPATHY: ICD-10-CM

## 2023-11-19 DIAGNOSIS — K21.9 GERD (GASTROESOPHAGEAL REFLUX DISEASE): ICD-10-CM

## 2023-11-19 PROBLEM — I73.9 PAD (PERIPHERAL ARTERY DISEASE) (HCC): Chronic | Status: ACTIVE | Noted: 2023-06-20

## 2023-11-19 PROBLEM — E66.01 SEVERE OBESITY (BMI 35.0-39.9) WITH COMORBIDITY (HCC): Chronic | Status: ACTIVE | Noted: 2021-08-31

## 2023-11-19 PROBLEM — E03.9 ACQUIRED HYPOTHYROIDISM: Chronic | Status: ACTIVE | Noted: 2020-02-24

## 2023-11-19 PROBLEM — N18.30 CKD STAGE 3 DUE TO TYPE 2 DIABETES MELLITUS (HCC): Chronic | Status: ACTIVE | Noted: 2019-11-20

## 2023-11-19 PROBLEM — I10 ESSENTIAL HYPERTENSION: Chronic | Status: ACTIVE | Noted: 2019-11-20

## 2023-11-19 LAB
ABO GROUP BLD: NORMAL
ABO GROUP BLD: NORMAL
ALBUMIN SERPL BCP-MCNC: 3.9 G/DL (ref 3.5–5)
ALP SERPL-CCNC: 48 U/L (ref 34–104)
ALT SERPL W P-5'-P-CCNC: 10 U/L (ref 7–52)
ANION GAP SERPL CALCULATED.3IONS-SCNC: 10 MMOL/L
AST SERPL W P-5'-P-CCNC: 19 U/L (ref 13–39)
BASOPHILS # BLD AUTO: 0.03 THOUSANDS/ÂΜL (ref 0–0.1)
BASOPHILS NFR BLD AUTO: 0 % (ref 0–1)
BILIRUB SERPL-MCNC: 0.57 MG/DL (ref 0.2–1)
BLD GP AB SCN SERPL QL: NEGATIVE
BUN SERPL-MCNC: 24 MG/DL (ref 5–25)
CALCIUM SERPL-MCNC: 14.3 MG/DL (ref 8.4–10.2)
CHLORIDE SERPL-SCNC: 92 MMOL/L (ref 96–108)
CO2 SERPL-SCNC: 30 MMOL/L (ref 21–32)
CREAT SERPL-MCNC: 1.64 MG/DL (ref 0.6–1.3)
EOSINOPHIL # BLD AUTO: 0.05 THOUSAND/ÂΜL (ref 0–0.61)
EOSINOPHIL NFR BLD AUTO: 0 % (ref 0–6)
ERYTHROCYTE [DISTWIDTH] IN BLOOD BY AUTOMATED COUNT: 13.1 % (ref 11.6–15.1)
GFR SERPL CREATININE-BSD FRML MDRD: 29 ML/MIN/1.73SQ M
GLUCOSE SERPL-MCNC: 189 MG/DL (ref 65–140)
GLUCOSE SERPL-MCNC: 223 MG/DL (ref 65–140)
HCT VFR BLD AUTO: 43.6 % (ref 34.8–46.1)
HGB BLD-MCNC: 13.8 G/DL (ref 11.5–15.4)
IMM GRANULOCYTES # BLD AUTO: 0.05 THOUSAND/UL (ref 0–0.2)
IMM GRANULOCYTES NFR BLD AUTO: 0 % (ref 0–2)
LYMPHOCYTES # BLD AUTO: 1.65 THOUSANDS/ÂΜL (ref 0.6–4.47)
LYMPHOCYTES NFR BLD AUTO: 12 % (ref 14–44)
MCH RBC QN AUTO: 30.5 PG (ref 26.8–34.3)
MCHC RBC AUTO-ENTMCNC: 31.7 G/DL (ref 31.4–37.4)
MCV RBC AUTO: 97 FL (ref 82–98)
MONOCYTES # BLD AUTO: 1.15 THOUSAND/ÂΜL (ref 0.17–1.22)
MONOCYTES NFR BLD AUTO: 9 % (ref 4–12)
NEUTROPHILS # BLD AUTO: 10.5 THOUSANDS/ÂΜL (ref 1.85–7.62)
NEUTS SEG NFR BLD AUTO: 79 % (ref 43–75)
NRBC BLD AUTO-RTO: 0 /100 WBCS
PLATELET # BLD AUTO: 130 THOUSANDS/UL (ref 149–390)
PMV BLD AUTO: 11.6 FL (ref 8.9–12.7)
POTASSIUM SERPL-SCNC: 3.8 MMOL/L (ref 3.5–5.3)
PROCALCITONIN SERPL-MCNC: 0.1 NG/ML
PROT SERPL-MCNC: 6.7 G/DL (ref 6.4–8.4)
RBC # BLD AUTO: 4.52 MILLION/UL (ref 3.81–5.12)
RH BLD: POSITIVE
RH BLD: POSITIVE
SODIUM SERPL-SCNC: 132 MMOL/L (ref 135–147)
SPECIMEN EXPIRATION DATE: NORMAL
TSH SERPL DL<=0.05 MIU/L-ACNC: 2.23 UIU/ML (ref 0.45–4.5)
WBC # BLD AUTO: 13.43 THOUSAND/UL (ref 4.31–10.16)

## 2023-11-19 PROCEDURE — 84145 PROCALCITONIN (PCT): CPT | Performed by: PHYSICIAN ASSISTANT

## 2023-11-19 PROCEDURE — 82948 REAGENT STRIP/BLOOD GLUCOSE: CPT

## 2023-11-19 PROCEDURE — 99223 1ST HOSP IP/OBS HIGH 75: CPT | Performed by: PHYSICIAN ASSISTANT

## 2023-11-19 PROCEDURE — 86901 BLOOD TYPING SEROLOGIC RH(D): CPT | Performed by: EMERGENCY MEDICINE

## 2023-11-19 PROCEDURE — 72125 CT NECK SPINE W/O DYE: CPT

## 2023-11-19 PROCEDURE — 70450 CT HEAD/BRAIN W/O DYE: CPT

## 2023-11-19 PROCEDURE — 71045 X-RAY EXAM CHEST 1 VIEW: CPT

## 2023-11-19 PROCEDURE — 73564 X-RAY EXAM KNEE 4 OR MORE: CPT

## 2023-11-19 PROCEDURE — 86850 RBC ANTIBODY SCREEN: CPT | Performed by: EMERGENCY MEDICINE

## 2023-11-19 PROCEDURE — 99285 EMERGENCY DEPT VISIT HI MDM: CPT

## 2023-11-19 PROCEDURE — 99285 EMERGENCY DEPT VISIT HI MDM: CPT | Performed by: EMERGENCY MEDICINE

## 2023-11-19 PROCEDURE — 80053 COMPREHEN METABOLIC PANEL: CPT | Performed by: EMERGENCY MEDICINE

## 2023-11-19 PROCEDURE — 36415 COLL VENOUS BLD VENIPUNCTURE: CPT | Performed by: EMERGENCY MEDICINE

## 2023-11-19 PROCEDURE — 73080 X-RAY EXAM OF ELBOW: CPT

## 2023-11-19 PROCEDURE — 93005 ELECTROCARDIOGRAM TRACING: CPT

## 2023-11-19 PROCEDURE — 72170 X-RAY EXAM OF PELVIS: CPT

## 2023-11-19 PROCEDURE — 84443 ASSAY THYROID STIM HORMONE: CPT | Performed by: EMERGENCY MEDICINE

## 2023-11-19 PROCEDURE — 85025 COMPLETE CBC W/AUTO DIFF WBC: CPT | Performed by: EMERGENCY MEDICINE

## 2023-11-19 PROCEDURE — 86900 BLOOD TYPING SEROLOGIC ABO: CPT | Performed by: EMERGENCY MEDICINE

## 2023-11-19 RX ORDER — INSULIN LISPRO 100 [IU]/ML
1-5 INJECTION, SOLUTION INTRAVENOUS; SUBCUTANEOUS
Status: DISCONTINUED | OUTPATIENT
Start: 2023-11-19 | End: 2023-11-28 | Stop reason: HOSPADM

## 2023-11-19 RX ORDER — PANTOPRAZOLE SODIUM 40 MG/1
40 TABLET, DELAYED RELEASE ORAL
Status: DISCONTINUED | OUTPATIENT
Start: 2023-11-19 | End: 2023-11-28 | Stop reason: HOSPADM

## 2023-11-19 RX ORDER — INSULIN GLARGINE 100 [IU]/ML
20 INJECTION, SOLUTION SUBCUTANEOUS
Status: DISCONTINUED | OUTPATIENT
Start: 2023-11-19 | End: 2023-11-25

## 2023-11-19 RX ORDER — ATORVASTATIN CALCIUM 80 MG/1
80 TABLET, FILM COATED ORAL
Status: DISCONTINUED | OUTPATIENT
Start: 2023-11-20 | End: 2023-11-28 | Stop reason: HOSPADM

## 2023-11-19 RX ORDER — INSULIN LISPRO 100 [IU]/ML
1-6 INJECTION, SOLUTION INTRAVENOUS; SUBCUTANEOUS
Status: DISCONTINUED | OUTPATIENT
Start: 2023-11-20 | End: 2023-11-28 | Stop reason: HOSPADM

## 2023-11-19 RX ORDER — AMLODIPINE BESYLATE 5 MG/1
5 TABLET ORAL DAILY
Status: DISCONTINUED | OUTPATIENT
Start: 2023-11-20 | End: 2023-11-28 | Stop reason: HOSPADM

## 2023-11-19 RX ORDER — ACETAMINOPHEN 325 MG/1
650 TABLET ORAL EVERY 4 HOURS PRN
Status: DISCONTINUED | OUTPATIENT
Start: 2023-11-19 | End: 2023-11-28 | Stop reason: HOSPADM

## 2023-11-19 RX ORDER — ONDANSETRON 2 MG/ML
4 INJECTION INTRAMUSCULAR; INTRAVENOUS EVERY 6 HOURS PRN
Status: DISCONTINUED | OUTPATIENT
Start: 2023-11-19 | End: 2023-11-28 | Stop reason: HOSPADM

## 2023-11-19 RX ORDER — ENOXAPARIN SODIUM 100 MG/ML
30 INJECTION SUBCUTANEOUS DAILY
Status: DISCONTINUED | OUTPATIENT
Start: 2023-11-20 | End: 2023-11-28 | Stop reason: HOSPADM

## 2023-11-19 RX ORDER — SODIUM CHLORIDE 9 MG/ML
100 INJECTION, SOLUTION INTRAVENOUS CONTINUOUS
Status: DISCONTINUED | OUTPATIENT
Start: 2023-11-19 | End: 2023-11-20

## 2023-11-19 RX ORDER — CHLORAL HYDRATE 500 MG
1000 CAPSULE ORAL 2 TIMES DAILY
Status: DISCONTINUED | OUTPATIENT
Start: 2023-11-19 | End: 2023-11-28 | Stop reason: HOSPADM

## 2023-11-19 RX ORDER — METOPROLOL SUCCINATE 50 MG/1
50 TABLET, EXTENDED RELEASE ORAL DAILY
Status: DISCONTINUED | OUTPATIENT
Start: 2023-11-20 | End: 2023-11-28 | Stop reason: HOSPADM

## 2023-11-19 RX ORDER — LEVOTHYROXINE SODIUM 0.05 MG/1
50 TABLET ORAL DAILY
Status: DISCONTINUED | OUTPATIENT
Start: 2023-11-20 | End: 2023-11-28 | Stop reason: HOSPADM

## 2023-11-19 RX ORDER — ENOXAPARIN SODIUM 100 MG/ML
40 INJECTION SUBCUTANEOUS DAILY
Status: DISCONTINUED | OUTPATIENT
Start: 2023-11-20 | End: 2023-11-19

## 2023-11-19 RX ORDER — FLUTICASONE PROPIONATE 50 MCG
2 SPRAY, SUSPENSION (ML) NASAL DAILY
Status: DISCONTINUED | OUTPATIENT
Start: 2023-11-20 | End: 2023-11-28 | Stop reason: HOSPADM

## 2023-11-19 RX ORDER — LANOLIN ALCOHOL/MO/W.PET/CERES
400 CREAM (GRAM) TOPICAL DAILY
Status: DISCONTINUED | OUTPATIENT
Start: 2023-11-20 | End: 2023-11-28 | Stop reason: HOSPADM

## 2023-11-19 RX ORDER — OMEGA-3S/DHA/EPA/FISH OIL/D3 300MG-1000
800 CAPSULE ORAL DAILY
Status: DISCONTINUED | OUTPATIENT
Start: 2023-11-20 | End: 2023-11-28 | Stop reason: HOSPADM

## 2023-11-19 RX ORDER — DULOXETIN HYDROCHLORIDE 20 MG/1
20 CAPSULE, DELAYED RELEASE ORAL DAILY
Status: DISCONTINUED | OUTPATIENT
Start: 2023-11-20 | End: 2023-11-28 | Stop reason: HOSPADM

## 2023-11-19 RX ADMIN — INSULIN LISPRO 1 UNITS: 100 INJECTION, SOLUTION INTRAVENOUS; SUBCUTANEOUS at 22:58

## 2023-11-19 RX ADMIN — SODIUM CHLORIDE 1000 ML: 0.9 INJECTION, SOLUTION INTRAVENOUS at 21:27

## 2023-11-19 RX ADMIN — SODIUM CHLORIDE 100 ML/HR: 0.9 INJECTION, SOLUTION INTRAVENOUS at 23:53

## 2023-11-19 RX ADMIN — OMEGA-3 FATTY ACIDS CAP 1000 MG 1000 MG: 1000 CAP at 22:55

## 2023-11-19 RX ADMIN — PANTOPRAZOLE SODIUM 40 MG: 40 TABLET, DELAYED RELEASE ORAL at 22:55

## 2023-11-19 RX ADMIN — INSULIN GLARGINE 20 UNITS: 100 INJECTION, SOLUTION SUBCUTANEOUS at 22:55

## 2023-11-20 LAB
ALBUMIN SERPL BCP-MCNC: 3.4 G/DL (ref 3.5–5)
ALP SERPL-CCNC: 41 U/L (ref 34–104)
ALT SERPL W P-5'-P-CCNC: 10 U/L (ref 7–52)
ANION GAP SERPL CALCULATED.3IONS-SCNC: 8 MMOL/L
ANION GAP SERPL CALCULATED.3IONS-SCNC: 8 MMOL/L
AST SERPL W P-5'-P-CCNC: 17 U/L (ref 13–39)
ATRIAL RATE: 89 BPM
BILIRUB SERPL-MCNC: 0.42 MG/DL (ref 0.2–1)
BILIRUB UR QL STRIP: ABNORMAL
BUN SERPL-MCNC: 24 MG/DL (ref 5–25)
BUN SERPL-MCNC: 24 MG/DL (ref 5–25)
CA-I BLD-SCNC: 1.7 MMOL/L (ref 1.12–1.32)
CALCIUM ALBUM COR SERPL-MCNC: 12.5 MG/DL (ref 8.3–10.1)
CALCIUM SERPL-MCNC: 12 MG/DL (ref 8.4–10.2)
CALCIUM SERPL-MCNC: 13.4 MG/DL (ref 8.4–10.2)
CHLORIDE SERPL-SCNC: 102 MMOL/L (ref 96–108)
CHLORIDE SERPL-SCNC: 98 MMOL/L (ref 96–108)
CLARITY UR: CLEAR
CO2 SERPL-SCNC: 26 MMOL/L (ref 21–32)
CO2 SERPL-SCNC: 28 MMOL/L (ref 21–32)
COLOR UR: YELLOW
CREAT SERPL-MCNC: 1.49 MG/DL (ref 0.6–1.3)
CREAT SERPL-MCNC: 1.72 MG/DL (ref 0.6–1.3)
ERYTHROCYTE [DISTWIDTH] IN BLOOD BY AUTOMATED COUNT: 13.1 % (ref 11.6–15.1)
GFR SERPL CREATININE-BSD FRML MDRD: 28 ML/MIN/1.73SQ M
GFR SERPL CREATININE-BSD FRML MDRD: 33 ML/MIN/1.73SQ M
GLUCOSE SERPL-MCNC: 147 MG/DL (ref 65–140)
GLUCOSE SERPL-MCNC: 160 MG/DL (ref 65–140)
GLUCOSE SERPL-MCNC: 175 MG/DL (ref 65–140)
GLUCOSE SERPL-MCNC: 183 MG/DL (ref 65–140)
GLUCOSE SERPL-MCNC: 196 MG/DL (ref 65–140)
GLUCOSE SERPL-MCNC: 197 MG/DL (ref 65–140)
GLUCOSE SERPL-MCNC: 217 MG/DL (ref 65–140)
GLUCOSE UR STRIP-MCNC: NEGATIVE MG/DL
HCT VFR BLD AUTO: 38.6 % (ref 34.8–46.1)
HGB BLD-MCNC: 12.2 G/DL (ref 11.5–15.4)
HGB UR QL STRIP.AUTO: NEGATIVE
KETONES UR STRIP-MCNC: ABNORMAL MG/DL
LEUKOCYTE ESTERASE UR QL STRIP: NEGATIVE
MCH RBC QN AUTO: 30.1 PG (ref 26.8–34.3)
MCHC RBC AUTO-ENTMCNC: 31.6 G/DL (ref 31.4–37.4)
MCV RBC AUTO: 95 FL (ref 82–98)
NITRITE UR QL STRIP: NEGATIVE
P AXIS: 51 DEGREES
PH UR STRIP.AUTO: 6 [PH]
PLATELET # BLD AUTO: 135 THOUSANDS/UL (ref 149–390)
PMV BLD AUTO: 11.3 FL (ref 8.9–12.7)
POTASSIUM SERPL-SCNC: 3.5 MMOL/L (ref 3.5–5.3)
POTASSIUM SERPL-SCNC: 3.5 MMOL/L (ref 3.5–5.3)
PR INTERVAL: 122 MS
PROCALCITONIN SERPL-MCNC: 0.12 NG/ML
PROT SERPL-MCNC: 5.9 G/DL (ref 6.4–8.4)
PROT UR STRIP-MCNC: NEGATIVE MG/DL
PTH-INTACT SERPL-MCNC: 18.1 PG/ML (ref 12–88)
QRS AXIS: -25 DEGREES
QRSD INTERVAL: 90 MS
QT INTERVAL: 358 MS
QTC INTERVAL: 435 MS
RBC # BLD AUTO: 4.05 MILLION/UL (ref 3.81–5.12)
SODIUM SERPL-SCNC: 134 MMOL/L (ref 135–147)
SODIUM SERPL-SCNC: 136 MMOL/L (ref 135–147)
SP GR UR STRIP.AUTO: 1.02
T WAVE AXIS: 58 DEGREES
UROBILINOGEN UR QL STRIP.AUTO: 0.2 E.U./DL
VENTRICULAR RATE: 89 BPM
WBC # BLD AUTO: 11.4 THOUSAND/UL (ref 4.31–10.16)

## 2023-11-20 PROCEDURE — 80048 BASIC METABOLIC PNL TOTAL CA: CPT | Performed by: PHYSICIAN ASSISTANT

## 2023-11-20 PROCEDURE — 84145 PROCALCITONIN (PCT): CPT | Performed by: PHYSICIAN ASSISTANT

## 2023-11-20 PROCEDURE — 82330 ASSAY OF CALCIUM: CPT | Performed by: HOSPITALIST

## 2023-11-20 PROCEDURE — 83970 ASSAY OF PARATHORMONE: CPT | Performed by: HOSPITALIST

## 2023-11-20 PROCEDURE — 80053 COMPREHEN METABOLIC PANEL: CPT | Performed by: INTERNAL MEDICINE

## 2023-11-20 PROCEDURE — 99232 SBSQ HOSP IP/OBS MODERATE 35: CPT | Performed by: INTERNAL MEDICINE

## 2023-11-20 PROCEDURE — 99223 1ST HOSP IP/OBS HIGH 75: CPT | Performed by: INTERNAL MEDICINE

## 2023-11-20 PROCEDURE — 93010 ELECTROCARDIOGRAM REPORT: CPT | Performed by: INTERNAL MEDICINE

## 2023-11-20 PROCEDURE — 85027 COMPLETE CBC AUTOMATED: CPT | Performed by: PHYSICIAN ASSISTANT

## 2023-11-20 PROCEDURE — 82948 REAGENT STRIP/BLOOD GLUCOSE: CPT

## 2023-11-20 PROCEDURE — 81003 URINALYSIS AUTO W/O SCOPE: CPT | Performed by: EMERGENCY MEDICINE

## 2023-11-20 PROCEDURE — 83521 IG LIGHT CHAINS FREE EACH: CPT | Performed by: INTERNAL MEDICINE

## 2023-11-20 RX ORDER — CALCITONIN SALMON 200 [USP'U]/ML
4 INJECTION, SOLUTION INTRAMUSCULAR; SUBCUTANEOUS EVERY 12 HOURS SCHEDULED
Status: DISCONTINUED | OUTPATIENT
Start: 2023-11-20 | End: 2023-11-21

## 2023-11-20 RX ORDER — CALCITONIN SALMON 200 [USP'U]/ML
4 INJECTION, SOLUTION INTRAMUSCULAR; SUBCUTANEOUS EVERY 12 HOURS SCHEDULED
Status: DISCONTINUED | OUTPATIENT
Start: 2023-11-20 | End: 2023-11-20

## 2023-11-20 RX ORDER — SODIUM CHLORIDE 9 MG/ML
50 INJECTION, SOLUTION INTRAVENOUS CONTINUOUS
Status: DISCONTINUED | OUTPATIENT
Start: 2023-11-20 | End: 2023-11-22

## 2023-11-20 RX ADMIN — METOPROLOL SUCCINATE 50 MG: 50 TABLET, EXTENDED RELEASE ORAL at 09:44

## 2023-11-20 RX ADMIN — FLUTICASONE PROPIONATE 2 SPRAY: 50 SPRAY, METERED NASAL at 09:58

## 2023-11-20 RX ADMIN — ENOXAPARIN SODIUM 30 MG: 30 INJECTION SUBCUTANEOUS at 09:44

## 2023-11-20 RX ADMIN — AMLODIPINE BESYLATE 5 MG: 5 TABLET ORAL at 09:43

## 2023-11-20 RX ADMIN — SODIUM CHLORIDE 50 ML/HR: 0.9 INJECTION, SOLUTION INTRAVENOUS at 20:00

## 2023-11-20 RX ADMIN — CALCITONIN SALMON 252 UNITS: 200 INJECTION, SOLUTION INTRAMUSCULAR; SUBCUTANEOUS at 17:12

## 2023-11-20 RX ADMIN — INSULIN GLARGINE 20 UNITS: 100 INJECTION, SOLUTION SUBCUTANEOUS at 21:52

## 2023-11-20 RX ADMIN — INSULIN LISPRO 1 UNITS: 100 INJECTION, SOLUTION INTRAVENOUS; SUBCUTANEOUS at 21:52

## 2023-11-20 RX ADMIN — PANTOPRAZOLE SODIUM 40 MG: 40 TABLET, DELAYED RELEASE ORAL at 06:44

## 2023-11-20 RX ADMIN — Medication 400 MCG: at 09:43

## 2023-11-20 RX ADMIN — LEVOTHYROXINE SODIUM 50 MCG: 50 TABLET ORAL at 06:44

## 2023-11-20 RX ADMIN — CHOLECALCIFEROL TAB 10 MCG (400 UNIT) 800 UNITS: 10 TAB at 09:43

## 2023-11-20 RX ADMIN — INSULIN LISPRO 2 UNITS: 100 INJECTION, SOLUTION INTRAVENOUS; SUBCUTANEOUS at 11:51

## 2023-11-20 RX ADMIN — ASPIRIN 81 MG: 81 TABLET, COATED ORAL at 09:44

## 2023-11-20 RX ADMIN — DULOXETINE HYDROCHLORIDE 20 MG: 20 CAPSULE, DELAYED RELEASE ORAL at 09:43

## 2023-11-20 RX ADMIN — CALCITONIN SALMON 1 UNITS: 200 INJECTION, SOLUTION INTRAMUSCULAR; SUBCUTANEOUS at 11:51

## 2023-11-20 RX ADMIN — OMEGA-3 FATTY ACIDS CAP 1000 MG 1000 MG: 1000 CAP at 09:44

## 2023-11-20 RX ADMIN — ATORVASTATIN CALCIUM 80 MG: 80 TABLET, FILM COATED ORAL at 17:08

## 2023-11-20 RX ADMIN — SODIUM CHLORIDE 100 ML/HR: 0.9 INJECTION, SOLUTION INTRAVENOUS at 10:05

## 2023-11-20 NOTE — CONSULTS
CONSULTATION-NEPHROLOGY   Mely Schuler 66 y.o. female MRN: [id number]  Unit/Bed#: -01 Encounter: [id number]        Assessment and Plan:      Hypercalcemia, severe,symptomatic. Concern for severity and requiring hospitalization is always malignancy. Will exclude parathyroid abnormality, although this is rather severe. Potentially due to volume contraction, but Cr stable. She was taking 1-2 tums a day based on my interaction. Volume status appears compensated. Recommend:  Continue NSS trial and monitor for volume overload. Check chem q12. As she was encephalopathic and severe, calcitonin recommended. Spoke with pharmacy and will do path test first.   If refractory to this tx, then pamidronate or zometa would be advised. Check 25 vitamin D for vitamin D stores, PTH, PTHRp, spep,upep, kappa/lambda ratio, immunoglobulins. Recommend malignancy evaluation and consider pan scan. Hold vitamin D/calcium /TUMS. 2. 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 Continue to follow Cr trends here. Hold diuretic/Farxiga and allow fluid trial.    3. Type 2 DM, not well controlled. Continue management per hospital team.    4. Essential HTN   BP improving today. Follow trend on amlodipine and toprol. Can increase amlodipine if needed. 5. Encephalopathy due to severe hypercalcemia. Ca 14.3 uncorrected. Ical 1.7. Tx hypercalcemia as above, should improvement with tx. HPI:    Mely Schuler is a 66 y.o. female with hx type 3 DM, CKD3B baseline Cr around 1.5 presented for evaluation of weakness and falls. She reported back pain with multiple epidural hx. She reported falling when ambulating to bathroom. She denies hx hypercalcemia and reports taking 1-2 tums a day. She is not an ideal historian although [address] [employer]. She is forgetful and is in 2 point restraints. Reported lack of appetite and poor PO intake in past 1 week. Cr on admission 1.6, Na 132 and Ca 143.  Given IL NSS bolus and placed on NSS at 100ml/hr. She had mild hypercalcemia  at 10.3. Reason for Consult: hypercalcemia    Review of Systems:  Confusion limits ROS but denies complaints.        Historical Information   Past Medical History:   Diagnosis Date    Benign essential hypertension     Chronic kidney disease, stage 3 (HCC)     Disorder of gallbladder     Disorder of skin and subcutaneous tissue     Dyspnea     Essential hypertension     Hyperlipidemia     Hypokalemia     Malaise and fatigue     Mixed hyperlipidemia     Morbid obesity (HCC)     Pernicious anemia      Past Surgical History:   Procedure Laterality Date    BREAST BIOPSY Right     CARPAL TUNNEL RELEASE Bilateral     CHOLECYSTECTOMY      COLONOSCOPY      Dr. Velasquez Nick      x3    PARTIAL HYSTERECTOMY      SKIN LESION EXCISION      scalp      Social History   Social History     Substance and Sexual Activity   Alcohol Use Yes    Comment: Occasionally      Social History     Substance and Sexual Activity   Drug Use Never    Comment: Denies drug use - As per [facility]      Social History     Tobacco Use   Smoking Status Former    Packs/day: 1.00    Years: 14.00    Total pack years: 14.00    Types: Cigarettes    Quit date: 0    Years since quittin.9   Smokeless Tobacco Never       Family History:   Family History   Problem Relation Age of Onset    Stroke Mother     Atrial fibrillation Mother     Brain cancer Father     Other Brother         Twin brothers - Open heart    Other Brother         Twin brothers - Open heart    No Known Problems Maternal Grandmother     No Known Problems Paternal Grandmother        Medications:  Pertinent medications were reviewed  Current Facility-Administered Medications   Medication Dose Route Frequency Provider Last Rate    acetaminophen  650 mg Oral Q4H PRN Samantha Alamo PA-C      amLODIPine  5 mg Oral Daily Samantha Alamo PA-C      aspirin  81 mg Oral Daily Tyler Raymond NANCY Cintron      atorvastatin  80 mg Oral Daily With [employer], NANCY      calcitonin (salmon)  4 Units/kg (Adjusted) Subcutaneous Q12H [address] [facility],       cholecalciferol  800 Units Oral Daily Eva Ochoa, [place]      DULoxetine  20 mg Oral Daily [address] [facility], PA-LEONEL      enoxaparin  30 mg Subcutaneous Daily Eva Ochoa, PA-C      fish oil  1,000 mg Oral BID Eva Ochoa, PA-C      fluticasone  2 spray Nasal Daily Eva Ochoa, PA-C      folic acid  952 mcg Oral Daily Eva Ochoa, PA-C      insulin glargine  20 Units Subcutaneous HS Eva Ochoa, PA-C      insulin lispro  1-5 Units Subcutaneous HS Eva Ochoa, PA-C      insulin lispro  1-6 Units Subcutaneous TID AC Luda Torrez, PA-C      levothyroxine  50 mcg Oral Daily Eva Ochoa, PA-C      metoprolol succinate  50 mg Oral Daily Luda Cintron PA-C      ondansetron  4 mg Intravenous Q6H PRN Eva Ochoa, PA-C      pantoprazole  40 mg Oral Early Morning Eva Ochoa, PA-C      sodium chloride  100 mL/hr Intravenous Continuous Eva Ochoa, PA-C 100 mL/hr (11/20/23 1005)         No Known Allergies      Vitals:   /83   Pulse 99   Temp 97.8 °F (36.6 °C)   Resp 16   Ht 5' 2" (1.575 m)   Wt 82.9 kg (182 lb 12.2 oz)   SpO2 94%   BMI 33.43 kg/m²   Body mass index is 33.43 kg/m². SpO2: 94 %,   SpO2 Activity: At Rest,   O2 Device: None (Room air)      Intake/Output Summary (Last 24 hours) at 11/20/2023 1310  Last data filed at 11/20/2023 0900  Gross per 24 hour   Intake 905 ml   Output --   Net 905 ml     Invasive Devices       Peripheral Intravenous Line  Duration             Peripheral IV 11/19/23 Distal;Left;Upper;Ventral (anterior) Arm <1 day    Peripheral IV 11/19/23 Dorsal (posterior); Left Hand <1 day    Peripheral IV 11/19/23 Proximal;Right;Ventral (anterior) Forearm <1 day                    Physical Exam:  General: conscious, cooperative, in no acute distress, obese   Eyes: conjunctivae pink, anicteric sclerae  ENT: lips and mucous membranes moist  Neck: supple, no JVD, no masses  Chest: clear breath sounds bilateral, no crackles, ronchus or wheezings  CVS: distinct S1 & S2, normal rate, regular rhythm  Abdomen: soft, non-tender, non-distended, normoactive bowel sounds  Extremities: no edema of both legs  Skin: no rash  Neuro: awake, alert, oriented in arm restraints       Diagnostic Data:  Lab: I have personally reviewed pertinent lab results. ,   CBC:  Results from last 7 days   Lab Units 11/20/23  0447   WBC Thousand/uL 11.40*   HEMOGLOBIN g/dL 12.2   HEMATOCRIT % 38.6   PLATELETS Thousands/uL 135*      CMP:   Lab Results   Component Value Date    SODIUM 134 (L) 11/20/2023    K 3.5 11/20/2023    CL 98 11/20/2023    CO2 28 11/20/2023    BUN 24 11/20/2023    CREATININE 1.49 (H) 11/20/2023    CALCIUM 13.4 (HH) 11/20/2023    AST 19 11/19/2023    ALT 10 11/19/2023    ALKPHOS 48 11/19/2023    EGFR 33 11/20/2023   ,   PT/INR: No results found for: "PT", "INR",   Magnesium: No components found for: "MAG",  Phosphorous: No results found for: "PHOS"    Microbiology:  @LABRCNTIP,(urinecx:7)@        Joyce Creed, DO    Portions of the record may have been created with voice recognition software. Occasional wrong word or "sound a like" substitutions may have occurred due to the inherent limitations of voice recognition software. Read the chart carefully and recognize, using context, where substitutions have occurred.

## 2023-11-20 NOTE — ASSESSMENT & PLAN NOTE
Lab Results   Component Value Date    HGBA1C 9.9 (H) 10/26/2023       Recent Labs     11/19/23  2142 11/20/23  0733 11/20/23  1054   POCGLU 189* 196* 217*       Blood Sugar Average: Last 72 hrs:  (P) 636.2610405678236241  Patient following with endocrinology  Continue insulin  Hold Ozempic and resume at discharge  Sliding scale insulin coverage while in the hospital

## 2023-11-20 NOTE — PLAN OF CARE
Problem: SAFETY,RESTRAINT: NV/NON-SELF DESTRUCTIVE BEHAVIOR  Goal: Remains free of harm/injury (restraint for non violent/non self-detsructive behavior)  Description: INTERVENTIONS:  - Instruct patient/family regarding restraint use   - Assess and monitor physiologic and psychological status   - Provide interventions and comfort measures to meet assessed patient needs   - Identify and implement measures to help patient regain control  - Assess readiness for release of restraint   Outcome: Progressing  Goal: Returns to optimal restraint-free functioning  Description: INTERVENTIONS:  - Assess the patient's behavior and symptoms that indicate continued need for restraint  - Identify and implement measures to help patient regain control  - Assess readiness for release of restraint   Outcome: Progressing     Problem: PAIN - ADULT  Goal: Verbalizes/displays adequate comfort level or baseline comfort level  Description: Interventions:  - Encourage patient to monitor pain and request assistance  - Assess pain using appropriate pain scale  - Administer analgesics based on type and severity of pain and evaluate response  - Implement non-pharmacological measures as appropriate and evaluate response  - Consider cultural and social influences on pain and pain management  - Notify physician/advanced practitioner if interventions unsuccessful or patient reports new pain  Outcome: Progressing     Problem: INFECTION - ADULT  Goal: Absence or prevention of progression during hospitalization  Description: INTERVENTIONS:  - Assess and monitor for signs and symptoms of infection  - Monitor lab/diagnostic results  - Monitor all insertion sites, i.e. indwelling lines, tubes, and drains  - Monitor endotracheal if appropriate and nasal secretions for changes in amount and color  - [place] appropriate cooling/warming therapies per order  - Administer medications as ordered  - Instruct and encourage patient and family to use good hand hygiene technique  - Identify and instruct in appropriate isolation precautions for identified infection/condition  Outcome: Progressing  Goal: Absence of fever/infection during neutropenic period  Description: INTERVENTIONS:  - Monitor WBC    Outcome: Progressing     Problem: SAFETY ADULT  Goal: Patient will remain free of falls  Description: INTERVENTIONS:  - Educate patient/family on patient safety including physical limitations  - Instruct patient to call for assistance with activity   - Consult OT/PT to assist with strengthening/mobility   - Keep Call bell within reach  - Keep bed low and locked with side rails adjusted as appropriate  - Keep care items and personal belongings within reach  - Initiate and maintain comfort rounds  - Make Fall Risk Sign visible to staff  - Offer Toileting every 2 Hours, in advance of need  - Initiate/Maintain bed/chair alarm  - Obtain necessary fall risk management equipment: alarms, restraints, yellow socks.   - Apply yellow socks and bracelet for high fall risk patients  - Consider moving patient to room near nurses station  Outcome: Progressing  Goal: Maintain or return to baseline ADL function  Description: INTERVENTIONS:  -  Assess patient's ability to carry out ADLs; assess patient's baseline for ADL function and identify physical deficits which impact ability to perform ADLs (bathing, care of mouth/teeth, toileting, grooming, dressing, etc.)  - Assess/evaluate cause of self-care deficits   - Assess range of motion  - Assess patient's mobility; develop plan if impaired  - Assess patient's need for assistive devices and provide as appropriate  - Encourage maximum independence but intervene and supervise when necessary  - Involve family in performance of ADLs  - Assess for home care needs following discharge   - Consider OT consult to assist with ADL evaluation and planning for discharge  - Provide patient education as appropriate  Outcome: Progressing  Goal: Maintains/Returns to pre admission functional level  Description: INTERVENTIONS:  - Perform AM-PAC 6 Click Basic Mobility/ Daily Activity assessment daily.  - Set and communicate daily mobility goal to care team and patient/family/caregiver. - Collaborate with rehabilitation services on mobility goals if consulted  - Perform Range of Motion 3 times a day. - Reposition patient every 2 hours. - Dangle patient 3 times a day  - Stand patient 3 times a day  - Ambulate patient 3 times a day  - Out of bed to chair 3 times a day   - Out of bed for meals 3 times a day  - Out of bed for toileting  - Record patient progress and toleration of activity level   Outcome: Progressing     Problem: DISCHARGE PLANNING  Goal: Discharge to home or other facility with appropriate resources  Description: INTERVENTIONS:  - Identify barriers to discharge w/patient and caregiver  - Arrange for needed discharge resources and transportation as appropriate  - Identify discharge learning needs (meds, wound care, etc.)  - Refer to Case Management Department for coordinating discharge planning if the patient needs post-hospital services based on physician/advanced practitioner order or complex needs related to functional status, cognitive ability, or social support system  Outcome: Progressing     Problem: Knowledge Deficit  Goal: Patient/family/caregiver demonstrates understanding of disease process, treatment plan, medications, and discharge instructions  Description: Complete learning assessment and assess knowledge base.   Interventions:  - Provide teaching at level of understanding  - Provide teaching via preferred learning methods  Outcome: Progressing     Problem: Prexisting or High Potential for Compromised Skin Integrity  Goal: Skin integrity is maintained or improved  Description: INTERVENTIONS:  - Identify patients at risk for skin breakdown  - Assess and monitor skin integrity  - Assess and monitor nutrition and hydration status  - Monitor labs   - Assess for incontinence   - Turn and reposition patient  - Assist with mobility/ambulation  - Relieve pressure over bony prominences  - Avoid friction and shearing  - Provide appropriate hygiene as needed including keeping skin clean and dry  - Evaluate need for skin moisturizer/barrier cream  - Collaborate with interdisciplinary team   - Patient/family teaching  - Consider wound care consult   Outcome: Progressing

## 2023-11-20 NOTE — ASSESSMENT & PLAN NOTE
Patient presented with weakness, confusion, frequent falls, found to have hypercalcemia.   Patient reports she has been taking Tums frequently for heartburn  IVF  Nephrology consult  Check PTH and ionized calcium

## 2023-11-20 NOTE — PROGRESS NOTES
[address]  Progress Note  Name: Brendan Hendricks  MRN: [id number]  Unit/Bed#: -01 I Date of Admission: 11/19/2023   Date of Service: 11/20/2023 I Hospital Day: 1    Assessment/Plan   * Hypercalcemia  Assessment & Plan  Patient presented with weakness, confusion, frequent falls, found to have hypercalcemia. Patient reports she has been taking Tums frequently for heartburn  Continue IV fluids  Calcitonin initiated per nephrology  Check PTH and ionized calcium  Repeat BMP in the morning    PAD (peripheral artery disease) ([address])  Assessment & Plan  Follows w/ vascular  Continue aspirin, statin    Severe obesity (BMI 35.0-39. 9) with comorbidity ([address])  Assessment & Plan  Patient starting on Ozempic, monitor weight loss  Healthy diet lifestyle modification recommended    Acquired hypothyroidism  Assessment & Plan  Continue levothyroxine    Essential hypertension  Assessment & Plan  Continue home medication with hold parameters    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 mellitus ([address])  Assessment & Plan  Creat baseline 1.4 w/ CKD3b  Continue IV fluids  Repeat BMP in the morning  Nephrology following    Type 2 diabetes mellitus with stage 3b chronic kidney disease, with long-term current use of insulin [facility])  Assessment & Plan  Lab Results   Component Value Date    HGBA1C 9.9 (H) 10/26/2023       Recent Labs     11/19/23  2142 11/20/23  0733 11/20/23  1054   POCGLU 189* 196* 217*       Blood Sugar Average: Last 72 hrs:  (P) 032.6502896886012480  Patient following with endocrinology  Continue insulin  Hold Ozempic and resume at discharge  Sliding scale insulin coverage while in the hospital             VTE Prophylaxis:  Enoxaparin (Lovenox)    Patient Centered Rounds: I have performed bedside rounds with nursing staff today.     Discussions with Specialists or Other Care Team Provider: yes  Education and Discussions with Family / Patient: Attempted to call patient's family at number provided in chart with no answer    Current Length of Stay: 1 day(s)    Current Patient Status: Inpatient   Certification Statement: The patient will continue to require additional inpatient hospital stay due to hypercalcemia    Discharge Plan: Discharge home once medically stable    Code Status: Level 3 - DNAR and DNI    Subjective:   No overnight events noted    Objective:     Vitals:   Temp (24hrs), Av.1 °F (36.7 °C), Min:97.5 °F (36.4 °C), Max:98.8 °F (37.1 °C)    Temp:  [97.5 °F (36.4 °C)-98.8 °F (37.1 °C)] 97.8 °F (36.6 °C)  HR:  [84-99] 99  Resp:  [16-23] 16  BP: (124-219)/(60-88) 151/83  SpO2:  [90 %-96 %] 94 %  Body mass index is 33.43 kg/m². Input and Output Summary (last 24 hours): Intake/Output Summary (Last 24 hours) at 2023 1203  Last data filed at 2023 0900  Gross per 24 hour   Intake 905 ml   Output --   Net 905 ml       Physical Exam:   Physical Exam  Constitutional:       General: She is not in acute distress. Appearance: She is obese. HENT:      Head: Normocephalic and atraumatic. Nose: Nose normal.      Mouth/Throat:      Mouth: Mucous membranes are dry. Eyes:      Extraocular Movements: Extraocular movements intact. Conjunctiva/sclera: Conjunctivae normal.   Cardiovascular:      Rate and Rhythm: Normal rate and regular rhythm. Pulmonary:      Effort: Pulmonary effort is normal. No respiratory distress. Abdominal:      Palpations: Abdomen is soft. Tenderness: There is no abdominal tenderness. Musculoskeletal:         General: Normal range of motion. Cervical back: Normal range of motion and neck supple. Comments: Generalized weakness   Skin:     General: Skin is warm and dry. Neurological:      Mental Status: She is alert. Comments: Patient is awake and alert. Following simple commands.   Disoriented to time and place   Psychiatric:         Mood and Affect: Mood normal.         Behavior: Behavior normal.         Additional Data: Labs:    Results from last 7 days   Lab Units 11/20/23  0447 11/19/23  1655   WBC Thousand/uL 11.40* 13.43*   HEMOGLOBIN g/dL 12.2 13.8   HEMATOCRIT % 38.6 43.6   PLATELETS Thousands/uL 135* 130*   NEUTROS PCT %  --  79*   LYMPHS PCT %  --  12*   MONOS PCT %  --  9   EOS PCT %  --  0     Results from last 7 days   Lab Units 11/20/23  0447 11/19/23  1848   SODIUM mmol/L 134* 132*   POTASSIUM mmol/L 3.5 3.8   CHLORIDE mmol/L 98 92*   CO2 mmol/L 28 30   BUN mg/dL 24 24   CREATININE mg/dL 1.49* 1.64*   CALCIUM mg/dL 13.4* 14.3*   ALK PHOS U/L  --  48   ALT U/L  --  10   AST U/L  --  19         Results from last 7 days   Lab Units 11/20/23  1054 11/20/23  0733 11/19/23  2142   POC GLUCOSE mg/dl 217* 196* 189*           * I Have Reviewed All Lab Data Listed Above. * Additional Pertinent Lab Tests Reviewed:  [facility] Admission  Reviewed    Imaging:  Imaging Reports Reviewed Today Include: No new imaging    Recent Cultures (last 7 days):           Last 24 Hours Medication List:   Current Facility-Administered Medications   Medication Dose Route Frequency Provider Last Rate    acetaminophen  650 mg Oral Q4H PRN Nagi Jones PA-C      amLODIPine  5 mg Oral Daily Nagi Jones PA-C      aspirin  81 mg Oral Daily [address] [facility]      atorvastatin  80 mg Oral Daily With [employer]      calcitonin (salmon)  4 Units/kg (Adjusted) Subcutaneous Q12H [facility] and Niko, DO      cholecalciferol  800 Units Oral Daily Shantell Barroso      DULoxetine  20 mg Oral Daily Luda Cintron PA-C      enoxaparin  30 mg Subcutaneous Daily Luda Cintron PA-C      fish oil  1,000 mg Oral BID Nagi Jones PA-C      fluticasone  2 spray Nasal Daily Nagi Jones PA-C      folic acid  578 mcg Oral Daily [address] [facility] NANCY Cintron      insulin glargine  20 Units Subcutaneous HS Nagi Jones, PA-C      insulin lispro  1-5 Units Subcutaneous HS Faina Burris, PA-LEONEL      insulin lispro  1-6 Units Subcutaneous TID AC Faina Burris PA-C      levothyroxine  50 mcg Oral Daily [address] [place] [address], NANCY      metoprolol succinate  50 mg Oral Daily Luda Cintron PA-C      ondansetron  4 mg Intravenous Q6H PRN Faina Burris PA-C      pantoprazole  40 mg Oral Early Morning Faina Burris PA-C      sodium chloride  100 mL/hr Intravenous Continuous Faina Burris PA-C 100 mL/hr (11/20/23 1005)        Today, Patient Was Seen By: Radha Soto MD    ** Please Note: Dictation voice to text software may have been used in the creation of this document.  **

## 2023-11-20 NOTE — CASE MANAGEMENT
Case Management Assessment & Discharge Planning Note    Patient name Matilda Zimmer  Location [address] [facility] [place] 207/-52 MRN [id number]  : 1945 Date 2023       Current Admission Date: 2023  Current Admission Diagnosis:Hypercalcemia   Patient Active Problem List    Diagnosis Date Noted    Hypercalcemia 2023    Chronic cough 2023    PAD (peripheral artery disease) ([address]) 2023    Asymptomatic bilateral carotid artery stenosis 2023    Chronic pain syndrome 2022    Lumbar radiculopathy 2022    Spinal stenosis of lumbar region     Severe obesity (BMI 35.0-39. 9) with comorbidity ([address]) 2021    Mixed hyperlipidemia 2020    Acquired hypothyroidism 2020    Type 2 diabetes mellitus with stage 3b chronic kidney disease, with long-term current use of insulin ([address]) 2019    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 mellitus ([address]) 2019    Essential hypertension 2019      LOS (days): 1  Geometric Mean LOS (GMLOS) (days): 2.60  Days to GMLOS:1.8     OBJECTIVE:    Risk of Unplanned Readmission Score: 12.8     Current admission status: Inpatient    Preferred Pharmacy:   [address] [place] [address] [id number] [place] [address] [address]  Phone: [phone] Fax: [phone]    Primary Care Provider: Laron Estrella MD    Primary Insurance: MEDICARE  Secondary Insurance: COMMERCIAL MISCELLANEOUS    ASSESSMENT:  Active Health Care Proxies    There are no active Health Care Proxies on file.        Advance Directives  Does patient have a [facility]?: No  Was patient offered paperwork?: Yes (Paperwork provided)  Does patient currently have a Health Care decision maker?: Yes, please see Health Care Proxy section  Does patient have Advance Directives?: No  Was patient offered paperwork?: Yes (paperwork provided)  Primary Contact: Donnie Negron    Readmission Root Cause  30 Day Readmission: No    Patient Information  Admitted from[de-identified] Home  Mental Status: Alert  During Assessment patient was accompanied by: Not accompanied during assessment  Assessment information provided by[de-identified] Patient  Primary Caregiver: Spouse  Caregiver's Name[de-identified] Cullen Potter Relationship to Patient[de-identified] Significant Other  Caregiver's Telephone Number[de-identified] [phone]  Support Systems: Spouse/significant other  [place] of Residence: [facility] do you live in?: [address] [facility] entry access options. Select all that apply.: Stairs  Number of steps to enter home.: 5  Do the steps have railings?: Yes  Type of Current Residence: [facility]  Living Arrangements: Lives w/ Spouse/significant other  Is patient a ?: No    Activities of Daily Living Prior to Admission  Functional Status: Assistance  Completes ADLs independently?: No  Ambulates independently?: Yes  Does patient use assisted devices?: Yes  Assisted Devices (DME) used: Marion Chau  Does patient currently own DME?: Yes  What DME does the patient currently own?: Rekha Falk  Does patient have a history of Outpatient Therapy (PT/OT)?: No  Does the patient have a history of Short-Term Rehab?: No  Does patient have a history of HHC?: No  Does patient currently have [facility]?: No    Patient Information Continued  Income Source: Pension/custodial  Does patient have prescription coverage?: Yes  Does patient receive dialysis treatments?: No  Does patient have a history of substance abuse?: No  Does patient have a history of Mental Health Diagnosis?: No    PHQ 2/9 Screening   Reviewed PHQ 2/9 Depression Screening Score?: No    Means of Transportation  Means of Transport to Appts[de-identified] Family transport  Housing Stability: Not on file   Food Insecurity: Not on file   Transportation Needs: No Transportation Needs (3/7/2023)    PRAPARE - Transportation     Lack of Transportation (Medical): No     Lack of Transportation (Non-Medical):  No   Utilities: Not on file       DISCHARGE DETAILS:    Discharge planning discussed with[de-identified] Pt    Contacts  Patient Contacts: nieves DUNCAN  Relationship to Patient[de-identified] Other (Comment) (SO)  Contact Method: Phone  Phone Number: [phone]  Reason/Outcome: Emergency [address]         Is the patient interested in [facility] at discharge?: No    DME Referral Provided  Referral made for DME?: No    Would you like to participate in our [id number] [facility] Call service program?  : No - Declined  Appears pleasantly confused. Answered some questions appropriately. TC to pt SO Salima Officer, left a VM for a return call to verify information.

## 2023-11-20 NOTE — PLAN OF CARE
Problem: SAFETY,RESTRAINT: NV/NON-SELF DESTRUCTIVE BEHAVIOR  Goal: Remains free of harm/injury (restraint for non violent/non self-detsructive behavior)  Description: INTERVENTIONS:  - Instruct patient/family regarding restraint use   - Assess and monitor physiologic and psychological status   - Provide interventions and comfort measures to meet assessed patient needs   - Identify and implement measures to help patient regain control  - Assess readiness for release of restraint   Outcome: Progressing  Goal: Returns to optimal restraint-free functioning  Description: INTERVENTIONS:  - Assess the patient's behavior and symptoms that indicate continued need for restraint  - Identify and implement measures to help patient regain control  - Assess readiness for release of restraint   Outcome: Progressing     Problem: PAIN - ADULT  Goal: Verbalizes/displays adequate comfort level or baseline comfort level  Description: Interventions:  - Encourage patient to monitor pain and request assistance  - Assess pain using appropriate pain scale  - Administer analgesics based on type and severity of pain and evaluate response  - Implement non-pharmacological measures as appropriate and evaluate response  - Consider cultural and social influences on pain and pain management  - Notify physician/advanced practitioner if interventions unsuccessful or patient reports new pain  Outcome: Progressing     Problem: INFECTION - ADULT  Goal: Absence or prevention of progression during hospitalization  Description: INTERVENTIONS:  - Assess and monitor for signs and symptoms of infection  - Monitor lab/diagnostic results  - Monitor all insertion sites, i.e. indwelling lines, tubes, and drains  - Monitor endotracheal if appropriate and nasal secretions for changes in amount and color  - Jarratt appropriate cooling/warming therapies per order  - Administer medications as ordered  - Instruct and encourage patient and family to use good hand hygiene technique  - Identify and instruct in appropriate isolation precautions for identified infection/condition  Outcome: Progressing  Goal: Absence of fever/infection during neutropenic period  Description: INTERVENTIONS:  - Monitor WBC    Outcome: Progressing     Problem: DISCHARGE PLANNING  Goal: Discharge to home or other facility with appropriate resources  Description: INTERVENTIONS:  - Identify barriers to discharge w/patient and caregiver  - Arrange for needed discharge resources and transportation as appropriate  - Identify discharge learning needs (meds, wound care, etc.)  - Arrange for interpretive services to assist at discharge as needed  - Refer to Case Management Department for coordinating discharge planning if the patient needs post-hospital services based on physician/advanced practitioner order or complex needs related to functional status, cognitive ability, or social support system  Outcome: Progressing     Problem: Knowledge Deficit  Goal: Patient/family/caregiver demonstrates understanding of disease process, treatment plan, medications, and discharge instructions  Description: Complete learning assessment and assess knowledge base.   Interventions:  - Provide teaching at level of understanding  - Provide teaching via preferred learning methods  Outcome: Progressing     Problem: SAFETY ADULT  Goal: Patient will remain free of falls  Description: INTERVENTIONS:  - Educate patient/family on patient safety including physical limitations  - Instruct patient to call for assistance with activity   - Consult OT/PT to assist with strengthening/mobility   - Keep Call bell within reach  - Keep bed low and locked with side rails adjusted as appropriate  - Keep care items and personal belongings within reach  - Initiate and maintain comfort rounds  - Make Fall Risk Sign visible to staff  - Offer Toileting every 2 Hours, in advance of need  - Initiate/Maintain bed alarm  - Apply yellow socks and bracelet for high fall risk patients  - Consider moving patient to room near nurses station  Outcome: Progressing  Goal: Maintain or return to baseline ADL function  Description: INTERVENTIONS:  -  Assess patient's ability to carry out ADLs; assess patient's baseline for ADL function and identify physical deficits which impact ability to perform ADLs (bathing, care of mouth/teeth, toileting, grooming, dressing, etc.)  - Assess/evaluate cause of self-care deficits   - Assess range of motion  - Assess patient's mobility; develop plan if impaired  - Assess patient's need for assistive devices and provide as appropriate  - Encourage maximum independence but intervene and supervise when necessary  - Involve family in performance of ADLs  - Assess for home care needs following discharge   - Consider OT consult to assist with ADL evaluation and planning for discharge  - Provide patient education as appropriate  Outcome: Progressing  Goal: Maintains/Returns to pre admission functional level  Description: INTERVENTIONS:  - Perform AM-PAC 6 Click Basic Mobility/ Daily Activity assessment daily.  - Set and communicate daily mobility goal to care team and patient/family/caregiver. - Collaborate with rehabilitation services on mobility goals if consulted  - Perform Range of Motion 3 times a day. - Reposition patient every 2 hours.   - Out of bed to chair 3 times a day   - Out of bed for meals 3 times a day  - Out of bed for toileting  - Record patient progress and toleration of activity level   Outcome: Progressing

## 2023-11-20 NOTE — ASSESSMENT & PLAN NOTE
Patient presented with weakness, confusion, frequent falls, found to have hypercalcemia.   Patient reports she has been taking Tums frequently for heartburn  Continue IV fluids  Calcitonin initiated per nephrology  Check PTH and ionized calcium  Repeat BMP in the morning

## 2023-11-20 NOTE — H&P
[address]  H&P  Name: Clifford Watts 66 y.o. female I MRN: [id number]  Unit/Bed#: -01 I Date of Admission: 11/19/2023   Date of Service: 11/19/2023 I Hospital Day: 0      Assessment/Plan   * Hypercalcemia  Assessment & Plan  Patient presented with weakness, confusion, frequent falls, found to have hypercalcemia. Patient reports she has been taking Tums frequently for heartburn  IVF  Nephrology consult  Check PTH and ionized calcium    PAD (peripheral artery disease) ([facility])  Assessment & Plan  Follows w/ vascular  Continue aspirin, statin    Severe obesity (BMI 35.0-39. 9) with comorbidity ([address])  Assessment & Plan  Patient starting on Ozempic, monitor weight loss  Healthy diet lifestyle modification recommended    Acquired hypothyroidism  Assessment & Plan  Continue levothyroxine    Essential hypertension  Assessment & Plan  Continue home medication with hold parameters    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 mellitus ([facility])  Assessment & Plan  Creat baseline 1.4 w/ CKD3b  IVF  monitor      Type 2 diabetes mellitus with stage 3b chronic kidney disease, with long-term current use of insulin ([facility])  Assessment & Plan  Lab Results   Component Value Date    HGBA1C 9.9 (H) 10/26/2023       No results for input(s): "POCGLU" in the last 72 hours. Blood Sugar Average: Last 72 hrs:  Patient following with endocrinology  Continue insulin  Hold Ozempic and resume at discharge  Sliding scale insulin coverage while in the hospital      VTE Pharmacologic Prophylaxis: VTE Score: 4 Moderate Risk (Score 3-4) - Pharmacological DVT Prophylaxis Ordered: enoxaparin (Lovenox). Code Status: DNR/DNI  Discussion with patient    Anticipated Length of Stay: Patient will be admitted on an inpatient basis with an anticipated length of stay of greater than 2 midnights secondary to Lab montioring, specialist input. Total Time Spent on Date of Encounter in care of patient: 75 mins.  This time was spent on one or more of the following: performing physical exam; counseling and coordination of care; obtaining or reviewing history; documenting in the medical record; reviewing/ordering tests, medications or procedures; communicating with other healthcare professionals and discussing with patient's family/caregivers. Chief Complaint: weakness, falls    History of Present Illness:  Wero Lay is a 66 y.o. female with a PMH of obesity, hypertension, hyperlipidemia, diabetes mellitus type 2 with chronic kidney disease stage IIIb, hypothyroidism, PAD who presents with weakness and falls. Patient reports chronic back pain with hx of 3 lumbar epidurals. She has weakness started yesterday and reports falling going to the bathroom. Denies head injury, no back, hip, knee pain. Had episode of vomiting yesterday and notes heartburn frequently and notes she has been taking Tums frequently recently. Review of Systems:  Review of Systems   Constitutional:  Negative for chills and fever. HENT:  Negative for rhinorrhea, sore throat and trouble swallowing. Respiratory:  Positive for cough. Negative for shortness of breath. Cardiovascular:  Negative for chest pain and leg swelling. Gastrointestinal:  Positive for vomiting. Negative for abdominal pain, diarrhea and nausea. Heartburn   Genitourinary:  Negative for dysuria and hematuria. Musculoskeletal:  Positive for myalgias. Negative for back pain and neck pain. Skin:  Negative for rash and wound. Neurological:  Positive for dizziness and weakness. Negative for headaches. Psychiatric/Behavioral:  Positive for confusion.         Past Medical and Surgical History:   Past Medical History:   Diagnosis Date    Benign essential hypertension     Chronic kidney disease, stage 3 (HCC)     Disorder of gallbladder     Disorder of skin and subcutaneous tissue     Dyspnea     Essential hypertension     Hyperlipidemia     Hypokalemia     Malaise and fatigue     Mixed hyperlipidemia Morbid obesity ([address] )     Pernicious anemia        Past Surgical History:   Procedure Laterality Date    BREAST BIOPSY Right     CARPAL TUNNEL RELEASE Bilateral     CHOLECYSTECTOMY      COLONOSCOPY      Dr. Mamta Woodard      x3    PARTIAL HYSTERECTOMY      SKIN LESION EXCISION      scalp        Meds/Allergies:  Prior to Admission medications    Medication Sig Start Date End Date Taking?  Authorizing Provider   amLODIPine (NORVASC) 5 mg tablet Take 1 tablet (5 mg total) by mouth daily 10/18/23   Malia Luke MD   aspirin (ECOTRIN LOW STRENGTH) 81 mg EC tablet Take 81 mg by mouth daily    Historical Provider, MD   cholecalciferol (VITAMIN D3) 1,000 units tablet Take 800 Units by mouth daily      Historical Provider, MD   DULoxetine (CYMBALTA) 20 mg capsule take 1 capsule by mouth once daily 7/20/23   Prudencio Pope MD   fluticasone (FLONASE) 50 mcg/act nasal spray 2 sprays into each nostril daily 11/30/22   Adalid Paz PA-C   folic acid (FOLVITE) 308 mcg tablet Take 400 mcg by mouth daily    Historical Provider, MD   glucose blood (FREESTYLE LITE) test strip Use as instructed 1/19/23   Malia Luke MD   glucose monitoring kit (FREESTYLE) monitoring kit 1 each by Does not apply route daily 6/3/20   Gwenda Severs, MD   Insulin Glargine Solostar (Lantus SoloStar) 100 UNIT/ML SOPN Inject 0.5 mL (50 Units total) under the skin daily at bedtime 9/5/23 12/4/23  Malia Luke MD   Insulin Pen Needle 32G X 6 MM MISC Use in the morning 2/8/22   Malia Luke MD   Lancets (freestyle) lancets Use as instructed -glucose monitor BID 9/7/21   Malia Luke MD   levothyroxine 50 mcg tablet Take 1 tablet (50 mcg total) by mouth daily 7/20/23   Malia Luke MD   metoprolol succinate (TOPROL-XL) 50 mg 24 hr tablet Take 1 tablet (50 mg total) by mouth daily 9/7/23   Malia Luke MD   omega-3-acid ethyl esters (LOVAZA) 1 g capsule Take 2 g by mouth 2 (two) times a day    Historical Provider, MD   Psyllium (FIBER) 0.52 g CAPS Take by mouth    Historical Provider, MD   rosuvastatin (CRESTOR) 10 MG tablet Take 1 tablet (10 mg total) by mouth daily 23   Kathrine Stanley MD   semaglutide, 0.25 or 0.5 mg/dose, (Ozempic) 2 mg/1.5 mL injection pen Inject 0.25 mg once weekly for 4 weeks then increase to 0.5 mg once weekly. 23   LAURITA Dueñas   Urea 20 Intensive Hydrating 20 % cream  22   Historical Provider, MD   valsartan (DIOVAN) 160 mg tablet Take 1 tablet (160 mg total) by mouth daily  Patient not taking: Reported on 11/10/2023 9/14/23   Dev Villaseñor MD   insulin aspart (NovoLOG FlexPen) 100 UNIT/ML injection pen Inject 4 Units under the skin daily with dinner 22  Kathrine Stanley MD   Januvia 50 MG tablet take 1 tablet by mouth once daily  Patient not taking: No sig reported 20  Dev Villaseñor MD   linaGLIPtin 5 MG TABS Take 5 mg by mouth daily  Patient not taking: Reported on 2021  Kathrine Stanley MD     I have reviewed home medications using recent Epic encounter.     Allergies: No Known Allergies    Social History:  Marital Status: /Civil Union   Occupation: retired  Patient Pre-hospital Living Situation: With other family member: boyfriend  Patient Pre-hospital Level of Mobility: walks  Patient Pre-hospital Diet Restrictions: low carb  Substance Use History:   Social History     Substance and Sexual Activity   Alcohol Use Yes    Comment: Occasionally      Social History     Tobacco Use   Smoking Status Former    Packs/day: 1.00    Years: 14.00    Total pack years: 14.00    Types: Cigarettes    Quit date:     Years since quittin.9   Smokeless Tobacco Never     Social History     Substance and Sexual Activity   Drug Use Never    Comment: Denies drug use - As per Bryce Henderson        Family History:  Family History   Problem Relation Age of Onset Stroke Mother     Atrial fibrillation Mother     Brain cancer Father     Other Brother         Twin brothers - Open heart    Other Brother         Twin brothers - Open heart    No Known Problems Maternal Grandmother     No Known Problems Paternal Grandmother        Physical Exam:     Vitals:   Blood Pressure: 156/80 (11/19/23 2022)  Pulse: 91 (11/19/23 2022)  Temperature: 97.8 °F (36.6 °C) (11/19/23 2022)  Temp Source: Tympanic (11/19/23 1637)  Respirations: 18 (11/19/23 2022)  Weight - Scale: 82.9 kg (182 lb 12.2 oz) (11/19/23 2022)  SpO2: 94 % (11/19/23 2022)    Physical Exam  Vitals reviewed. Constitutional:       Appearance: She is obese. Comments: Elderly  female   HENT:      Head: Normocephalic and atraumatic. Nose: Nose normal.   Eyes:      General:         Right eye: No discharge. Left eye: No discharge. Extraocular Movements: Extraocular movements intact. Conjunctiva/sclera: Conjunctivae normal.   Cardiovascular:      Rate and Rhythm: Normal rate and regular rhythm. Pulmonary:      Effort: Pulmonary effort is normal. No respiratory distress. Breath sounds: Normal breath sounds. No wheezing. Abdominal:      General: Bowel sounds are normal. There is no distension. Palpations: Abdomen is soft. Tenderness: There is no abdominal tenderness. There is no guarding. Musculoskeletal:         General: No swelling or tenderness. Normal range of motion. Cervical back: Normal range of motion. Right lower leg: No edema. Left lower leg: No edema. Skin:     General: Skin is warm and dry. Capillary Refill: Capillary refill takes less than 2 seconds. Comments: Positive skin tear right elbow with ecchymosis   Neurological:      General: No focal deficit present. Mental Status: She is alert. Motor: Weakness present.       Comments: Confused at times   Psychiatric:         Mood and Affect: Mood normal.         Behavior: Behavior normal.            Additional Data:     Lab Results:  Results from last 7 days   Lab Units 11/19/23  1655   WBC Thousand/uL 13.43*   HEMOGLOBIN g/dL 13.8   HEMATOCRIT % 43.6   PLATELETS Thousands/uL 130*   NEUTROS PCT % 79*   LYMPHS PCT % 12*   MONOS PCT % 9   EOS PCT % 0     Results from last 7 days   Lab Units 11/19/23  1848   SODIUM mmol/L 132*   POTASSIUM mmol/L 3.8   CHLORIDE mmol/L 92*   CO2 mmol/L 30   BUN mg/dL 24   CREATININE mg/dL 1.64*   ANION GAP mmol/L 10   CALCIUM mg/dL 14.3*   ALBUMIN g/dL 3.9   TOTAL BILIRUBIN mg/dL 0.57   ALK PHOS U/L 48   ALT U/L 10   AST U/L 19   GLUCOSE RANDOM mg/dL 223*         Lines/Drains:  Invasive Devices       Peripheral Intravenous Line  Duration             Peripheral IV 11/19/23 Distal;Left;Upper;Ventral (anterior) Arm <1 day                    Imaging: Reviewed radiology reports from this admission including: CT head and c spine  TRAUMA - CT head wo contrast   Final Result by Ebonie Hwang MD (11/19 1727)      No acute intracranial abnormality. The study was marked in [facility] for immediate notification given trauma status. .      Workstation performed: [id number]         TRAUMA - CT spine cervical wo contrast   Final Result by Ebonie Hwang MD (11/19 1732)      No cervical spine fracture or traumatic malalignment. The study was marked in [facility] for immediate notification given trauma status. .            Workstation performed: [id number]         XR Trauma chest portable   Final Result by Ebonie Hwang MD (11/19 1734)      No acute cardiopulmonary disease. Workstation performed: [id number]         XR Trauma pelvis ap only 1 or 2 vw   Final Result by Ebonie Hwang MD (11/19 1735)      No acute osseous abnormality. Degenerative changes as described.       Workstation performed: [id number]         XR elbow 3+ vw RIGHT    (Results Pending)   XR knee 4+ vw right injury    (Results Pending)       EKG and Other Studies Reviewed on Admission:   EKG: NSR. HR 89, reviewed in muse personally. ** Please Note: This note has been constructed using a voice recognition system.  **

## 2023-11-20 NOTE — ASSESSMENT & PLAN NOTE
Lab Results   Component Value Date    HGBA1C 9.9 (H) 10/26/2023       No results for input(s): "POCGLU" in the last 72 hours.     Blood Sugar Average: Last 72 hrs:  Patient following with endocrinology  Continue insulin  Hold Ozempic and resume at discharge  Sliding scale insulin coverage while in the hospital

## 2023-11-21 ENCOUNTER — APPOINTMENT (INPATIENT)
Dept: CT IMAGING | Facility: HOSPITAL | Age: 78
DRG: 640 | End: 2023-11-21
Payer: MEDICARE

## 2023-11-21 PROBLEM — G93.40 ENCEPHALOPATHY: Status: ACTIVE | Noted: 2023-11-21

## 2023-11-21 PROBLEM — R93.5 ABNORMAL CT OF THE ABDOMEN: Status: ACTIVE | Noted: 2023-11-21

## 2023-11-21 LAB
25(OH)D3 SERPL-MCNC: 27.1 NG/ML (ref 30–100)
ALBUMIN SERPL BCP-MCNC: 3.3 G/DL (ref 3.5–5)
ALBUMIN SERPL BCP-MCNC: 3.5 G/DL (ref 3.5–5)
ALP SERPL-CCNC: 43 U/L (ref 34–104)
ALP SERPL-CCNC: 45 U/L (ref 34–104)
ALT SERPL W P-5'-P-CCNC: 10 U/L (ref 7–52)
ALT SERPL W P-5'-P-CCNC: 10 U/L (ref 7–52)
ANION GAP SERPL CALCULATED.3IONS-SCNC: 9 MMOL/L
ANION GAP SERPL CALCULATED.3IONS-SCNC: 9 MMOL/L
AST SERPL W P-5'-P-CCNC: 15 U/L (ref 13–39)
AST SERPL W P-5'-P-CCNC: 16 U/L (ref 13–39)
BILIRUB SERPL-MCNC: 0.46 MG/DL (ref 0.2–1)
BILIRUB SERPL-MCNC: 0.5 MG/DL (ref 0.2–1)
BUN SERPL-MCNC: 22 MG/DL (ref 5–25)
BUN SERPL-MCNC: 24 MG/DL (ref 5–25)
CALCIUM ALBUM COR SERPL-MCNC: 10.9 MG/DL (ref 8.3–10.1)
CALCIUM SERPL-MCNC: 10 MG/DL (ref 8.4–10.2)
CALCIUM SERPL-MCNC: 10.3 MG/DL (ref 8.4–10.2)
CHLORIDE SERPL-SCNC: 102 MMOL/L (ref 96–108)
CHLORIDE SERPL-SCNC: 99 MMOL/L (ref 96–108)
CO2 SERPL-SCNC: 25 MMOL/L (ref 21–32)
CO2 SERPL-SCNC: 26 MMOL/L (ref 21–32)
CREAT SERPL-MCNC: 1.46 MG/DL (ref 0.6–1.3)
CREAT SERPL-MCNC: 1.48 MG/DL (ref 0.6–1.3)
ERYTHROCYTE [DISTWIDTH] IN BLOOD BY AUTOMATED COUNT: 13.2 % (ref 11.6–15.1)
GFR SERPL CREATININE-BSD FRML MDRD: 33 ML/MIN/1.73SQ M
GFR SERPL CREATININE-BSD FRML MDRD: 34 ML/MIN/1.73SQ M
GLUCOSE SERPL-MCNC: 196 MG/DL (ref 65–140)
GLUCOSE SERPL-MCNC: 199 MG/DL (ref 65–140)
GLUCOSE SERPL-MCNC: 215 MG/DL (ref 65–140)
GLUCOSE SERPL-MCNC: 222 MG/DL (ref 65–140)
GLUCOSE SERPL-MCNC: 248 MG/DL (ref 65–140)
GLUCOSE SERPL-MCNC: 264 MG/DL (ref 65–140)
HCT VFR BLD AUTO: 37.2 % (ref 34.8–46.1)
HGB BLD-MCNC: 11.8 G/DL (ref 11.5–15.4)
KAPPA LC FREE SER-MCNC: 53.2 MG/L (ref 3.3–19.4)
KAPPA LC FREE/LAMBDA FREE SER: 2.43 {RATIO} (ref 0.26–1.65)
LAMBDA LC FREE SERPL-MCNC: 21.9 MG/L (ref 5.7–26.3)
MCH RBC QN AUTO: 30.2 PG (ref 26.8–34.3)
MCHC RBC AUTO-ENTMCNC: 31.7 G/DL (ref 31.4–37.4)
MCV RBC AUTO: 95 FL (ref 82–98)
PLATELET # BLD AUTO: 139 THOUSANDS/UL (ref 149–390)
PMV BLD AUTO: 11 FL (ref 8.9–12.7)
POTASSIUM SERPL-SCNC: 3.2 MMOL/L (ref 3.5–5.3)
POTASSIUM SERPL-SCNC: 3.8 MMOL/L (ref 3.5–5.3)
PROT SERPL-MCNC: 5.9 G/DL (ref 6.4–8.4)
PROT SERPL-MCNC: 6.2 G/DL (ref 6.4–8.4)
PTH-INTACT SERPL-MCNC: 18.2 PG/ML (ref 12–88)
RBC # BLD AUTO: 3.91 MILLION/UL (ref 3.81–5.12)
SODIUM SERPL-SCNC: 133 MMOL/L (ref 135–147)
SODIUM SERPL-SCNC: 137 MMOL/L (ref 135–147)
WBC # BLD AUTO: 10.73 THOUSAND/UL (ref 4.31–10.16)

## 2023-11-21 PROCEDURE — 82397 CHEMILUMINESCENT ASSAY: CPT | Performed by: INTERNAL MEDICINE

## 2023-11-21 PROCEDURE — 82948 REAGENT STRIP/BLOOD GLUCOSE: CPT

## 2023-11-21 PROCEDURE — 82306 VITAMIN D 25 HYDROXY: CPT | Performed by: INTERNAL MEDICINE

## 2023-11-21 PROCEDURE — 99232 SBSQ HOSP IP/OBS MODERATE 35: CPT | Performed by: INTERNAL MEDICINE

## 2023-11-21 PROCEDURE — 71250 CT THORAX DX C-: CPT

## 2023-11-21 PROCEDURE — 83521 IG LIGHT CHAINS FREE EACH: CPT | Performed by: INTERNAL MEDICINE

## 2023-11-21 PROCEDURE — G1004 CDSM NDSC: HCPCS

## 2023-11-21 PROCEDURE — 83970 ASSAY OF PARATHORMONE: CPT | Performed by: INTERNAL MEDICINE

## 2023-11-21 PROCEDURE — 74176 CT ABD & PELVIS W/O CONTRAST: CPT

## 2023-11-21 PROCEDURE — 85027 COMPLETE CBC AUTOMATED: CPT | Performed by: INTERNAL MEDICINE

## 2023-11-21 PROCEDURE — 97167 OT EVAL HIGH COMPLEX 60 MIN: CPT

## 2023-11-21 PROCEDURE — 84165 PROTEIN E-PHORESIS SERUM: CPT | Performed by: INTERNAL MEDICINE

## 2023-11-21 PROCEDURE — 80053 COMPREHEN METABOLIC PANEL: CPT | Performed by: INTERNAL MEDICINE

## 2023-11-21 PROCEDURE — 84166 PROTEIN E-PHORESIS/URINE/CSF: CPT | Performed by: INTERNAL MEDICINE

## 2023-11-21 PROCEDURE — 97163 PT EVAL HIGH COMPLEX 45 MIN: CPT

## 2023-11-21 RX ORDER — POTASSIUM CHLORIDE 20MEQ/15ML
20 LIQUID (ML) ORAL
Status: DISCONTINUED | OUTPATIENT
Start: 2023-11-21 | End: 2023-11-28 | Stop reason: HOSPADM

## 2023-11-21 RX ADMIN — ATORVASTATIN CALCIUM 80 MG: 80 TABLET, FILM COATED ORAL at 17:10

## 2023-11-21 RX ADMIN — ASPIRIN 81 MG: 81 TABLET, COATED ORAL at 09:36

## 2023-11-21 RX ADMIN — AMLODIPINE BESYLATE 5 MG: 5 TABLET ORAL at 09:36

## 2023-11-21 RX ADMIN — FLUTICASONE PROPIONATE 2 SPRAY: 50 SPRAY, METERED NASAL at 09:44

## 2023-11-21 RX ADMIN — METOPROLOL SUCCINATE 50 MG: 50 TABLET, EXTENDED RELEASE ORAL at 09:35

## 2023-11-21 RX ADMIN — PANTOPRAZOLE SODIUM 40 MG: 40 TABLET, DELAYED RELEASE ORAL at 09:36

## 2023-11-21 RX ADMIN — POTASSIUM CHLORIDE 20 MEQ: 1.5 SOLUTION ORAL at 17:10

## 2023-11-21 RX ADMIN — LEVOTHYROXINE SODIUM 50 MCG: 50 TABLET ORAL at 09:36

## 2023-11-21 RX ADMIN — INSULIN GLARGINE 20 UNITS: 100 INJECTION, SOLUTION SUBCUTANEOUS at 21:32

## 2023-11-21 RX ADMIN — ENOXAPARIN SODIUM 30 MG: 30 INJECTION SUBCUTANEOUS at 09:37

## 2023-11-21 RX ADMIN — POTASSIUM CHLORIDE 20 MEQ: 1.5 SOLUTION ORAL at 09:37

## 2023-11-21 RX ADMIN — INSULIN LISPRO 2 UNITS: 100 INJECTION, SOLUTION INTRAVENOUS; SUBCUTANEOUS at 21:33

## 2023-11-21 RX ADMIN — INSULIN LISPRO 2 UNITS: 100 INJECTION, SOLUTION INTRAVENOUS; SUBCUTANEOUS at 09:39

## 2023-11-21 RX ADMIN — INSULIN LISPRO 3 UNITS: 100 INJECTION, SOLUTION INTRAVENOUS; SUBCUTANEOUS at 17:11

## 2023-11-21 RX ADMIN — INSULIN LISPRO 2 UNITS: 100 INJECTION, SOLUTION INTRAVENOUS; SUBCUTANEOUS at 12:03

## 2023-11-21 RX ADMIN — DULOXETINE HYDROCHLORIDE 20 MG: 20 CAPSULE, DELAYED RELEASE ORAL at 09:35

## 2023-11-21 RX ADMIN — OMEGA-3 FATTY ACIDS CAP 1000 MG 1000 MG: 1000 CAP at 17:10

## 2023-11-21 RX ADMIN — OMEGA-3 FATTY ACIDS CAP 1000 MG 1000 MG: 1000 CAP at 09:37

## 2023-11-21 RX ADMIN — Medication 400 MCG: at 09:37

## 2023-11-21 RX ADMIN — CHOLECALCIFEROL TAB 10 MCG (400 UNIT) 800 UNITS: 10 TAB at 09:37

## 2023-11-21 RX ADMIN — POTASSIUM CHLORIDE 20 MEQ: 1.5 SOLUTION ORAL at 12:05

## 2023-11-21 NOTE — ASSESSMENT & PLAN NOTE
Unclear etiology  CT head negative for any acute findings  Unable to get a hold of family to establish patient's baseline  Possibly secondary to hypercalcemia which is improving  If no improvement can consider MRI brain as needed

## 2023-11-21 NOTE — PROGRESS NOTES
[address]  Progress Note  Name: Dariel Hayes  MRN: [id number]  Unit/Bed#: -01 I Date of Admission: 11/19/2023   Date of Service: 11/21/2023 I Hospital Day: 2    Assessment/Plan   * Hypercalcemia  Assessment & Plan  Patient presented with weakness, confusion, frequent falls, found to have hypercalcemia. Patient reports she has been taking Tums frequently for heartburn  Continue IV fluids  Calcitonin initiated per nephrology  Calcium level improving  Repeat BMP in the morning    Encephalopathy  Assessment & Plan  Unclear etiology  CT head negative for any acute findings  Unable to get a hold of family to establish patient's baseline  Possibly secondary to hypercalcemia which is improving  If no improvement can consider MRI brain as needed    Abnormal CT of the abdomen  Assessment & Plan  Fluid attenuation lesion adjacent to the right colon of uncertain etiology   If kidney function improves will consider scan with contrast versus MRI. Will review with family once available at bedside    PAD (peripheral artery disease) ([address])  Assessment & Plan  Follows w/ vascular  Continue aspirin, statin    Severe obesity (BMI 35.0-39. 9) with comorbidity ([address])  Assessment & Plan  Patient starting on Ozempic, monitor weight loss  Healthy diet lifestyle modification recommended    Acquired hypothyroidism  Assessment & Plan  Continue levothyroxine    Essential hypertension  Assessment & Plan  Continue home medication with hold parameters    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 mellitus ([address])  Assessment & Plan  Creat baseline 1.4 w/ CKD3b  Continue IV fluids  Repeat BMP in the morning  Nephrology following    Type 2 diabetes mellitus with stage 3b chronic kidney disease, with long-term current use of insulin [facility])  Assessment & Plan  Lab Results   Component Value Date    HGBA1C 9.9 (H) 10/26/2023       Recent Labs     11/20/23  1559 11/20/23  1617 11/20/23  2129 11/21/23  0644   POCGLU 160* 175* 197* 199*         Blood Sugar Average: Last 72 hrs:  (P) [id number]  Patient following with endocrinology  Continue insulin  Hold Ozempic and resume at discharge  Sliding scale insulin coverage while in the hospital           VTE Prophylaxis:  Enoxaparin (Lovenox)    Patient Centered Rounds: I have performed bedside rounds with nursing staff today. Discussions with Specialists or Other Care Team Provider: yes  Education and Discussions with Family / Patient: Will attempt to get a hold of the family at number provided in chart    Current Length of Stay: 2 day(s)    Current Patient Status: Inpatient   Certification Statement: The patient will continue to require additional inpatient hospital stay due to hypercalcemia, encephalopathy    Discharge Plan: Hopeful discharge in the next 48 to 72 hours    Code Status: Level 3 - DNAR and DNI    Subjective:   No overnight events noted. Patient still with intermittent confusion. Poor oral intake    Objective:     Vitals:   Temp (24hrs), Av.1 °F (36.7 °C), Min:97.8 °F (36.6 °C), Max:98.4 °F (36.9 °C)    Temp:  [97.8 °F (36.6 °C)-98.4 °F (36.9 °C)] 97.8 °F (36.6 °C)  HR:  [83-95] 94  Resp:  [18] 18  BP: (125-163)/(71-90) 163/89  SpO2:  [92 %-93 %] 92 %  Body mass index is 33.43 kg/m². Input and Output Summary (last 24 hours): Intake/Output Summary (Last 24 hours) at 2023 1037  Last data filed at 2023 1018  Gross per 24 hour   Intake 120 ml   Output 1250 ml   Net -1130 ml       Physical Exam:   Physical Exam  Constitutional:       General: She is not in acute distress. HENT:      Head: Normocephalic and atraumatic. Nose: Nose normal.      Mouth/Throat:      Mouth: Mucous membranes are dry. Eyes:      Extraocular Movements: Extraocular movements intact. Conjunctiva/sclera: Conjunctivae normal.   Cardiovascular:      Rate and Rhythm: Normal rate and regular rhythm.    Pulmonary:      Effort: Pulmonary effort is normal. No respiratory distress. Abdominal:      Palpations: Abdomen is soft. Tenderness: There is no abdominal tenderness. Musculoskeletal:         General: Normal range of motion. Cervical back: Normal range of motion and neck supple. Skin:     General: Skin is warm and dry. Neurological:      Mental Status: She is alert. Comments: Drowsy but arousable, following simple commands   Psychiatric:         Mood and Affect: Mood normal.         Behavior: Behavior normal.       Additional Data:     Labs:    Results from last 7 days   Lab Units 11/21/23  0605 11/20/23  0447 11/19/23  1655   WBC Thousand/uL 10.73*   < > 13.43*   HEMOGLOBIN g/dL 11.8   < > 13.8   HEMATOCRIT % 37.2   < > 43.6   PLATELETS Thousands/uL 139*   < > 130*   NEUTROS PCT %  --   --  79*   LYMPHS PCT %  --   --  12*   MONOS PCT %  --   --  9   EOS PCT %  --   --  0    < > = values in this interval not displayed. Results from last 7 days   Lab Units 11/21/23  0641   SODIUM mmol/L 137   POTASSIUM mmol/L 3.2*   CHLORIDE mmol/L 102   CO2 mmol/L 26   BUN mg/dL 22   CREATININE mg/dL 1.46*   CALCIUM mg/dL 10.3*   ALK PHOS U/L 43   ALT U/L 10   AST U/L 16         Results from last 7 days   Lab Units 11/21/23  0644 11/20/23  2129 11/20/23  1617 11/20/23  1559 11/20/23  1054 11/20/23  0733 11/19/23  2142   POC GLUCOSE mg/dl 199* 197* 175* 160* 217* 196* 189*           * I Have Reviewed All Lab Data Listed Above. * Additional Pertinent Lab Tests Reviewed:  [facility] Admission  Reviewed    Imaging:  Imaging Reports Reviewed Today Include: CT imaging results reviewed    Recent Cultures (last 7 days):           Last 24 Hours Medication List:   Current Facility-Administered Medications   Medication Dose Route Frequency Provider Last Rate    acetaminophen  650 mg Oral Q4H PRN Carnella Shelter, PA-C      amLODIPine  5 mg Oral Daily Carnella Shelter, PA-C      aspirin  81 mg Oral Daily Carnella [place], [place] atorvastatin  80 mg Oral Daily With [facility] Shantell Adams      cholecalciferol  800 Units Oral Daily Canary Deshawn [place], [place]      DULoxetine  20 mg Oral Daily Canary Deshawn [place], PA-C      enoxaparin  30 mg Subcutaneous Daily Canary Deshawn NANCY Hall      fish oil  1,000 mg Oral BID Les Davila PA-C      fluticasone  2 spray Nasal Daily Canary Deshawn [place], [place]      folic acid  737 mcg Oral Daily Canary Deshawndean Cintron PA-C      insulin glargine  20 Units Subcutaneous HS Luda Cintron PA-C      insulin lispro  1-5 Units Subcutaneous HS Luda Cintron PA-C      insulin lispro  1-6 Units Subcutaneous TID AC Luda Sanabria PA-C      levothyroxine  50 mcg Oral Daily Luda Cintron PA-C      metoprolol succinate  50 mg Oral Daily Luda Cintron PA-C      ondansetron  4 mg Intravenous Q6H PRN Les Davila PA-C      pantoprazole  40 mg Oral Early Morning Les Davila PA-C      potassium chloride  20 mEq Oral TID With Meals Felicita Galvez MD      sodium chloride  50 mL/hr Intravenous Continuous Alex Bowers DO 50 mL/hr (11/20/23 2000)        Today, Patient Was Seen By: Felicita Galvez MD    ** Please Note: Dictation voice to text software may have been used in the creation of this document.  **

## 2023-11-21 NOTE — PLAN OF CARE
Problem: SAFETY,RESTRAINT: [place]/NON-SELF DESTRUCTIVE BEHAVIOR  Goal: Remains free of harm/injury (restraint for non violent/non self-detsructive behavior)  Description: INTERVENTIONS:  - Instruct patient/family regarding restraint use   - Assess and monitor physiologic and psychological status   - Provide interventions and comfort measures to meet assessed patient needs   - Identify and implement measures to help patient regain control  - Assess readiness for release of restraint   Outcome: Progressing  Goal: Returns to optimal restraint-free functioning  Description: INTERVENTIONS:  - Assess the patient's behavior and symptoms that indicate continued need for restraint  - Identify and implement measures to help patient regain control  - Assess readiness for release of restraint   Outcome: Progressing     Problem: PAIN - ADULT  Goal: Verbalizes/displays adequate comfort level or baseline comfort level  Description: Interventions:  - Encourage patient to monitor pain and request assistance  - Assess pain using appropriate pain scale  - Administer analgesics based on type and severity of pain and evaluate response  - Implement non-pharmacological measures as appropriate and evaluate response  - Consider cultural and social influences on pain and pain management  - Notify physician/advanced practitioner if interventions unsuccessful or patient reports new pain  Outcome: Progressing     Problem: INFECTION - ADULT  Goal: Absence or prevention of progression during hospitalization  Description: INTERVENTIONS:  - Assess and monitor for signs and symptoms of infection  - Monitor lab/diagnostic results  - Monitor all insertion sites, i.e. indwelling lines, tubes, and drains  - Monitor endotracheal if appropriate and nasal secretions for changes in amount and color  - [place] appropriate cooling/warming therapies per order  - Administer medications as ordered  - Instruct and encourage patient and family to use good hand hygiene technique  - Identify and instruct in appropriate isolation precautions for identified infection/condition  Outcome: Progressing  Goal: Absence of fever/infection during neutropenic period  Description: INTERVENTIONS:  - Monitor WBC    Outcome: Progressing     Problem: SAFETY ADULT  Goal: Patient will remain free of falls  Description: INTERVENTIONS:  - Educate patient/family on patient safety including physical limitations  - Instruct patient to call for assistance with activity   - Consult OT/PT to assist with strengthening/mobility   - Keep Call bell within reach  - Keep bed low and locked with side rails adjusted as appropriate  - Keep care items and personal belongings within reach  - Initiate and maintain comfort rounds  - Make Fall Risk Sign visible to staff  - Offer Toileting every 2 Hours, in advance of need  - Initiate/Maintain bed/chair alarm  - Obtain necessary fall risk management equipment: alarms, restraints, yellow socks.   - Apply yellow socks and bracelet for high fall risk patients  - Consider moving patient to room near nurses station  Outcome: Progressing  Goal: Maintain or return to baseline ADL function  Description: INTERVENTIONS:  -  Assess patient's ability to carry out ADLs; assess patient's baseline for ADL function and identify physical deficits which impact ability to perform ADLs (bathing, care of mouth/teeth, toileting, grooming, dressing, etc.)  - Assess/evaluate cause of self-care deficits   - Assess range of motion  - Assess patient's mobility; develop plan if impaired  - Assess patient's need for assistive devices and provide as appropriate  - Encourage maximum independence but intervene and supervise when necessary  - Involve family in performance of ADLs  - Assess for home care needs following discharge   - Consider OT consult to assist with ADL evaluation and planning for discharge  - Provide patient education as appropriate  Outcome: Progressing  Goal: Maintains/Returns to pre admission functional level  Description: INTERVENTIONS:  - Perform AM-PAC 6 Click Basic Mobility/ Daily Activity assessment daily.  - Set and communicate daily mobility goal to care team and patient/family/caregiver. - Collaborate with rehabilitation services on mobility goals if consulted  - Perform Range of Motion 3 times a day. - Reposition patient every 2 hours. - Dangle patient 3 times a day  - Stand patient 3 times a day  - Ambulate patient 3 times a day  - Out of bed to chair 3 times a day   - Out of bed for meals 3 times a day  - Out of bed for toileting  - Record patient progress and toleration of activity level   Outcome: Progressing     Problem: DISCHARGE PLANNING  Goal: Discharge to home or other facility with appropriate resources  Description: INTERVENTIONS:  - Identify barriers to discharge w/patient and caregiver  - Arrange for needed discharge resources and transportation as appropriate  - Identify discharge learning needs (meds, wound care, etc.)  - Refer to Case Management Department for coordinating discharge planning if the patient needs post-hospital services based on physician/advanced practitioner order or complex needs related to functional status, cognitive ability, or social support system  Outcome: Progressing     Problem: Knowledge Deficit  Goal: Patient/family/caregiver demonstrates understanding of disease process, treatment plan, medications, and discharge instructions  Description: Complete learning assessment and assess knowledge base.   Interventions:  - Provide teaching at level of understanding  - Provide teaching via preferred learning methods  Outcome: Progressing     Problem: Prexisting or High Potential for Compromised Skin Integrity  Goal: Skin integrity is maintained or improved  Description: INTERVENTIONS:  - Identify patients at risk for skin breakdown  - Assess and monitor skin integrity  - Assess and monitor nutrition and hydration status  - Monitor labs   - Assess for incontinence   - Turn and reposition patient  - Assist with mobility/ambulation  - Relieve pressure over bony prominences  - Avoid friction and shearing  - Provide appropriate hygiene as needed including keeping skin clean and dry  - Evaluate need for skin moisturizer/barrier cream  - Collaborate with interdisciplinary team   - Patient/family teaching  - Consider wound care consult   Outcome: Progressing

## 2023-11-21 NOTE — PLAN OF CARE
Problem: SAFETY,RESTRAINT: [place]/NON-SELF DESTRUCTIVE BEHAVIOR  Goal: Remains free of harm/injury (restraint for non violent/non self-detsructive behavior)  Description: INTERVENTIONS:  - Instruct patient/family regarding restraint use   - Assess and monitor physiologic and psychological status   - Provide interventions and comfort measures to meet assessed patient needs   - Identify and implement measures to help patient regain control  - Assess readiness for release of restraint   11/21/2023 0027 by Governor Knock  Outcome: Not Progressing  11/21/2023 0013 by Governor Knock  Outcome: Not Progressing     Problem: SAFETY ADULT  Goal: Maintain or return to baseline ADL function  Description: INTERVENTIONS:  -  Assess patient's ability to carry out ADLs; assess patient's baseline for ADL function and identify physical deficits which impact ability to perform ADLs (bathing, care of mouth/teeth, toileting, grooming, dressing, etc.)  - Assess/evaluate cause of self-care deficits   - Assess range of motion  - Assess patient's mobility; develop plan if impaired  - Assess patient's need for assistive devices and provide as appropriate  - Encourage maximum independence but intervene and supervise when necessary  - Involve family in performance of ADLs  - Assess for home care needs following discharge   - Consider OT consult to assist with ADL evaluation and planning for discharge  - Provide patient education as appropriate  11/21/2023 0027 by Governor Knock  Outcome: Not Progressing  11/21/2023 0013 by Governor Knock  Outcome: Not Progressing     Problem: Knowledge Deficit  Goal: Patient/family/caregiver demonstrates understanding of disease process, treatment plan, medications, and discharge instructions  Description: Complete learning assessment and assess knowledge base.   Interventions:  - Provide teaching at level of understanding  - Provide teaching via preferred learning methods  11/21/2023 0027 by JOVANNY HILDA  Outcome: Not Progressing  11/21/2023 0013 by Elise Kirkpatrick  Outcome: Not Progressing

## 2023-11-21 NOTE — PLAN OF CARE
Problem: PHYSICAL THERAPY ADULT  Goal: Performs mobility at highest level of function for planned discharge setting. See evaluation for individualized goals. Description: Treatment/Interventions: Functional transfer training, LE strengthening/ROM, Elevations, Therapeutic exercise, Endurance training, Cognitive reorientation, Patient/family training, Equipment eval/education, Bed mobility, Gait training, Spoke to nursing  Equipment Recommended: Daisy Ken       See flowsheet documentation for full assessment, interventions and recommendations. Note: Prognosis: Fair  Problem List: Decreased strength, Decreased endurance, Impaired balance, Decreased mobility, Decreased coordination, Decreased cognition, Impaired judgement, Decreased safety awareness  Assessment: Pt is a 66 y.o. female seen for PT evaluation s/p admit to [address] [place] [facility] [place] w/ Hypercalcemia. PTA, pt was independent w/ all functional mobility and used walker/cane assistive device for mobility per chart review. PT consulted to assess pt's functional mobility and d/c needs. At time of PT eval, pt found supine in and bed and was agreeable to PT session. Pt required min assist x2 for bed mobility, transfers and ambulation with noted confusion. Pt was unable to provide any subjective intake due to confusion and all intake was found per chart review. Pt showed impaired balance with ambulation and needed consistent VC for redirection in order to safely ambulate to bedside commode and to bedside chair. Pt presented with an antalgic gait pattern and decreased foot clearance with ambulating to bedside chair. Safety is a concern in regards to confusion and overall safety awareness of the patient. Pt currently functioning below baseline level. Pt will continue to benefit from immediate acute skilled PT services in order to address the deficits listed above.  Areas for improvement include: decreased strength, decreased mobility, decreased endurance, and impaired balance. From PT/mobility standpoint, at this time recommendation is a moderate resource intensity, in order to maximize pt's functional independence and safety/consistency w/ mobility. Barriers to Discharge: Inaccessible home environment, Decreased caregiver support     Rehab Resource Intensity Level, PT: II (Moderate Resource Intensity)    See flowsheet documentation for full assessment.

## 2023-11-21 NOTE — PLAN OF CARE
Problem: OCCUPATIONAL THERAPY ADULT  Goal: Performs self-care activities at highest level of function for planned discharge setting. See evaluation for individualized goals. Description: Treatment Interventions: ADL retraining, UE strengthening/ROM, Functional transfer training, Endurance training, Patient/family training, Cognitive reorientation, Compensatory technique education, Energy conservation, Activityengagement    See flowsheet documentation for full assessment, interventions and recommendations. Note: Limitation: Decreased ADL status, Decreased UE strength, Decreased Safe judgement during ADL, Decreased endurance, Decreased self-care trans, Decreased high-level ADLs, Decreased cognition  Prognosis: Good  Assessment: Pt is a 66 y.o. female seen for OT evaluation s/p admit to [facility] on 11/19/2023 w/ Hypercalcemia. Comorbidities affecting pt's functional performance at time of assessment include: type 2 diabetes, CKD, HTN, obesity, encephalopathy, spinal stenosis of lumbar region. Personal factors affecting pt at time of IE include:steps to enter environment, behavioral pattern, difficulty performing ADLS, difficulty performing IADLS , limited insight into deficits, decreased initiation and engagement , health management , and environment. Prior to admission, pt was I with ADLs and required A with IADLs. Upon evaluation: the following deficits impact occupational performance: weakness, decreased strength, decreased balance, decreased tolerance, impaired attention, impaired memory, impaired problem solving, and decreased safety awareness. Pt to benefit from continued skilled OT tx while in the hospital to address deficits as defined above and maximize level of functional independence w ADL's and functional mobility. Occupational Performance areas to address include: grooming, bathing/shower, toilet hygiene, dressing, functional mobility, community mobility, and clothing management.  From OT standpoint, recommendation at time of d/c would be with max intensity OT resources.      Rehab Resource Intensity Level, OT: I (Maximum Resource Intensity)     Bryn Min MS, OTR/L

## 2023-11-21 NOTE — ASSESSMENT & PLAN NOTE
Lab Results   Component Value Date    HGBA1C 9.9 (H) 10/26/2023       Recent Labs     11/20/23  1559 11/20/23  1617 11/20/23 2129 11/21/23  0644   POCGLU 160* 175* 197* 199*         Blood Sugar Average: Last 72 hrs:  (P) [id number]  Patient following with endocrinology  Continue insulin  Hold Ozempic and resume at discharge  Sliding scale insulin coverage while in the hospital

## 2023-11-21 NOTE — OCCUPATIONAL THERAPY NOTE
Occupational Therapy Evaluation     Patient Name: Michelle Chadwick  EFITN'T Date: 11/21/2023  Problem List  Principal Problem:    Hypercalcemia  Active Problems:    Type 2 diabetes mellitus with stage 3b chronic kidney disease, with long-term current use of insulin ([address])    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 mellitus ([address])    Essential hypertension    Acquired hypothyroidism    Severe obesity (BMI 35.0-39. 9) with comorbidity ([address])    PAD (peripheral artery disease) (HCC)    Abnormal CT of the abdomen    Encephalopathy    Past Medical History  Past Medical History:   Diagnosis Date    Benign essential hypertension     Chronic kidney disease, stage 3 (HCC)     Disorder of gallbladder     Disorder of skin and subcutaneous tissue     Dyspnea     Essential hypertension     Hyperlipidemia     Hypokalemia     Malaise and fatigue     Mixed hyperlipidemia     Morbid obesity (HCC)     Pernicious anemia      Past Surgical History  Past Surgical History:   Procedure Laterality Date    BREAST BIOPSY Right     CARPAL TUNNEL RELEASE Bilateral     CHOLECYSTECTOMY      COLONOSCOPY      Dr. Douglas Beck INJECTION      x3    PARTIAL HYSTERECTOMY      SKIN LESION EXCISION      scalp         11/21/23 1251   OT Last Visit   OT Visit Date 11/21/23   Note Type   Note type Evaluation   Additional Comments Pt seen as a co-eval with PT due to the patient's co-morbidities, clinically unstable presentation, and present impairments which are a regression from the patient's baseline.    Pain Assessment   Pain Assessment Tool FLACC   Pain Rating: FLACC (Rest) - Face 0   Pain Rating: FLACC (Rest) - Legs 0   Pain Rating: FLACC (Rest) - Activity 0   Pain Rating: FLACC (Rest) - Cry 0   Pain Rating: FLACC (Rest) - Consolability 0   Score: FLACC (Rest) 0   Pain Rating: FLACC (Activity) - Face 0   Pain Rating: FLACC (Activity) - Legs 0   Pain Rating: FLACC (Activity) - Activity 0   Pain Rating: FLACC (Activity) - Cry 0   Pain Rating: FLACC (Activity) - Consolability 1   Score: FLACC (Activity) 1   Restrictions/Precautions   Weight Bearing Precautions Per Order No   Braces or Orthoses   (none reported)   Other Precautions Cognitive; Chair Alarm; Bed Alarm; Fall Risk;Pain   Home Living   Type of [address] [place] [address] One level;Stairs to enter with rails; Performs ADLs on one level  (5 MAIKEL w/ HR)   Bathroom Accessibility Accessible   Home Equipment Walker;Cane  (RW used)   Additional Comments Home set-up gathered from chart review; pt poor historian d/t cognitive status   Prior Function   Level of [place] Independent with ADLs; Independent with functional mobility; Needs assistance with IADLS   Lives With Spouse   Receives Help From Family   IADLs Family/Friend/Other provides transportation  (per chart review, family transports)   Comments Per discussion with CNA, 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 Family assists with IADLs as gathered per chart review. Will continue to follow-up as able with CM to gather PLOF. (Pt poor historian d/t cognitive status)   Subjective   Subjective "I have to go to the bathroom"   ADL   Where Assessed Edge of bed   UB Bathing Assistance 4  Minimal Assistance   LB Bathing Assistance 3  Moderate Assistance   UB Dressing Assistance 4  Minimal Assistance   LB Dressing Assistance 2  Maximal [address] [place]  2  Maximal [address] [facility] [address] Deficit Perineal hygiene; Increased time to complete  (& perianal hygiene while in stance)   Bed Mobility   Rolling R 4  Minimal assistance   Additional items Assist x 1; Increased time required;Verbal cues;LE management   Rolling L 4  Minimal assistance   Additional items Assist x 1; Increased time required;Verbal cues;LE management   Supine to Sit 4  Minimal assistance   Additional items Assist x 2;HOB elevated; Bedrails; Increased time required;Verbal cues   Sit to Supine   (DNT; pt seated in recliner upon conclusion of session)   Additional Comments VC for bedrail utilization, task initation and proper body mechanics   Transfers   Sit to Stand 4  Minimal assistance   Additional items Assist x 2;Bedrails; Increased time required;Verbal cues   Stand to Sit 4  Minimal assistance   Additional items Assist x 2;Armrests; Increased time required;Verbal cues   Stand pivot 4  Minimal assistance   Additional items Assist x 2; Increased time required;Verbal cues   Toilet transfer 4  Minimal assistance   Additional items Assist x 2; Increased time required;Commode   Additional Comments RW used; VC for safe hand placement and proper body mechanics. Pt required moderate tactile cues for RW management and positioning   Balance   Static Sitting Good   Dynamic Sitting Fair +   Static Standing Fair   Dynamic Standing Fair -   Ambulatory Poor +   Activity Tolerance   Activity Tolerance Patient limited by fatigue; Other (Comment)  (cognitive status)   Medical Staff Made Aware Yes, CM made aware of d/c recs   Nurse Made Aware Yes, nursing staff made aware of session outcomes   RUE Assessment   RUE Assessment WFL   RUE Strength   RUE Overall Strength   (3/5)   LUE Assessment   LUE Assessment WFL   LUE Strength   LUE Overall Strength   (3/5)   Hand Function   Gross Motor Coordination Functional   Fine Motor Coordination Functional   Cognition   Overall Cognitive Status Impaired   Arousal/Participation Alert; Responsive   Attention Attends with cues to redirect   Orientation Level Disoriented X4   Memory Decreased recall of precautions;Decreased recall of recent events   Following Commands Follows one step commands inconsistently   Comments Pt agreeable to OT evaluation, pleasantly confused   Assessment   Limitation Decreased ADL status; Decreased UE strength;Decreased Safe judgement during ADL;Decreased endurance;Decreased self-care trans;Decreased high-level ADLs; Decreased cognition   Prognosis Good   Assessment Pt is a 66 y.o. female seen for OT evaluation s/p admit to [facility]2023 w/ Hypercalcemia. Comorbidities affecting pt's functional performance at time of assessment include: type 2 diabetes, CKD, HTN, obesity, encephalopathy, spinal stenosis of lumbar region. Personal factors affecting pt at time of IE include:steps to enter environment, behavioral pattern, difficulty performing ADLS, difficulty performing IADLS , limited insight into deficits, decreased initiation and engagement , health management , and environment. Prior to admission, pt was I with ADLs and required A with IADLs. Upon evaluation: the following deficits impact occupational performance: weakness, decreased strength, decreased balance, decreased tolerance, impaired attention, impaired memory, impaired problem solving, and decreased safety awareness. Pt to benefit from continued skilled OT tx while in the hospital to address deficits as defined above and maximize level of functional independence w ADL's and functional mobility. Occupational Performance areas to address include: grooming, bathing/shower, toilet hygiene, dressing, functional mobility, community mobility, and clothing management. From OT standpoint, recommendation at time of d/c would be with max intensity OT resources. Goals   Patient Goals to go to the bathroom   Plan   Treatment Interventions ADL retraining;UE strengthening/ROM; Functional transfer training; Endurance training;Patient/family training;Cognitive reorientation; Compensatory technique education; Energy conservation; Activityengagement   Goal Expiration Date 12/01/23   OT Treatment Day 0   OT Frequency 3-5x/wk   Discharge Recommendation   Rehab Resource Intensity Level, OT I (Maximum Resource Intensity)   Additional Comments  The patient's raw score on the AM-PAC Daily Activity inpatient short form is 15, standardized score is 34.69, less than 39.4.  Patients at this level are likely to benefit from discharge with maximum intensity OT resources. Please refer to the recommendation of the Occupational Therapist for safe discharge planning.    AM-PAC Daily Activity Inpatient   Lower Body Dressing 2   Bathing 2   Toileting 2   Upper Body Dressing 3   Grooming 3   Eating 3   Daily Activity Raw Score 15   Daily Activity Standardized Score (Calc for Raw Score >=11) 34.69   AM-PAC Applied Cognition Inpatient   Following a Speech/Presentation 1   Understanding Ordinary Conversation 3   Taking Medications 2   Remembering Where Things Are Placed or Put Away 2   Remembering List of 4-5 Errands 1   Taking Care of Complicated Tasks 1   Applied Cognition Raw Score 10   Applied Cognition Standardized Score 24.98     GOALS:    Pt will achieve the following within specified time frame: LTG  Pt will achieve the following goals within 10 days    *ADL transfers with (S) for inc'd independence with ADLs/purposeful tasks    *UB ADL with (S) for inc'd independence with self cares    *LB ADL with (S) using AE prn for inc'd independence with self cares    *Toileting with (S) for clothing management and hygiene for return to PLOF with personal care    *Increase static stand balance and dyn stand balance to F for inc'd safety with standing purposeful tasks    *Increase stand tolerance x3 m for inc'd tolerance with standing purposeful tasks    *Bed mobility- (S) for inc'd independence to manage own comfort and initiate EOB & OOB purposeful tasks    *Participate in 10-15m UE therex to increase overall stamina/activity tolerance for purposeful tasks      Linda Maurer MS, OTR/L

## 2023-11-21 NOTE — CASE MANAGEMENT
Case Management Discharge Planning Note    Patient name Matilda Zimmer  Location [address] [facility] [place] 207/-33 MRN [id number]  : 1945 Date 2023       Current Admission Date: 2023  Current Admission Diagnosis:Hypercalcemia   Patient Active Problem List    Diagnosis Date Noted    Hypercalcemia 2023    Chronic cough 2023    PAD (peripheral artery disease) ([address]) 2023    Asymptomatic bilateral carotid artery stenosis 2023    Chronic pain syndrome 2022    Lumbar radiculopathy 2022    Spinal stenosis of lumbar region     Severe obesity (BMI 35.0-39. 9) with comorbidity ([address]) 2021    Mixed hyperlipidemia 2020    Acquired hypothyroidism 2020    Type 2 diabetes mellitus with stage 3b chronic kidney disease, with long-term current use of insulin ([address]) 2019    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 mellitus ([address]) 2019    Essential hypertension 2019      LOS (days): 2  Geometric Mean LOS (GMLOS) (days): 2.60  Days to GMLOS:1     OBJECTIVE:  Risk of Unplanned Readmission Score: 12.96         Current admission status: Inpatient   Preferred Pharmacy:   [address] [place] [address] [id number] [address] [address]  Phone: [phone] Fax: [phone]    Primary Care Provider: Laron Estrella MD    Primary Insurance: MEDICARE  Secondary Insurance: COMMERCIAL MISCELLANEOUS    DISCHARGE DETAILS:  Pt SO currently in the ER. Asked pt if she had any children. Pt states she has a son Mose Krabbe but does not have a phone number. Pt states she has a friend Amanda Rajesh ([phone]) number in her cell phone who may know the number of son. TC to Joanne Forbes who states the son lives in [place] and she may be able to get the number from the pt neighbor. Will call CM back. Pt made aware her SO is in the ER with difficulty voiding.

## 2023-11-21 NOTE — ASSESSMENT & PLAN NOTE
Patient presented with weakness, confusion, frequent falls, found to have hypercalcemia.   Patient reports she has been taking Tums frequently for heartburn  Continue IV fluids  Calcitonin initiated per nephrology  Calcium level improving  Repeat BMP in the morning

## 2023-11-21 NOTE — ASSESSMENT & PLAN NOTE
Fluid attenuation lesion adjacent to the right colon of uncertain etiology   If kidney function improves will consider scan with contrast versus MRI.   Will review with family once available at bedside

## 2023-11-21 NOTE — CASE MANAGEMENT
Case Management Discharge Planning Note    Patient name Cristy Garrido  Location [address] [facility] [place] 207/-37 MRN [id number]  : 1945 Date 2023       Current Admission Date: 2023  Current Admission Diagnosis:Hypercalcemia   Patient Active Problem List    Diagnosis Date Noted    Abnormal CT of the abdomen 2023    Encephalopathy 2023    Hypercalcemia 2023    Chronic cough 2023    PAD (peripheral artery disease) ([address]) 2023    Asymptomatic bilateral carotid artery stenosis 2023    Chronic pain syndrome 2022    Lumbar radiculopathy 2022    Spinal stenosis of lumbar region     Severe obesity (BMI 35.0-39. 9) with comorbidity ([address]) 2021    Mixed hyperlipidemia 2020    Acquired hypothyroidism 2020    Type 2 diabetes mellitus with stage 3b chronic kidney disease, with long-term current use of insulin ([address]) 2019    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 mellitus ([address]) 2019    Essential hypertension 2019      LOS (days): 2  Geometric Mean LOS (GMLOS) (days): 2.60  Days to GMLOS:0.9     OBJECTIVE:  Risk of Unplanned Readmission Score: 12.71         Current admission status: Inpatient   Preferred Pharmacy:   [address] [place], [place] [id number]  Phone: [phone] Fax: [phone]    Primary Care Provider: Triston Jasmine MD    Primary Insurance: MEDICARE  Secondary Insurance: COMMERCIAL MISCELLANEOUS    DISCHARGE DETAILS:  Received a TC from pt friend Rayna Kitchen with the son Kin Current Phone number [phone]. Also another number of [phone]. Son lives in [place]. Received a call from the pt neighbor Ramesh Giordano ( [phone]) who has taken the SO car, keys and wallet home. Pt DAKOTA is still in the ER. He provided the number for pt brother Eve Pagan [phone]. A neighbor is feeding the pt cats.

## 2023-11-21 NOTE — PROGRESS NOTES
Progress Note - Nephrology   Adelean Proffer 66 y.o. female MRN: [id number]  Unit/Bed#: -[facility] Encounter: [id number]      Assessment/plan    Hypercalcemia, severe initially->mild ,symptomatic. Concern for severity and requiring hospitalization is always malignancy. Will exclude parathyroid abnormality, although this is rather severe. Potentially due to volume contraction, but Cr stable. She was taking 1-2 tums a day based on my interaction. Volume status appears compensated. Recommend:  No need for bisphosphonate. Ca 12.0->10.3, uncorrected. Continue IVF if not tolerating PO at gentle rate. Can stop calcitionin given significant improvement. Check 25 vitamin D for vitamin D stores, PTH, PTHRp, spep,upep, kappa/lambda ratio, immunoglobulins. Hold vitamin D/calcium /TUMS. Ct abd/pelvis/chest without contrast, no definitive malignancy but limited without contrast. Fluid along ascending colon? Defer ot primary. 2. 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 Cr trends stable, continue to follow. 3. Type 2 DM, not well controlled. Continue management per hospital team.     4. Essential HTN   BP improving today. Follow trend on amlodipine and toprol. Can increase amlodipine if needed. 5. Encephalopathy due to severe hypercalcemia  Ca 14.3 uncorrected. Ical 1.7. Tx hypercalcemia as above, should improvement with tx. Consider CVA evaluation with MRI. 6. Hypokalemia, mild  Due to poor intake/IVF. Subjective:   Patient seen and examined today. Patient confused. 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 Could not obtain ROS. Objective:     Vitals: Blood pressure 163/89, pulse 94, temperature 97.8 °F (36.6 °C), temperature source Temporal, resp. rate 18, height 5' 2" (1.575 m), weight 82.9 kg (182 lb 12.2 oz), SpO2 92 %. ,Body mass index is 33.43 kg/m².     Weight (last 2 days)       Date/Time Weight    11/20/23 0000 82.9 (182.76)    11/19/23 20:22:10 82.9 (182.76)              Intake/Output Summary (Last 24 hours) at 11/21/2023 0925  Last data filed at 11/21/2023 0616  Gross per 24 hour   Intake --   Output 1250 ml   Net -1250 ml            Physical Exam: /89   Pulse 94   Temp 97.8 °F (36.6 °C) (Temporal)   Resp 18   Ht 5' 2" (1.575 m)   Wt 82.9 kg (182 lb 12.2 oz)   SpO2 92%   BMI 33.43 kg/m²   General appearance: confused, no acute distress  Nose: Nares normal. Septum midline. Mucosa normal. No drainage or sinus tenderness. Lungs: clear to auscultation bilaterally  Heart: regular rate and rhythm, S1, S2 normal, no murmur, click, rub or gallop  Abdomen: soft, non-tender; bowel sounds normal; no masses,  no organomegaly  Extremities:no edema. Skin: Skin color, texture, turgor normal. No rashes or lesions     Lab, Imaging and other studies: I have personally reviewed pertinent labs.

## 2023-11-21 NOTE — PHYSICAL THERAPY NOTE
Physical Therapy Evaluation   Time in: 1255  Time out: 1305  Total evaluation time: 10 minutes    Patient's Name: Matilda Zimmer    Admitting Diagnosis  Hypercalcemia [E83.52]  Head injury [S09.90XA]  Generalized weakness [R53.1]    Problem List  Patient Active Problem List   Diagnosis    Type 2 diabetes mellitus with stage 3b chronic kidney disease, with long-term current use of insulin ([address])    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 mellitus ([address])    Essential hypertension    Mixed hyperlipidemia    Acquired hypothyroidism    Severe obesity (BMI 35.0-39. 9) with comorbidity ([address])    Spinal stenosis of lumbar region    Chronic pain syndrome    Lumbar radiculopathy    PAD (peripheral artery disease) (HCC)    Asymptomatic bilateral carotid artery stenosis    Chronic cough    Hypercalcemia    Abnormal CT of the abdomen    Encephalopathy       Past Medical History  Past Medical History:   Diagnosis Date    Benign essential hypertension     Chronic kidney disease, stage 3 (HCC)     Disorder of gallbladder     Disorder of skin and subcutaneous tissue     Dyspnea     Essential hypertension     Hyperlipidemia     Hypokalemia     Malaise and fatigue     Mixed hyperlipidemia     Morbid obesity (HCC)     Pernicious anemia        Past Surgical History  Past Surgical History:   Procedure Laterality Date    BREAST BIOPSY Right     CARPAL TUNNEL RELEASE Bilateral     CHOLECYSTECTOMY      COLONOSCOPY      Dr. Ellen Mccollum      x3    PARTIAL HYSTERECTOMY      SKIN LESION EXCISION      scalp        PT performed at least 2 patient identifiers during session: Name and wristband.        11/21/23 1305   PT Last Visit   PT Visit Date 11/21/23   Note Type   Note type Evaluation   Pain Assessment   Pain Assessment Tool 0-10   Pain Score No Pain   Patient's Stated Pain Goal No pain   Multiple Pain Sites No   Restrictions/Precautions   Weight Bearing Precautions Per Order No   Other Precautions Bed Alarm;Chair Alarm; Fall Risk;Pain;Cognitive   Home Living   Type of [address] [place] [address] [address] One level;Stairs to enter with rails; Performs ADLs on one level; Able to live on main level with bedroom/bathroom  ([address])   Home Equipment Walker;Cane   Additional Comments No homeset up was provided due to cognition deficits. Plan to consult with case management and about background information. Prior Function   Lives With Spouse   IADLs Family/Friend/Other provides transportation   Falls in the last 6 months   (unable to provide fall history)   Comments No PLOF was provided due to cognition deficits. Plan to consult with case management about background information. 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 General   Additional Pertinent History OT Sosa was present for co-evaluation due to medical complexity   Family/Caregiver Present No   Cognition   Overall Cognitive Status Impaired   Arousal/Participation Alert   Attention Attends with cues to redirect   Orientation Level Disoriented X4   Memory Decreased recall of precautions;Decreased recall of recent events   Following Commands Follows one step commands inconsistently   Comments Pt was very confused during evaluation and was unable to provide accurate subjective information   RLE Assessment   RLE Assessment X   Strength RLE   RLE Overall Strength 3/5  (Atleast 3/5 due to functional activity tolerance)   LLE Assessment   LLE Assessment X   Strength LLE   LLE Overall Strength 3/5  (Atleast 3/5 due to functional activity tolerance)   Vision-Basic Assessment   Current Vision Wears glasses all the time   Bed Mobility   Supine to Sit 4  Minimal assistance   Additional items Assist x 2; Increased time required;LE management; Bedrails;HOB elevated;Verbal cues   Additional Comments Pt remained OOB in bedside chair at end of session   Transfers   Sit to Stand 4 Minimal assistance   Additional items Increased time required;Verbal cues; Bedrails;Assist x 2   Stand to Sit 4  Minimal assistance   Additional items Increased time required;Armrests; Verbal cues; Assist x 2   Stand pivot 4  Minimal assistance   Additional items Increased time required;Verbal cues; Assist x 2  (VC for placement of RW)   Toilet transfer 4  Minimal assistance   Additional items Increased time required;Commode;Armrests; Verbal cues; Assist x 2  (VC for use of arm rest)   Ambulation/Elevation   Gait pattern Improper Weight shift; Antalgic;Decreased foot clearance; Inconsistent radha; Short stride   Gait Assistance 4  Minimal assist   Additional items Assist x 2;Verbal cues; Tactile cues  (VC for proper placement of RW and direction)   Assistive Device Rolling walker  (Pt needed increased help with RW use)   Distance 3 ft from bed to bedside commode, 6 ft from bed to bedside chair   Stair Management Assistance Not tested   Balance   Static Sitting Fair   Dynamic Sitting Fair -   Static Standing Poor +   Dynamic Standing Poor +   Ambulatory Poor +   Endurance Deficit   Endurance Deficit Yes   Activity Tolerance   Activity Tolerance Patient limited by fatigue  (patient limited by cognition)   Medical Staff Made Aware Yes, medical staff made aware of session outcomes   Nurse Made Aware Yes, RN Alma Rosa Thomas and [facility] Bozena Hayes were present for evaluation. Assessment   Prognosis Fair   Problem List Decreased strength;Decreased endurance; Impaired balance;Decreased mobility; Decreased coordination;Decreased cognition; Impaired judgement;Decreased safety awareness   Assessment Pt is a 66 y.o. female seen for PT evaluation s/p admit to [address] [facility] [place] w/ Hypercalcemia. PTA, pt was independent w/ all functional mobility and used walker/cane assistive device for mobility per chart review. PT consulted to assess pt's functional mobility and d/c needs.  At time of PT eval, pt found supine in and bed and was agreeable to PT session. Pt required min assist x2 for bed mobility, transfers and ambulation with noted confusion. Pt was unable to provide any subjective intake due to confusion and all intake was found per chart review. Pt showed impaired balance with ambulation and needed consistent VC for redirection in order to safely ambulate to bedside commode and to bedside chair. Pt presented with an antalgic gait pattern and decreased foot clearance with ambulating to bedside chair. Safety is a concern in regards to confusion and overall safety awareness of the patient. Pt currently functioning below baseline level. Pt will continue to benefit from immediate acute skilled PT services in order to address the deficits listed above. Areas for improvement include: decreased strength, decreased mobility, decreased endurance, and impaired balance. From PT/mobility standpoint, at this time recommendation is a moderate resource intensity, in order to maximize pt's functional independence and safety/consistency w/ mobility. Barriers to Discharge Inaccessible home environment;Decreased caregiver support   Goals   STG Expiration Date 12/01/23   Short Term Goal #1 In 7-10 days, the pt will be able to increase LE strength by 1/2 grade in order to increase functional activity tolerance. Pt will perform transfers MOD I with RW in order to safely maneuver her home. Pt will perform bed mobility MOD I in order to safely get in/out of bed. Pt will navigate 5 MA[address]EL with HR MOD I in order to safely enter/exit her home. Pt will increase balance score by 1/2 grade in order to decrease fall risk. Pt will ambulate >50 ft MOD I with RW in order to safely ambulate in her home. PT Treatment Day 0   Plan   Treatment/Interventions Functional transfer training;LE strengthening/ROM; Elevations; Therapeutic exercise; Endurance training;Cognitive reorientation;Patient/family training;Equipment eval/education; Bed mobility;Gait training;Spoke to nursing   PT Frequency 3-5x/wk   Discharge Recommendation   Rehab Resource Intensity Level, PT II (Moderate Resource Intensity)   Equipment Recommended Walker   AM-PAC Basic Mobility Inpatient   Turning in Flat Bed Without Bedrails 3   Lying on Back to Sitting on Edge of Flat Bed Without Bedrails 2   Moving Bed to Chair 3   Standing Up From Chair Using Arms 3   Walk in Room 2   Climb 3-5 Stairs With Railing 2   Basic Mobility Inpatient Raw Score 15   Basic Mobility Standardized Score 36.97   Highest Level Of Mobility   -[facility] Goal 4: Move to chair/commode   -HLM Achieved 6: Walk 10 steps or more   End of Consult   Patient Position at End of Consult Bedside chair;Bed/Chair alarm activated; All needs within reach       [facility], [facility]

## 2023-11-22 ENCOUNTER — APPOINTMENT (INPATIENT)
Dept: RADIOLOGY | Facility: HOSPITAL | Age: 78
DRG: 640 | End: 2023-11-22
Payer: MEDICARE

## 2023-11-22 ENCOUNTER — APPOINTMENT (INPATIENT)
Dept: MRI IMAGING | Facility: HOSPITAL | Age: 78
DRG: 640 | End: 2023-11-22
Payer: MEDICARE

## 2023-11-22 ENCOUNTER — APPOINTMENT (INPATIENT)
Dept: CT IMAGING | Facility: HOSPITAL | Age: 78
DRG: 640 | End: 2023-11-22
Payer: MEDICARE
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
## 2023-11-22 LAB
ALBUMIN SERPL BCP-MCNC: 3.2 G/DL (ref 3.5–5)
ALBUMIN SERPL ELPH-MCNC: 3.06 G/DL (ref 3.2–5.1)
ALBUMIN SERPL ELPH-MCNC: 54.7 % (ref 48–70)
ALP SERPL-CCNC: 40 U/L (ref 34–104)
ALPHA1 GLOB SERPL ELPH-MCNC: 0.28 G/DL (ref 0.15–0.47)
ALPHA1 GLOB SERPL ELPH-MCNC: 5 % (ref 1.8–7)
ALPHA2 GLOB SERPL ELPH-MCNC: 0.69 G/DL (ref 0.42–1.04)
ALPHA2 GLOB SERPL ELPH-MCNC: 12.3 % (ref 5.9–14.9)
ALT SERPL W P-5'-P-CCNC: 9 U/L (ref 7–52)
ANION GAP SERPL CALCULATED.3IONS-SCNC: 8 MMOL/L
AST SERPL W P-5'-P-CCNC: 13 U/L (ref 13–39)
BETA GLOB ABNORMAL SERPL ELPH-MCNC: 0.39 G/DL (ref 0.31–0.57)
BETA1 GLOB SERPL ELPH-MCNC: 7 % (ref 4.7–7.7)
BETA2 GLOB SERPL ELPH-MCNC: 7.3 % (ref 3.1–7.9)
BETA2+GAMMA GLOB SERPL ELPH-MCNC: 0.41 G/DL (ref 0.2–0.58)
BILIRUB SERPL-MCNC: 0.64 MG/DL (ref 0.2–1)
BUN SERPL-MCNC: 21 MG/DL (ref 5–25)
CALCIUM ALBUM COR SERPL-MCNC: 9.9 MG/DL (ref 8.3–10.1)
CALCIUM SERPL-MCNC: 9.3 MG/DL (ref 8.4–10.2)
CHLORIDE SERPL-SCNC: 100 MMOL/L (ref 96–108)
CO2 SERPL-SCNC: 26 MMOL/L (ref 21–32)
CREAT SERPL-MCNC: 1.41 MG/DL (ref 0.6–1.3)
ERYTHROCYTE [DISTWIDTH] IN BLOOD BY AUTOMATED COUNT: 13.2 % (ref 11.6–15.1)
GAMMA GLOB ABNORMAL SERPL ELPH-MCNC: 0.77 G/DL (ref 0.4–1.66)
GAMMA GLOB SERPL ELPH-MCNC: 13.7 % (ref 6.9–22.3)
GFR SERPL CREATININE-BSD FRML MDRD: 35 ML/MIN/1.73SQ M
GLUCOSE SERPL-MCNC: 177 MG/DL (ref 65–140)
GLUCOSE SERPL-MCNC: 178 MG/DL (ref 65–140)
GLUCOSE SERPL-MCNC: 186 MG/DL (ref 65–140)
GLUCOSE SERPL-MCNC: 192 MG/DL (ref 65–140)
GLUCOSE SERPL-MCNC: 245 MG/DL (ref 65–140)
HCT VFR BLD AUTO: 35.5 % (ref 34.8–46.1)
HGB BLD-MCNC: 11.4 G/DL (ref 11.5–15.4)
IGG/ALB SER: 1.21 {RATIO} (ref 1.1–1.8)
MCH RBC QN AUTO: 30.8 PG (ref 26.8–34.3)
MCHC RBC AUTO-ENTMCNC: 32.1 G/DL (ref 31.4–37.4)
MCV RBC AUTO: 96 FL (ref 82–98)
PLATELET # BLD AUTO: 143 THOUSANDS/UL (ref 149–390)
PMV BLD AUTO: 10.4 FL (ref 8.9–12.7)
POTASSIUM SERPL-SCNC: 3.5 MMOL/L (ref 3.5–5.3)
PROT PATTERN SERPL ELPH-IMP: ABNORMAL
PROT SERPL-MCNC: 5.6 G/DL (ref 6.4–8.2)
PROT SERPL-MCNC: 5.6 G/DL (ref 6.4–8.4)
RBC # BLD AUTO: 3.7 MILLION/UL (ref 3.81–5.12)
SODIUM SERPL-SCNC: 134 MMOL/L (ref 135–147)
WBC # BLD AUTO: 10.19 THOUSAND/UL (ref 4.31–10.16)

## 2023-11-22 PROCEDURE — 70450 CT HEAD/BRAIN W/O DYE: CPT

## 2023-11-22 PROCEDURE — 73522 X-RAY EXAM HIPS BI 3-4 VIEWS: CPT

## 2023-11-22 PROCEDURE — 99232 SBSQ HOSP IP/OBS MODERATE 35: CPT | Performed by: INTERNAL MEDICINE

## 2023-11-22 PROCEDURE — 80053 COMPREHEN METABOLIC PANEL: CPT | Performed by: INTERNAL MEDICINE

## 2023-11-22 PROCEDURE — 85027 COMPLETE CBC AUTOMATED: CPT | Performed by: INTERNAL MEDICINE

## 2023-11-22 PROCEDURE — 82948 REAGENT STRIP/BLOOD GLUCOSE: CPT

## 2023-11-22 PROCEDURE — 73120 X-RAY EXAM OF HAND: CPT

## 2023-11-22 PROCEDURE — G1004 CDSM NDSC: HCPCS

## 2023-11-22 PROCEDURE — 99222 1ST HOSP IP/OBS MODERATE 55: CPT | Performed by: SURGERY

## 2023-11-22 PROCEDURE — 70551 MRI BRAIN STEM W/O DYE: CPT

## 2023-11-22 PROCEDURE — 84165 PROTEIN E-PHORESIS SERUM: CPT | Performed by: STUDENT IN AN ORGANIZED HEALTH CARE EDUCATION/TRAINING PROGRAM

## 2023-11-22 RX ORDER — CEFAZOLIN SODIUM 2 G/50ML
2000 SOLUTION INTRAVENOUS EVERY 8 HOURS
Status: DISCONTINUED | OUTPATIENT
Start: 2023-11-22 | End: 2023-11-27

## 2023-11-22 RX ADMIN — SODIUM CHLORIDE 50 ML/HR: 0.9 INJECTION, SOLUTION INTRAVENOUS at 06:39

## 2023-11-22 RX ADMIN — ASPIRIN 81 MG: 81 TABLET, COATED ORAL at 08:01

## 2023-11-22 RX ADMIN — OMEGA-3 FATTY ACIDS CAP 1000 MG 1000 MG: 1000 CAP at 17:30

## 2023-11-22 RX ADMIN — CHOLECALCIFEROL TAB 10 MCG (400 UNIT) 800 UNITS: 10 TAB at 08:01

## 2023-11-22 RX ADMIN — METOPROLOL SUCCINATE 50 MG: 50 TABLET, EXTENDED RELEASE ORAL at 08:01

## 2023-11-22 RX ADMIN — INSULIN LISPRO 1 UNITS: 100 INJECTION, SOLUTION INTRAVENOUS; SUBCUTANEOUS at 23:16

## 2023-11-22 RX ADMIN — INSULIN LISPRO 3 UNITS: 100 INJECTION, SOLUTION INTRAVENOUS; SUBCUTANEOUS at 12:10

## 2023-11-22 RX ADMIN — POTASSIUM CHLORIDE 20 MEQ: 1.5 SOLUTION ORAL at 17:29

## 2023-11-22 RX ADMIN — INSULIN GLARGINE 20 UNITS: 100 INJECTION, SOLUTION SUBCUTANEOUS at 23:16

## 2023-11-22 RX ADMIN — INSULIN LISPRO 1 UNITS: 100 INJECTION, SOLUTION INTRAVENOUS; SUBCUTANEOUS at 17:32

## 2023-11-22 RX ADMIN — FLUTICASONE PROPIONATE 2 SPRAY: 50 SPRAY, METERED NASAL at 08:02

## 2023-11-22 RX ADMIN — AMLODIPINE BESYLATE 5 MG: 5 TABLET ORAL at 08:00

## 2023-11-22 RX ADMIN — SILVER NITRATE APPLICATORS 1 APPLICATOR: 25; 75 STICK TOPICAL at 16:45

## 2023-11-22 RX ADMIN — DULOXETINE HYDROCHLORIDE 20 MG: 20 CAPSULE, DELAYED RELEASE ORAL at 08:00

## 2023-11-22 RX ADMIN — ENOXAPARIN SODIUM 30 MG: 30 INJECTION SUBCUTANEOUS at 08:00

## 2023-11-22 RX ADMIN — ATORVASTATIN CALCIUM 80 MG: 80 TABLET, FILM COATED ORAL at 17:30

## 2023-11-22 RX ADMIN — POTASSIUM CHLORIDE 20 MEQ: 1.5 SOLUTION ORAL at 12:12

## 2023-11-22 RX ADMIN — CEFAZOLIN SODIUM 2000 MG: 2 SOLUTION INTRAVENOUS at 17:30

## 2023-11-22 RX ADMIN — OMEGA-3 FATTY ACIDS CAP 1000 MG 1000 MG: 1000 CAP at 08:00

## 2023-11-22 RX ADMIN — Medication 400 MCG: at 08:01

## 2023-11-22 RX ADMIN — INSULIN LISPRO 1 UNITS: 100 INJECTION, SOLUTION INTRAVENOUS; SUBCUTANEOUS at 07:56

## 2023-11-22 RX ADMIN — LEVOTHYROXINE SODIUM 50 MCG: 50 TABLET ORAL at 04:50

## 2023-11-22 RX ADMIN — POTASSIUM CHLORIDE 20 MEQ: 1.5 SOLUTION ORAL at 07:58

## 2023-11-22 RX ADMIN — PANTOPRAZOLE SODIUM 40 MG: 40 TABLET, DELAYED RELEASE ORAL at 04:50

## 2023-11-22 NOTE — QUICK NOTE
Formal consult note to follow. Examined patient at the request of the medical service and nursing staff to assist with soft tissue bleeding at the site of a right ring finger avulsion injury and associated fracture. Site examined and cleansed. Some sites of active bleeding were encountered and managed with silver nitrate. This was effective and compressive dressing replaced with surgicel as first layer and overlying gauze, loren to follow. Would leave site dressed overnight. If the dressing saturates, would remove the gauze and loren, leave the surgicel and replace the gauze and loren and tightly wrap the area. Would dose IV antibiotics and tetanus vaccine. Would remove the dressings on 11/23 and cleanse the area with warm soap and water. Per hand surgery would redress the site again tomorrow placing bacitracin at wound once the site is deemed hemostatic. Call the surgery team for further needs moving forward.

## 2023-11-22 NOTE — ASSESSMENT & PLAN NOTE
Patient presented with weakness, confusion, frequent falls, found to have hypercalcemia.   Patient reports she has been taking Tums frequently for heartburn  Continue IV fluids as needed  Calcitonin initiated per nephrology  Calcium level improved  Repeat BMP as needed

## 2023-11-22 NOTE — PROGRESS NOTES
[address]  Progress Note  Name: Nadiya Robin  MRN: [id number]  Unit/Bed#: -01 I Date of Admission: 11/19/2023   Date of Service: 11/22/2023 I Hospital Day: 3    Assessment/Plan   * Hypercalcemia  Assessment & Plan  Patient presented with weakness, confusion, frequent falls, found to have hypercalcemia. Patient reports she has been taking Tums frequently for heartburn  Continue IV fluids as needed  Calcitonin initiated per nephrology  Calcium level improved  Repeat BMP as needed    [address]  Patient had a fall in her room on 11/22/2023. Notified by RN. CT head negative for any acute findings. X-rays of hip negative for any acute findings. Patient had laceration to right ring finger. Surgical team has been consulted. X-ray performed of right hand, Comminuted distal 4th phalangeal fracture. Will review with Ortho  PT/OT eval    Encephalopathy  Assessment & Plan  Unclear etiology  CT head negative for any acute findings  Unable to get a hold of family to establish patient's baseline  Possibly secondary to hypercalcemia which is improving  If no improvement can consider MRI brain as needed    Abnormal CT of the abdomen  Assessment & Plan  Fluid attenuation lesion adjacent to the right colon of uncertain etiology   If kidney function improves will consider scan with contrast versus MRI. Will review with family once available at bedside    PAD (peripheral artery disease) ([address])  Assessment & Plan  Follows w/ vascular  Continue aspirin, statin    Severe obesity (BMI 35.0-39. 9) with comorbidity [facility])  Assessment & Plan  Patient starting on Ozempic, monitor weight loss  Healthy diet lifestyle modification recommended    Acquired hypothyroidism  Assessment & Plan  Continue levothyroxine    Essential hypertension  Assessment & Plan  Continue home medication with hold parameters    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 mellitus (HCC)  Assessment & Plan  Creat baseline 1.4 w/ WFA5F  Continue IV fluids as needed  Nephrology following    Type 2 diabetes mellitus with stage 3b chronic kidney disease, with long-term current use of insulin [facility])  Assessment & Plan  Lab Results   Component Value Date    HGBA1C 9.9 (H) 10/26/2023       Recent Labs     23  1610 23  0705 23  1143   POCGLU 248* 215* 186* 245*         Blood Sugar Average: Last 72 hrs:  (P) [id number]  Patient following with endocrinology  Continue insulin  Hold Ozempic and resume at discharge  Sliding scale insulin coverage while in the hospital             VTE Prophylaxis:  Enoxaparin (Lovenox)    Patient Centered Rounds: I have performed bedside rounds with nursing staff today. Discussions with Specialists or Other Care Team Provider: yes  Education and Discussions with Family / Patient: Spoke with patient's stepson over the phone regarding plan of care    Current Length of Stay: 3 day(s)    Current Patient Status: Inpatient   Certification Statement: The patient will continue to require additional inpatient hospital stay due to hypercalcemia    Discharge Plan: Case management working on rehab placement    Code Status: Level 3 - DNAR and DNI    Subjective:   Patient had a fall in her room this morning. Per RN patient did hit her head when falling backwards. CT head negative. Patient denies any loss of consciousness. Objective:     Vitals:   Temp (24hrs), Av.8 °F (36.6 °C), Min:97.5 °F (36.4 °C), Max:98 °F (36.7 °C)    Temp:  [97.5 °F (36.4 °C)-98 °F (36.7 °C)] 97.5 °F (36.4 °C)  HR:  [90-96] 90  Resp:  [18-20] 20  BP: (105-165)/(75-83) 147/75  SpO2:  [94 %-96 %] 94 %  Body mass index is 33.43 kg/m². Input and Output Summary (last 24 hours):        Intake/Output Summary (Last 24 hours) at 2023 1319  Last data filed at 2023 1100  Gross per 24 hour   Intake 60 ml   Output --   Net 60 ml       Physical Exam:   Physical Exam  Constitutional:       General: She is not in acute distress. Appearance: She is obese. HENT:      Head: Normocephalic and atraumatic. Nose: Nose normal.      Mouth/Throat:      Mouth: Mucous membranes are moist.   Eyes:      Extraocular Movements: Extraocular movements intact. Conjunctiva/sclera: Conjunctivae normal.   Cardiovascular:      Rate and Rhythm: Normal rate and regular rhythm. Pulmonary:      Effort: Pulmonary effort is normal. No respiratory distress. Abdominal:      Palpations: Abdomen is soft. Tenderness: There is no abdominal tenderness. Musculoskeletal:         General: Normal range of motion. Cervical back: Normal range of motion and neck supple. Comments: No obvious tenderness over bilateral hips   Skin:     General: Skin is warm and dry. Neurological:      Mental Status: She is alert. Comments: Patient is awake and alert. Following simple commands. Psychiatric:         Mood and Affect: Mood normal.         Behavior: Behavior normal.      Comments: Intermittent confusion         Additional Data:     Labs:    Results from last 7 days   Lab Units 11/22/23  0444 11/20/23  0447 11/19/23  1655   WBC Thousand/uL 10.19*   < > 13.43*   HEMOGLOBIN g/dL 11.4*   < > 13.8   HEMATOCRIT % 35.5   < > 43.6   PLATELETS Thousands/uL 143*   < > 130*   NEUTROS PCT %  --   --  79*   LYMPHS PCT %  --   --  12*   MONOS PCT %  --   --  9   EOS PCT %  --   --  0    < > = values in this interval not displayed.      Results from last 7 days   Lab Units 11/22/23  0444   SODIUM mmol/L 134*   POTASSIUM mmol/L 3.5   CHLORIDE mmol/L 100   CO2 mmol/L 26   BUN mg/dL 21   CREATININE mg/dL 1.41*   CALCIUM mg/dL 9.3   ALK PHOS U/L 40   ALT U/L 9   AST U/L 13         Results from last 7 days   Lab Units 11/22/23  1143 11/22/23  0705 11/21/23  2020 11/21/23  1610 11/21/23  1118 11/21/23  0644 11/20/23  2129 11/20/23  1617 11/20/23  1559 11/20/23  1054 11/20/23  0733 11/19/23  2142   POC GLUCOSE mg/dl 245* 186* 215* 248* 222* 199* 197* 175* 160* 217* 196* 189*           * I Have Reviewed All Lab Data Listed Above. * Additional Pertinent Lab Tests Reviewed: [address] Admission  Reviewed    Imaging:  Imaging Reports Reviewed Today Include: Imaging results reviewed    Recent Cultures (last 7 days):           Last 24 Hours Medication List:   Current Facility-Administered Medications   Medication Dose Route Frequency Provider Last Rate    acetaminophen  650 mg Oral Q4H PRN ANGELA Osman-LEONEL      amLODIPine  5 mg Oral Daily Samantha SeniorNANCY      aspirin  81 mg Oral Daily [address] [facility], NANCY      atorvastatin  80 mg Oral Daily With [facility]      cholecalciferol  800 Units Oral Daily Samantha Alamo PA-C      DULoxetine  20 mg Oral Daily Luda Cintron PA-C      enoxaparin  30 mg Subcutaneous Daily Luda Cintron PA-C      fish oil  1,000 mg Oral BID Samantha Alamo PA-C      fluticasone  2 spray Nasal Daily [address] [facility], NANCY      folic acid  325 mcg Oral Daily Luda Cintron PA-C      insulin glargine  20 Units Subcutaneous HS Luda Cintron PA-C      insulin lispro  1-5 Units Subcutaneous HS Luda Cintron PA-C      insulin lispro  1-6 Units Subcutaneous TID  Luda Stern, NANCY      levothyroxine  50 mcg Oral Daily Luda Cintron PA-C      metoprolol succinate  50 mg Oral Daily Luda Cintron PA-C      ondansetron  4 mg Intravenous Q6H PRN Samantha Alamo PA-C      pantoprazole  40 mg Oral Early Morning Samantha Alamo PA-C      potassium chloride  20 mEq Oral TID With Naif Zuluaga MD          Today, Patient Was Seen By: Akil Cook MD    ** Please Note: Dictation voice to text software may have been used in the creation of this document.  **

## 2023-11-22 NOTE — PROGRESS NOTES
Progress Note - Nephrology   Chasidy Malone 66 y.o. female MRN: [id number]  Unit/Bed#: -01 Encounter: [id number]      Assessment:    Hypercalcemia, severe initially->mild ,symptomatic. Concern for severity and requiring hospitalization is always malignancy. Likely volume contraction+ tums given rapid correction. She was taking 1-2 tums a day based on my interaction but she could have been taking significantly more. Volume status appears compensated. Recommend:  No need for bisphosphonate. Ca 9.3, uncorrected. Received 1 x dose calcitonin. PTH appropriately suppressed < 20. 25 vitamin D 27.1, not elevated. Kappa/lambda ratio 2.4, Ig Kappa 53.2, can be elevated due to renal disease. Hold vitamin D/calcium /TUMS. Ct abd/pelvis/chest without contrast, no definitive malignancy but limited without contrast. Fluid along ascending colon? Stop IVF as tolerating diet. Follow daily chem. 2. 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 Cr trends stable, continue to follow. Cr 1.4, stable. 3. Type 2 DM, not well controlled. Continue management per hospital team.     4. Essential HTN   BP improving today. Follow trend on amlodipine and toprol. Can increase amlodipine if needed. 5. Encephalopathy due to severe hypercalcemia/ ? Other etiology. Unclear baseline. Hypercalcemia resolved. Consider CVA evaluation with MRI. 6. Hypokalemia, mild  Follow trends. Subjective:   Patient seen and examined today. Denies complaints. She is sitting in the chair and intermittently confused. A complete 10 point review of systems was performed and is otherwise negative. Objective:     Vitals: Blood pressure 159/81, pulse 92, temperature 97.5 °F (36.4 °C), temperature source Temporal, resp. rate 20, height 5' 2" (1.575 m), weight 82.9 kg (182 lb 12.2 oz), SpO2 96 %. ,Body mass index is 33.43 kg/m².     Weight (last 2 days)       Date/Time Weight    11/20/23 0000 82.9 (182.76)              Intake/Output Summary (Last 24 hours) at 11/22/2023 0925  Last data filed at 11/21/2023 1300  Gross per 24 hour   Intake 240 ml   Output --   Net 240 ml            Physical Exam: /81   Pulse 92   Temp 97.5 °F (36.4 °C) (Temporal)   Resp 20   Ht 5' 2" (1.575 m)   Wt 82.9 kg (182 lb 12.2 oz)   SpO2 96%   BMI 33.43 kg/m²     General Appearance:    Alert, cooperative, no distress, appears stated age   Head:    Normocephalic, without obvious abnormality, atraumatic   Eyes:    EOMI       Nose:   Nares normal, septum midline, mucosa normal, no drainage    or sinus tenderness   Throat:   Lips, mucosa, and tongue normal; teeth and gums normal   Neck:   No JVD   Back:     Symmetric, no curvature, ROM normal, no CVA tenderness   Lungs:     Clear to auscultation bilaterally, respirations unlabored   Chest Wall:    No tenderness or deformity    Heart:    Regular rate and rhythm, S1 and S2 normal, no murmur, rub   or gallop   Breast Exam:       Abdomen:     Soft, non-tender, bowel sounds active all four quadrants,     no masses, no organomegaly   Genitalia:     Rectal:     Extremities:   Extremities normal, atraumatic, no cyanosis or edema   Pulses:   2+ and symmetric all extremities   Skin:   Skin color, texture, turgor normal, no rashes or lesions   Lymph nodes:     Neurologic:   Confused, no focal deficit noted         Lab, Imaging and other studies: I have personally reviewed pertinent labs.

## 2023-11-22 NOTE — ASSESSMENT & PLAN NOTE
Lab Results   Component Value Date    HGBA1C 9.9 (H) 10/26/2023       Recent Labs     11/21/23  1610 11/21/23  2020 11/22/23  0705 11/22/23  1143   POCGLU 248* 215* 186* 245*         Blood Sugar Average: Last 72 hrs:  (P) [id number]  Patient following with endocrinology  Continue insulin  Hold Ozempic and resume at discharge  Sliding scale insulin coverage while in the hospital

## 2023-11-22 NOTE — PLAN OF CARE
Problem: SAFETY,RESTRAINT: NV/NON-SELF DESTRUCTIVE BEHAVIOR  Goal: Remains free of harm/injury (restraint for non violent/non self-detsructive behavior)  Description: INTERVENTIONS:  - Instruct patient/family regarding restraint use   - Assess and monitor physiologic and psychological status   - Provide interventions and comfort measures to meet assessed patient needs   - Identify and implement measures to help patient regain control  - Assess readiness for release of restraint   Outcome: Progressing  Goal: Returns to optimal restraint-free functioning  Description: INTERVENTIONS:  - Assess the patient's behavior and symptoms that indicate continued need for restraint  - Identify and implement measures to help patient regain control  - Assess readiness for release of restraint   Outcome: Progressing     Problem: PAIN - ADULT  Goal: Verbalizes/displays adequate comfort level or baseline comfort level  Description: Interventions:  - Encourage patient to monitor pain and request assistance  - Assess pain using appropriate pain scale  - Administer analgesics based on type and severity of pain and evaluate response  - Implement non-pharmacological measures as appropriate and evaluate response  - Consider cultural and social influences on pain and pain management  - Notify physician/advanced practitioner if interventions unsuccessful or patient reports new pain  Outcome: Progressing     Problem: INFECTION - ADULT  Goal: Absence or prevention of progression during hospitalization  Description: INTERVENTIONS:  - Assess and monitor for signs and symptoms of infection  - Monitor lab/diagnostic results  - Monitor all insertion sites, i.e. indwelling lines, tubes, and drains  - Monitor endotracheal if appropriate and nasal secretions for changes in amount and color  - [place] appropriate cooling/warming therapies per order  - Administer medications as ordered  - Instruct and encourage patient and family to use good hand hygiene technique  - Identify and instruct in appropriate isolation precautions for identified infection/condition  Outcome: Progressing  Goal: Absence of fever/infection during neutropenic period  Description: INTERVENTIONS:  - Monitor WBC    Outcome: Progressing     Problem: SAFETY ADULT  Goal: Patient will remain free of falls  Description: INTERVENTIONS:  - Educate patient/family on patient safety including physical limitations  - Instruct patient to call for assistance with activity   - Consult OT/PT to assist with strengthening/mobility   - Keep Call bell within reach  - Keep bed low and locked with side rails adjusted as appropriate  - Keep care items and personal belongings within reach  - Initiate and maintain comfort rounds  - Make Fall Risk Sign visible to staff  - Offer Toileting every 2 Hours, in advance of need  - Initiate/Maintain bed/chair alarm  - Obtain necessary fall risk management equipment: alarms, restraints, yellow socks.   - Apply yellow socks and bracelet for high fall risk patients  - Consider moving patient to room near nurses station  Outcome: Progressing  Goal: Maintain or return to baseline ADL function  Description: INTERVENTIONS:  -  Assess patient's ability to carry out ADLs; assess patient's baseline for ADL function and identify physical deficits which impact ability to perform ADLs (bathing, care of mouth/teeth, toileting, grooming, dressing, etc.)  - Assess/evaluate cause of self-care deficits   - Assess range of motion  - Assess patient's mobility; develop plan if impaired  - Assess patient's need for assistive devices and provide as appropriate  - Encourage maximum independence but intervene and supervise when necessary  - Involve family in performance of ADLs  - Assess for home care needs following discharge   - Consider OT consult to assist with ADL evaluation and planning for discharge  - Provide patient education as appropriate  Outcome: Progressing  Goal: Maintains/Returns to pre admission functional level  Description: INTERVENTIONS:  - Perform AM-PAC 6 Click Basic Mobility/ Daily Activity assessment daily.  - Set and communicate daily mobility goal to care team and patient/family/caregiver. - Collaborate with rehabilitation services on mobility goals if consulted  - Perform Range of Motion 3 times a day. - Reposition patient every 2 hours. - Dangle patient 3 times a day  - Stand patient 3 times a day  - Ambulate patient 3 times a day  - Out of bed to chair 3 times a day   - Out of bed for meals 3 times a day  - Out of bed for toileting  - Record patient progress and toleration of activity level   Outcome: Progressing     Problem: DISCHARGE PLANNING  Goal: Discharge to home or other facility with appropriate resources  Description: INTERVENTIONS:  - Identify barriers to discharge w/patient and caregiver  - Arrange for needed discharge resources and transportation as appropriate  - Identify discharge learning needs (meds, wound care, etc.)  - Refer to Case Management Department for coordinating discharge planning if the patient needs post-hospital services based on physician/advanced practitioner order or complex needs related to functional status, cognitive ability, or social support system  Outcome: Progressing     Problem: Knowledge Deficit  Goal: Patient/family/caregiver demonstrates understanding of disease process, treatment plan, medications, and discharge instructions  Description: Complete learning assessment and assess knowledge base.   Interventions:  - Provide teaching at level of understanding  - Provide teaching via preferred learning methods  Outcome: Progressing     Problem: Prexisting or High Potential for Compromised Skin Integrity  Goal: Skin integrity is maintained or improved  Description: INTERVENTIONS:  - Identify patients at risk for skin breakdown  - Assess and monitor skin integrity  - Assess and monitor nutrition and hydration status  - Monitor labs   - Assess for incontinence   - Turn and reposition patient  - Assist with mobility/ambulation  - Relieve pressure over bony prominences  - Avoid friction and shearing  - Provide appropriate hygiene as needed including keeping skin clean and dry  - Evaluate need for skin moisturizer/barrier cream  - Collaborate with interdisciplinary team   - Patient/family teaching  - Consider wound care consult   Outcome: Progressing

## 2023-11-22 NOTE — ASSESSMENT & PLAN NOTE
Patient had a fall in her room on 11/22/2023. Notified by RN. CT head negative for any acute findings. X-rays of hip negative for any acute findings. Patient had laceration to right ring finger. Surgical team has been consulted. X-ray performed of right hand, Comminuted distal 4th phalangeal fracture.   Will review with Ortho  PT/OT eval

## 2023-11-22 NOTE — PLAN OF CARE
Problem: SAFETY,RESTRAINT: NV/NON-SELF DESTRUCTIVE BEHAVIOR  Goal: Remains free of harm/injury (restraint for non violent/non self-detsructive behavior)  Description: INTERVENTIONS:  - Instruct patient/family regarding restraint use   - Assess and monitor physiologic and psychological status   - Provide interventions and comfort measures to meet assessed patient needs   - Identify and implement measures to help patient regain control  - Assess readiness for release of restraint   Outcome: Progressing  Goal: Returns to optimal restraint-free functioning  Description: INTERVENTIONS:  - Assess the patient's behavior and symptoms that indicate continued need for restraint  - Identify and implement measures to help patient regain control  - Assess readiness for release of restraint   Outcome: Progressing     Problem: PAIN - ADULT  Goal: Verbalizes/displays adequate comfort level or baseline comfort level  Description: Interventions:  - Encourage patient to monitor pain and request assistance  - Assess pain using appropriate pain scale  - Administer analgesics based on type and severity of pain and evaluate response  - Implement non-pharmacological measures as appropriate and evaluate response  - Consider cultural and social influences on pain and pain management  - Notify physician/advanced practitioner if interventions unsuccessful or patient reports new pain  Outcome: Progressing     Problem: INFECTION - ADULT  Goal: Absence or prevention of progression during hospitalization  Description: INTERVENTIONS:  - Assess and monitor for signs and symptoms of infection  - Monitor lab/diagnostic results  - Monitor all insertion sites, i.e. indwelling lines, tubes, and drains  - Monitor endotracheal if appropriate and nasal secretions for changes in amount and color  - [place] appropriate cooling/warming therapies per order  - Administer medications as ordered  - Instruct and encourage patient and family to use good hand hygiene technique  - Identify and instruct in appropriate isolation precautions for identified infection/condition  Outcome: Progressing  Goal: Absence of fever/infection during neutropenic period  Description: INTERVENTIONS:  - Monitor WBC    Outcome: Progressing     Problem: SAFETY ADULT  Goal: Patient will remain free of falls  Description: INTERVENTIONS:  - Educate patient/family on patient safety including physical limitations  - Instruct patient to call for assistance with activity   - Consult OT/PT to assist with strengthening/mobility   - Keep Call bell within reach  - Keep bed low and locked with side rails adjusted as appropriate  - Keep care items and personal belongings within reach  - Initiate and maintain comfort rounds  - Make Fall Risk Sign visible to staff  - Offer Toileting every 2 Hours, in advance of need  - Initiate/Maintain bed/chair alarm  - Obtain necessary fall risk management equipment: alarms, restraints, yellow socks.   - Apply yellow socks and bracelet for high fall risk patients  - Consider moving patient to room near nurses station  Outcome: Progressing  Goal: Maintain or return to baseline ADL function  Description: INTERVENTIONS:  -  Assess patient's ability to carry out ADLs; assess patient's baseline for ADL function and identify physical deficits which impact ability to perform ADLs (bathing, care of mouth/teeth, toileting, grooming, dressing, etc.)  - Assess/evaluate cause of self-care deficits   - Assess range of motion  - Assess patient's mobility; develop plan if impaired  - Assess patient's need for assistive devices and provide as appropriate  - Encourage maximum independence but intervene and supervise when necessary  - Involve family in performance of ADLs  - Assess for home care needs following discharge   - Consider OT consult to assist with ADL evaluation and planning for discharge  - Provide patient education as appropriate  Outcome: Progressing  Goal: Maintains/Returns to pre admission functional level  Description: INTERVENTIONS:  - Perform AM-PAC 6 Click Basic Mobility/ Daily Activity assessment daily.  - Set and communicate daily mobility goal to care team and patient/family/caregiver. - Collaborate with rehabilitation services on mobility goals if consulted  - Perform Range of Motion 3 times a day. - Reposition patient every 2 hours. - Dangle patient 3 times a day  - Stand patient 3 times a day  - Ambulate patient 3 times a day  - Out of bed to chair 3 times a day   - Out of bed for meals 3 times a day  - Out of bed for toileting  - Record patient progress and toleration of activity level   Outcome: Progressing     Problem: DISCHARGE PLANNING  Goal: Discharge to home or other facility with appropriate resources  Description: INTERVENTIONS:  - Identify barriers to discharge w/patient and caregiver  - Arrange for needed discharge resources and transportation as appropriate  - Identify discharge learning needs (meds, wound care, etc.)  - Refer to Case Management Department for coordinating discharge planning if the patient needs post-hospital services based on physician/advanced practitioner order or complex needs related to functional status, cognitive ability, or social support system  Outcome: Progressing     Problem: Knowledge Deficit  Goal: Patient/family/caregiver demonstrates understanding of disease process, treatment plan, medications, and discharge instructions  Description: Complete learning assessment and assess knowledge base.   Interventions:  - Provide teaching at level of understanding  - Provide teaching via preferred learning methods  Outcome: Progressing     Problem: Prexisting or High Potential for Compromised Skin Integrity  Goal: Skin integrity is maintained or improved  Description: INTERVENTIONS:  - Identify patients at risk for skin breakdown  - Assess and monitor skin integrity  - Assess and monitor nutrition and hydration status  - Monitor labs   - Assess for incontinence   - Turn and reposition patient  - Assist with mobility/ambulation  - Relieve pressure over bony prominences  - Avoid friction and shearing  - Provide appropriate hygiene as needed including keeping skin clean and dry  - Evaluate need for skin moisturizer/barrier cream  - Collaborate with interdisciplinary team   - Patient/family teaching  - Consider wound care consult   Outcome: Progressing

## 2023-11-22 NOTE — QUICK NOTE
Reviewed x-ray and patient's laceration with hand surgery on-call (Dr. Vera Arellano). Based on x-ray findings and clinical imaging no acute intervention needed at this time per hand surgery. Continue local wound care with bacitracin. We will give IV Ancef for now and plan for 7 days total of antibiotics. Patient will need outpatient follow-up with hand surgery. No need for splint at this time.

## 2023-11-23 LAB
ALBUMIN SERPL BCP-MCNC: 3.2 G/DL (ref 3.5–5)
ALP SERPL-CCNC: 41 U/L (ref 34–104)
ALT SERPL W P-5'-P-CCNC: 7 U/L (ref 7–52)
ANION GAP SERPL CALCULATED.3IONS-SCNC: 9 MMOL/L
AST SERPL W P-5'-P-CCNC: 12 U/L (ref 13–39)
BILIRUB SERPL-MCNC: 0.4 MG/DL (ref 0.2–1)
BUN SERPL-MCNC: 21 MG/DL (ref 5–25)
CALCIUM ALBUM COR SERPL-MCNC: 9 MG/DL (ref 8.3–10.1)
CALCIUM SERPL-MCNC: 8.4 MG/DL (ref 8.4–10.2)
CHLORIDE SERPL-SCNC: 103 MMOL/L (ref 96–108)
CO2 SERPL-SCNC: 25 MMOL/L (ref 21–32)
CREAT SERPL-MCNC: 1.29 MG/DL (ref 0.6–1.3)
GFR SERPL CREATININE-BSD FRML MDRD: 39 ML/MIN/1.73SQ M
GLUCOSE SERPL-MCNC: 136 MG/DL (ref 65–140)
GLUCOSE SERPL-MCNC: 145 MG/DL (ref 65–140)
GLUCOSE SERPL-MCNC: 148 MG/DL (ref 65–140)
GLUCOSE SERPL-MCNC: 185 MG/DL (ref 65–140)
GLUCOSE SERPL-MCNC: 234 MG/DL (ref 65–140)
KAPPA LC UR-MCNC: 49.89 MG/L (ref 1.17–86.46)
KAPPA LC/LAMBDA UR: 9.43 {RATIO} (ref 1.83–14.26)
LAMBDA LC UR-MCNC: 5.29 MG/L (ref 0.27–15.21)
POTASSIUM SERPL-SCNC: 3.4 MMOL/L (ref 3.5–5.3)
PROT SERPL-MCNC: 5.6 G/DL (ref 6.4–8.4)
SODIUM SERPL-SCNC: 137 MMOL/L (ref 135–147)

## 2023-11-23 PROCEDURE — 80053 COMPREHEN METABOLIC PANEL: CPT | Performed by: INTERNAL MEDICINE

## 2023-11-23 PROCEDURE — 99232 SBSQ HOSP IP/OBS MODERATE 35: CPT | Performed by: PHYSICIAN ASSISTANT

## 2023-11-23 PROCEDURE — 90715 TDAP VACCINE 7 YRS/> IM: CPT | Performed by: SURGERY

## 2023-11-23 PROCEDURE — 82948 REAGENT STRIP/BLOOD GLUCOSE: CPT

## 2023-11-23 RX ORDER — GINSENG 100 MG
1 CAPSULE ORAL DAILY
Status: DISCONTINUED | OUTPATIENT
Start: 2023-11-23 | End: 2023-11-28 | Stop reason: HOSPADM

## 2023-11-23 RX ORDER — OLANZAPINE 10 MG/2ML
2.5 INJECTION, POWDER, FOR SOLUTION INTRAMUSCULAR ONCE
Status: COMPLETED | OUTPATIENT
Start: 2023-11-23 | End: 2023-11-23

## 2023-11-23 RX ADMIN — ATORVASTATIN CALCIUM 80 MG: 80 TABLET, FILM COATED ORAL at 17:16

## 2023-11-23 RX ADMIN — CEFAZOLIN SODIUM 2000 MG: 2 SOLUTION INTRAVENOUS at 09:53

## 2023-11-23 RX ADMIN — INSULIN GLARGINE 20 UNITS: 100 INJECTION, SOLUTION SUBCUTANEOUS at 21:59

## 2023-11-23 RX ADMIN — ENOXAPARIN SODIUM 30 MG: 30 INJECTION SUBCUTANEOUS at 09:53

## 2023-11-23 RX ADMIN — OLANZAPINE 2.5 MG: 10 INJECTION, POWDER, FOR SOLUTION INTRAMUSCULAR at 02:21

## 2023-11-23 RX ADMIN — INSULIN LISPRO 2 UNITS: 100 INJECTION, SOLUTION INTRAVENOUS; SUBCUTANEOUS at 21:59

## 2023-11-23 RX ADMIN — CEFAZOLIN SODIUM 2000 MG: 2 SOLUTION INTRAVENOUS at 17:16

## 2023-11-23 RX ADMIN — ACETAMINOPHEN 650 MG: 325 TABLET ORAL at 18:13

## 2023-11-23 RX ADMIN — INSULIN LISPRO 1 UNITS: 100 INJECTION, SOLUTION INTRAVENOUS; SUBCUTANEOUS at 16:52

## 2023-11-23 RX ADMIN — CEFAZOLIN SODIUM 2000 MG: 2 SOLUTION INTRAVENOUS at 00:12

## 2023-11-23 RX ADMIN — OMEGA-3 FATTY ACIDS CAP 1000 MG 1000 MG: 1000 CAP at 17:16

## 2023-11-23 RX ADMIN — AMLODIPINE BESYLATE 5 MG: 5 TABLET ORAL at 09:54

## 2023-11-23 RX ADMIN — TETANUS TOXOID, REDUCED DIPHTHERIA TOXOID AND ACELLULAR PERTUSSIS VACCINE, ADSORBED 0.5 ML: 5; 2.5; 8; 8; 2.5 SUSPENSION INTRAMUSCULAR at 17:17

## 2023-11-23 RX ADMIN — POTASSIUM CHLORIDE 20 MEQ: 1.5 SOLUTION ORAL at 17:16

## 2023-11-23 NOTE — PLAN OF CARE
Problem: SAFETY,RESTRAINT: NV/NON-SELF DESTRUCTIVE BEHAVIOR  Goal: Remains free of harm/injury (restraint for non violent/non self-detsructive behavior)  Description: INTERVENTIONS:  - Instruct patient/family regarding restraint use   - Assess and monitor physiologic and psychological status   - Provide interventions and comfort measures to meet assessed patient needs   - Identify and implement measures to help patient regain control  - Assess readiness for release of restraint   Outcome: Progressing     Problem: PAIN - ADULT  Goal: Verbalizes/displays adequate comfort level or baseline comfort level  Description: Interventions:  - Encourage patient to monitor pain and request assistance  - Assess pain using appropriate pain scale  - Administer analgesics based on type and severity of pain and evaluate response  - Implement non-pharmacological measures as appropriate and evaluate response  - Consider cultural and social influences on pain and pain management  - Notify physician/advanced practitioner if interventions unsuccessful or patient reports new pain  Outcome: Progressing     Problem: SAFETY ADULT  Goal: Patient will remain free of falls  Description: INTERVENTIONS:  - Educate patient/family on patient safety including physical limitations  - Instruct patient to call for assistance with activity   - Consult OT/PT to assist with strengthening/mobility   - Keep Call bell within reach  - Keep bed low and locked with side rails adjusted as appropriate  - Keep care items and personal belongings within reach  - Initiate and maintain comfort rounds  - Make Fall Risk Sign visible to staff  - Offer Toileting every 2 Hours, in advance of need  - Initiate/Maintain bed/chair alarm  - Obtain necessary fall risk management equipment: alarms, restraints, yellow socks.   - Apply yellow socks and bracelet for high fall risk patients  - Consider moving patient to room near nurses station  Outcome: Not Progressing

## 2023-11-23 NOTE — NURSING NOTE
Pt combative this AM, attempted to remove wound dressing on finger, her IV, her purewick, clothing, etc. Spoke to pt's provider and placed pt on two-point non-violent soft restraints on the wrists for a total of three hours before discontinuing restraints due to improved behavior and cooperation with care. Pt has continued to follow commands and maintain appropriate safety awareness. Provider aware.

## 2023-11-23 NOTE — ASSESSMENT & PLAN NOTE
Creat baseline 1.4 w/ CKD3b  Continue IV fluids as needed  Nephrology consultation appreciated- now signed off

## 2023-11-23 NOTE — QUICK NOTE
Attempted to see patient today but she is somnolent from sedation. Chart reviewed and discussed with RN/primary service. Hypercalcemia resolved. Replace potassium today. Cr trend stable. If tolerating PO, no need for IVF. Suspect calcium due to tums/volume depletion. She may have been taking significant tums dosage, cannot obtain corollary information. She has not been able to tell Tums use and  hospitalized and he has dementia. Spep no monoclonal protein. PTH suppressed. Can follow up in outpatient setting for follow up of calcium trends. TT nephrology with questions. Will sign off.

## 2023-11-23 NOTE — ASSESSMENT & PLAN NOTE
Lab Results   Component Value Date    HGBA1C 9.9 (H) 10/26/2023       Recent Labs     11/22/23  1626 11/22/23  2139 11/23/23  0807 11/23/23  1205   POCGLU 177* 192* 145* 136         Blood Sugar Average: Last 72 hrs:  (P) 194  Patient following with endocrinology  Continue insulin  Hold Ozempic and resume at discharge  Sliding scale insulin coverage while in the hospital

## 2023-11-23 NOTE — PROGRESS NOTES
[id number] [facility]  Progress Note  Name: Italo Cronin  MRN: [id number]  Unit/Bed#: -01 I Date of Admission: 11/19/2023   Date of Service: 11/23/2023 I Hospital Day: 4    Assessment/Plan   Encephalopathy  Assessment & Plan  Unclear etiology  CT head negative for any acute findings  Unable to get a hold of family to establish patient's baseline  Possibly secondary to hypercalcemia however this has resolved and patient continues to have confusion  MRI brain negative for acute findings  Consult neurology    * Hypercalcemia  Assessment & Plan  Patient presented with weakness, confusion, frequent falls, found to have hypercalcemia. Patient reports she has been taking Tums frequently for heartburn  Continue IV fluids as needed  Calcium level within normal limits today- nephrology signed off. Repeat BMP as needed    Abnormal CT of the abdomen  Assessment & Plan  Fluid attenuation lesion adjacent to the right colon of uncertain etiology   Outpatient MRI abdomen     Fall  Assessment & Plan  Patient had a fall in her room on 11/22/2023. Notified by RN. CT head negative for any acute findings. X-rays of hip negative for any acute findings. Patient had laceration to right ring finger. Surgical team has been consulted. X-ray performed of right hand, Comminuted distal 4th phalangeal fracture. Reviewed with hand surgery on-call (Dr. Hollis Pratt) no intervention required at this time   PT/OT eval    PAD (peripheral artery disease) [facility])  Assessment & Plan  Follows w/ vascular  Continue aspirin, statin    Severe obesity (BMI 35.0-39. 9) with comorbidity [facility])  Assessment & Plan  Patient starting on Ozempic, monitor weight loss  Healthy diet lifestyle modification recommended    Acquired hypothyroidism  Assessment & Plan  Continue levothyroxine    Essential hypertension  Assessment & Plan  Continue home medication with hold parameters    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 mellitus ([address])  Assessment & Plan  Creat baseline 1.4 w/ CKD3b  Continue IV fluids as needed  Nephrology consultation appreciated- now signed off    Type 2 diabetes mellitus with stage 3b chronic kidney disease, with long-term current use of insulin [facility])  Assessment & Plan  Lab Results   Component Value Date    HGBA1C 9.9 (H) 10/26/2023       Recent Labs     11/22/23  1626 11/22/23  2139 11/23/23  0807 11/23/23  1205   POCGLU 177* 192* 145* 136         Blood Sugar Average: Last 72 hrs:  (P) 194  Patient following with endocrinology  Continue insulin  Hold Ozempic and resume at discharge  Sliding scale insulin coverage while in the hospital             VTE Pharmacologic Prophylaxis: VTE Score: 4 Moderate Risk (Score 3-4) - Pharmacological DVT Prophylaxis Ordered: enoxaparin (Lovenox). Mobility:   Basic Mobility Inpatient Raw Score: 16  JH-HLM Goal: 5: Stand one or more mins  JH-HLM Achieved: 1: Laying in bed  [facility] Goal NOT achieved. Continue with multidisciplinary rounding and encourage appropriate mobility to improve upon [facility] goals. Patient Centered Rounds: I performed bedside rounds with nursing staff today. Discussions with Specialists or Other Care Team Provider: nursing    Education and Discussions with Family / Patient: Attempted to update  (son) via phone. Unable to contact. Will attempt to updated step son    Total Time Spent on Date of Encounter in care of patient: 25 mins. This time was spent on one or more of the following: performing physical exam; counseling and coordination of care; obtaining or reviewing history; documenting in the medical record; reviewing/ordering tests, medications or procedures; communicating with other healthcare professionals and discussing with patient's family/caregivers.     Current Length of Stay: 4 day(s)  Current Patient Status: Inpatient   Certification Statement: The patient will continue to require additional inpatient hospital stay due to continued confusion, possible need for STR placement vs LTC  Discharge Plan: Anticipate discharge in 48-72 hrs to discharge location to be determined pending rehab evaluations. Code Status: Level 3 - DNAR and DNI    Subjective: The patient was seen and examined. The patient is lying in bed in no acute distress. She's confused, undressing in the bed. At times yelling out help. When in the room she is asking me to call the police or EMS for help. Objective:     Vitals:   Temp (24hrs), Av.5 °F (36.4 °C), Min:97 °F (36.1 °C), Max:97.9 °F (36.6 °C)    Temp:  [97 °F (36.1 °C)-97.9 °F (36.6 °C)] 97.9 °F (36.6 °C)  HR:  [83-94] 91  Resp:  [19-20] 20  BP: (133-152)/(69-83) 133/83  SpO2:  [95 %-97 %] 97 %  Body mass index is 33.43 kg/m². Input and Output Summary (last 24 hours):   No intake or output data in the 24 hours ending 23 1242    Physical Exam:   Physical Exam  Vitals and nursing note reviewed. Constitutional:       Appearance: Normal appearance. HENT:      Head: Normocephalic and atraumatic. Cardiovascular:      Rate and Rhythm: Normal rate and regular rhythm. Heart sounds: Normal heart sounds. Pulmonary:      Effort: Pulmonary effort is normal.      Breath sounds: Normal breath sounds. Abdominal:      Palpations: Abdomen is soft. Tenderness: There is no abdominal tenderness. Skin:     General: Skin is warm and dry. Neurological:      General: No focal deficit present. Mental Status: She is alert. Comments: Oriented to place and self. Patient able to answer some questions appropriately however will then ask to have the police called. Psychiatric:         Attention and Perception: Attention normal.         Mood and Affect: Mood normal.         Speech: Speech normal.         Behavior: Behavior is cooperative. Cognition and Memory: Cognition is impaired. Memory is impaired.           Additional Data:     Labs:  Results from last 7 days   Lab Units 23  0444 23  7716 11/19/23  1655   WBC Thousand/uL 10.19*   < > 13.43*   HEMOGLOBIN g/dL 11.4*   < > 13.8   HEMATOCRIT % 35.5   < > 43.6   PLATELETS Thousands/uL 143*   < > 130*   NEUTROS PCT %  --   --  79*   LYMPHS PCT %  --   --  12*   MONOS PCT %  --   --  9   EOS PCT %  --   --  0    < > = values in this interval not displayed. Results from last 7 days   Lab Units 11/23/23  0625   SODIUM mmol/L 137   POTASSIUM mmol/L 3.4*   CHLORIDE mmol/L 103   CO2 mmol/L 25   BUN mg/dL 21   CREATININE mg/dL 1.29   ANION GAP mmol/L 9   CALCIUM mg/dL 8.4   ALBUMIN g/dL 3.2*   TOTAL BILIRUBIN mg/dL 0.40   ALK PHOS U/L 41   ALT U/L 7   AST U/L 12*   GLUCOSE RANDOM mg/dL 148*         Results from last 7 days   Lab Units 11/23/23  1205 11/23/23  0807 11/22/23  2139 11/22/23  1626 11/22/23  1143 11/22/23  0705 11/21/23  2020 11/21/23  1610 11/21/23  1118 11/21/23  0644 11/20/23  2129 11/20/23  1617   POC GLUCOSE mg/dl 136 145* 192* 177* 245* 186* 215* 248* 222* 199* 197* 175*         Results from last 7 days   Lab Units 11/20/23  0447 11/19/23  1848   PROCALCITONIN ng/ml 0.12 0.10       Lines/Drains:  Invasive Devices       Peripheral Intravenous Line  Duration             Peripheral IV 11/23/23 Left;Ventral (anterior) Forearm <1 day              Drain  Duration             External Urinary Catheter 3 days                          Imaging: No pertinent imaging reviewed.     Recent Cultures (last 7 days):         Last 24 Hours Medication List:   Current Facility-Administered Medications   Medication Dose Route Frequency Provider Last Rate    acetaminophen  650 mg Oral Q4H PRN Gloria Daley PA-C      amLODIPine  5 mg Oral Daily Lia Hall PA-C      aspirin  81 mg Oral Daily [place], [place]      atorvastatin  80 mg Oral Daily With CellumenNANCY      bacitracin  1 small application Topical Daily Rommijudy Sibley PA-C      cefazolin  2,000 mg Intravenous Q8H Archie Forman MD 2,000 mg (11/23/23 8522)    cholecalciferol  800 Units Oral Daily Gloria Daley PA-C      DULoxetine  20 mg Oral Daily Lia Hall PA-C      enoxaparin  30 mg Subcutaneous Daily Lia Hall PA-C      fish oil  1,000 mg Oral BID Gloria Daley PA-C      fluticasone  2 spray Nasal Daily Lia Schmitt [place], [place]      folic acid  213 mcg Oral Daily Lia Cintron PA-C      insulin glargine  20 Units Subcutaneous HS Luda Cintron PA-C      insulin lispro  1-5 Units Subcutaneous HS Luda Cintron PA-C      insulin lispro  1-6 Units Subcutaneous TID AC Luda Castillo PA-C      levothyroxine  50 mcg Oral Daily Lia Hall PA-C      metoprolol succinate  50 mg Oral Daily Luda Cintron PA-C      ondansetron  4 mg Intravenous Q6H PRN Gloria Daley PA-C      pantoprazole  40 mg Oral Early Morning Gloria Daley PA-C      potassium chloride  20 mEq Oral TID With Meals Ariadna Chaney MD      tetanus-diphtheria-acellular pertussis  0.5 mL Intramuscular Once Archie Forman MD          Today, Patient Was Seen By: Kena Sibley PA-C    **Please Note: This note may have been constructed using a voice recognition system. **

## 2023-11-23 NOTE — PLAN OF CARE
Problem: INFECTION - ADULT  Goal: Absence or prevention of progression during hospitalization  Description: INTERVENTIONS:  - Assess and monitor for signs and symptoms of infection  - Monitor lab/diagnostic results  - Monitor all insertion sites, i.e. indwelling lines, tubes, and drains  - Monitor endotracheal if appropriate and nasal secretions for changes in amount and color  - [place] appropriate cooling/warming therapies per order  - Administer medications as ordered  - Instruct and encourage patient and family to use good hand hygiene technique  - Identify and instruct in appropriate isolation precautions for identified infection/condition  Outcome: Progressing  Goal: Absence of fever/infection during neutropenic period  Description: INTERVENTIONS:  - Monitor WBC    Outcome: Progressing

## 2023-11-23 NOTE — ARC ADMISSION
Referral received for consideration of patient for inpatient acute rehab. Will review patient's case with [facility] physician and update CM as able.

## 2023-11-23 NOTE — ED PROVIDER NOTES
Emergency Department Trauma Note  Gisele Rodriguez 66 y.o. female MRN: [id number]  Unit/Bed#: /-01 Encounter: [id number]      Trauma Alert: Trauma Acuity: Trauma Evaluation  Model of Arrival:   via    Trauma Team: Current Providers  Attending Provider: Sha Johnson MD  Attending Provider: Nayla Matrinez MD  Attending Provider: Zenaida Whaley MD  Graduate Nurse: Edis Feng  Registered Nurse:  Josefina Palacios RN  Registered Nurse: Néstor Mendiola RN  Registered Nurse: Vivian Luque RN  Consulting Physician: Martin Love DO  Certified Nursing Assistant: Hannah Dorado  Patient Care Assistant: Felipa Mcqueen  Patient Care Assistant: Felipa Mcqueen  Patient Care Assistant: Reyna Arteaga  Registered Nurse: Vivian Luque RN  Registered Nurse: Jalen Rowell RN  Care Manager: Moon Bhardwaj RN  Patient Care Assistant: Reyna Arteaga  Certified Nursing Assistant: Hannah Dorado  Patient Care Assistant: Reyna Arteaga  Registered Nurse: Leonidas Gregg  Registered Nurse: Venkat Garcia RN  Registered Nurse: Agusto Espinoza RN  Patient Care Assistant: Michelle Lopez  Registered Nurse: Jalen Rowell RN  Patient Care Assistant: Cm Thompson  Patient Care Assistant: Deanna Sun  Registered Nurse: Lucia Rocha RN  Patient Care Assistant: Diandra Rojas  Patient Care Assistant: Viktoriya Roe  Patient Care Assistant: Leesa Ashford  Registered Nurse: Leonidas Gregg  Registered Nurse: Venkat Garcia RN  Certified Nursing Assistant: Hannah Dorado  Patient Care Assistant: Cm Thompson  Registered Nurse: Lucia Rocha RN  Care Manager: Carlos Baca RN  Consulting Physician: Kraig Bustillo MD  Registered Nurse: Helder Croft RN  Patient Care Assistant: Stanislaw Macdonald  Patient Care Assistant: Pia Swain  Patient Care Assistant: Cm Thompson  Patient Care Assistant: Anuradha Cornejo  Registered Nurse: Devi Ramos RN  Consultants:     None      History of Present Illness Chief Complaint:   Chief Complaint   Patient presents with    Fall     Pt poor historian. EMS reports fall from bed this am      HPI:  Vivian Drake is a 66 y.o. female who presents with . Mechanism:Details of Incident: fell out of bed, hit right knee and elbow Injury Date: 11/19/23        Patient is a 63-year-old female with history of hypertension, CKD that presents for evaluation after a fall. Patient she has had multiple falls over the past 2 days. EMS reports that she has had some generalized weakness and some confusion. It is unclear what her baseline is, was unable to reach family. No blood thinners or antiplatelet agents noted. She currently complains of right knee and right elbow pain. She still been able to her despite her symptoms. She denies headache neck pain chest pain dyspnea abdominal pain. Review of Systems   Constitutional:  Positive for fatigue. Negative for fever. HENT:  Negative for sore throat. Respiratory:  Negative for shortness of breath. Cardiovascular:  Negative for chest pain. Gastrointestinal:  Negative for abdominal pain. Genitourinary:  Negative for dysuria. Musculoskeletal:  Negative for back pain. Right knee and right elbow pain   Skin:  Negative for rash. Neurological:  Negative for light-headedness. Psychiatric/Behavioral:  Negative for agitation. All other systems reviewed and are negative.       Historical Information     Immunizations:   Immunization History   Administered Date(s) Administered    COVID-19 MODERNA VACC 0.5 ML IM 04/27/2021, 05/25/2021    INFLUENZA 10/06/2014, 09/23/2015, 09/07/2016, 09/12/2017, 09/26/2018, 10/08/2020    Influenza, high dose seasonal 0.7 mL 10/08/2020, 10/07/2021, 03/07/2023, 11/08/2023    Pneumococcal Conjugate Vaccine 20-valent (Pcv20), Polysace 03/07/2023    Pneumococcal Polysaccharide PPV23 10/06/2014    influenza, trivalent, adjuvanted 09/16/2019       Past Medical History:   Diagnosis Date Benign essential hypertension     Chronic kidney disease, stage 3 (HCC)     Disorder of gallbladder     Disorder of skin and subcutaneous tissue     Dyspnea     Essential hypertension     Hyperlipidemia     Hypokalemia     Malaise and fatigue     Mixed hyperlipidemia     Morbid obesity (HCC)     Pernicious anemia        Family History   Problem Relation Age of Onset    Stroke Mother     Atrial fibrillation Mother     Brain cancer Father     Other Brother         Twin brothers - Open heart    Other Brother         Twin brothers - Open heart    No Known Problems Maternal Grandmother     No Known Problems Paternal Grandmother      Past Surgical History:   Procedure Laterality Date    BREAST BIOPSY Right     CARPAL TUNNEL RELEASE Bilateral     CHOLECYSTECTOMY      COLONOSCOPY      Dr. Masood Wilson INJECTION      x3    PARTIAL HYSTERECTOMY      SKIN LESION EXCISION      scalp      Social History     Tobacco Use    Smoking status: Former     Packs/day: 1.00     Years: 14.00     Total pack years: 14.00     Types: Cigarettes     Quit date: 0     Years since quittin.9    Smokeless tobacco: Never   Vaping Use    Vaping Use: Never used   Substance Use Topics    Alcohol use: Yes     Comment: Occasionally     Drug use: Never     Comment: Denies drug use - As per [employer]      E-Cigarette/Vaping    E-Cigarette Use Never User      E-Cigarette/Vaping Substances    Nicotine No     THC No     CBD No     Flavoring No     Other No     Unknown No        Family History: non-contributory    Meds/Allergies   Prior to Admission Medications   Prescriptions Last Dose Informant Patient Reported? Taking?    DULoxetine (CYMBALTA) 20 mg capsule  Self No No   Sig: take 1 capsule by mouth once daily   Insulin Glargine Solostar (Lantus SoloStar) 100 UNIT/ML SOPN   No No   Sig: Inject 0.5 mL (50 Units total) under the skin daily at bedtime   Insulin Pen Needle 32G X 6 MM MISC  Self No No   Sig: Use in the morning Lancets (freestyle) lancets  Self No No   Sig: Use as instructed -glucose monitor BID   Psyllium (FIBER) 0.52 g CAPS  Self Yes No   Sig: Take by mouth   Urea 20 Intensive Hydrating 20 % cream  Self Yes No   amLODIPine (NORVASC) 5 mg tablet   No No   Sig: Take 1 tablet (5 mg total) by mouth daily   aspirin (ECOTRIN LOW STRENGTH) 81 mg EC tablet  Self Yes No   Sig: Take 81 mg by mouth daily   cholecalciferol (VITAMIN D3) 1,000 units tablet  Self Yes No   Sig: Take 800 Units by mouth daily     fluticasone (FLONASE) 50 mcg/act nasal spray  Self No No   Si sprays into each nostril daily   folic acid (FOLVITE) 963 mcg tablet  Self Yes No   Sig: Take 400 mcg by mouth daily   glucose blood (FREESTYLE LITE) test strip  Self No No   Sig: Use as instructed   glucose monitoring kit (FREESTYLE) monitoring kit  Self No No   Si each by Does not apply route daily   levothyroxine 50 mcg tablet  Self No No   Sig: Take 1 tablet (50 mcg total) by mouth daily   metoprolol succinate (TOPROL-XL) 50 mg 24 hr tablet   No No   Sig: Take 1 tablet (50 mg total) by mouth daily   omega-3-acid ethyl esters (LOVAZA) 1 g capsule  Self Yes No   Sig: Take 2 g by mouth 2 (two) times a day   rosuvastatin (CRESTOR) 10 MG tablet  Self No No   Sig: Take 1 tablet (10 mg total) by mouth daily   semaglutide, 0.25 or 0.5 mg/dose, (Ozempic) 2 mg/1.5 mL injection pen   No No   Sig: Inject 0.25 mg once weekly for 4 weeks then increase to 0.5 mg once weekly.       Facility-Administered Medications: None       No Known Allergies    PHYSICAL EXAM    PE limited by: NA    Objective   Vitals:   First set: Temperature: 97.5 °F (36.4 °C) (23)  Pulse: 89 (23)  Respirations: 18 (23)  Blood Pressure: 150/88 (23 163)  SpO2: 96 % (23)    Primary Survey:   (A) Airway: Intact  (B) Breathing: Bilateral breath sounds  (C) Circulation: Pulses:   normal  (D) Disabliity:  GCS Total:  15  (E) Expose:  Completed    Secondary Survey: (Click on Physical Exam tab above)  Physical Exam  Vitals reviewed. Constitutional:       General: She is not in acute distress. Appearance: She is well-developed. HENT:      Head: Normocephalic. Eyes:      Pupils: Pupils are equal, round, and reactive to light. Cardiovascular:      Rate and Rhythm: Normal rate and regular rhythm. Heart sounds: Normal heart sounds. Pulmonary:      Effort: Pulmonary effort is normal.      Breath sounds: Normal breath sounds. Abdominal:      General: Bowel sounds are normal. There is no distension. Palpations: Abdomen is soft. Tenderness: There is no abdominal tenderness. There is no guarding. Musculoskeletal:         General: No tenderness or deformity. Normal range of motion. Cervical back: Normal range of motion and neck supple. Comments: Patient with abrasion of the right elbow, full range of motion no bony tenderness. Right knee with mild diffuse tenderness, full range of motion. Skin:     General: Skin is warm and dry. Capillary Refill: Capillary refill takes less than 2 seconds. Neurological:      Mental Status: She is alert. Cranial Nerves: No cranial nerve deficit. Sensory: No sensory deficit. Comments: Alert and oriented x 3, GCS 15.,  Nerves II through XII intact, strength 5 out of 5 throughout sensation intact throughout. Psychiatric:         Behavior: Behavior normal.         Thought Content: Thought content normal.         Judgment: Judgment normal.         Cervical spine cleared by clinical criteria?  No (imaging required)      Invasive Devices       Peripheral Intravenous Line  Duration             Peripheral IV 11/23/23 Left;Ventral (anterior) Forearm <1 day              Drain  Duration             External Urinary Catheter 2 days                    Lab Results:   Results Reviewed       Procedure Component Value Units Date/Time    Basic metabolic panel [[id number]]  (Abnormal) Collected: 11/20/23 0447    Lab Status: Final result Specimen: Blood from Arm, Right Updated: 11/20/23 0601     Sodium 134 mmol/L      Potassium 3.5 mmol/L      Chloride 98 mmol/L      CO2 28 mmol/L      ANION GAP 8 mmol/L      BUN 24 mg/dL      Creatinine 1.49 mg/dL      Glucose 183 mg/dL      Calcium 13.4 mg/dL      eGFR 33 ml/min/1.73sq m     Narrative:      [facility] (CKD):     Stage 1 with normal or high GFR (GFR > 90 mL/min/1.73 square meters)    Stage 2 Mild CKD (GFR = 60-89 mL/min/1.73 square meters)    Stage 3A Moderate CKD (GFR = 45-59 mL/min/1.73 square meters)    Stage 3B Moderate CKD (GFR = 30-44 mL/min/1.73 square meters)    Stage 4 Severe CKD (GFR = 15-29 mL/min/1.73 square meters)    Stage 5 End Stage CKD (GFR <15 mL/min/1.73 square meters)  Note: GFR calculation is accurate only with a steady state creatinine    Procalcitonin [[id number]]  (Normal) Collected: 11/20/23 0447    Lab Status: Final result Specimen: Blood from Arm, Right Updated: 11/20/23 0556     Procalcitonin 0.12 ng/ml     UA w Reflex to Microscopic w Reflex to Culture [[id number]]  (Abnormal) Collected: 11/20/23 0523    Lab Status: Final result Specimen: Urine Updated: 11/20/23 0546     Color, UA Yellow     Clarity, UA Clear     Specific Gravity, UA 1.025     pH, UA 6.0     Leukocytes, UA Negative     Nitrite, UA Negative     Protein, UA Negative mg/dl      Glucose, UA Negative mg/dl      Ketones, UA Trace mg/dl      Urobilinogen, UA 0.2 E.U./dl      Bilirubin, UA 1+     Occult Blood, UA Negative    CBC (With Platelets) [[id number]]  (Abnormal) Collected: 11/20/23 0447    Lab Status: Final result Specimen: Blood from Arm, Right Updated: 11/20/23 0542     WBC 11.40 Thousand/uL      RBC 4.05 Million/uL      Hemoglobin 12.2 g/dL      Hematocrit 38.6 %      MCV 95 fL      MCH 30.1 pg      MCHC 31.6 g/dL      RDW 13.1 %      Platelets 275 Thousands/uL      MPV 11.3 fL     Procalcitonin [[id number]]  (Normal) Collected: 11/19/23 1848    Lab Status: Final result Specimen: Blood from Arm, Left Updated: 11/19/23 2046     Procalcitonin 0.10 ng/ml     Comprehensive metabolic panel [[id number]]  (Abnormal) Collected: 11/19/23 1848    Lab Status: Final result Specimen: Blood from Arm, Left Updated: 11/19/23 1926     Sodium 132 mmol/L      Potassium 3.8 mmol/L      Chloride 92 mmol/L      CO2 30 mmol/L      ANION GAP 10 mmol/L      BUN 24 mg/dL      Creatinine 1.64 mg/dL      Glucose 223 mg/dL      Calcium 14.3 mg/dL      AST 19 U/L      ALT 10 U/L      Alkaline Phosphatase 48 U/L      Total Protein 6.7 g/dL      Albumin 3.9 g/dL      Total Bilirubin 0.57 mg/dL      eGFR 29 ml/min/1.73sq m     Narrative:      [facility] ([facility]):     Stage 1 with normal or high GFR (GFR > 90 mL/min/1.73 square meters)    Stage 2 Mild CKD (GFR = 60-89 mL/min/1.73 square meters)    Stage 3A Moderate CKD (GFR = 45-59 mL/min/1.73 square meters)    Stage 3B Moderate CKD (GFR = 30-44 mL/min/1.73 square meters)    Stage 4 Severe CKD (GFR = 15-29 mL/min/1.73 square meters)    Stage 5 End Stage CKD (GFR <15 mL/min/1.73 square meters)  Note: GFR calculation is accurate only with a steady state creatinine    TSH, 3rd generation with Free T4 reflex [[id number]]  (Normal) Collected: 11/19/23 1655    Lab Status: Final result Specimen: Blood from Arm, Left Updated: 11/19/23 1807     TSH 3RD GENERATON 2.227 uIU/mL     CBC and differential [[id number]]  (Abnormal) Collected: 11/19/23 1655    Lab Status: Final result Specimen: Blood from Arm, Left Updated: 11/19/23 1737     WBC 13.43 Thousand/uL      RBC 4.52 Million/uL      Hemoglobin 13.8 g/dL      Hematocrit 43.6 %      MCV 97 fL      MCH 30.5 pg      MCHC 31.7 g/dL      RDW 13.1 %      MPV 11.6 fL      Platelets 073 Thousands/uL      nRBC 0 /100 WBCs      Neutrophils Relative 79 %      Immat GRANS % 0 %      Lymphocytes Relative 12 %      Monocytes Relative 9 %      Eosinophils Relative 0 %      Basophils Relative 0 %      Neutrophils Absolute 10.50 Thousands/µL      Immature Grans Absolute 0.05 Thousand/uL      Lymphocytes Absolute 1.65 Thousands/µL      Monocytes Absolute 1.15 Thousand/µL      Eosinophils Absolute 0.05 Thousand/µL      Basophils Absolute 0.03 Thousands/µL                    Imaging Studies:   Direct to CT: No  MRI brain wo contrast   Final Result by Jihan Cowart MD (11/22 2228)      White matter changes suggestive of chronic microangiopathy. No acute intracranial pathology. Workstation performed: [id number]         XR hand 2 vw right   Final Result by Jose Gross MD (11/22 1314)      Comminuted distal 4th phalangeal fracture. Workstation performed: [id number]         XR hips bilateral 3-4 vw w pelvis if performed   Final Result by Caitlyn Shields MD (11/22 1053)      No acute findings. Degenerative changes mostly in the lower lumbar spine. Workstation performed: [id number]         CT head wo contrast   Final Result by Alonso Melo MD (11/22 1110)      No acute intracranial abnormality. Stable chronic microangiopathic changes within the brain. Workstation performed: [id number]         CT chest abdomen pelvis wo contrast   Final Result by Minerva Golden MD (11/21 0930)      1. No definite evidence of malignancy in the chest, abdomen, or pelvis within limits of this unenhanced scan. 2.  Fluid attenuation lesion adjacent to the right colon of uncertain etiology, suboptimally evaluated without contrast. A colonic duplication cyst is possible. The study was marked in [employer] for significant notification. A follow-up MRI of the abdomen is    recommended to evaluate for solid enhancement. Workstation performed: [id number]         XR elbow 3+ vw RIGHT   Final Result by Brian Brennan MD (11/20 1042)      No acute osseous abnormality. Workstation performed: [id number]         XR knee 4+ vw right injury   Final Result by Yanni Ng MD (11/20 1374)      No acute osseous abnormality. Workstation performed: [id number]         TRAUMA - CT head wo contrast   Final Result by Joselyn Page MD (11/19 1727)      No acute intracranial abnormality. The study was marked in [facility] for immediate notification given trauma status. .      Workstation performed: [id number]         TRAUMA - CT spine cervical wo contrast   Final Result by Joselyn Page MD (11/19 1732)      No cervical spine fracture or traumatic malalignment. The study was marked in [facility] for immediate notification given trauma status. .            Workstation performed: [id number]         XR Trauma chest portable   Final Result by Joselyn Page MD (11/19 1734)      No acute cardiopulmonary disease. Workstation performed: [id number]         XR Trauma pelvis ap only 1 or 2 vw   Final Result by Joselyn Page MD (11/19 1735)      No acute osseous abnormality. Degenerative changes as described.       Workstation performed: [id number]               Procedures  POC FAST US    Date/Time: 11/23/2023 6:54 AM    Performed by: Judit Farley MD  Authorized by: Judit Farley MD    Patient location:  ED  Other Assisting Provider: No    Procedure details:     Exam Type:  Diagnostic    Indications: blunt abdominal trauma      Assess for:  Intra-abdominal fluid and pericardial effusion    Technique: FAST      Views obtained:  Heart - Pericardial sac, RUQ - Boyd's Pouch, LUQ - Splenorenal space and Suprapubic - Pouch of Jaya    Image quality: diagnostic      Image availability:  Not saved  FAST Findings:     RUQ (Hepatorenal) free fluid: absent      LUQ (Splenorenal) free fluid: absent      Suprapubic free fluid: absent      Cardiac wall motion: identified      Pericardial effusion: absent    Interpretation: Impressions: negative             ED Course           Medical Decision Making  Patient is a 66-year-old female who presents for evaluation of frequent falls, generalized weakness and fatigue. Unclear what the patient's baseline is but she is slightly confused. For this reason, trauma evaluation was called. CT imaging reviewed and negative. X-rays reviewed and negative. Blood work reviewed and showed significant hypercalcemia. Started with IV fluids and will be admitted to hospital for further workup and management. Amount and/or Complexity of Data Reviewed  Labs: ordered. Radiology: ordered. Risk  Decision regarding hospitalization. Disposition  [facility] Transfer: No  Final diagnoses:   Hypercalcemia   Generalized weakness   Fall, initial encounter     Time reflects when diagnosis was documented in both MDM as applicable and the Disposition within this note       Time User Action Codes Description Comment    11/19/2023  7:41 PM Aparna Cali Add [E83.52] Hypercalcemia     11/19/2023  7:41 PM Syd Rubio Add [R53.1] Generalized weakness     11/19/2023  7:41 PM Aparna Cali Add [Y11. IFAX] Fall, initial encounter     11/22/2023 10:05 AM Regulo Manzanares Add [M86.969X] Finger avulsion     11/22/2023  3:48 PM Lue Ouachita Add [S62.609B] Open finger fracture           ED Disposition       ED Disposition   Admit    Condition   Stable    Date/Time   Sun Nov 19, 2023  7:41 PM    Comment   Case was discussed with SLIM and the patient's admission status was agreed to be Admission Status: inpatient status to the service of Dr. Farrell Sacks .                Follow-up Information    None       Current Discharge Medication List        CONTINUE these medications which have NOT CHANGED    Details   amLODIPine (NORVASC) 5 mg tablet Take 1 tablet (5 mg total) by mouth daily  Qty: 90 tablet, Refills: 3    Associated Diagnoses: Essential hypertension      aspirin (ECOTRIN LOW STRENGTH) 81 mg EC tablet Take 81 mg by mouth daily      cholecalciferol (VITAMIN D3) 1,000 units tablet Take 800 Units by mouth daily        DULoxetine (CYMBALTA) 20 mg capsule take 1 capsule by mouth once daily  Qty: 90 capsule, Refills: 1    Associated Diagnoses: Spinal stenosis of lumbar region, unspecified whether neurogenic claudication present      fluticasone (FLONASE) 50 mcg/act nasal spray 2 sprays into each nostril daily  Qty: 16 g, Refills: 6    Associated Diagnoses: Nasal congestion      folic acid (FOLVITE) 505 mcg tablet Take 400 mcg by mouth daily      glucose blood (FREESTYLE LITE) test strip Use as instructed  Qty: 100 each, Refills: 3    Associated Diagnoses: Type 2 diabetes mellitus with stage 3b chronic kidney disease, with long-term current use of insulin ([facility])      glucose monitoring kit (FREESTYLE) monitoring kit 1 each by Does not apply route daily  Qty: 1 each, Refills: 0    Associated Diagnoses: Type 2 diabetes mellitus without complication, without long-term current use of insulin ([facility])      Insulin Glargine Solostar (Lantus SoloStar) 100 UNIT/ML SOPN Inject 0.5 mL (50 Units total) under the skin daily at bedtime  Qty: 15 mL, Refills: 5    Associated Diagnoses: Type 2 diabetes mellitus with stage 3a chronic kidney disease, with long-term current use of insulin ([facility])      Insulin Pen Needle 32G X 6 MM MISC Use in the morning  Qty: 100 each, Refills: 5    Associated Diagnoses: Type 2 diabetes mellitus without complication, without long-term current use of insulin ([facility])      Lancets (freestyle) lancets Use as instructed -glucose monitor BID  Qty: 100 each, Refills: 5    Associated Diagnoses: Type 2 diabetes mellitus with stage 3a chronic kidney disease, with long-term current use of insulin ([facility])      levothyroxine 50 mcg tablet Take 1 tablet (50 mcg total) by mouth daily  Qty: 90 tablet, Refills: 2    Associated Diagnoses: Type 2 diabetes mellitus with stage 3a chronic kidney disease, with long-term current use of insulin ([address]); Mixed hyperlipidemia      metoprolol succinate (TOPROL-XL) 50 mg 24 hr tablet Take 1 tablet (50 mg total) by mouth daily  Qty: 90 tablet, Refills: 2    Associated Diagnoses: Essential hypertension      omega-3-acid ethyl esters (LOVAZA) 1 g capsule Take 2 g by mouth 2 (two) times a day      Psyllium (FIBER) 0.52 g CAPS Take by mouth      rosuvastatin (CRESTOR) 10 MG tablet Take 1 tablet (10 mg total) by mouth daily  Qty: 90 tablet, Refills: 3    Associated Diagnoses: Mixed hyperlipidemia      semaglutide, 0.25 or 0.5 mg/dose, (Ozempic) 2 mg/1.5 mL injection pen Inject 0.25 mg once weekly for 4 weeks then increase to 0.5 mg once weekly. Qty: 3 mL, Refills: 0    Associated Diagnoses: Type 2 diabetes mellitus with stage 3b chronic kidney disease, with long-term current use of insulin (HCC)      Urea 20 Intensive Hydrating 20 % cream            No discharge procedures on file.     PDMP Review         Value Time User    PDMP Reviewed  Yes 11/19/2023  8:10 PM Luda Jasmine PA-C            ED Provider  Electronically Signed by           Jeannine Paz MD  11/23/23 0133

## 2023-11-23 NOTE — ASSESSMENT & PLAN NOTE
Patient had a fall in her room on 11/22/2023. Notified by RN. CT head negative for any acute findings. X-rays of hip negative for any acute findings. Patient had laceration to right ring finger. Surgical team has been consulted. X-ray performed of right hand, Comminuted distal 4th phalangeal fracture.  Reviewed with hand surgery on-call (Dr. Sharmila Brewster) no intervention required at this time   PT/OT eval

## 2023-11-23 NOTE — ASSESSMENT & PLAN NOTE
Unclear etiology  CT head negative for any acute findings  Unable to get a hold of family to establish patient's baseline  Possibly secondary to hypercalcemia however this has resolved and patient continues to have confusion  MRI brain negative for acute findings  Consult neurology

## 2023-11-23 NOTE — ASSESSMENT & PLAN NOTE
Patient presented with weakness, confusion, frequent falls, found to have hypercalcemia. Patient reports she has been taking Tums frequently for heartburn  Continue IV fluids as needed  Calcium level within normal limits today- nephrology signed off.    Repeat BMP as needed

## 2023-11-23 NOTE — CONSULTS
Consultation - General Surgery   Nathan Groves 66 y.o. female MRN: [id number]  Unit/Bed#: -01 Encounter: [id number]    Assessment/Plan      78F with multiple comorbidities, unfortunately had a fall during her hospitalization and sustained a right fourth finger comminuted fracture of the distal 4th phalanx with involvement of the tuft and displacement. I was called to assist with wound management and hemostasis. Wound washed out, irrigated, cleansed, and treated with silver nitrate with good effect and hemostasis achieved. Surgicel also placed across friable soft tissue and pressure wrap placed. Recommend antibiotics and tetanus in light of significant soft tissue disruption. Case also reviewed with hand surgery on call and recommendations received and implemented per the primary team. General surgery will remain available on an as needed basis for additional wound care support. Would recommend taking the dressing down on 11/23 and washing things out again with warm soap and water and placing bacitracin to the wound bed and replacing the dressing. Defer to hand surgery for further management. General surgery will sign off. Call with further questions. History of Present Illness     HPI:  Nathan Groves is a 66 y.o. female who presents with newly sustained right fourth ring finger complex fracture after fall. Site without adequate hemostasis and significant blood soaked dressings in the last hour. Called to evaluate the wound and guide management. Inpatient consult to Acute Care Surgery  Consult performed by: Mono Flannery MD  Consult ordered by: Coby Acevedo MD          Review of Systems   Constitutional:  Positive for activity change. Musculoskeletal:         Falls   Skin:  Positive for color change and wound. Right fourth ring finger fractured distally with associated soft tissue injury   Neurological:         Dementia   All other systems reviewed and are negative.       Historical Information   Past Medical History:   Diagnosis Date    Benign essential hypertension     Chronic kidney disease, stage 3 (HCC)     Disorder of gallbladder     Disorder of skin and subcutaneous tissue     Dyspnea     Essential hypertension     Hyperlipidemia     Hypokalemia     Malaise and fatigue     Mixed hyperlipidemia     Morbid obesity ([address])     Pernicious anemia      Past Surgical History:   Procedure Laterality Date    BREAST BIOPSY Right     CARPAL TUNNEL RELEASE Bilateral     CHOLECYSTECTOMY      COLONOSCOPY      Dr. Kendra Londono      x3    PARTIAL HYSTERECTOMY      SKIN LESION EXCISION      scalp      Social History   Social History     Substance and Sexual Activity   Alcohol Use Yes    Comment: Occasionally      Social History     Substance and Sexual Activity   Drug Use Never    Comment: Denies drug use - As per [facility]      Social History     Tobacco Use   Smoking Status Former    Packs/day: 1.00    Years: 14.00    Total pack years: 14.00    Types: Cigarettes    Quit date: 0    Years since quittin.9   Smokeless Tobacco Never     Family History: non-contributory    Meds/Allergies   all current active meds have been reviewed  No Known Allergies    Objective   First Vitals:   Blood Pressure: 150/88 (23 1637)  Pulse: 89 (23 1637)  Temperature: 97.5 °F (36.4 °C) (23 1637)  Temp Source: Tympanic (23 1637)  Respirations: 18 (23 163)  Height: 5' 2" (157.5 cm) (23 0000)  Weight - Scale: 82.9 kg (182 lb 12.2 oz) (23)  SpO2: 96 % (23 1637)    Current Vitals:   Blood Pressure: 133/83 (23 0954)  Pulse: 91 (23 0954)  Temperature: 97.9 °F (36.6 °C) (23 2316)  Temp Source: Temporal (23 1450)  Respirations: 20 (23 0952)  Height: 5' 2" (157.5 cm) (23 0000)  Weight - Scale: 82.9 kg (182 lb 12.2 oz) (23)  SpO2: 97 % (23 0952)    No intake or output data in the 24 hours ending 11/23/23 1248    Invasive Devices       Peripheral Intravenous Line  Duration             Peripheral IV 11/23/23 Left;Ventral (anterior) Forearm <1 day              Drain  Duration             External Urinary Catheter 3 days                    Physical Exam  Vitals reviewed. Constitutional:       General: She is not in acute distress. HENT:      Head: Normocephalic. Mouth/Throat:      Mouth: Mucous membranes are moist.   Eyes:      Pupils: Pupils are equal, round, and reactive to light. Cardiovascular:      Rate and Rhythm: Normal rate. Pulmonary:      Effort: Pulmonary effort is normal.   Abdominal:      Palpations: Abdomen is soft. Musculoskeletal:         General: Normal range of motion. Cervical back: Normal range of motion. Skin:     General: Skin is warm. Capillary Refill: Capillary refill takes less than 2 seconds. Comments: Right fourth finger with distal swelling and bruising and deformity and soft tissue avulsion running from nailbed around to the palmar side and over, see photos, no visible bone, soft tissue friable and actively oozing with multiple sites of pin point bleeding that had been causing pooling and strike through the gauze wrap repeatedly, wound cleansed, bleeding points managed with silver nitrate with good effect, site hemostatic and wrapped with surgicel and then gauze, loren wrap   Neurological:      Mental Status: She is alert. Mental status is at baseline. Psychiatric:         Mood and Affect: Mood normal.         Lab Results: I have personally reviewed pertinent lab results.    Lab Results   Component Value Date    SODIUM 137 11/23/2023    K 3.4 (L) 11/23/2023     11/23/2023    CO2 25 11/23/2023    BUN 21 11/23/2023    CREATININE 1.29 11/23/2023    CALCIUM 8.4 11/23/2023    AST 12 (L) 11/23/2023    ALT 7 11/23/2023    ALKPHOS 41 11/23/2023    EGFR 39 11/23/2023   , Coagulation: No results found for: "PT", "INR", "APTT"  Imaging: I have personally reviewed pertinent reports. and I have personally reviewed pertinent films in PACS  EKG, Pathology, and Other Studies: I have personally reviewed pertinent reports. and I have personally reviewed pertinent films in PACS      Signature:   Olvin Madsen MD  Date: 11/23/2023 Time: 12:48 PM

## 2023-11-24 PROBLEM — E83.42 HYPOMAGNESEMIA: Status: ACTIVE | Noted: 2023-11-24

## 2023-11-24 PROBLEM — G93.41 METABOLIC ENCEPHALOPATHY: Status: ACTIVE | Noted: 2023-11-21

## 2023-11-24 LAB
ALBUMIN SERPL BCP-MCNC: 3.2 G/DL (ref 3.5–5)
ALBUMIN UR ELPH-MCNC: 100 %
ALP SERPL-CCNC: 45 U/L (ref 34–104)
ALPHA1 GLOB MFR UR ELPH: 0 %
ALPHA2 GLOB MFR UR ELPH: 0 %
ALT SERPL W P-5'-P-CCNC: 4 U/L (ref 7–52)
ANION GAP SERPL CALCULATED.3IONS-SCNC: 7 MMOL/L
AST SERPL W P-5'-P-CCNC: 12 U/L (ref 13–39)
B-GLOBULIN MFR UR ELPH: 0 %
BILIRUB SERPL-MCNC: 0.38 MG/DL (ref 0.2–1)
BUN SERPL-MCNC: 17 MG/DL (ref 5–25)
CALCIUM ALBUM COR SERPL-MCNC: 8.5 MG/DL (ref 8.3–10.1)
CALCIUM SERPL-MCNC: 7.9 MG/DL (ref 8.4–10.2)
CHLORIDE SERPL-SCNC: 104 MMOL/L (ref 96–108)
CO2 SERPL-SCNC: 27 MMOL/L (ref 21–32)
CREAT SERPL-MCNC: 1.37 MG/DL (ref 0.6–1.3)
ERYTHROCYTE [DISTWIDTH] IN BLOOD BY AUTOMATED COUNT: 13.4 % (ref 11.6–15.1)
GAMMA GLOB MFR UR ELPH: 0 %
GFR SERPL CREATININE-BSD FRML MDRD: 36 ML/MIN/1.73SQ M
GLUCOSE SERPL-MCNC: 157 MG/DL (ref 65–140)
GLUCOSE SERPL-MCNC: 159 MG/DL (ref 65–140)
GLUCOSE SERPL-MCNC: 193 MG/DL (ref 65–140)
GLUCOSE SERPL-MCNC: 212 MG/DL (ref 65–140)
GLUCOSE SERPL-MCNC: 336 MG/DL (ref 65–140)
HCT VFR BLD AUTO: 37.9 % (ref 34.8–46.1)
HGB BLD-MCNC: 11.9 G/DL (ref 11.5–15.4)
MAGNESIUM SERPL-MCNC: 1.2 MG/DL (ref 1.9–2.7)
MCH RBC QN AUTO: 30.6 PG (ref 26.8–34.3)
MCHC RBC AUTO-ENTMCNC: 31.4 G/DL (ref 31.4–37.4)
MCV RBC AUTO: 97 FL (ref 82–98)
PLATELET # BLD AUTO: 148 THOUSANDS/UL (ref 149–390)
PMV BLD AUTO: 10.9 FL (ref 8.9–12.7)
POTASSIUM SERPL-SCNC: 3.5 MMOL/L (ref 3.5–5.3)
PROT PATTERN UR ELPH-IMP: NORMAL
PROT SERPL-MCNC: 5.5 G/DL (ref 6.4–8.4)
PROT UR-MCNC: 16.8 MG/DL
RBC # BLD AUTO: 3.89 MILLION/UL (ref 3.81–5.12)
SODIUM SERPL-SCNC: 138 MMOL/L (ref 135–147)
WBC # BLD AUTO: 9.3 THOUSAND/UL (ref 4.31–10.16)

## 2023-11-24 PROCEDURE — 84166 PROTEIN E-PHORESIS/URINE/CSF: CPT | Performed by: INTERNAL MEDICINE

## 2023-11-24 PROCEDURE — 82948 REAGENT STRIP/BLOOD GLUCOSE: CPT

## 2023-11-24 PROCEDURE — 97110 THERAPEUTIC EXERCISES: CPT

## 2023-11-24 PROCEDURE — 99232 SBSQ HOSP IP/OBS MODERATE 35: CPT | Performed by: PHYSICIAN ASSISTANT

## 2023-11-24 PROCEDURE — 80053 COMPREHEN METABOLIC PANEL: CPT | Performed by: PHYSICIAN ASSISTANT

## 2023-11-24 PROCEDURE — 85027 COMPLETE CBC AUTOMATED: CPT | Performed by: PHYSICIAN ASSISTANT

## 2023-11-24 PROCEDURE — 84166 PROTEIN E-PHORESIS/URINE/CSF: CPT | Performed by: STUDENT IN AN ORGANIZED HEALTH CARE EDUCATION/TRAINING PROGRAM

## 2023-11-24 PROCEDURE — 97530 THERAPEUTIC ACTIVITIES: CPT

## 2023-11-24 PROCEDURE — 83735 ASSAY OF MAGNESIUM: CPT | Performed by: PHYSICIAN ASSISTANT

## 2023-11-24 RX ORDER — MAGNESIUM SULFATE HEPTAHYDRATE 40 MG/ML
4 INJECTION, SOLUTION INTRAVENOUS ONCE
Status: COMPLETED | OUTPATIENT
Start: 2023-11-24 | End: 2023-11-24

## 2023-11-24 RX ADMIN — CEFAZOLIN SODIUM 2000 MG: 2 SOLUTION INTRAVENOUS at 08:17

## 2023-11-24 RX ADMIN — ENOXAPARIN SODIUM 30 MG: 30 INJECTION SUBCUTANEOUS at 08:15

## 2023-11-24 RX ADMIN — ACETAMINOPHEN 650 MG: 325 TABLET ORAL at 18:50

## 2023-11-24 RX ADMIN — LEVOTHYROXINE SODIUM 50 MCG: 50 TABLET ORAL at 05:21

## 2023-11-24 RX ADMIN — BACITRACIN ZINC 1 SMALL APPLICATION: 500 OINTMENT TOPICAL at 08:15

## 2023-11-24 RX ADMIN — FLUTICASONE PROPIONATE 2 SPRAY: 50 SPRAY, METERED NASAL at 08:16

## 2023-11-24 RX ADMIN — POTASSIUM CHLORIDE 20 MEQ: 1.5 SOLUTION ORAL at 08:16

## 2023-11-24 RX ADMIN — OMEGA-3 FATTY ACIDS CAP 1000 MG 1000 MG: 1000 CAP at 08:15

## 2023-11-24 RX ADMIN — MAGNESIUM SULFATE HEPTAHYDRATE 4 G: 4 INJECTION, SOLUTION INTRAVENOUS at 07:53

## 2023-11-24 RX ADMIN — INSULIN LISPRO 1 UNITS: 100 INJECTION, SOLUTION INTRAVENOUS; SUBCUTANEOUS at 07:53

## 2023-11-24 RX ADMIN — ASPIRIN 81 MG: 81 TABLET, COATED ORAL at 08:15

## 2023-11-24 RX ADMIN — Medication 400 MCG: at 08:15

## 2023-11-24 RX ADMIN — PANTOPRAZOLE SODIUM 40 MG: 40 TABLET, DELAYED RELEASE ORAL at 05:21

## 2023-11-24 RX ADMIN — METOPROLOL SUCCINATE 50 MG: 50 TABLET, EXTENDED RELEASE ORAL at 08:15

## 2023-11-24 RX ADMIN — OMEGA-3 FATTY ACIDS CAP 1000 MG 1000 MG: 1000 CAP at 18:26

## 2023-11-24 RX ADMIN — ATORVASTATIN CALCIUM 80 MG: 80 TABLET, FILM COATED ORAL at 18:26

## 2023-11-24 RX ADMIN — DULOXETINE HYDROCHLORIDE 20 MG: 20 CAPSULE, DELAYED RELEASE ORAL at 08:15

## 2023-11-24 RX ADMIN — CEFAZOLIN SODIUM 2000 MG: 2 SOLUTION INTRAVENOUS at 00:27

## 2023-11-24 RX ADMIN — INSULIN LISPRO 2 UNITS: 100 INJECTION, SOLUTION INTRAVENOUS; SUBCUTANEOUS at 18:26

## 2023-11-24 RX ADMIN — CHOLECALCIFEROL TAB 10 MCG (400 UNIT) 800 UNITS: 10 TAB at 08:15

## 2023-11-24 RX ADMIN — AMLODIPINE BESYLATE 5 MG: 5 TABLET ORAL at 08:15

## 2023-11-24 RX ADMIN — INSULIN LISPRO 5 UNITS: 100 INJECTION, SOLUTION INTRAVENOUS; SUBCUTANEOUS at 13:52

## 2023-11-24 RX ADMIN — CEFAZOLIN SODIUM 2000 MG: 2 SOLUTION INTRAVENOUS at 18:25

## 2023-11-24 RX ADMIN — POTASSIUM CHLORIDE 20 MEQ: 1.5 SOLUTION ORAL at 14:04

## 2023-11-24 RX ADMIN — INSULIN GLARGINE 20 UNITS: 100 INJECTION, SOLUTION SUBCUTANEOUS at 21:00

## 2023-11-24 RX ADMIN — ACETAMINOPHEN 650 MG: 325 TABLET ORAL at 08:25

## 2023-11-24 RX ADMIN — INSULIN LISPRO 1 UNITS: 100 INJECTION, SOLUTION INTRAVENOUS; SUBCUTANEOUS at 21:00

## 2023-11-24 RX ADMIN — POTASSIUM CHLORIDE 20 MEQ: 1.5 SOLUTION ORAL at 18:26

## 2023-11-24 NOTE — PHYSICAL THERAPY NOTE
PHYSICAL THERAPY TREATMENT NOTE  NAME:  Eguenie [place]  DATE: 11/24/23    Length Of Stay: 5  Performed at least 2 patient identifiers during session: Name and ID bracelet    TREATMENT FLOWSHEET:    11/24/23 1313   PT Last Visit   PT Visit Date 11/24/23   Note Type   Note Type Treatment   Pain Assessment   Pain Assessment Tool 0-10   Pain Score 6   Pain Location/Orientation Orientation: Right;Other (Comment)  (4th distal phalanx)   Pain Onset/Description Onset: Ongoing;Frequency: Constant/Continuous; Descriptor: Aching   Patient's Stated Pain Goal No pain   Hospital Pain Intervention(s) Repositioned; Ambulation/increased activity   Restrictions/Precautions   Weight Bearing Precautions Per Order No   Other Precautions Bed Alarm; Chair Alarm; Fall Risk;Pain;Multiple lines;Cognitive   General   Chart Reviewed Yes   Family/Caregiver Present No   Cognition   Overall Cognitive Status Impaired   Arousal/Participation Responsive; Alert   Attention Attends with cues to redirect   Orientation Level Oriented to person;Oriented to place;Oriented to time   Memory Decreased recall of precautions;Decreased recall of recent events   Following Commands Follows one step commands with increased time or repetition   Comments Pt. seems to have improved cognition this session but did report seeing a cat in the corner of her room earlier. Subjective   Subjective "There was a cat in the corner of my room before, is it still there?"   Bed Mobility   Rolling R 5  Supervision   Additional items Assist x 1;HOB elevated; Bedrails; Increased time required;Verbal cues   Rolling L 5  Supervision   Additional items Assist x 1;HOB elevated; Bedrails; Increased time required;Verbal cues   Supine to Sit 4  Minimal assistance   Additional items Assist x 1;HOB elevated; Bedrails; Increased time required;Verbal cues   Sit to Supine 4  Minimal assistance   Additional items Assist x 1;HOB elevated; Bedrails; Increased time required;Verbal cues;LE management Additional Comments Pt. tolerated sitting at to perform BLE ther ex   Transfers   Sit to Stand 4  Minimal assistance   Additional items Assist x 1; Increased time required;Verbal cues; Bedrails  (RW)   Stand to Sit 4  Minimal assistance   Additional items Assist x 1; Increased time required;Verbal cues; Bedrails   Stand pivot 3  Moderate assistance   Additional items Assist x 1; Armrests; Increased time required;Verbal cues  (RW)   Toilet transfer 3  Moderate assistance   Additional items Assist x 1; Armrests; Increased time required;Verbal cues; Commode  (RW)   Additional Comments VC's provided for proper hand placement during transitions and assistance with RW management . Pt. required assitance with clothing management and hygiene needs   Ambulation/Elevation   Gait pattern Improper Weight shift;Decreased foot clearance; Inconsistent radha; Excessively slow; Short stride;Decreased heel strike;Decreased toe off   Gait Assistance 3  Moderate assist   Additional items Assist x 1;Verbal cues   Assistive Device Rolling walker   Distance 3ftx2   Balance   Static Sitting Fair +   Dynamic Sitting Fair   Static Standing Fair -   Dynamic Standing Fair -   Ambulatory Poor +   Endurance Deficit   Endurance Deficit Yes   Activity Tolerance   Activity Tolerance Patient limited by fatigue;Patient limited by pain   Nurse Made Aware yes   Exercises   Quad Sets Sitting;10 reps;AROM; Bilateral   Heelslides Sitting;10 reps;AROM; Bilateral   Hip Flexion Sitting;10 reps;AROM; Bilateral   Hip Abduction Sitting;10 reps;AROM; Bilateral   Hip Adduction Sitting;10 reps;AROM; Bilateral   Knee AROM Long Arc Quad Sitting;10 reps;AROM; Bilateral   Ankle Pumps Sitting;10 reps;AROM; Bilateral   Marching Sitting;10 reps;AROM; Bilateral   Assessment   Prognosis Fair   Problem List Decreased strength;Decreased endurance; Impaired balance;Decreased mobility; Decreased coordination;Decreased cognition; Impaired judgement;Decreased safety awareness   Goals   Patient Goals to use the commode   PT Treatment Day 1   Plan   Treatment/Interventions Functional transfer training;LE strengthening/ROM; Elevations; Endurance training; Therapeutic exercise;Cognitive reorientation;Patient/family training;Equipment eval/education; Bed mobility;Gait training; Compensatory technique education;Spoke to nursing   Progress Progressing toward goals   PT Frequency 3-5x/wk   Discharge Recommendation   Rehab Resource Intensity Level, PT II (Moderate Resource Intensity)   AM-PAC Basic Mobility Inpatient   Turning in Flat Bed Without Bedrails 3   Lying on Back to Sitting on Edge of Flat Bed Without Bedrails 2   Moving Bed to Chair 3   Standing Up From Chair Using Arms 3   Walk in Room 2   Climb 3-5 Stairs With Railing 1   Basic Mobility Inpatient Raw Score 14   Basic Mobility Standardized Score 35.55   Highest Level Of Mobility   -[facility] Goal 4: Move to chair/commode   -HLM Achieved 4: Move to chair/commode       The patient's AM-PAC Basic Mobility Inpatient Short Form Raw Score is 14. A raw score less than 16 suggests the patient may benefit from discharge to post-acute rehabilitation services. Please also refer to the recommendation of the Physical Therapist for safe discharge planning. Pt seen for PT treatment session this date with interventions consisting of bed mobility tasks, transfer training, seated TE, gait training, toilet transfers, and education provided as needed for safety and direction to improve functional mobility, safety awareness, and activity tolerance. Pt agreeable to PT treatment session upon arrival, pt found resting in bed. At end of session, pt left in bed, positioned for comfort with bed alarm activated with all needs in reach. In comparison to previous session, pt with improvements in amount of assistance required for gait and transfers .  Continue to recommend Level II (Moderate Resource Intensity at time of d/c in order to maximize pt's functional independence and safety w/ mobility. Pt continues to be functioning below baseline level. PT will continue to see pt while here in order to address the deficits listed above and provide interventions consistent w/ POC in effort to achieve STGs.     Shantell Bullard

## 2023-11-24 NOTE — ARC ADMISSION
Received referral on patient for possible ARC placement. Upon review of patient's case with [facility] physician, patient deemed not ARC appropriate at this time. Lower level of care/therapies recommended. CM made aware.     Thank you  Macy Guallpa

## 2023-11-24 NOTE — ASSESSMENT & PLAN NOTE
Unclear etiology- Possibly secondary to hypercalcemia however this has resolved and patient continues to have confusion  CT head negative for any acute findings  MRI brain negative for acute findings  Unable to get in contact with patient's son, however I spoke to her step son. He stated he lives in [place] however he did visit about 3 weeks ago. During that visit he states she was her normal self. He describes her as being sharp for a mental standpoint. 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 Confusion seems to be improving today however she is having hallucinations of her cats being in her room.    Consult neurology

## 2023-11-24 NOTE — PLAN OF CARE
Problem: PAIN - ADULT  Goal: Verbalizes/displays adequate comfort level or baseline comfort level  Description: Interventions:  - Encourage patient to monitor pain and request assistance  - Assess pain using appropriate pain scale  - Administer analgesics based on type and severity of pain and evaluate response  - Implement non-pharmacological measures as appropriate and evaluate response  - Consider cultural and social influences on pain and pain management  - Notify physician/advanced practitioner if interventions unsuccessful or patient reports new pain  Outcome: Progressing     Problem: INFECTION - ADULT  Goal: Absence or prevention of progression during hospitalization  Description: INTERVENTIONS:  - Assess and monitor for signs and symptoms of infection  - Monitor lab/diagnostic results  - Monitor all insertion sites, i.e. indwelling lines, tubes, and drains  - Monitor endotracheal if appropriate and nasal secretions for changes in amount and color  - [place] appropriate cooling/warming therapies per order  - Administer medications as ordered  - Instruct and encourage patient and family to use good hand hygiene technique  - Identify and instruct in appropriate isolation precautions for identified infection/condition  Outcome: Progressing  Goal: Absence of fever/infection during neutropenic period  Description: INTERVENTIONS:  - Monitor WBC    Outcome: Progressing     Problem: SAFETY ADULT  Goal: Patient will remain free of falls  Description: INTERVENTIONS:  - Educate patient/family on patient safety including physical limitations  - Instruct patient to call for assistance with activity   - Consult OT/PT to assist with strengthening/mobility   - Keep Call bell within reach  - Keep bed low and locked with side rails adjusted as appropriate  - Keep care items and personal belongings within reach  - Initiate and maintain comfort rounds  - Make Fall Risk Sign visible to staff  - Offer Toileting every 2 Hours, in advance of need  - Initiate/Maintain bed/chair alarm  - Obtain necessary fall risk management equipment: alarms, restraints, yellow socks. - Apply yellow socks and bracelet for high fall risk patients  - Consider moving patient to room near nurses station  Outcome: Progressing  Goal: Maintain or return to baseline ADL function  Description: INTERVENTIONS:  -  Assess patient's ability to carry out ADLs; assess patient's baseline for ADL function and identify physical deficits which impact ability to perform ADLs (bathing, care of mouth/teeth, toileting, grooming, dressing, etc.)  - Assess/evaluate cause of self-care deficits   - Assess range of motion  - Assess patient's mobility; develop plan if impaired  - Assess patient's need for assistive devices and provide as appropriate  - Encourage maximum independence but intervene and supervise when necessary  - Involve family in performance of ADLs  - Assess for home care needs following discharge   - Consider OT consult to assist with ADL evaluation and planning for discharge  - Provide patient education as appropriate  Outcome: Progressing  Goal: Maintains/Returns to pre admission functional level  Description: INTERVENTIONS:  - Perform AM-PAC 6 Click Basic Mobility/ Daily Activity assessment daily.  - Set and communicate daily mobility goal to care team and patient/family/caregiver. - Collaborate with rehabilitation services on mobility goals if consulted  - Perform Range of Motion 3 times a day. - Reposition patient every 2 hours.   - Dangle patient 3 times a day  - Stand patient 3 times a day  - Ambulate patient 3 times a day  - Out of bed to chair 3 times a day   - Out of bed for meals 3 times a day  - Out of bed for toileting  - Record patient progress and toleration of activity level   Outcome: Progressing     Problem: DISCHARGE PLANNING  Goal: Discharge to home or other facility with appropriate resources  Description: INTERVENTIONS:  - Identify barriers to discharge w/patient and caregiver  - Arrange for needed discharge resources and transportation as appropriate  - Identify discharge learning needs (meds, wound care, etc.)  - Refer to Case Management Department for coordinating discharge planning if the patient needs post-hospital services based on physician/advanced practitioner order or complex needs related to functional status, cognitive ability, or social support system  Outcome: Progressing     Problem: Knowledge Deficit  Goal: Patient/family/caregiver demonstrates understanding of disease process, treatment plan, medications, and discharge instructions  Description: Complete learning assessment and assess knowledge base.   Interventions:  - Provide teaching at level of understanding  - Provide teaching via preferred learning methods  Outcome: Progressing     Problem: Prexisting or High Potential for Compromised Skin Integrity  Goal: Skin integrity is maintained or improved  Description: INTERVENTIONS:  - Identify patients at risk for skin breakdown  - Assess and monitor skin integrity  - Assess and monitor nutrition and hydration status  - Monitor labs   - Assess for incontinence   - Turn and reposition patient  - Assist with mobility/ambulation  - Relieve pressure over bony prominences  - Avoid friction and shearing  - Provide appropriate hygiene as needed including keeping skin clean and dry  - Evaluate need for skin moisturizer/barrier cream  - Collaborate with interdisciplinary team   - Patient/family teaching  - Consider wound care consult   Outcome: Progressing

## 2023-11-24 NOTE — PLAN OF CARE
Problem: SAFETY,RESTRAINT: NV/NON-SELF DESTRUCTIVE BEHAVIOR  Goal: Returns to optimal restraint-free functioning  Description: INTERVENTIONS:  - Assess the patient's behavior and symptoms that indicate continued need for restraint  - Identify and implement measures to help patient regain control  - Assess readiness for release of restraint   Outcome: Progressing     Problem: INFECTION - ADULT  Goal: Absence of fever/infection during neutropenic period  Description: INTERVENTIONS:  - Monitor WBC    Outcome: Progressing     Problem: DISCHARGE PLANNING  Goal: Discharge to home or other facility with appropriate resources  Description: INTERVENTIONS:  - Identify barriers to discharge w/patient and caregiver  - Arrange for needed discharge resources and transportation as appropriate  - Identify discharge learning needs (meds, wound care, etc.)  - Refer to Case Management Department for coordinating discharge planning if the patient needs post-hospital services based on physician/advanced practitioner order or complex needs related to functional status, cognitive ability, or social support system  Outcome: Progressing     Problem: Knowledge Deficit  Goal: Patient/family/caregiver demonstrates understanding of disease process, treatment plan, medications, and discharge instructions  Description: Complete learning assessment and assess knowledge base.   Interventions:  - Provide teaching at level of understanding  - Provide teaching via preferred learning methods  Outcome: Progressing     Problem: Prexisting or High Potential for Compromised Skin Integrity  Goal: Skin integrity is maintained or improved  Description: INTERVENTIONS:  - Identify patients at risk for skin breakdown  - Assess and monitor skin integrity  - Assess and monitor nutrition and hydration status  - Monitor labs   - Assess for incontinence   - Turn and reposition patient  - Assist with mobility/ambulation  - Relieve pressure over bony prominences  - Avoid friction and shearing  - Provide appropriate hygiene as needed including keeping skin clean and dry  - Evaluate need for skin moisturizer/barrier cream  - Collaborate with interdisciplinary team   - Patient/family teaching  - Consider wound care consult   Outcome: Progressing

## 2023-11-24 NOTE — PROGRESS NOTES
[address]  Progress Note  Name: Elmo Taylor  MRN: [id number]  Unit/Bed#: -01 I Date of Admission: 11/19/2023   Date of Service: 11/24/2023 I Hospital Day: 5    Assessment/Plan   Metabolic encephalopathy  Assessment & Plan  Unclear etiology- Possibly secondary to hypercalcemia however this has resolved and patient continues to have confusion  CT head negative for any acute findings  MRI brain negative for acute findings  Unable to get in contact with patient's son, however I spoke to her step son. He stated he lives in [place] however he did visit about 3 weeks ago. During that visit he states she was her normal self. He describes her as being sharp for a mental standpoint. 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 Confusion seems to be improving today however she is having hallucinations of her cats being in her room. Consult neurology    * Hypercalcemia  Assessment & Plan  Patient presented with weakness, confusion, frequent falls, found to have hypercalcemia. Patient reports she has been taking Tums frequently for heartburn  Calcium level within normal limits- nephrology signed off. Repeat BMP as needed    Abnormal CT of the abdomen  Assessment & Plan  Fluid attenuation lesion adjacent to the right colon of uncertain etiology   Outpatient MRI abdomen     Fall  Assessment & Plan  Patient had a fall in her room on 11/22/2023. Notified by RN. CT head negative for any acute findings. X-rays of hip negative for any acute findings. Patient had laceration to right ring finger. Surgical team has been consulted. Wound care ordered per note recs. X-ray performed of right hand, Comminuted distal 4th phalangeal fracture.  Reviewed with hand surgery on-call (Dr. Jaycee Quick) no intervention required at this time   PT/OT eval    Hypomagnesemia  Assessment & Plan  Magnesium of 1.2 today  Will given 4 g IV magnesium   Repeat mag level in am    PAD (peripheral artery disease) ([address])  Assessment & Plan  Follows w/ vascular  Continue aspirin, statin    Severe obesity (BMI 35.0-39. 9) with comorbidity ([address])  Assessment & Plan  Patient starting on Ozempic, monitor weight loss  Healthy diet lifestyle modification recommended    Acquired hypothyroidism  Assessment & Plan  Continue levothyroxine    Essential hypertension  Assessment & Plan  Continue home medication with hold parameters    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 mellitus ([address])  Assessment & Plan  Creat baseline 1.4 w/ ARA9A  Nephrology consultation appreciated- now signed off    Type 2 diabetes mellitus with stage 3b chronic kidney disease, with long-term current use of insulin [facility])  Assessment & Plan  Lab Results   Component Value Date    HGBA1C 9.9 (H) 10/26/2023       Recent Labs     11/23/23  1205 11/23/23  1612 11/23/23  2135 11/24/23  0727   POCGLU 136 185* 234* 157*         Blood Sugar Average: Last 72 hrs:  (P) [id number]  Patient following with endocrinology  Continue insulin  Hold Ozempic and resume at discharge  Sliding scale insulin coverage while in the hospital             VTE Pharmacologic Prophylaxis: VTE Score: 4 Moderate Risk (Score 3-4) - Pharmacological DVT Prophylaxis Ordered: enoxaparin (Lovenox). Mobility:   Basic Mobility Inpatient Raw Score: 15  -HLM Goal: 4: Move to chair/commode  JH-HLM Achieved: 2: Bed activities/Dependent transfer  HLM Goal NOT achieved. Continue with multidisciplinary rounding and encourage appropriate mobility to improve upon [facility] goals. Patient Centered Rounds: I performed bedside rounds with nursing staff today. Discussions with Specialists or Other Care Team Provider: nursing, CM    Education and Discussions with Family / Patient:  Patient's son lives in [place], will attempt to call later today. Unable to reach him yesterday but did updated her step son Angélica Tucker. Angélica Tucker stated he would attempt to reach Ed ( the patient's son).      Total Time Spent on Date of Encounter in care of patient: 25 mins. This time was spent on one or more of the following: performing physical exam; counseling and coordination of care; obtaining or reviewing history; documenting in the medical record; reviewing/ordering tests, medications or procedures; communicating with other healthcare professionals and discussing with patient's family/caregivers. Current Length of Stay: 5 day(s)  Current Patient Status: Inpatient   Certification Statement: The patient will continue to require additional inpatient hospital stay due to continued treatment/workup and monitoring of confusion  Discharge Plan: Anticipate discharge in 48-72 hrs to rehab facility. Code Status: Level 3 - DNAR and DNI    Subjective: The patient was seen and examined. The patient is lying in bed in no acute distress. She reports pain from her right ring finger. She seems to have improvement of her confusion today, she knows she's in the hospital and why. She know's her SO is here as well. However, she did also tel me she saw her cat in the corner of her room. Objective:     Vitals:   Temp (24hrs), Av.6 °F (36.4 °C), Min:97 °F (36.1 °C), Max:98.2 °F (36.8 °C)    Temp:  [97 °F (36.1 °C)-98.2 °F (36.8 °C)] 97.7 °F (36.5 °C)  HR:  [91-94] 94  Resp:  [20] 20  BP: (112-163)/(69-84) 163/69  SpO2:  [97 %-98 %] 98 %  Body mass index is 33.43 kg/m². Input and Output Summary (last 24 hours): Intake/Output Summary (Last 24 hours) at 2023 1049  Last data filed at 2023 0817  Gross per 24 hour   Intake --   Output 500 ml   Net -500 ml       Physical Exam:   Physical Exam  Vitals and nursing note reviewed. Constitutional:       General: She is awake. Appearance: Normal appearance. She is morbidly obese. HENT:      Head: Normocephalic and atraumatic. Cardiovascular:      Rate and Rhythm: Normal rate and regular rhythm. Heart sounds: Normal heart sounds.    Pulmonary:      Effort: Pulmonary effort is normal.      Breath sounds: Normal breath sounds. Abdominal:      Palpations: Abdomen is soft. Tenderness: There is no abdominal tenderness. Skin:     Comments: Right ring finger with dressing in place. Neurological:      General: No focal deficit present. Mental Status: She is alert. She is confused. Psychiatric:         Attention and Perception: Attention normal. She perceives visual hallucinations. Mood and Affect: Mood normal.         Speech: Speech normal.         Behavior: Behavior is cooperative. Comments: Patient reports seeing her cat in her room in the corner. Additional Data:     Labs:  Results from last 7 days   Lab Units 11/24/23  0520 11/20/23  0447 11/19/23  1655   WBC Thousand/uL 9.30   < > 13.43*   HEMOGLOBIN g/dL 11.9   < > 13.8   HEMATOCRIT % 37.9   < > 43.6   PLATELETS Thousands/uL 148*   < > 130*   NEUTROS PCT %  --   --  79*   LYMPHS PCT %  --   --  12*   MONOS PCT %  --   --  9   EOS PCT %  --   --  0    < > = values in this interval not displayed.      Results from last 7 days   Lab Units 11/24/23  0520   SODIUM mmol/L 138   POTASSIUM mmol/L 3.5   CHLORIDE mmol/L 104   CO2 mmol/L 27   BUN mg/dL 17   CREATININE mg/dL 1.37*   ANION GAP mmol/L 7   CALCIUM mg/dL 7.9*   ALBUMIN g/dL 3.2*   TOTAL BILIRUBIN mg/dL 0.38   ALK PHOS U/L 45   ALT U/L 4*   AST U/L 12*   GLUCOSE RANDOM mg/dL 159*         Results from last 7 days   Lab Units 11/24/23  0727 11/23/23  2135 11/23/23  1612 11/23/23  1205 11/23/23  0807 11/22/23  2139 11/22/23  1626 11/22/23  1143 11/22/23  0705 11/21/23  2020 11/21/23  1610 11/21/23  1118   POC GLUCOSE mg/dl 157* 234* 185* 136 145* 192* 177* 245* 186* 215* 248* 222*         Results from last 7 days   Lab Units 11/20/23  0447 11/19/23  1848   PROCALCITONIN ng/ml 0.12 0.10       Lines/Drains:  Invasive Devices       Peripheral Intravenous Line  Duration             Peripheral IV 11/23/23 Left;Ventral (anterior) Forearm 1 day              Drain  Duration             External Urinary Catheter 4 days                          Imaging: No pertinent imaging reviewed.     Recent Cultures (last 7 days):         Last 24 Hours Medication List:   Current Facility-Administered Medications   Medication Dose Route Frequency Provider Last Rate    acetaminophen  650 mg Oral Q4H PRN Bin Perkins PA-C      amLODIPine  5 mg Oral Daily [facility], NANCY      aspirin  81 mg Oral Daily [facility]      atorvastatin  80 mg Oral Daily With [employer]      bacitracin  1 small application Topical Daily Donice NANCY Gunter      cefazolin  2,000 mg Intravenous Q8H Kraig Bustillo MD 2,000 mg (11/24/23 1434)    cholecalciferol  800 Units Oral Daily Bin Perkins PA-C      DULoxetine  20 mg Oral Daily Luda Cintron PA-C      enoxaparin  30 mg Subcutaneous Daily [facility]      fish oil  1,000 mg Oral BID Bin Perkins PA-C      fluticasone  2 spray Nasal Daily [facility], NANCY      folic acid  791 mcg Oral Daily Luda Cintron PA-C      insulin glargine  20 Units Subcutaneous HS Luda Cintron PA-C      insulin lispro  1-5 Units Subcutaneous HS Luda Cintron PA-C      insulin lispro  1-6 Units Subcutaneous TID AC Luda De Leon PA-C      levothyroxine  50 mcg Oral Daily Luda Cintron PA-C      magnesium sulfate  4 g Intravenous Once Leora Gunter PA-C 4 g (11/24/23 1833)    metoprolol succinate  50 mg Oral Daily Bin Perkins PA-C      ondansetron  4 mg Intravenous Q6H PRN Bin Perkins PA-C      pantoprazole  40 mg Oral Early Morning Bin Perkins PA-C      potassium chloride  20 mEq Oral TID With Pepe Maxwell MD          Today, Patient Was Seen By: Leora Gunter PA-C    **Please Note: This note may have been constructed using a voice recognition system. **

## 2023-11-24 NOTE — PLAN OF CARE
Problem: PHYSICAL THERAPY ADULT  Goal: Performs mobility at highest level of function for planned discharge setting. See evaluation for individualized goals. Description: Treatment/Interventions: Functional transfer training, LE strengthening/ROM, Elevations, Therapeutic exercise, Endurance training, Cognitive reorientation, Patient/family training, Equipment eval/education, Bed mobility, Gait training, Spoke to nursing  Equipment Recommended: Corie Lebron       See flowsheet documentation for full assessment, interventions and recommendations. Outcome: Progressing  Note: Prognosis: Fair  Problem List: Decreased strength, Decreased endurance, Impaired balance, Decreased mobility, Decreased coordination, Decreased cognition, Impaired judgement, Decreased safety awareness  Assessment: Pt seen for PT treatment session this date with interventions consisting of bed mobility tasks, transfer training, seated TE, gait training, toilet transfers, and education provided as needed for safety and direction to improve functional mobility, safety awareness, and activity tolerance. Pt agreeable to PT treatment session upon arrival, pt found resting in bed. At end of session, pt left in bed, positioned for comfort with bed alarm activated with all needs in reach. In comparison to previous session, pt with improvements in amount of assistance required for gait and transfers . Continue to recommend Level II (Moderate Resource Intensity at time of d/c in order to maximize pt's functional independence and safety w/ mobility. Pt continues to be functioning below baseline level. PT will continue to see pt while here in order to address the deficits listed above and provide interventions consistent w/ POC in effort to achieve STGs. Barriers to Discharge: Inaccessible home environment, Decreased caregiver support     Rehab Resource Intensity Level, PT: II (Moderate Resource Intensity)    See flowsheet documentation for full assessment.

## 2023-11-24 NOTE — NUTRITION
11/24/23 1344   Biochemical Data,Medical Tests, and Procedures   Biochemical Data/Medical Tests/Procedures Lab values reviewed; Meds reviewed   Labs (Comment) 11/24/23 glucose , creat 1.37, Ca 7.9, AST 12, ALT 4   Meds (Comment) atorvastatin, fish oil folic acid, insulin, levothyroxine, protonix, potassium chloride   Nutrition-Focused Physical Exam   Nutrition-Focused Physical Exam Findings RN skin assessment reviewed; No edema documented; Wound  (No edema. Wound at right elbow. Skin tear at right finger.)   Medical-Related Concerns type 2 diabetes, stage 3 CKD, obesity   Current PO Intake   Current Diet Order Cardiac, CCD2 diet, thin liquids   Current Meal Intake Adequate;0-25%;%   Estimated calorie intake compared to estimated need Nutrient needs are met at present. Recommendations/Interventions   Malnutrition/BMI Present No   Summary LOS. Presents s/p fall with confusion. Past medical history significant for type 2 diabetes, stage 3 CKD, obesity. Weight history reviewed. Noted significant 10# weight loss in 1 month (5.2% body weight). No edema. Wound at right elbow. Skin tear at right finger. Prescribed a Cardiac, CCD2 diet, thin liquids. Meal completion 0 - 25%. Poor appetite noted per flow sheet documentation. Per nursing staff pt with good intake at breakfast this morning. 50% at lunch today. PO intakes improving. Met with pt at bedside. Pt pleasantly confused at times. She reports having a good appetite. Usually has 3 meals daily. NKFA. Denies dysphagia. Report goal of weight loss, any weight loss was intentional. Discussed diet as prescribed. Offers no nutrition questions at this time. RD to follow PRN. Interventions/Recommendations Continue current diet order;Monitor I & O's   Education Assessment   Education Patient/caregiver not appropriate for education at this time   Patient Nutrition Goals   Goal Glucose WNL; Adequate intake

## 2023-11-24 NOTE — ASSESSMENT & PLAN NOTE
Lab Results   Component Value Date    HGBA1C 9.9 (H) 10/26/2023       Recent Labs     11/23/23  1205 11/23/23  1612 11/23/23  2135 11/24/23  0727   POCGLU 136 185* 234* 157*         Blood Sugar Average: Last 72 hrs:  (P) 076.8441982671141828  Patient following with endocrinology  Continue insulin  Hold Ozempic and resume at discharge  Sliding scale insulin coverage while in the hospital

## 2023-11-24 NOTE — ASSESSMENT & PLAN NOTE
Patient had a fall in her room on 11/22/2023. Notified by RN. CT head negative for any acute findings. X-rays of hip negative for any acute findings. Patient had laceration to right ring finger. Surgical team has been consulted. Wound care ordered per note recs. X-ray performed of right hand, Comminuted distal 4th phalangeal fracture.  Reviewed with hand surgery on-call (Dr. Robby Atwood) no intervention required at this time   PT/OT eval

## 2023-11-24 NOTE — ASSESSMENT & PLAN NOTE
Patient presented with weakness, confusion, frequent falls, found to have hypercalcemia. Patient reports she has been taking Tums frequently for heartburn  Calcium level within normal limits- nephrology signed off.    Repeat BMP as needed

## 2023-11-25 LAB
ANION GAP SERPL CALCULATED.3IONS-SCNC: 7 MMOL/L
BUN SERPL-MCNC: 17 MG/DL (ref 5–25)
CALCIUM SERPL-MCNC: 8 MG/DL (ref 8.4–10.2)
CHLORIDE SERPL-SCNC: 104 MMOL/L (ref 96–108)
CO2 SERPL-SCNC: 24 MMOL/L (ref 21–32)
CREAT SERPL-MCNC: 1.3 MG/DL (ref 0.6–1.3)
ERYTHROCYTE [DISTWIDTH] IN BLOOD BY AUTOMATED COUNT: 13.6 % (ref 11.6–15.1)
GFR SERPL CREATININE-BSD FRML MDRD: 39 ML/MIN/1.73SQ M
GLUCOSE SERPL-MCNC: 191 MG/DL (ref 65–140)
GLUCOSE SERPL-MCNC: 214 MG/DL (ref 65–140)
GLUCOSE SERPL-MCNC: 240 MG/DL (ref 65–140)
GLUCOSE SERPL-MCNC: 275 MG/DL (ref 65–140)
GLUCOSE SERPL-MCNC: 290 MG/DL (ref 65–140)
HCT VFR BLD AUTO: 36 % (ref 34.8–46.1)
HGB BLD-MCNC: 11.7 G/DL (ref 11.5–15.4)
MAGNESIUM SERPL-MCNC: 1.6 MG/DL (ref 1.9–2.7)
MCH RBC QN AUTO: 30.9 PG (ref 26.8–34.3)
MCHC RBC AUTO-ENTMCNC: 32.5 G/DL (ref 31.4–37.4)
MCV RBC AUTO: 95 FL (ref 82–98)
PLATELET # BLD AUTO: 153 THOUSANDS/UL (ref 149–390)
PMV BLD AUTO: 11 FL (ref 8.9–12.7)
POTASSIUM SERPL-SCNC: 3.9 MMOL/L (ref 3.5–5.3)
RBC # BLD AUTO: 3.79 MILLION/UL (ref 3.81–5.12)
SODIUM SERPL-SCNC: 135 MMOL/L (ref 135–147)
WBC # BLD AUTO: 10.17 THOUSAND/UL (ref 4.31–10.16)

## 2023-11-25 PROCEDURE — 80048 BASIC METABOLIC PNL TOTAL CA: CPT | Performed by: PHYSICIAN ASSISTANT

## 2023-11-25 PROCEDURE — 82607 VITAMIN B-12: CPT | Performed by: PSYCHIATRY & NEUROLOGY

## 2023-11-25 PROCEDURE — 83735 ASSAY OF MAGNESIUM: CPT | Performed by: PHYSICIAN ASSISTANT

## 2023-11-25 PROCEDURE — G0426 INPT/ED TELECONSULT50: HCPCS | Performed by: PSYCHIATRY & NEUROLOGY

## 2023-11-25 PROCEDURE — 85027 COMPLETE CBC AUTOMATED: CPT | Performed by: PHYSICIAN ASSISTANT

## 2023-11-25 PROCEDURE — 82948 REAGENT STRIP/BLOOD GLUCOSE: CPT

## 2023-11-25 PROCEDURE — 99232 SBSQ HOSP IP/OBS MODERATE 35: CPT | Performed by: PHYSICIAN ASSISTANT

## 2023-11-25 RX ORDER — MAGNESIUM SULFATE HEPTAHYDRATE 40 MG/ML
4 INJECTION, SOLUTION INTRAVENOUS ONCE
Status: COMPLETED | OUTPATIENT
Start: 2023-11-25 | End: 2023-11-25

## 2023-11-25 RX ORDER — INSULIN GLARGINE 100 [IU]/ML
25 INJECTION, SOLUTION SUBCUTANEOUS
Status: DISCONTINUED | OUTPATIENT
Start: 2023-11-25 | End: 2023-11-26

## 2023-11-25 RX ORDER — OLANZAPINE 10 MG/2ML
5 INJECTION, POWDER, FOR SOLUTION INTRAMUSCULAR ONCE
Status: COMPLETED | OUTPATIENT
Start: 2023-11-25 | End: 2023-11-25

## 2023-11-25 RX ADMIN — ASPIRIN 81 MG: 81 TABLET, COATED ORAL at 08:14

## 2023-11-25 RX ADMIN — LEVOTHYROXINE SODIUM 50 MCG: 50 TABLET ORAL at 05:34

## 2023-11-25 RX ADMIN — BACITRACIN ZINC 1 SMALL APPLICATION: 500 OINTMENT TOPICAL at 08:21

## 2023-11-25 RX ADMIN — POTASSIUM CHLORIDE 20 MEQ: 1.5 SOLUTION ORAL at 11:38

## 2023-11-25 RX ADMIN — Medication 400 MCG: at 08:15

## 2023-11-25 RX ADMIN — POTASSIUM CHLORIDE 20 MEQ: 1.5 SOLUTION ORAL at 17:01

## 2023-11-25 RX ADMIN — DULOXETINE HYDROCHLORIDE 20 MG: 20 CAPSULE, DELAYED RELEASE ORAL at 08:15

## 2023-11-25 RX ADMIN — CHOLECALCIFEROL TAB 10 MCG (400 UNIT) 800 UNITS: 10 TAB at 08:13

## 2023-11-25 RX ADMIN — CEFAZOLIN SODIUM 2000 MG: 2 SOLUTION INTRAVENOUS at 02:09

## 2023-11-25 RX ADMIN — POTASSIUM CHLORIDE 20 MEQ: 1.5 SOLUTION ORAL at 07:50

## 2023-11-25 RX ADMIN — INSULIN GLARGINE 25 UNITS: 100 INJECTION, SOLUTION SUBCUTANEOUS at 21:53

## 2023-11-25 RX ADMIN — OMEGA-3 FATTY ACIDS CAP 1000 MG 1000 MG: 1000 CAP at 08:16

## 2023-11-25 RX ADMIN — INSULIN LISPRO 2 UNITS: 100 INJECTION, SOLUTION INTRAVENOUS; SUBCUTANEOUS at 07:49

## 2023-11-25 RX ADMIN — INSULIN LISPRO 3 UNITS: 100 INJECTION, SOLUTION INTRAVENOUS; SUBCUTANEOUS at 21:51

## 2023-11-25 RX ADMIN — OMEGA-3 FATTY ACIDS CAP 1000 MG 1000 MG: 1000 CAP at 17:01

## 2023-11-25 RX ADMIN — CEFAZOLIN SODIUM 2000 MG: 2 SOLUTION INTRAVENOUS at 17:01

## 2023-11-25 RX ADMIN — INSULIN LISPRO 3 UNITS: 100 INJECTION, SOLUTION INTRAVENOUS; SUBCUTANEOUS at 17:01

## 2023-11-25 RX ADMIN — INSULIN LISPRO 4 UNITS: 100 INJECTION, SOLUTION INTRAVENOUS; SUBCUTANEOUS at 11:41

## 2023-11-25 RX ADMIN — MAGNESIUM SULFATE HEPTAHYDRATE 4 G: 4 INJECTION, SOLUTION INTRAVENOUS at 07:50

## 2023-11-25 RX ADMIN — CEFAZOLIN SODIUM 2000 MG: 2 SOLUTION INTRAVENOUS at 11:32

## 2023-11-25 RX ADMIN — AMLODIPINE BESYLATE 5 MG: 5 TABLET ORAL at 08:15

## 2023-11-25 RX ADMIN — METOPROLOL SUCCINATE 50 MG: 50 TABLET, EXTENDED RELEASE ORAL at 08:14

## 2023-11-25 RX ADMIN — ENOXAPARIN SODIUM 30 MG: 30 INJECTION SUBCUTANEOUS at 08:13

## 2023-11-25 RX ADMIN — PANTOPRAZOLE SODIUM 40 MG: 40 TABLET, DELAYED RELEASE ORAL at 05:35

## 2023-11-25 RX ADMIN — OLANZAPINE 5 MG: 10 INJECTION, POWDER, LYOPHILIZED, FOR SOLUTION INTRAMUSCULAR at 21:58

## 2023-11-25 RX ADMIN — FLUTICASONE PROPIONATE 2 SPRAY: 50 SPRAY, METERED NASAL at 08:13

## 2023-11-25 RX ADMIN — ATORVASTATIN CALCIUM 80 MG: 80 TABLET, FILM COATED ORAL at 17:01

## 2023-11-25 NOTE — PROGRESS NOTES
Surgeon TT to come see patient because bleeding continued through multiple bandage changes. Surgeon will be in shortly to see patient.

## 2023-11-25 NOTE — PROGRESS NOTES
PCA alerted nurse that patient had a fall. Fall witnessed by PCA. Patient stood up from chair without assistive device or ringing for help, patient then stumbled to the right and fell backwards hitting her head off wall. RN at bedside at this point and noticed patient to be bleeding from right hand ring finger. Alerted hospialist to check on patient. Hospitalist assessed patient. Vitals obtained and WNL. Patient was then assisted by Rn x 2 and PCA off the floor and into chair.

## 2023-11-25 NOTE — PROGRESS NOTES
Surgeon in to see patient, RN at bedside during dressing change and wound care, bleeding subsided, finger wrapped no further signs of bleeding.

## 2023-11-25 NOTE — CONSULTS
TeleConsultation - Neurology   Eugenie Lake Clear 66 y.o. female MRN: [id number]  Unit/Bed#: -01 Encounter: [id number]        REQUIRED DOCUMENTATION:     1. This service was provided via Telemedicine. 2. Provider located at [facility]. 3. TeleMed provider: Maggie Lim DO.  4. Identify all parties in room with patient during tele consult:  Pt   5. Patient was then informed that this was a Telemedicine visit and that the exam was being conducted confidentially over secure lines. My office door was closed. No one else was in the room. Patient acknowledged consent and understanding of privacy and security of the Telemedicine visit, and gave us permission to have the assistant stay in the room in order to assist with the history and to conduct the exam.  I informed the patient that I have reviewed their record in Epic and presented the opportunity for them to ask any questions regarding the visit today. The patient agreed to participate. Assessment/Plan       Ms. Polo Cotter is a pleasant 65 yo female with hyothyroidism HTN CKD 3 DM - not well controlled seen in consultation for altered mentation. Suspect mild encephalopathy/ delirium 2/2 hypercalcemia, hospitalization at this time based on exam and history, most consistent with at least a mild cognitive impairment vs early neurodegenerative process at baseline thus limited cognitive reserve. - Recommend recheck TSH/T4/B12 for evaluation of memory impairment  - Hypercalcemia is improved  - No infection noted on chart review  - LFTs wnl  - Routine EEG recommended  -MRI brain no contrast with no acute pathology  - Avoid any CNS affecting/altering medications  - At this time patient unsafe to discharge home to live by herself, suspect will improve with medical management to some degree however will need cognitive screen OP to evaluate for potential underlying cognitive impairment. - She should not be driving.          Eugenie López will need follow up in in 6 weeks with general attending or advance practitioner. She will require MRI brain neuroquant OP pending exam on out patient follow up. History of Present Illness     Reason for Consult / Principal Problem: confusion, hallucinosis  Hx and PE limited by: tele exam - patient limited hisotrian  HPI: Mariam Olivera is a 66 y.o.  female with obesity, hypothyroidism, CKD 3, DM- HgbA1C 9.9, who presents with falls in the setting of hypercalcemia, unclear if she has diabetic neuropathy also. Pt states she falls from to time. She states she may have hit her head but does not know if any LOC. She states she walks independently at home however if she goes out if needed takes a cane. She denies noting confusion here however initially tells me she is in a "clothing optional resort" then by the end of the visit states hospital- [facility]- [place]. She does repeat the same history a few times during the course of my exam  She states she fells and broke her finger thus in the hospital  She reports living by herself with her with her significant other. Per primary team she is the primary caregiver for her significant other who has dementia. Patient states she has blurry vision and was seeing "shadows" which looked like cats in her room earlier in the stay however  per patient "they told her these were shadows" and currently denies seeing anyone else in the room    She denies hx of stroke/seizure  She is able to name some of her OP medications but not all.   She tells me she drives but inaccurate in regards to when she last drove; states yesterday- when she was in the hospital    On chart review patient's OP notes from 11/2023 with her diabetic appointment notes "concerns about patient's memory"    Inpatient consult to Neurology  Consult performed by: Henrry Doyle DO  Consult ordered by: Adriana Casanova PA-C           Review of Systems 10 point ROS completed and negative other than that noted above    Historical Information   Past Medical History:   Diagnosis Date    Benign essential hypertension     Chronic kidney disease, stage 3 (HCC)     Disorder of gallbladder     Disorder of skin and subcutaneous tissue     Dyspnea     Essential hypertension     Hyperlipidemia     Hypokalemia     Malaise and fatigue     Mixed hyperlipidemia     Morbid obesity (HCC)     Pernicious anemia      Past Surgical History:   Procedure Laterality Date    BREAST BIOPSY Right     CARPAL TUNNEL RELEASE Bilateral     CHOLECYSTECTOMY      COLONOSCOPY      Dr. Velasquez Nick      x3    PARTIAL HYSTERECTOMY      SKIN LESION EXCISION      scalp      Social History   Social History     Substance and Sexual Activity   Alcohol Use Yes    Comment: Occasionally      Social History     Substance and Sexual Activity   Drug Use Never    Comment: Denies drug use - As per Migdalia Donning      E-Cigarette/Vaping    E-Cigarette Use Never User      E-Cigarette/Vaping Substances    Nicotine No     THC No     CBD No     Flavoring No     Other No     Unknown No      Social History     Tobacco Use   Smoking Status Former    Packs/day: 1.00    Years: 14.00    Total pack years: 14.00    Types: Cigarettes    Quit date:     Years since quittin.9   Smokeless Tobacco Never     Family History:   Family History   Problem Relation Age of Onset    Stroke Mother     Atrial fibrillation Mother     Brain cancer Father     Other Brother         Twin brothers - Open heart    Other Brother         Twin brothers - Open heart    No Known Problems Maternal Grandmother     No Known Problems Paternal Grandmother        Review of previous medical records was  completed. Meds/Allergies   all current active meds have been reviewed    No Known Allergies    Objective   Vitals:Blood pressure 152/68, pulse 88, temperature 98.1 °F (36.7 °C), resp. rate 18, height 5' 2" (1.575 m), weight 82.9 kg (182 lb 12.2 oz), SpO2 98 %. ,Body mass index is 33.43 kg/m². Intake/Output Summary (Last 24 hours) at 11/25/2023 1226  Last data filed at 11/25/2023 1100  Gross per 24 hour   Intake 1040 ml   Output 700 ml   Net 340 ml       Invasive Devices: Invasive Devices       Peripheral Intravenous Line  Duration             Peripheral IV 11/23/23 Left;Ventral (anterior) Forearm 2 days              Drain  Duration             External Urinary Catheter 5 days                    Physical Exam  Neurologic Exam    Modified PE as this is a video consultation:  Gen:   NAD. HEENT:  No Septal deviation EOMI NCAT. Resp:  Symmetric chest rise and patient in no obvious respiratory distress  MSK: ROM normal  Skin: No rash noted in visualized portion of this exam    Neuroexam:  AO X person but not place intiially states she is in "clothing optional resort" then when I state hospital by the end of the visit states yes- in "[facility]", immediate recall 3/3, delayed recall 1/3, no right or left confusion, can follow two step commands with ease, only limited insight into situation, knows month and year on my exam, can name 4/4 objects, can repeat. No hallucinosis noted at the time of my exam, can tell me what a quarter dime and olesya adds up to accurately. No aphasia no dysarthria noted    CN 2-12 grossly intact   Motor:  Intact antigravity X 4 extremities. No Pronator drift noted. Sensation: Intact to light touch, in all extremities proximal -- unable to assess distally in this tele exam  Cerebellar: No dysmetria b/l with FNF testing. Gait:  deferred    Lab Results: I have personally reviewed pertinent reports. Imaging Studies: I have personally reviewed pertinent films in PACS  EKG, Pathology, and Other Studies: I have personally reviewed pertinent reports. Code Status: Level 3 - DNAR and DNI  Advance Directive and Living Will:      Power of :    POLST:      Counseling / Coordination of Care  Total time spent today 60 minutes.  Greater than 50% of total time was spent with the patient and / or family counseling and / or coordination of care.  A description of the counseling / coordination of care: as noted above in A/P including attempting to contact son- however received voicemail will reattempt later today

## 2023-11-25 NOTE — PROGRESS NOTES
[id number]  Progress Note  Name: Donnie Parra  MRN: [id number]  Unit/Bed#: -01 I Date of Admission: 11/19/2023   Date of Service: 11/25/2023 I Hospital Day: 6    Assessment/Plan   Metabolic encephalopathy  Assessment & Plan  Unclear etiology- Possibly secondary to hypercalcemia however this has resolved and patient continues to have confusion and hallucinations   CT head negative for any acute findings  MRI brain negative for acute findings  The patient's son and step son describes her as being sharp for a mental standpoint. Confusion seems to be improving however she is having hallucinations at times. Neurology consultation appreciated- outpatient office visit on 11/10/23 mentioned concerns for patient's memory. Likely mild cognitive impairment with delirium and hallucinations likely from hyperkalemia. Patient should not be driving per neurology  Will order EEG per neurology. Also recommends check TSH, T4, B12    * Hypercalcemia  Assessment & Plan  Patient presented with weakness, confusion, frequent falls, found to have hypercalcemia. Patient reports she has been taking Tums frequently for heartburn  Calcium level within normal limits- nephrology signed off. Repeat BMP as needed    Abnormal CT of the abdomen  Assessment & Plan  Fluid attenuation lesion adjacent to the right colon of uncertain etiology   Outpatient MRI abdomen     Fall  Assessment & Plan  Patient had a fall in her room on 11/22/2023. Notified by RN. CT head negative for any acute findings. X-rays of hip negative for any acute findings. Patient had laceration to right ring finger. Surgical team has been consulted. Wound care ordered per note recs. X-ray performed of right hand, Comminuted distal 4th phalangeal fracture.  Reviewed with hand surgery on-call (Dr. Felisha Joseph) no intervention required at this time   PT/OT eval    Hypomagnesemia  Assessment & Plan  Magnesium of 1.6 today  Will given 4 g IV magnesium   Repeat mag level in am    PAD (peripheral artery disease) ([address])  Assessment & Plan  Follows w/ vascular  Continue aspirin, statin    Severe obesity (BMI 35.0-39. 9) with comorbidity ([address])  Assessment & Plan  Patient starting on Ozempic, monitor weight loss  Healthy diet lifestyle modification recommended    Acquired hypothyroidism  Assessment & Plan  Continue levothyroxine    Essential hypertension  Assessment & Plan  Continue home medication with hold parameters    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 mellitus ([address])  Assessment & Plan  Creat baseline 1.4 w/ [id number]  Nephrology consultation appreciated- now signed off    Type 2 diabetes mellitus with stage 3b chronic kidney disease, with long-term current use of insulin [facility])  Assessment & Plan  Lab Results   Component Value Date    HGBA1C 9.9 (H) 10/26/2023       Recent Labs     11/24/23  1612 11/24/23  2051 11/25/23  0648 11/25/23  1107   POCGLU 212* 193* 214* 290*         Blood Sugar Average: Last 72 hrs:  (P) 738.0558809645631528  Patient following with endocrinology  Increase Lantus 20 units to 25 units q hs  Hold Ozempic and resume at discharge  Sliding scale insulin coverage while in the hospital             VTE Pharmacologic Prophylaxis: VTE Score: 4 Moderate Risk (Score 3-4) - Pharmacological DVT Prophylaxis Ordered: enoxaparin (Lovenox). Mobility:   Basic Mobility Inpatient Raw Score: 14  JH-HLM Goal: 4: Move to chair/commode  JH-HLM Achieved: 6: Walk 10 steps or more  HLM Goal NOT achieved. Continue with multidisciplinary rounding and encourage appropriate mobility to improve upon [facility] goals. Patient Centered Rounds: I performed bedside rounds with nursing staff today. Discussions with Specialists or Other Care Team Provider: nursingCARL      Total Time Spent on Date of Encounter in care of patient: 25 mins.  This time was spent on one or more of the following: performing physical exam; counseling and coordination of care; obtaining or reviewing history; documenting in the medical record; reviewing/ordering tests, medications or procedures; communicating with other healthcare professionals and discussing with patient's family/caregivers. Current Length of Stay: 6 day(s)  Current Patient Status: Inpatient   Certification Statement: The patient will continue to require additional inpatient hospital stay due to need for placement to STR, possible LTC if no improvement in confusion   Discharge Plan: Anticipate discharge in 48-72 hrs to rehab facility. Code Status: Level 3 - DNAR and DNI    Subjective: The patient was seen and examined. The patient is sitting up in her chair in no acute distress. She is having waxing and weaning confusion. She's reporting see people in her room per nursing staff. Objective:     Vitals:   Temp (24hrs), Av.8 °F (36.6 °C), Min:97.6 °F (36.4 °C), Max:98.1 °F (36.7 °C)    Temp:  [97.6 °F (36.4 °C)-98.1 °F (36.7 °C)] 98.1 °F (36.7 °C)  HR:  [88-94] 88  Resp:  [18] 18  BP: (128-152)/(59-68) 152/68  SpO2:  [98 %-99 %] 98 %  Body mass index is 33.43 kg/m². Input and Output Summary (last 24 hours): Intake/Output Summary (Last 24 hours) at 2023 1311  Last data filed at 2023 1100  Gross per 24 hour   Intake 1040 ml   Output 700 ml   Net 340 ml       Physical Exam:   Physical Exam  Vitals and nursing note reviewed. Constitutional:       General: She is awake. Appearance: Normal appearance. HENT:      Head: Normocephalic and atraumatic. Cardiovascular:      Rate and Rhythm: Normal rate and regular rhythm. Heart sounds: Normal heart sounds. Pulmonary:      Effort: Pulmonary effort is normal.      Breath sounds: Normal breath sounds. Abdominal:      Palpations: Abdomen is soft. Tenderness: There is no abdominal tenderness. Skin:     General: Skin is warm and dry. Neurological:      General: No focal deficit present. Mental Status: She is alert. She is confused. Psychiatric:         Attention and Perception: Attention normal. She perceives visual hallucinations. Mood and Affect: Mood normal.         Speech: Speech normal.         Behavior: Behavior is cooperative. Cognition and Memory: Memory is impaired. Additional Data:     Labs:  Results from last 7 days   Lab Units 11/25/23  0458 11/20/23  0447 11/19/23  1655   WBC Thousand/uL 10.17*   < > 13.43*   HEMOGLOBIN g/dL 11.7   < > 13.8   HEMATOCRIT % 36.0   < > 43.6   PLATELETS Thousands/uL 153   < > 130*   NEUTROS PCT %  --   --  79*   LYMPHS PCT %  --   --  12*   MONOS PCT %  --   --  9   EOS PCT %  --   --  0    < > = values in this interval not displayed. Results from last 7 days   Lab Units 11/25/23  0458 11/24/23  0520   SODIUM mmol/L 135 138   POTASSIUM mmol/L 3.9 3.5   CHLORIDE mmol/L 104 104   CO2 mmol/L 24 27   BUN mg/dL 17 17   CREATININE mg/dL 1.30 1.37*   ANION GAP mmol/L 7 7   CALCIUM mg/dL 8.0* 7.9*   ALBUMIN g/dL  --  3.2*   TOTAL BILIRUBIN mg/dL  --  0.38   ALK PHOS U/L  --  45   ALT U/L  --  4*   AST U/L  --  12*   GLUCOSE RANDOM mg/dL 191* 159*         Results from last 7 days   Lab Units 11/25/23  1107 11/25/23  0648 11/24/23  2051 11/24/23  1612 11/24/23  1133 11/24/23  0727 11/23/23  2135 11/23/23  1612 11/23/23  1205 11/23/23  0807 11/22/23  2139 11/22/23  1626   POC GLUCOSE mg/dl 290* 214* 193* 212* 336* 157* 234* 185* 136 145* 192* 177*         Results from last 7 days   Lab Units 11/20/23  0447 11/19/23  1848   PROCALCITONIN ng/ml 0.12 0.10       Lines/Drains:  Invasive Devices       Peripheral Intravenous Line  Duration             Peripheral IV 11/23/23 Left;Ventral (anterior) Forearm 2 days              Drain  Duration             External Urinary Catheter 5 days                          Imaging: No pertinent imaging reviewed.     Recent Cultures (last 7 days):         Last 24 Hours Medication List:   Current Facility-Administered Medications   Medication Dose Route Frequency Provider Last Rate    acetaminophen  650 mg Oral Q4H PRN Keith Minaya PA-C      amLODIPine  5 mg Oral Daily [address] [facility], NANCY      aspirin  81 mg Oral Daily [address] [facility], Gemini Sharma      atorvastatin  80 mg Oral Daily With [employer]NC[employer]      bacitracin  1 small application Topical Daily Dayton Romero PA-C      cefazolin  2,000 mg Intravenous Q8H Dinh Buck MD 2,000 mg (11/25/23 1132)    cholecalciferol  800 Units Oral Daily Keith Minaya PA-C      DULoxetine  20 mg Oral Daily Luda Cintron PA-C      enoxaparin  30 mg Subcutaneous Daily Luda Cintron PA-C      fish oil  1,000 mg Oral BID Keith Minaya PA-C      fluticasone  2 spray Nasal Daily Keith Minaya PA-C      folic acid  154 mcg Oral Daily Luda Cintron PA-C      insulin glargine  25 Units Subcutaneous HS Antwon Monroy PA-C      insulin lispro  1-5 Units Subcutaneous HS Luda Cintron PA-C      insulin lispro  1-6 Units Subcutaneous TID AC Luda Mccabe PA-C      levothyroxine  50 mcg Oral Daily [address] [facility], NANCY      metoprolol succinate  50 mg Oral Daily Luda Cintron PA-C      ondansetron  4 mg Intravenous Q6H PRN Keith Minaya PA-C      pantoprazole  40 mg Oral Early Morning Keith Minaya PA-C      potassium chloride  20 mEq Oral TID With Meals Monica Orta MD          Today, Patient Was Seen By: Dayton Romero PA-C    **Please Note: This note may have been constructed using a voice recognition system. **

## 2023-11-25 NOTE — ASSESSMENT & PLAN NOTE
Unclear etiology- Possibly secondary to hypercalcemia however this has resolved and patient continues to have confusion and hallucinations   CT head negative for any acute findings  MRI brain negative for acute findings  The patient's son and step son describes her as being sharp for a mental standpoint. Confusion seems to be improving however she is having hallucinations at times. Neurology consultation appreciated- outpatient office visit on 11/10/23 mentioned concerns for patient's memory. Likely mild cognitive impairment with delirium and hallucinations likely from hyperkalemia. Patient should not be driving per neurology  Will order EEG per neurology.  Also recommends check TSH, T4, B12

## 2023-11-25 NOTE — PLAN OF CARE
Problem: PAIN - ADULT  Goal: Verbalizes/displays adequate comfort level or baseline comfort level  Description: Interventions:  - Encourage patient to monitor pain and request assistance  - Assess pain using appropriate pain scale  - Administer analgesics based on type and severity of pain and evaluate response  - Implement non-pharmacological measures as appropriate and evaluate response  - Consider cultural and social influences on pain and pain management  - Notify physician/advanced practitioner if interventions unsuccessful or patient reports new pain  11/24/2023 2329 by Marlen Dailey RN  Outcome: Progressing  11/24/2023 2329 by Marlen Dailey RN  Outcome: Progressing     Problem: INFECTION - ADULT  Goal: Absence or prevention of progression during hospitalization  Description: INTERVENTIONS:  - Assess and monitor for signs and symptoms of infection  - Monitor lab/diagnostic results  - Monitor all insertion sites, i.e. indwelling lines, tubes, and drains  - Monitor endotracheal if appropriate and nasal secretions for changes in amount and color  - [place] appropriate cooling/warming therapies per order  - Administer medications as ordered  - Instruct and encourage patient and family to use good hand hygiene technique  - Identify and instruct in appropriate isolation precautions for identified infection/condition  11/24/2023 2329 by Marlen Dailey RN  Outcome: Progressing  11/24/2023 2329 by Marlen Dailey RN  Outcome: Progressing  Goal: Absence of fever/infection during neutropenic period  Description: INTERVENTIONS:  - Monitor WBC    11/24/2023 2329 by Marlen Dailey RN  Outcome: Progressing  11/24/2023 2329 by Marlen Dailey RN  Outcome: Progressing     Problem: SAFETY ADULT  Goal: Patient will remain free of falls  Description: INTERVENTIONS:  - Educate patient/family on patient safety including physical limitations  - Instruct patient to call for assistance with activity   - Consult OT/PT to assist with strengthening/mobility   - Keep Call bell within reach  - Keep bed low and locked with side rails adjusted as appropriate  - Keep care items and personal belongings within reach  - Initiate and maintain comfort rounds  - Make Fall Risk Sign visible to staff  - Offer Toileting every 2 Hours, in advance of need  - Initiate/Maintain bed/chair alarm  - Obtain necessary fall risk management equipment: alarms, restraints, yellow socks. - Apply yellow socks and bracelet for high fall risk patients  - Consider moving patient to room near nurses station  11/24/2023 2329 by Jose Roberto De La Fuente RN  Outcome: Progressing  11/24/2023 2329 by Jose Roberto De La Fuente RN  Outcome: Progressing  Goal: Maintain or return to baseline ADL function  Description: INTERVENTIONS:  -  Assess patient's ability to carry out ADLs; assess patient's baseline for ADL function and identify physical deficits which impact ability to perform ADLs (bathing, care of mouth/teeth, toileting, grooming, dressing, etc.)  - Assess/evaluate cause of self-care deficits   - Assess range of motion  - Assess patient's mobility; develop plan if impaired  - Assess patient's need for assistive devices and provide as appropriate  - Encourage maximum independence but intervene and supervise when necessary  - Involve family in performance of ADLs  - Assess for home care needs following discharge   - Consider OT consult to assist with ADL evaluation and planning for discharge  - Provide patient education as appropriate  Outcome: Progressing  Goal: Maintains/Returns to pre admission functional level  Description: INTERVENTIONS:  - Perform AM-PAC 6 Click Basic Mobility/ Daily Activity assessment daily.  - Set and communicate daily mobility goal to care team and patient/family/caregiver. - Collaborate with rehabilitation services on mobility goals if consulted  - Perform Range of Motion 3 times a day. - Reposition patient every 2 hours.   - Dangle patient 3 times a day  - Stand patient 3 times a day  - Ambulate patient 3 times a day  - Out of bed to chair 3 times a day   - Out of bed for meals 3 times a day  - Out of bed for toileting  - Record patient progress and toleration of activity level   Outcome: Progressing     Problem: DISCHARGE PLANNING  Goal: Discharge to home or other facility with appropriate resources  Description: INTERVENTIONS:  - Identify barriers to discharge w/patient and caregiver  - Arrange for needed discharge resources and transportation as appropriate  - Identify discharge learning needs (meds, wound care, etc.)  - Refer to Case Management Department for coordinating discharge planning if the patient needs post-hospital services based on physician/advanced practitioner order or complex needs related to functional status, cognitive ability, or social support system  Outcome: Progressing     Problem: Knowledge Deficit  Goal: Patient/family/caregiver demonstrates understanding of disease process, treatment plan, medications, and discharge instructions  Description: Complete learning assessment and assess knowledge base.   Interventions:  - Provide teaching at level of understanding  - Provide teaching via preferred learning methods  Outcome: Progressing     Problem: Prexisting or High Potential for Compromised Skin Integrity  Goal: Skin integrity is maintained or improved  Description: INTERVENTIONS:  - Identify patients at risk for skin breakdown  - Assess and monitor skin integrity  - Assess and monitor nutrition and hydration status  - Monitor labs   - Assess for incontinence   - Turn and reposition patient  - Assist with mobility/ambulation  - Relieve pressure over bony prominences  - Avoid friction and shearing  - Provide appropriate hygiene as needed including keeping skin clean and dry  - Evaluate need for skin moisturizer/barrier cream  - Collaborate with interdisciplinary team   - Patient/family teaching  - Consider wound care consult   Outcome: Progressing

## 2023-11-25 NOTE — PLAN OF CARE
Problem: PAIN - ADULT  Goal: Verbalizes/displays adequate comfort level or baseline comfort level  Description: Interventions:  - Encourage patient to monitor pain and request assistance  - Assess pain using appropriate pain scale  - Administer analgesics based on type and severity of pain and evaluate response  - Implement non-pharmacological measures as appropriate and evaluate response  - Consider cultural and social influences on pain and pain management  - Notify physician/advanced practitioner if interventions unsuccessful or patient reports new pain  Outcome: Progressing     Problem: SAFETY ADULT  Goal: Patient will remain free of falls  Description: INTERVENTIONS:  - Educate patient/family on patient safety including physical limitations  - Instruct patient to call for assistance with activity   - Consult OT/PT to assist with strengthening/mobility   - Keep Call bell within reach  - Keep bed low and locked with side rails adjusted as appropriate  - Keep care items and personal belongings within reach  - Initiate and maintain comfort rounds  - Make Fall Risk Sign visible to staff  - Offer Toileting every 2 Hours, in advance of need  - Initiate/Maintain bed/chair alarm  - Obtain necessary fall risk management equipment: alarms, restraints, yellow socks. - Apply yellow socks and bracelet for high fall risk patients  - Consider moving patient to room near nurses station  Outcome: Progressing     Problem: SAFETY ADULT  Goal: Maintains/Returns to pre admission functional level  Description: INTERVENTIONS:  - Perform AM-PAC 6 Click Basic Mobility/ Daily Activity assessment daily.  - Set and communicate daily mobility goal to care team and patient/family/caregiver. - Collaborate with rehabilitation services on mobility goals if consulted  - Perform Range of Motion 3 times a day. - Reposition patient every 2 hours.   - Dangle patient 3 times a day  - Stand patient 3 times a day  - Ambulate patient 3 times a day  - Out of bed to chair 3 times a day   - Out of bed for meals 3 times a day  - Out of bed for toileting  - Record patient progress and toleration of activity level   Outcome: Not Progressing     Problem: Knowledge Deficit  Goal: Patient/family/caregiver demonstrates understanding of disease process, treatment plan, medications, and discharge instructions  Description: Complete learning assessment and assess knowledge base.   Interventions:  - Provide teaching at level of understanding  - Provide teaching via preferred learning methods  Outcome: Progressing     Problem: Prexisting or High Potential for Compromised Skin Integrity  Goal: Skin integrity is maintained or improved  Description: INTERVENTIONS:  - Identify patients at risk for skin breakdown  - Assess and monitor skin integrity  - Assess and monitor nutrition and hydration status  - Monitor labs   - Assess for incontinence   - Turn and reposition patient  - Assist with mobility/ambulation  - Relieve pressure over bony prominences  - Avoid friction and shearing  - Provide appropriate hygiene as needed including keeping skin clean and dry  - Evaluate need for skin moisturizer/barrier cream  - Collaborate with interdisciplinary team   - Patient/family teaching  - Consider wound care consult   Outcome: Progressing

## 2023-11-25 NOTE — ASSESSMENT & PLAN NOTE
Patient had a fall in her room on 11/22/2023. Notified by RN. CT head negative for any acute findings. X-rays of hip negative for any acute findings. Patient had laceration to right ring finger. Surgical team has been consulted. Wound care ordered per note recs. X-ray performed of right hand, Comminuted distal 4th phalangeal fracture.  Reviewed with hand surgery on-call (Dr. Jennifer Joseph) no intervention required at this time   PT/OT sosaal

## 2023-11-25 NOTE — ASSESSMENT & PLAN NOTE
Baclofen can not be refilled due to no protocol. Will route to provider for further review.    Salina Ventura RN         Lab Results   Component Value Date    HGBA1C 9.9 (H) 10/26/2023       Recent Labs     11/24/23  1612 11/24/23  2051 11/25/23  0648 11/25/23  1107   POCGLU 212* 193* 214* 290*         Blood Sugar Average: Last 72 hrs:  (P) [id number]  Patient following with endocrinology  Increase Lantus 20 units to 25 units q hs  Hold Ozempic and resume at discharge  Sliding scale insulin coverage while in the hospital

## 2023-11-26 LAB
ANION GAP SERPL CALCULATED.3IONS-SCNC: 9 MMOL/L
BUN SERPL-MCNC: 18 MG/DL (ref 5–25)
CALCIUM SERPL-MCNC: 7.4 MG/DL (ref 8.4–10.2)
CHLORIDE SERPL-SCNC: 107 MMOL/L (ref 96–108)
CO2 SERPL-SCNC: 23 MMOL/L (ref 21–32)
CREAT SERPL-MCNC: 1.21 MG/DL (ref 0.6–1.3)
ERYTHROCYTE [DISTWIDTH] IN BLOOD BY AUTOMATED COUNT: 13.6 % (ref 11.6–15.1)
GFR SERPL CREATININE-BSD FRML MDRD: 42 ML/MIN/1.73SQ M
GLUCOSE SERPL-MCNC: 177 MG/DL (ref 65–140)
GLUCOSE SERPL-MCNC: 206 MG/DL (ref 65–140)
GLUCOSE SERPL-MCNC: 208 MG/DL (ref 65–140)
GLUCOSE SERPL-MCNC: 212 MG/DL (ref 65–140)
GLUCOSE SERPL-MCNC: 273 MG/DL (ref 65–140)
HCT VFR BLD AUTO: 37.5 % (ref 34.8–46.1)
HGB BLD-MCNC: 11.4 G/DL (ref 11.5–15.4)
MAGNESIUM SERPL-MCNC: 1.9 MG/DL (ref 1.9–2.7)
MCH RBC QN AUTO: 30.3 PG (ref 26.8–34.3)
MCHC RBC AUTO-ENTMCNC: 30.4 G/DL (ref 31.4–37.4)
MCV RBC AUTO: 100 FL (ref 82–98)
PLATELET # BLD AUTO: 155 THOUSANDS/UL (ref 149–390)
PMV BLD AUTO: 12.1 FL (ref 8.9–12.7)
POTASSIUM SERPL-SCNC: 4.5 MMOL/L (ref 3.5–5.3)
PTH RELATED PROT SERPL-SCNC: <2 PMOL/L
RBC # BLD AUTO: 3.76 MILLION/UL (ref 3.81–5.12)
SODIUM SERPL-SCNC: 139 MMOL/L (ref 135–147)
TSH SERPL DL<=0.05 MIU/L-ACNC: 2.61 UIU/ML (ref 0.45–4.5)
VIT B12 SERPL-MCNC: 1614 PG/ML (ref 180–914)
WBC # BLD AUTO: 9.02 THOUSAND/UL (ref 4.31–10.16)

## 2023-11-26 PROCEDURE — 80048 BASIC METABOLIC PNL TOTAL CA: CPT | Performed by: PHYSICIAN ASSISTANT

## 2023-11-26 PROCEDURE — 99222 1ST HOSP IP/OBS MODERATE 55: CPT

## 2023-11-26 PROCEDURE — 85027 COMPLETE CBC AUTOMATED: CPT | Performed by: PHYSICIAN ASSISTANT

## 2023-11-26 PROCEDURE — 83735 ASSAY OF MAGNESIUM: CPT | Performed by: PHYSICIAN ASSISTANT

## 2023-11-26 PROCEDURE — 82948 REAGENT STRIP/BLOOD GLUCOSE: CPT

## 2023-11-26 PROCEDURE — 99232 SBSQ HOSP IP/OBS MODERATE 35: CPT | Performed by: PHYSICIAN ASSISTANT

## 2023-11-26 PROCEDURE — 84443 ASSAY THYROID STIM HORMONE: CPT | Performed by: PHYSICIAN ASSISTANT

## 2023-11-26 RX ORDER — GUAIFENESIN/DEXTROMETHORPHAN 100-10MG/5
10 SYRUP ORAL EVERY 4 HOURS PRN
Status: DISCONTINUED | OUTPATIENT
Start: 2023-11-26 | End: 2023-11-28 | Stop reason: HOSPADM

## 2023-11-26 RX ORDER — INSULIN GLARGINE 100 [IU]/ML
30 INJECTION, SOLUTION SUBCUTANEOUS
Status: DISCONTINUED | OUTPATIENT
Start: 2023-11-26 | End: 2023-11-28

## 2023-11-26 RX ADMIN — POTASSIUM CHLORIDE 20 MEQ: 1.5 SOLUTION ORAL at 08:03

## 2023-11-26 RX ADMIN — POTASSIUM CHLORIDE 20 MEQ: 1.5 SOLUTION ORAL at 17:29

## 2023-11-26 RX ADMIN — LEVOTHYROXINE SODIUM 50 MCG: 50 TABLET ORAL at 05:31

## 2023-11-26 RX ADMIN — CEFAZOLIN SODIUM 2000 MG: 2 SOLUTION INTRAVENOUS at 10:57

## 2023-11-26 RX ADMIN — CEFAZOLIN SODIUM 2000 MG: 2 SOLUTION INTRAVENOUS at 02:29

## 2023-11-26 RX ADMIN — DULOXETINE HYDROCHLORIDE 20 MG: 20 CAPSULE, DELAYED RELEASE ORAL at 10:56

## 2023-11-26 RX ADMIN — OMEGA-3 FATTY ACIDS CAP 1000 MG 1000 MG: 1000 CAP at 17:31

## 2023-11-26 RX ADMIN — CHOLECALCIFEROL TAB 10 MCG (400 UNIT) 800 UNITS: 10 TAB at 10:56

## 2023-11-26 RX ADMIN — GUAIFENESIN AND DEXTROMETHORPHAN 10 ML: 100; 10 SYRUP ORAL at 11:10

## 2023-11-26 RX ADMIN — PANTOPRAZOLE SODIUM 40 MG: 40 TABLET, DELAYED RELEASE ORAL at 05:31

## 2023-11-26 RX ADMIN — FLUTICASONE PROPIONATE 2 SPRAY: 50 SPRAY, METERED NASAL at 10:57

## 2023-11-26 RX ADMIN — OMEGA-3 FATTY ACIDS CAP 1000 MG 1000 MG: 1000 CAP at 10:56

## 2023-11-26 RX ADMIN — AMLODIPINE BESYLATE 5 MG: 5 TABLET ORAL at 10:56

## 2023-11-26 RX ADMIN — Medication 400 MCG: at 10:56

## 2023-11-26 RX ADMIN — INSULIN LISPRO 4 UNITS: 100 INJECTION, SOLUTION INTRAVENOUS; SUBCUTANEOUS at 17:28

## 2023-11-26 RX ADMIN — INSULIN GLARGINE 30 UNITS: 100 INJECTION, SOLUTION SUBCUTANEOUS at 22:51

## 2023-11-26 RX ADMIN — BACITRACIN ZINC 1 SMALL APPLICATION: 500 OINTMENT TOPICAL at 10:57

## 2023-11-26 RX ADMIN — INSULIN LISPRO 2 UNITS: 100 INJECTION, SOLUTION INTRAVENOUS; SUBCUTANEOUS at 07:49

## 2023-11-26 RX ADMIN — ATORVASTATIN CALCIUM 80 MG: 80 TABLET, FILM COATED ORAL at 17:31

## 2023-11-26 RX ADMIN — POTASSIUM CHLORIDE 20 MEQ: 1.5 SOLUTION ORAL at 15:06

## 2023-11-26 RX ADMIN — METOPROLOL SUCCINATE 50 MG: 50 TABLET, EXTENDED RELEASE ORAL at 10:56

## 2023-11-26 RX ADMIN — ASPIRIN 81 MG: 81 TABLET, COATED ORAL at 10:56

## 2023-11-26 RX ADMIN — ENOXAPARIN SODIUM 30 MG: 30 INJECTION SUBCUTANEOUS at 10:57

## 2023-11-26 RX ADMIN — INSULIN LISPRO 2 UNITS: 100 INJECTION, SOLUTION INTRAVENOUS; SUBCUTANEOUS at 22:51

## 2023-11-26 RX ADMIN — GUAIFENESIN AND DEXTROMETHORPHAN 10 ML: 100; 10 SYRUP ORAL at 23:15

## 2023-11-26 RX ADMIN — INSULIN LISPRO 2 UNITS: 100 INJECTION, SOLUTION INTRAVENOUS; SUBCUTANEOUS at 11:11

## 2023-11-26 RX ADMIN — CEFAZOLIN SODIUM 2000 MG: 2 SOLUTION INTRAVENOUS at 17:28

## 2023-11-26 NOTE — ASSESSMENT & PLAN NOTE
Patient had a fall in her room on 11/22/2023. Notified by RN. CT head negative for any acute findings. X-rays of hip negative for any acute findings. Patient had laceration to right ring finger. Surgical team has been consulted. Wound care ordered per note recs. X-ray performed of right hand, Comminuted distal 4th phalangeal fracture.  Reviewed with hand surgery on-call (Dr. Vera Arellano) no intervention required at this time   PT/OT eval and recommended STR

## 2023-11-26 NOTE — CONSULTS
Orthopedics   Clifford Watts 66 y.o. female MRN: [id number]  Unit/Bed#: -01    Assessment:  66 y.o. right hand dominant female with right ring finger open distal phalanx fracture with avulsion laceration from fall 11/22/23, s/p washout by general surgery 11/22/23. Plan:   NWB right ring finger  Antibiotics per primary- currently on ancef x7d  Continue bacitracin, adaptic, and gauze dressing per nursing  Body mass index is 33.43 kg/m². Discussed progress with hand surgeon on call, Dr. Yakelin Alatorre, who agreed with assessment and plan  No evidence of infection at laceration at this point  No indication for acute orthopedic surgical intervention at this time  Dispo: ortho will reevaluate  To have interval evaluation by orthopedic surgery tomorrow, 11/27 to determine need for further intervention  Follow with hand surgery outpatient     Chief Complaint:   Right ring finger pain    HPI:  66 y.o. right hand dominant female with right ring finger open fracture s/p fall in hospital room 11/22/23, where she caught herself on the metal aspect of bedside tray. S/p washout for wound management and hemostasis performed by general surgery 11/22. On date of injury, primary team discussed with hand surgeon on call, recommending no acute intervention by hand surgery and to continue local wound care, with no need for splint at this time. Today, consulted by medicine team, interested in further evaluation and orthopedic input on of the wound and current care. She is complaining of right ring finger pain. Pain is sharp in character, Located to the distal aspect of the finger, acute in onset, constant in duration, severe in intensity. Exacerbating factors include movement or palpation, remitting factors include keeping it wrapped and protected. Denies radiating,  numbness, tingling, scabbing noted without discharge/purulence/bleeding. No other complaints at this time.  PMH significant for metabolic encephalopathy and confusion leading to admission and falls.  Activity level: performs needlepoint       Review Of Systems:   Skin: Normal  Neuro: See HPI  Musculoskeletal: See HPI  14 point review of systems negative except as stated above     Past Medical History:   Past Medical History:   Diagnosis Date    Benign essential hypertension     Chronic kidney disease, stage 3 (HCC)     Disorder of gallbladder     Disorder of skin and subcutaneous tissue     Dyspnea     Essential hypertension     Hyperlipidemia     Hypokalemia     Malaise and fatigue     Mixed hyperlipidemia     Morbid obesity (HCC)     Pernicious anemia        Past Surgical History:   Past Surgical History:   Procedure Laterality Date    BREAST BIOPSY Right     CARPAL TUNNEL RELEASE Bilateral     CHOLECYSTECTOMY      COLONOSCOPY      Dr. Franklin Hatfield      x3    PARTIAL HYSTERECTOMY      SKIN LESION EXCISION      scalp        Family History:  Family history reviewed and non-contributory  Family History   Problem Relation Age of Onset    Stroke Mother     Atrial fibrillation Mother     Brain cancer Father     Other Brother         Twin brothers - Open heart    Other Brother         Twin brothers - Open heart    No Known Problems Maternal Grandmother     No Known Problems Paternal Grandmother        Social History:  Social History     Socioeconomic History    Marital status: /Civil Union     Spouse name: Rashad Monique     Number of children: None    Years of education: None    Highest education level: None   Occupational History    Occupation: Retired    Tobacco Use    Smoking status: Former     Packs/day: 1.00     Years: 14.00     Total pack years: 14.00     Types: Cigarettes     Quit date:      Years since quittin.9    Smokeless tobacco: Never   Vaping Use    Vaping Use: Never used   Substance and Sexual Activity    Alcohol use: Yes     Comment: Occasionally     Drug use: Never     Comment: Denies drug use - As per [facility] Sexual activity: Not Currently   Other Topics Concern    None   Social History Narrative     - Orlene Anchors is [place] and speaks Burkinan, common law marriage    Does not consume caffeine      Social Determinants of Health     Financial Resource Strain: Low Risk  (3/7/2023)    Overall Financial Resource Strain (CARDIA)     Difficulty of Paying Living Expenses: Not hard at all   Food Insecurity: Not on file   Transportation Needs: No Transportation Needs (3/7/2023)    PRAPARE - Transportation     Lack of Transportation (Medical): No     Lack of Transportation (Non-Medical):  No   Physical Activity: Not on file   Stress: Not on file   Social Connections: Not on file   Intimate Partner Violence: Not on file   Housing Stability: Not on file       Allergies:   No Known Allergies         Labs:  0   Lab Value Date/Time    HCT 37.5 11/26/2023 0419    HCT 36.0 11/25/2023 0458    HCT 37.9 11/24/2023 0520    HGB 11.4 (L) 11/26/2023 0419    HGB 11.7 11/25/2023 0458    HGB 11.9 11/24/2023 0520    WBC 9.02 11/26/2023 0419    WBC 10.17 (H) 11/25/2023 0458    WBC 9.30 11/24/2023 0520       Meds:    Current Facility-Administered Medications:     acetaminophen (TYLENOL) tablet 650 mg, 650 mg, Oral, Q4H PRN, Fernando Cintron PA-C, 650 mg at 11/24/23 1850    amLODIPine (NORVASC) tablet 5 mg, 5 mg, Oral, Daily, Shante Bread, PA-C, 5 mg at 11/26/23 1056    aspirin (ECOTRIN LOW STRENGTH) EC tablet 81 mg, 81 mg, Oral, Daily, Shante Bread, PA-C, 81 mg at 11/26/23 1056    atorvastatin (LIPITOR) tablet 80 mg, 80 mg, Oral, Daily With Dinner, Shante Bread, PA-C, 80 mg at 11/25/23 1701    bacitracin topical ointment 1 small application, 1 small application, Topical, Daily, Jac Woodall PA-C, 1 small application at 21/60/82 1057    ceFAZolin (ANCEF) IVPB (premix in dextrose) 2,000 mg 50 mL, 2,000 mg, Intravenous, Q8H, Jono Garza MD, Last Rate: 100 mL/hr at 11/26/23 1057, 2,000 mg at 11/26/23 1057    cholecalciferol (VITAMIN D3) tablet 800 Units, 800 Units, Oral, Daily, ANGELA Gonzales-C, 800 Units at 11/26/23 1056    dextromethorphan-guaiFENesin (ROBITUSSIN DM) oral syrup 10 mL, 10 mL, Oral, Q4H PRN, ANGELA Jenkins-C, 10 mL at 11/26/23 1110    DULoxetine (CYMBALTA) delayed release capsule 20 mg, 20 mg, Oral, Daily, ANGELA Gonzales-C, 20 mg at 11/26/23 1056    enoxaparin (LOVENOX) subcutaneous injection 30 mg, 30 mg, Subcutaneous, Daily, ANGELA Gonzales-C, 30 mg at 11/26/23 1057    fish oil capsule 1,000 mg, 1,000 mg, Oral, BID, Holley Kelly PA-C, 1,000 mg at 11/26/23 1056    fluticasone (FLONASE) 50 mcg/act nasal spray 2 spray, 2 spray, Nasal, Daily, Holley Kelly PA-C, 2 spray at 02/47/39 3777    folic acid (FOLVITE) tablet 400 mcg, 400 mcg, Oral, Daily, Holley Kelly PA-C, 400 mcg at 11/26/23 1056    insulin glargine (LANTUS) subcutaneous injection 30 Units 0.3 mL, 30 Units, Subcutaneous, HS, Antwon Monroy PA-C    insulin lispro (HumaLOG) 100 units/mL subcutaneous injection 1-5 Units, 1-5 Units, Subcutaneous, HS, Holley Kelly PA-C, 3 Units at 11/25/23 2151    insulin lispro (HumaLOG) 100 units/mL subcutaneous injection 1-6 Units, 1-6 Units, Subcutaneous, TID AC, 2 Units at 11/26/23 1111 **AND** Fingerstick Glucose (POCT), , , TID AC, Luda Cintron PA-C    levothyroxine tablet 50 mcg, 50 mcg, Oral, Daily, Holley Kelly PA-C, 50 mcg at 11/26/23 0531    metoprolol succinate (TOPROL-XL) 24 hr tablet 50 mg, 50 mg, Oral, Daily, ANGELA Gonzales-C, 50 mg at 11/26/23 1056    ondansetron (ZOFRAN) injection 4 mg, 4 mg, Intravenous, Q6H PRN, Holley Kelly PA-C    pantoprazole (PROTONIX) EC tablet 40 mg, 40 mg, Oral, Early Morning, Luda Cintron PA-C, 40 mg at 11/26/23 0531    potassium chloride oral solution 20 mEq, 20 mEq, Oral, TID With Meals, , MD, 20 mEq at 23 0803    Blood Culture:   No results found for: "Merrily Daft"    Wound Culture:   No results found for: "WOUNDCULT"    Ins and Outs:  I/O last 24 hours: In: 600 [P.O.:600]  Out: -     Physical Exam:   /75   Pulse 89   Temp 98 °F (36.7 °C)   Resp 18   Ht 5' 2" (1.575 m)   Wt 82.9 kg (182 lb 12.2 oz)   SpO2 96%   BMI 33.43 kg/m²   Gen: No acute distress, resting comfortably in bed  HEENT: Eyes clear, moist mucus membranes, hearing intact  Respiratory: No audible wheezing or stridor  Cardiovascular: Well Perfused peripherally, 2+ distal pulse  Abdomen: nondistended, no peritoneal signs  Musculoskeletal: right upper extremity  Laceration at volar aspect of distal phalanx   No rupert purulence  Dusky and erythematous appearance of volar aspect, consistent with changes to perfusion and injury pattern  Darkening/ecchymotic nailbed  Scabbing along length of laceration   TTP on the volar and dorsal aspect of distal phalanx, without extension proximally  Full active & passive ROM at PIP. Will not flex/extend DIP due to swelling and pain   SILT m/r/u. Surrounding fingers warm and well perfused  Musculature is soft and compressible, no pain with passive stretch    Radiology:   I personally reviewed the films. Hand x rays  showed Comminuted fracture of the distal 4th phalanx with involvement of the tuft. There is approximate 0.2 cm distal displacement of the dominant fracture fragment. There is surrounding soft tissue swelling. There is no radiopaque foreign body. There are no   suspicious osseous lesions. Imagin23     Media Information      Document Information    Clinical Image - Mobile Device      2023 10:00   Attached To:    Hospital Encounter on 23   Source [address], RN  [address] [place], [place]

## 2023-11-26 NOTE — PLAN OF CARE
Problem: PAIN - ADULT  Goal: Verbalizes/displays adequate comfort level or baseline comfort level  Description: Interventions:  - Encourage patient to monitor pain and request assistance  - Assess pain using appropriate pain scale  - Administer analgesics based on type and severity of pain and evaluate response  - Implement non-pharmacological measures as appropriate and evaluate response  - Consider cultural and social influences on pain and pain management  - Notify physician/advanced practitioner if interventions unsuccessful or patient reports new pain  Outcome: Progressing     Problem: INFECTION - ADULT  Goal: Absence of fever/infection during neutropenic period  Description: INTERVENTIONS:  - Monitor WBC    Outcome: Progressing     Problem: SAFETY ADULT  Goal: Maintains/Returns to pre admission functional level  Description: INTERVENTIONS:  - Perform AM-PAC 6 Click Basic Mobility/ Daily Activity assessment daily.  - Set and communicate daily mobility goal to care team and patient/family/caregiver. - Collaborate with rehabilitation services on mobility goals if consulted  - Perform Range of Motion 3 times a day. - Reposition patient every 2 hours.   - Dangle patient 3 times a day  - Stand patient 3 times a day  - Ambulate patient 3 times a day  - Out of bed to chair 3 times a day   - Out of bed for meals 3 times a day  - Out of bed for toileting  - Record patient progress and toleration of activity level   Outcome: Progressing     Problem: DISCHARGE PLANNING  Goal: Discharge to home or other facility with appropriate resources  Description: INTERVENTIONS:  - Identify barriers to discharge w/patient and caregiver  - Arrange for needed discharge resources and transportation as appropriate  - Identify discharge learning needs (meds, wound care, etc.)  - Refer to Case Management Department for coordinating discharge planning if the patient needs post-hospital services based on physician/advanced practitioner order or complex needs related to functional status, cognitive ability, or social support system  Outcome: Progressing     Problem: Knowledge Deficit  Goal: Patient/family/caregiver demonstrates understanding of disease process, treatment plan, medications, and discharge instructions  Description: Complete learning assessment and assess knowledge base.   Interventions:  - Provide teaching at level of understanding  - Provide teaching via preferred learning methods  Outcome: Progressing     Problem: Prexisting or High Potential for Compromised Skin Integrity  Goal: Skin integrity is maintained or improved  Description: INTERVENTIONS:  - Identify patients at risk for skin breakdown  - Assess and monitor skin integrity  - Assess and monitor nutrition and hydration status  - Monitor labs   - Assess for incontinence   - Turn and reposition patient  - Assist with mobility/ambulation  - Relieve pressure over bony prominences  - Avoid friction and shearing  - Provide appropriate hygiene as needed including keeping skin clean and dry  - Evaluate need for skin moisturizer/barrier cream  - Collaborate with interdisciplinary team   - Patient/family teaching  - Consider wound care consult   Outcome: Progressing
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
## 2023-11-26 NOTE — PLAN OF CARE
Problem: SAFETY ADULT  Goal: Patient will remain free of falls  Description: INTERVENTIONS:  - Educate patient/family on patient safety including physical limitations  - Instruct patient to call for assistance with activity   - Consult OT/PT to assist with strengthening/mobility   - Keep Call bell within reach  - Keep bed low and locked with side rails adjusted as appropriate  - Keep care items and personal belongings within reach  - Initiate and maintain comfort rounds  - Make Fall Risk Sign visible to staff  - Offer Toileting every 2 Hours, in advance of need  - Initiate/Maintain bed/chair alarm  - Obtain necessary fall risk management equipment: alarms, restraints, yellow socks. - Apply yellow socks and bracelet for high fall risk patients  - Consider moving patient to room near nurses station  Outcome: Progressing  Goal: Maintain or return to baseline ADL function  Description: INTERVENTIONS:  -  Assess patient's ability to carry out ADLs; assess patient's baseline for ADL function and identify physical deficits which impact ability to perform ADLs (bathing, care of mouth/teeth, toileting, grooming, dressing, etc.)  - Assess/evaluate cause of self-care deficits   - Assess range of motion  - Assess patient's mobility; develop plan if impaired  - Assess patient's need for assistive devices and provide as appropriate  - Encourage maximum independence but intervene and supervise when necessary  - Involve family in performance of ADLs  - Assess for home care needs following discharge   - Consider OT consult to assist with ADL evaluation and planning for discharge  - Provide patient education as appropriate  Outcome: Progressing  Goal: Maintains/Returns to pre admission functional level  Description: INTERVENTIONS:  - Perform AM-PAC 6 Click Basic Mobility/ Daily Activity assessment daily.  - Set and communicate daily mobility goal to care team and patient/family/caregiver.    - Collaborate with rehabilitation services on mobility goals if consulted  - Perform Range of Motion 3 times a day. - Reposition patient every 2 hours.   - Dangle patient 3 times a day  - Stand patient 3 times a day  - Ambulate patient 3 times a day  - Out of bed to chair 3 times a day   - Out of bed for meals 3 times a day  - Out of bed for toileting  - Record patient progress and toleration of activity level   Outcome: Progressing

## 2023-11-26 NOTE — ASSESSMENT & PLAN NOTE
Lab Results   Component Value Date    HGBA1C 9.9 (H) 10/26/2023       Recent Labs     11/25/23  1622 11/25/23  2109 11/26/23  0652 11/26/23  1047   POCGLU 240* 275* 206* 208*         Blood Sugar Average: Last 72 hrs:  (P) 216.5  Patient following with endocrinology  Increase Lantus 25 units to 30 units q hs  Hold Ozempic and resume at discharge  Sliding scale insulin coverage while in the hospital

## 2023-11-26 NOTE — ASSESSMENT & PLAN NOTE
Likely secondary to hypercalcemia as well as likely mild cognitive impairment with delirium. CT head negative for any acute findings  MRI brain negative for acute findings  The patient's son and step son describes her as being sharp for a mental standpoint. Neurology consultation appreciated- outpatient office visit on 11/10/23 mentioned concerns for patient's memory. Likely mild cognitive impairment with delirium and hallucinations likely from hypercalcemia. Patient should not be driving per neurology  B12: 1,614. Will check TSH, T4  EEG ordered per neurology.

## 2023-11-27 ENCOUNTER — APPOINTMENT (INPATIENT)
Dept: RADIOLOGY | Facility: HOSPITAL | Age: 78
DRG: 640 | End: 2023-11-27
Payer: MEDICARE

## 2023-11-27 LAB
ALBUMIN UR ELPH-MCNC: 100 %
ALPHA1 GLOB MFR UR ELPH: 0 %
ALPHA2 GLOB MFR UR ELPH: 0 %
ANION GAP SERPL CALCULATED.3IONS-SCNC: 7 MMOL/L
B-GLOBULIN MFR UR ELPH: 0 %
BUN SERPL-MCNC: 15 MG/DL (ref 5–25)
CALCIUM SERPL-MCNC: 7.2 MG/DL (ref 8.4–10.2)
CHLORIDE SERPL-SCNC: 106 MMOL/L (ref 96–108)
CO2 SERPL-SCNC: 23 MMOL/L (ref 21–32)
CREAT SERPL-MCNC: 1.08 MG/DL (ref 0.6–1.3)
ERYTHROCYTE [DISTWIDTH] IN BLOOD BY AUTOMATED COUNT: 13.7 % (ref 11.6–15.1)
GAMMA GLOB MFR UR ELPH: 0 %
GFR SERPL CREATININE-BSD FRML MDRD: 49 ML/MIN/1.73SQ M
GLUCOSE SERPL-MCNC: 134 MG/DL (ref 65–140)
GLUCOSE SERPL-MCNC: 144 MG/DL (ref 65–140)
GLUCOSE SERPL-MCNC: 197 MG/DL (ref 65–140)
GLUCOSE SERPL-MCNC: 255 MG/DL (ref 65–140)
GLUCOSE SERPL-MCNC: 339 MG/DL (ref 65–140)
HCT VFR BLD AUTO: 34.8 % (ref 34.8–46.1)
HGB BLD-MCNC: 10.7 G/DL (ref 11.5–15.4)
MCH RBC QN AUTO: 30.4 PG (ref 26.8–34.3)
MCHC RBC AUTO-ENTMCNC: 30.7 G/DL (ref 31.4–37.4)
MCV RBC AUTO: 99 FL (ref 82–98)
PLATELET # BLD AUTO: 153 THOUSANDS/UL (ref 149–390)
PMV BLD AUTO: 10.6 FL (ref 8.9–12.7)
POTASSIUM SERPL-SCNC: 4.6 MMOL/L (ref 3.5–5.3)
PROT PATTERN UR ELPH-IMP: NORMAL
PROT UR-MCNC: 19.3 MG/DL
RBC # BLD AUTO: 3.52 MILLION/UL (ref 3.81–5.12)
SODIUM SERPL-SCNC: 136 MMOL/L (ref 135–147)
WBC # BLD AUTO: 8.44 THOUSAND/UL (ref 4.31–10.16)

## 2023-11-27 PROCEDURE — 99232 SBSQ HOSP IP/OBS MODERATE 35: CPT | Performed by: INTERNAL MEDICINE

## 2023-11-27 PROCEDURE — 85027 COMPLETE CBC AUTOMATED: CPT | Performed by: PHYSICIAN ASSISTANT

## 2023-11-27 PROCEDURE — 97110 THERAPEUTIC EXERCISES: CPT

## 2023-11-27 PROCEDURE — 82948 REAGENT STRIP/BLOOD GLUCOSE: CPT

## 2023-11-27 PROCEDURE — 99231 SBSQ HOSP IP/OBS SF/LOW 25: CPT | Performed by: STUDENT IN AN ORGANIZED HEALTH CARE EDUCATION/TRAINING PROGRAM

## 2023-11-27 PROCEDURE — 80048 BASIC METABOLIC PNL TOTAL CA: CPT | Performed by: PHYSICIAN ASSISTANT

## 2023-11-27 PROCEDURE — 97530 THERAPEUTIC ACTIVITIES: CPT

## 2023-11-27 PROCEDURE — 73564 X-RAY EXAM KNEE 4 OR MORE: CPT

## 2023-11-27 PROCEDURE — 84166 PROTEIN E-PHORESIS/URINE/CSF: CPT | Performed by: PATHOLOGY

## 2023-11-27 RX ADMIN — Medication 400 MCG: at 08:44

## 2023-11-27 RX ADMIN — BACITRACIN ZINC 1 SMALL APPLICATION: 500 OINTMENT TOPICAL at 08:44

## 2023-11-27 RX ADMIN — POTASSIUM CHLORIDE 20 MEQ: 1.5 SOLUTION ORAL at 17:24

## 2023-11-27 RX ADMIN — GUAIFENESIN AND DEXTROMETHORPHAN 10 ML: 100; 10 SYRUP ORAL at 14:30

## 2023-11-27 RX ADMIN — METOPROLOL SUCCINATE 50 MG: 50 TABLET, EXTENDED RELEASE ORAL at 08:44

## 2023-11-27 RX ADMIN — GUAIFENESIN AND DEXTROMETHORPHAN 10 ML: 100; 10 SYRUP ORAL at 22:10

## 2023-11-27 RX ADMIN — AMLODIPINE BESYLATE 5 MG: 5 TABLET ORAL at 08:44

## 2023-11-27 RX ADMIN — ENOXAPARIN SODIUM 30 MG: 30 INJECTION SUBCUTANEOUS at 08:44

## 2023-11-27 RX ADMIN — POTASSIUM CHLORIDE 20 MEQ: 1.5 SOLUTION ORAL at 12:55

## 2023-11-27 RX ADMIN — ATORVASTATIN CALCIUM 80 MG: 80 TABLET, FILM COATED ORAL at 17:24

## 2023-11-27 RX ADMIN — INSULIN LISPRO 1 UNITS: 100 INJECTION, SOLUTION INTRAVENOUS; SUBCUTANEOUS at 22:09

## 2023-11-27 RX ADMIN — CHOLECALCIFEROL TAB 10 MCG (400 UNIT) 800 UNITS: 10 TAB at 08:44

## 2023-11-27 RX ADMIN — OMEGA-3 FATTY ACIDS CAP 1000 MG 1000 MG: 1000 CAP at 08:44

## 2023-11-27 RX ADMIN — INSULIN LISPRO 5 UNITS: 100 INJECTION, SOLUTION INTRAVENOUS; SUBCUTANEOUS at 11:52

## 2023-11-27 RX ADMIN — LEVOTHYROXINE SODIUM 50 MCG: 50 TABLET ORAL at 05:07

## 2023-11-27 RX ADMIN — OMEGA-3 FATTY ACIDS CAP 1000 MG 1000 MG: 1000 CAP at 17:24

## 2023-11-27 RX ADMIN — DULOXETINE HYDROCHLORIDE 20 MG: 20 CAPSULE, DELAYED RELEASE ORAL at 08:44

## 2023-11-27 RX ADMIN — INSULIN GLARGINE 30 UNITS: 100 INJECTION, SOLUTION SUBCUTANEOUS at 22:09

## 2023-11-27 RX ADMIN — ACETAMINOPHEN 650 MG: 325 TABLET ORAL at 17:24

## 2023-11-27 RX ADMIN — PANTOPRAZOLE SODIUM 40 MG: 40 TABLET, DELAYED RELEASE ORAL at 05:07

## 2023-11-27 RX ADMIN — ASPIRIN 81 MG: 81 TABLET, COATED ORAL at 08:44

## 2023-11-27 RX ADMIN — CEFAZOLIN SODIUM 2000 MG: 2 SOLUTION INTRAVENOUS at 02:38

## 2023-11-27 RX ADMIN — INSULIN LISPRO 3 UNITS: 100 INJECTION, SOLUTION INTRAVENOUS; SUBCUTANEOUS at 16:46

## 2023-11-27 RX ADMIN — POTASSIUM CHLORIDE 20 MEQ: 1.5 SOLUTION ORAL at 08:44

## 2023-11-27 NOTE — OCCUPATIONAL THERAPY NOTE
11/27/23 1113   OT Last Visit   OT Visit Date 11/27/23   Note Type   Note Type Treatment   Pain Assessment   Pain Assessment Tool 0-10   Pain Score 2  (reports occasional "throbbing" of R ring finger)   Pain Location/Orientation Orientation: Right;Location: Knee  (AND right ring finger)   Restrictions/Precautions   Weight Bearing Precautions Per Order Yes   RUE Weight Bearing Per Order NWB   Other Precautions Chair Alarm; Bed Alarm   Lifestyle   Reciprocal Relationships was asking about her boyfriend (is in the hospital?)   Intrinsic Gratification beatriz; cross stitch; needlepoint; games on her phone; one soap opera   Therapeutic Excerise-Strength   UE Strength Yes   Right Upper Extremity- Strength   R Shoulder Flexion; Horizontal ABduction; Other (Comment)  (horizontal adduction;  Pro/re-traction)   R Elbow Elbow flexion;Elbow extension  (in forward and reverse;  also: supin/pron-ation)   R Weight/Reps/Sets AROM - 10 reps shoulders and 15 reps elbows   Left Upper Extremity-Strength   L Shoulder Flexion; Horizontal ABduction; Other (Comment)  (horizontal adduction; Pro/re-traction)   L Elbow Elbow flexion;Elbow extension  (in forward and reverse; also: supin/pron-ation)   L Weights/Reps/Sets 2 pound weight - 10 reps shoulders and 15 reps elbows   Subjective   Subjective spoke of her son and boyfriend's 3 sons (all living in other states); reports travels that her and her boyfriend did in the past > "but we can't anymore because these things started to happen" (medical/physical issues)   Cognition   Overall Cognitive Status Impaired   Arousal/Participation Alert; Responsive; Cooperative   Attention Attends with cues to redirect   Following Commands Follows one step commands with increased time or repetition   Activity Tolerance   Activity Tolerance Patient limited by pain   Assessment   Assessment Patient participated in Skilled OT session this date with interventions consisting of therapeutic exercise to: increase functional use of BUEs, increase BUE muscle strength toward resumption of necessary self-care tasks and tasks of interest/gratification . Patient agreeable to OT treatment session, upon arrival patient was found seated OOB to Recliner. Patient requiring verbal cues for correct technique, one step directives, and frequent rest periods, and self-care assistance as noted in flow sheet/AM-PAC. Patient continues to be functioning below baseline level, occupational performance remains limited secondary to factors listed above and increased risk for falls and injury. Patient to benefit from continued Occupational Therapy treatment while in the hospital to address deficits as defined above and maximize level of functional independence with ADLs and functional mobility. Plan   Treatment Interventions Patient/family training; Activityengagement; Compensatory technique education;UE strengthening/ROM   Goal Expiration Date 12/01/23   OT Treatment Day 1   Discharge Recommendation   Rehab Resource Intensity Level, OT I (Maximum Resource Intensity)   Additional Comments 2 The patient's raw score on the -PAC Daily Activity Inpatient Short Form is 15. A raw score of less than 19 suggests the patient may benefit from discharge to post-acute rehabilitation services. Please refer to the recommendation of the Occupational Therapist for safe discharge planning.    AM-PAC Daily Activity Inpatient   Lower Body Dressing 2   Bathing 2   Toileting 2   Upper Body Dressing 3   Grooming 3   Eating 3   Daily Activity Raw Score 15   Daily Activity Standardized Score (Calc for Raw Score >=11) 34.69   AM-PAC Applied Cognition Inpatient   Following a Speech/Presentation 2   Understanding Ordinary Conversation 3   Taking Medications 2   Remembering Where Things Are Placed or Put Away 2   Remembering List of 4-5 Errands 2   Taking Care of Complicated Tasks 1   Applied Cognition Raw Score 12   Applied Cognition Standardized Score 28.82       Delaney Leesa Oats

## 2023-11-27 NOTE — ASSESSMENT & PLAN NOTE
Likely secondary to hypercalcemia as well as likely mild cognitive impairment with delirium. CT head negative for any acute findings  MRI brain negative for acute findings  The patient's son and step son describes her as being sharp for a mental standpoint. Neurology consultation appreciated- outpatient office visit on 11/10/23 mentioned concerns for patient's memory. Likely mild cognitive impairment with delirium and hallucinations likely from hypercalcemia. Patient should not be driving per neurology  B12: 1,614.  Will check TSH, T4  EEG ordered per neurology  May likely be secondary to worsening dementia  Patient's family interested in discharge to assisted living facility

## 2023-11-27 NOTE — ASSESSMENT & PLAN NOTE
Lab Results   Component Value Date    HGBA1C 9.9 (H) 10/26/2023       Recent Labs     11/26/23  1047 11/26/23  1607 11/26/23  2114 11/27/23  0704   POCGLU 208* 273* 212* 134         Blood Sugar Average: Last 72 hrs:  (P) 226.2255208415755024  Patient following with endocrinology  Increase Lantus 25 units to 30 units qhs  Hold Ozempic and resume at discharge  Sliding scale insulin coverage while in the hospital

## 2023-11-27 NOTE — PROGRESS NOTES
[id number]  Progress Note  Name: Donnie Parra  MRN: [id number]  Unit/Bed#: -01 I Date of Admission: 11/19/2023   Date of Service: 11/27/2023 I Hospital Day: 8    Assessment/Plan   * Hypercalcemia  Assessment & Plan  Patient presented with weakness, confusion, frequent falls, found to have hypercalcemia  Patient reports she has been taking Tums frequently for heartburn  Calcium level within normal limits- nephrology signed off  Repeat BMP as needed    Metabolic encephalopathy  Assessment & Plan  Likely secondary to hypercalcemia as well as likely mild cognitive impairment with delirium. CT head negative for any acute findings  MRI brain negative for acute findings  The patient's son and step son describes her as being sharp for a mental standpoint. Neurology consultation appreciated- outpatient office visit on 11/10/23 mentioned concerns for patient's memory. Likely mild cognitive impairment with delirium and hallucinations likely from hypercalcemia. Patient should not be driving per neurology  B12: 1,614. Will check TSH, T4  EEG ordered per neurology  May likely be secondary to worsening dementia  Patient's family interested in discharge to assisted living facility    Hypomagnesemia  Assessment & Plan  Continue to monitor and replete as needed    [address]  Patient had a fall in her room on 11/22/2023. Notified by RN. CT head negative for any acute findings. X-rays of hip negative for any acute findings. Patient had laceration to right ring finger. Surgical team has been consulted. Wound care ordered per note recs. X-ray performed of right hand, Comminuted distal 4th phalangeal fracture.  Reviewed with hand surgery on-call (Dr. Felisha Joseph) no intervention required at this time   PT/OT eval and recommended STR    Abnormal CT of the abdomen  Assessment & Plan  Fluid attenuation lesion adjacent to the right colon of uncertain etiology   Outpatient MRI abdomen PAD (peripheral artery disease) (HCC)  Assessment & Plan  Follows w/ vascular  Continue aspirin, statin    Severe obesity (BMI 35.0-39. 9) with comorbidity ([address])  Assessment & Plan  Patient starting on Ozempic, monitor weight loss  Healthy diet lifestyle modification recommended    Acquired hypothyroidism  Assessment & Plan  Continue levothyroxine    Essential hypertension  Assessment & Plan  Continue home medication with hold parameters    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 mellitus ([address])  Assessment & Plan  Creat baseline 1.4 w/ ISC2N  Nephrology consultation appreciated- now signed off    Type 2 diabetes mellitus with stage 3b chronic kidney disease, with long-term current use of insulin [facility])  Assessment & Plan  Lab Results   Component Value Date    HGBA1C 9.9 (H) 10/26/2023       Recent Labs     11/26/23  1047 11/26/23  1607 11/26/23  2114 11/27/23  0704   POCGLU 208* 273* 212* 134         Blood Sugar Average: Last 72 hrs:  (P) 226.0642300073033980  Patient following with endocrinology  Increase Lantus 25 units to 30 units qhs  Hold Ozempic and resume at discharge  Sliding scale insulin coverage while in the hospital                 VTE Pharmacologic Prophylaxis: VTE Score: 4 Moderate Risk (Score 3-4) - Pharmacological DVT Prophylaxis Ordered: enoxaparin (Lovenox). Mobility:   Basic Mobility Inpatient Raw Score: 16  JH-HLM Goal: 5: Stand one or more mins  JH-HLM Achieved: 5: Stand (1 or more minutes)  HLM Goal achieved. Continue to encourage appropriate mobility. Patient Centered Rounds: I performed bedside rounds with nursing staff today. Discussions with Specialists or Other Care Team Provider: Case management    Education and Discussions with Family / Patient: Updated  (son) via phone. Total Time Spent on Date of Encounter in care of patient: 35 mins.  This time was spent on one or more of the following: performing physical exam; counseling and coordination of care; obtaining or reviewing history; documenting in the medical record; reviewing/ordering tests, medications or procedures; communicating with other healthcare professionals and discussing with patient's family/caregivers. Current Length of Stay: 8 day(s)  Current Patient Status: Inpatient   Certification Statement: The patient will continue to require additional inpatient hospital stay due to worsening dementia requiring assisted living facility  Discharge Plan: Anticipate discharge in 24-48 hrs to rehab facility. Code Status: Level 3 - DNAR and DNI    Subjective:   Patient seen and examined at bedside. No acute events overnight. Denies chest pain, SOB, diaphoresis, nausea/vomiting/diarrhea, fevers/chills. Objective:     Vitals:   Temp (24hrs), Av.4 °F (36.9 °C), Min:98 °F (36.7 °C), Max:99.1 °F (37.3 °C)    Temp:  [98 °F (36.7 °C)-99.1 °F (37.3 °C)] 98.2 °F (36.8 °C)  HR:  [79-90] 79  Resp:  [18-20] 20  BP: (120-157)/(52-83) 157/83  SpO2:  [96 %-99 %] 98 %  Body mass index is 33.43 kg/m². Input and Output Summary (last 24 hours): Intake/Output Summary (Last 24 hours) at 2023 0846  Last data filed at 2023 1700  Gross per 24 hour   Intake 360 ml   Output 500 ml   Net -140 ml       Physical Exam:   Physical Exam  Vitals and nursing note reviewed. Constitutional:       General: She is not in acute distress. Appearance: She is well-developed. HENT:      Head: Normocephalic and atraumatic. Eyes:      Conjunctiva/sclera: Conjunctivae normal.   Cardiovascular:      Rate and Rhythm: Normal rate and regular rhythm. Heart sounds: No murmur heard. Pulmonary:      Effort: Pulmonary effort is normal. No respiratory distress. Breath sounds: Normal breath sounds. Abdominal:      Palpations: Abdomen is soft. Tenderness: There is no abdominal tenderness. Musculoskeletal:         General: No swelling. Cervical back: Neck supple. Skin:     General: Skin is warm and dry. Capillary Refill: Capillary refill takes less than 2 seconds. Neurological:      Mental Status: She is alert. Psychiatric:         Mood and Affect: Mood normal.         Additional Data:     Labs:  Results from last 7 days   Lab Units 11/27/23  0511   WBC Thousand/uL 8.44   HEMOGLOBIN g/dL 10.7*   HEMATOCRIT % 34.8   PLATELETS Thousands/uL 153     Results from last 7 days   Lab Units 11/27/23  0511 11/25/23  0458 11/24/23  0520   SODIUM mmol/L 136   < > 138   POTASSIUM mmol/L 4.6   < > 3.5   CHLORIDE mmol/L 106   < > 104   CO2 mmol/L 23   < > 27   BUN mg/dL 15   < > 17   CREATININE mg/dL 1.08   < > 1.37*   ANION GAP mmol/L 7   < > 7   CALCIUM mg/dL 7.2*   < > 7.9*   ALBUMIN g/dL  --   --  3.2*   TOTAL BILIRUBIN mg/dL  --   --  0.38   ALK PHOS U/L  --   --  45   ALT U/L  --   --  4*   AST U/L  --   --  12*   GLUCOSE RANDOM mg/dL 144*   < > 159*    < > = values in this interval not displayed.          Results from last 7 days   Lab Units 11/27/23  0704 11/26/23  2114 11/26/23  1607 11/26/23  1047 11/26/23  0652 11/25/23  2109 11/25/23  1622 11/25/23  1107 11/25/23  0648 11/24/23  2051 11/24/23  1612 11/24/23  1133   POC GLUCOSE mg/dl 134 212* 273* 208* 206* 275* 240* 290* 214* 193* 212* 336*               Lines/Drains:  Invasive Devices       Peripheral Intravenous Line  Duration             Peripheral IV 11/23/23 Left;Ventral (anterior) Forearm 4 days    Peripheral IV 11/26/23 Proximal;Right;Ventral (anterior) Forearm 1 day              Drain  Duration             External Urinary Catheter 6 days                          Imaging: Reviewed radiology reports from this admission including: MRI brain    Recent Cultures (last 7 days):         Last 24 Hours Medication List:   Current Facility-Administered Medications   Medication Dose Route Frequency Provider Last Rate    acetaminophen  650 mg Oral Q4H PRN Nagi Jones PA-C      amLODIPine  5 mg Oral Daily Nagi Jones PA-C      aspirin  81 mg Oral Daily Agnes Jagjit, PA-C      atorvastatin  80 mg Oral Daily With [employer], PA-C      bacitracin  1 small application Topical Daily Renetta Galavn PA-C      cefazolin  2,000 mg Intravenous Q8H Gustabo Fothergill, MD 2,000 mg (11/27/23 0238)    cholecalciferol  800 Units Oral Daily Agnes Chad, PA-C      dextromethorphan-guaiFENesin  10 mL Oral Q4H PRN Renetta Galvan, NANCY      DULoxetine  20 mg Oral Daily [facility], PA-C      enoxaparin  30 mg Subcutaneous Daily 406 [facility], PA-C      fish oil  1,000 mg Oral BID Agnes Danielson, NANCY      fluticasone  2 spray Nasal Daily Agnes Danielson, PA-LEONEL      folic acid  468 mcg Oral Daily Luda Cintron PA-C      insulin glargine  30 Units Subcutaneous HS Antwon Monroy PA-C      insulin lispro  1-5 Units Subcutaneous HS Luda Cintron, NANCY      insulin lispro  1-6 Units Subcutaneous TID AC Luda Thomas PA-C      levothyroxine  50 mcg Oral Daily [address] [facility], NANCY      metoprolol succinate  50 mg Oral Daily Luda Cintron PA-C      ondansetron  4 mg Intravenous Q6H PRN Agnes Danielson, NANCY      pantoprazole  40 mg Oral Early Morning Agnes Danielson, PA-C      potassium chloride  20 mEq Oral TID With Nely Roman MD          Today, Patient Was Seen By: Garcia Mullen DO    **Please Note: This note may have been constructed using a voice recognition system. **

## 2023-11-27 NOTE — PHYSICAL THERAPY NOTE
PHYSICAL THERAPY TREATMENT  NAME:  Otilia Fajardo  DATE: 11/27/23    AGE:   66 y.o. Mrn:   [id number]  ADMIT DX:  Hypercalcemia [E83.52]  Head injury [S09.90XA]  Generalized weakness [R53.1]  Problem List:   Patient Active Problem List   Diagnosis    Type 2 diabetes mellitus with stage 3b chronic kidney disease, with long-term current use of insulin ([address])    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 mellitus ([address])    Essential hypertension    Mixed hyperlipidemia    Acquired hypothyroidism    Severe obesity (BMI 35.0-39. 9) with comorbidity ([address])    Spinal stenosis of lumbar region    Chronic pain syndrome    Lumbar radiculopathy    PAD (peripheral artery disease) (HCC)    Asymptomatic bilateral carotid artery stenosis    Chronic cough    Hypercalcemia    Abnormal CT of the abdomen    Metabolic encephalopathy    Fall    Hypomagnesemia       Past Medical History  Past Medical History:   Diagnosis Date    Benign essential hypertension     Chronic kidney disease, stage 3 (HCC)     Disorder of gallbladder     Disorder of skin and subcutaneous tissue     Dyspnea     Essential hypertension     Hyperlipidemia     Hypokalemia     Malaise and fatigue     Mixed hyperlipidemia     Morbid obesity ([address])     Pernicious anemia        Past Surgical History  Past Surgical History:   Procedure Laterality Date    BREAST BIOPSY Right     CARPAL TUNNEL RELEASE Bilateral     CHOLECYSTECTOMY      COLONOSCOPY      Dr. Thelma Odell      x3    PARTIAL HYSTERECTOMY      SKIN LESION EXCISION      scalp        Length Of Stay: 8  Performed at least 2 patient identifiers during session: Name and Birthday       11/27/23 0903   PT Last Visit   PT Visit Date 11/27/23   Note Type   Note Type Treatment   Pain Assessment   Pain Assessment Tool 0-10   Pain Score 3   Pain Location/Orientation Orientation: Right;Location: Knee   Pain Onset/Description Onset: Ongoing;Frequency: Constant/Continuous   Patient's Stated Pain Goal No pain   Hospital Pain Intervention(s) Repositioned; Emotional support  (Pt reports ongoing R knee pain, states she recieves injections as outpatient. R knee did have yellow/green bruising. PT discussed R knee pain with ortho - ortho stated will order R knee xray.)   Multiple Pain Sites No  (pt stated no pain at R 4th digit)   Restrictions/Precautions   Weight Bearing Precautions Per Order Yes   RUE Weight Bearing Per Order NWB  ("NWB right ring finger" per ortho)   Other Precautions Cognitive; Chair Alarm; Bed Alarm; Fall Risk;Pain   General   Chart Reviewed Yes   Response to Previous Treatment Patient with no complaints from previous session. Family/Caregiver Present No   Cognition   Overall Cognitive Status Impaired   Arousal/Participation Alert; Responsive; Cooperative   Attention Attends with cues to redirect   Orientation Level Oriented X4   Memory Decreased recall of recent events;Decreased recall of precautions;Decreased short term memory   Following Commands Follows one step commands with increased time or repetition   Comments Pt agreeable to PT session   Subjective   Subjective "I think I need to go to the bathroom again"   Bed Mobility   Supine to Sit   (CGA)   Additional items Assist x 1; Increased time required;Verbal cues  (verbal cues to maintain NWB R hand)   Sit to Supine   (not tested as pt seated in bedside chair at end of session)   Additional Comments Pt without complaints of lightheadedness, SOB, chest pain, dizziness throughout session   Transfers   Sit to Stand 3  Moderate assistance   Additional items Assist x 1; Increased time required;Verbal cues   Stand to Sit 3  Moderate assistance   Additional items Assist x 1; Increased time required;Verbal cues   Stand pivot 3  Moderate assistance   Additional items Assist x 1; Increased time required;Verbal cues   Toilet transfer 3  Moderate assistance   Additional items Assist x 1; Increased time required;Verbal cues  (bedside commode)   Additional Comments transfers performed with L hand held assist from PT to maintain R hand NWB   Ambulation/Elevation   Gait pattern Improper Weight shift;Decreased foot clearance;Narrow MIRELA;Shuffling; Short stride;Decreased heel strike;Decreased hip extension   Gait Assistance 3  Moderate assist   Additional items Assist x 1;Verbal cues   Assistive Device Other (Comment)  (L hand held assist)   Distance 4'   Stair Management Assistance Not tested   Balance   Static Sitting Fair +   Dynamic Sitting Fair   Static Standing Poor +   Dynamic Standing Poor +   Ambulatory Poor +   Endurance Deficit   Endurance Deficit Yes   Endurance Deficit Description decreased activity tolerance   Activity Tolerance   Activity Tolerance Patient limited by fatigue   Medical Staff Made Aware Ortho present at end of session - PT discussed presence of R knee pain, per pt report is chronic in nature; however, with recent fall, ortho planning to order R knee xray   Nurse Made Aware APURVA Mullins   Assessment   Prognosis Good   Problem List Decreased strength;Decreased endurance; Impaired balance;Decreased mobility; Decreased cognition;Decreased safety awareness;Pain   Assessment Pt seen for PT treatment session this date, consisting of ther act focused on transfer training and safety awareness . Since previous session, pt has made minimal progress in terms of assist required for mobility and progression to activity tolerance. Pt greeted supine in bed and agreeable to PT session reporting 0/10 pain to R 4th digit; PT reviewed maintaining R hand NWB and pt able to maintain throughout session with min verbal cues. Pt required CGA for sup > sit with HOB flat; once sitting EOB, pt with incontinence of bowel and completed STS and SPT modA to bedside commode with left hand held assist; PCA present to assist with hygiene. Pt ambulated 4' modA L hand held assist with distance limited by unsteadiness on feet.  Pertinent barriers during this session include pain, cognitive status, awareness, and incontinence. Current goals and POC remain appropriate, pt continues to have rehab potential and is making fair progress towards STGs. Pt prognosis for achieving goals is good, pending pt progress with hospitalization/medical status improvements, and indicated by orientation, motivation, supportive family/caregivers, previous response to intervention, and responsive to cues/strategies. Pt limited d/t the presence of intractable pain, fear of pain provocation, and fear of falling. PT recommends level 2, moderate resource intensity upon discharge. Pt continues to be functioning below baseline level, and remains limited 2* factors listed above. PT will continue to see pt during current hospitalization in order to address the deficits listed above and provide interventions consistent w/ POC in effort to achieve STGs. Barriers to Discharge Inaccessible home environment;Decreased caregiver support   Goals   STG Expiration Date 12/01/23   Short Term Goal #1 PT updated patient goals: In 7-10 days, the pt will be able to increase LE strength by 1/2 grade in order to increase functional activity tolerance. Pt will perform transfers MOD I with LRAD in order to safely maneuver her home. Pt will perform bed mobility MOD I in order to safely get in/out of bed. Pt will navigate 5 MAIKEL with unilateral HR MOD I in order to safely enter/exit her home. Pt will increase balance score by 1/2 grade in order to decrease fall risk. Pt will ambulate >50 ft MOD I with LRAD in order to safely ambulate in her home. PT Treatment Day 2   Plan   Treatment/Interventions Functional transfer training;LE strengthening/ROM; Elevations; Therapeutic exercise; Endurance training;Patient/family training;Bed mobility;Gait training;Continued evaluation;Spoke to advanced practitioner   Progress Progressing toward goals   PT Frequency 3-5x/wk   Discharge Recommendation   Rehab Resource Intensity Level, PT II (Moderate Resource Intensity)   AM-PAC Basic Mobility Inpatient   Turning in Flat Bed Without Bedrails 3   Lying on Back to Sitting on Edge of Flat Bed Without Bedrails 3   Moving Bed to Chair 2   Standing Up From Chair Using Arms 2   Walk in Room 2   Climb 3-5 Stairs With Railing 1   Basic Mobility Inpatient Raw Score 13   Basic Mobility Standardized Score 33.99   Highest Level Of Mobility   -[facility] Goal 4: Move to chair/commode   -HL Achieved 4: Move to chair/commode   End of Consult   Patient Position at End of Consult All needs within reach;Bed/Chair alarm activated; Bedside chair       Time In: 0903  Time Out: 0915  Total Treatment Minutes: 12     [place], [place]

## 2023-11-27 NOTE — PROGRESS NOTES
Orthopedics   Danie Morales 66 y.o. female MRN: [id number]  Unit/Bed#: -01    Assessment:  66 y.o. right hand dominant female with right ring finger open distal phalanx fracture with avulsion laceration from fall 11/22/23, s/p washout by general surgery 11/22/23. Plan:   NWB right ring finger  Antibiotics per primary- currently on ancef x7d  Continue bacitracin, adaptic, and gauze dressing per nursing  Body mass index is 33.43 kg/m². No evidence of infection at laceration at this point  No indication for acute orthopedic surgical intervention at this time  Dispo: Ortho will sign off at this point. Please reach out with questions or concerns including signs of infection such as increasing pain, erythema, drainage   Follow up with hand surgery outpatient       Chief Complaint:   Right ring finger pain    HPI:  66 y.o. right hand dominant female with right ring finger open fracture s/p fall in hospital room 11/22/23, where she caught herself on the metal aspect of bedside tray. S/p washout for wound management and hemostasis performed by general surgery 11/22. On date of injury, primary team discussed with hand surgeon on call, recommending no acute intervention by hand surgery and to continue local wound care, with no need for splint at this time. The patient was again discussed with hand surgery yesterday which suggested continued nonoperative management with wound care per nursing.     Review Of Systems:   Skin: Normal  Neuro: See HPI  Musculoskeletal: See HPI  14 point review of systems negative except as stated above     Past Medical History:   Past Medical History:   Diagnosis Date    Benign essential hypertension     Chronic kidney disease, stage 3 (HCC)     Disorder of gallbladder     Disorder of skin and subcutaneous tissue     Dyspnea     Essential hypertension     Hyperlipidemia     Hypokalemia     Malaise and fatigue     Mixed hyperlipidemia     Morbid obesity (HCC)     Pernicious anemia Past Surgical History:   Past Surgical History:   Procedure Laterality Date    BREAST BIOPSY Right     CARPAL TUNNEL RELEASE Bilateral     CHOLECYSTECTOMY      COLONOSCOPY      Dr. Drew Melchor      x3    PARTIAL HYSTERECTOMY      SKIN LESION EXCISION      scalp        Family History:  Family history reviewed and non-contributory  Family History   Problem Relation Age of Onset    Stroke Mother     Atrial fibrillation Mother     Brain cancer Father     Other Brother         Twin brothers - Open heart    Other Brother         Twin brothers - Open heart    No Known Problems Maternal Grandmother     No Known Problems Paternal Grandmother        Social History:  Social History     Socioeconomic History    Marital status: /Civil Union     Spouse name: Oliver Ro     Number of children: None    Years of education: None    Highest education level: None   Occupational History    Occupation: Retired    Tobacco Use    Smoking status: Former     Packs/day: 1.00     Years: 14.00     Total pack years: 14.00     Types: Cigarettes     Quit date:      Years since quittin.9    Smokeless tobacco: Never   Vaping Use    Vaping Use: Never used   Substance and Sexual Activity    Alcohol use: Yes     Comment: Occasionally     Drug use: Never     Comment: Denies drug use - As per [facility]     Sexual activity: Not Currently   Other Topics Concern    None   Social History Narrative     - Oliver Ro is [place] and speaks [place], common law marriage    Does not consume caffeine      Social Determinants of Health     Financial Resource Strain: Low Risk  (3/7/2023)    Overall Financial Resource Strain (CARDIA)     Difficulty of Paying Living Expenses: Not hard at all   Food Insecurity: Not on file   Transportation Needs: No Transportation Needs (3/7/2023)    PRAPARE - Transportation     Lack of Transportation (Medical): No     Lack of Transportation (Non-Medical):  No   Physical Activity: Not on file   Stress: Not on file   Social Connections: Not on file   Intimate Partner Violence: Not on file   Housing Stability: Not on file       Allergies:   No Known Allergies         Labs:  0   Lab Value Date/Time    HCT 34.8 11/27/2023 0511    HCT 37.5 11/26/2023 0419    HCT 36.0 11/25/2023 0458    HGB 10.7 (L) 11/27/2023 0511    HGB 11.4 (L) 11/26/2023 0419    HGB 11.7 11/25/2023 0458    WBC 8.44 11/27/2023 0511    WBC 9.02 11/26/2023 0419    WBC 10.17 (H) 11/25/2023 0458       Meds:    Current Facility-Administered Medications:     acetaminophen (TYLENOL) tablet 650 mg, 650 mg, Oral, Q4H PRN, Ward Cintron PA-C, 650 mg at 11/24/23 1850    amLODIPine (NORVASC) tablet 5 mg, 5 mg, Oral, Daily, [employer], PA-C, 5 mg at 11/27/23 0844    aspirin (ECOTRIN LOW STRENGTH) EC tablet 81 mg, 81 mg, Oral, Daily, [employer], PA-C, 81 mg at 11/27/23 0844    atorvastatin (LIPITOR) tablet 80 mg, 80 mg, Oral, Daily With Dinner, KATHLEEN Nicholson PA-C, 80 mg at 11/26/23 1731    bacitracin topical ointment 1 small application, 1 small application, Topical, Daily, Jonathan Hamm PA-C, 1 small application at 59/42/19 0844    cholecalciferol (VITAMIN D3) tablet 800 Units, 800 Units, Oral, Daily, [employer], PA-C, 800 Units at 11/27/23 0844    dextromethorphan-guaiFENesin (ROBITUSSIN DM) oral syrup 10 mL, 10 mL, Oral, Q4H PRN, Jonathan Hamm PA-C, 10 mL at 11/26/23 2315    DULoxetine (CYMBALTA) delayed release capsule 20 mg, 20 mg, Oral, Daily, [employer], PA-C, 20 mg at 11/27/23 0844    enoxaparin (LOVENOX) subcutaneous injection 30 mg, 30 mg, Subcutaneous, Daily, [employer], PA-C, 30 mg at 11/27/23 0844    fish oil capsule 1,000 mg, 1,000 mg, Oral, BID, [employer], PA-C, 1,000 mg at 11/27/23 0844    fluticasone (FLONASE) 50 mcg/act nasal spray 2 spray, 2 spray, Nasal, Daily, [employer], PA-C, 2 spray at 11/26/23 1057 folic acid (FOLVITE) tablet 400 mcg, 400 mcg, Oral, Daily, [employer], NANCY, 400 mcg at 11/27/23 0844    insulin glargine (LANTUS) subcutaneous injection 30 Units 0.3 mL, 30 Units, Subcutaneous, HS, Antwon Monroy PA-C, 30 Units at 11/26/23 2251    insulin lispro (HumaLOG) 100 units/mL subcutaneous injection 1-5 Units, 1-5 Units, Subcutaneous, HS, [employer], PA-C, 2 Units at 11/26/23 2251    insulin lispro (HumaLOG) 100 units/mL subcutaneous injection 1-6 Units, 1-6 Units, Subcutaneous, TID AC, 4 Units at 11/26/23 1728 **AND** Fingerstick Glucose (POCT), , , TID AC, Luda Cintron PA-C    levothyroxine tablet 50 mcg, 50 mcg, Oral, Daily, [address] [place], PAYRN, 50 mcg at 11/27/23 0507    metoprolol succinate (TOPROL-XL) 24 hr tablet 50 mg, 50 mg, Oral, Daily, [employer], PA-C, 50 mg at 11/27/23 0844    ondansetron (ZOFRAN) injection 4 mg, 4 mg, Intravenous, Q6H PRN, [facility]    pantoprazole (PROTONIX) EC tablet 40 mg, 40 mg, Oral, Early Morning, Luda Cintron PA-C, 40 mg at 11/27/23 0507    potassium chloride oral solution 20 mEq, 20 mEq, Oral, TID With Meals, Won Aparicio MD, 20 mEq at 11/27/23 0844    Blood Culture:   No results found for: "Carissa [place]"    Wound Culture:   No results found for: "WOUNDCULT"    Ins and Outs:  I/O last 24 hours:   In: 360 [P.O.:360]  Out: 500 [Urine:500]    Physical Exam:   /67   Pulse 84   Temp 98.2 °F (36.8 °C) (Temporal)   Resp 20   Ht 5' 2" (1.575 m)   Wt 82.9 kg (182 lb 12.2 oz)   SpO2 98%   BMI 33.43 kg/m²   Gen: No acute distress, resting comfortably in bed  HEENT: Eyes clear, moist mucus membranes, hearing intact  Respiratory: No audible wheezing or stridor  Cardiovascular: Well Perfused peripherally, 2+ distal pulse  Abdomen: nondistended, no peritoneal signs    Musculoskeletal: right upper extremity  Laceration at volar aspect of distal phalanx extending dorsally to the nail bed  Serosanguinous drainage but no rupert purulence  Dusky and erythematous appearance of volar aspect, consistent with changes to perfusion and injury pattern  Darkening/ecchymotic nailbed  Eschar forming along length of laceration   TTP on the volar and dorsal aspect of distal phalanx, without extension proximally  No erythema proximal to the laceration  Full active & passive ROM at PIP. Will not flex/extend DIP due to swelling and pain   SILT m/r/u. Surrounding fingers warm and well perfused  Musculature is soft and compressible, no pain with passive stretch    Radiology:   I personally reviewed the films. Hand x rays  showed Comminuted fracture of the distal 4th phalanx with involvement of the tuft. There is approximate 0.2 cm distal displacement of the dominant fracture fragment. There is surrounding soft tissue swelling. There is no radiopaque foreign body. There are no   suspicious osseous lesions. Imagin23     Media Information      Document Information    Clinical Image - Mobile Device      2023 10:00   Attached To:    Hospital Encounter on 23   Source Information    Kervin Pantoja RN  [facility] [address] Mariam Rader MD

## 2023-11-27 NOTE — ASSESSMENT & PLAN NOTE
Patient presented with weakness, confusion, frequent falls, found to have hypercalcemia  Patient reports she has been taking Tums frequently for heartburn  Calcium level within normal limits- nephrology signed off  Repeat BMP as needed

## 2023-11-27 NOTE — ASSESSMENT & PLAN NOTE
Patient had a fall in her room on 11/22/2023. Notified by RN. CT head negative for any acute findings. X-rays of hip negative for any acute findings. Patient had laceration to right ring finger. Surgical team has been consulted. Wound care ordered per note recs. X-ray performed of right hand, Comminuted distal 4th phalangeal fracture.  Reviewed with hand surgery on-call (Dr. Jennifer Joseph) no intervention required at this time   PT/OT eval and recommended STR

## 2023-11-27 NOTE — PLAN OF CARE
Problem: OCCUPATIONAL THERAPY ADULT  Goal: Performs self-care activities at highest level of function for planned discharge setting. See evaluation for individualized goals. Description: Treatment Interventions: ADL retraining, UE strengthening/ROM, Functional transfer training, Endurance training, Patient/family training, Cognitive reorientation, Compensatory technique education, Energy conservation, Activityengagement          See flowsheet documentation for full assessment, interventions and recommendations. Outcome: Progressing  Note: Limitation: Decreased ADL status, Decreased UE strength, Decreased Safe judgement during ADL, Decreased endurance, Decreased self-care trans, Decreased high-level ADLs, Decreased cognition  Prognosis: Good  Assessment: Patient participated in Skilled OT session this date with interventions consisting of therapeutic exercise to: increase functional use of BUEs, increase BUE muscle strength toward resumption of necessary self-care tasks and tasks of interest/gratification . Patient agreeable to OT treatment session, upon arrival patient was found seated OOB to Recliner. Patient requiring verbal cues for correct technique, one step directives, and frequent rest periods, and self-care assistance as noted in flow sheet/AM-PAC. Patient continues to be functioning below baseline level, occupational performance remains limited secondary to factors listed above and increased risk for falls and injury. Patient to benefit from continued Occupational Therapy treatment while in the hospital to address deficits as defined above and maximize level of functional independence with ADLs and functional mobility.      Rehab Resource Intensity Level, OT: I (Maximum Resource Intensity)     NORBERTO Mckeon

## 2023-11-27 NOTE — PLAN OF CARE
Problem: PAIN - ADULT  Goal: Verbalizes/displays adequate comfort level or baseline comfort level  Description: Interventions:  - Encourage patient to monitor pain and request assistance  - Assess pain using appropriate pain scale  - Administer analgesics based on type and severity of pain and evaluate response  - Implement non-pharmacological measures as appropriate and evaluate response  - Consider cultural and social influences on pain and pain management  - Notify physician/advanced practitioner if interventions unsuccessful or patient reports new pain  Outcome: Progressing     Problem: INFECTION - ADULT  Goal: Absence or prevention of progression during hospitalization  Description: INTERVENTIONS:  - Assess and monitor for signs and symptoms of infection  - Monitor lab/diagnostic results  - Monitor all insertion sites, i.e. indwelling lines, tubes, and drains  - Monitor endotracheal if appropriate and nasal secretions for changes in amount and color  - [place] appropriate cooling/warming therapies per order  - Administer medications as ordered  - Instruct and encourage patient and family to use good hand hygiene technique  - Identify and instruct in appropriate isolation precautions for identified infection/condition  Outcome: Progressing

## 2023-11-27 NOTE — PLAN OF CARE
Problem: PHYSICAL THERAPY ADULT  Goal: Performs mobility at highest level of function for planned discharge setting. See evaluation for individualized goals. Description: Treatment/Interventions: Functional transfer training, LE strengthening/ROM, Elevations, Therapeutic exercise, Endurance training, Cognitive reorientation, Patient/family training, Equipment eval/education, Bed mobility, Gait training, Spoke to nursing  Equipment Recommended: Liban Newman       See flowsheet documentation for full assessment, interventions and recommendations. Outcome: Progressing  Note: Prognosis: Good  Problem List: Decreased strength, Decreased endurance, Impaired balance, Decreased mobility, Decreased cognition, Decreased safety awareness, Pain  Assessment: Pt seen for PT treatment session this date, consisting of ther act focused on transfer training and safety awareness . Since previous session, pt has made minimal progress in terms of assist required for mobility and progression to activity tolerance. Pt greeted supine in bed and agreeable to PT session reporting 0/10 pain to R 4th digit; PT reviewed maintaining R hand NWB and pt able to maintain throughout session with min verbal cues. Pt required CGA for sup > sit with HOB flat; once sitting EOB, pt with incontinence of bowel and completed STS and SPT modA to bedside commode with left hand held assist; PCA present to assist with hygiene. Pt ambulated 4' modA L hand held assist with distance limited by unsteadiness on feet. Pertinent barriers during this session include pain, cognitive status, awareness, and incontinence. Current goals and POC remain appropriate, pt continues to have rehab potential and is making fair progress towards STGs.  Pt prognosis for achieving goals is good, pending pt progress with hospitalization/medical status improvements, and indicated by orientation, motivation, supportive family/caregivers, previous response to intervention, and responsive to cues/strategies. Pt limited d/t the presence of intractable pain, fear of pain provocation, and fear of falling. PT recommends level 2, moderate resource intensity upon discharge. Pt continues to be functioning below baseline level, and remains limited 2* factors listed above. PT will continue to see pt during current hospitalization in order to address the deficits listed above and provide interventions consistent w/ POC in effort to achieve STGs. Barriers to Discharge: Inaccessible home environment, Decreased caregiver support     Rehab Resource Intensity Level, PT: II (Moderate Resource Intensity)    See flowsheet documentation for full assessment.    Drea Hinkle; PT, DPT

## 2023-11-27 NOTE — CASE MANAGEMENT
Case Management Discharge Planning Note    Patient name Michelle Chadwick  Location [address] [facility] [place] 207/-59 MRN [id number]  : 1945 Date 2023       Current Admission Date: 2023  Current Admission Diagnosis:Hypercalcemia   Patient Active Problem List    Diagnosis Date Noted    Hypomagnesemia 2023    Fall 2023    Abnormal CT of the abdomen     Metabolic encephalopathy     Hypercalcemia 2023    Chronic cough 2023    PAD (peripheral artery disease) ([address]) 2023    Asymptomatic bilateral carotid artery stenosis 2023    Chronic pain syndrome 2022    Lumbar radiculopathy 2022    Spinal stenosis of lumbar region     Severe obesity (BMI 35.0-39. 9) with comorbidity ([address]) 2021    Mixed hyperlipidemia 2020    Acquired hypothyroidism 2020    Type 2 diabetes mellitus with stage 3b chronic kidney disease, with long-term current use of insulin ([address]) 2019    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 mellitus ([address]) 2019    Essential hypertension 2019      LOS (days): 8  Geometric Mean LOS (GMLOS) (days): 3.60  Days to GMLOS:-4.1     OBJECTIVE:  Risk of Unplanned Readmission Score: 16.63      Current admission status: Inpatient   Preferred Pharmacy:   [address] [place] [address] #[id number] Sosa Farah, [address] [place] [address]  Phone: [phone] Fax: [phone]    Primary Care Provider: Rolly Kitchen MD    Primary Insurance: MEDICARE  Secondary Insurance: COMMERCIAL MISCELLANEOUS    DISCHARGE DETAILS:  Provided the list of accepting facilities to the pt. Pt states she wants her SO Cornell How to also go to the same facility as her. Sent referrals for the SO via [address] [place] [address] for STR.

## 2023-11-28 ENCOUNTER — HOME HEALTH ADMISSION (OUTPATIENT)
Dept: HOME HEALTH SERVICES | Facility: HOME HEALTHCARE | Age: 78
End: 2023-11-28
Payer: MEDICARE

## 2023-11-28 VITALS
TEMPERATURE: 97.4 F | DIASTOLIC BLOOD PRESSURE: 65 MMHG | SYSTOLIC BLOOD PRESSURE: 144 MMHG | OXYGEN SATURATION: 98 % | RESPIRATION RATE: 20 BRPM | HEIGHT: 62 IN | BODY MASS INDEX: 33.63 KG/M2 | HEART RATE: 79 BPM | WEIGHT: 182.76 LBS

## 2023-11-28 LAB
ANION GAP SERPL CALCULATED.3IONS-SCNC: 6 MMOL/L
BASOPHILS # BLD AUTO: 0.04 THOUSANDS/ÂΜL (ref 0–0.1)
BASOPHILS NFR BLD AUTO: 1 % (ref 0–1)
BUN SERPL-MCNC: 24 MG/DL (ref 5–25)
CALCIUM SERPL-MCNC: 7.5 MG/DL (ref 8.4–10.2)
CHLORIDE SERPL-SCNC: 106 MMOL/L (ref 96–108)
CO2 SERPL-SCNC: 21 MMOL/L (ref 21–32)
CREAT SERPL-MCNC: 1.12 MG/DL (ref 0.6–1.3)
DME PARACHUTE DELIVERY DATE REQUESTED: NORMAL
DME PARACHUTE ITEM DESCRIPTION: NORMAL
DME PARACHUTE ORDER STATUS: NORMAL
DME PARACHUTE SUPPLIER NAME: NORMAL
DME PARACHUTE SUPPLIER PHONE: NORMAL
EOSINOPHIL # BLD AUTO: 0.23 THOUSAND/ÂΜL (ref 0–0.61)
EOSINOPHIL NFR BLD AUTO: 3 % (ref 0–6)
ERYTHROCYTE [DISTWIDTH] IN BLOOD BY AUTOMATED COUNT: 13.6 % (ref 11.6–15.1)
GFR SERPL CREATININE-BSD FRML MDRD: 47 ML/MIN/1.73SQ M
GLUCOSE SERPL-MCNC: 184 MG/DL (ref 65–140)
GLUCOSE SERPL-MCNC: 211 MG/DL (ref 65–140)
GLUCOSE SERPL-MCNC: 224 MG/DL (ref 65–140)
HCT VFR BLD AUTO: 36.6 % (ref 34.8–46.1)
HGB BLD-MCNC: 11.3 G/DL (ref 11.5–15.4)
IMM GRANULOCYTES # BLD AUTO: 0.05 THOUSAND/UL (ref 0–0.2)
IMM GRANULOCYTES NFR BLD AUTO: 1 % (ref 0–2)
LYMPHOCYTES # BLD AUTO: 1.74 THOUSANDS/ÂΜL (ref 0.6–4.47)
LYMPHOCYTES NFR BLD AUTO: 21 % (ref 14–44)
MAGNESIUM SERPL-MCNC: 1.3 MG/DL (ref 1.9–2.7)
MCH RBC QN AUTO: 30.3 PG (ref 26.8–34.3)
MCHC RBC AUTO-ENTMCNC: 30.9 G/DL (ref 31.4–37.4)
MCV RBC AUTO: 98 FL (ref 82–98)
MONOCYTES # BLD AUTO: 0.7 THOUSAND/ÂΜL (ref 0.17–1.22)
MONOCYTES NFR BLD AUTO: 9 % (ref 4–12)
NEUTROPHILS # BLD AUTO: 5.39 THOUSANDS/ÂΜL (ref 1.85–7.62)
NEUTS SEG NFR BLD AUTO: 65 % (ref 43–75)
NRBC BLD AUTO-RTO: 0 /100 WBCS
PHOSPHATE SERPL-MCNC: 1.7 MG/DL (ref 2.3–4.1)
PLATELET # BLD AUTO: 165 THOUSANDS/UL (ref 149–390)
PMV BLD AUTO: 10.7 FL (ref 8.9–12.7)
POTASSIUM SERPL-SCNC: 4.7 MMOL/L (ref 3.5–5.3)
PROCALCITONIN SERPL-MCNC: 0.13 NG/ML
RBC # BLD AUTO: 3.73 MILLION/UL (ref 3.81–5.12)
SODIUM SERPL-SCNC: 133 MMOL/L (ref 135–147)
WBC # BLD AUTO: 8.15 THOUSAND/UL (ref 4.31–10.16)

## 2023-11-28 PROCEDURE — 83735 ASSAY OF MAGNESIUM: CPT | Performed by: INTERNAL MEDICINE

## 2023-11-28 PROCEDURE — 84100 ASSAY OF PHOSPHORUS: CPT | Performed by: INTERNAL MEDICINE

## 2023-11-28 PROCEDURE — 85025 COMPLETE CBC W/AUTO DIFF WBC: CPT | Performed by: INTERNAL MEDICINE

## 2023-11-28 PROCEDURE — 84145 PROCALCITONIN (PCT): CPT | Performed by: INTERNAL MEDICINE

## 2023-11-28 PROCEDURE — 82948 REAGENT STRIP/BLOOD GLUCOSE: CPT

## 2023-11-28 PROCEDURE — 80048 BASIC METABOLIC PNL TOTAL CA: CPT | Performed by: INTERNAL MEDICINE

## 2023-11-28 PROCEDURE — 99239 HOSP IP/OBS DSCHRG MGMT >30: CPT | Performed by: INTERNAL MEDICINE

## 2023-11-28 RX ORDER — PANTOPRAZOLE SODIUM 40 MG/1
40 TABLET, DELAYED RELEASE ORAL
Refills: 0
Start: 2023-11-29 | End: 2023-12-04 | Stop reason: SDUPTHER

## 2023-11-28 RX ORDER — INSULIN GLARGINE 100 [IU]/ML
35 INJECTION, SOLUTION SUBCUTANEOUS
Status: DISCONTINUED | OUTPATIENT
Start: 2023-11-28 | End: 2023-11-28 | Stop reason: HOSPADM

## 2023-11-28 RX ORDER — INSULIN GLARGINE 100 [IU]/ML
35 INJECTION, SOLUTION SUBCUTANEOUS
Qty: 10 ML | Refills: 0
Start: 2023-11-28 | End: 2023-12-07

## 2023-11-28 RX ORDER — CALCIUM GLUCONATE 20 MG/ML
1 INJECTION, SOLUTION INTRAVENOUS ONCE
Status: COMPLETED | OUTPATIENT
Start: 2023-11-28 | End: 2023-11-28

## 2023-11-28 RX ORDER — MAGNESIUM SULFATE HEPTAHYDRATE 40 MG/ML
2 INJECTION, SOLUTION INTRAVENOUS
Status: DISCONTINUED | OUTPATIENT
Start: 2023-11-28 | End: 2023-11-28

## 2023-11-28 RX ADMIN — MAGNESIUM SULFATE HEPTAHYDRATE 2 G: 40 INJECTION, SOLUTION INTRAVENOUS at 06:34

## 2023-11-28 RX ADMIN — INSULIN LISPRO 2 UNITS: 100 INJECTION, SOLUTION INTRAVENOUS; SUBCUTANEOUS at 11:59

## 2023-11-28 RX ADMIN — OMEGA-3 FATTY ACIDS CAP 1000 MG 1000 MG: 1000 CAP at 08:04

## 2023-11-28 RX ADMIN — POTASSIUM & SODIUM PHOSPHATES POWDER PACK 280-160-250 MG 2 PACKET: 280-160-250 PACK at 08:04

## 2023-11-28 RX ADMIN — BACITRACIN ZINC 1 SMALL APPLICATION: 500 OINTMENT TOPICAL at 08:04

## 2023-11-28 RX ADMIN — POTASSIUM CHLORIDE 20 MEQ: 1.5 SOLUTION ORAL at 08:04

## 2023-11-28 RX ADMIN — METOPROLOL SUCCINATE 50 MG: 50 TABLET, EXTENDED RELEASE ORAL at 08:04

## 2023-11-28 RX ADMIN — PANTOPRAZOLE SODIUM 40 MG: 40 TABLET, DELAYED RELEASE ORAL at 05:17

## 2023-11-28 RX ADMIN — POTASSIUM CHLORIDE 20 MEQ: 1.5 SOLUTION ORAL at 11:59

## 2023-11-28 RX ADMIN — Medication 400 MCG: at 08:04

## 2023-11-28 RX ADMIN — DULOXETINE HYDROCHLORIDE 20 MG: 20 CAPSULE, DELAYED RELEASE ORAL at 08:04

## 2023-11-28 RX ADMIN — AMLODIPINE BESYLATE 5 MG: 5 TABLET ORAL at 08:04

## 2023-11-28 RX ADMIN — ENOXAPARIN SODIUM 30 MG: 30 INJECTION SUBCUTANEOUS at 08:04

## 2023-11-28 RX ADMIN — CALCIUM GLUCONATE 1 G: 20 INJECTION, SOLUTION INTRAVENOUS at 08:52

## 2023-11-28 RX ADMIN — POTASSIUM & SODIUM PHOSPHATES POWDER PACK 280-160-250 MG 2 PACKET: 280-160-250 PACK at 11:59

## 2023-11-28 RX ADMIN — ASPIRIN 81 MG: 81 TABLET, COATED ORAL at 08:04

## 2023-11-28 RX ADMIN — CHOLECALCIFEROL TAB 10 MCG (400 UNIT) 800 UNITS: 10 TAB at 08:04

## 2023-11-28 RX ADMIN — LEVOTHYROXINE SODIUM 50 MCG: 50 TABLET ORAL at 05:17

## 2023-11-28 RX ADMIN — INSULIN LISPRO 1 UNITS: 100 INJECTION, SOLUTION INTRAVENOUS; SUBCUTANEOUS at 07:40

## 2023-11-28 NOTE — CASE MANAGEMENT
Case Management Discharge Planning Note    Patient name Loyd Meyers  Location [address] [facility] [place] 207/-79 MRN [id number]  : 1945 Date 2023       Current Admission Date: 2023  Current Admission Diagnosis:Hypercalcemia   Patient Active Problem List    Diagnosis Date Noted    Hypomagnesemia 2023    Fall 2023    Abnormal CT of the abdomen     Metabolic encephalopathy     Hypercalcemia 2023    Chronic cough 2023    PAD (peripheral artery disease) ([address]) 2023    Asymptomatic bilateral carotid artery stenosis 2023    Chronic pain syndrome 2022    Lumbar radiculopathy 2022    Spinal stenosis of lumbar region     Severe obesity (BMI 35.0-39. 9) with comorbidity ([address]) 2021    Mixed hyperlipidemia 2020    Acquired hypothyroidism 2020    Type 2 diabetes mellitus with stage 3b chronic kidney disease, with long-term current use of insulin ([address]) 2019    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 mellitus ([address]) 2019    Essential hypertension 2019      LOS (days): 9  Geometric Mean LOS (GMLOS) (days): 3.60  Days to GMLOS:-5.1     OBJECTIVE:  Risk of Unplanned Readmission Score: 19.02         Current admission status: Inpatient   Preferred Pharmacy:   [address] [place] [address] #[id number] Christina Parish, [address] [place] [address]  Phone: [phone] Fax: [phone]    Primary Care Provider: Lis Negron MD    Primary Insurance: MEDICARE  Secondary Insurance: COMMERCIAL MISCELLANEOUS    DISCHARGE DETAILS:  Pt neighbor Elizabeth Douglass came to transport pt home, however refused saying"I don't want the financial responsibility" and would not transport. TC to [facility], spoke to Nicole Rodriguez with the neighbor listening via speaker phone. Pt has been medically cleared by [facility] and has [address] services set up. As per MIGUEL she will get intake out to them ASAP to determine if eligible for waiver services.   Pt made aware of same.  TC to [employer], spoke to [place] and set up [employer] for pt and SO to go home.                                                                                         IMM Given (Date):: 11/28/23

## 2023-11-28 NOTE — CASE MANAGEMENT
Case Management Discharge Planning Note    Patient name Cristy Garrido  Location [address] [facility] [place] 207/-81 MRN [id number]  : 1945 Date 2023       Current Admission Date: 2023  Current Admission Diagnosis:Hypercalcemia   Patient Active Problem List    Diagnosis Date Noted    Hypomagnesemia 2023    Fall 2023    Abnormal CT of the abdomen     Metabolic encephalopathy     Hypercalcemia 2023    Chronic cough 2023    PAD (peripheral artery disease) ([address]) 2023    Asymptomatic bilateral carotid artery stenosis 2023    Chronic pain syndrome 2022    Lumbar radiculopathy 2022    Spinal stenosis of lumbar region     Severe obesity (BMI 35.0-39. 9) with comorbidity ([address]) 2021    Mixed hyperlipidemia 2020    Acquired hypothyroidism 2020    Type 2 diabetes mellitus with stage 3b chronic kidney disease, with long-term current use of insulin ([address]) 2019    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 mellitus ([address]) 2019    Essential hypertension 2019      LOS (days): 9  Geometric Mean LOS (GMLOS) (days): 3.60  Days to GMLOS:-5     OBJECTIVE:  Risk of Unplanned Readmission Score: 18.78         Current admission status: Inpatient   Preferred Pharmacy:   [employer] [facility] [address] [facility] [address] [place] [address] Road [address]  Phone: [phone] Fax: [phone]    Primary Care Provider: Triston Jasmine MD    Primary Insurance: MEDICARE  Secondary Insurance: COMMERCIAL MISCELLANEOUS    DISCHARGE DETAILS:       Freedom of Choice: Yes  Comments - Freedom of Choice: Pt no longer wants STR, wants HHC, referrals made via [address] Pt chose [facility] from the accepting providers.   reserved in  [facility]         Is the patient interested in [address] at discharge?: Yes  [facility] requested[de-identified] Nursing, Occupational Therapy, [address] [place] [address] Name[de-identified] Shelli Provider[de-identified] PCP  Home Health Services Needed[de-identified] Strengthening/Theraputic Exercises to Improve Function, Gait/ADL Training, Evaluate Functional Status and Safety  Homebound Criteria Met[de-identified] Uses an Assist Device (i.e. cane, walker, etc), Requires the Assistance of Another Person for Safe Ambulation or to Leave the Home  Supporting Clincal Findings[de-identified] Fatigues Easliy in [employer], [employer]    Received call back from Neeraj Marjudy who will transport the pt and SO home at 12:30. Notified Susanna MIXON of same.                                                        IMM Given (Date):: 11/28/23  IMM Given to[de-identified] Patient (Reviewed the IMM with the pt who is ikn agreement with same.)

## 2023-11-28 NOTE — CASE MANAGEMENT
Case Management Discharge Planning Note    Patient name Cesilia Calles  Location [address] [facility] [place] 207/-38 MRN [id number]  : 1945 Date 2023       Current Admission Date: 2023  Current Admission Diagnosis:Hypercalcemia   Patient Active Problem List    Diagnosis Date Noted    Hypomagnesemia 2023    Fall 2023    Abnormal CT of the abdomen     Metabolic encephalopathy     Hypercalcemia 2023    Chronic cough 2023    PAD (peripheral artery disease) ([address]) 2023    Asymptomatic bilateral carotid artery stenosis 2023    Chronic pain syndrome 2022    Lumbar radiculopathy 2022    Spinal stenosis of lumbar region     Severe obesity (BMI 35.0-39. 9) with comorbidity ([address]) 2021    Mixed hyperlipidemia 2020    Acquired hypothyroidism 2020    Type 2 diabetes mellitus with stage 3b chronic kidney disease, with long-term current use of insulin ([address]) 2019    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 mellitus ([address]) 2019    Essential hypertension 2019      LOS (days): 9  Geometric Mean LOS (GMLOS) (days): 3.60  Days to GMLOS:-5.2     OBJECTIVE:  Risk of Unplanned Readmission Score: 19.04         Current admission status: Inpatient   Preferred Pharmacy:   [address] [place] [address] #[id number] Sylvie Hidalgo, [address] [place] [address] Road [address]  Phone: [phone] Fax: [phone]    Primary Care Provider: Sharee Iniguez MD    Primary Insurance: MEDICARE  Secondary Insurance: COMMERCIAL MISCELLANEOUS    DISCHARGE DETAILS:  Received a TC from Ba at [facility] for intake information. Provided information to [facility]. [facility] aware pt is being DC to home with the SO today. Pt will be transported via [employer] at 1330. Provided a walker to the pt, ordered via [place].                                                                                         IMM Given (Date):: 23

## 2023-11-28 NOTE — PROGRESS NOTES
[address]  Progress Note  Name: Tina Quiros  MRN: [id number]  Unit/Bed#: -01 I Date of Admission: 11/19/2023   Date of Service: 11/28/2023 I Hospital Day: 9    Assessment/Plan   * Hypercalcemia  Assessment & Plan  Patient presented with weakness, confusion, frequent falls, found to have hypercalcemia  Patient reports she has been taking Tums frequently for heartburn  Calcium level now slightly low  Trend and replete as needed  Repeat BMP     Metabolic encephalopathy  Assessment & Plan  Likely secondary to hypercalcemia as well as likely mild cognitive impairment with delirium. CT head negative for any acute findings  MRI brain negative for acute findings  The patient's son and step son describes her as being sharp for a mental standpoint. Neurology consultation appreciated- outpatient office visit on 11/10/23 mentioned concerns for patient's memory. Likely mild cognitive impairment with delirium and hallucinations likely from hypercalcemia. Patient should not be driving per neurology  B12 1,614  TSH within normal limits  EEG ordered per neurology  May likely be secondary to worsening dementia  Patient's family interested in discharge to assisted living facility  Patient's mentation seems to be improving and may be back to baseline    Hypomagnesemia  Assessment & Plan  Continue to monitor and replete as needed    [address]  Patient had a fall in her room on 11/22/2023. Notified by RN. CT head negative for any acute findings. X-rays of hip negative for any acute findings. Patient had laceration to right ring finger. Surgical team has been consulted. Wound care ordered per note recs. X-ray performed of right hand, Comminuted distal 4th phalangeal fracture.  Reviewed with hand surgery on-call (Dr. Lisa Nayak) no intervention required at this time   PT/OT eval and recommended STR    Abnormal CT of the abdomen  Assessment & Plan  Fluid attenuation lesion adjacent to the right colon of uncertain etiology   Outpatient MRI abdomen     PAD (peripheral artery disease) ([address])  Assessment & Plan  Follows w/ vascular  Continue aspirin, statin    Severe obesity (BMI 35.0-39. 9) with comorbidity ([address])  Assessment & Plan  Patient starting on Ozempic, monitor weight loss  Healthy diet lifestyle modification recommended    Acquired hypothyroidism  Assessment & Plan  Continue levothyroxine    Essential hypertension  Assessment & Plan  Continue home medication with hold parameters    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 mellitus ([address])  Assessment & Plan  Creat baseline 1.4 w/ HVD6Q  Nephrology consultation appreciated- now signed off  Trend BMP    Type 2 diabetes mellitus with stage 3b chronic kidney disease, with long-term current use of insulin [facility])  Assessment & Plan  Lab Results   Component Value Date    HGBA1C 9.9 (H) 10/26/2023       Recent Labs     11/27/23  1115 11/27/23  1621 11/27/23  2109 11/28/23  0734   POCGLU 339* 255* 197* 184*       Blood Sugar Average: Last 72 hrs:  (P) [id number]  Patient following with endocrinology  Increase Lantus 25 units to 35 units qhs  Hold Ozempic and resume at discharge  Sliding scale insulin coverage while in the hospital                 VTE Pharmacologic Prophylaxis: VTE Score: 4 Moderate Risk (Score 3-4) - Pharmacological DVT Prophylaxis Ordered: enoxaparin (Lovenox). Mobility:   Basic Mobility Inpatient Raw Score: 16  JH-HLM Goal: 5: Stand one or more mins  JH-HLM Achieved: 5: Stand (1 or more minutes)  HLM Goal achieved. Continue to encourage appropriate mobility. Patient Centered Rounds: I performed bedside rounds with nursing staff today. Discussions with Specialists or Other Care Team Provider: Case management    Education and Discussions with Family / Patient: Updated  (son) via phone. Total Time Spent on Date of Encounter in care of patient: 35 mins.  This time was spent on one or more of the following: performing physical exam; counseling and coordination of care; obtaining or reviewing history; documenting in the medical record; reviewing/ordering tests, medications or procedures; communicating with other healthcare professionals and discussing with patient's family/caregivers. Current Length of Stay: 9 day(s)  Current Patient Status: Inpatient   Certification Statement: The patient will continue to require additional inpatient hospital stay due to worsening dementia requiring assisted living facility  Discharge Plan: Anticipate discharge in 24-48 hrs to rehab facility. Code Status: Level 3 - DNAR and DNI    Subjective:   Patient seen and examined at bedside. No acute events overnight. Denies chest pain, SOB, diaphoresis, nausea/vomiting/diarrhea, fevers/chills. Objective:     Vitals:   Temp (24hrs), Av.9 °F (36.6 °C), Min:97.4 °F (36.3 °C), Max:98.3 °F (36.8 °C)    Temp:  [97.4 °F (36.3 °C)-98.3 °F (36.8 °C)] 97.4 °F (36.3 °C)  HR:  [70-87] 79  Resp:  [16-20] 20  BP: (122-144)/(57-67) 144/65  SpO2:  [94 %-98 %] 98 %  Body mass index is 33.43 kg/m². Input and Output Summary (last 24 hours): Intake/Output Summary (Last 24 hours) at 2023 3449  Last data filed at 2023 0600  Gross per 24 hour   Intake 250 ml   Output 725 ml   Net -475 ml       Physical Exam:   Physical Exam  Vitals and nursing note reviewed. Constitutional:       General: She is not in acute distress. Appearance: She is well-developed. HENT:      Head: Normocephalic and atraumatic. Eyes:      Conjunctiva/sclera: Conjunctivae normal.   Cardiovascular:      Rate and Rhythm: Normal rate and regular rhythm. Heart sounds: No murmur heard. Pulmonary:      Effort: Pulmonary effort is normal. No respiratory distress. Breath sounds: Normal breath sounds. Abdominal:      Palpations: Abdomen is soft. Tenderness: There is no abdominal tenderness.    Musculoskeletal:         General: No swelling. Cervical back: Neck supple. Skin:     General: Skin is warm and dry. Capillary Refill: Capillary refill takes less than 2 seconds. Neurological:      Mental Status: She is alert. Psychiatric:         Mood and Affect: Mood normal.         Additional Data:     Labs:  Results from last 7 days   Lab Units 11/28/23  0448   WBC Thousand/uL 8.15   HEMOGLOBIN g/dL 11.3*   HEMATOCRIT % 36.6   PLATELETS Thousands/uL 165   NEUTROS PCT % 65   LYMPHS PCT % 21   MONOS PCT % 9   EOS PCT % 3     Results from last 7 days   Lab Units 11/28/23  0448 11/25/23  0458 11/24/23  0520   SODIUM mmol/L 133*   < > 138   POTASSIUM mmol/L 4.7   < > 3.5   CHLORIDE mmol/L 106   < > 104   CO2 mmol/L 21   < > 27   BUN mg/dL 24   < > 17   CREATININE mg/dL 1.12   < > 1.37*   ANION GAP mmol/L 6   < > 7   CALCIUM mg/dL 7.5*   < > 7.9*   ALBUMIN g/dL  --   --  3.2*   TOTAL BILIRUBIN mg/dL  --   --  0.38   ALK PHOS U/L  --   --  45   ALT U/L  --   --  4*   AST U/L  --   --  12*   GLUCOSE RANDOM mg/dL 224*   < > 159*    < > = values in this interval not displayed.          Results from last 7 days   Lab Units 11/28/23  0734 11/27/23  2109 11/27/23  1621 11/27/23  1115 11/27/23  0704 11/26/23  2114 11/26/23  1607 11/26/23  1047 11/26/23  0652 11/25/23  2109 11/25/23  1622 11/25/23  1107   POC GLUCOSE mg/dl 184* 197* 255* 339* 134 212* 273* 208* 206* 275* 240* 290*         Results from last 7 days   Lab Units 11/28/23  0448   PROCALCITONIN ng/ml 0.13       Lines/Drains:  Invasive Devices       Peripheral Intravenous Line  Duration             Peripheral IV 11/26/23 Proximal;Right;Ventral (anterior) Forearm 2 days              Drain  Duration             External Urinary Catheter 7 days                          Imaging: Reviewed radiology reports from this admission including: MRI brain    Recent Cultures (last 7 days):         Last 24 Hours Medication List:   Current Facility-Administered Medications   Medication Dose Route Frequency Provider Last Rate    acetaminophen  650 mg Oral Q4H PRN Ekaterina Subramanian PA-C      amLODIPine  5 mg Oral Daily [facility], NANCY      aspirin  81 mg Oral Daily [address] [place] [address]      atorvastatin  80 mg Oral Daily With [employer]NC[employer]      bacitracin  1 small application Topical Daily Derrell CopperNANCY      calcium gluconate  1 g Intravenous Once Trudi Solomon DO      cholecalciferol  800 Units Oral Daily Shantell Hassan      dextromethorphan-guaiFENesin  10 mL Oral Q4H PRN Derrell NANCY Tarango      DULoxetine  20 mg Oral Daily [facility]      enoxaparin  30 mg Subcutaneous Daily Luda Cintron PA-C      fish oil  1,000 mg Oral BID Ekaterina Subramanian PA-C      fluticasone  2 spray Nasal Daily Ekaterina Subramanian PA-C      folic acid  626 mcg Oral Daily Luda Cintron PA-C      insulin glargine  35 Units Subcutaneous HS Trudi Solomon DO      insulin lispro  1-5 Units Subcutaneous HS Luda Cintron PA-C      insulin lispro  1-6 Units Subcutaneous TID AC Luda Montejo PA-C      levothyroxine  50 mcg Oral Daily [facility]      metoprolol succinate  50 mg Oral Daily Luda Cintron PA-C      ondansetron  4 mg Intravenous Q6H PRN Ekaterina Subramanian PA-C      pantoprazole  40 mg Oral Early Morning Ekaterina Subramanian PA-C      potassium chloride  20 mEq Oral TID With Meals Jada Park MD      potassium-sodium phosphates  2 packet Oral 4x Daily (with meals and at bedtime) Ekaterina Subramanian PA-C          Today, Patient Was Seen By: Trudi Solomon DO    **Please Note: This note may have been constructed using a voice recognition system. **

## 2023-11-28 NOTE — PLAN OF CARE
Problem: PAIN - ADULT  Goal: Verbalizes/displays adequate comfort level or baseline comfort level  Description: Interventions:  - Encourage patient to monitor pain and request assistance  - Assess pain using appropriate pain scale  - Administer analgesics based on type and severity of pain and evaluate response  - Implement non-pharmacological measures as appropriate and evaluate response  - Consider cultural and social influences on pain and pain management  - Notify physician/advanced practitioner if interventions unsuccessful or patient reports new pain  Outcome: Adequate for Discharge     Problem: INFECTION - ADULT  Goal: Absence or prevention of progression during hospitalization  Description: INTERVENTIONS:  - Assess and monitor for signs and symptoms of infection  - Monitor lab/diagnostic results  - Monitor all insertion sites, i.e. indwelling lines, tubes, and drains  - Monitor endotracheal if appropriate and nasal secretions for changes in amount and color  - [place] appropriate cooling/warming therapies per order  - Administer medications as ordered  - Instruct and encourage patient and family to use good hand hygiene technique  - Identify and instruct in appropriate isolation precautions for identified infection/condition  Outcome: Adequate for Discharge  Goal: Absence of fever/infection during neutropenic period  Description: INTERVENTIONS:  - Monitor WBC    Outcome: Adequate for Discharge     Problem: SAFETY ADULT  Goal: Patient will remain free of falls  Description: INTERVENTIONS:  - Educate patient/family on patient safety including physical limitations  - Instruct patient to call for assistance with activity   - Consult OT/PT to assist with strengthening/mobility   - Keep Call bell within reach  - Keep bed low and locked with side rails adjusted as appropriate  - Keep care items and personal belongings within reach  - Initiate and maintain comfort rounds  - Make Fall Risk Sign visible to staff  - Offer Toileting every 2 Hours, in advance of need  - Initiate/Maintain bed/chair alarm  - Obtain necessary fall risk management equipment: alarms, restraints, yellow socks. - Apply yellow socks and bracelet for high fall risk patients  - Consider moving patient to room near nurses station  Outcome: Adequate for Discharge  Goal: Maintain or return to baseline ADL function  Description: INTERVENTIONS:  -  Assess patient's ability to carry out ADLs; assess patient's baseline for ADL function and identify physical deficits which impact ability to perform ADLs (bathing, care of mouth/teeth, toileting, grooming, dressing, etc.)  - Assess/evaluate cause of self-care deficits   - Assess range of motion  - Assess patient's mobility; develop plan if impaired  - Assess patient's need for assistive devices and provide as appropriate  - Encourage maximum independence but intervene and supervise when necessary  - Involve family in performance of ADLs  - Assess for home care needs following discharge   - Consider OT consult to assist with ADL evaluation and planning for discharge  - Provide patient education as appropriate  Outcome: Adequate for Discharge  Goal: Maintains/Returns to pre admission functional level  Description: INTERVENTIONS:  - Perform AM-PAC 6 Click Basic Mobility/ Daily Activity assessment daily.  - Set and communicate daily mobility goal to care team and patient/family/caregiver. - Collaborate with rehabilitation services on mobility goals if consulted  - Perform Range of Motion 3 times a day. - Reposition patient every 2 hours.   - Dangle patient 3 times a day  - Stand patient 3 times a day  - Ambulate patient 3 times a day  - Out of bed to chair 3 times a day   - Out of bed for meals 3 times a day  - Out of bed for toileting  - Record patient progress and toleration of activity level   Outcome: Adequate for Discharge     Problem: DISCHARGE PLANNING  Goal: Discharge to home or other facility with appropriate resources  Description: INTERVENTIONS:  - Identify barriers to discharge w/patient and caregiver  - Arrange for needed discharge resources and transportation as appropriate  - Identify discharge learning needs (meds, wound care, etc.)  - Refer to Case Management Department for coordinating discharge planning if the patient needs post-hospital services based on physician/advanced practitioner order or complex needs related to functional status, cognitive ability, or social support system  Outcome: Adequate for Discharge     Problem: Knowledge Deficit  Goal: Patient/family/caregiver demonstrates understanding of disease process, treatment plan, medications, and discharge instructions  Description: Complete learning assessment and assess knowledge base.   Interventions:  - Provide teaching at level of understanding  - Provide teaching via preferred learning methods  Outcome: Adequate for Discharge     Problem: Prexisting or High Potential for Compromised Skin Integrity  Goal: Skin integrity is maintained or improved  Description: INTERVENTIONS:  - Identify patients at risk for skin breakdown  - Assess and monitor skin integrity  - Assess and monitor nutrition and hydration status  - Monitor labs   - Assess for incontinence   - Turn and reposition patient  - Assist with mobility/ambulation  - Relieve pressure over bony prominences  - Avoid friction and shearing  - Provide appropriate hygiene as needed including keeping skin clean and dry  - Evaluate need for skin moisturizer/barrier cream  - Collaborate with interdisciplinary team   - Patient/family teaching  - Consider wound care consult   Outcome: Adequate for Discharge

## 2023-11-28 NOTE — ASSESSMENT & PLAN NOTE
Likely secondary to hypercalcemia as well as likely mild cognitive impairment with delirium. CT head negative for any acute findings  MRI brain negative for acute findings  The patient's son and step son describes her as being sharp for a mental standpoint. Neurology consultation appreciated- outpatient office visit on 11/10/23 mentioned concerns for patient's memory. Likely mild cognitive impairment with delirium and hallucinations likely from hypercalcemia.   Patient should not be driving per neurology  B12 1,614  TSH within normal limits  EEG ordered per neurology  May likely be secondary to worsening dementia  Patient's family interested in discharge to assisted living facility  Patient's mentation seems to be improving and may be back to baseline

## 2023-11-28 NOTE — ASSESSMENT & PLAN NOTE
Patient had a fall in her room on 11/22/2023. Notified by RN. CT head negative for any acute findings. X-rays of hip negative for any acute findings. Patient had laceration to right ring finger. Surgical team has been consulted. Wound care ordered per note recs. X-ray performed of right hand, Comminuted distal 4th phalangeal fracture.  Reviewed with hand surgery on-call (Dr. Silva Cohen) no intervention required at this time   PT/OT eval and recommended STR The new medicine for your diarrhea is called lomotil  Take 1 pill no more frequently than every 4 hours

## 2023-11-28 NOTE — ASSESSMENT & PLAN NOTE
Patient presented with weakness, confusion, frequent falls, found to have hypercalcemia  Patient reports she has been taking Tums frequently for heartburn  Calcium level now slightly low  Trend and replete as needed  Repeat BMP

## 2023-11-28 NOTE — DISCHARGE SUMMARY
[address]  Discharge- Matilda Lapping 1945, 66 y.o. female MRN: [id number]  Unit/Bed#: [facility] Encounter: [id number]  Primary Care Provider: Laron Estrella MD   Date and time admitted to hospital: 11/19/2023  4:39 PM    * Hypercalcemia  Assessment & Plan  Patient presented with weakness, confusion, frequent falls, found to have hypercalcemia  Patient reports she has been taking Tums frequently for heartburn  Calcium level now slightly low  Trend and replete as needed  Repeat BMP     Metabolic encephalopathy  Assessment & Plan  Likely secondary to hypercalcemia as well as likely mild cognitive impairment with delirium. CT head negative for any acute findings  MRI brain negative for acute findings  The patient's son and step son describes her as being sharp for a mental standpoint. Neurology consultation appreciated- outpatient office visit on 11/10/23 mentioned concerns for patient's memory. Likely mild cognitive impairment with delirium and hallucinations likely from hypercalcemia. Patient should not be driving per neurology  B12 1,614  TSH within normal limits  EEG ordered per neurology  May likely be secondary to worsening dementia  Patient's family interested in discharge to assisted living facility  Patient's mentation seems to be improving and may be back to baseline    Hypomagnesemia  Assessment & Plan  Continue to monitor and replete as needed    [address]  Patient had a fall in her room on 11/22/2023. Notified by RN. CT head negative for any acute findings. X-rays of hip negative for any acute findings. Patient had laceration to right ring finger. Surgical team has been consulted. Wound care ordered per note recs. X-ray performed of right hand, Comminuted distal 4th phalangeal fracture.  Reviewed with hand surgery on-call (Dr. Lisa Nayak) no intervention required at this time   PT/OT eval and recommended STR    Abnormal CT of the abdomen  Assessment & Plan  Fluid attenuation lesion adjacent to the right colon of uncertain etiology   Outpatient MRI abdomen     PAD (peripheral artery disease) ([address])  Assessment & Plan  Follows w/ vascular  Continue aspirin, statin    Severe obesity (BMI 35.0-39. 9) with comorbidity ([address])  Assessment & Plan  Patient starting on Ozempic, monitor weight loss  Healthy diet lifestyle modification recommended    Acquired hypothyroidism  Assessment & Plan  Continue levothyroxine    Essential hypertension  Assessment & Plan  Continue home medication with hold parameters    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 mellitus ([address])  Assessment & Plan  Creat baseline 1.4 w/ [id number]  Nephrology consultation appreciated- now signed off  Trend BMP    Type 2 diabetes mellitus with stage 3b chronic kidney disease, with long-term current use of insulin [facility])  Assessment & Plan  Lab Results   Component Value Date    HGBA1C 9.9 (H) 10/26/2023       Recent Labs     11/27/23  1115 11/27/23  1621 11/27/23  2109 11/28/23  0734   POCGLU 339* 255* 197* 184*       Blood Sugar Average: Last 72 hrs:  (P) [id number]  Patient following with endocrinology  Increase Lantus 25 units to 35 units qhs  Hold Ozempic and resume at discharge  Sliding scale insulin coverage while in the hospital                Medical Problems       Resolved Problems  Date Reviewed: 11/28/2023   None         Admission Date:   Admission Orders (From admission, onward)       Ordered        11/19/23 1941  INPATIENT ADMISSION  Once                            Admitting Diagnosis: Hypercalcemia [E83.52]  Head injury [S09.90XA]  Generalized weakness [R53.1]    HPI: Juan Okeefe is a 66 y.o. female with a PMH of obesity, hypertension, hyperlipidemia, diabetes mellitus type 2 with chronic kidney disease stage IIIb, hypothyroidism, PAD who presents with weakness and falls. Patient reports chronic back pain with hx of 3 lumbar epidurals.  She has weakness started yesterday and reports falling going to the bathroom. Denies head injury, no back, hip, knee pain. Had episode of vomiting yesterday and notes heartburn frequently and notes she has been taking Tums frequently recently. Procedures Performed:   Orders Placed This Encounter   Procedures    Fast Ultrasound       Summary of Hospital Course: The patient remained hemodynamically stable and saturating well on room air throughout her hospital course. The patient's calcium level improved. PT/OT recommended short-term rehab however on 11/28/2023 the patient stated she would like to be discharged with home health care as she feels as though she can take care of herself and her  with the assistance of nurses in the home. She was strongly encouraged to resume all preadmission medications at all preadmission dosages with the exception of Tums as she was found to be hypercalcemic on presentation. She was also encouraged to follow-up with her PCP within 7-14 days. Significant Findings, Care, Treatment and Services Provided: Not applicable    Complications: Not applicable    Condition at Discharge: stable         Discharge instructions/Information to patient and family:   See after visit summary for information provided to patient and family. Provisions for Follow-Up Care:  See after visit summary for information related to follow-up care and any pertinent home health orders. PCP: Flakito Kraft MD    Disposition:  Home with home health care    Planned Readmission: No    Discharge Statement   I spent 45 minutes discharging the patient. This time was spent on the day of discharge. I had direct contact with the patient on the day of discharge. Additional documentation is required if more than 30 minutes were spent on discharge. Discharge Medications:  See after visit summary for reconciled discharge medications provided to patient and family.

## 2023-11-28 NOTE — ASSESSMENT & PLAN NOTE
Lab Results   Component Value Date    HGBA1C 9.9 (H) 10/26/2023       Recent Labs     11/27/23  1115 11/27/23  1621 11/27/23  2109 11/28/23  0734   POCGLU 339* 255* 197* 184*       Blood Sugar Average: Last 72 hrs:  (P) 823.8942979887987563  Patient following with endocrinology  Increase Lantus 25 units to 35 units qhs  Hold Ozempic and resume at discharge  Sliding scale insulin coverage while in the hospital

## 2023-11-28 NOTE — PLAN OF CARE
Problem: PAIN - ADULT  Goal: Verbalizes/displays adequate comfort level or baseline comfort level  Description: Interventions:  - Encourage patient to monitor pain and request assistance  - Assess pain using appropriate pain scale  - Administer analgesics based on type and severity of pain and evaluate response  - Implement non-pharmacological measures as appropriate and evaluate response  - Consider cultural and social influences on pain and pain management  - Notify physician/advanced practitioner if interventions unsuccessful or patient reports new pain  Outcome: Progressing     Problem: SAFETY ADULT  Goal: Patient will remain free of falls  Description: INTERVENTIONS:  - Educate patient/family on patient safety including physical limitations  - Instruct patient to call for assistance with activity   - Consult OT/PT to assist with strengthening/mobility   - Keep Call bell within reach  - Keep bed low and locked with side rails adjusted as appropriate  - Keep care items and personal belongings within reach  - Initiate and maintain comfort rounds  - Make Fall Risk Sign visible to staff  - Offer Toileting every 2 Hours, in advance of need  - Initiate/Maintain bed/chair alarm  - Obtain necessary fall risk management equipment: alarms, restraints, yellow socks.   - Apply yellow socks and bracelet for high fall risk patients  - Consider moving patient to room near nurses station  Outcome: Progressing

## 2023-11-28 NOTE — CASE MANAGEMENT
Case Management Discharge Planning Note    Patient name Pavel Giles  Location [address] [facility] 207/-96 MRN [id number]  : 1945 Date 2023       Current Admission Date: 2023  Current Admission Diagnosis:Hypercalcemia   Patient Active Problem List    Diagnosis Date Noted    Hypomagnesemia 2023    Fall 2023    Abnormal CT of the abdomen     Metabolic encephalopathy     Hypercalcemia 2023    Chronic cough 2023    PAD (peripheral artery disease) ([address]) 2023    Asymptomatic bilateral carotid artery stenosis 2023    Chronic pain syndrome 2022    Lumbar radiculopathy 2022    Spinal stenosis of lumbar region     Severe obesity (BMI 35.0-39. 9) with comorbidity ([address]) 2021    Mixed hyperlipidemia 2020    Acquired hypothyroidism 2020    Type 2 diabetes mellitus with stage 3b chronic kidney disease, with long-term current use of insulin ([address]) 2019    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 mellitus ([address]) 2019    Essential hypertension 2019      LOS (days): 9  Geometric Mean LOS (GMLOS) (days): 3.60  Days to GMLOS:-5     OBJECTIVE:  Risk of Unplanned Readmission Score: 18.78     Current admission status: Inpatient   Preferred Pharmacy:   [address] [place] [address] [facility] [address] [place] [id number]  Phone: [phone] Fax: [phone]    Primary Care Provider: Vlad Marshall MD    Primary Insurance: MEDICARE  Secondary Insurance: COMMERCIAL MISCELLANEOUS    DISCHARGE DETAILS:       Freedom of Choice: Yes  Comments - Freedom of Choice: Pt no longer wants STR, wants HHC, referrals made via [address] Pt chose [facility] from the accepting providers.   reserved in [address]         Is the patient interested in [facility] at discharge?: Yes  [facility] requested[de-identified] Nursing, Occupational Therapy, Physical [facility] Name[de-identified] [facility]  Home Health Follow-Up Provider[de-identified] PCP  Home Health Services Needed[de-identified] Strengthening/Theraputic Exercises to Improve Function, Gait/ADL Training, Evaluate Functional Status and Safety  Homebound Criteria Met[de-identified] Uses an Assist Device (i.e. cane, walker, etc), Requires the Assistance of Another Person for Safe Ambulation or to Leave the Home  Supporting Clincal Findings[de-identified] Fatigues Easliy in [employer], Limited Endurance    IMM Given (Date):: 11/28/23  IMM Given to[de-identified] Patient (Reviewed the IMM with the pt who is ikn agreement with same.)  Pt indicated she does not now want to go to [facility]. PT reports she would rather go home with [facility] [address] services. Referrals made via [facility]. Pt chose [facility] from the accepting providers. TC to pt neighbor Juliana Holter at [phone] ( took pt car keys and car), unable to leave a  at this time. Will attempt again.

## 2023-11-28 NOTE — NURSING NOTE
Pt DC to home via transport car with significant other in stable condition. All belongings packed and sent. IV catheter removed fully intact. AVS reviewed with pt.

## 2023-11-29 ENCOUNTER — HOME CARE VISIT (OUTPATIENT)
Dept: HOME HEALTH SERVICES | Facility: HOME HEALTHCARE | Age: 78
End: 2023-11-29

## 2023-11-29 ENCOUNTER — TELEPHONE (OUTPATIENT)
Dept: FAMILY MEDICINE CLINIC | Facility: CLINIC | Age: 78
End: 2023-11-29

## 2023-11-30 ENCOUNTER — HOME CARE VISIT (OUTPATIENT)
Dept: HOME HEALTH SERVICES | Facility: HOME HEALTHCARE | Age: 78
End: 2023-11-30
Payer: MEDICARE

## 2023-11-30 PROCEDURE — 400013 VN SOC

## 2023-11-30 PROCEDURE — 10330081 VN NO-PAY CLAIM PROCEDURE

## 2023-11-30 PROCEDURE — G0299 HHS/HOSPICE OF RN EA 15 MIN: HCPCS

## 2023-12-01 ENCOUNTER — HOME CARE VISIT (OUTPATIENT)
Dept: HOME HEALTH SERVICES | Facility: HOME HEALTHCARE | Age: 78
End: 2023-12-01
Payer: MEDICARE

## 2023-12-04 ENCOUNTER — TRANSITIONAL CARE MANAGEMENT (OUTPATIENT)
Dept: FAMILY MEDICINE CLINIC | Facility: CLINIC | Age: 78
End: 2023-12-04

## 2023-12-04 ENCOUNTER — HOME CARE VISIT (OUTPATIENT)
Dept: HOME HEALTH SERVICES | Facility: HOME HEALTHCARE | Age: 78
End: 2023-12-04
Payer: MEDICARE

## 2023-12-04 VITALS
OXYGEN SATURATION: 98 % | HEART RATE: 96 BPM | DIASTOLIC BLOOD PRESSURE: 64 MMHG | RESPIRATION RATE: 20 BRPM | TEMPERATURE: 97.6 F | SYSTOLIC BLOOD PRESSURE: 124 MMHG

## 2023-12-04 VITALS — OXYGEN SATURATION: 98 % | DIASTOLIC BLOOD PRESSURE: 80 MMHG | SYSTOLIC BLOOD PRESSURE: 126 MMHG | HEART RATE: 82 BPM

## 2023-12-04 DIAGNOSIS — K21.9 GERD (GASTROESOPHAGEAL REFLUX DISEASE): ICD-10-CM

## 2023-12-04 PROCEDURE — G0151 HHCP-SERV OF PT,EA 15 MIN: HCPCS

## 2023-12-04 RX ORDER — PANTOPRAZOLE SODIUM 40 MG/1
40 TABLET, DELAYED RELEASE ORAL
Qty: 90 TABLET | Refills: 1 | Status: SHIPPED | OUTPATIENT
Start: 2023-12-04

## 2023-12-04 NOTE — TELEPHONE ENCOUNTER
Pt was to received pantoprazole 40 mg takes 21 QD morning #90  From the hospital and it was ordered but never sent to her pharmacy  She is asking if you can send to the [facility] in [address].     Please advise    Phone: [phone]

## 2023-12-05 ENCOUNTER — HOME CARE VISIT (OUTPATIENT)
Dept: HOME HEALTH SERVICES | Facility: HOME HEALTHCARE | Age: 78
End: 2023-12-05
Payer: MEDICARE

## 2023-12-05 VITALS
HEART RATE: 90 BPM | DIASTOLIC BLOOD PRESSURE: 88 MMHG | TEMPERATURE: 96.8 F | OXYGEN SATURATION: 98 % | SYSTOLIC BLOOD PRESSURE: 148 MMHG | RESPIRATION RATE: 20 BRPM

## 2023-12-05 PROCEDURE — G0300 HHS/HOSPICE OF LPN EA 15 MIN: HCPCS

## 2023-12-05 PROCEDURE — G0152 HHCP-SERV OF OT,EA 15 MIN: HCPCS

## 2023-12-06 VITALS — SYSTOLIC BLOOD PRESSURE: 138 MMHG | DIASTOLIC BLOOD PRESSURE: 62 MMHG

## 2023-12-06 NOTE — CASE COMMUNICATION
PT initial evaluation completed. Pt appears to be functioning at baseline, demonstrates ability to manuever throughout home without AD, but is limited due to chronic back pain and R knee pain. Pt has RW in home and is able to safely utilize as needed.

## 2023-12-07 ENCOUNTER — APPOINTMENT (OUTPATIENT)
Dept: RADIOLOGY | Facility: CLINIC | Age: 78
End: 2023-12-07
Payer: MEDICARE

## 2023-12-07 ENCOUNTER — OFFICE VISIT (OUTPATIENT)
Dept: NEPHROLOGY | Facility: CLINIC | Age: 78
End: 2023-12-07
Payer: MEDICARE

## 2023-12-07 ENCOUNTER — OFFICE VISIT (OUTPATIENT)
Dept: FAMILY MEDICINE CLINIC | Facility: CLINIC | Age: 78
End: 2023-12-07
Payer: MEDICARE

## 2023-12-07 ENCOUNTER — APPOINTMENT (OUTPATIENT)
Dept: LAB | Facility: CLINIC | Age: 78
End: 2023-12-07
Payer: MEDICARE

## 2023-12-07 VITALS
OXYGEN SATURATION: 98 % | DIASTOLIC BLOOD PRESSURE: 72 MMHG | SYSTOLIC BLOOD PRESSURE: 132 MMHG | BODY MASS INDEX: 33.86 KG/M2 | HEIGHT: 62 IN | HEART RATE: 83 BPM | RESPIRATION RATE: 16 BRPM | WEIGHT: 184 LBS | TEMPERATURE: 97.2 F

## 2023-12-07 VITALS
HEIGHT: 62 IN | DIASTOLIC BLOOD PRESSURE: 82 MMHG | SYSTOLIC BLOOD PRESSURE: 130 MMHG | WEIGHT: 184 LBS | HEART RATE: 79 BPM | OXYGEN SATURATION: 98 % | BODY MASS INDEX: 33.86 KG/M2

## 2023-12-07 DIAGNOSIS — E83.52 HYPERCALCEMIA: ICD-10-CM

## 2023-12-07 DIAGNOSIS — Z79.4 TYPE 2 DIABETES MELLITUS WITH STAGE 3B CHRONIC KIDNEY DISEASE, WITH LONG-TERM CURRENT USE OF INSULIN (HCC): ICD-10-CM

## 2023-12-07 DIAGNOSIS — R93.5 ABNORMAL CT OF THE ABDOMEN: ICD-10-CM

## 2023-12-07 DIAGNOSIS — S61.314D LACERATION OF RIGHT RING FINGER WITHOUT FOREIGN BODY WITH DAMAGE TO NAIL, SUBSEQUENT ENCOUNTER: ICD-10-CM

## 2023-12-07 DIAGNOSIS — I10 ESSENTIAL HYPERTENSION: Chronic | ICD-10-CM

## 2023-12-07 DIAGNOSIS — N18.31 STAGE 3A CHRONIC KIDNEY DISEASE (HCC): ICD-10-CM

## 2023-12-07 DIAGNOSIS — E83.42 HYPOMAGNESEMIA: ICD-10-CM

## 2023-12-07 DIAGNOSIS — S62.634G: ICD-10-CM

## 2023-12-07 DIAGNOSIS — N18.32 TYPE 2 DIABETES MELLITUS WITH STAGE 3B CHRONIC KIDNEY DISEASE, WITH LONG-TERM CURRENT USE OF INSULIN (HCC): Chronic | ICD-10-CM

## 2023-12-07 DIAGNOSIS — N17.9 AKI (ACUTE KIDNEY INJURY) (HCC): Primary | ICD-10-CM

## 2023-12-07 DIAGNOSIS — E11.22 TYPE 2 DIABETES MELLITUS WITH STAGE 3B CHRONIC KIDNEY DISEASE, WITH LONG-TERM CURRENT USE OF INSULIN (HCC): ICD-10-CM

## 2023-12-07 DIAGNOSIS — E83.52 HYPERCALCEMIA: Primary | ICD-10-CM

## 2023-12-07 DIAGNOSIS — G93.41 METABOLIC ENCEPHALOPATHY: ICD-10-CM

## 2023-12-07 DIAGNOSIS — Z79.4 TYPE 2 DIABETES MELLITUS WITH STAGE 3B CHRONIC KIDNEY DISEASE, WITH LONG-TERM CURRENT USE OF INSULIN (HCC): Chronic | ICD-10-CM

## 2023-12-07 DIAGNOSIS — E11.22 TYPE 2 DIABETES MELLITUS WITH STAGE 3B CHRONIC KIDNEY DISEASE, WITH LONG-TERM CURRENT USE OF INSULIN (HCC): Chronic | ICD-10-CM

## 2023-12-07 DIAGNOSIS — N18.32 TYPE 2 DIABETES MELLITUS WITH STAGE 3B CHRONIC KIDNEY DISEASE, WITH LONG-TERM CURRENT USE OF INSULIN (HCC): ICD-10-CM
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
## 2023-12-07 PROBLEM — R05.3 CHRONIC COUGH: Status: RESOLVED | Noted: 2023-09-01 | Resolved: 2023-12-07

## 2023-12-07 LAB
25(OH)D3 SERPL-MCNC: 44.3 NG/ML (ref 30–100)
ALBUMIN SERPL BCP-MCNC: 4 G/DL (ref 3.5–5)
ALP SERPL-CCNC: 107 U/L (ref 34–104)
ALT SERPL W P-5'-P-CCNC: 18 U/L (ref 7–52)
ANION GAP SERPL CALCULATED.3IONS-SCNC: 11 MMOL/L
AST SERPL W P-5'-P-CCNC: 17 U/L (ref 13–39)
BASOPHILS # BLD AUTO: 0.06 THOUSANDS/ÂΜL (ref 0–0.1)
BASOPHILS NFR BLD AUTO: 1 % (ref 0–1)
BILIRUB SERPL-MCNC: 0.67 MG/DL (ref 0.2–1)
BUN SERPL-MCNC: 22 MG/DL (ref 5–25)
CALCIUM SERPL-MCNC: 9.9 MG/DL (ref 8.4–10.2)
CHLORIDE SERPL-SCNC: 99 MMOL/L (ref 96–108)
CO2 SERPL-SCNC: 26 MMOL/L (ref 21–32)
CREAT SERPL-MCNC: 1.68 MG/DL (ref 0.6–1.3)
CRP SERPL QL: 3.5 MG/L
EOSINOPHIL # BLD AUTO: 0.37 THOUSAND/ÂΜL (ref 0–0.61)
EOSINOPHIL NFR BLD AUTO: 4 % (ref 0–6)
ERYTHROCYTE [DISTWIDTH] IN BLOOD BY AUTOMATED COUNT: 14 % (ref 11.6–15.1)
EST. AVERAGE GLUCOSE BLD GHB EST-MCNC: 226 MG/DL
GFR SERPL CREATININE-BSD FRML MDRD: 28 ML/MIN/1.73SQ M
GLUCOSE P FAST SERPL-MCNC: 141 MG/DL (ref 65–99)
HBA1C MFR BLD: 9.5 %
HCT VFR BLD AUTO: 39.1 % (ref 34.8–46.1)
HGB BLD-MCNC: 12.2 G/DL (ref 11.5–15.4)
IMM GRANULOCYTES # BLD AUTO: 0.03 THOUSAND/UL (ref 0–0.2)
IMM GRANULOCYTES NFR BLD AUTO: 0 % (ref 0–2)
LYMPHOCYTES # BLD AUTO: 1.81 THOUSANDS/ÂΜL (ref 0.6–4.47)
LYMPHOCYTES NFR BLD AUTO: 21 % (ref 14–44)
MAGNESIUM SERPL-MCNC: 1.3 MG/DL (ref 1.9–2.7)
MCH RBC QN AUTO: 29.8 PG (ref 26.8–34.3)
MCHC RBC AUTO-ENTMCNC: 31.2 G/DL (ref 31.4–37.4)
MCV RBC AUTO: 96 FL (ref 82–98)
MONOCYTES # BLD AUTO: 0.61 THOUSAND/ÂΜL (ref 0.17–1.22)
MONOCYTES NFR BLD AUTO: 7 % (ref 4–12)
NEUTROPHILS # BLD AUTO: 5.84 THOUSANDS/ÂΜL (ref 1.85–7.62)
NEUTS SEG NFR BLD AUTO: 67 % (ref 43–75)
NRBC BLD AUTO-RTO: 0 /100 WBCS
PHOSPHATE SERPL-MCNC: 3.9 MG/DL (ref 2.3–4.1)
PLATELET # BLD AUTO: 188 THOUSANDS/UL (ref 149–390)
PMV BLD AUTO: 11.2 FL (ref 8.9–12.7)
POTASSIUM SERPL-SCNC: 4.1 MMOL/L (ref 3.5–5.3)
PROT SERPL-MCNC: 7.2 G/DL (ref 6.4–8.4)
PTH-INTACT SERPL-MCNC: 31.9 PG/ML (ref 12–88)
RBC # BLD AUTO: 4.09 MILLION/UL (ref 3.81–5.12)
SODIUM SERPL-SCNC: 136 MMOL/L (ref 135–147)
WBC # BLD AUTO: 8.72 THOUSAND/UL (ref 4.31–10.16)

## 2023-12-07 PROCEDURE — 99215 OFFICE O/P EST HI 40 MIN: CPT | Performed by: INTERNAL MEDICINE

## 2023-12-07 PROCEDURE — 86140 C-REACTIVE PROTEIN: CPT

## 2023-12-07 PROCEDURE — 99496 TRANSJ CARE MGMT HIGH F2F 7D: CPT | Performed by: INTERNAL MEDICINE

## 2023-12-07 PROCEDURE — 83735 ASSAY OF MAGNESIUM: CPT

## 2023-12-07 PROCEDURE — 85025 COMPLETE CBC W/AUTO DIFF WBC: CPT

## 2023-12-07 PROCEDURE — 82306 VITAMIN D 25 HYDROXY: CPT

## 2023-12-07 PROCEDURE — 83036 HEMOGLOBIN GLYCOSYLATED A1C: CPT

## 2023-12-07 PROCEDURE — 83970 ASSAY OF PARATHORMONE: CPT

## 2023-12-07 PROCEDURE — 84100 ASSAY OF PHOSPHORUS: CPT

## 2023-12-07 PROCEDURE — 80053 COMPREHEN METABOLIC PANEL: CPT

## 2023-12-07 PROCEDURE — 36415 COLL VENOUS BLD VENIPUNCTURE: CPT

## 2023-12-07 PROCEDURE — 73130 X-RAY EXAM OF HAND: CPT

## 2023-12-07 RX ORDER — CEPHALEXIN 500 MG/1
500 CAPSULE ORAL EVERY 6 HOURS SCHEDULED
Qty: 28 CAPSULE | Refills: 0 | Status: SHIPPED | OUTPATIENT
Start: 2023-12-07 | End: 2023-12-14

## 2023-12-07 RX ORDER — MAGNESIUM OXIDE 400 MG/1
400 TABLET ORAL 2 TIMES DAILY
Qty: 60 TABLET | Refills: 3 | Status: SHIPPED | OUTPATIENT
Start: 2023-12-07

## 2023-12-07 NOTE — ASSESSMENT & PLAN NOTE
Lab Results   Component Value Date    EGFR 28 12/07/2023    EGFR 47 11/28/2023    EGFR 49 11/27/2023    CREATININE 1.68 (H) 12/07/2023    CREATININE 1.12 11/28/2023    CREATININE 1.08 11/27/2023   Baseline creatinine appears to be around 1.1 mg/dL. Continue to work towards repeating his baseline creatinine. As noted above unclear etiology for acute kidney injury at this time.

## 2023-12-07 NOTE — PATIENT INSTRUCTIONS
Start magnesium oxide 400 mg twice daily, take for 1 week, then reduce to once daily. yes/Continue to monitor nutrition provision and tolerance, weights, labs, skin integrity

## 2023-12-07 NOTE — PATIENT INSTRUCTIONS
Please get blood work and xrays today   Start Keflex for possible infection in finger  Get MRI of abdomen due to fluid seen on CT around colon  Follow up with hand surgeon or Dr. Angel Michaels

## 2023-12-07 NOTE — PROGRESS NOTES
[facility] [address] [place] [address]    Name: Dimitrios Clement  YOB: 1945      Assessment/Plan:    INDIGO (acute kidney injury) [facility])  Patient's kidney function is lower than typical, unclear etiology at this time. Continue supportive care, avoid nephrotoxins. Will reassess labs in about 2 weeks. If creatinine continues to be higher than typical, and additional evaluation in the form of both blood work and imaging may be indicated. Also urine studies will most likely be done at that time as well. Stage 3a chronic kidney disease [facility])  Lab Results   Component Value Date    EGFR 28 12/07/2023    EGFR 47 11/28/2023    EGFR 49 11/27/2023    CREATININE 1.68 (H) 12/07/2023    CREATININE 1.12 11/28/2023    CREATININE 1.08 11/27/2023   Baseline creatinine appears to be around 1.1 mg/dL. Continue to work towards repeating his baseline creatinine. As noted above unclear etiology for acute kidney injury at this time. Hypomagnesemia  Recommended for the patient to initiate magnesium oxide 400 mg 1 tablet twice daily for the next week. After this she will reduce to 1 tablet daily, reassess labs in 2 weeks. Essential hypertension  Continue current medications, patient's blood pressure appears to be well-controlled at this time. Type 2 diabetes mellitus with stage 3b chronic kidney disease, with long-term current use of insulin (HCC)  Continue to focus on improving blood sugar control management. Patient continues to have no proteinuria, however she is at high risk for developing diabetic nephropathy although she does not have evidence of diagnosis at this time.   Lab Results   Component Value Date    HGBA1C 9.9 (H) 10/26/2023            Problem List Items Addressed This Visit          Endocrine    Type 2 diabetes mellitus with stage 3b chronic kidney disease, with long-term current use of insulin ([address]) (Chronic)     Continue to focus on improving blood sugar control management. Patient continues to have no proteinuria, however she is at high risk for developing diabetic nephropathy although she does not have evidence of diagnosis at this time. Lab Results   Component Value Date    HGBA1C 9.9 (H) 10/26/2023          Relevant Medications    insulin detemir (LEVEMIR) 100 units/mL subcutaneous injection       Cardiovascular and Mediastinum    Essential hypertension (Chronic)     Continue current medications, patient's blood pressure appears to be well-controlled at this time. Genitourinary    Stage 3a chronic kidney disease [facility])     Lab Results   Component Value Date    EGFR 28 12/07/2023    EGFR 47 11/28/2023    EGFR 49 11/27/2023    CREATININE 1.68 (H) 12/07/2023    CREATININE 1.12 11/28/2023    CREATININE 1.08 11/27/2023   Baseline creatinine appears to be around 1.1 mg/dL. Continue to work towards repeating his baseline creatinine. As noted above unclear etiology for acute kidney injury at this time. Relevant Orders    Basic metabolic panel    INDIGO (acute kidney injury) ([address]) - Primary     Patient's kidney function is lower than typical, unclear etiology at this time. Continue supportive care, avoid nephrotoxins. Will reassess labs in about 2 weeks. If creatinine continues to be higher than typical, and additional evaluation in the form of both blood work and imaging may be indicated. Also urine studies will most likely be done at that time as well. Other    Hypomagnesemia     Recommended for the patient to initiate magnesium oxide 400 mg 1 tablet twice daily for the next week. After this she will reduce to 1 tablet daily, reassess labs in 2 weeks. Relevant Medications    magnesium oxide (MAG-OX) 400 mg tablet    Other Relevant Orders    Magnesium       Patient is an acute kidney injury of unclear etiology. Will reassess blood work in about 2 weeks.   She will work on avoiding all NSAIDs and the potential for toxins in outpatient setting. Additional workup including imaging may be indicated depending on how blood work progresses. Subjective:      Patient ID: Kian Albert is a 66 y.o. female. Patient presents for follow-up appoint. Reviewed the patient's labs in detail, most recent creatinine noted to be 1.68 mg/dL, this is estimated GFR 28 mL/min. Patient also noted to have a magnesium level of only 1.3 mg/dL. She is currently not taking magnesium supplementation. She is taking all medications as prescribed with no specific side effects. She denies use of nonsteroidal anti-inflammatory medications. We reviewed the patient's recent hospitalization due to hypercalcemia which was noted to be due to consumption of a lot of Tums. Hypertension  This is a chronic problem. The current episode started more than 1 year ago. The problem is unchanged. The problem is controlled. Pertinent negatives include no chest pain, orthopnea or peripheral edema. There are no associated agents to hypertension. Risk factors for coronary artery disease include obesity, post-menopausal state and smoking/tobacco exposure. Past treatments include lifestyle changes, calcium channel blockers and beta blockers. Compliance problems include diet. Hypertensive end-organ damage includes kidney disease. Identifiable causes of hypertension include chronic renal disease. The following portions of the patient's history were reviewed and updated as appropriate: allergies, current medications, past family history, past medical history, past social history, past surgical history and problem list.    Review of Systems   Cardiovascular:  Negative for chest pain and orthopnea. All other systems reviewed and are negative.         Social History     Socioeconomic History    Marital status: /Civil Union     Spouse name: Katalina Gail     Number of children: None    Years of education: None    Highest education level: None   Occupational History Occupation: Retired    Tobacco Use    Smoking status: Former     Packs/day: 1.00     Years: 14.00     Total pack years: 14.00     Types: Cigarettes     Quit date:      Years since quittin.9    Smokeless tobacco: Never   Vaping Use    Vaping Use: Never used   Substance and Sexual Activity    Alcohol use: Yes     Comment: Occasionally     Drug use: Never     Comment: Denies drug use - [facility]     Sexual activity: Not Currently   Other Topics Concern    None   Social History Narrative     - Mamie Fleischer is [place] and speaks Greek, common law marriage    Does not consume caffeine      Social Determinants of Health     Financial Resource Strain: Low Risk  (3/7/2023)    Overall Financial Resource Strain (CARDIA)     Difficulty of Paying Living Expenses: Not hard at all   Food Insecurity: Not on file   Transportation Needs: No Transportation Needs (3/7/2023)    PRAPARE - Transportation     Lack of Transportation (Medical): No     Lack of Transportation (Non-Medical):  No   Physical Activity: Not on file   Stress: Not on file   Social Connections: Not on file   Intimate Partner Violence: Not on file   Housing Stability: Not on file     Past Medical History:   Diagnosis Date    Benign essential hypertension     Chronic kidney disease, stage 3 (HCC)     Disorder of gallbladder     Disorder of skin and subcutaneous tissue     Dyspnea     Essential hypertension     Hyperlipidemia     Hypokalemia     Malaise and fatigue     Mixed hyperlipidemia     Morbid obesity (HCC)     Pernicious anemia      Past Surgical History:   Procedure Laterality Date    BREAST BIOPSY Right     CARPAL TUNNEL RELEASE Bilateral     CHOLECYSTECTOMY      COLONOSCOPY      Dr. Camron Taylor INJECTION      x3    PARTIAL HYSTERECTOMY      SKIN LESION EXCISION      scalp        Current Outpatient Medications:     amLODIPine (NORVASC) 5 mg tablet, Take 1 tablet (5 mg total) by mouth daily, Disp: 90 tablet, Rfl: 3    aspirin (ECOTRIN LOW STRENGTH) 81 mg EC tablet, Take 81 mg by mouth daily, Disp: , Rfl:     bacitracin topical ointment 500 units/g topical ointment, Apply 1 large application topically daily. to right 4th finger wound until healed  Indications: wound, Disp: , Rfl:     cephalexin (KEFLEX) 500 mg capsule, Take 1 capsule (500 mg total) by mouth every 6 (six) hours for 7 days, Disp: 28 capsule, Rfl: 0    cholecalciferol (VITAMIN D3) 1,000 units tablet, Take 800 Units by mouth daily  , Disp: , Rfl:     DULoxetine (CYMBALTA) 20 mg capsule, take 1 capsule by mouth once daily, Disp: 90 capsule, Rfl: 1    fluticasone (FLONASE) 50 mcg/act nasal spray, 2 sprays into each nostril daily, Disp: 16 g, Rfl: 6    folic acid (FOLVITE) 108 mcg tablet, Take 400 mcg by mouth daily, Disp: , Rfl:     glucose blood (FREESTYLE LITE) test strip, Use as instructed, Disp: 100 each, Rfl: 3    glucose monitoring kit (FREESTYLE) monitoring kit, 1 each by Does not apply route daily, Disp: 1 each, Rfl: 0    insulin detemir (LEVEMIR) 100 units/mL subcutaneous injection, Inject 35 Units under the skin daily Take this at bedtime. , Disp: , Rfl:     Insulin Pen Needle 32G X 6 MM MISC, Use in the morning, Disp: 100 each, Rfl: 5    Lancets (freestyle) lancets, Use as instructed -glucose monitor BID, Disp: 100 each, Rfl: 5    levothyroxine 50 mcg tablet, Take 1 tablet (50 mcg total) by mouth daily, Disp: 90 tablet, Rfl: 2    magnesium oxide (MAG-OX) 400 mg tablet, Take 1 tablet (400 mg total) by mouth 2 (two) times a day, Disp: 60 tablet, Rfl: 3    metoprolol succinate (TOPROL-XL) 50 mg 24 hr tablet, Take 1 tablet (50 mg total) by mouth daily, Disp: 90 tablet, Rfl: 2    omega-3-acid ethyl esters (LOVAZA) 1 g capsule, Take 2 g by mouth 2 (two) times a day, Disp: , Rfl:     pantoprazole (PROTONIX) 40 mg tablet, Take 1 tablet (40 mg total) by mouth daily in the early morning, Disp: 90 tablet, Rfl: 1    Psyllium (FIBER) 0.52 g CAPS, Take by mouth, Disp: , Rfl:     rosuvastatin (CRESTOR) 10 MG tablet, Take 1 tablet (10 mg total) by mouth daily, Disp: 90 tablet, Rfl: 3    semaglutide, 0.25 or 0.5 mg/dose, (Ozempic) 2 mg/1.5 mL injection pen, Inject 0.25 mg once weekly for 4 weeks then increase to 0.5 mg once weekly. , Disp: 3 mL, Rfl: 0    Urea 20 Intensive Hydrating 20 % cream, , Disp: , Rfl:     Lab Results   Component Value Date    SODIUM 136 12/07/2023    K 4.1 12/07/2023    CL 99 12/07/2023    CO2 26 12/07/2023    AGAP 11 12/07/2023    BUN 22 12/07/2023    CREATININE 1.68 (H) 12/07/2023    GLUC 224 (H) 11/28/2023    GLUF 141 (H) 12/07/2023    CALCIUM 9.9 12/07/2023    AST 17 12/07/2023    ALT 18 12/07/2023    ALKPHOS 107 (H) 12/07/2023    TP 7.2 12/07/2023    TBILI 0.67 12/07/2023    EGFR 28 12/07/2023     Lab Results   Component Value Date    WBC 8.72 12/07/2023    HGB 12.2 12/07/2023    HCT 39.1 12/07/2023    MCV 96 12/07/2023     12/07/2023     Lab Results   Component Value Date    CHOLESTEROL 93 07/14/2023    CHOLESTEROL 109 01/25/2022    CHOLESTEROL 107 02/25/2021     Lab Results   Component Value Date    HDL 57 07/14/2023    HDL 36 (L) 01/25/2022    HDL 40 02/25/2021     Lab Results   Component Value Date    LDLCALC 13 07/14/2023    LDLCALC 44 01/25/2022    LDLCALC 32 02/25/2021     Lab Results   Component Value Date    TRIG 114 07/14/2023    TRIG 143 01/25/2022    TRIG 177 (H) 02/25/2021     No results found for: "CHOLHDL"  Lab Results   Component Value Date    ETM6ALXTZCHY 2.614 11/26/2023     Lab Results   Component Value Date    PTH 31.9 12/07/2023    CALCIUM 9.9 12/07/2023    PHOS 3.9 12/07/2023     Lab Results   Component Value Date    SPEP See Comment 11/21/2023    UPEP See Comment 11/24/2023     No results found for: "Tyson Check", "[id number]"        Objective:      /82 (BP Location: Left arm, Patient Position: Sitting)   Pulse 79   Ht 5' 2" (1.575 m)   Wt 83.5 kg (184 lb)   SpO2 98%   BMI 33.65 kg/m²          Physical Exam  Vitals reviewed. Constitutional:       General: She is not in acute distress. Appearance: Normal appearance. HENT:      Head: Normocephalic and atraumatic. Right Ear: External ear normal.      Left Ear: External ear normal.   Eyes:      Conjunctiva/sclera: Conjunctivae normal.   Cardiovascular:      Rate and Rhythm: Normal rate and regular rhythm. Heart sounds: Normal heart sounds. Pulmonary:      Effort: No respiratory distress. Breath sounds: No wheezing. Abdominal:      Palpations: Abdomen is soft. Skin:     General: Skin is warm and dry. Neurological:      General: No focal deficit present. Mental Status: She is alert and oriented to person, place, and time.    Psychiatric:         Mood and Affect: Mood normal.         Behavior: Behavior normal.

## 2023-12-07 NOTE — ASSESSMENT & PLAN NOTE
Continue to focus on improving blood sugar control management. Patient continues to have no proteinuria, however she is at high risk for developing diabetic nephropathy although she does not have evidence of diagnosis at this time.   Lab Results   Component Value Date    HGBA1C 9.9 (H) 10/26/2023

## 2023-12-07 NOTE — ASSESSMENT & PLAN NOTE
Patient's kidney function is lower than typical, unclear etiology at this time. Continue supportive care, avoid nephrotoxins. Will reassess labs in about 2 weeks. If creatinine continues to be higher than typical, and additional evaluation in the form of both blood work and imaging may be indicated. Also urine studies will most likely be done at that time as well.

## 2023-12-07 NOTE — PROGRESS NOTES
Assessment & Plan     1. Hypercalcemia  -? Serum calcium 14.3 on admission, in setting of poor intake and tums ingestion. Normal PTH, PTHrP and SPEP. Treated with calcitonin and fluids. She has stopped all oral antacids, vitamin D supplementation. Has nephrology follow up later today. -    CBC and differential; Future  -     Comprehensive metabolic panel; Future  -     Magnesium; Future  -     Phosphorus; Future  -     PTH, intact; Future  -     Vitamin D 25 hydroxy; Future    2. Closed displaced fracture of distal phalanx of right ring finger with delayed healing, subsequent encounter  -concern for infection, given swelling, erythema and possible abscess. Will repeat xray and start Keflex, stat referral to hand surgeon   -     XR hand 3+ vw right; Future; Expected date: 12/07/2023  -     Ambulatory Referral to Hand Surgery; Future    3. Abnormal CT of the abdomen  -fluid along right colon on CT, MRI recommended. -  MRI abdomen wo contrast; Future; Expected date: 12/07/2023    4. Fall, subsequent encounter    5. Laceration of right ring finger without foreign body with damage to nail, subsequent encounter  -see above, concern for infection/abscess     -  Ambulatory Referral to Hand Surgery; Future  -     CBC and differential; Future  -     Comprehensive metabolic panel; Future  -     C-reactive protein; Future  -     cephalexin (KEFLEX) 500 mg capsule; Take 1 capsule (500 mg total) by mouth every 6 (six) hours for 7 days    6. Metabolic encephalopathy  - due to hypercalcemia, delirium and dehydration. She is back to baseline, MMSE 29/30, discussed driving safety evaluation through roadway to [place], information given     7. Essential hypertension    8. Hypomagnesemia  -     Magnesium; Future        Depression Screening and Follow-up Plan: Patient was screened for depression during today's encounter. They screened negative with a PHQ-2 score of 0.       Subjective     Transitional Care Management Review: Nohemi Yancey is a 66 y.o. female here for TCM follow up. During the TCM phone call patient stated:  TCM Call     Date and time call was made  12/4/2023 11:20 AM    Patient was hospitialized at  [address] [facility]    Date of Admission  11/19/23    Date of discharge  11/28/23    Diagnosis  Hypercalcemia    Disposition  Home    Current Symptoms  None      TCM Call     Post hospital issues  None    Scheduled for follow up? Yes    Did you obtain your prescribed medications  Yes    Do you need help managing your prescriptions or medications  No    Is transportation to your appointment needed  No    I have advised the patient to call PCP with any new or worsening symptoms  [id number] [facility] care staff; Friends    The type of support provided  Emotional; Physical; Other (comment)    Do you have social support  Yes, as much as I need        68yo female with history of HTN, uncontrolled diabetes, CKD stage 3 here for hospital follow up    Pt reports she fell several times at home prior to admission, called EMS 11/19 and taken to ER. Found to have hypercalcemia with correct calcium 14.3. She reports she was taking 2-3 tums per day for heartburn and epigastric pain. Also noted to have delirium/metabolic encephalopathy. Had CT and MRI which were unremarkable. She was evaluated by Neurology, and advised not to drive. She is back to her baseline at this time. Pt reports that she fell in hospital room on 11/22 and had laceration and fracture of right 4th digit. Seen by surgeon and advised to use bacitracin but reports swelling and pain. Diabetes: using levemir 35 units nightly, as well as weekly ozempic. Still hasn't received hanna ordered by endocrine. Review of Systems   Constitutional:  Positive for unexpected weight change. Negative for appetite change, chills, fatigue and fever. Respiratory:  Negative for chest tightness and shortness of breath. Cardiovascular:  Negative for chest pain, palpitations and leg swelling. Gastrointestinal:  Negative for abdominal pain, constipation, diarrhea, nausea and vomiting. Musculoskeletal:  Positive for arthralgias and joint swelling (right 4th digit). Skin:  Positive for wound (right 4th digit). Neurological:  Negative for dizziness, light-headedness and headaches. Objective     /72   Pulse 83   Temp (!) 97.2 °F (36.2 °C) (Tympanic)   Resp 16   Ht 5' 2" (1.575 m)   Wt 83.5 kg (184 lb)   SpO2 98%   BMI 33.65 kg/m²      Physical Exam  Vitals reviewed. Constitutional:       General: She is not in acute distress. Appearance: She is obese. Cardiovascular:      Rate and Rhythm: Normal rate and regular rhythm. Heart sounds: No murmur heard. No friction rub. No gallop. Pulmonary:      Effort: Pulmonary effort is normal. No respiratory distress. Breath sounds: No wheezing, rhonchi or rales. Skin:     Findings: Erythema, laceration and wound present. Comments: Distal right 4th digit DIP with erythema, swelling, tenderness and fluctuance   Neurological:      Mental Status: She is alert. Motor: Motor function is intact. Gait: Gait is intact. Comments: MMSE 29/30    Psychiatric:         Mood and Affect: Mood and affect normal.         Speech: Speech normal.         Behavior: Behavior normal. Behavior is cooperative.          Cognition and Memory: Cognition and memory normal.       Medications have been reviewed by provider in current encounter    Marlen Gr MD

## 2023-12-07 NOTE — ASSESSMENT & PLAN NOTE
Recommended for the patient to initiate magnesium oxide 400 mg 1 tablet twice daily for the next week. After this she will reduce to 1 tablet daily, reassess labs in 2 weeks.

## 2023-12-08 ENCOUNTER — OFFICE VISIT (OUTPATIENT)
Dept: OBGYN CLINIC | Facility: CLINIC | Age: 78
End: 2023-12-08
Payer: MEDICARE

## 2023-12-08 ENCOUNTER — HOME CARE VISIT (OUTPATIENT)
Dept: HOME HEALTH SERVICES | Facility: HOME HEALTHCARE | Age: 78
End: 2023-12-08
Payer: MEDICARE

## 2023-12-08 VITALS
HEART RATE: 80 BPM | WEIGHT: 180 LBS | DIASTOLIC BLOOD PRESSURE: 83 MMHG | SYSTOLIC BLOOD PRESSURE: 135 MMHG | BODY MASS INDEX: 33.13 KG/M2 | HEIGHT: 62 IN

## 2023-12-08 DIAGNOSIS — S62.635A CLOSED DISPLACED FRACTURE OF DISTAL PHALANX OF LEFT RING FINGER, INITIAL ENCOUNTER: Primary | ICD-10-CM

## 2023-12-08 PROCEDURE — G0299 HHS/HOSPICE OF RN EA 15 MIN: HCPCS

## 2023-12-08 PROCEDURE — 99213 OFFICE O/P EST LOW 20 MIN: CPT | Performed by: ORTHOPAEDIC SURGERY

## 2023-12-08 NOTE — CASE COMMUNICATION
No further OT services are planned this time as patient is comfortable with current upper extremity strength and denies need for ADL training.       Thank Nilda Anen

## 2023-12-08 NOTE — PROGRESS NOTES
Assessment/Plan:   Diagnoses and all orders for this visit:    Closed displaced fracture of distal phalanx of left ring finger, initial encounter  -     Fracture / Dislocation Treatment         Reviewed physical exam and imaging with patient at time of visit. Radiographic findings demonstrate interval healing of her right ring finger. She was advised on wound care, soak the finger in peroxide 3x per day. She was placed in a stax splint at the time of visit. She will follow-up in 4 weeks for re-evaluation and repeat Xr of her right hand. The patient expresses understanding and is in agreement with today's treatment plan. The patient has a distal phalanx fracture of her right ring finger as result of a fall. This is from approximately 2 weeks ago. There is no major swelling. Tendon function intact. No signs of infection. X-rays do show a callus formation. She was placed in a stack splint. Follow-up 1 month evaluation for strength motion check and x-rays of her right ring finger-3 views      Subjective:   Patient ID: Chasidy Malone  1945     HPI  Patient is a 66 y.o. female who presents for initial evaluation of her right ring finger. The patient states that she was in the hospital for overdosing on Tums. She states that she was attempting to take a step with her walker and fell back into the wall, striking her hand against the wall. She has not been wearing splint. She denies numbness and tingling.       The following portions of the patient's history were reviewed and updated as appropriate:  Past medical history, past surgical history, Family history, social history, current medications and allergies    Past Medical History:   Diagnosis Date    Benign essential hypertension     Chronic kidney disease, stage 3 ([address])     Disorder of gallbladder     Disorder of skin and subcutaneous tissue     Dyspnea     Essential hypertension     Hyperlipidemia     Hypokalemia     Malaise and fatigue     Mixed hyperlipidemia     Morbid obesity (HCC)     Pernicious anemia        Past Surgical History:   Procedure Laterality Date    BREAST BIOPSY Right     CARPAL TUNNEL RELEASE Bilateral     CHOLECYSTECTOMY      COLONOSCOPY      Dr. Ruben Gardiner      x3    PARTIAL HYSTERECTOMY      SKIN LESION EXCISION      scalp        Family History   Problem Relation Age of Onset    Stroke Mother     Atrial fibrillation Mother     Brain cancer Father     Other Brother         Twin brothers - Open heart    Other Brother         Twin brothers - Open heart    No Known Problems Maternal Grandmother     No Known Problems Paternal Grandmother        Social History     Socioeconomic History    Marital status: /Civil Union     Spouse name: Rut Euceda     Number of children: None    Years of education: None    Highest education level: None   Occupational History    Occupation: Retired    Tobacco Use    Smoking status: Former     Packs/day: 1.00     Years: 14.00     Total pack years: 14.00     Types: Cigarettes     Quit date:      Years since quittin.9    Smokeless tobacco: Never   Vaping Use    Vaping Use: Never used   Substance and Sexual Activity    Alcohol use: Yes     Comment: Occasionally     Drug use: Never     Comment: Denies drug use - As per [facility]     Sexual activity: Not Currently   Other Topics Concern    None   Social History Narrative     - Rut Euceda is [place] and speaks Ukrainian, common law marriage    Does not consume caffeine      Social Determinants of Health     Financial Resource Strain: Low Risk  (3/7/2023)    Overall Financial Resource Strain (CARDIA)     Difficulty of Paying Living Expenses: Not hard at all   Food Insecurity: Not on file   Transportation Needs: No Transportation Needs (3/7/2023)    PRAPARE - Transportation     Lack of Transportation (Medical): No     Lack of Transportation (Non-Medical):  No   Physical Activity: Not on file   Stress: Not on file Social Connections: Not on file   Intimate Partner Violence: Not on file   Housing Stability: Not on file         Current Outpatient Medications:     amLODIPine (NORVASC) 5 mg tablet, Take 1 tablet (5 mg total) by mouth daily, Disp: 90 tablet, Rfl: 3    aspirin (ECOTRIN LOW STRENGTH) 81 mg EC tablet, Take 81 mg by mouth daily, Disp: , Rfl:     bacitracin topical ointment 500 units/g topical ointment, Apply 1 large application topically daily. to right 4th finger wound until healed  Indications: wound, Disp: , Rfl:     cephalexin (KEFLEX) 500 mg capsule, Take 1 capsule (500 mg total) by mouth every 6 (six) hours for 7 days, Disp: 28 capsule, Rfl: 0    cholecalciferol (VITAMIN D3) 1,000 units tablet, Take 800 Units by mouth daily  , Disp: , Rfl:     DULoxetine (CYMBALTA) 20 mg capsule, take 1 capsule by mouth once daily, Disp: 90 capsule, Rfl: 1    fluticasone (FLONASE) 50 mcg/act nasal spray, 2 sprays into each nostril daily, Disp: 16 g, Rfl: 6    folic acid (FOLVITE) 903 mcg tablet, Take 400 mcg by mouth daily, Disp: , Rfl:     glucose blood (FREESTYLE LITE) test strip, Use as instructed, Disp: 100 each, Rfl: 3    glucose monitoring kit (FREESTYLE) monitoring kit, 1 each by Does not apply route daily, Disp: 1 each, Rfl: 0    insulin detemir (LEVEMIR) 100 units/mL subcutaneous injection, Inject 35 Units under the skin daily Take this at bedtime. , Disp: , Rfl:     Insulin Pen Needle 32G X 6 MM MISC, Use in the morning, Disp: 100 each, Rfl: 5    Lancets (freestyle) lancets, Use as instructed -glucose monitor BID, Disp: 100 each, Rfl: 5    levothyroxine 50 mcg tablet, Take 1 tablet (50 mcg total) by mouth daily, Disp: 90 tablet, Rfl: 2    magnesium oxide (MAG-OX) 400 mg tablet, Take 1 tablet (400 mg total) by mouth 2 (two) times a day, Disp: 60 tablet, Rfl: 3    metoprolol succinate (TOPROL-XL) 50 mg 24 hr tablet, Take 1 tablet (50 mg total) by mouth daily, Disp: 90 tablet, Rfl: 2    omega-3-acid ethyl esters (LOVAZA) 1 g capsule, Take 2 g by mouth 2 (two) times a day, Disp: , Rfl:     pantoprazole (PROTONIX) 40 mg tablet, Take 1 tablet (40 mg total) by mouth daily in the early morning, Disp: 90 tablet, Rfl: 1    Psyllium (FIBER) 0.52 g CAPS, Take by mouth, Disp: , Rfl:     rosuvastatin (CRESTOR) 10 MG tablet, Take 1 tablet (10 mg total) by mouth daily, Disp: 90 tablet, Rfl: 3    semaglutide, 0.25 or 0.5 mg/dose, (Ozempic) 2 mg/1.5 mL injection pen, Inject 0.25 mg once weekly for 4 weeks then increase to 0.5 mg once weekly. , Disp: 3 mL, Rfl: 0    Urea 20 Intensive Hydrating 20 % cream, , Disp: , Rfl:     No Known Allergies    Review of Systems   Constitutional:  Negative for chills, fever and unexpected weight change. HENT:  Negative for hearing loss, nosebleeds and sore throat. Eyes:  Negative for pain, redness and visual disturbance. Respiratory:  Negative for cough, shortness of breath and wheezing. Cardiovascular:  Negative for chest pain, palpitations and leg swelling. Gastrointestinal:  Negative for abdominal pain, nausea and vomiting. Endocrine: Negative for polydipsia and polyuria. Genitourinary:  Negative for dysuria and hematuria. Skin:  Negative for rash and wound. Neurological:  Negative for dizziness, numbness and headaches. Psychiatric/Behavioral:  Negative for decreased concentration and suicidal ideas. The patient is not nervous/anxious. All other systems reviewed and are negative. Objective:  /83   Pulse 80   Ht 5' 2" (1.575 m)   Wt 81.6 kg (180 lb)   BMI 32.92 kg/m²     Ortho Exam  right Hand -  ring finger   Patient presents with no obvious anatomical deformity  Dried blood over distal phalanx.  Wound is healing well- no signs of dehiscence or infection  Mild generalized soft tissue swelling or effusion noted  Full FDS, FDP, extensor mechanisms are intact  No rotational deformity with composite finger flexion  TTP over distal phalanx   Demonstrates normal wrist, elbow, and shoulder motion  Forearm compartments are soft and supple  2+ distal radial pulse with brisk capillary refill to the fingers  Radial, median, and ulnar motor and sensory distribution intact  Sensations light to touch intact distally      Physical Exam  HENT:      Head: Normocephalic and atraumatic. Nose: Nose normal.   Eyes:      Conjunctiva/sclera: Conjunctivae normal.   Cardiovascular:      Rate and Rhythm: Normal rate. Pulmonary:      Effort: Pulmonary effort is normal.   Musculoskeletal:      Cervical back: Neck supple. Skin:     General: Skin is warm and dry. Capillary Refill: Capillary refill takes less than 2 seconds. Neurological:      Mental Status: She is alert and oriented to person, place, and time. Psychiatric:         Mood and Affect: Mood normal.         Behavior: Behavior normal.          Diagnostic Test Review: The attending physician has personally reviewed the pertinent films in PACS and the interpretation is as follows:    X-Ray of right hand taken on 12/8/23 were reviewed and showed interval healing of the comminuted fourth distal phalanx fracture . Fracture / Dislocation Treatment    Date/Time: 12/8/2023 10:45 AM    Performed by: Chuyita Hoover DO  Authorized by: Chuyita Hoover DO    Patient Location:  [facility] Protocol:  Consent: Verbal consent obtained. Risks and benefits: risks, benefits and alternatives were discussed  Consent given by: patient  Time out: Immediately prior to procedure a "time out" was called to verify the correct patient, procedure, equipment, support staff and site/side marked as required.   Timeout called at: 12/8/2023 10:44 AM.  Patient understanding: patient states understanding of the procedure being performed  Site marked: the operative site was marked  Patient identity confirmed: verbally with patient    Neurovascular status: Neurovascularly intact    Distal perfusion: normal    Neurological function: normal    Range of motion: reduced    Local anesthesia used?: No    Immobilization:  Splint  Splint type:  Finger splint, static  Neurovascular status: Neurovascularly intact    Distal perfusion: normal    Neurological function: normal    Range of motion: unchanged    Patient tolerance:  Patient tolerated the procedure well with no immediate complications         Scribe Attestation      I,:  Bryn Sanchez am acting as a scribe while in the presence of the attending physician.:       I,:  Julianna Navas, DO personally performed the services described in this documentation    as scribed in my presence.:

## 2023-12-09 VITALS
RESPIRATION RATE: 18 BRPM | TEMPERATURE: 97.3 F | SYSTOLIC BLOOD PRESSURE: 110 MMHG | DIASTOLIC BLOOD PRESSURE: 72 MMHG | HEART RATE: 90 BPM | OXYGEN SATURATION: 97 %

## 2023-12-11 ENCOUNTER — HOME CARE VISIT (OUTPATIENT)
Dept: HOME HEALTH SERVICES | Facility: HOME HEALTHCARE | Age: 78
End: 2023-12-11
Payer: MEDICARE

## 2023-12-11 LAB
DME PARACHUTE DELIVERY DATE ACTUAL: NORMAL
DME PARACHUTE DELIVERY DATE REQUESTED: NORMAL
DME PARACHUTE ITEM DESCRIPTION: NORMAL
DME PARACHUTE ORDER STATUS: NORMAL
DME PARACHUTE SUPPLIER NAME: NORMAL
DME PARACHUTE SUPPLIER PHONE: NORMAL

## 2023-12-12 PROCEDURE — G0180 MD CERTIFICATION HHA PATIENT: HCPCS | Performed by: INTERNAL MEDICINE

## 2023-12-15 ENCOUNTER — HOME CARE VISIT (OUTPATIENT)
Dept: HOME HEALTH SERVICES | Facility: HOME HEALTHCARE | Age: 78
End: 2023-12-15
Payer: MEDICARE

## 2023-12-15 VITALS
TEMPERATURE: 97.9 F | DIASTOLIC BLOOD PRESSURE: 62 MMHG | RESPIRATION RATE: 18 BRPM | HEART RATE: 70 BPM | OXYGEN SATURATION: 93 % | SYSTOLIC BLOOD PRESSURE: 110 MMHG

## 2023-12-15 PROCEDURE — G0299 HHS/HOSPICE OF RN EA 15 MIN: HCPCS

## 2023-12-16 ENCOUNTER — HOSPITAL ENCOUNTER (OUTPATIENT)
Dept: MRI IMAGING | Facility: HOSPITAL | Age: 78
Discharge: HOME | End: 2023-12-16
Attending: INTERNAL MEDICINE
Payer: MEDICARE

## 2023-12-16 DIAGNOSIS — R93.5 ABNORMAL CT OF THE ABDOMEN: ICD-10-CM

## 2023-12-16 PROCEDURE — 74181 MRI ABDOMEN W/O CONTRAST: CPT

## 2023-12-16 PROCEDURE — G1004 CDSM NDSC: HCPCS

## 2023-12-21 ENCOUNTER — HOME CARE VISIT (OUTPATIENT)
Dept: HOME HEALTH SERVICES | Facility: HOME HEALTHCARE | Age: 78
End: 2023-12-21
Payer: MEDICARE

## 2023-12-21 VITALS
DIASTOLIC BLOOD PRESSURE: 68 MMHG | BODY MASS INDEX: 32.56 KG/M2 | OXYGEN SATURATION: 98 % | RESPIRATION RATE: 18 BRPM | TEMPERATURE: 97.3 F | HEART RATE: 82 BPM | SYSTOLIC BLOOD PRESSURE: 108 MMHG | WEIGHT: 178 LBS

## 2023-12-21 PROCEDURE — G0299 HHS/HOSPICE OF RN EA 15 MIN: HCPCS

## 2023-12-22 ENCOUNTER — TELEPHONE (OUTPATIENT)
Dept: FAMILY MEDICINE CLINIC | Facility: CLINIC | Age: 78
End: 2023-12-22

## 2023-12-22 NOTE — TELEPHONE ENCOUNTER
Received Fax from [place] Dept of transportation for Cognitive Impairment form to be filled out by PCP, Scanned and put into PCP's folder up front

## 2024-01-03 ENCOUNTER — TELEPHONE (OUTPATIENT)
Dept: FAMILY MEDICINE CLINIC | Facility: CLINIC | Age: 79
End: 2024-01-03

## 2024-01-03 NOTE — TELEPHONE ENCOUNTER
MA received phone call on Friday from the transportation office looking for fax number to submit forms. Still waiting on fax to come through to complete documents.

## 2024-01-15 DIAGNOSIS — G93.41 METABOLIC ENCEPHALOPATHY: Primary | ICD-10-CM

## 2024-01-15 DIAGNOSIS — R41.89 COGNITIVE IMPAIRMENT: ICD-10-CM

## 2024-01-15 DIAGNOSIS — Z91.89 DRIVING SAFETY ISSUE: ICD-10-CM

## 2024-01-30 ENCOUNTER — TELEPHONE (OUTPATIENT)
Dept: FAMILY MEDICINE CLINIC | Facility: CLINIC | Age: 79
End: 2024-01-30

## 2024-01-30 NOTE — TELEPHONE ENCOUNTER
Pt called the office asking for pcp to place a referral to geriatrics so that she can try and get her license back. Would like referral to [facility] in [place]. Please advise.

## 2024-01-31 ENCOUNTER — TELEPHONE (OUTPATIENT)
Dept: FAMILY MEDICINE CLINIC | Facility: CLINIC | Age: 79
End: 2024-01-31

## 2024-01-31 NOTE — TELEPHONE ENCOUNTER
Please advise her that [facility] is primary care like myself, but if they tell her they can do cognitive evaluation and driving evaluation she can switch to them.

## 2024-01-31 NOTE — TELEPHONE ENCOUNTER
Norma Berry from [facility] called in regards to pt. States she has not received Cognitive Impairment form for pt and needs in order for pt to get permit. Is unsure if pt is conveying message clearly or where the confusion is. Would like a call with you in order to discuss further. Available tomorrow 2/1/2024 7:30-8:15, 10:30-11:45 or after 2:30. Phone number is [phone]. Please advise.

## 2024-02-01 DIAGNOSIS — Z91.89 DRIVING SAFETY ISSUE: Primary | ICD-10-CM

## 2024-02-01 NOTE — TELEPHONE ENCOUNTER
Please tell pt she does not have to see geriatrics at this time and schedule her with [facility] for fitness to drive evaluation. It looks like she's switching PCP to Dr. Melendez. Is this correct?

## 2024-02-01 NOTE — TELEPHONE ENCOUNTER
Please let her know that I cannot complete the cognitive impairment forms because I cannot assess reaction times, attentiveness to driving. I suppose we should now send her to [facility] for fitness to drive evaluation?

## 2024-02-27 ENCOUNTER — OFFICE VISIT (OUTPATIENT)
Dept: OBGYN CLINIC | Facility: CLINIC | Age: 79
End: 2024-02-27
Payer: MEDICARE

## 2024-02-27 VITALS
SYSTOLIC BLOOD PRESSURE: 133 MMHG | WEIGHT: 170 LBS | DIASTOLIC BLOOD PRESSURE: 67 MMHG | HEART RATE: 90 BPM | HEIGHT: 62 IN | BODY MASS INDEX: 31.28 KG/M2

## 2024-02-27 DIAGNOSIS — M17.11 PRIMARY OSTEOARTHRITIS OF RIGHT KNEE: Primary | ICD-10-CM

## 2024-02-27 DIAGNOSIS — M17.12 PRIMARY OSTEOARTHRITIS OF LEFT KNEE: ICD-10-CM

## 2024-02-27 PROCEDURE — 99213 OFFICE O/P EST LOW 20 MIN: CPT | Performed by: ORTHOPAEDIC SURGERY

## 2024-02-27 PROCEDURE — 20610 DRAIN/INJ JOINT/BURSA W/O US: CPT | Performed by: ORTHOPAEDIC SURGERY

## 2024-02-27 RX ORDER — BUPIVACAINE HYDROCHLORIDE 2.5 MG/ML
4 INJECTION, SOLUTION INFILTRATION; PERINEURAL
Status: COMPLETED | OUTPATIENT
Start: 2024-02-27 | End: 2024-02-27

## 2024-02-27 RX ORDER — TRIAMCINOLONE ACETONIDE 40 MG/ML
80 INJECTION, SUSPENSION INTRA-ARTICULAR; INTRAMUSCULAR
Status: COMPLETED | OUTPATIENT
Start: 2024-02-27 | End: 2024-02-27

## 2024-02-27 RX ADMIN — BUPIVACAINE HYDROCHLORIDE 4 ML: 2.5 INJECTION, SOLUTION INFILTRATION; PERINEURAL at 14:15

## 2024-02-27 RX ADMIN — TRIAMCINOLONE ACETONIDE 80 MG: 40 INJECTION, SUSPENSION INTRA-ARTICULAR; INTRAMUSCULAR at 14:15

## 2024-02-27 NOTE — PROGRESS NOTES
Assessment/Plan:   Diagnoses and all orders for this visit:    Primary osteoarthritis of right knee  -     Injection Procedure Prior Authorization; Future  -     Large joint arthrocentesis: R knee    Primary osteoarthritis of left knee  -     Injection Procedure Prior Authorization; Future         Discussed with patient that her exam is consistent with a flare-up of osteoarthritis of her right knee. She also experiences symptoms of osteoarthritis in her left knee. She was offered and accepted an injection(s) of Kenalog and Marcaine to her Right knee(s) for symptomatic relief of pain and inflammation. Patient tolerated the treatment(s) well. Ice and post injection protocol advised. Weightbearing activities as tolerated. Pre-authorization was ordered for visco supplementation of bilateral knees. She will be seen for follow-up once the visco injections are approved. Patient expresses understanding and is in agreement with this treatment plan. The patient was given the opportunity to ask questions or present concerns.    The patient has arthritis of her bilateral knees with the right being more symptomatic than the left.  The right knee was injected with Kenalog and Marcaine.  She tolerated procedure quite well.  She wants to get approved for viscosupplementation for both her knees.  Return back once this is obtained    Subjective:   Patient ID: Jina Norris  1945     HPI  Patient is a 78 y.o. female who presents for follow-up evaluation of her right knee. The patient was last seen on 8/22/23 where she completed visco supplementation injections to the right knee. She experienced significant relief following the gel injections. She states that her symptoms returned within the past week. On today's presentation, she rates her pain as a 7/10. The patient states that she is also beginning to experience pain in her left knee. Both knees are symptomatic with weightbearing activities and prolonged sitting. Her symptoms  are exacerbated by walking and increased physical activity. The patient also reports low back pain, she is treated by Dr. Pope for back pain.        The following portions of the patient's history were reviewed and updated as appropriate:  Past medical history, past surgical history, Family history, social history, current medications and allergies    Past Medical History:   Diagnosis Date    Benign essential hypertension     Chronic kidney disease, stage 3 (HCC)     Disorder of gallbladder     Disorder of skin and subcutaneous tissue     Dyspnea     Essential hypertension     Hyperlipidemia     Hypokalemia     Malaise and fatigue     Mixed hyperlipidemia     Morbid obesity (HCC)     Pernicious anemia        Past Surgical History:   Procedure Laterality Date    BREAST BIOPSY Right     CARPAL TUNNEL RELEASE Bilateral     CHOLECYSTECTOMY      COLONOSCOPY      Dr. Apple     HYSTERECTOMY      LUMBAR EPIDURAL INJECTION      x3    PARTIAL HYSTERECTOMY      SKIN LESION EXCISION      scalp        Family History   Problem Relation Age of Onset    Stroke Mother     Atrial fibrillation Mother     Brain cancer Father     Other Brother         Twin brothers - Open heart    Other Brother         Twin brothers - Open heart    No Known Problems Maternal Grandmother     No Known Problems Paternal Grandmother        Social History     Socioeconomic History    Marital status: /Civil Union     Spouse name: Thania     Number of children: None    Years of education: None    Highest education level: None   Occupational History    Occupation: Retired    Tobacco Use    Smoking status: Former     Current packs/day: 0.00     Average packs/day: 1 pack/day for 14.0 years (14.0 ttl pk-yrs)     Types: Cigarettes     Start date:      Quit date:      Years since quittin.1    Smokeless tobacco: Never   Vaping Use    Vaping status: Never Used   Substance and Sexual Activity    Alcohol use: Yes     Comment: Occasionally     Drug  use: Never     Comment: Denies drug use - As per [facility]     Sexual activity: Not Currently   Other Topics Concern    None   Social History Narrative     - Thania is [place] and speaks [place]i, common law marriage    Does not consume caffeine      Social Determinants of Health     Financial Resource Strain: Low Risk  (3/7/2023)    Overall Financial Resource Strain (CARDIA)     Difficulty of Paying Living Expenses: Not hard at all   Food Insecurity: Not on file   Transportation Needs: No Transportation Needs (3/7/2023)    PRAPARE - Transportation     Lack of Transportation (Medical): No     Lack of Transportation (Non-Medical): No   Physical Activity: Not on file   Stress: Not on file   Social Connections: Not on file   Intimate Partner Violence: Not on file   Housing Stability: Not on file         Current Outpatient Medications:     amLODIPine (NORVASC) 5 mg tablet, Take 1 tablet (5 mg total) by mouth daily, Disp: 90 tablet, Rfl: 3    aspirin (ECOTRIN LOW STRENGTH) 81 mg EC tablet, Take 81 mg by mouth daily, Disp: , Rfl:     bacitracin topical ointment 500 units/g topical ointment, Apply 1 large application topically daily. to right 4th finger wound until healed  Indications: wound, Disp: , Rfl:     cholecalciferol (VITAMIN D3) 1,000 units tablet, Take 800 Units by mouth daily  , Disp: , Rfl:     DULoxetine (CYMBALTA) 20 mg capsule, take 1 capsule by mouth once daily, Disp: 90 capsule, Rfl: 1    fluticasone (FLONASE) 50 mcg/act nasal spray, 2 sprays into each nostril daily, Disp: 16 g, Rfl: 6    folic acid (FOLVITE) 400 mcg tablet, Take 400 mcg by mouth daily, Disp: , Rfl:     glucose blood (FREESTYLE LITE) test strip, Use as instructed, Disp: 100 each, Rfl: 3    glucose monitoring kit (FREESTYLE) monitoring kit, 1 each by Does not apply route daily, Disp: 1 each, Rfl: 0    insulin detemir (LEVEMIR) 100 units/mL subcutaneous injection, Inject 35 Units under the skin daily Take this at bedtime., Disp: , Rfl:      Insulin Pen Needle 32G X 6 MM MISC, Use in the morning, Disp: 100 each, Rfl: 5    Lancets (freestyle) lancets, Use as instructed -glucose monitor BID, Disp: 100 each, Rfl: 5    levothyroxine 50 mcg tablet, Take 1 tablet (50 mcg total) by mouth daily, Disp: 90 tablet, Rfl: 2    magnesium oxide (MAG-OX) 400 mg tablet, Take 1 tablet (400 mg total) by mouth 2 (two) times a day, Disp: 60 tablet, Rfl: 3    metoprolol succinate (TOPROL-XL) 50 mg 24 hr tablet, Take 1 tablet (50 mg total) by mouth daily, Disp: 90 tablet, Rfl: 2    omega-3-acid ethyl esters (LOVAZA) 1 g capsule, Take 2 g by mouth 2 (two) times a day, Disp: , Rfl:     pantoprazole (PROTONIX) 40 mg tablet, Take 1 tablet (40 mg total) by mouth daily in the early morning, Disp: 90 tablet, Rfl: 1    Psyllium (FIBER) 0.52 g CAPS, Take by mouth, Disp: , Rfl:     rosuvastatin (CRESTOR) 10 MG tablet, Take 1 tablet (10 mg total) by mouth daily, Disp: 90 tablet, Rfl: 3    semaglutide, 0.25 or 0.5 mg/dose, (Ozempic) 2 mg/1.5 mL injection pen, Inject 0.25 mg once weekly for 4 weeks then increase to 0.5 mg once weekly., Disp: 3 mL, Rfl: 0    Urea 20 Intensive Hydrating 20 % cream, , Disp: , Rfl:     No Known Allergies    Review of Systems   Constitutional:  Negative for chills, fever and unexpected weight change.   HENT:  Negative for hearing loss, nosebleeds and sore throat.    Eyes:  Negative for pain, redness and visual disturbance.   Respiratory:  Negative for cough, shortness of breath and wheezing.    Cardiovascular:  Negative for chest pain, palpitations and leg swelling.   Gastrointestinal:  Negative for abdominal pain, nausea and vomiting.   Endocrine: Negative for polydipsia and polyuria.   Genitourinary:  Negative for dysuria and hematuria.   Skin:  Negative for rash and wound.   Neurological:  Negative for dizziness, numbness and headaches.   Psychiatric/Behavioral:  Negative for decreased concentration and suicidal ideas. The patient is not  "nervous/anxious.    All other systems reviewed and are negative.       Objective:  /67   Pulse 90   Ht 5' 2\" (1.575 m)   Wt 77.1 kg (170 lb)   BMI 31.09 kg/m²     Ortho Exam  bilateral knee(s) -   Patient ambulates with antalgic gait pattern  Uses No assistive device  Genu Varus anatomical deformity  Skin is warm and dry to touch with no signs of erythema, ecchymosis, or infection   No soft tissue swelling or effusion noted  ROM (0° - 115°)   Strength: 4/5 throughout  TTP over medial joint line, TTP over lateral joint line,   Flexor and extensor mechanisms are intact   Knee is stable to varus and valgus stress  - Lachman's  - Anterior Drawer, - Posterior Drawer  - Moraima's  Patella tracks centrally with palpable crepitus  Calf compartments are soft and supple  - Carlos Eduardo's sign  2+ DP and PT pulses with brisk capillary refill to the toes  Sural, saphenous, tibial, superficial, and deep peroneal motor and sensory distributions intact  Sensation light touch intact distally      Physical Exam  HENT:      Head: Normocephalic and atraumatic.      Nose: Nose normal.   Eyes:      Conjunctiva/sclera: Conjunctivae normal.   Cardiovascular:      Rate and Rhythm: Normal rate.   Pulmonary:      Effort: Pulmonary effort is normal.   Musculoskeletal:      Cervical back: Neck supple.   Skin:     General: Skin is warm and dry.      Capillary Refill: Capillary refill takes less than 2 seconds.   Neurological:      Mental Status: She is alert and oriented to person, place, and time.   Psychiatric:         Mood and Affect: Mood normal.         Behavior: Behavior normal.          Diagnostic Test Review:  No new imaging at time of visit.     Large joint arthrocentesis: R knee  Universal Protocol:  Consent: Verbal consent obtained.  Risks and benefits: risks, benefits and alternatives were discussed  Consent given by: patient  Timeout called at: 2/27/2024 2:30 PM.  Patient understanding: patient states understanding of the " procedure being performed  Site marked: the operative site was marked  Patient identity confirmed: verbally with patient  Supporting Documentation  Indications: pain and joint swelling   Procedure Details  Location: knee - R knee  Needle gauge: 21 G.  Ultrasound guidance: no  Approach: anterolateral  Medications administered: 4 mL bupivacaine 0.25 %; 80 mg triamcinolone acetonide 40 mg/mL    Patient tolerance: patient tolerated the procedure well with no immediate complications  Dressing:  Sterile dressing applied             Scribe Attestation      I,:   am acting as a scribe while in the presence of the attending physician.:       I,:   personally performed the services described in this documentation    as scribed in my presence.:

## 2024-02-28 ENCOUNTER — APPOINTMENT (OUTPATIENT)
Dept: NON INVASIVE DIAGNOSTICS | Facility: HOSPITAL | Age: 79
DRG: 066 | End: 2024-02-28
Payer: MEDICARE

## 2024-02-28 ENCOUNTER — HOSPITAL ENCOUNTER (INPATIENT)
Facility: HOSPITAL | Age: 79
LOS: 8 days | Discharge: HOME HEALTH | DRG: 066 | End: 2024-03-08
Attending: INTERNAL MEDICINE | Admitting: STUDENT IN AN ORGANIZED HEALTH CARE EDUCATION/TRAINING PROGRAM
Payer: MEDICARE

## 2024-02-28 ENCOUNTER — APPOINTMENT (EMERGENCY)
Dept: CT IMAGING | Facility: HOSPITAL | Age: 79
DRG: 066 | End: 2024-02-28
Payer: MEDICARE

## 2024-02-28 ENCOUNTER — APPOINTMENT (OUTPATIENT)
Dept: MRI IMAGING | Facility: HOSPITAL | Age: 79
DRG: 066 | End: 2024-02-28
Payer: MEDICARE

## 2024-02-28 ENCOUNTER — APPOINTMENT (EMERGENCY)
Dept: RADIOLOGY | Facility: HOSPITAL | Age: 79
DRG: 066 | End: 2024-02-28
Payer: MEDICARE

## 2024-02-28 DIAGNOSIS — I63.9 ACUTE CVA (CEREBROVASCULAR ACCIDENT) (HCC): ICD-10-CM

## 2024-02-28 DIAGNOSIS — R29.90 STROKE-LIKE SYMPTOM: ICD-10-CM

## 2024-02-28 DIAGNOSIS — I63.9 CVA (CEREBRAL VASCULAR ACCIDENT) (HCC): Primary | ICD-10-CM

## 2024-02-28 DIAGNOSIS — R53.1 GENERALIZED WEAKNESS: ICD-10-CM

## 2024-02-28 DIAGNOSIS — E86.0 DEHYDRATION: ICD-10-CM

## 2024-02-28 LAB
2HR DELTA HS TROPONIN: -1 NG/L
ALBUMIN SERPL BCP-MCNC: 4.1 G/DL (ref 3.5–5)
ALP SERPL-CCNC: 69 U/L (ref 34–104)
ALT SERPL W P-5'-P-CCNC: 9 U/L (ref 7–52)
ANION GAP SERPL CALCULATED.3IONS-SCNC: 11 MMOL/L
APTT PPP: 26 SECONDS (ref 23–37)
AST SERPL W P-5'-P-CCNC: 12 U/L (ref 13–39)
BASOPHILS # BLD AUTO: 0.02 THOUSANDS/ÂΜL (ref 0–0.1)
BASOPHILS NFR BLD AUTO: 0 % (ref 0–1)
BILIRUB SERPL-MCNC: 0.57 MG/DL (ref 0.2–1)
BILIRUB UR QL STRIP: NEGATIVE
BUN SERPL-MCNC: 20 MG/DL (ref 5–25)
CALCIUM SERPL-MCNC: 9.8 MG/DL (ref 8.4–10.2)
CARDIAC TROPONIN I PNL SERPL HS: 6 NG/L
CARDIAC TROPONIN I PNL SERPL HS: 7 NG/L
CHLORIDE SERPL-SCNC: 95 MMOL/L (ref 96–108)
CLARITY UR: CLEAR
CO2 SERPL-SCNC: 25 MMOL/L (ref 21–32)
COLOR UR: ABNORMAL
CREAT SERPL-MCNC: 1.69 MG/DL (ref 0.6–1.3)
EOSINOPHIL # BLD AUTO: 0.02 THOUSAND/ÂΜL (ref 0–0.61)
EOSINOPHIL NFR BLD AUTO: 0 % (ref 0–6)
ERYTHROCYTE [DISTWIDTH] IN BLOOD BY AUTOMATED COUNT: 13.2 % (ref 11.6–15.1)
FLUAV RNA RESP QL NAA+PROBE: NEGATIVE
FLUBV RNA RESP QL NAA+PROBE: NEGATIVE
GFR SERPL CREATININE-BSD FRML MDRD: 28 ML/MIN/1.73SQ M
GLUCOSE SERPL-MCNC: 262 MG/DL (ref 65–140)
GLUCOSE SERPL-MCNC: 319 MG/DL (ref 65–140)
GLUCOSE UR STRIP-MCNC: ABNORMAL MG/DL
HCT VFR BLD AUTO: 40.8 % (ref 34.8–46.1)
HGB BLD-MCNC: 12.9 G/DL (ref 11.5–15.4)
HGB UR QL STRIP.AUTO: NEGATIVE
IMM GRANULOCYTES # BLD AUTO: 0.07 THOUSAND/UL (ref 0–0.2)
IMM GRANULOCYTES NFR BLD AUTO: 1 % (ref 0–2)
INR PPP: 1.01 (ref 0.84–1.19)
KETONES UR STRIP-MCNC: NEGATIVE MG/DL
LACTATE SERPL-SCNC: 1 MMOL/L (ref 0.5–2)
LEUKOCYTE ESTERASE UR QL STRIP: NEGATIVE
LYMPHOCYTES # BLD AUTO: 1.8 THOUSANDS/ÂΜL (ref 0.6–4.47)
LYMPHOCYTES NFR BLD AUTO: 14 % (ref 14–44)
MCH RBC QN AUTO: 29.5 PG (ref 26.8–34.3)
MCHC RBC AUTO-ENTMCNC: 31.6 G/DL (ref 31.4–37.4)
MCV RBC AUTO: 93 FL (ref 82–98)
MONOCYTES # BLD AUTO: 0.8 THOUSAND/ÂΜL (ref 0.17–1.22)
MONOCYTES NFR BLD AUTO: 6 % (ref 4–12)
NEUTROPHILS # BLD AUTO: 9.92 THOUSANDS/ÂΜL (ref 1.85–7.62)
NEUTS SEG NFR BLD AUTO: 79 % (ref 43–75)
NITRITE UR QL STRIP: NEGATIVE
NRBC BLD AUTO-RTO: 0 /100 WBCS
PH UR STRIP.AUTO: 6.5 [PH]
PLATELET # BLD AUTO: 205 THOUSANDS/UL (ref 149–390)
PMV BLD AUTO: 11 FL (ref 8.9–12.7)
POTASSIUM SERPL-SCNC: 4 MMOL/L (ref 3.5–5.3)
PROT SERPL-MCNC: 7.5 G/DL (ref 6.4–8.4)
PROT UR STRIP-MCNC: NEGATIVE MG/DL
PROTHROMBIN TIME: 13.4 SECONDS (ref 11.6–14.5)
RBC # BLD AUTO: 4.38 MILLION/UL (ref 3.81–5.12)
RSV RNA RESP QL NAA+PROBE: NEGATIVE
SARS-COV-2 RNA RESP QL NAA+PROBE: NEGATIVE
SODIUM SERPL-SCNC: 131 MMOL/L (ref 135–147)
SP GR UR STRIP.AUTO: <=1.005
UROBILINOGEN UR QL STRIP.AUTO: 0.2 E.U./DL
WBC # BLD AUTO: 12.63 THOUSAND/UL (ref 4.31–10.16)

## 2024-02-28 PROCEDURE — 85730 THROMBOPLASTIN TIME PARTIAL: CPT | Performed by: INTERNAL MEDICINE

## 2024-02-28 PROCEDURE — 70551 MRI BRAIN STEM W/O DYE: CPT

## 2024-02-28 PROCEDURE — 70450 CT HEAD/BRAIN W/O DYE: CPT

## 2024-02-28 PROCEDURE — 99223 1ST HOSP IP/OBS HIGH 75: CPT | Performed by: STUDENT IN AN ORGANIZED HEALTH CARE EDUCATION/TRAINING PROGRAM

## 2024-02-28 PROCEDURE — 81003 URINALYSIS AUTO W/O SCOPE: CPT | Performed by: INTERNAL MEDICINE

## 2024-02-28 PROCEDURE — 84484 ASSAY OF TROPONIN QUANT: CPT | Performed by: STUDENT IN AN ORGANIZED HEALTH CARE EDUCATION/TRAINING PROGRAM

## 2024-02-28 PROCEDURE — 0241U HB NFCT DS VIR RESP RNA 4 TRGT: CPT | Performed by: INTERNAL MEDICINE

## 2024-02-28 PROCEDURE — 82948 REAGENT STRIP/BLOOD GLUCOSE: CPT

## 2024-02-28 PROCEDURE — 93005 ELECTROCARDIOGRAM TRACING: CPT

## 2024-02-28 PROCEDURE — 96361 HYDRATE IV INFUSION ADD-ON: CPT

## 2024-02-28 PROCEDURE — 99285 EMERGENCY DEPT VISIT HI MDM: CPT | Performed by: INTERNAL MEDICINE

## 2024-02-28 PROCEDURE — 93880 EXTRACRANIAL BILAT STUDY: CPT

## 2024-02-28 PROCEDURE — 99285 EMERGENCY DEPT VISIT HI MDM: CPT

## 2024-02-28 PROCEDURE — 83605 ASSAY OF LACTIC ACID: CPT | Performed by: INTERNAL MEDICINE

## 2024-02-28 PROCEDURE — 71045 X-RAY EXAM CHEST 1 VIEW: CPT

## 2024-02-28 PROCEDURE — 36415 COLL VENOUS BLD VENIPUNCTURE: CPT | Performed by: INTERNAL MEDICINE

## 2024-02-28 PROCEDURE — 96360 HYDRATION IV INFUSION INIT: CPT

## 2024-02-28 PROCEDURE — 85610 PROTHROMBIN TIME: CPT | Performed by: INTERNAL MEDICINE

## 2024-02-28 PROCEDURE — 85025 COMPLETE CBC W/AUTO DIFF WBC: CPT | Performed by: INTERNAL MEDICINE

## 2024-02-28 PROCEDURE — 80053 COMPREHEN METABOLIC PANEL: CPT | Performed by: INTERNAL MEDICINE

## 2024-02-28 PROCEDURE — 84484 ASSAY OF TROPONIN QUANT: CPT | Performed by: INTERNAL MEDICINE

## 2024-02-28 RX ORDER — CLOPIDOGREL BISULFATE 75 MG/1
300 TABLET ORAL ONCE
Status: COMPLETED | OUTPATIENT
Start: 2024-02-28 | End: 2024-02-28

## 2024-02-28 RX ORDER — LANOLIN ALCOHOL/MO/W.PET/CERES
400 CREAM (GRAM) TOPICAL DAILY
Status: DISCONTINUED | OUTPATIENT
Start: 2024-02-29 | End: 2024-03-08 | Stop reason: HOSPADM

## 2024-02-28 RX ORDER — PRAVASTATIN SODIUM 40 MG
80 TABLET ORAL
Status: DISCONTINUED | OUTPATIENT
Start: 2024-02-28 | End: 2024-03-08 | Stop reason: HOSPADM

## 2024-02-28 RX ORDER — PANTOPRAZOLE SODIUM 40 MG/1
40 TABLET, DELAYED RELEASE ORAL
Status: DISCONTINUED | OUTPATIENT
Start: 2024-02-29 | End: 2024-03-08 | Stop reason: HOSPADM

## 2024-02-28 RX ORDER — DULOXETIN HYDROCHLORIDE 20 MG/1
20 CAPSULE, DELAYED RELEASE ORAL DAILY
Status: DISCONTINUED | OUTPATIENT
Start: 2024-02-29 | End: 2024-03-08 | Stop reason: HOSPADM

## 2024-02-28 RX ORDER — CLOPIDOGREL BISULFATE 75 MG/1
75 TABLET ORAL DAILY
Status: DISCONTINUED | OUTPATIENT
Start: 2024-02-29 | End: 2024-03-08 | Stop reason: HOSPADM

## 2024-02-28 RX ORDER — ENOXAPARIN SODIUM 100 MG/ML
30 INJECTION SUBCUTANEOUS DAILY
Status: DISCONTINUED | OUTPATIENT
Start: 2024-02-29 | End: 2024-03-08 | Stop reason: HOSPADM

## 2024-02-28 RX ORDER — FLUTICASONE PROPIONATE 50 MCG
2 SPRAY, SUSPENSION (ML) NASAL DAILY
Status: DISCONTINUED | OUTPATIENT
Start: 2024-02-29 | End: 2024-03-08 | Stop reason: HOSPADM

## 2024-02-28 RX ORDER — ENOXAPARIN SODIUM 100 MG/ML
40 INJECTION SUBCUTANEOUS DAILY
Status: DISCONTINUED | OUTPATIENT
Start: 2024-02-29 | End: 2024-02-28

## 2024-02-28 RX ORDER — INSULIN LISPRO 100 [IU]/ML
1-6 INJECTION, SOLUTION INTRAVENOUS; SUBCUTANEOUS
Status: DISCONTINUED | OUTPATIENT
Start: 2024-02-28 | End: 2024-03-08 | Stop reason: HOSPADM

## 2024-02-28 RX ORDER — LEVOTHYROXINE SODIUM 0.05 MG/1
50 TABLET ORAL
Status: DISCONTINUED | OUTPATIENT
Start: 2024-02-29 | End: 2024-03-08 | Stop reason: HOSPADM

## 2024-02-28 RX ADMIN — PRAVASTATIN SODIUM 80 MG: 20 TABLET ORAL at 19:22

## 2024-02-28 RX ADMIN — CLOPIDOGREL 300 MG: 75 TABLET ORAL at 19:22

## 2024-02-28 RX ADMIN — SODIUM CHLORIDE 2000 ML: 0.9 INJECTION, SOLUTION INTRAVENOUS at 15:52

## 2024-02-28 RX ADMIN — INSULIN LISPRO 3 UNITS: 100 INJECTION, SOLUTION INTRAVENOUS; SUBCUTANEOUS at 20:14

## 2024-02-28 NOTE — H&P
"[facility]  H&P  Name: Jina Norris 78 y.o. female I MRN: [id number]  Unit/Bed#: ED 13 I Date of Admission: 2/28/2024   Date of Service: 2/28/2024 I Hospital Day: 0      Assessment/Plan   * Stroke-like symptoms  Assessment & Plan  78-year-old female with history of hypertension, type 2 diabetes mellitus, hyperlipidemia, hypothyroidism, PAD, and CKD 3 who presented to our ED due to right hand weakness and fall.  Stroke pathway  CT head reviewed which showed a 1 cm hypodensity within the right inferior frontal lobe on series 2 image 17 compared to prior CT and MRI examinations from November 2023.  MRI brain  Telemetry  Neurology evaluation and neurochecks  Echocardiogram  Vas bilateral carotids         Stage 3a chronic kidney disease (HCC)  Assessment & Plan  Lab Results   Component Value Date    EGFR 28 02/28/2024    EGFR 28 12/07/2023    EGFR 47 11/28/2023    CREATININE 1.69 (H) 02/28/2024    CREATININE 1.68 (H) 12/07/2023    CREATININE 1.12 11/28/2023     Stable, does not meet INDIGO criteria at this time.    PAD (peripheral artery disease) ([facility])  Assessment & Plan  Continue aspirin / statin  Continue follow-up with vascular surgery    Acquired hypothyroidism  Assessment & Plan  Continue levothyroxine    Mixed hyperlipidemia  Assessment & Plan  Continue statin    Essential hypertension  Assessment & Plan  Hold metoprolol and amlodipine for permissive hypertension. Treat if > 200 SBP    Type 2 diabetes mellitus with stage 3b chronic kidney disease, with long-term current use of insulin (HCC)  Assessment & Plan  Lab Results   Component Value Date    HGBA1C 9.5 (H) 12/07/2023       No results for input(s): \"POCGLU\" in the last 72 hours.    Blood Sugar Average: Last 72 hrs:    Continue detemir 35U HS, sliding scale  Monitor fingersticks             VTE Prophylaxis: Enoxaparin (Lovenox)  / sequential compression device   Code Status: full code    Anticipated Length of Stay:  Patient will be " admitted on an Observation basis with an anticipated length of stay of  < 2 midnights.   Justification for Hospital Stay: stroke pathway, neuro eval, mri brain, vas carotids, echo    Chief Complaint:   right hand weakness    History of Present Illness:    Jina Norris is a 78 y.o. female who presents with right hand weakness.    Patient is 78-year-old female with history of CKD 3, hypertension, hyperlipidemia, hypothyroidism, type 2 diabetes mellitus on insulin, and PAD who presented to our ED due to weakness.    Patient reports that she presented to the orthopedic office yesterday as a result of her right knee osteoarthritis.  She was injected with Kenalog and Marcaine.  She decided to go to the [facility] after this procedure.  She suddenly felt weak with her right lower extremity and brought herself down to the floor gently.  Denies any head trauma.  However, patient noticed that she started developing right hand weakness and had trouble keeping her hand close to grasp things.  She would require her left hand to ensure that nothing falls.  She had trouble sleeping overnight and this continued to recur.  She presented to our ED for further evaluation today.  CT head showed findings of a 1 cm hypodensity within the right inferior frontal lobe which is nonspecific but may represent a small infarction potentially subacute to chronic.    She currently denies any other symptoms otherwise.    Review of Systems:    Review of Systems   Constitutional:  Negative for chills and fever.   HENT:  Negative for congestion.    Respiratory:  Negative for cough, shortness of breath and wheezing.    Cardiovascular:  Negative for chest pain and palpitations.   Gastrointestinal:  Negative for abdominal pain, diarrhea, nausea and vomiting.   Musculoskeletal:  Positive for arthralgias and gait problem.   Skin:  Negative for color change and pallor.   Neurological:  Negative for headaches.   Psychiatric/Behavioral:  Negative for  confusion.        Past Medical and Surgical History:     Past Medical History:   Diagnosis Date    Benign essential hypertension     Chronic kidney disease, stage 3 (HCC)     Disorder of gallbladder     Disorder of skin and subcutaneous tissue     Dyspnea     Essential hypertension     Hyperlipidemia     Hypokalemia     Malaise and fatigue     Mixed hyperlipidemia     Morbid obesity (HCC)     Pernicious anemia        Past Surgical History:   Procedure Laterality Date    BREAST BIOPSY Right     CARPAL TUNNEL RELEASE Bilateral     CHOLECYSTECTOMY      COLONOSCOPY      Dr. Apple     HYSTERECTOMY      LUMBAR EPIDURAL INJECTION      x3    PARTIAL HYSTERECTOMY      SKIN LESION EXCISION      scalp        Meds/Allergies:    Prior to Admission medications    Medication Sig Start Date End Date Taking? Authorizing Provider   amLODIPine (NORVASC) 5 mg tablet Take 1 tablet (5 mg total) by mouth daily 10/18/23   Lyssa Peres MD   aspirin (ECOTRIN LOW STRENGTH) 81 mg EC tablet Take 81 mg by mouth daily    Historical Provider, MD   bacitracin topical ointment 500 units/g topical ointment Apply 1 large application topically daily. to right 4th finger wound until healed  Indications: wound    Homero Dinh DO   cholecalciferol (VITAMIN D3) 1,000 units tablet Take 800 Units by mouth daily      Historical Provider, MD   DULoxetine (CYMBALTA) 20 mg capsule take 1 capsule by mouth once daily 7/20/23   Prudencio Pope MD   fluticasone (FLONASE) 50 mcg/act nasal spray 2 sprays into each nostril daily 11/30/22   Akil Chery PA-C   folic acid (FOLVITE) 400 mcg tablet Take 400 mcg by mouth daily    Historical Provider, MD   glucose blood (FREESTYLE LITE) test strip Use as instructed 1/19/23   Lyssa Peres MD   glucose monitoring kit (FREESTYLE) monitoring kit 1 each by Does not apply route daily 6/3/20   Lena Taylor MD   insulin detemir (LEVEMIR) 100 units/mL subcutaneous injection Inject 35 Units under the skin  daily Take this at bedtime.    Historical Provider, MD   Insulin Pen Needle 32G X 6 MM MISC Use in the morning 2/8/22   Lyssa Peres MD   Lancets (freestyle) lancets Use as instructed -glucose monitor BID 9/7/21   Lyssa Peres MD   levothyroxine 50 mcg tablet Take 1 tablet (50 mcg total) by mouth daily 7/20/23   Lyssa Peres MD   magnesium oxide (MAG-OX) 400 mg tablet Take 1 tablet (400 mg total) by mouth 2 (two) times a day 12/7/23   Gabino Traylor DO   metoprolol succinate (TOPROL-XL) 50 mg 24 hr tablet Take 1 tablet (50 mg total) by mouth daily 9/7/23   Lyssa Peres MD   ftkof-8-vmsp ethyl esters (LOVAZA) 1 g capsule Take 2 g by mouth 2 (two) times a day    Historical Provider, MD   pantoprazole (PROTONIX) 40 mg tablet Take 1 tablet (40 mg total) by mouth daily in the early morning 12/4/23   Lyssa Peres MD   Psyllium (FIBER) 0.52 g CAPS Take by mouth    Historical Provider, MD   rosuvastatin (CRESTOR) 10 MG tablet Take 1 tablet (10 mg total) by mouth daily 4/18/23   Lyssa Peres MD   semaglutide, 0.25 or 0.5 mg/dose, (Ozempic) 2 mg/1.5 mL injection pen Inject 0.25 mg once weekly for 4 weeks then increase to 0.5 mg once weekly. 9/26/23   LAURITA Lipscomb   Urea 20 Intensive Hydrating 20 % cream  12/29/22   Historical Provider, MD   insulin aspart (NovoLOG FlexPen) 100 UNIT/ML injection pen Inject 4 Units under the skin daily with dinner 8/18/22 8/18/22  Lyssa Peres MD   Januvia 50 MG tablet take 1 tablet by mouth once daily  Patient not taking: No sig reported 11/16/20 2/25/21  Lena Taylor MD   linaGLIPtin 5 MG TABS Take 5 mg by mouth daily  Patient not taking: Reported on 7/27/2021 2/25/21 7/27/21  Lyssa Peres MD     I have reviewed home medications with patient personally.    Allergies: No Known Allergies    Social History:     Marital Status: /Civil Union   Substance Use History:   Social History     Substance and  Sexual Activity   Alcohol Use Yes    Comment: Occasionally      Social History     Tobacco Use   Smoking Status Former    Current packs/day: 0.00    Average packs/day: 1 pack/day for 14.0 years (14.0 ttl pk-yrs)    Types: Cigarettes    Start date:     Quit date:     Years since quittin.1   Smokeless Tobacco Never     Social History     Substance and Sexual Activity   Drug Use Never    Comment: Denies drug use - As per [employer]        Family History:    Family History   Problem Relation Age of Onset    Stroke Mother     Atrial fibrillation Mother     Brain cancer Father     Other Brother         Twin brothers - Open heart    Other Brother         Twin brothers - Open heart    No Known Problems Maternal Grandmother     No Known Problems Paternal Grandmother        Physical Exam:     Vitals:   Blood Pressure: (!) 177/77 (24 1630)  Pulse: 97 (24 1630)  Temperature: 97.9 °F (36.6 °C) (24 1526)  Temp Source: Temporal (24 1526)  Respirations: 17 (24 1630)  SpO2: 95 % (24 1630)    Physical Exam  Vitals reviewed.   Constitutional:       General: She is not in acute distress.  HENT:      Head: Normocephalic.      Nose: Nose normal.      Mouth/Throat:      Mouth: Mucous membranes are moist.   Eyes:      General: No scleral icterus.  Cardiovascular:      Rate and Rhythm: Normal rate and regular rhythm.   Pulmonary:      Effort: Pulmonary effort is normal. No respiratory distress.      Breath sounds: No wheezing.   Abdominal:      General: There is no distension.      Palpations: Abdomen is soft.      Tenderness: There is no abdominal tenderness.   Musculoskeletal:      Right lower leg: No edema.      Left lower leg: No edema.   Skin:     General: Skin is warm.   Neurological:      Mental Status: She is alert and oriented to person, place, and time.      Cranial Nerves: No cranial nerve deficit.      Sensory: No sensory deficit.      Motor: No weakness (5/5 lower extremity to leg  "raise, 5/5 upper extremity to arm raise and hand ).      Coordination: Coordination normal.       Additional Data:     Lab Results: I have personally reviewed pertinent reports.      Results from last 7 days   Lab Units 02/28/24  1551   WBC Thousand/uL 12.63*   HEMOGLOBIN g/dL 12.9   HEMATOCRIT % 40.8   PLATELETS Thousands/uL 205   NEUTROS PCT % 79*   LYMPHS PCT % 14   MONOS PCT % 6   EOS PCT % 0     Results from last 7 days   Lab Units 02/28/24  1551   SODIUM mmol/L 131*   POTASSIUM mmol/L 4.0   CHLORIDE mmol/L 95*   CO2 mmol/L 25   BUN mg/dL 20   CREATININE mg/dL 1.69*   ANION GAP mmol/L 11   CALCIUM mg/dL 9.8   ALBUMIN g/dL 4.1   TOTAL BILIRUBIN mg/dL 0.57   ALK PHOS U/L 69   ALT U/L 9   AST U/L 12*   GLUCOSE RANDOM mg/dL 319*                 Results from last 7 days   Lab Units 02/28/24  1551   LACTIC ACID mmol/L 1.0       Imaging: I have personally reviewed pertinent reports.      CT head without contrast   Final Result by Matt Cerda DO (02/28 1657)      There is a new approximately 1 cm hypodensity within the right inferior frontal lobe on series 2 image 17 compared to prior CT and MRI examinations from November 2023. This is nonspecific and may represent interval small infarction, now likely late    subacute to chronic. This could be confirmed with follow-up nonemergent MRI of the brain.      Stable additional white matter hypoattenuation within the cerebral hemispheres suggestive of chronic microangiopathy.      This examination was marked \"immediate notification\" in Epic in order to begin the standard process by which the radiology reading room liaison alerts the referring practitioner.                  Workstation performed: [id number]         XR chest 1 view portable   ED Interpretation by Walter Fletcher DO (02/28 1643)   No active disease in the chest      VAS carotid complete study (*Order only if CTA has not been completed*)    (Results Pending)   MRI brain wo contrast    " (Results Pending)       EKG, Pathology, and Other Studies Reviewed on Admission:   EKG: NSR, no acute ischemic changes    Allscripts / Epic Records Reviewed: Yes     ** Please Note: This note has been constructed using a voice recognition system. **

## 2024-02-28 NOTE — ASSESSMENT & PLAN NOTE
"Lab Results   Component Value Date    HGBA1C 9.5 (H) 12/07/2023       No results for input(s): \"POCGLU\" in the last 72 hours.    Blood Sugar Average: Last 72 hrs:    Continue detemir 35U HS, sliding scale  Monitor fingersticks    "

## 2024-02-28 NOTE — ED PROVIDER NOTES
History  Chief Complaint   Patient presents with    Weakness - Generalized     Patient reports she got a lidocaine shot in her knee yesterday and went to the store and felt dizzy and sat down. Patient reports her right hand feels hard to grasp with. Patient is not currently dizzy. Per family member patients   Patient lost 30 lbs since November.       Is a 78-year-old who was shopping in [employer] yesterday.  She apparently felt weak while she was checking out.  Not sure why but she needed to help her self to the ground.  At that time she had no focal abnormalities.  She does describe generalized weakness.  She returned home, her daughter describes her as not taking the medicines like she should.  Her sugars been running in the high 200s for some time now.  She is afraid to take her insulin because her daughter thinks she is afraid to gain weight.  She had been on Ozempic but that was stopped a month ago.  No apparent other new medications have been added to her.  She denies nausea vomiting or diarrhea.  Denies any chest pain or shortness of breath.  Just generalized weakness.  Her NIH was 0.        Prior to Admission Medications   Prescriptions Last Dose Informant Patient Reported? Taking?   DULoxetine (CYMBALTA) 20 mg capsule  Self No No   Sig: take 1 capsule by mouth once daily   Insulin Pen Needle 32G X 6 MM MISC  Self No No   Sig: Use in the morning   Lancets (freestyle) lancets  Self No No   Sig: Use as instructed -glucose monitor BID   Psyllium (FIBER) 0.52 g CAPS  Self Yes No   Sig: Take by mouth   Urea 20 Intensive Hydrating 20 % cream  Self Yes No   amLODIPine (NORVASC) 5 mg tablet   No No   Sig: Take 1 tablet (5 mg total) by mouth daily   aspirin (ECOTRIN LOW STRENGTH) 81 mg EC tablet  Self Yes No   Sig: Take 81 mg by mouth daily   bacitracin topical ointment 500 units/g topical ointment   Yes No   Sig: Apply 1 large application topically daily. to right 4th finger wound until healed  Indications: wound    cholecalciferol (VITAMIN D3) 1,000 units tablet  Self Yes No   Sig: Take 800 Units by mouth daily     fluticasone (FLONASE) 50 mcg/act nasal spray  Self No No   Si sprays into each nostril daily   folic acid (FOLVITE) 400 mcg tablet  Self Yes No   Sig: Take 400 mcg by mouth daily   glucose blood (FREESTYLE LITE) test strip  Self No No   Sig: Use as instructed   glucose monitoring kit (FREESTYLE) monitoring kit  Self No No   Si each by Does not apply route daily   insulin detemir (LEVEMIR) 100 units/mL subcutaneous injection   Yes No   Sig: Inject 35 Units under the skin daily Take this at bedtime.   levothyroxine 50 mcg tablet  Self No No   Sig: Take 1 tablet (50 mcg total) by mouth daily   magnesium oxide (MAG-OX) 400 mg tablet   No No   Sig: Take 1 tablet (400 mg total) by mouth 2 (two) times a day   metoprolol succinate (TOPROL-XL) 50 mg 24 hr tablet   No No   Sig: Take 1 tablet (50 mg total) by mouth daily   omega-3-acid ethyl esters (LOVAZA) 1 g capsule  Self Yes No   Sig: Take 2 g by mouth 2 (two) times a day   pantoprazole (PROTONIX) 40 mg tablet   No No   Sig: Take 1 tablet (40 mg total) by mouth daily in the early morning   rosuvastatin (CRESTOR) 10 MG tablet  Self No No   Sig: Take 1 tablet (10 mg total) by mouth daily   semaglutide, 0.25 or 0.5 mg/dose, (Ozempic) 2 mg/1.5 mL injection pen   No No   Sig: Inject 0.25 mg once weekly for 4 weeks then increase to 0.5 mg once weekly.      Facility-Administered Medications: None       Past Medical History:   Diagnosis Date    Benign essential hypertension     Chronic kidney disease, stage 3 (HCC)     Disorder of gallbladder     Disorder of skin and subcutaneous tissue     Dyspnea     Essential hypertension     Hyperlipidemia     Hypokalemia     Malaise and fatigue     Mixed hyperlipidemia     Morbid obesity (HCC)     Pernicious anemia        Past Surgical History:   Procedure Laterality Date    BREAST BIOPSY Right     CARPAL TUNNEL RELEASE Bilateral      CHOLECYSTECTOMY      COLONOSCOPY      Dr. Apple     HYSTERECTOMY      LUMBAR EPIDURAL INJECTION      x3    PARTIAL HYSTERECTOMY      SKIN LESION EXCISION      scalp        Family History   Problem Relation Age of Onset    Stroke Mother     Atrial fibrillation Mother     Brain cancer Father     Other Brother         Twin brothers - Open heart    Other Brother         Twin brothers - Open heart    No Known Problems Maternal Grandmother     No Known Problems Paternal Grandmother      I have reviewed and agree with the history as documented.    E-Cigarette/Vaping    E-Cigarette Use Never User      E-Cigarette/Vaping Substances    Nicotine No     THC No     CBD No     Flavoring No     Other No     Unknown No      Social History     Tobacco Use    Smoking status: Former     Current packs/day: 0.00     Average packs/day: 1 pack/day for 14.0 years (14.0 ttl pk-yrs)     Types: Cigarettes     Start date:      Quit date:      Years since quittin.1    Smokeless tobacco: Never   Vaping Use    Vaping status: Never Used   Substance Use Topics    Alcohol use: Yes     Comment: Occasionally     Drug use: Never     Comment: Denies drug use - As per [facility]        Review of Systems   Constitutional:  Negative for chills and fever.   HENT:  Negative for rhinorrhea and sore throat.    Eyes:  Negative for visual disturbance.   Respiratory:  Negative for cough and shortness of breath.    Cardiovascular:  Negative for chest pain and leg swelling.   Gastrointestinal:  Negative for abdominal pain, diarrhea, nausea and vomiting.   Genitourinary:  Negative for dysuria.   Musculoskeletal:  Positive for arthralgias, back pain and gait problem. Negative for myalgias.        Weakness requiring assistance with wheelchair.  Difficulty getting out of his car today.   Skin:  Negative for rash.   Neurological:  Positive for weakness and light-headedness. Negative for dizziness and headaches.        Patient perceives that she is dropping  things with the right arm   Psychiatric/Behavioral:  Positive for confusion.         Individual came with her says she is just not acting right.  She seems little more confused than normal.   All other systems reviewed and are negative.      Physical Exam  Physical Exam  Vitals and nursing note reviewed.   Constitutional:       Appearance: Normal appearance. She is well-developed. She is ill-appearing.   HENT:      Nose: Nose normal.      Mouth/Throat:      Mouth: Mucous membranes are dry.      Pharynx: No oropharyngeal exudate.      Comments: Dry mucous membranes  Eyes:      General: No scleral icterus.     Conjunctiva/sclera: Conjunctivae normal.      Pupils: Pupils are equal, round, and reactive to light.   Neck:      Vascular: No JVD.      Trachea: No tracheal deviation.   Cardiovascular:      Rate and Rhythm: Normal rate and regular rhythm.      Heart sounds: Normal heart sounds. No murmur heard.  Pulmonary:      Effort: Pulmonary effort is normal. No respiratory distress.      Breath sounds: Normal breath sounds. No wheezing or rales.   Abdominal:      General: Bowel sounds are normal.      Palpations: Abdomen is soft.      Tenderness: There is no abdominal tenderness. There is no guarding.   Musculoskeletal:         General: No tenderness. Normal range of motion.      Cervical back: Normal range of motion and neck supple.   Skin:     General: Skin is warm and dry.   Neurological:      General: No focal deficit present.      Mental Status: She is alert and oriented to person, place, and time.      Cranial Nerves: No cranial nerve deficit.      Sensory: No sensory deficit.      Motor: No abnormal muscle tone.      Comments: 5/5 motor, nl sens   Psychiatric:         Mood and Affect: Mood normal.         Behavior: Behavior normal.         Vital Signs  ED Triage Vitals [02/28/24 1526]   Temperature Pulse Respirations Blood Pressure SpO2   97.9 °F (36.6 °C) 84 18 (!) 183/77 98 %      Temp Source Heart Rate Source  Patient Position - Orthostatic VS BP Location FiO2 (%)   Temporal Monitor Sitting Left arm --      Pain Score       No Pain           Vitals:    02/28/24 1933 02/28/24 2030 02/28/24 2033 02/28/24 2100   BP: 153/90  (!) 178/67    Pulse: 57 75 76 73   Patient Position - Orthostatic VS:             Visual Acuity  Visual Acuity      Flowsheet Row Most Recent Value   L Pupil Size (mm) 2   R Pupil Size (mm) 2            ED Medications  Medications   aspirin (ECOTRIN LOW STRENGTH) EC tablet 81 mg (has no administration in time range)   DULoxetine (CYMBALTA) delayed release capsule 20 mg (has no administration in time range)   fluticasone (FLONASE) 50 mcg/act nasal spray 2 spray (has no administration in time range)   folic acid (FOLVITE) tablet 400 mcg (has no administration in time range)   insulin detemir (LEVEMIR) subcutaneous injection 35 Units (has no administration in time range)   levothyroxine tablet 50 mcg (has no administration in time range)   pantoprazole (PROTONIX) EC tablet 40 mg (has no administration in time range)   pravastatin (PRAVACHOL) tablet 80 mg (80 mg Oral Given 2/28/24 1922)   enoxaparin (LOVENOX) subcutaneous injection 30 mg (has no administration in time range)   insulin lispro (HumALOG/ADMELOG) 100 units/mL subcutaneous injection 1-6 Units (3 Units Subcutaneous Given 2/28/24 2014)   clopidogrel (PLAVIX) tablet 75 mg (has no administration in time range)   sodium chloride 0.9 % bolus 2,000 mL (0 mL Intravenous Stopped 2/28/24 2122)   clopidogrel (PLAVIX) tablet 300 mg (300 mg Oral Given 2/28/24 1922)       Diagnostic Studies  Results Reviewed       Procedure Component Value Units Date/Time    Fingerstick Glucose (POCT) [[id number]]  (Abnormal) Collected: 02/28/24 1947    Lab Status: Final result Updated: 02/28/24 1947     POC Glucose 262 mg/dl     HS Troponin I 2hr [[id number]]  (Normal) Collected: 02/28/24 1831    Lab Status: Final result Specimen: Blood from Arm, Left Updated: 02/28/24 8562      hs TnI 2hr 6 ng/L      Delta 2hr hsTnI -1 ng/L     Protime-INR [[id number]]  (Normal) Collected: 02/28/24 1551    Lab Status: Final result Specimen: Blood from Arm, Left Updated: 02/28/24 1814     Protime 13.4 seconds      INR 1.01    APTT [[id number]]  (Normal) Collected: 02/28/24 1551    Lab Status: Final result Specimen: Blood from Arm, Left Updated: 02/28/24 1814     PTT 26 seconds     UA w Reflex to Microscopic w Reflex to Culture [[id number]]  (Abnormal) Collected: 02/28/24 1748    Lab Status: Final result Specimen: Urine, Clean Catch Updated: 02/28/24 1757     Color, UA Straw     Clarity, UA Clear     Specific Gravity, UA <=1.005     pH, UA 6.5     Leukocytes, UA Negative     Nitrite, UA Negative     Protein, UA Negative mg/dl      Glucose, UA 3+ mg/dl      Ketones, UA Negative mg/dl      Urobilinogen, UA 0.2 E.U./dl      Bilirubin, UA Negative     Occult Blood, UA Negative    FLU/RSV/COVID - if FLU/RSV clinically relevant [[id number]]  (Normal) Collected: 02/28/24 1551    Lab Status: Final result Specimen: Nares from Nose Updated: 02/28/24 1645     SARS-CoV-2 Negative     INFLUENZA A PCR Negative     INFLUENZA B PCR Negative     RSV PCR Negative    Narrative:      FOR PEDIATRIC PATIENTS - copy/paste COVID Guidelines URL to browser: [email or web address]    SARS-CoV-2 assay is a Nucleic Acid Amplification assay intended for the  qualitative detection of nucleic acid from SARS-CoV-2 in nasopharyngeal  swabs. Results are for the presumptive identification of SARS-CoV-2 RNA.    Positive results are indicative of infection with SARS-CoV-2, the virus  causing COVID-19, but do not rule out bacterial infection or co-infection  with other viruses. Laboratories within the [place] and its  territories are required to report all positive results to the appropriate  public health authorities. Negative results do not preclude SARS-CoV-2  infection and should not be  used as the sole basis for treatment or other  patient management decisions. Negative results must be combined with  clinical observations, patient history, and epidemiological information.  This test has not been FDA cleared or approved.    This test has been authorized by FDA under an Emergency Use Authorization  (EUA). This test is only authorized for the duration of time the  declaration that circumstances exist justifying the authorization of the  emergency use of an in vitro diagnostic tests for detection of SARS-CoV-2  virus and/or diagnosis of COVID-19 infection under section 564(b)(1) of  the Act, 21 U.S.C. 360bbb-3(b)(1), unless the authorization is terminated  or revoked sooner. The test has been validated but independent review by FDA  and CLIA is pending.    Test performed using Viv[employer]ig[employer]: This RT-PCR assay targets N2,  a region unique to SARS-CoV-2. A conserved region in the E-gene was chosen  for pan-Sarbecovirus detection which includes SARS-CoV-2.    According to CMS-2020-01-R, this platform meets the definition of high-throughput technology.    HS Troponin 0hr (reflex protocol) [[id number]]  (Normal) Collected: 02/28/24 1551    Lab Status: Final result Specimen: Blood from Arm, Left Updated: 02/28/24 1632     hs TnI 0hr 7 ng/L     Comprehensive metabolic panel [[id number]]  (Abnormal) Collected: 02/28/24 1551    Lab Status: Final result Specimen: Blood from Arm, Left Updated: 02/28/24 1624     Sodium 131 mmol/L      Potassium 4.0 mmol/L      Chloride 95 mmol/L      CO2 25 mmol/L      ANION GAP 11 mmol/L      BUN 20 mg/dL      Creatinine 1.69 mg/dL      Glucose 319 mg/dL      Calcium 9.8 mg/dL      AST 12 U/L      ALT 9 U/L      Alkaline Phosphatase 69 U/L      Total Protein 7.5 g/dL      Albumin 4.1 g/dL      Total Bilirubin 0.57 mg/dL      eGFR 28 ml/min/1.73sq m     Narrative:      National Kidney Disease Foundation guidelines for Chronic Kidney Disease (CKD):     Stage 1 with normal or  high GFR (GFR > 90 mL/min/1.73 square meters)    Stage 2 Mild CKD (GFR = 60-89 mL/min/1.73 square meters)    Stage 3A Moderate CKD (GFR = 45-59 mL/min/1.73 square meters)    Stage 3B Moderate CKD (GFR = 30-44 mL/min/1.73 square meters)    Stage 4 Severe CKD (GFR = 15-29 mL/min/1.73 square meters)    Stage 5 End Stage CKD (GFR <15 mL/min/1.73 square meters)  Note: GFR calculation is accurate only with a steady state creatinine    Lactic acid, plasma (w/reflex if result > 2.0) [[id number]]  (Normal) Collected: 02/28/24 1551    Lab Status: Final result Specimen: Blood from Arm, Left Updated: 02/28/24 1624     LACTIC ACID 1.0 mmol/L     Narrative:      Result may be elevated if tourniquet was used during collection.    CBC and differential [[id number]]  (Abnormal) Collected: 02/28/24 1551    Lab Status: Final result Specimen: Blood from Arm, Left Updated: 02/28/24 1607     WBC 12.63 Thousand/uL      RBC 4.38 Million/uL      Hemoglobin 12.9 g/dL      Hematocrit 40.8 %      MCV 93 fL      MCH 29.5 pg      MCHC 31.6 g/dL      RDW 13.2 %      MPV 11.0 fL      Platelets 205 Thousands/uL      nRBC 0 /100 WBCs      Neutrophils Relative 79 %      Immat GRANS % 1 %      Lymphocytes Relative 14 %      Monocytes Relative 6 %      Eosinophils Relative 0 %      Basophils Relative 0 %      Neutrophils Absolute 9.92 Thousands/µL      Immature Grans Absolute 0.07 Thousand/uL      Lymphocytes Absolute 1.80 Thousands/µL      Monocytes Absolute 0.80 Thousand/µL      Eosinophils Absolute 0.02 Thousand/µL      Basophils Absolute 0.02 Thousands/µL                    MRI brain wo contrast   Final Result by Arslan Hester DO (02/28 2007)      Recent infarcts are identified in the right anterior deep frontal white matter, left insular cortex, and left posterior frontal deep white matter without evidence of hemorrhage. Embolic phenomenon should be considered given the bilateral involvement.      Moderate chronic microangiopathic changes.     "  The study was marked in EPIC for immediate notification.      Workstation performed: [id number]         CT head without contrast   Final Result by Matt Cerda DO (02/28 1657)      There is a new approximately 1 cm hypodensity within the right inferior frontal lobe on series 2 image 17 compared to prior CT and MRI examinations from November 2023. This is nonspecific and may represent interval small infarction, now likely late    subacute to chronic. This could be confirmed with follow-up nonemergent MRI of the brain.      Stable additional white matter hypoattenuation within the cerebral hemispheres suggestive of chronic microangiopathy.      This examination was marked \"immediate notification\" in Epic in order to begin the standard process by which the radiology reading room liaison alerts the referring practitioner.                  Workstation performed: [id number]         XR chest 1 view portable   ED Interpretation by Walter Fletcher DO (02/28 1643)   No active disease in the chest      VAS carotid complete study (*Order only if CTA has not been completed*)    (Results Pending)              Procedures  ECG 12 Lead Documentation Only    Date/Time: 2/28/2024 4:05 PM    Performed by: Walter Fletcher DO  Authorized by: Walter Fletcher DO    Indications / Diagnosis:  Generalized weakness and a diabetic  ECG reviewed by me, the ED Provider: yes    Patient location:  ED  Previous ECG:     Previous ECG:  Compared to current    Similarity:  No change    Comparison to cardiac monitor: Yes    Interpretation:     Interpretation: normal    Rate:     ECG rate:  77    ECG rate assessment: normal    Rhythm:     Rhythm: sinus rhythm      Rhythm comment:  Sinus arrhythmia  Ectopy:     Ectopy: none    QRS:     QRS axis:  Left    QRS intervals:  Normal  Conduction:     Conduction: normal    ST segments:     ST segments:  Non-specific  T waves:     T waves: non-specific             ED Course  ED Course as of " 02/28/24 2254   Wed Feb 28, 2024   1636 Creatinine(!): 1.69   1723 Unfortunately, patient urinated without giving me a specimen.  Will continue to hydrate, attempt getting a specimen before deciding on disposition.  CAT scan revealing a hypodensity in the right frontal area which may be consistent with her right arm perceived weakness, although I do not perceive any.  She has been dropping things with that hand she states.  Will reach out to neuro.   1757 Urine still pending.  Discussed with Slim.  Given the profound weakness, hyperglycemia and new finding on CAT scan will admit for suggested MRI and IV hydration.  Awaiting urine.   1759 Blood pressure acceptable ranges for post CVA/TIA symptoms             HEART Risk Score      Flowsheet Row Most Recent Value   Heart Score Risk Calculator    History 0 Filed at: 02/28/2024 1634   ECG 1 Filed at: 02/28/2024 1634   Age 2 Filed at: 02/28/2024 1634   Risk Factors 1 Filed at: 02/28/2024 1634   Troponin 0 Filed at: 02/28/2024 1634   HEART Score 4 Filed at: 02/28/2024 1634             Stroke Assessment       Row Name 02/28/24 1611             NIH Stroke Scale    Interval --      Level of Consciousness (1a.) 0      LOC Questions (1b.) 0      LOC Commands (1c.) 0      Best Gaze (2.) 0      Visual (3.) 0      Facial Palsy (4.) 0      Motor Arm, Left (5a.) 0      Motor Arm, Right (5b.) 0      Motor Leg, Left (6a.) 0      Motor Leg, Right (6b.) 0      Limb Ataxia (7.) 0      Sensory (8.) 0      Best Language (9.) 0      Dysarthria (10.) 0      Extinction and Inattention (11.) (Formerly Neglect) 0      Total 0                                SBIRT 20yo+      Flowsheet Row Most Recent Value   Initial Alcohol Screen: US AUDIT-C     1. How often do you have a drink containing alcohol? 0 Filed at: 02/28/2024 1526   2. How many drinks containing alcohol do you have on a typical day you are drinking?  0 Filed at: 02/28/2024 1526   3a. Male UNDER 65: How often do you have five or more  drinks on one occasion? 0 Filed at: 02/28/2024 1526   3b. FEMALE Any Age, or MALE 65+: How often do you have 4 or more drinks on one occassion? 0 Filed at: 02/28/2024 1526   Audit-C Score 0 Filed at: 02/28/2024 1526   MICHAEL: How many times in the past year have you...    Used an illegal drug or used a prescription medication for non-medical reasons? Never Filed at: 02/28/2024 1526                      Medical Decision Making  Is a 78-year-old who was shopping in [employer] yesterday.  She apparently felt weak while she was checking out.  Not sure why but she needed to help her self to the ground.  At that time she had no focal abnormalities.  She does describe generalized weakness.  She returned home, her daughter describes her as not taking the medicines like she should.  Her sugars been running in the high 200s for some time now.  She is afraid to take her insulin because her daughter thinks she is afraid to gain weight.  She had been on Ozempic but that was stopped a month ago.  No apparent other new medications have been added to her.  She denies nausea vomiting or diarrhea.  Denies any chest pain or shortness of breath.  Just generalized weakness.  Her NIH was 0.    Patient clearly seems little confused.  She appears dry in his bone.  I suspect she has some renal insufficiency, hyperglycemia, her right-sided perceived weakness and clumsiness cannot be elicited on neurological exam today but certainly is at risk for stroke.  Other etiologies may simply be UTI, underlying infection or even COVID or flu.  May have him be underlying sepsis as well versus medication related confusion.    Amount and/or Complexity of Data Reviewed  Independent Historian: friend     Details: History given from a friend, and neighbor who looks over Jina and her , her  struggling with dementia at this time.  They both live alone by their neighbor.  Labs: ordered. Decision-making details documented in ED Course.     Details:  Laboratory data revealing the hyperglycemia.  Clinically she is dehydrated, has no real change in creatinine since her evaluation 2 months ago, but she normally runs a better GFR dating back 6 months.  Etiology unclear.  Radiology: ordered and independent interpretation performed.     Details: Pertinent findings on CT, likely subacute frontal right stroke pain.  This might explain her right arm hand weakness.  NIH essentially unremarkable.  Will need MRI in the morning.  Discussed with Ralph.  ECG/medicine tests: ordered and independent interpretation performed.     Details: EKG ordered and independently interpreted by me revealing sinus rhythm with sinus arrhythmia.  Left axis deviation.    Risk  Decision regarding hospitalization.  Risk Details: Discussed case with Ralph.  Given her generalized weakness and inability to get along at home will admit, likely will need PT OT, and potentially the MRI of her head.  Subtle finding by CAT scan.  Awaiting urine on transfer to floor.    I did discuss patient's situation with the neighbor, Kellee, and she states her  has significant dementia, so this may be a struggle getting her home to him.  He apparently has significant enough dementia that he had forgotten putting her in the neighbor's car to take her to the hospital.  Discharge planning will need to be aware of this.  She may require some assistance at home upon discharge.             Disposition  Final diagnoses:   Generalized weakness   Dehydration   CVA (cerebral vascular accident) (HCC)     Time reflects when diagnosis was documented in both MDM as applicable and the Disposition within this note       Time User Action Codes Description Comment    2/28/2024  5:55 PM Akil Davis Add [R29.90] Stroke-like symptom     2/28/2024  5:58 PM Walter Fletcher Add [R53.1] Weakness     2/28/2024  5:58 PM Walter Fletcher Add [R53.1] Generalized weakness     2/28/2024  5:58 PM Walter Fletcher Remove [R53.1] Weakness      2/28/2024  5:58 PM Walter Fletcher Add [E86.0] Dehydration     2/28/2024  5:59 PM Walter Fletcher Add [I63.9] CVA (cerebral vascular accident) (HCC)     2/28/2024  5:59 PM Walter Fletcher Modify [R29.90] Stroke-like symptom     2/28/2024  5:59 PM Walter Fletcher Modify [I63.9] CVA (cerebral vascular accident) (HCC)           ED Disposition       ED Disposition   Admit    Condition   Stable    Date/Time   Wed Feb 28, 2024 0896    Comment   Case was discussed with Susan and the patient's admission status was agreed to be Admission Status: observation status to the service of Dr. Davis .               Follow-up Information    None         Patient's Medications   Discharge Prescriptions    No medications on file       No discharge procedures on file.    PDMP Review         Value Time User    PDMP Reviewed  Yes 11/19/2023  8:10 PM Luda Cintron PA-C            ED Provider  Electronically Signed by             Walter Fletcher DO  02/28/24 0665

## 2024-02-28 NOTE — ASSESSMENT & PLAN NOTE
Lab Results   Component Value Date    EGFR 28 02/28/2024    EGFR 28 12/07/2023    EGFR 47 11/28/2023    CREATININE 1.69 (H) 02/28/2024    CREATININE 1.68 (H) 12/07/2023    CREATININE 1.12 11/28/2023     Stable, does not meet INDIGO criteria at this time.

## 2024-02-28 NOTE — TREATMENT PLAN
Discussed with Dr. Fletcher, neurology recommends plavix load then plavix 75mg daily. Neuro to see patient in consult.

## 2024-02-28 NOTE — ASSESSMENT & PLAN NOTE
78-year-old female with history of hypertension, type 2 diabetes mellitus, hyperlipidemia, hypothyroidism, PAD, and CKD 3 who presented to our ED due to right hand weakness and fall.  Stroke pathway  CT head reviewed which showed a 1 cm hypodensity within the right inferior frontal lobe on series 2 image 17 compared to prior CT and MRI examinations from November 2023.  MRI brain  Telemetry  Neurology evaluation and neurochecks  Echocardiogram  Vas bilateral carotids

## 2024-02-29 ENCOUNTER — APPOINTMENT (OUTPATIENT)
Dept: NON INVASIVE DIAGNOSTICS | Facility: HOSPITAL | Age: 79
DRG: 066 | End: 2024-02-29
Payer: MEDICARE

## 2024-02-29 ENCOUNTER — APPOINTMENT (INPATIENT)
Dept: CT IMAGING | Facility: HOSPITAL | Age: 79
DRG: 066 | End: 2024-02-29
Payer: MEDICARE

## 2024-02-29 LAB
ALBUMIN SERPL BCP-MCNC: 3.4 G/DL (ref 3.5–5)
ALP SERPL-CCNC: 58 U/L (ref 34–104)
ALT SERPL W P-5'-P-CCNC: 7 U/L (ref 7–52)
ANION GAP SERPL CALCULATED.3IONS-SCNC: 9 MMOL/L
AORTIC ROOT: 3.2 CM
APICAL FOUR CHAMBER EJECTION FRACTION: 61 %
ASCENDING AORTA: 2.8 CM
AST SERPL W P-5'-P-CCNC: 9 U/L (ref 13–39)
ATRIAL RATE: 67 BPM
ATRIAL RATE: 77 BPM
AV REGURGITATION PRESSURE HALF TIME: 465 MS
BASOPHILS # BLD AUTO: 0.01 THOUSANDS/ÂΜL (ref 0–0.1)
BASOPHILS NFR BLD AUTO: 0 % (ref 0–1)
BILIRUB SERPL-MCNC: 0.42 MG/DL (ref 0.2–1)
BSA FOR ECHO PROCEDURE: 1.78 M2
BUN SERPL-MCNC: 17 MG/DL (ref 5–25)
CALCIUM ALBUM COR SERPL-MCNC: 9 MG/DL (ref 8.3–10.1)
CALCIUM SERPL-MCNC: 8.5 MG/DL (ref 8.4–10.2)
CHLORIDE SERPL-SCNC: 100 MMOL/L (ref 96–108)
CHOLEST SERPL-MCNC: 106 MG/DL
CO2 SERPL-SCNC: 23 MMOL/L (ref 21–32)
CREAT SERPL-MCNC: 1.27 MG/DL (ref 0.6–1.3)
E WAVE DECELERATION TIME: 185 MS
E/A RATIO: 0.7
EOSINOPHIL # BLD AUTO: 0.03 THOUSAND/ÂΜL (ref 0–0.61)
EOSINOPHIL NFR BLD AUTO: 0 % (ref 0–6)
ERYTHROCYTE [DISTWIDTH] IN BLOOD BY AUTOMATED COUNT: 13.3 % (ref 11.6–15.1)
EST. AVERAGE GLUCOSE BLD GHB EST-MCNC: 301 MG/DL
FRACTIONAL SHORTENING: 33 (ref 28–44)
GFR SERPL CREATININE-BSD FRML MDRD: 40 ML/MIN/1.73SQ M
GLUCOSE SERPL-MCNC: 181 MG/DL (ref 65–140)
GLUCOSE SERPL-MCNC: 303 MG/DL (ref 65–140)
GLUCOSE SERPL-MCNC: 314 MG/DL (ref 65–140)
GLUCOSE SERPL-MCNC: 387 MG/DL (ref 65–140)
GLUCOSE SERPL-MCNC: 394 MG/DL (ref 65–140)
HBA1C MFR BLD: 12.1 %
HCT VFR BLD AUTO: 36.9 % (ref 34.8–46.1)
HDLC SERPL-MCNC: 43 MG/DL
HGB BLD-MCNC: 11.7 G/DL (ref 11.5–15.4)
IMM GRANULOCYTES # BLD AUTO: 0.04 THOUSAND/UL (ref 0–0.2)
IMM GRANULOCYTES NFR BLD AUTO: 1 % (ref 0–2)
INTERVENTRICULAR SEPTUM IN DIASTOLE (PARASTERNAL SHORT AXIS VIEW): 1.2 CM
INTERVENTRICULAR SEPTUM: 1.2 CM (ref 0.6–1.1)
IVC: 14 MM
LAAS-AP2: 17.5 CM2
LAAS-AP4: 17.7 CM2
LDLC SERPL CALC-MCNC: 31 MG/DL (ref 0–100)
LEFT ATRIUM SIZE: 3.5 CM
LEFT ATRIUM VOLUME (MOD BIPLANE): 48 ML
LEFT ATRIUM VOLUME INDEX (MOD BIPLANE): 27 ML/M2
LEFT INTERNAL DIMENSION IN SYSTOLE: 2.7 CM (ref 2.1–4)
LEFT VENTRICULAR INTERNAL DIMENSION IN DIASTOLE: 4 CM (ref 3.5–6)
LEFT VENTRICULAR POSTERIOR WALL IN END DIASTOLE: 1.2 CM
LEFT VENTRICULAR STROKE VOLUME: 41 ML
LVSV (TEICH): 41 ML
LYMPHOCYTES # BLD AUTO: 1.6 THOUSANDS/ÂΜL (ref 0.6–4.47)
LYMPHOCYTES NFR BLD AUTO: 18 % (ref 14–44)
MAGNESIUM SERPL-MCNC: 1.6 MG/DL (ref 1.9–2.7)
MCH RBC QN AUTO: 29.7 PG (ref 26.8–34.3)
MCHC RBC AUTO-ENTMCNC: 31.7 G/DL (ref 31.4–37.4)
MCV RBC AUTO: 94 FL (ref 82–98)
MONOCYTES # BLD AUTO: 0.65 THOUSAND/ÂΜL (ref 0.17–1.22)
MONOCYTES NFR BLD AUTO: 7 % (ref 4–12)
MV E'TISSUE VEL-SEP: 6 CM/S
MV PEAK A VEL: 1.25 M/S
MV PEAK E VEL: 87 CM/S
NEUTROPHILS # BLD AUTO: 6.4 THOUSANDS/ÂΜL (ref 1.85–7.62)
NEUTS SEG NFR BLD AUTO: 74 % (ref 43–75)
NRBC BLD AUTO-RTO: 0 /100 WBCS
P AXIS: 34 DEGREES
PLATELET # BLD AUTO: 155 THOUSANDS/UL (ref 149–390)
PMV BLD AUTO: 10.5 FL (ref 8.9–12.7)
POTASSIUM SERPL-SCNC: 4 MMOL/L (ref 3.5–5.3)
PR INTERVAL: 180 MS
PROT SERPL-MCNC: 6.2 G/DL (ref 6.4–8.4)
QRS AXIS: -13 DEGREES
QRS AXIS: -4 DEGREES
QRSD INTERVAL: 86 MS
QRSD INTERVAL: 88 MS
QT INTERVAL: 382 MS
QT INTERVAL: 406 MS
QTC INTERVAL: 432 MS
QTC INTERVAL: 465 MS
RBC # BLD AUTO: 3.94 MILLION/UL (ref 3.81–5.12)
RIGHT ATRIUM AREA SYSTOLE A4C: 8.2 CM2
RIGHT VENTRICLE ID DIMENSION: 2.4 CM
SL CV AV DECELERATION TIME RETROGRADE: 1604 MS
SL CV AV PEAK GRADIENT RETROGRADE: 74 MMHG
SL CV LEFT ATRIUM LENGTH A2C: 5.5 CM
SL CV LV EF: 55
SL CV PED ECHO LEFT VENTRICLE DIASTOLIC VOLUME (MOD BIPLANE) 2D: 69 ML
SL CV PED ECHO LEFT VENTRICLE SYSTOLIC VOLUME (MOD BIPLANE) 2D: 28 ML
SODIUM SERPL-SCNC: 132 MMOL/L (ref 135–147)
T WAVE AXIS: 11 DEGREES
T WAVE AXIS: 12 DEGREES
TR MAX PG: 22 MMHG
TR PEAK VELOCITY: 2.4 M/S
TRICUSPID ANNULAR PLANE SYSTOLIC EXCURSION: 1.9 CM
TRICUSPID VALVE PEAK REGURGITATION VELOCITY: 2.37 M/S
TRIGL SERPL-MCNC: 158 MG/DL
VENTRICULAR RATE: 77 BPM
VENTRICULAR RATE: 79 BPM
WBC # BLD AUTO: 8.73 THOUSAND/UL (ref 4.31–10.16)

## 2024-02-29 PROCEDURE — 85025 COMPLETE CBC W/AUTO DIFF WBC: CPT | Performed by: STUDENT IN AN ORGANIZED HEALTH CARE EDUCATION/TRAINING PROGRAM

## 2024-02-29 PROCEDURE — 97167 OT EVAL HIGH COMPLEX 60 MIN: CPT

## 2024-02-29 PROCEDURE — 99232 SBSQ HOSP IP/OBS MODERATE 35: CPT | Performed by: STUDENT IN AN ORGANIZED HEALTH CARE EDUCATION/TRAINING PROGRAM

## 2024-02-29 PROCEDURE — 70498 CT ANGIOGRAPHY NECK: CPT

## 2024-02-29 PROCEDURE — 97163 PT EVAL HIGH COMPLEX 45 MIN: CPT

## 2024-02-29 PROCEDURE — 80053 COMPREHEN METABOLIC PANEL: CPT | Performed by: STUDENT IN AN ORGANIZED HEALTH CARE EDUCATION/TRAINING PROGRAM

## 2024-02-29 PROCEDURE — 93306 TTE W/DOPPLER COMPLETE: CPT

## 2024-02-29 PROCEDURE — 70496 CT ANGIOGRAPHY HEAD: CPT

## 2024-02-29 PROCEDURE — 93306 TTE W/DOPPLER COMPLETE: CPT | Performed by: INTERNAL MEDICINE

## 2024-02-29 PROCEDURE — 80061 LIPID PANEL: CPT | Performed by: STUDENT IN AN ORGANIZED HEALTH CARE EDUCATION/TRAINING PROGRAM

## 2024-02-29 PROCEDURE — 83735 ASSAY OF MAGNESIUM: CPT | Performed by: STUDENT IN AN ORGANIZED HEALTH CARE EDUCATION/TRAINING PROGRAM

## 2024-02-29 PROCEDURE — 93010 ELECTROCARDIOGRAM REPORT: CPT | Performed by: INTERNAL MEDICINE

## 2024-02-29 PROCEDURE — 82948 REAGENT STRIP/BLOOD GLUCOSE: CPT

## 2024-02-29 PROCEDURE — 36415 COLL VENOUS BLD VENIPUNCTURE: CPT | Performed by: STUDENT IN AN ORGANIZED HEALTH CARE EDUCATION/TRAINING PROGRAM

## 2024-02-29 PROCEDURE — 83036 HEMOGLOBIN GLYCOSYLATED A1C: CPT | Performed by: STUDENT IN AN ORGANIZED HEALTH CARE EDUCATION/TRAINING PROGRAM

## 2024-02-29 PROCEDURE — 93880 EXTRACRANIAL BILAT STUDY: CPT | Performed by: SURGERY

## 2024-02-29 RX ORDER — SODIUM CHLORIDE, SODIUM GLUCONATE, SODIUM ACETATE, POTASSIUM CHLORIDE, MAGNESIUM CHLORIDE, SODIUM PHOSPHATE, DIBASIC, AND POTASSIUM PHOSPHATE .53; .5; .37; .037; .03; .012; .00082 G/100ML; G/100ML; G/100ML; G/100ML; G/100ML; G/100ML; G/100ML
100 INJECTION, SOLUTION INTRAVENOUS CONTINUOUS
Status: DISPENSED | OUTPATIENT
Start: 2024-02-29 | End: 2024-02-29

## 2024-02-29 RX ORDER — INSULIN LISPRO 100 [IU]/ML
5 INJECTION, SOLUTION INTRAVENOUS; SUBCUTANEOUS
Status: DISCONTINUED | OUTPATIENT
Start: 2024-02-29 | End: 2024-03-01

## 2024-02-29 RX ORDER — HYDRALAZINE HYDROCHLORIDE 20 MG/ML
10 INJECTION INTRAMUSCULAR; INTRAVENOUS ONCE
Status: COMPLETED | OUTPATIENT
Start: 2024-02-29 | End: 2024-02-29

## 2024-02-29 RX ORDER — NYSTATIN 100000 [USP'U]/G
POWDER TOPICAL 2 TIMES DAILY
Status: DISCONTINUED | OUTPATIENT
Start: 2024-02-29 | End: 2024-03-08 | Stop reason: HOSPADM

## 2024-02-29 RX ADMIN — INSULIN LISPRO 5 UNITS: 100 INJECTION, SOLUTION INTRAVENOUS; SUBCUTANEOUS at 14:07

## 2024-02-29 RX ADMIN — CLOPIDOGREL 75 MG: 75 TABLET ORAL at 08:53

## 2024-02-29 RX ADMIN — PANTOPRAZOLE SODIUM 40 MG: 40 TABLET, DELAYED RELEASE ORAL at 05:23

## 2024-02-29 RX ADMIN — NYSTATIN: 100000 POWDER TOPICAL at 21:12

## 2024-02-29 RX ADMIN — Medication 400 MCG: at 08:53

## 2024-02-29 RX ADMIN — INSULIN LISPRO 5 UNITS: 100 INJECTION, SOLUTION INTRAVENOUS; SUBCUTANEOUS at 06:03

## 2024-02-29 RX ADMIN — LEVOTHYROXINE SODIUM 50 MCG: 25 TABLET ORAL at 05:23

## 2024-02-29 RX ADMIN — DULOXETINE HYDROCHLORIDE 20 MG: 20 CAPSULE, DELAYED RELEASE ORAL at 08:53

## 2024-02-29 RX ADMIN — INSULIN LISPRO 4 UNITS: 100 INJECTION, SOLUTION INTRAVENOUS; SUBCUTANEOUS at 17:05

## 2024-02-29 RX ADMIN — ENOXAPARIN SODIUM 30 MG: 100 INJECTION SUBCUTANEOUS at 08:54

## 2024-02-29 RX ADMIN — HYDRALAZINE HYDROCHLORIDE 10 MG: 20 INJECTION, SOLUTION INTRAMUSCULAR; INTRAVENOUS at 04:25

## 2024-02-29 RX ADMIN — INSULIN DETEMIR 35 UNITS: 100 INJECTION, SOLUTION SUBCUTANEOUS at 12:06

## 2024-02-29 RX ADMIN — IOHEXOL 90 ML: 350 INJECTION, SOLUTION INTRAVENOUS at 14:02

## 2024-02-29 RX ADMIN — INSULIN LISPRO 5 UNITS: 100 INJECTION, SOLUTION INTRAVENOUS; SUBCUTANEOUS at 17:04

## 2024-02-29 RX ADMIN — PRAVASTATIN SODIUM 80 MG: 20 TABLET ORAL at 17:04

## 2024-02-29 RX ADMIN — FLUTICASONE PROPIONATE 2 SPRAY: 50 SPRAY, METERED NASAL at 08:54

## 2024-02-29 RX ADMIN — INSULIN LISPRO 6 UNITS: 100 INJECTION, SOLUTION INTRAVENOUS; SUBCUTANEOUS at 11:57

## 2024-02-29 RX ADMIN — SODIUM CHLORIDE, SODIUM GLUCONATE, SODIUM ACETATE, POTASSIUM CHLORIDE, MAGNESIUM CHLORIDE, SODIUM PHOSPHATE, DIBASIC, AND POTASSIUM PHOSPHATE 100 ML/HR: .53; .5; .37; .037; .03; .012; .00082 INJECTION, SOLUTION INTRAVENOUS at 14:00

## 2024-02-29 RX ADMIN — ASPIRIN 81 MG: 81 TABLET, COATED ORAL at 08:53

## 2024-02-29 NOTE — OCCUPATIONAL THERAPY NOTE
Occupational Therapy Evaluation      Jina WENDY Jr    2/29/2024    Principal Problem:    Stroke-like symptoms  Active Problems:    Type 2 diabetes mellitus with stage 3b chronic kidney disease, with long-term current use of insulin (HCC)    Essential hypertension    Mixed hyperlipidemia    Acquired hypothyroidism    PAD (peripheral artery disease) (HCC)    Stage 3a chronic kidney disease (HCC)      Past Medical History:   Diagnosis Date    Benign essential hypertension     Chronic kidney disease, stage 3 (HCC)     Disorder of gallbladder     Disorder of skin and subcutaneous tissue     Dyspnea     Essential hypertension     Hyperlipidemia     Hypokalemia     Malaise and fatigue     Mixed hyperlipidemia     Morbid obesity (HCC)     Pernicious anemia        Past Surgical History:   Procedure Laterality Date    BREAST BIOPSY Right     CARPAL TUNNEL RELEASE Bilateral     CHOLECYSTECTOMY      COLONOSCOPY      Dr. Apple     HYSTERECTOMY      LUMBAR EPIDURAL INJECTION      x3    PARTIAL HYSTERECTOMY      SKIN LESION EXCISION      scalp         02/29/24 0803   OT Last Visit   OT Visit Date 02/29/24   Note Type   Note type Evaluation   Pain Assessment   Pain Assessment Tool 0-10   Pain Score No Pain   Restrictions/Precautions   Weight Bearing Precautions Per Order No   Other Precautions Cognitive;Multiple lines;Fall Risk   Home Living   Type of Home House   Home Layout One level;Performs ADLs on one level;Able to live on main level with bedroom/bathroom;Stairs to enter with rails  (5 MAIKEL)   Bathroom Shower/Tub Walk-in shower   Bathroom Toilet Standard   Bathroom Equipment Grab bars in shower   Bathroom Accessibility Accessible   Home Equipment Walker;Cane  (no AD used at baseline)   Prior Function   Level of [place] Independent with ADLs;Independent with functional mobility   Lives With Significant other  (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)   Receives Help From Neighbor  (neighbor provides transportation)    IADLs Independent with meal prep;Independent with medication management;Family/Friend/Other provides transportation   Falls in the last 6 months 1 to 4  (1 fall PTA)   ADL   UB Bathing Assistance 5  Supervision/Setup   LB Bathing Assistance 4  Minimal Assistance   UB Dressing Assistance 5  Supervision/Setup   LB Dressing Assistance 4  Minimal Assistance   Toileting Assistance  5  Supervision/Setup   Bed Mobility   Supine to Sit 5  Supervision   Additional items Assist x 1;HOB elevated;Bedrails;Increased time required;Verbal cues   Additional Comments Pt remained OOB in recliner upon conclusion   Transfers   Sit to Stand 5  Supervision   Additional items Assist x 1;Increased time required;Verbal cues   Stand to Sit 5  Supervision   Additional items Assist x 1;Armrests;Increased time required;Verbal cues   Toilet transfer 5  Supervision   Additional items Assist x 1;Standard toilet;Increased time required   Functional Mobility   Functional Mobility   (CGA)   Additional Comments A x1   Additional items Rolling walker   Balance   Static Sitting Good   Dynamic Sitting Fair +   Static Standing Fair -   Dynamic Standing Fair -   Ambulatory Fair -   Activity Tolerance   Activity Tolerance Patient limited by fatigue   Medical Staff Made Aware CM notified   Nurse Made Aware APURVA Jain   RUASHKAN Assessment   RUE Assessment WFL   LUE Assessment   LUE Assessment WFL   Hand Function   Gross Motor Coordination Functional   Fine Motor Coordination Functional   Sensation   Light Touch No apparent deficits   Vision-Basic Assessment   Current Vision Wears glasses all the time   Cognition   Overall Cognitive Status Impaired   Arousal/Participation Alert;Cooperative   Attention Attends with cues to redirect   Orientation Level Oriented X4   Memory Decreased short term memory;Decreased recall of recent events;Decreased recall of precautions   Following Commands Follows one step commands without difficulty   Comments Mini Cog completed- 0/5    Assessment   Limitation Decreased ADL status;Decreased Safe judgement during ADL;Decreased endurance;Decreased self-care trans;Decreased high-level ADLs;Decreased cognition   Prognosis Good   Assessment Pt is a 78 y.o. female seen for OT evaluation s/p admit [facility]/28/[facility] w/ Stroke-like symptoms.  Comorbidities affecting pt's functional performance at time of assessment include:  HTN, CKD, Dyspnea, HLD, Obesity . Personal factors affecting pt at time of IE include:steps to enter environment, limited home support, difficulty performing ADLS, and limited insight into deficits. Prior to admission, pt was Mod I with ADLs. Upon evaluation: the following deficits impact occupational performance: decreased balance, decreased tolerance, impaired memory, impaired sequencing, impaired problem solving, and decreased safety awareness. Pt to benefit from continued skilled OT tx while in the hospital to address deficits as defined above and maximize level of functional independence w ADL's and functional mobility. Occupational Performance areas to address include: bathing/shower, toilet hygiene, dressing, functional mobility, and clothing management. From OT standpoint, recommendation at time of d/c would be Level III (Minimum Resource Intensity).   Goals   Patient Goals To go to the bathroom  (completed during session)   Plan   Treatment Interventions ADL retraining;Functional transfer training;Endurance training;Patient/family training;Equipment evaluation/education;Compensatory technique education;Energy conservation;Activityengagement;Cognitive reorientation   Goal Expiration Date 03/10/24   OT Treatment Day 0   OT Frequency 1-2x/wk   Discharge Recommendation   Rehab Resource Intensity Level, OT III (Minimum Resource Intensity)   Additional Comments  Pt seen as a co-eval with PT due to the patient's co-morbidities, clinically unstable presentation, and present impairments which are a regression from the  patient's baseline.   Additional Comments 2 The patient's raw score on the AM-PAC Daily Activity Inpatient Short Form is 20. A raw score of greater than or equal to 19 suggests the patient may benefit from discharge to home. Please refer to the recommendation of the Occupational Therapist for safe discharge planning.   -PAC Daily Activity Inpatient   Lower Body Dressing 3   Bathing 3   Toileting 3   Upper Body Dressing 3   Grooming 4   Eating 4   Daily Activity Raw Score 20   Daily Activity Standardized Score (Calc for Raw Score >=11) 42.03   AM-[facility] Applied Cognition Inpatient   Following a Speech/Presentation 3   Understanding Ordinary Conversation 3   Taking Medications 2   Remembering Where Things Are Placed or Put Away 1   Remembering List of 4-5 Errands 1   Taking Care of Complicated Tasks 1   Applied Cognition Raw Score 11   Applied Cognition Standardized Score 27.03   Mini-Cog Assessment   Word Version Version 1: Banana, Sunrise, Chair   Clock Draw (CDT) 0   Word Recall 0   Mini-Cog Score 0   Mini-Cog Results Positive screen for dementia     GOALS:    Pt will achieve the following within specified time frame: STG  Pt will achieve the following goals within 5 days    *ADL transfers with (S) for inc'd independence with ADLs/purposeful tasks    *UB ADL with (I) for inc'd independence with self cares    *LB ADL with (S) using AE prn for inc'd independence with self cares    *Toileting with (I) for clothing management and hygiene for return to PLOF with personal care    *Increase static stand balance and dyn stand balance to F for inc'd safety with standing purposeful tasks    *Increase stand tolerance x3 m for inc'd tolerance with standing purposeful tasks    *Participate in 10m UE therex to increase overall stamina/activity tolerance for purposeful tasks    *Bed mobility- (I) for inc'd independence to manage own comfort and initiate EOB & OOB purposeful tasks    Pt will achieve the following within specified  time frame: LTG  Pt will achieve the following goals within 10 days    *ADL transfers with (I) for inc'd independence with ADLs/purposeful tasks    *LB ADL with (I) using AE prn for inc'd independence with self cares    *Increase static stand balance and dyn stand balance to F+ for inc'd safety with standing purposeful tasks    *Increase stand tolerance x5 m for inc'd tolerance with standing purposeful tasks      Karen Enriquez MS, OTR/L

## 2024-02-29 NOTE — ASSESSMENT & PLAN NOTE
Lab Results   Component Value Date    HGBA1C 12.1 (H) 02/29/2024       Recent Labs     02/28/24 1947 02/29/24  1154   POCGLU 262* 387*         Blood Sugar Average: Last 72 hrs:  (P) 324.5 increase basal insulin to 36 units   Increase Humalog to 12 units with meals plus sliding scale.  Patient will benefit from improved glycemic control, hemoglobin A1c goal less than 7.

## 2024-02-29 NOTE — PLAN OF CARE
Problem: PHYSICAL THERAPY ADULT  Goal: Performs mobility at highest level of function for planned discharge setting.  See evaluation for individualized goals.  Description: Treatment/Interventions: Functional transfer training, LE strengthening/ROM, Elevations, Therapeutic exercise, Endurance training, Patient/family training, Equipment eval/education, Bed mobility, Gait training, Cognitive reorientation, Spoke to nursing  Equipment Recommended: Walker (continue RW use)       See flowsheet documentation for full assessment, interventions and recommendations.  Note: Prognosis: Good  Problem List: Decreased strength, Decreased endurance, Impaired balance, Decreased mobility, Impaired judgement, Decreased safety awareness, Decreased cognition  Assessment: Pt is 78 y.o. female seen for high-complexity PT evaluation on 2/29/2024 s/p admit to [facility] on 2/28/2024 w/ Stroke-like symptoms; pt presents to ED with R hand weakness and s/p fall. PT initially consulted to assess pt's functional mobility and d/c needs. Order placed for PT eval and tx, w/ up and OOB as tolerated order. PTA, pt lives c boyfriend in 1 SH c 5 MAIKEL, amb s AD at baseline, (I) ADLs/IADLs. At time of eval, pt requires SUP for bed mobility and transfers, tolerating amb x 70 ft x 2 with RW. Upon evaluation, pt presenting with impaired functional mobility d/t decreased strength, decreased endurance, impaired balance, decreased mobility, decreased cognition, impaired judgement, and decreased safety awareness. Pertinent PMHx and current co-morbidities affecting pt's physical performance at time of assessment include: CKD stage 3, PAD, hypothyroidism, HLD, HTN, DM type 2. Personal factors affecting pt at time of eval include: decreased cognition, positive fall history, limited insight into impairments, and increased age . Objective measures performed on IE also reveal limitations: AM-PAC 6-Clicks: 18/24. Pt's clinical presentation is  currently unstable/unpredictable seen in pt's presentation of need for input for task focus and mobility technique, ongoing medical assessment, and poor safety awareness . Overall, pt's rehab potential and prognosis to return to PLOF is good as impacted by objective findings, warranting pt to receive further skilled PT interventions to address identified impairments, activity limitation(s), and participation restriction(s). Pt is to benefit from continued PT tx to address deficits as defined above and maximize level of functional independent mobility and consistency in order for pt to improve activity tolerance. From PT/mobility standpoint, recommendation at time of d/c would be level 3, minimal resource intensity PT services, pending pt's overall functional progress in order to facilitate return to PLOF and abilities.  Barriers to Discharge: Inaccessible home environment     Rehab Resource Intensity Level, PT: III (Minimum Resource Intensity)    See flowsheet documentation for full assessment.

## 2024-02-29 NOTE — NUTRITION
"   02/29/24 1401   Biochemical Data,Medical Tests, and Procedures   Biochemical Data/Medical Tests/Procedures Lab values reviewed;Meds reviewed   Labs (Comment) 2/29/2024 hemoglobin A1c 12.1, glucose 387, triglycerides 158, HDL 43, sodium 132, AST 9, magnesium 1.6   Meds (Comment) Aspirin, Plavix, folic acid, insulin, levothyroxine, Protonix, Plasma-Lyte   Nutrition-Focused Physical Exam   Nutrition-Focused Physical Exam Findings RN skin assessment reviewed   Medical-Related Concerns type 2 diabetes, stage 3 CKD, obesity   Adequacy of Intake   Nutrition Modality PO   Feeding Route   PO Independent   Current PO Intake   Current Diet Order CCD 2 diet, thin liquids   Current Meal Intake Adequate   Estimated calorie intake compared to estimated need Unknown at this time.   PES Statement   Biochemical (2) Altered nutrition-related laboratory values (specify) NC-2.2  (Hemoglobin A1c)   Related to Other (Comment)  (Type 2 diabetes)   As evidenced by: Abnormal lab (Specify)  (Hemoglobin A1c 12.1)   Recommendations/Interventions   Malnutrition/BMI Present No   Summary Consult-stroke; ST consulted.  Presents with generalized weakness.  Stroke pathway. Past medical history significant for type 2 diabetes, stage 3 CKD, obesity. Weight history reviewed. Noted significant 21# weight loss in 6 months (10.9% body weight).  Prescribed a CCD 2 diet, thin liquids. No documented meal completion as of yet.  Met with patient at bedside.  \"I do not really feel like eating.\"  Reports usually having 2 meals daily.  Does not add salt to food.  Cautious of carbohydrate intake.  Patient cooks and grocery shops.  NKFA.  She reports new weakness to right upper extremity, \"I drop everything.\"  Denies dysphagia.  Reports intentional weight loss over time. She reports familiarity with diabetes and cardiac diet. Offers no nutrient questions at this time. Continue diet as prescribed. RD to follow PRN.   Interventions/Recommendations Continue current " diet order   Education Assessment   Education Education not indicated at this time   Patient Nutrition Goals   Goal Improve to healthful diet;Adequate intake

## 2024-02-29 NOTE — ASSESSMENT & PLAN NOTE
Stroke pathway completed.  Neurology recommending...  PT/OT recommending level III Minimal resource

## 2024-02-29 NOTE — PROGRESS NOTES
Pastoral Care Progress Note    2024  Patient: Jina Norris : 1945  Admission Date & Time: 2024 1515  MRN: [id number] CSN: [id number]    CH responded to code RRT at 1500. Pt occupied with providers, no family present.  CH remains available.     24 1500   Clinical Encounter Type   Visited With Patient not available   Crisis Visit Code  (rrt)

## 2024-02-29 NOTE — ED NOTES
Report given to  RN on 2nd floor at this time. RN Aware of patients worsening symptoms.       Susy Cunha RN  02/29/24 [id number]       Susy Cunha RN  02/29/24 1529

## 2024-02-29 NOTE — SPEECH THERAPY NOTE
Speech Language/Pathology  Orders Received. Chart reviewed. Pt returned from CT, fluids started, insulin given as ordered. Pts speech worsening since return from CT. +right facial droop.  Rapid Response called. ST will f/u as indicated.

## 2024-02-29 NOTE — ED NOTES
Initial TT sent to Dr Davis at 2:15pm making him aware of patients worsening symptoms.  replied and stated we are awaiting neuro recs. RN replied with neuro exam findings and pt held in ED room awaiting response.  replied 30 minutes later requesting stroke alert. Stroke alert called at 2:52pm. Dr at bedside shortly after.  then called Rapid Response and promptly cancelled both after bedside exam. Pt then transferred to floor by RN and bedside report given.      Susy Cunha RN  02/29/24 [id number]

## 2024-02-29 NOTE — PHYSICAL THERAPY NOTE
Physical Therapy Evaluation   Time in: 0737  Time out: 0800  Total evaluation time: 23 minutes    Patient's Name: Jina Norris    Admitting Diagnosis  Weakness [R53.1]    Problem List  Patient Active Problem List   Diagnosis    Type 2 diabetes mellitus with stage 3b chronic kidney disease, with long-term current use of insulin (HCC)    Essential hypertension    Mixed hyperlipidemia    Acquired hypothyroidism    Severe obesity (BMI 35.0-39.9) with comorbidity (HCC)    Spinal stenosis of lumbar region    Chronic pain syndrome    Lumbar radiculopathy    PAD (peripheral artery disease) (HCC)    Asymptomatic bilateral carotid artery stenosis    Hypercalcemia    Abnormal CT of the abdomen    Metabolic encephalopathy    Fall    Hypomagnesemia    Stage 3a chronic kidney disease (HCC)    INDIGO (acute kidney injury) ([facility])    Stroke-like symptoms       Past Medical History  Past Medical History:   Diagnosis Date    Benign essential hypertension     Chronic kidney disease, stage 3 (HCC)     Disorder of gallbladder     Disorder of skin and subcutaneous tissue     Dyspnea     Essential hypertension     Hyperlipidemia     Hypokalemia     Malaise and fatigue     Mixed hyperlipidemia     Morbid obesity (HCC)     Pernicious anemia        Past Surgical History  Past Surgical History:   Procedure Laterality Date    BREAST BIOPSY Right     CARPAL TUNNEL RELEASE Bilateral     CHOLECYSTECTOMY      COLONOSCOPY      Dr. Apple     HYSTERECTOMY      LUMBAR EPIDURAL INJECTION      x3    PARTIAL HYSTERECTOMY      SKIN LESION EXCISION      scalp        PT performed at least 2 patient identifiers during session: Name and wristband.       02/29/24 0739   PT Last Visit   PT Visit Date 02/29/24   Note Type   Note type Evaluation   Pain Assessment   Pain Assessment Tool 0-10   Pain Score No Pain  (at rest 0/10)   Restrictions/Precautions   Weight Bearing Precautions Per Order No   Other Precautions Fall Risk;Multiple lines;Cognitive   Home Living  "  Type of Home House   Home Layout One level;Performs ADLs on one level;Able to live on main level with bedroom/bathroom;Stairs to enter with rails  (5 MAIKEL)   Bathroom Shower/Tub Walk-in shower   Bathroom Toilet Standard   Bathroom Equipment Grab bars in shower   Bathroom Accessibility Accessible   Home Equipment Walker;Cane  (no AD used at baseline)   Prior Function   Level of [place] Independent with ADLs;Independent with functional mobility   Lives With Significant other  (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)   Receives Help From Neighbor  (neighbor provides transportation)   IADLs Independent with meal prep;Independent with medication management;Family/Friend/Other provides transportation   Falls in the last 6 months 1 to 4  (1 fall PTA)   General   Family/Caregiver Present No   Cognition   Arousal/Participation Alert   Attention Attends with cues to redirect   Orientation Level Oriented X4   Memory Decreased short term memory;Decreased recall of recent events;Decreased recall of precautions   Following Commands Follows one step commands without difficulty   Comments pt agreeable to PT session   Subjective   Subjective \"I can try and walk\"   RLE Assessment   RLE Assessment X   Strength RLE   RLE Overall Strength 4/5   LLE Assessment   LLE Assessment X   Strength LLE   LLE Overall Strength 4/5   Vision-Basic Assessment   Current Vision Wears glasses all the time   Coordination   Movements are Fluid and Coordinated 1   Sensation WFL  (pt denies numbness/tingling)   Bed Mobility   Supine to Sit 5  Supervision   Additional items Assist x 1;HOB elevated;Increased time required;Verbal cues   Additional Comments Pt remained OOB in recliner upon conclusion   Transfers   Sit to Stand 5  Supervision   Additional items Assist x 1;Increased time required   Stand to Sit 5  Supervision   Additional items Assist x 1;Increased time required   Toilet transfer 5  Supervision   Additional items Assist x 1;Standard " toilet;Increased time required   Ambulation/Elevation   Gait pattern Improper Weight shift;Decreased foot clearance;Short stride   Gait Assistance 5  Supervision  (SBA)   Additional items Assist x 1;Verbal cues   Assistive Device Rolling walker   Distance 70 ft x 2   Stair Management Assistance Not tested   Balance   Static Sitting Good   Dynamic Sitting Fair +   Static Standing Fair -   Dynamic Standing Fair -   Ambulatory Fair -   Endurance Deficit   Endurance Deficit Yes   Activity Tolerance   Activity Tolerance Patient limited by fatigue   Medical Staff Made Aware coordination of care provided with OT Karen   Nurse Made Aware Yes, RN Susy made aware of outcomes/recs   Assessment   Prognosis Good   Problem List Decreased strength;Decreased endurance;Impaired balance;Decreased mobility;Impaired judgement;Decreased safety awareness;Decreased cognition   Assessment Pt is 78 y.o. female seen for high-complexity PT evaluation on 2/29/2024 s/p admit to [facility] on 2/28/2024 w/ Stroke-like symptoms; pt presents to ED with R hand weakness and s/p fall. PT initially consulted to assess pt's functional mobility and d/c needs. Order placed for PT eval and tx, w/ up and OOB as tolerated order. PTA, pt lives c boyfriend in 1 SH c 5 MAIKEL, amb s AD at baseline, (I) ADLs/IADLs. At time of eval, pt requires SUP for bed mobility and transfers, tolerating amb x 70 ft x 2 with RW. Upon evaluation, pt presenting with impaired functional mobility d/t decreased strength, decreased endurance, impaired balance, decreased mobility, decreased cognition, impaired judgement, and decreased safety awareness. Pertinent PMHx and current co-morbidities affecting pt's physical performance at time of assessment include: CKD stage 3, PAD, hypothyroidism, HLD, HTN, DM type 2. Personal factors affecting pt at time of eval include: decreased cognition, positive fall history, limited insight into impairments, and increased age .  "Objective measures performed on IE also reveal limitations: AM-PAC 6-Clicks: 18/24. Pt's clinical presentation is currently unstable/unpredictable seen in pt's presentation of need for input for task focus and mobility technique, ongoing medical assessment, and poor safety awareness . Overall, pt's rehab potential and prognosis to return to PLOF is good as impacted by objective findings, warranting pt to receive further skilled PT interventions to address identified impairments, activity limitation(s), and participation restriction(s). Pt is to benefit from continued PT tx to address deficits as defined above and maximize level of functional independent mobility and consistency in order for pt to improve activity tolerance. From PT/mobility standpoint, recommendation at time of d/c would be level 3, minimal resource intensity PT services, pending pt's overall functional progress in order to facilitate return to PLOF and abilities.   Barriers to Discharge Inaccessible home environment   Goals   Patient Goals \"to get home to my boyfriend\"   [facility] Expiration Date 03/10/24   Short Term Goal #1 In 7-10 days: Increase bilateral LE strength 1/2 grade to facilitate independent mobility, Perform all bed mobility tasks modified independent to decrease caregiver burden, Perform all transfers modified independent to improve independence, Ambulate > 150 ft. with least restrictive assistive device modified independent w/o LOB and w/ normalized gait pattern 100% of the time, Navigate 5 stairs with close S with unilateral handrail to facilitate return to previous living environment, Increase all balance 1/2 grade to decrease risk for falls, Complete exercise program independently, and Tolerate 4 hr OOB to faciliate upright tolerance   PT Treatment Day 0   Plan   Treatment/Interventions Functional transfer training;LE strengthening/ROM;Elevations;Therapeutic exercise;Endurance training;Patient/family training;Equipment " eval/education;Bed mobility;Gait training;Cognitive reorientation;Spoke to nursing   PT Frequency 3-5x/wk   Discharge Recommendation   Rehab Resource Intensity Level, PT III (Minimum Resource Intensity)   Equipment Recommended Walker  (continue RW use)   Additional Comments Pt's raw score on the AM-PAC Basic Mobility inpatient short form is 18, standardized score is 41.05. Patients at this level are likely to benefit from DC to home with home health care, however, please refer to therapist recommendation for safe DC planning.   AM-PAC Basic Mobility Inpatient   Turning in Flat Bed Without Bedrails 3   Lying on Back to Sitting on Edge of Flat Bed Without Bedrails 3   Moving Bed to Chair 3   Standing Up From Chair Using Arms 3   Walk in Room 3   Climb 3-5 Stairs With Railing 3   Basic Mobility Inpatient Raw Score 18   Basic Mobility Standardized Score 41.05   Highest Level Of Mobility   JH-HLM Goal 6: Walk 10 steps or more   JH-HLM Achieved 7: Walk 25 feet or more   End of Consult   Patient Position at End of Consult Bedside chair;All needs within reach       Tip Martinez, PT, DPT

## 2024-02-29 NOTE — ED NOTES
Pt returned from CT, fluids started, insulin given as ordered. Pts speech worsening since return from CT. +right facial droop. Tiger text sent Dr Davis.      Susy Cunha, RN  02/29/24 2933

## 2024-02-29 NOTE — ASSESSMENT & PLAN NOTE
78-year-old female with history of hypertension, type 2 diabetes mellitus, hyperlipidemia, hypothyroidism, PAD, and CKD 3 who presented to our ED due to right hand weakness and fall.  Stroke pathway  CT head reviewed which showed a 1 cm hypodensity within the right inferior frontal lobe on series 2 image 17 compared to prior CT and MRI examinations from November 2023.  MRI showing recent infarcts are identified in the right anterior deep frontal white matter, left insular cortex, and left posterior frontal deep white matter without evidence of hemorrhage. Embolic phenomenon should be considered given the bilateral involvement.   Telemetry  Improvement with renal function. IV hydration started. CTA head and neck to complete stroke pathway  Awaiting neurology evaluation  PT/OT recommending level III Minimal resource

## 2024-02-29 NOTE — ED NOTES
Patient placed in hospital bed for comfort       Lizbeth Haywood RN  02/28/24 2019     30-Mar-2021 22:23

## 2024-02-29 NOTE — ASSESSMENT & PLAN NOTE
Lab Results   Component Value Date    EGFR 40 02/29/2024    EGFR 28 02/28/2024    EGFR 28 12/07/2023    CREATININE 1.27 02/29/2024    CREATININE 1.69 (H) 02/28/2024    CREATININE 1.68 (H) 12/07/2023     Stable, does not meet INDIGO criteria at this time. Baseline appears to be between 1.1-1.4.  IV hydration for cta, monitor renal function.

## 2024-02-29 NOTE — CONSULTS
TeleConsultation - Stroke   Jina Norris 78 y.o. female MRN: [id number]  Unit/Bed#: ED 13 Encounter: [id number]        REQUIRED DOCUMENTATION:     1. This service was provided via Telemedicine.  2. Provider located at [place].  3. TeleMed provider: Alex Neri MD.  4. Identify all parties in room with patient during tele consult:  5.Patient was then informed that this was a Telemedicine visit and that the exam was being conducted confidentially over secure lines. My office door was closed. No one else was in the room.  Patient acknowledged consent and understanding of privacy and security of the Telemedicine visit, and gave us permission to have the assistant stay in the room in order to assist with the history and to conduct the exam.  I informed the patient that I have reviewed their record in Epic and presented the opportunity for them to ask any questions regarding the visit today.  The patient agreed to participate.           Assessment/Plan   Assessment:   78 y.o. female who presents with acute onset R hand weakness, imbalance for 2 days.   MRI brain wo demonstrated recent infarcts in R anterior frontal WM, L insular and L frontal area.   CTA H/N, no LVO, no significant stenosis  -Echo gross unremarkable, mild abnormal diastolic function    Acute stroke, bihemispheric  -etiology under investigation, suspect embolic given bilateral hemispheric involvement.   -continue stroke pathway  -bp goal normotensive  -continue DAPT with aspirin and plavix for 21 days and continue with monotherapy with plavix  -LDL 31, keep home regimen  -please consider DILLAN to rule out LV thrombus. If unremarkable, will need long term cardiac monitor.     TPA Decision: Patient not a candidate. Unclear time of onset outside appropriate time window.          Jina Norris will need follow up in in 6 weeks with neurovascular attending or advance practitioner. She will not require outpatient neurological testing.    History of Present  Illness     Reason for Consult / Principal Problem: stroke  Hx and PE limited by: tele  Patient last known well: 2 days ago      HPI: Jina Norris is a 78 y.o. female who presents with acute onset R hand weakness, imbalance for 2 days.     Per patient, symptoms started acutely yesterday, unclear the exact time. MRI brain wo demonstrated recent infarcts in R anterior frontal WM, L insular and L frontal area.   Neuro exam through tele was limited, but noticed R facial weakness, R hand weakness and slurred speech. NIHSS=3    Inpatient consult to Neurology  Consult performed by: Alex Neri MD  Consult ordered by: Akil Davis MD      Review of Systems  10 system reviewed, unremarkable other than above    Historical Information   Past Medical History:   Diagnosis Date    Benign essential hypertension     Chronic kidney disease, stage 3 (HCC)     Disorder of gallbladder     Disorder of skin and subcutaneous tissue     Dyspnea     Essential hypertension     Hyperlipidemia     Hypokalemia     Malaise and fatigue     Mixed hyperlipidemia     Morbid obesity (HCC)     Pernicious anemia      Past Surgical History:   Procedure Laterality Date    BREAST BIOPSY Right     CARPAL TUNNEL RELEASE Bilateral     CHOLECYSTECTOMY      COLONOSCOPY      Dr. Apple     HYSTERECTOMY      LUMBAR EPIDURAL INJECTION      x3    PARTIAL HYSTERECTOMY      SKIN LESION EXCISION      scalp      Social History   Social History     Substance and Sexual Activity   Alcohol Use Yes    Comment: Occasionally      Social History     Substance and Sexual Activity   Drug Use Never    Comment: Denies drug use - As per [facility]      E-Cigarette/Vaping    E-Cigarette Use Never User      E-Cigarette/Vaping Substances    Nicotine No     THC No     CBD No     Flavoring No     Other No     Unknown No      Social History     Tobacco Use   Smoking Status Former    Current packs/day: 0.00    Average packs/day: 1 pack/day for 14.0 years (14.0 ttl pk-yrs)    Types:  "Cigarettes    Start date:     Quit date:     Years since quittin.1   Smokeless Tobacco Never     Family History: non-contributory    Review of previous medical records was  completed.     Meds/Allergies   all current active meds have been reviewed    No Known Allergies    Objective   Vitals:Blood pressure 141/77, pulse 79, temperature 97.9 °F (36.6 °C), temperature source Temporal, resp. rate 17, height 5' 2\" (1.575 m), weight 77.1 kg (170 lb), SpO2 96%.,Body mass index is 31.09 kg/m².    Intake/Output Summary (Last 24 hours) at 2024 1029  Last data filed at 2024 2122  Gross per 24 hour   Intake 2000 ml   Output --   Net 2000 ml       Invasive Devices:   Invasive Devices       Peripheral Intravenous Line  Duration             Peripheral IV 24 Left Antecubital 1 day                    Physical Exam  Neurologic Exam  GEN: in no acute distress, well-developed, well nourished  HEENT: normocephalic,  Nose and ears grossly normal in appearance.  CV:  no pedal edema.  Normotensive  PULM: airways patent, non-labored breathing   ABD:  Nondistended  EXT: no   edema or erythema.  No joint swelling  SKIN: no rashes or lesions.     NEURO:        Mental Status: Alert and oriented to person, place, and year. Interactive, able to follow commands.  Answers questions appropriately       Speech: slurred speech, no aphasia,          CN 2-12: grossly intact except for R facial droop       Motor: can move all extremities symmetrically      Sensory:  Reported intact light touch throughout        Reflexes:  Not able to assess during tele visit       Coordination: no ataxia with finger-to-nose and heel-to-shin testing             Gait/Station: Deferred        Cortical: No Extinction    NIHSS:  1a.Level of Consciousness: 0 = Alert   1b. LOC Questions: 0 = Answers both correctly   1c. LOC Commands: 0 = Obeys both correctly   2. Best Gaze: 0 = Normal   3. Visual: 0 = No visual field loss   4. Facial Palsy: " 2=Partial paralysis (total or near total paralysis of the lower face)   5a. Motor Right Arm: 0=No drift, limb holds 90 (or 45) degrees for full 10 seconds   5b. Motor Left Arm: 0=No drift, limb holds 90 (or 45) degrees for full 10 seconds   6a. Motor Right Le=No drift, limb holds 90 (or 45) degrees for full 10 seconds   6b. Motor Left Le=No drift, limb holds 90 (or 45) degrees for full 10 seconds   7. Limb Ataxia:  0=Absent   8. Sensory: 0=Normal; no sensory loss   9. Best Language:  0=No aphasia, normal   10. Dysarthria: 1=Mild to moderate, patient slurs at least some words and at worst, can be understood with some difficulty   11. Extinction and Inattention (formerly Neglect): 0=No abnormality   Total Score: 3       Modified [place] Score:  0 (No baseline symptoms/disability)    Lab Results: CBC:   Results from last 7 days   Lab Units 24  0405 24  1551   WBC Thousand/uL 8.73 12.63*   RBC Million/uL 3.94 4.38   HEMOGLOBIN g/dL 11.7 12.9   HEMATOCRIT % 36.9 40.8   MCV fL 94 93   PLATELETS Thousands/uL 155 205   , BMP/CMP:   Results from last 7 days   Lab Units 24  0405 24  1551   SODIUM mmol/L 132* 131*   POTASSIUM mmol/L 4.0 4.0   CHLORIDE mmol/L 100 95*   CO2 mmol/L 23 25   BUN mg/dL 17 20   CREATININE mg/dL 1.27 1.69*   CALCIUM mg/dL 8.5 9.8   AST U/L 9* 12*   ALT U/L 7 9   ALK PHOS U/L 58 69   EGFR ml/min/1.73sq m 40 28   , Lipid Profile:   Results from last 7 days   Lab Units 24  0405   HDL mg/dL 43*   LDL CALC mg/dL 31   TRIGLYCERIDES mg/dL 158*     Imaging Studies: I have personally reviewed pertinent films in PACS  EKG, Pathology, and Other Studies: I have personally reviewed pertinent reports.    VTE Prophylaxis: Heparin    Counseling / Coordination of Care  Total time spent today 51 minutes. Greater than 50% of total time was spent with the patient and / or family counseling and / or coordination of care. A description of the counseling / coordination of care:  impression, future studies, follow up

## 2024-02-29 NOTE — ASSESSMENT & PLAN NOTE
Lab Results   Component Value Date    HGBA1C 12.1 (H) 02/29/2024       Recent Labs     02/28/24 1947 02/29/24  1154   POCGLU 262* 387*       Blood Sugar Average: Last 72 hrs:  (P) 324.5  Continue detemir 35U HS, sliding scale Beging lispro 5U TID with meals due to hyperglycemia  Monitor fingersticks

## 2024-02-29 NOTE — PROGRESS NOTES
[facility]  Progress Note  Name: Jina Norris I  MRN: [id number]  Unit/Bed#: ED 13 I Date of Admission: 2/28/2024   Date of Service: 2/29/2024 I Hospital Day: 0    Addendum: Informed that patient had worsening facial droop and slurring of speech. She just completed her CTA head and neck. Spoke to Dr. Neri personally who stated that no indication for stroke alert since she is no longer a TNK candidate at this point. Discussed case with rapid response team.    Assessment/Plan   * Stroke-like symptoms  Assessment & Plan  78-year-old female with history of hypertension, type 2 diabetes mellitus, hyperlipidemia, hypothyroidism, PAD, and CKD 3 who presented to our ED due to right hand weakness and fall.  Stroke pathway  CT head reviewed which showed a 1 cm hypodensity within the right inferior frontal lobe on series 2 image 17 compared to prior CT and MRI examinations from November 2023.  MRI showing recent infarcts are identified in the right anterior deep frontal white matter, left insular cortex, and left posterior frontal deep white matter without evidence of hemorrhage. Embolic phenomenon should be considered given the bilateral involvement.   Telemetry  Improvement with renal function. IV hydration started. CTA head and neck to complete stroke pathway  Awaiting neurology evaluation  PT/OT recommending level III Minimal resource       Stage 3a chronic kidney disease (HCC)  Assessment & Plan  Lab Results   Component Value Date    EGFR 40 02/29/2024    EGFR 28 02/28/2024    EGFR 28 12/07/2023    CREATININE 1.27 02/29/2024    CREATININE 1.69 (H) 02/28/2024    CREATININE 1.68 (H) 12/07/2023     Stable, does not meet INDIGO criteria at this time. Baseline appears to be between 1.1-1.4.  IV hydration for cta, monitor renal function.    PAD (peripheral artery disease) (HCC)  Assessment & Plan  Continue aspirin / statin  Continue follow-up with vascular surgery    Acquired hypothyroidism  Assessment &  Plan  Continue levothyroxine    Mixed hyperlipidemia  Assessment & Plan  Continue statin    Essential hypertension  Assessment & Plan  Permissive hypertension. Hold metoprolol and amlodipine. Treat if > 200 SBP    Type 2 diabetes mellitus with stage 3b chronic kidney disease, with long-term current use of insulin (HCC)  Assessment & Plan  Lab Results   Component Value Date    HGBA1C 12.1 (H) 2024       Recent Labs     24  1947 24  1154   POCGLU 262* 387*       Blood Sugar Average: Last 72 hrs:  (P) 324.5  Continue detemir 35U HS, sliding scale Beging lispro 5U TID with meals due to hyperglycemia  Monitor fingersticks             VTE Pharmacologic Prophylaxis:   Pharmacologic: Enoxaparin (Lovenox)  Mechanical VTE Prophylaxis in Place: Yes    Discussions with Specialists or Other Care Team Provider: nursing    Education and Discussions with Family / Patient: patient    Current Length of Stay: 0 day(s)    Current Patient Status: Inpatient   Certification Statement: The patient will continue to require additional inpatient hospital stay due to stroke pathway, telemetry, neurology evaluation    Discharge Plan: 24 hours    Code Status: Level 1 - Full Code      Subjective:   Patient seen and examined at bedside. No new imaging overnights.    Objective:     Vitals:   Temp (24hrs), Av.9 °F (36.6 °C), Min:97.9 °F (36.6 °C), Max:97.9 °F (36.6 °C)    Temp:  [97.9 °F (36.6 °C)] 97.9 °F (36.6 °C)  HR:  [57-97] 75  Resp:  [16-18] 17  BP: (140-203)/(62-90) 197/79  SpO2:  [93 %-99 %] 97 %  Body mass index is 31.09 kg/m².     Input and Output Summary (last 24 hours):       Intake/Output Summary (Last 24 hours) at 2024 1326  Last data filed at 2024 2122  Gross per 24 hour   Intake 2000 ml   Output --   Net 2000 ml       Physical Exam:     Physical Exam  Vitals reviewed.   Constitutional:       General: She is not in acute distress.  HENT:      Head: Normocephalic.      Nose: Nose normal.       Mouth/Throat:      Mouth: Mucous membranes are moist.   Eyes:      General: No scleral icterus.  Cardiovascular:      Rate and Rhythm: Normal rate.   Pulmonary:      Effort: Pulmonary effort is normal. No respiratory distress.   Abdominal:      General: There is no distension.      Palpations: Abdomen is soft.      Tenderness: There is no abdominal tenderness.   Skin:     General: Skin is warm.   Neurological:      Mental Status: She is alert.      Cranial Nerves: Cranial nerve deficit (facial droop) present.      Motor: Weakness (right hand) present.   Psychiatric:         Mood and Affect: Mood normal.         Behavior: Behavior normal.       Additional Data:     Labs:    Results from last 7 days   Lab Units 02/29/24  0405   WBC Thousand/uL 8.73   HEMOGLOBIN g/dL 11.7   HEMATOCRIT % 36.9   PLATELETS Thousands/uL 155   NEUTROS PCT % 74   LYMPHS PCT % 18   MONOS PCT % 7   EOS PCT % 0     Results from last 7 days   Lab Units 02/29/24  0405   SODIUM mmol/L 132*   POTASSIUM mmol/L 4.0   CHLORIDE mmol/L 100   CO2 mmol/L 23   BUN mg/dL 17   CREATININE mg/dL 1.27   ANION GAP mmol/L 9   CALCIUM mg/dL 8.5   ALBUMIN g/dL 3.4*   TOTAL BILIRUBIN mg/dL 0.42   ALK PHOS U/L 58   ALT U/L 7   AST U/L 9*   GLUCOSE RANDOM mg/dL 314*     Results from last 7 days   Lab Units 02/28/24  1551   INR  1.01     Results from last 7 days   Lab Units 02/29/24  1154 02/28/24  1947   POC GLUCOSE mg/dl 387* 262*     Results from last 7 days   Lab Units 02/29/24  0405   HEMOGLOBIN A1C % 12.1*     Results from last 7 days   Lab Units 02/28/24  1551   LACTIC ACID mmol/L 1.0           * I Have Reviewed All Lab Data Listed Above.  * Additional Pertinent Lab Tests Reviewed: All Labs For Current Hospital Admission Reviewed    Mobility:  Basic Mobility Inpatient Raw Score: 18  -HLM Goal: 6: Walk 10 steps or more  JH-HLM Achieved: 7: Walk 25 feet or more    Lines:   Invasive Devices       Peripheral Intravenous Line  Duration             Peripheral IV  02/28/24 Left Antecubital 1 day                       Imaging:    Imaging Reports Reviewed Today Include: mri brain, vas carotid, ct head, xr chest    Recent Cultures (last 7 days):           Last 24 Hours Medication List:   Current Facility-Administered Medications   Medication Dose Route Frequency Provider Last Rate    aspirin  81 mg Oral Daily Akil Davis MD      clopidogrel  75 mg Oral Daily Akil Davis MD      DULoxetine  20 mg Oral Daily Akil Davis MD      enoxaparin  30 mg Subcutaneous Daily Akil Davis MD      fluticasone  2 spray Nasal Daily Akil Davis MD      folic acid  400 mcg Oral Daily Akil Davis MD      insulin detemir  35 Units Subcutaneous Daily Akil Davis MD      insulin lispro  1-6 Units Subcutaneous TID AC Akil Davis MD      insulin lispro  5 Units Subcutaneous TID With Meals Akil Davis MD      levothyroxine  50 mcg Oral Early Morning Akil Davis MD      multi-electrolyte  100 mL/hr Intravenous Continuous Akil Davis MD      pantoprazole  40 mg Oral Early Morning Akil Davis MD      pravastatin  80 mg Oral Daily With Dinner Akil Davis MD          Today, Patient Was Seen By: Akil Davis MD    ** Please Note: Dictation voice to text software may have been used in the creation of this document. **

## 2024-02-29 NOTE — PLAN OF CARE
Problem: OCCUPATIONAL THERAPY ADULT  Goal: Performs self-care activities at highest level of function for planned discharge setting.  See evaluation for individualized goals.  Description: Treatment Interventions: ADL retraining, Functional transfer training, Endurance training, Patient/family training, Equipment evaluation/education, Compensatory technique education, Energy conservation, Activityengagement, Cognitive reorientation    See flowsheet documentation for full assessment, interventions and recommendations.   Note: Limitation: Decreased ADL status, Decreased Safe judgement during ADL, Decreased endurance, Decreased self-care trans, Decreased high-level ADLs, Decreased cognition  Prognosis: Good  Assessment: Pt is a 78 y.o. female seen for OT evaluation s/p admit to [facility] on 2/28/2024 w/ Stroke-like symptoms.  Comorbidities affecting pt's functional performance at time of assessment include:  HTN, CKD, Dyspnea, HLD, Obesity . Personal factors affecting pt at time of IE include:steps to enter environment, limited home support, difficulty performing ADLS, and limited insight into deficits. Prior to admission, pt was Mod I with ADLs. Upon evaluation: the following deficits impact occupational performance: decreased balance, decreased tolerance, impaired memory, impaired sequencing, impaired problem solving, and decreased safety awareness. Pt to benefit from continued skilled OT tx while in the hospital to address deficits as defined above and maximize level of functional independence w ADL's and functional mobility. Occupational Performance areas to address include: bathing/shower, toilet hygiene, dressing, functional mobility, and clothing management. From OT standpoint, recommendation at time of d/c would be Level III (Minimum Resource Intensity).     Rehab Resource Intensity Level, OT: III (Minimum Resource Intensity)     Karen Enriquez MS, OTR/L      RASH

## 2024-02-29 NOTE — TREATMENT PLAN
Discussed findings with neurology at the time initial tiger text was sent by nursing. Upon assessment of Jina, symptom concerns were present early this morning during my first examination early today. CTA head and neck was completed at the time of my second examination. Neurology agrees that the symptoms were all present early today. Neurology stated that the stroke alert was deemed unnecessary since patient is currently not a candidate for TNK. No further neuroimaging or changes in management necessary at this time. Continue neurochecks.

## 2024-03-01 DIAGNOSIS — I63.9 CEREBROVASCULAR ACCIDENT (CVA), UNSPECIFIED MECHANISM (HCC): Primary | ICD-10-CM

## 2024-03-01 PROBLEM — R29.90 STROKE-LIKE SYMPTOMS: Status: RESOLVED | Noted: 2024-02-28 | Resolved: 2024-03-01

## 2024-03-01 LAB
ANION GAP SERPL CALCULATED.3IONS-SCNC: 9 MMOL/L
BUN SERPL-MCNC: 21 MG/DL (ref 5–25)
CALCIUM SERPL-MCNC: 9 MG/DL (ref 8.4–10.2)
CHLORIDE SERPL-SCNC: 101 MMOL/L (ref 96–108)
CO2 SERPL-SCNC: 25 MMOL/L (ref 21–32)
CREAT SERPL-MCNC: 1.23 MG/DL (ref 0.6–1.3)
GFR SERPL CREATININE-BSD FRML MDRD: 42 ML/MIN/1.73SQ M
GLUCOSE SERPL-MCNC: 179 MG/DL (ref 65–140)
GLUCOSE SERPL-MCNC: 183 MG/DL (ref 65–140)
GLUCOSE SERPL-MCNC: 205 MG/DL (ref 65–140)
GLUCOSE SERPL-MCNC: 206 MG/DL (ref 65–140)
GLUCOSE SERPL-MCNC: 385 MG/DL (ref 65–140)
MAGNESIUM SERPL-MCNC: 1.6 MG/DL (ref 1.9–2.7)
POTASSIUM SERPL-SCNC: 3.7 MMOL/L (ref 3.5–5.3)
SODIUM SERPL-SCNC: 135 MMOL/L (ref 135–147)
TSH SERPL DL<=0.05 MIU/L-ACNC: 2.99 UIU/ML (ref 0.45–4.5)

## 2024-03-01 PROCEDURE — 82948 REAGENT STRIP/BLOOD GLUCOSE: CPT

## 2024-03-01 PROCEDURE — 80048 BASIC METABOLIC PNL TOTAL CA: CPT | Performed by: STUDENT IN AN ORGANIZED HEALTH CARE EDUCATION/TRAINING PROGRAM

## 2024-03-01 PROCEDURE — 97168 OT RE-EVAL EST PLAN CARE: CPT

## 2024-03-01 PROCEDURE — 99223 1ST HOSP IP/OBS HIGH 75: CPT | Performed by: INTERNAL MEDICINE

## 2024-03-01 PROCEDURE — 83735 ASSAY OF MAGNESIUM: CPT | Performed by: STUDENT IN AN ORGANIZED HEALTH CARE EDUCATION/TRAINING PROGRAM

## 2024-03-01 PROCEDURE — 99233 SBSQ HOSP IP/OBS HIGH 50: CPT | Performed by: INTERNAL MEDICINE

## 2024-03-01 PROCEDURE — 84443 ASSAY THYROID STIM HORMONE: CPT | Performed by: INTERNAL MEDICINE

## 2024-03-01 PROCEDURE — 92610 EVALUATE SWALLOWING FUNCTION: CPT

## 2024-03-01 RX ORDER — INSULIN LISPRO 100 [IU]/ML
12 INJECTION, SOLUTION INTRAVENOUS; SUBCUTANEOUS
Status: DISCONTINUED | OUTPATIENT
Start: 2024-03-01 | End: 2024-03-08 | Stop reason: HOSPADM

## 2024-03-01 RX ORDER — MAGNESIUM SULFATE HEPTAHYDRATE 40 MG/ML
2 INJECTION, SOLUTION INTRAVENOUS ONCE
Status: COMPLETED | OUTPATIENT
Start: 2024-03-01 | End: 2024-03-01

## 2024-03-01 RX ADMIN — INSULIN LISPRO 12 UNITS: 100 INJECTION, SOLUTION INTRAVENOUS; SUBCUTANEOUS at 16:59

## 2024-03-01 RX ADMIN — LEVOTHYROXINE SODIUM 50 MCG: 25 TABLET ORAL at 06:05

## 2024-03-01 RX ADMIN — INSULIN LISPRO 6 UNITS: 100 INJECTION, SOLUTION INTRAVENOUS; SUBCUTANEOUS at 11:52

## 2024-03-01 RX ADMIN — PRAVASTATIN SODIUM 80 MG: 20 TABLET ORAL at 16:57

## 2024-03-01 RX ADMIN — PANTOPRAZOLE SODIUM 40 MG: 40 TABLET, DELAYED RELEASE ORAL at 06:05

## 2024-03-01 RX ADMIN — NYSTATIN: 100000 POWDER TOPICAL at 17:53

## 2024-03-01 RX ADMIN — Medication 400 MCG: at 09:19

## 2024-03-01 RX ADMIN — CLOPIDOGREL 75 MG: 75 TABLET ORAL at 09:20

## 2024-03-01 RX ADMIN — INSULIN LISPRO 1 UNITS: 100 INJECTION, SOLUTION INTRAVENOUS; SUBCUTANEOUS at 07:39

## 2024-03-01 RX ADMIN — NYSTATIN: 100000 POWDER TOPICAL at 09:20

## 2024-03-01 RX ADMIN — MAGNESIUM SULFATE HEPTAHYDRATE 2 G: 40 INJECTION, SOLUTION INTRAVENOUS at 11:52

## 2024-03-01 RX ADMIN — INSULIN LISPRO 5 UNITS: 100 INJECTION, SOLUTION INTRAVENOUS; SUBCUTANEOUS at 07:39

## 2024-03-01 RX ADMIN — ASPIRIN 81 MG: 81 TABLET, COATED ORAL at 09:20

## 2024-03-01 RX ADMIN — DULOXETINE HYDROCHLORIDE 20 MG: 20 CAPSULE, DELAYED RELEASE ORAL at 09:19

## 2024-03-01 RX ADMIN — INSULIN LISPRO 2 UNITS: 100 INJECTION, SOLUTION INTRAVENOUS; SUBCUTANEOUS at 16:58

## 2024-03-01 RX ADMIN — ENOXAPARIN SODIUM 30 MG: 100 INJECTION SUBCUTANEOUS at 09:20

## 2024-03-01 RX ADMIN — INSULIN LISPRO 12 UNITS: 100 INJECTION, SOLUTION INTRAVENOUS; SUBCUTANEOUS at 11:52

## 2024-03-01 RX ADMIN — INSULIN DETEMIR 35 UNITS: 100 INJECTION, SOLUTION SUBCUTANEOUS at 09:20

## 2024-03-01 NOTE — ARC ADMISSION
Referral received for patient consideration of ARC placement for rehab.  Patient has been pre-approved for ARC pending medical stability, functional needs and bed availability at campus of choice.  CM has been updated in Aidin.

## 2024-03-01 NOTE — PROGRESS NOTES
Patient:    MRN:  [id number]    Aidin Request ID:  [id number]    Level of care reserved:  Home Health Agency    [facility] [place] [address] [phone]    Clinical needs requested:    Geography searched:  [phone]    Start of Service:    Request sent:  9:49am EST on 3/1/2024 by Stacey Oconnor    Partner reserved:  3:36pm EST on 3/1/2024 by Stacey Oconnor    Choice list shared:  12:50pm EST on 3/1/2024 by Stacey Oconnor

## 2024-03-01 NOTE — PLAN OF CARE
Problem: OCCUPATIONAL THERAPY ADULT  Goal: Performs self-care activities at highest level of function for planned discharge setting.  See evaluation for individualized goals.  Description: Treatment Interventions: ADL retraining, Functional transfer training, Endurance training, Patient/family training, Equipment evaluation/education, Compensatory technique education, Energy conservation, Activityengagement, Cognitive reorientation    See flowsheet documentation for full assessment, interventions and recommendations.   Note: Limitation: Decreased ADL status, Decreased UE strength, Decreased Safe judgement during ADL, Decreased endurance, Decreased fine motor control, Decreased self-care trans, Decreased high-level ADLs  Prognosis: Good  Assessment: Pt is a 78 y.o. female seen for OT re-evaluation s/p admit to [facility] on 2/28/2024 w/ Acute CVA (cerebrovascular accident) (HCC).  Comorbidities affecting pt's functional performance at time of assessment include: type 2 diabetes, HTN, PAD, chronic kidney disease, obesity, spinal stenosis, lumbar radiculopathy, metabolic encephalopathy. Personal factors affecting pt at time of IE include:steps to enter environment, difficulty performing ADLS, difficulty performing IADLS , limited insight into deficits, decreased initiation and engagement , and health management . Prior to admission, pt was I with ADLs and IADLs. Upon evaluation: the following deficits impact occupational performance: weakness, decreased strength, decreased balance, decreased tolerance, impaired FMC, impaired problem solving, and decreased safety awareness. Pt to benefit from continued skilled OT tx while in the hospital to address deficits as defined above and maximize level of functional independence w ADL's and functional mobility. Occupational Performance areas to address include: grooming, bathing/shower, toilet hygiene, dressing, functional mobility, community mobility, and clothing management.  From OT standpoint, recommendation at time of d/c would with moderate intensity OT resources.     Rehab Resource Intensity Level, OT: II (Moderate Resource Intensity)     Sosa Grant MS, OTR/L

## 2024-03-01 NOTE — OCCUPATIONAL THERAPY NOTE
Occupational Therapy Re-evaluation      Jina Norris    3/1/2024    Principal Problem:    Acute CVA (cerebrovascular accident) ([facility])  Active Problems:    Type 2 diabetes mellitus with stage 3b chronic kidney disease, with long-term current use of insulin (HCC)    Essential hypertension    Mixed hyperlipidemia    Acquired hypothyroidism    PAD (peripheral artery disease) ([facility])    Stage 3a chronic kidney disease (HCC)      Past Medical History:   Diagnosis Date    Benign essential hypertension     Chronic kidney disease, stage 3 (HCC)     Disorder of gallbladder     Disorder of skin and subcutaneous tissue     Dyspnea     Essential hypertension     Hyperlipidemia     Hypokalemia     Malaise and fatigue     Mixed hyperlipidemia     Morbid obesity (HCC)     Pernicious anemia        Past Surgical History:   Procedure Laterality Date    BREAST BIOPSY Right     CARPAL TUNNEL RELEASE Bilateral     CHOLECYSTECTOMY      COLONOSCOPY      Dr. Apple     HYSTERECTOMY      LUMBAR EPIDURAL INJECTION      x3    PARTIAL HYSTERECTOMY      SKIN LESION EXCISION      scalp         03/01/24 1345   OT Last Visit   OT Visit Date 03/01/24   Note Type   Note type Re-Evaluation   Pain Assessment   Pain Assessment Tool 0-10   Pain Score No Pain   Restrictions/Precautions   Weight Bearing Precautions Per Order No   Other Precautions Fall Risk;Cognitive   Home Living   Type of Home House   Home Layout One level;Performs ADLs on one level;Able to live on main level with bedroom/bathroom;Stairs to enter with rails  (5 MAIKEL)   Bathroom Shower/Tub Walk-in shower   Bathroom Toilet Standard   Bathroom Equipment Grab bars in shower   Bathroom Accessibility Accessible   Home Equipment Walker;Cane  (no AD used at baseline)   Prior Function   Level of [place] Independent with ADLs;Independent with functional mobility   Lives With Significant other   Receives Help From Family   IADLs Independent with meal prep;Independent with medication  "management;Family/Friend/Other provides transportation   Falls in the last 6 months 1 to 4  (1 fall PTA)   Vocational Retired   Subjective   Subjective \"I just feel a bit dizzy\"   ADL   Where Assessed Edge of bed   UB Bathing Assistance 5  Supervision/Setup   LB Bathing Assistance 4  Minimal Assistance   UB Dressing Assistance 5  Supervision/Setup   LB Dressing Assistance 4  Minimal Assistance   LB Dressing Deficit Don/doff R sock;Don/doff L sock  (min A to don bilateral socks d/t R lateral lean and  LOB while seated EOB)   Bed Mobility   Supine to Sit 4  Minimal assistance   Additional items Assist x 1;HOB elevated;Bedrails;Increased time required;Verbal cues   Sit to Supine 5  Supervision   Additional items Assist x 1;Increased time required;Verbal cues   Additional Comments VC for bedrail utilization and proper body mechanics   Transfers   Sit to Stand   (CGA)   Additional items Assist x 1;Increased time required;Verbal cues   Stand to Sit   (CGA)   Additional items Assist x 1;Increased time required;Verbal cues   Additional Comments RW used; VC for safe hand placement, proper body mechanics and RW management   Functional Mobility   Functional Mobility 4  Minimal assistance   Additional Comments min A x1 for short distance ADL mobility w/ RW d/t minimal LOB noted   Additional items Rolling walker   Balance   Static Sitting Good   Dynamic Sitting Fair +   Static Standing Fair -   Dynamic Standing Fair -   Ambulatory Poor +   Activity Tolerance   Activity Tolerance Patient limited by fatigue   Medical Staff Made Aware Yes, CM made aware of session outcomes   Nurse Made Aware Yes, nursing staff made aware of session outcomes   RUE Assessment   RUE Assessment WFL   RUE Strength   RUE Overall Strength   (3+/5 shoulder flexion; 3/5  strength)   LUE Assessment   LUE Assessment WFL   LUE Strength   LUE Overall Strength   (4-/5)   Hand Function   Gross Motor Coordination Impaired   Fine Motor Coordination Impaired "   Sensation   Light Touch No apparent deficits   Psychosocial   Psychosocial (WDL) WDL   Cognition   Overall Cognitive Status Impaired   Arousal/Participation Alert;Cooperative   Attention Attends with cues to redirect   Orientation Level Oriented X4   Memory Decreased short term memory;Decreased recall of recent events;Decreased recall of precautions   Following Commands Follows one step commands without difficulty   Comments Pt agreeable to OT re-evaluation   Assessment   Limitation Decreased ADL status;Decreased UE strength;Decreased Safe judgement during ADL;Decreased endurance;Decreased fine motor control;Decreased self-care trans;Decreased high-level ADLs   Prognosis Good   Assessment Pt is a 78 y.o. female seen for OT re-evaluation s/p admit to [facility] on 2/28/2024 w/ Acute CVA (cerebrovascular accident) (HCC).  Comorbidities affecting pt's functional performance at time of assessment include: type 2 diabetes, HTN, PAD, chronic kidney disease, obesity, spinal stenosis, lumbar radiculopathy, metabolic encephalopathy. Personal factors affecting pt at time of IE include:steps to enter environment, difficulty performing ADLS, difficulty performing IADLS , limited insight into deficits, decreased initiation and engagement , and health management . Prior to admission, pt was I with ADLs and IADLs. Upon evaluation: the following deficits impact occupational performance: weakness, decreased strength, decreased balance, decreased tolerance, impaired FMC, impaired problem solving, and decreased safety awareness. Pt to benefit from continued skilled OT tx while in the hospital to address deficits as defined above and maximize level of functional independence w ADL's and functional mobility. Occupational Performance areas to address include: grooming, bathing/shower, toilet hygiene, dressing, functional mobility, community mobility, and clothing management. From OT standpoint, recommendation at time of d/c would with  moderate intensity OT resources.   Goals   Patient Goals to go home   Plan   Treatment Interventions ADL retraining;Functional transfer training;UE strengthening/ROM;Endurance training;Patient/family training;Equipment evaluation/education;Compensatory technique education;Energy conservation;Activityengagement   Goal Expiration Date 03/10/24   OT Treatment Day 1   OT Frequency 3-5x/wk   Discharge Recommendation   Rehab Resource Intensity Level, OT II (Moderate Resource Intensity)   Additional Comments  The patient's raw score on the AM-PAC Daily Activity inpatient short form is 18, standardized score is 38.66, less than 39.4. Patients at this level are likely to benefit from discharge with moderate intensity OT resources. Please refer to the recommendation of the Occupational Therapist for safe discharge planning.   AM-PAC Daily Activity Inpatient   Lower Body Dressing 2   Bathing 3   Toileting 3   Upper Body Dressing 3   Grooming 3   Eating 4   Daily Activity Raw Score 18   Daily Activity Standardized Score (Calc for Raw Score >=11) 38.66   AM-PAC Applied Cognition Inpatient   Following a Speech/Presentation 3   Understanding Ordinary Conversation 3   Taking Medications 3   Remembering Where Things Are Placed or Put Away 2   Remembering List of 4-5 Errands 2   Taking Care of Complicated Tasks 2   Applied Cognition Raw Score 15   Applied Cognition Standardized Score 33.54     GOALS:    Pt will achieve the following within specified time frame: LTG  Pt will achieve the following goals within 10 days    *ADL transfers with (S) for inc'd independence with ADLs/purposeful tasks    *UB ADL with (I) for inc'd independence with self cares    *LB ADL with (S) using AE prn for inc'd independence with self cares    *Toileting with (S) for clothing management and hygiene for return to OF with personal care    *Increase static stand balance and dyn stand balance to F+ for inc'd safety with standing purposeful  tasks    *Increase stand tolerance x5 m for inc'd tolerance with standing purposeful tasks    *Bed mobility- (S) for inc'd independence to manage own comfort and initiate EOB & OOB purposeful tasks    *Participate in 10-15m UE therex to increase overall stamina/activity tolerance for purposeful tasks      Ssoa Grant MS, OTR/L

## 2024-03-01 NOTE — ASSESSMENT & PLAN NOTE
-MRI 2/28/2024 showing recent infarcts identified in the right anterior deep frontal white matter along with left insular cortex and left posterior frontal deep white matter without evidence of hemorrhage with concern for embolic phenomenon raised.  -Neurology requesting transesophageal echocardiogram and if unrevealing for implantable loop recorder at that time if insurance is agreeable or if not would benefit from ambulatory event monitoring in the meantime.  -Risks, benefits, alternatives to transesophageal echocardiogram discussed with patient in full and all questions answered.  Patient denied any loose chipped or cracked teeth esophageal issues or prior gastric or esophageal surgeries.  -Will plan for transesophageal echocardiogram 3/4/2024 with both written and verbal consent obtained and not only placed in paper chart but also copy given to OR staff as well.  -Continue dual antiplatelet therapy at this time as initial ECG showing possible atrial fibrillation/atrial run however to short amount of time for definitive diagnosis and telemetry so far showing no evidence of atrial fibrillation at this time  -Would continue telemetry monitoring while patient is hospitalized.

## 2024-03-01 NOTE — PLAN OF CARE
Problem: Knowledge Deficit  Goal: Patient/family/caregiver demonstrates understanding of disease process, treatment plan, medications, and discharge instructions  Description: Complete learning assessment and assess knowledge base.  Interventions:  - Provide teaching at level of understanding  - Provide teaching via preferred learning methods  Outcome: Progressing     Problem: Neurological Deficit  Goal: Neurological status is stable or improving  Description: Interventions:  - Monitor and assess patient's level of consciousness, motor function, sensory function, and level of assistance needed for ADLs.   - Monitor and report changes from baseline. Collaborate with interdisciplinary team to initiate plan and implement interventions as ordered.   - Provide and maintain a safe environment.  - Consider seizure precautions.  - Consider fall precautions.  - Consider aspiration precautions.  - Consider bleeding precautions.  Outcome: Progressing     Problem: Communication Impairment  Goal: Ability to express needs and understand communication  Description: Assess patient's communication skills and ability to understand information.  Patient will demonstrate use of effective communication techniques, alternative methods of communication and understanding even if not able to speak.     - Encourage communication and provide alternate methods of communication as needed.  - Collaborate with case management/ for discharge needs.  - Include patient/family/caregiver in decisions related to communication.  Outcome: Progressing

## 2024-03-01 NOTE — CONSULTS
[facility]  Consult  Name: Jina Norris 78 y.o. female I MRN: [id number]  Unit/Bed#: -01 I Date of Admission: 2/28/2024   Date of Service: 3/1/2024 I Hospital Day: 1    Inpatient consult to Cardiology  Consult performed by: Rolly Funes DO  Consult ordered by: Akil Davis MD          Assessment/Plan   Acquired hypothyroidism  Assessment & Plan  -Plan of care per primary team    Mixed hyperlipidemia  Assessment & Plan  -In the setting of recent CVA would recommend high intensity statin therapy  -Counseled patient on dietary and lifestyle modifications    Essential hypertension  Assessment & Plan  -Blood pressure elevated at this time would reinitiate home metoprolol succinate and monitor with further up titration/readdition of home medications as patient's blood pressure tolerates  -Counseled patient on dietary and lifestyle modifications.    Type 2 diabetes mellitus with stage 3b chronic kidney disease, with long-term current use of insulin (HCC)  Assessment & Plan  Lab Results   Component Value Date    HGBA1C 12.1 (H) 02/29/2024       Recent Labs     02/29/24  1406 02/29/24  1619 02/29/24  2128 03/01/24  0654   POCGLU 394* 303* 181* 179*       Blood Sugar Average: Last 72 hrs:  (P) [id number]      -Counseled patient on dietary and lifestyle modifications  -Plan of care per primary team    * Stroke-like symptoms  Assessment & Plan  -MRI 2/28/2024 showing recent infarcts identified in the right anterior deep frontal white matter along with left insular cortex and left posterior frontal deep white matter without evidence of hemorrhage with concern for embolic phenomenon raised.  -Neurology requesting transesophageal echocardiogram and if unrevealing for implantable loop recorder at that time if insurance is agreeable or if not would benefit from ambulatory event monitoring in the meantime.  -Risks, benefits, alternatives to transesophageal echocardiogram discussed with  patient in full and all questions answered.  Patient denied any loose chipped or cracked teeth esophageal issues or prior gastric or esophageal surgeries.  -Will plan for transesophageal echocardiogram 3/4/2024 with both written and verbal consent obtained and not only placed in paper chart but also copy given to OR staff as well.  -Continue dual antiplatelet therapy at this time as initial ECG showing possible atrial fibrillation/atrial run however to short amount of time for definitive diagnosis and telemetry so far showing no evidence of atrial fibrillation at this time  -Would continue telemetry monitoring while patient is hospitalized.           Other summary comments:   -Patient is agreeable to undergoing transesophageal echocardiogram and risks and benefits of procedure along with alternatives discussed with patient in full and all questions answered.  Written and verbal consent obtained and we will plan for 3/4/2024.  -I did recommend as patient's blood pressures are still not adequately controlled maintaining her in the hospital for up titration of medical therapy and inpatient procedure.  Patient notes she will discuss this with primary team as she does wish to be discharged if possible.  -Would continue telemetry monitoring while patient is hospitalized for further evaluation of atrial fibrillation  -Continue dual antiplatelet therapy and would recommend high intensity statin along with reinitiation of home metoprolol as blood pressure still somewhat elevated.  -Patient does snore and would benefit from outpatient sleep medicine evaluation  -If patient does not stay will place ambulatory order for event monitor as well although if insurance will cover would benefit likely more from implantable loop recorder.  -Patient informed she should be n.p.o. sips with medications midnight prior to procedure.      HPI: Jina Norris is a 78 y.o. year old female with hypertension, hyperlipidemia, diabetes mellitus,  CKD stage III, obesity and PAD who originally presented to the hospital at [facility] 2/28/2024 with concern for strokelike symptoms.  Patient undergone right knee injection day prior and then went to [facility] during which time she felt dizzy and weak and was able to bring herself to the floor without significant trauma and was taken home to rest.  In the morning she noticed difficulty speaking and facial droop and therefore presented for further evaluation.  She was seen and evaluated by neurology with stroke workup which revealed concern for embolic event and cardiology was consulted and requested by neurology for transesophageal echocardiogram and if unrevealing potential internal loop recorder placement.  -Currently patient denies any chest pain, palpitations, lightness or dizziness, loss of consciousness, shortness of breath, lower extremity edema, orthopnea or bendopnea.  She notes apart from some residual facial droop and right hand weakness she otherwise feels well and wishes to go home.        EKG:   Sinus rhythm on telemetry with no atrial fibrillation visualized.    MOST  RECENT CARDIAC IMAGING:   -Transthoracic echocardiogram 2/29/2024 showing left ventricular systolic function normal cemented LVEF 55% with grade 1 diastolic dysfunction, mildly dilated left atrium with mild aortic regurgitation and trace mitral regurgitation      Review of Systems:   Review of Systems   Constitutional:  Negative for chills, diaphoresis, fatigue and fever.   HENT:  Negative for trouble swallowing and voice change.    Eyes:  Negative for pain and redness.   Respiratory:  Negative for shortness of breath and wheezing.    Cardiovascular:  Negative for chest pain, palpitations and leg swelling.   Gastrointestinal:  Negative for abdominal pain, constipation, diarrhea, nausea and vomiting.   Genitourinary:  Negative for dysuria.   Musculoskeletal:  Positive for arthralgias. Negative for neck pain and neck  stiffness.   Skin:  Negative for rash.   Neurological:  Positive for weakness (right hand and right facial droop). Negative for dizziness, light-headedness and headaches.   Psychiatric/Behavioral:  Negative for agitation and hallucinations.    All other systems reviewed and are negative.       Historical Information   Past Medical History:   Diagnosis Date    Benign essential hypertension     Chronic kidney disease, stage 3 (HCC)     Disorder of gallbladder     Disorder of skin and subcutaneous tissue     Dyspnea     Essential hypertension     Hyperlipidemia     Hypokalemia     Malaise and fatigue     Mixed hyperlipidemia     Morbid obesity (HCC)     Pernicious anemia      Past Surgical History:   Procedure Laterality Date    BREAST BIOPSY Right     CARPAL TUNNEL RELEASE Bilateral     CHOLECYSTECTOMY      COLONOSCOPY      Dr. Apple     HYSTERECTOMY      LUMBAR EPIDURAL INJECTION      x3    PARTIAL HYSTERECTOMY      SKIN LESION EXCISION      scalp      Social History     Substance and Sexual Activity   Alcohol Use Yes    Comment: Occasionally      Social History     Substance and Sexual Activity   Drug Use Never    Comment: Denies drug use - As per [employer]      Social History     Tobacco Use   Smoking Status Former    Current packs/day: 0.00    Average packs/day: 1 pack/day for 14.0 years (14.0 ttl pk-yrs)    Types: Cigarettes    Start date:     Quit date:     Years since quittin.1   Smokeless Tobacco Never       Family History:   Family History   Problem Relation Age of Onset    Stroke Mother     Atrial fibrillation Mother     Brain cancer Father     Other Brother         Twin brothers - Open heart    Other Brother         Twin brothers - Open heart    No Known Problems Maternal Grandmother     No Known Problems Paternal Grandmother         Meds/Allergies   all current active meds have been reviewed  Medications Prior to Admission   Medication    amLODIPine (NORVASC) 5 mg tablet    aspirin (ECOTRIN LOW  "STRENGTH) 81 mg EC tablet    cholecalciferol (VITAMIN D3) 1,000 units tablet    DULoxetine (CYMBALTA) 20 mg capsule    fluticasone (FLONASE) 50 mcg/act nasal spray    folic acid (FOLVITE) 400 mcg tablet    insulin detemir (LEVEMIR) 100 units/mL subcutaneous injection    levothyroxine 50 mcg tablet    metoprolol succinate (TOPROL-XL) 50 mg 24 hr tablet    omega-3-acid ethyl esters (LOVAZA) 1 g capsule    pantoprazole (PROTONIX) 40 mg tablet    Psyllium (FIBER) 0.52 g CAPS    rosuvastatin (CRESTOR) 10 MG tablet    Urea 20 Intensive Hydrating 20 % cream    bacitracin topical ointment 500 units/g topical ointment    glucose blood (FREESTYLE LITE) test strip    glucose monitoring kit (FREESTYLE) monitoring kit    Insulin Pen Needle 32G X 6 MM MISC    Lancets (freestyle) lancets    magnesium oxide (MAG-OX) 400 mg tablet    semaglutide, 0.25 or 0.5 mg/dose, (Ozempic) 2 mg/1.5 mL injection pen       No Known Allergies    Objective   Vitals: Blood pressure (!) 136/108, pulse 81, temperature 98.1 °F (36.7 °C), resp. rate 18, height 5' 2\" (1.575 m), weight 77.1 kg (170 lb), SpO2 98%., Body mass index is 31.09 kg/m².,   Orthostatic Blood Pressures      Flowsheet Row Most Recent Value   Blood Pressure 136/108 filed at 2024 0752   Patient Position - Orthostatic VS Lying filed at 2024 0220            Systolic (24hrs), Av , Min:123 , Max:197     Diastolic (24hrs), Av, Min:78, Max:109    Physical Exam:  Physical Exam  Vitals reviewed.   Constitutional:       General: She is not in acute distress.     Appearance: She is obese. She is not diaphoretic.   HENT:      Head: Normocephalic and atraumatic.   Eyes:      General:         Right eye: No discharge.         Left eye: No discharge.   Neck:      Comments: Trachea midline, no JVD present  Cardiovascular:      Rate and Rhythm: Normal rate and regular rhythm.      Heart sounds:      No friction rub.   Pulmonary:      Effort: Pulmonary effort is normal. No " respiratory distress.      Breath sounds: No wheezing.   Chest:      Chest wall: No tenderness.   Abdominal:      General: Bowel sounds are normal.      Palpations: Abdomen is soft.      Tenderness: There is no abdominal tenderness. There is no rebound.   Musculoskeletal:      Right lower leg: No edema.      Left lower leg: No edema.   Skin:     General: Skin is warm and dry.   Neurological:      Mental Status: She is alert.      Comments: Awake, alert, oriented x 3, right facial droop present and right hand and slight upper extremity weakness although patient able to move extremities bilaterally.   Psychiatric:         Mood and Affect: Mood normal.         Behavior: Behavior normal.          Lab Results:   Labs reviewed and prominent abnormalities reviewed above and/or below.    Troponins:    Results from last 7 days   Lab Units 02/28/24  1831 02/28/24  1551   HS TNI 0HR ng/L  --  7   HS TNI 2HR ng/L 6  --    HSTNI D2 ng/L -1  --      BNP:

## 2024-03-01 NOTE — ASSESSMENT & PLAN NOTE
-In the setting of recent CVA would recommend high intensity statin therapy  -Counseled patient on dietary and lifestyle modifications

## 2024-03-01 NOTE — PLAN OF CARE
Problem: Potential for Falls  Goal: Patient will remain free of falls  Description: INTERVENTIONS:  - Educate patient/family on patient safety including physical limitations  - Instruct patient to call for assistance with activity   - Consult OT/PT to assist with strengthening/mobility   - Keep Call bell within reach  - Keep bed low and locked with side rails adjusted as appropriate  - Keep care items and personal belongings within reach  - Initiate and maintain comfort rounds  - Make Fall Risk Sign visible to staff  - Offer Toileting every 2 Hours, in advance of need  - Initiate/Maintain bed alarm  - Obtain necessary fall risk management equipment: bed alarm  - Apply yellow socks and bracelet for high fall risk patients  - Consider moving patient to room near nurses station  Outcome: Progressing

## 2024-03-01 NOTE — PROGRESS NOTES
[facility]  Progress Note  Name: Jina Norris I  MRN: [id number]  Unit/Bed#: -[facility] I Date of Admission: 2/28/2024   Date of Service: 3/1/2024 I Hospital Day: 1    Assessment/Plan   * Acute CVA (cerebrovascular accident) ([facility])  Assessment & Plan  Request reevaluation by PT OT, patient with significant right upper extremity weakness, patient slightly unsteady on her feet, new facial droop, speech therapy recommended downgrade of diet, patient would likely benefit from acute rehab stay.    78-year-old female with history of hypertension, type 2 diabetes mellitus, hyperlipidemia, hypothyroidism, PAD, and CKD 3 who presented to our ED due to right hand weakness and fall.    Continue current dual antiplatelet therapy per neurology, continue with telemetry monitoring, concern for paroxysmal A-fib, plan for DILLAN Monday.    Continue statin.    Check TSH, hemoglobin A1c is not controlled,    MRI:   Impression:       Recent infarcts are identified in the right anterior deep frontal white matter, left insular cortex, and left posterior frontal deep white matter without evidence of hemorrhage. Embolic phenomenon should be considered given the bilateral involvement.    Moderate chronic microangiopathic changes.       Type 2 diabetes mellitus with stage 3b chronic kidney disease, with long-term current use of insulin ([facility])  Assessment & Plan  Lab Results   Component Value Date    HGBA1C 12.1 (H) 02/29/2024       Recent Labs     02/28/24  1947 02/29/24  1154   POCGLU 262* 387*         Blood Sugar Average: Last 72 hrs:  (P) 324.5 increase basal insulin to 36 units   Increase Humalog to 12 units with meals plus sliding scale.  Patient will benefit from improved glycemic control, hemoglobin A1c goal less than 7.      Stage 3a chronic kidney disease ([facility])  Assessment & Plan  Lab Results   Component Value Date    EGFR 40 02/29/2024    EGFR 28 02/28/2024    EGFR 28 12/07/2023    CREATININE 1.27 02/29/2024     CREATININE 1.69 (H) 2024    CREATININE 1.68 (H) 2023     Stable, does not meet INDIGO criteria at this time. Baseline appears to be between 1.1-1.4.    PAD (peripheral artery disease) (HCC)  Assessment & Plan  Continue aspirin / statin  Continue follow-up with vascular surgery    Acquired hypothyroidism  Assessment & Plan  Continue levothyroxine    Check TSH    Mixed hyperlipidemia  Assessment & Plan  Continue statin    Essential hypertension  Assessment & Plan  Continue metoprolol and amlodipine, blood pressure control is currently adequate    Stroke-like symptoms-resolved as of 3/1/2024  Assessment & Plan    Stroke pathway completed.  Neurology recommending...  PT/OT recommending level III Minimal resource                VTE Pharmacologic Prophylaxis:   Moderate Risk (Score 3-4) - Pharmacological DVT Prophylaxis Ordered: enoxaparin (Lovenox).    Mobility:   Basic Mobility Inpatient Raw Score: 18  JH-HLM Goal: 6: Walk 10 steps or more  JH-HLM Achieved: 6: Walk 10 steps or more  HLM Goal achieved. Continue to encourage appropriate mobility.    Patient Centered Rounds: I performed bedside rounds with nursing staff today.   Discussions with Specialists or Other Care Team Provider: Case discussed today with cardiology    Education and Discussions with Family / Patient: Attempted to update  (son) via phone. Unable to contact.    Current Length of Stay: 1 day(s)  Current Patient Status: Inpatient   Certification Statement: The patient will continue to require additional inpatient hospital stay due to acute CVA, no radiographic evidence of worsening stroke, possible recrudescence, continue supportive care.  Discharge Plan:  Discharge plan pending repeat PT OT eval    Code Status: Level 1 - Full Code    Subjective:   Patient offers no acute complaints however she has obvious right upper extremity weakness, significant right facial droop, dysarthria.    Objective:     Vitals:   Temp (24hrs), Av.7  °F (36.5 °C), Min:97 °F (36.1 °C), Max:98.2 °F (36.8 °C)    Temp:  [97 °F (36.1 °C)-98.2 °F (36.8 °C)] 98.1 °F (36.7 °C)  HR:  [75-88] 81  Resp:  [16-18] 18  BP: (123-197)/() 136/108  SpO2:  [90 %-99 %] 98 %  Body mass index is 31.09 kg/m².     Input and Output Summary (last 24 hours):     Intake/Output Summary (Last 24 hours) at 3/1/2024 1109  Last data filed at 3/1/2024 0752  Gross per 24 hour   Intake 260 ml   Output --   Net 260 ml       Physical Exam:   Physical Exam  Vitals and nursing note reviewed.   Constitutional:       General: She is not in acute distress.     Appearance: She is not ill-appearing, toxic-appearing or diaphoretic.   HENT:      Head: Normocephalic and atraumatic.   Cardiovascular:      Rate and Rhythm: Normal rate.      Pulses: Normal pulses.      Heart sounds: Normal heart sounds.   Pulmonary:      Effort: Pulmonary effort is normal. No respiratory distress.      Breath sounds: Normal breath sounds. No wheezing.   Neurological:      Mental Status: She is alert.      Motor: Weakness present.      Comments: Muscle strength 3 out of 5 right upper extremity    Right facial droop, garbled speech   Psychiatric:         Mood and Affect: Mood normal.         Behavior: Behavior normal.         Thought Content: Thought content normal.         Judgment: Judgment normal.          Additional Data:     Labs:  Results from last 7 days   Lab Units 02/29/24  0405   WBC Thousand/uL 8.73   HEMOGLOBIN g/dL 11.7   HEMATOCRIT % 36.9   PLATELETS Thousands/uL 155   NEUTROS PCT % 74   LYMPHS PCT % 18   MONOS PCT % 7   EOS PCT % 0     Results from last 7 days   Lab Units 03/01/24  0603 02/29/24  0405   SODIUM mmol/L 135 132*   POTASSIUM mmol/L 3.7 4.0   CHLORIDE mmol/L 101 100   CO2 mmol/L 25 23   BUN mg/dL 21 17   CREATININE mg/dL 1.23 1.27   ANION GAP mmol/L 9 9   CALCIUM mg/dL 9.0 8.5   ALBUMIN g/dL  --  3.4*   TOTAL BILIRUBIN mg/dL  --  0.42   ALK PHOS U/L  --  58   ALT U/L  --  7   AST U/L  --  9*    GLUCOSE RANDOM mg/dL 206* 314*     Results from last 7 days   Lab Units 02/28/24  1551   INR  1.01     Results from last 7 days   Lab Units 03/01/24  0654 02/29/24  2128 02/29/24  1619 02/29/24  1406 02/29/24  1154 02/28/24  1947   POC GLUCOSE mg/dl 179* 181* 303* 394* 387* 262*     Results from last 7 days   Lab Units 02/29/24  0405   HEMOGLOBIN A1C % 12.1*     Results from last 7 days   Lab Units 02/28/24  1551   LACTIC ACID mmol/L 1.0       Lines/Drains:  Invasive Devices       Peripheral Intravenous Line  Duration             Peripheral IV 03/01/24 Dorsal (posterior);Right Hand <1 day                      Telemetry:  Telemetry Orders (From admission, onward)               24 Hour Telemetry Monitoring  Continuous x 24 Hours (Telem)        Question:  Reason for 24 Hour Telemetry  Answer:  TIA/Suspected CVA/ Confirmed CVA                          Continue telemetry monitoring due to concern of possible atrial fibrillation, patient should be maintained on telemetry throughout this hospital stay.    Imaging: No pertinent imaging reviewed.    Recent Cultures (last 7 days):         Last 24 Hours Medication List:   Current Facility-Administered Medications   Medication Dose Route Frequency Provider Last Rate    aspirin  81 mg Oral Daily Akil Davis MD      clopidogrel  75 mg Oral Daily Akil Davis MD      DULoxetine  20 mg Oral Daily Akil Davis MD      enoxaparin  30 mg Subcutaneous Daily Akil Davis MD      fluticasone  2 spray Nasal Daily Akil Davis MD      folic acid  400 mcg Oral Daily Akil Davis MD      [START ON 3/2/2024] insulin detemir  36 Units Subcutaneous Daily Rolly Sol DO      insulin lispro  1-6 Units Subcutaneous TID AC Akil Davis MD      insulin lispro  12 Units Subcutaneous TID With Meals Rolly Sol DO      levothyroxine  50 mcg Oral Early Morning Akil Davis MD      magnesium sulfate  2 g Intravenous Once Rolly Sol DO      nystatin   Topical BID Akil  MD Susan      pantoprazole  40 mg Oral Early Morning Akil Davis MD      pravastatin  80 mg Oral Daily With Dinner Akil Davis MD          Today, Patient Was Seen By: Rolly Sol DO    **Please Note: This note may have been constructed using a voice recognition system.**

## 2024-03-01 NOTE — PLAN OF CARE
Problem: Potential for Falls  Goal: Patient will remain free of falls  Description: INTERVENTIONS:  - Educate patient/family on patient safety including physical limitations  - Instruct patient to call for assistance with activity   - Consult OT/PT to assist with strengthening/mobility   - Keep Call bell within reach  - Keep bed low and locked with side rails adjusted as appropriate  - Keep care items and personal belongings within reach  - Initiate and maintain comfort rounds  - Make Fall Risk Sign visible to staff  - Offer Toileting every 2 Hours, in advance of need  - Initiate/Maintain bed/chair alarm  - Obtain necessary fall risk management equipment: Gripper socks, bed in lowest position.    - Apply yellow socks and bracelet for high fall risk patients  - Consider moving patient to room near nurses station  Outcome: Progressing     Problem: Nutrition/Hydration-ADULT  Goal: Nutrient/Hydration intake appropriate for improving, restoring or maintaining nutritional needs  Description: Monitor and assess patient's nutrition/hydration status for malnutrition. Collaborate with interdisciplinary team and initiate plan and interventions as ordered.  Monitor patient's weight and dietary intake as ordered or per policy. Utilize nutrition screening tool and intervene as necessary. Determine patient's food preferences and provide high-protein, high-caloric foods as appropriate.     INTERVENTIONS:  - Monitor oral intake, urinary output, labs, and treatment plans  - Assess nutrition and hydration status and recommend course of action  - Evaluate amount of meals eaten  - Assist patient with eating if necessary   - Allow adequate time for meals  - Recommend/ encourage appropriate diets, oral nutritional supplements, and vitamin/mineral supplements  - Order, calculate, and assess calorie counts as needed  - Recommend, monitor, and adjust tube feedings and TPN/PPN based on assessed needs  - Assess need for intravenous fluids  -  Provide specific nutrition/hydration education as appropriate  - Include patient/family/caregiver in decisions related to nutrition  Outcome: Progressing     Problem: Prexisting or High Potential for Compromised Skin Integrity  Goal: Skin integrity is maintained or improved  Description: INTERVENTIONS:  - Identify patients at risk for skin breakdown  - Assess and monitor skin integrity  - Assess and monitor nutrition and hydration status  - Monitor labs   - Assess for incontinence   - Turn and reposition patient  - Assist with mobility/ambulation  - Relieve pressure over bony prominences  - Avoid friction and shearing  - Provide appropriate hygiene as needed including keeping skin clean and dry  - Evaluate need for skin moisturizer/barrier cream  - Collaborate with interdisciplinary team   - Patient/family teaching  - Consider wound care consult   Outcome: Progressing

## 2024-03-01 NOTE — ASSESSMENT & PLAN NOTE
-Blood pressure elevated at this time would reinitiate home metoprolol succinate and monitor with further up titration/readdition of home medications as patient's blood pressure tolerates  -Counseled patient on dietary and lifestyle modifications.

## 2024-03-01 NOTE — SPEECH THERAPY NOTE
Speech Language/Pathology  Speech/Language Pathology  Assessment    Patient Name: Jina Norris  Today's Date: 3/1/2024     Problem List  Principal Problem:    Stroke-like symptoms  Active Problems:    Type 2 diabetes mellitus with stage 3b chronic kidney disease, with long-term current use of insulin (HCC)    Essential hypertension    Mixed hyperlipidemia    Acquired hypothyroidism    PAD (peripheral artery disease) (HCC)    Stage 3a chronic kidney disease (HCC)    Past Medical History  Past Medical History:   Diagnosis Date    Benign essential hypertension     Chronic kidney disease, stage 3 (HCC)     Disorder of gallbladder     Disorder of skin and subcutaneous tissue     Dyspnea     Essential hypertension     Hyperlipidemia     Hypokalemia     Malaise and fatigue     Mixed hyperlipidemia     Morbid obesity (HCC)     Pernicious anemia      Past Surgical History  Past Surgical History:   Procedure Laterality Date    BREAST BIOPSY Right     CARPAL TUNNEL RELEASE Bilateral     CHOLECYSTECTOMY      COLONOSCOPY      Dr. Apple     HYSTERECTOMY      LUMBAR EPIDURAL INJECTION      x3    PARTIAL HYSTERECTOMY      SKIN LESION EXCISION      scalp       Bedside Swallow Evaluation:    Summary:  Pt presented w/ mild oral and suspect WNL pharyngeal stage of swallow. Positioned upright and alert.  Presented with mild right facial droop. When cued was able to retract minimally.   She fed herself fruit cup and thin liquids via straw. Mastication was mildly prolonged however functional. Bolus control and formation were min reduced. Evident by min  intermittent pocketing on right  buccal cavity. Transfer and swallow initiation appeared prompt. No overt s/s of aspiration. Pt did use lingual sweep to clear residue. ST reviewed safe swallow strategies include lingual/finger sweep, slow rate, and alternate liquids with solids. Pt understood.    Pt speech ranged from cleared to intermittent periods of slurred speech. Benefited from  "verbal cues to utilize slow rate and over articulate. ST questioned pt on name of current location pt stated \"[facility]\". With additional verbal cues  pt did correct to hospital. 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 ST provided reassurance as able.     Recommendations:  Diet: Dysphagia 3 dental   Liquid: Thin   Meds:As tolerated  Supervision: intermittent   Positioning:Upright  Strategies: slow rate, alternate liquids with solids, swallow piror to additional po, and monitor for pocketing.   Pt to take PO/Meds only when fully alert and upright.   Oral care  Aspiration precautions  Reflux precautions  Therapy Prognosis: fair  Prognosis considerations:age, medical status   Frequency: 2-5 times weekly as indicated    Consider consult w/:  Rehab  Nutrition    Goal(s):  Dysphagia LTG  -Patient will demonstrate safe and effective oral intake (without overt s/s significant oral/pharyngeal dysphagia including s/s penetration or aspiration) for the highest appropriate diet level.     1.Pt will tolerate least restrictive diet w/out s/s aspiration or oral/pharyngeal difficulties.   2.Pt will will effectively manipulate/masticate and transfer purees/solids w/out s/s dysphagia/aspiration.   3.Pt will tolerate thin liquids w/out s/s aspiration.   -If indicated, patient will comply with a Video/Modified Barium Swallow study for more complete assessment of swallowing anatomy/physiology/aspiration risk and to assess efficacy of treatment techniques so as to best guide treatment plan     H&P/Admit info/ pertinent provider notes: (PMH noted above)  Jina Norris is a 78 y.o. female who presents with right hand weakness.     Patient is 78-year-old female with history of CKD 3, hypertension, hyperlipidemia, hypothyroidism, type 2 diabetes mellitus on insulin, and PAD who presented to our ED due to weakness.     Patient reports that she presented to the " orthopedic office yesterday as a result of her right knee osteoarthritis.  She was injected with Kenalog and Marcaine.  She decided to go to the [facility] after this procedure.  She suddenly felt weak with her right lower extremity and brought herself down to the floor gently.  Denies any head trauma.  However, patient noticed that she started developing right hand weakness and had trouble keeping her hand close to grasp things.  She would require her left hand to ensure that nothing falls.  She had trouble sleeping overnight and this continued to recur.  She presented to our ED for further evaluation today.  CT head showed findings of a 1 cm hypodensity within the right inferior frontal lobe which is nonspecific but may represent a small infarction potentially subacute to chronic.    Special Studies:  CTA head and neck w wo contrast (03/01/2024) impresison   CT:  1.  Grossly stable small foci of recent infarcts in the left corona radiata, left insula, and anterior inferior right frontal lobe. No intracranial mass effect or hemorrhage.  2.  Chronic microangiopathic change.  CTA:  3.  No intracranial large vessel occlusion. Severe atherosclerotic stenosis in bilateral supraclinoid ICAs.  4.  Left vertebral artery arises from the aortic arch (normal variant) with severe atherosclerotic stenosis at the origin.  5.  No hemodynamically significant stenosis in bilateral cervical carotid and right vertebral arteries.  6.  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  MRI brain wo contrast (02/28/2024) Impression   Recent infarcts are identified in the right anterior deep frontal white matter, left insular cortex, and left posterior frontal deep white matter without evidence of hemorrhage. Embolic phenomenon should be considered given the bilateral involvement.  Moderate chronic microangiopathic changes.   CT head without contrast (02/28/2024)  "Impression  There is a new approximately 1 cm hypodensity within the right inferior frontal lobe on series 2 image 17 compared to prior CT and MRI examinations from November 2023. This is nonspecific and may represent interval small infarction, now likely late   subacute to chronic. This could be confirmed with follow-up nonemergent MRI of the brain.  Stable additional white matter hypoattenuation within the cerebral hemispheres suggestive of chronic microangiopathy.  This examination was marked \"immediate notification\" in Epic in order to begin the standard process by which the radiology reading room liaison alerts the referring practitioner.   XR chest 1 view portable (03/01/2024) Impression   No acute cardiopulmonary disease.     Procalcitonin:    WBC: 8.73     Code Status  level 1 full code    Previous MBS: none     Patient's goal: \" get home \"    Did the pt report pain? no  If yes, was nursing notified/was it addressed? N/a    Reason for consult:  R/o aspiration  Determine safest and least restrictive diet  New neuro event  Stroke protocol    Precautions:  Fall    Food Allergies: No known    Current Diet: Regular and thin    Premorbid diet: Regular and thin    O2 requirement: Room air    Social/Prior living Lives with spouse    Voice/Speech: Vocal quality WNL, intermittently slurred,    Follows commands: Basic    Cognitive status: Alert      [facility] exam:  Dentition: Adequate   Lips (VII):WNL  Tongue (XII): midline  Mandible (V):adequate ROM  Face/oral sensation (V):WNL   Velum (X):WNL    Items administered:  Fruit cup, and thin liquids via straw with single/successive sips    Oral stage:  Lip closure:WNL  Mastication: mildly prolonged however functional   Bolus formation:WNL  Bolus control:WNL  Transfer:WNL    Pharyngeal stage:  Swallow promptness: prompt   Laryngeal rise:adequate   No overt s/s aspiration    Esophageal stage:  No s/s reported    Aspiration precautions posted    Results d/w:  Pt, , " physician,

## 2024-03-01 NOTE — ASSESSMENT & PLAN NOTE
Lab Results   Component Value Date    HGBA1C 12.1 (H) 02/29/2024       Recent Labs     02/29/24  1406 02/29/24  1619 02/29/24  2128 03/01/24  0654   POCGLU 394* 303* 181* 179*       Blood Sugar Average: Last 72 hrs:  (P) [id number]      -Counseled patient on dietary and lifestyle modifications  -Plan of care per primary team

## 2024-03-02 LAB
ALBUMIN SERPL BCP-MCNC: 3.4 G/DL (ref 3.5–5)
ALP SERPL-CCNC: 52 U/L (ref 34–104)
ALT SERPL W P-5'-P-CCNC: 7 U/L (ref 7–52)
ANION GAP SERPL CALCULATED.3IONS-SCNC: 7 MMOL/L
AST SERPL W P-5'-P-CCNC: 11 U/L (ref 13–39)
BASOPHILS # BLD AUTO: 0.03 THOUSANDS/ÂΜL (ref 0–0.1)
BASOPHILS NFR BLD AUTO: 0 % (ref 0–1)
BILIRUB SERPL-MCNC: 0.46 MG/DL (ref 0.2–1)
BUN SERPL-MCNC: 26 MG/DL (ref 5–25)
CALCIUM ALBUM COR SERPL-MCNC: 9.3 MG/DL (ref 8.3–10.1)
CALCIUM SERPL-MCNC: 8.8 MG/DL (ref 8.4–10.2)
CHLORIDE SERPL-SCNC: 101 MMOL/L (ref 96–108)
CO2 SERPL-SCNC: 24 MMOL/L (ref 21–32)
CREAT SERPL-MCNC: 1.27 MG/DL (ref 0.6–1.3)
EOSINOPHIL # BLD AUTO: 0.1 THOUSAND/ÂΜL (ref 0–0.61)
EOSINOPHIL NFR BLD AUTO: 1 % (ref 0–6)
ERYTHROCYTE [DISTWIDTH] IN BLOOD BY AUTOMATED COUNT: 13.6 % (ref 11.6–15.1)
GFR SERPL CREATININE-BSD FRML MDRD: 40 ML/MIN/1.73SQ M
GLUCOSE SERPL-MCNC: 168 MG/DL (ref 65–140)
GLUCOSE SERPL-MCNC: 185 MG/DL (ref 65–140)
GLUCOSE SERPL-MCNC: 199 MG/DL (ref 65–140)
GLUCOSE SERPL-MCNC: 200 MG/DL (ref 65–140)
GLUCOSE SERPL-MCNC: 274 MG/DL (ref 65–140)
HCT VFR BLD AUTO: 35.5 % (ref 34.8–46.1)
HGB BLD-MCNC: 11.4 G/DL (ref 11.5–15.4)
IMM GRANULOCYTES # BLD AUTO: 0.04 THOUSAND/UL (ref 0–0.2)
IMM GRANULOCYTES NFR BLD AUTO: 0 % (ref 0–2)
INR PPP: 0.99 (ref 0.84–1.19)
LYMPHOCYTES # BLD AUTO: 1.97 THOUSANDS/ÂΜL (ref 0.6–4.47)
LYMPHOCYTES NFR BLD AUTO: 21 % (ref 14–44)
MAGNESIUM SERPL-MCNC: 1.9 MG/DL (ref 1.9–2.7)
MCH RBC QN AUTO: 29.7 PG (ref 26.8–34.3)
MCHC RBC AUTO-ENTMCNC: 32.1 G/DL (ref 31.4–37.4)
MCV RBC AUTO: 92 FL (ref 82–98)
MONOCYTES # BLD AUTO: 0.77 THOUSAND/ÂΜL (ref 0.17–1.22)
MONOCYTES NFR BLD AUTO: 8 % (ref 4–12)
NEUTROPHILS # BLD AUTO: 6.49 THOUSANDS/ÂΜL (ref 1.85–7.62)
NEUTS SEG NFR BLD AUTO: 70 % (ref 43–75)
NRBC BLD AUTO-RTO: 0 /100 WBCS
PHOSPHATE SERPL-MCNC: 3.8 MG/DL (ref 2.3–4.1)
PLATELET # BLD AUTO: 168 THOUSANDS/UL (ref 149–390)
PMV BLD AUTO: 10.6 FL (ref 8.9–12.7)
POTASSIUM SERPL-SCNC: 3.9 MMOL/L (ref 3.5–5.3)
PROT SERPL-MCNC: 6.1 G/DL (ref 6.4–8.4)
PROTHROMBIN TIME: 13.2 SECONDS (ref 11.6–14.5)
RBC # BLD AUTO: 3.84 MILLION/UL (ref 3.81–5.12)
SODIUM SERPL-SCNC: 132 MMOL/L (ref 135–147)
WBC # BLD AUTO: 9.4 THOUSAND/UL (ref 4.31–10.16)

## 2024-03-02 PROCEDURE — 99232 SBSQ HOSP IP/OBS MODERATE 35: CPT | Performed by: INTERNAL MEDICINE

## 2024-03-02 PROCEDURE — 83735 ASSAY OF MAGNESIUM: CPT | Performed by: INTERNAL MEDICINE

## 2024-03-02 PROCEDURE — 85610 PROTHROMBIN TIME: CPT | Performed by: INTERNAL MEDICINE

## 2024-03-02 PROCEDURE — 99232 SBSQ HOSP IP/OBS MODERATE 35: CPT

## 2024-03-02 PROCEDURE — 85025 COMPLETE CBC W/AUTO DIFF WBC: CPT | Performed by: INTERNAL MEDICINE

## 2024-03-02 PROCEDURE — 82948 REAGENT STRIP/BLOOD GLUCOSE: CPT

## 2024-03-02 PROCEDURE — 84100 ASSAY OF PHOSPHORUS: CPT | Performed by: INTERNAL MEDICINE

## 2024-03-02 PROCEDURE — 80053 COMPREHEN METABOLIC PANEL: CPT | Performed by: INTERNAL MEDICINE

## 2024-03-02 RX ORDER — METOPROLOL SUCCINATE 50 MG/1
50 TABLET, EXTENDED RELEASE ORAL DAILY
Status: DISCONTINUED | OUTPATIENT
Start: 2024-03-02 | End: 2024-03-08 | Stop reason: HOSPADM

## 2024-03-02 RX ADMIN — INSULIN DETEMIR 36 UNITS: 100 INJECTION, SOLUTION SUBCUTANEOUS at 08:51

## 2024-03-02 RX ADMIN — PRAVASTATIN SODIUM 80 MG: 20 TABLET ORAL at 17:08

## 2024-03-02 RX ADMIN — INSULIN LISPRO 2 UNITS: 100 INJECTION, SOLUTION INTRAVENOUS; SUBCUTANEOUS at 07:35

## 2024-03-02 RX ADMIN — DULOXETINE HYDROCHLORIDE 20 MG: 20 CAPSULE, DELAYED RELEASE ORAL at 08:50

## 2024-03-02 RX ADMIN — LEVOTHYROXINE SODIUM 50 MCG: 25 TABLET ORAL at 05:08

## 2024-03-02 RX ADMIN — CLOPIDOGREL 75 MG: 75 TABLET ORAL at 08:50

## 2024-03-02 RX ADMIN — ENOXAPARIN SODIUM 30 MG: 100 INJECTION SUBCUTANEOUS at 08:51

## 2024-03-02 RX ADMIN — NYSTATIN: 100000 POWDER TOPICAL at 17:08

## 2024-03-02 RX ADMIN — PANTOPRAZOLE SODIUM 40 MG: 40 TABLET, DELAYED RELEASE ORAL at 05:08

## 2024-03-02 RX ADMIN — INSULIN LISPRO 4 UNITS: 100 INJECTION, SOLUTION INTRAVENOUS; SUBCUTANEOUS at 11:15

## 2024-03-02 RX ADMIN — INSULIN LISPRO 12 UNITS: 100 INJECTION, SOLUTION INTRAVENOUS; SUBCUTANEOUS at 11:16

## 2024-03-02 RX ADMIN — ASPIRIN 81 MG: 81 TABLET, COATED ORAL at 08:51

## 2024-03-02 RX ADMIN — NYSTATIN: 100000 POWDER TOPICAL at 08:51

## 2024-03-02 RX ADMIN — METOPROLOL SUCCINATE 50 MG: 50 TABLET, EXTENDED RELEASE ORAL at 11:16

## 2024-03-02 RX ADMIN — INSULIN LISPRO 12 UNITS: 100 INJECTION, SOLUTION INTRAVENOUS; SUBCUTANEOUS at 07:36

## 2024-03-02 RX ADMIN — FLUTICASONE PROPIONATE 2 SPRAY: 50 SPRAY, METERED NASAL at 08:51

## 2024-03-02 RX ADMIN — INSULIN LISPRO 12 UNITS: 100 INJECTION, SOLUTION INTRAVENOUS; SUBCUTANEOUS at 16:40

## 2024-03-02 RX ADMIN — Medication 400 MCG: at 08:50

## 2024-03-02 RX ADMIN — INSULIN LISPRO 1 UNITS: 100 INJECTION, SOLUTION INTRAVENOUS; SUBCUTANEOUS at 16:39

## 2024-03-02 NOTE — ASSESSMENT & PLAN NOTE
Request reevaluation by PT OT, patient with significant right upper extremity weakness, patient slightly unsteady on her feet, new facial droop, speech therapy recommended downgrade of diet, patient would likely benefit from acute rehab stay.    78-year-old female with history of hypertension, type 2 diabetes mellitus, hyperlipidemia, hypothyroidism, PAD, and CKD 3 who presented to our ED due to right hand weakness and fall.    Continue current dual antiplatelet therapy per neurology, continue with telemetry monitoring, concern for paroxysmal A-fib, plan for DILLAN Monday.    Continue statin.    Check TSH, hemoglobin A1c is not controlled,    MRI:   Impression:       Recent infarcts are identified in the right anterior deep frontal white matter, left insular cortex, and left posterior frontal deep white matter without evidence of hemorrhage. Embolic phenomenon should be considered given the bilateral involvement.    Moderate chronic microangiopathic changes.      Hyperbili

## 2024-03-02 NOTE — ASSESSMENT & PLAN NOTE
-Plan for DILLAN 3/4/2024  -Continue to monitor on telemetry although no significant atrial fibrillation or arrhythmias present at this time  -Continue to monitor patient clinically with plan per neurology and primary team.

## 2024-03-02 NOTE — ASSESSMENT & PLAN NOTE
Lab Results   Component Value Date    EGFR 40 03/02/2024    EGFR 42 03/01/2024    EGFR 40 02/29/2024    CREATININE 1.27 03/02/2024    CREATININE 1.23 03/01/2024    CREATININE 1.27 02/29/2024     Stable, does not meet INDIGO criteria at this time. Baseline appears to be between 1.1-1.4.

## 2024-03-02 NOTE — PLAN OF CARE
Problem: Potential for Falls  Goal: Patient will remain free of falls  Description: INTERVENTIONS:  - Educate patient/family on patient safety including physical limitations  - Instruct patient to call for assistance with activity   - Consult OT/PT to assist with strengthening/mobility   - Keep Call bell within reach  - Keep bed low and locked with side rails adjusted as appropriate  - Keep care items and personal belongings within reach  - Initiate and maintain comfort rounds  - Make Fall Risk Sign visible to staff  - Offer Toileting every 2 Hours, in advance of need  - Initiate/Maintain bed alarm  - Obtain necessary fall risk management equipment  - Apply yellow socks and bracelet for high fall risk patients  - Consider moving patient to room near nurses station  Outcome: Progressing     Problem: Nutrition/Hydration-ADULT  Goal: Nutrient/Hydration intake appropriate for improving, restoring or maintaining nutritional needs  Description: Monitor and assess patient's nutrition/hydration status for malnutrition. Collaborate with interdisciplinary team and initiate plan and interventions as ordered.  Monitor patient's weight and dietary intake as ordered or per policy. Utilize nutrition screening tool and intervene as necessary. Determine patient's food preferences and provide high-protein, high-caloric foods as appropriate.     INTERVENTIONS:  - Monitor oral intake, urinary output, labs, and treatment plans  - Assess nutrition and hydration status and recommend course of action  - Evaluate amount of meals eaten  - Assist patient with eating if necessary   - Allow adequate time for meals  - Recommend/ encourage appropriate diets, oral nutritional supplements, and vitamin/mineral supplements  - Order, calculate, and assess calorie counts as needed  - Recommend, monitor, and adjust tube feedings and TPN/PPN based on assessed needs  - Assess need for intravenous fluids  - Provide specific nutrition/hydration education as  appropriate  - Include patient/family/caregiver in decisions related to nutrition  Outcome: Progressing     Problem: Prexisting or High Potential for Compromised Skin Integrity  Goal: Skin integrity is maintained or improved  Description: INTERVENTIONS:  - Identify patients at risk for skin breakdown  - Assess and monitor skin integrity  - Assess and monitor nutrition and hydration status  - Monitor labs   - Assess for incontinence   - Turn and reposition patient  - Assist with mobility/ambulation  - Relieve pressure over bony prominences  - Avoid friction and shearing  - Provide appropriate hygiene as needed including keeping skin clean and dry  - Evaluate need for skin moisturizer/barrier cream  - Collaborate with interdisciplinary team   - Patient/family teaching  - Consider wound care consult   Outcome: Progressing     Problem: PAIN - ADULT  Goal: Verbalizes/displays adequate comfort level or baseline comfort level  Description: Interventions:  - Encourage patient to monitor pain and request assistance  - Assess pain using appropriate pain scale  - Administer analgesics based on type and severity of pain and evaluate response  - Implement non-pharmacological measures as appropriate and evaluate response  - Consider cultural and social influences on pain and pain management  - Notify physician/advanced practitioner if interventions unsuccessful or patient reports new pain  Outcome: Progressing     Problem: INFECTION - ADULT  Goal: Absence or prevention of progression during hospitalization  Description: INTERVENTIONS:  - Assess and monitor for signs and symptoms of infection  - Monitor lab/diagnostic results  - Monitor all insertion sites, i.e. indwelling lines, tubes, and drains  - Monitor endotracheal if appropriate and nasal secretions for changes in amount and color  - [place] appropriate cooling/warming therapies per order  - Administer medications as ordered  - Instruct and encourage patient and family to  use good hand hygiene technique  - Identify and instruct in appropriate isolation precautions for identified infection/condition  Outcome: Progressing  Goal: Absence of fever/infection during neutropenic period  Description: INTERVENTIONS:  - Monitor WBC    Outcome: Progressing     Goal: Maintain or return to baseline ADL function  Description: INTERVENTIONS:  -  Assess patient's ability to carry out ADLs; assess patient's baseline for ADL function and identify physical deficits which impact ability to perform ADLs (bathing, care of mouth/teeth, toileting, grooming, dressing, etc.)  - Assess/evaluate cause of self-care deficits   - Assess range of motion  - Assess patient's mobility; develop plan if impaired  - Assess patient's need for assistive devices and provide as appropriate  - Encourage maximum independence but intervene and supervise when necessary  - Involve family in performance of ADLs  - Assess for home care needs following discharge   - Consider OT consult to assist with ADL evaluation and planning for discharge  - Provide patient education as appropriate  Outcome: Progressing  Goal: Maintains/Returns to pre admission functional level  Description: INTERVENTIONS:  - Perform AM-PAC 6 Click Basic Mobility/ Daily Activity assessment daily.  - Set and communicate daily mobility goal to care team and patient/family/caregiver.   - Collaborate with rehabilitation services on mobility goals if consulted  - Perform Range of Motion 3 times a day.  - Reposition patient every 2 hours.  - Dangle patient 3 times a day  - Stand patient 3 times a day  - Ambulate patient 3 times a day  - Out of bed to chair 3 times a day   - Out of bed for meals 3 times a day  - Out of bed for toileting  - Record patient progress and toleration of activity level   Outcome: Progressing     Problem: DISCHARGE PLANNING  Goal: Discharge to home or other facility with appropriate resources  Description: INTERVENTIONS:  - Identify barriers to  discharge w/patient and caregiver  - Arrange for needed discharge resources and transportation as appropriate  - Identify discharge learning needs (meds, wound care, etc.)  - Arrange for interpretive services to assist at discharge as needed  - Refer to Case Management Department for coordinating discharge planning if the patient needs post-hospital services based on physician/advanced practitioner order or complex needs related to functional status, cognitive ability, or social support system  Outcome: Progressing     Problem: Knowledge Deficit  Goal: Patient/family/caregiver demonstrates understanding of disease process, treatment plan, medications, and discharge instructions  Description: Complete learning assessment and assess knowledge base.  Interventions:  - Provide teaching at level of understanding  - Provide teaching via preferred learning methods  Outcome: Progressing     Problem: Neurological Deficit  Goal: Neurological status is stable or improving  Description: Interventions:  - Monitor and assess patient's level of consciousness, motor function, sensory function, and level of assistance needed for ADLs.   - Monitor and report changes from baseline. Collaborate with interdisciplinary team to initiate plan and implement interventions as ordered.   - Provide and maintain a safe environment.  - Consider seizure precautions.  - Consider fall precautions.  - Consider aspiration precautions.  - Consider bleeding precautions.  Outcome: Progressing     Problem: Activity Intolerance/Impaired Mobility  Goal: Mobility/activity is maintained at optimum level for patient  Description: Interventions:  - Assess and monitor patient  barriers to mobility and need for assistive/adaptive devices.  - Assess patient's emotional response to limitations.  - Collaborate with interdisciplinary team and initiate plans and interventions as ordered.  - Encourage independent activity per ability.  - Maintain proper body  alignment.  - Perform active/passive rom as tolerated/ordered.  - Plan activities to conserve energy.  - Turn patient as appropriate  Outcome: Progressing     Problem: Communication Impairment  Goal: Ability to express needs and understand communication  Description: Assess patient's communication skills and ability to understand information.  Patient will demonstrate use of effective communication techniques, alternative methods of communication and understanding even if not able to speak.     - Encourage communication and provide alternate methods of communication as needed.  - Collaborate with case management/ for discharge needs.  - Include patient/family/caregiver in decisions related to communication.  Outcome: Progressing     Problem: Potential for Aspiration  Goal: Non-ventilated patient's risk of aspiration is minimized  Description: Assess and monitor vital signs, respiratory status, and labs (WBC).  Monitor for signs of aspiration (tachypnea, cough, rales, wheezing, cyanosis, fever).    - Assess and monitor patient's ability to swallow.  - Place patient up in chair to eat if possible.  - HOB up at 90 degrees to eat if unable to get patient up into chair.  - Supervise patient during oral intake.   - Instruct patient/ family to take small bites.  - Instruct patient/ family to take small single sips when taking liquids.  - Follow patient-specific strategies generated by speech pathologist.  Outcome: Progressing  Goal: Ventilated patient's risk of aspiration is minimized  Description: Assess and monitor vital signs, respiratory status, airway cuff pressure, and labs (WBC).  Monitor for signs of aspiration (tachypnea, cough, rales, wheezing, cyanosis, fever).    - Elevate head of bed 30 degrees if patient has tube feeding.  - Monitor tube feeding.  Outcome: Progressing     Problem: Nutrition  Goal: Nutrition/Hydration status is improving  Description: Monitor and assess patient's  nutrition/hydration status for malnutrition (ex- brittle hair, bruises, dry skin, pale skin and conjunctiva, muscle wasting, smooth red tongue, and disorientation). Collaborate with interdisciplinary team and initiate plan and interventions as ordered.  Monitor patient's weight and dietary intake as ordered or per policy. Utilize nutrition screening tool and intervene per policy. Determine patient's food preferences and provide high-protein, high-caloric foods as appropriate.     - Assist patient with eating.  - Allow adequate time for meals.  - Encourage patient to take dietary supplement as ordered.  - Collaborate with clinical nutritionist.  - Include patient/family/caregiver in decisions related to nutrition.  Outcome: Progressing

## 2024-03-02 NOTE — ASSESSMENT & PLAN NOTE
Lab Results   Component Value Date    HGBA1C 12.1 (H) 02/29/2024       Recent Labs     03/01/24  1621 03/01/24 2037 03/02/24  0652 03/02/24  1047   POCGLU 205* 183* 200* 274*         Blood Sugar Average: Last 72 hrs:  (P) [id number] increase basal insulin to 36 units   Increase Humalog to 12 units with meals plus sliding scale.  Patient will benefit from improved glycemic control, hemoglobin A1c goal less than 7.

## 2024-03-02 NOTE — PROGRESS NOTES
[facility]  Progress Note  Name: Jina Norris I  MRN: [id number]  Unit/Bed#: -01 I Date of Admission: 2/28/2024   Date of Service: 3/2/2024 I Hospital Day: 2    Assessment/Plan   Acquired hypothyroidism  Assessment & Plan  -Continue levothyroxine with plan of care per primary team    Mixed hyperlipidemia  Assessment & Plan  -In setting of recent CVA would recommend high intensity statin therapy  -Counseled patient on dietary and lifestyle modifications.    Essential hypertension  Assessment & Plan  -Blood pressure elevated at this time would reinitiate home metoprolol succinate and if blood pressure tolerates could further uptitrate medical therapy with reinitiation of home medications  -Per neurology documentation goal would be normotension  -Counseled patient on dietary and lifestyle modifications.      Type 2 diabetes mellitus with stage 3b chronic kidney disease, with long-term current use of insulin ([facility])  Assessment & Plan  Lab Results   Component Value Date    HGBA1C 12.1 (H) 02/29/2024       Recent Labs     03/01/24  1137 03/01/24  1621 03/01/24  2037 03/02/24  0652   POCGLU 385* 205* 183* 200*         Blood Sugar Average: Last 72 hrs:  (P) 267.9    -Counseled patient on dietary and lifestyle modifications  -Plan of care per primary team    * Acute CVA (cerebrovascular accident) (HCC)  Assessment & Plan  -Plan for DILLAN 3/4/2024  -Continue to monitor on telemetry although no significant atrial fibrillation or arrhythmias present at this time  -Continue to monitor patient clinically with plan per neurology and primary team.           Subjective:   Patient seen and examined.  Patient denies any chest pain, palpitations, lightness or dizziness, loss consciousness or shortness of breath.  She notes overall feeling reasonably well today.    Summary comments:  -Continue to monitor patient while hospitalized on telemetry to monitor for significant arrhythmias given recent CVA concern  "for possible embolic nature  -Patient will undergo transesophageal echocardiogram both written and verbal consent obtained  -As blood pressure is elevated would reinitiate metoprolol succinate 50 mg daily and uptitrate with reinitiation of home medical regimen as neurology note does state normotension is goal for patient.    Telemetry/ECG/Cardiac testing:   Sinus rhythm on telemetry with no atrial fibrillation appreciated    Vitals: Blood pressure 167/90, pulse 83, temperature 97.9 °F (36.6 °C), resp. rate 18, height 5' 2\" (1.575 m), weight 77.1 kg (170 lb), SpO2 98%.,   Orthostatic Blood Pressures      Flowsheet Row Most Recent Value   Blood Pressure 167/90 filed at 03/02/2024 0740   Patient Position - Orthostatic VS Sitting filed at 03/01/2024 1900        ,   Weight (last 2 days)       Date/Time Weight    02/29/24 0805 77.1 (170)            Physical Exam:  Physical Exam  Vitals reviewed.   Constitutional:       General: She is not in acute distress.     Appearance: She is obese. She is not diaphoretic.   HENT:      Head: Normocephalic and atraumatic.   Eyes:      General:         Right eye: No discharge.         Left eye: No discharge.   Neck:      Comments: Trachea midline, no JVD present  Cardiovascular:      Rate and Rhythm: Normal rate and regular rhythm.      Heart sounds:      No friction rub.   Pulmonary:      Effort: Pulmonary effort is normal. No respiratory distress.      Breath sounds: No wheezing.   Chest:      Chest wall: No tenderness.   Abdominal:      General: Bowel sounds are normal.      Palpations: Abdomen is soft.      Tenderness: There is no abdominal tenderness. There is no rebound.   Musculoskeletal:      Right lower leg: No edema.      Left lower leg: No edema.   Skin:     General: Skin is warm and dry.   Neurological:      Mental Status: She is alert.      Comments: Awake, alert, able to answer questions appropriately, right facial droop still present and slight hand and right upper " extremity weakness but still able to move extremities bilaterally.   Psychiatric:         Mood and Affect: Mood normal.         Behavior: Behavior normal.        Medications:      Current Facility-Administered Medications:     aspirin (ECOTRIN LOW STRENGTH) EC tablet 81 mg, 81 mg, Oral, Daily, Akil Davis MD, 81 mg at 03/02/24 0851    clopidogrel (PLAVIX) tablet 75 mg, 75 mg, Oral, Daily, Akil Davis MD, 75 mg at 03/02/24 0850    DULoxetine (CYMBALTA) delayed release capsule 20 mg, 20 mg, Oral, Daily, Akil Davis MD, 20 mg at 03/02/24 0850    enoxaparin (LOVENOX) subcutaneous injection 30 mg, 30 mg, Subcutaneous, Daily, Akil Davis MD, 30 mg at 03/02/24 0851    fluticasone (FLONASE) 50 mcg/act nasal spray 2 spray, 2 spray, Nasal, Daily, Akil Davis MD, 2 spray at 03/02/24 0851    folic acid (FOLVITE) tablet 400 mcg, 400 mcg, Oral, Daily, Akil Davis MD, 400 mcg at 03/02/24 0850    insulin detemir (LEVEMIR) subcutaneous injection 36 Units, 36 Units, Subcutaneous, Daily, Rolly Sol DO, 36 Units at 03/02/24 0851    insulin lispro (HumALOG/ADMELOG) 100 units/mL subcutaneous injection 1-6 Units, 1-6 Units, Subcutaneous, TID AC, 2 Units at 03/02/24 0735 **AND** Fingerstick Glucose (POCT), , , TID AC, Akil Davis MD    insulin lispro (HumALOG/ADMELOG) 100 units/mL subcutaneous injection 12 Units, 12 Units, Subcutaneous, TID With Meals, Rolly Sol DO, 12 Units at 03/02/24 0736    levothyroxine tablet 50 mcg, 50 mcg, Oral, Early Morning, Akil Davis MD, 50 mcg at 03/02/24 0508    metoprolol succinate (TOPROL-XL) 24 hr tablet 50 mg, 50 mg, Oral, Daily, Rolly Funes DO    nystatin (MYCOSTATIN) powder, , Topical, BID, Akil Davis MD, Given at 03/02/24 0851    pantoprazole (PROTONIX) EC tablet 40 mg, 40 mg, Oral, Early Morning, Akil Davis MD, 40 mg at 03/02/24 0508    pravastatin (PRAVACHOL) tablet 80 mg, 80 mg, Oral, Daily With Dinner, Akil Davis MD, 80 mg at 03/01/24 7259     Labs &  Results:  Labs reviewed and prominent abnormalities reviewed above and/or below.  Troponins:    Results from last 7 days   Lab Units 02/28/24  1831 02/28/24  1551   HS TNI 0HR ng/L  --  7   HS TNI 2HR ng/L 6  --    HSTNI D2 ng/L -1  --         BNP:

## 2024-03-02 NOTE — ASSESSMENT & PLAN NOTE
Lab Results   Component Value Date    HGBA1C 12.1 (H) 02/29/2024       Recent Labs     03/01/24  1137 03/01/24  1621 03/01/24 2037 03/02/24  0652   POCGLU 385* 205* 183* 200*         Blood Sugar Average: Last 72 hrs:  (P) 267.9    -Counseled patient on dietary and lifestyle modifications  -Plan of care per primary team

## 2024-03-02 NOTE — ASSESSMENT & PLAN NOTE
-Blood pressure elevated at this time would reinitiate home metoprolol succinate and if blood pressure tolerates could further uptitrate medical therapy with reinitiation of home medications  -Per neurology documentation goal would be normotension  -Counseled patient on dietary and lifestyle modifications.

## 2024-03-02 NOTE — PROGRESS NOTES
Patient:    MRN:  [id number]    Aidin Request ID:  [id number]    Level of care reserved:  Inpatient Rehab Facility    Partner Reserved:  [facility] - ([place]), [place] [id number] [phone]    Clinical needs requested:    Geography searched:  20 miles around [id number]    Start of Service:    Request sent:  3:38pm EST on 3/1/2024 by Stacey Oconnor    Partner reserved:  12:29pm EST on 3/2/2024 by Stacey Oconnor    Choice list shared:  7:47pm EST on 3/1/2024 by Stacey Oconnor

## 2024-03-02 NOTE — PLAN OF CARE
Problem: Prexisting or High Potential for Compromised Skin Integrity  Goal: Skin integrity is maintained or improved  Description: INTERVENTIONS:  - Identify patients at risk for skin breakdown  - Assess and monitor skin integrity  - Assess and monitor nutrition and hydration status  - Monitor labs   - Assess for incontinence   - Turn and reposition patient  - Assist with mobility/ambulation  - Relieve pressure over bony prominences  - Avoid friction and shearing  - Provide appropriate hygiene as needed including keeping skin clean and dry  - Evaluate need for skin moisturizer/barrier cream  - Collaborate with interdisciplinary team   - Patient/family teaching  - Consider wound care consult   Outcome: Progressing     Problem: Activity Intolerance/Impaired Mobility  Goal: Mobility/activity is maintained at optimum level for patient  Description: Interventions:  - Assess and monitor patient  barriers to mobility and need for assistive/adaptive devices.  - Assess patient's emotional response to limitations.  - Collaborate with interdisciplinary team and initiate plans and interventions as ordered.  - Encourage independent activity per ability.  - Maintain proper body alignment.  - Perform active/passive rom as tolerated/ordered.  - Plan activities to conserve energy.  - Turn patient as appropriate  Outcome: Progressing

## 2024-03-02 NOTE — PROGRESS NOTES
Patient:    MRN:  [id number]    Aidin Request ID:  [id number]    Level of care reserved:    Partner Reserved:    Clinical needs requested:    Geography searched:  20 miles around [id number]    Start of Service:    Request sent:  3:38pm EST on 3/1/2024 by Stacey Oconnor    Partner reserved:  by Stacey Oconnor    Choice list shared:  7:47pm EST on 3/1/2024 by Stacey Oconnor

## 2024-03-02 NOTE — ASSESSMENT & PLAN NOTE
-In setting of recent CVA would recommend high intensity statin therapy  -Counseled patient on dietary and lifestyle modifications.

## 2024-03-02 NOTE — PROGRESS NOTES
[facility]  Progress Note  Name: Jina Norris I  MRN: [id number]  Unit/Bed#: -01 I Date of Admission: 2/28/2024   Date of Service: 3/2/2024 I Hospital Day: 2    Assessment/Plan   * Acute CVA (cerebrovascular accident) (HCC)  Assessment & Plan  Request reevaluation by PT OT, patient with significant right upper extremity weakness, patient slightly unsteady on her feet, new facial droop, speech therapy recommended downgrade of diet, patient would likely benefit from acute rehab stay.    78-year-old female with history of hypertension, type 2 diabetes mellitus, hyperlipidemia, hypothyroidism, PAD, and CKD 3 who presented to our ED due to right hand weakness and fall.    Continue current dual antiplatelet therapy per neurology, continue with telemetry monitoring, concern for paroxysmal A-fib, plan for DILLAN Monday.    Continue statin.    Check TSH, hemoglobin A1c is not controlled,    MRI:   Impression:       Recent infarcts are identified in the right anterior deep frontal white matter, left insular cortex, and left posterior frontal deep white matter without evidence of hemorrhage. Embolic phenomenon should be considered given the bilateral involvement.    Moderate chronic microangiopathic changes.       Stage 3a chronic kidney disease (HCC)  Assessment & Plan  Lab Results   Component Value Date    EGFR 40 03/02/2024    EGFR 42 03/01/2024    EGFR 40 02/29/2024    CREATININE 1.27 03/02/2024    CREATININE 1.23 03/01/2024    CREATININE 1.27 02/29/2024     Stable, does not meet INDIGO criteria at this time. Baseline appears to be between 1.1-1.4.    PAD (peripheral artery disease) (HCC)  Assessment & Plan  Continue aspirin / statin  Continue follow-up with vascular surgery    Acquired hypothyroidism  Assessment & Plan  Continue levothyroxine    TSH 2.99    Mixed hyperlipidemia  Assessment & Plan  Continue statin    Essential hypertension  Assessment & Plan  Continue metoprolol and amlodipine,  blood pressure control is currently adequate    Type 2 diabetes mellitus with stage 3b chronic kidney disease, with long-term current use of insulin ([facility])  Assessment & Plan  Lab Results   Component Value Date    HGBA1C 12.1 (H) 02/29/2024       Recent Labs     03/01/24  1621 03/01/24  2037 03/02/24  0652 03/02/24  1047   POCGLU 205* 183* 200* 274*         Blood Sugar Average: Last 72 hrs:  (P) 268.6163590344571103 increase basal insulin to 36 units   Increase Humalog to 12 units with meals plus sliding scale.  Patient will benefit from improved glycemic control, hemoglobin A1c goal less than 7.             VTE Pharmacologic Prophylaxis:   High Risk (Score >/= 5) - Pharmacological DVT Prophylaxis Ordered: enoxaparin (Lovenox). Sequential Compression Devices Ordered.    Mobility:   Basic Mobility Inpatient Raw Score: 18  JH-HLM Goal: 6: Walk 10 steps or more  JH-HLM Achieved: 6: Walk 10 steps or more  HLM Goal achieved. Continue to encourage appropriate mobility.    Patient Centered Rounds: I performed bedside rounds with nursing staff today.   Discussions with Specialists or Other Care Team Provider: CM, cardiology    Education and Discussions with Family / Patient: Patient declined call to .     Total Time Spent on Date of Encounter in care of patient: 35 mins. This time was spent on one or more of the following: performing physical exam; counseling and coordination of care; obtaining or reviewing history; documenting in the medical record; reviewing/ordering tests, medications or procedures; communicating with other healthcare professionals and discussing with patient's family/caregivers.    Current Length of Stay: 2 day(s)  Current Patient Status: Inpatient   Certification Statement: The patient will continue to require additional inpatient hospital stay due to awaiting DILLAN Monday  Discharge Plan: Anticipate discharge in 48-72 hrs to home with home services.    Code Status: Level 1 - Full  Code    Subjective:   Patient reports feeling well.  She has continued weakness on her right side however weakness remains unchanged.  Sensation remains intact.  She denies chest pain/pressure, palpitations, lightheadedness, nausea, shortness of breath, or chills.    Objective:     Vitals:   Temp (24hrs), Av °F (36.7 °C), Min:97.8 °F (36.6 °C), Max:98.5 °F (36.9 °C)    Temp:  [97.8 °F (36.6 °C)-98.5 °F (36.9 °C)] 97.9 °F (36.6 °C)  HR:  [80-96] 96  Resp:  [18] 18  BP: (128-171)/(64-90) 128/73  SpO2:  [94 %-98 %] 97 %  Body mass index is 31.09 kg/m².     Input and Output Summary (last 24 hours):     Intake/Output Summary (Last 24 hours) at 3/2/2024 1127  Last data filed at 3/2/2024 0740  Gross per 24 hour   Intake 654.58 ml   Output 100 ml   Net 554.58 ml       Physical Exam:   Physical Exam  Vitals and nursing note reviewed.   Constitutional:       Appearance: She is normal weight.   HENT:      Head: Normocephalic.      Nose: Nose normal.      Mouth/Throat:      Mouth: Mucous membranes are moist.      Pharynx: Oropharynx is clear.   Eyes:      General: No scleral icterus.     Conjunctiva/sclera: Conjunctivae normal.      Pupils: Pupils are equal, round, and reactive to light.   Cardiovascular:      Rate and Rhythm: Normal rate and regular rhythm.      Heart sounds: No murmur heard.     No friction rub. No gallop.   Pulmonary:      Effort: Pulmonary effort is normal. No respiratory distress.      Breath sounds: Normal breath sounds. No stridor. No wheezing, rhonchi or rales.   Abdominal:      General: Abdomen is flat.      Palpations: Abdomen is soft.   Musculoskeletal:         General: Normal range of motion.      Cervical back: Normal range of motion and neck supple.      Right lower leg: No edema.      Left lower leg: No edema.   Lymphadenopathy:      Cervical: No cervical adenopathy.   Skin:     General: Skin is warm.      Coloration: Skin is not jaundiced or pale.      Findings: No bruising, erythema or  lesion.   Neurological:      General: No focal deficit present.      Mental Status: She is alert and oriented to person, place, and time. Mental status is at baseline.      Cranial Nerves: No cranial nerve deficit.      Motor: Weakness (Right-sided weakness of upper extremity, test with hand grasp and shoulder abduction, right-sided facial droop noted) present.   Psychiatric:         Mood and Affect: Mood normal.         Behavior: Behavior normal.         Thought Content: Thought content normal.          Additional Data:     Labs:  Results from last 7 days   Lab Units 24  0438   WBC Thousand/uL 9.40   HEMOGLOBIN g/dL 11.4*   HEMATOCRIT % 35.5   PLATELETS Thousands/uL 168   NEUTROS PCT % 70   LYMPHS PCT % 21   MONOS PCT % 8   EOS PCT % 1     Results from last 7 days   Lab Units 24  0438   SODIUM mmol/L 132*   POTASSIUM mmol/L 3.9   CHLORIDE mmol/L 101   CO2 mmol/L 24   BUN mg/dL 26*   CREATININE mg/dL 1.27   ANION GAP mmol/L 7   CALCIUM mg/dL 8.8   ALBUMIN g/dL 3.4*   TOTAL BILIRUBIN mg/dL 0.46   ALK PHOS U/L 52   ALT U/L 7   AST U/L 11*   GLUCOSE RANDOM mg/dL 199*     Results from last 7 days   Lab Units 24  0438   INR  0.99     Results from last 7 days   Lab Units 24  1047 24  0652 24  2037 24  1621 24  1137 24  0654 24  2128 24  1619 24  1406 24  1154 24  1947   POC GLUCOSE mg/dl 274* 200* 183* 205* 385* 179* 181* 303* 394* 387* 262*     Results from last 7 days   Lab Units 24  0405   HEMOGLOBIN A1C % 12.1*     Results from last 7 days   Lab Units 24  1551   LACTIC ACID mmol/L 1.0       Lines/Drains:  Invasive Devices       Peripheral Intravenous Line  Duration             Peripheral IV 24 Dorsal (posterior);Right Hand 1 day                      Telemetry:  Telemetry Orders (From admission, onward)               24 Hour Telemetry Monitoring  Continuous x 24 Hours (Telem)           Question:  Reason for 24  Hour Telemetry  Answer:  TIA/Suspected CVA/ Confirmed CVA                     Telemetry Reviewed: Normal Sinus Rhythm  Indication for Continued Telemetry Use: Acute CVA             Imaging: Reviewed radiology reports from this admission including: MRI brain    Recent Cultures (last 7 days):         Last 24 Hours Medication List:   Current Facility-Administered Medications   Medication Dose Route Frequency Provider Last Rate    aspirin  81 mg Oral Daily Akil Davis MD      clopidogrel  75 mg Oral Daily Akil Davis MD      DULoxetine  20 mg Oral Daily Akil Davis MD      enoxaparin  30 mg Subcutaneous Daily Akil Davis MD      fluticasone  2 spray Nasal Daily Akil Davis MD      folic acid  400 mcg Oral Daily Akil Davis MD      insulin detemir  36 Units Subcutaneous Daily Rolly Sol DO      insulin lispro  1-6 Units Subcutaneous TID AC Akil Davis MD      insulin lispro  12 Units Subcutaneous TID With Meals Rolly Sol DO      levothyroxine  50 mcg Oral Early Morning Akil Davis MD      metoprolol succinate  50 mg Oral Daily Rolly Funes DO      nystatin   Topical BID Akil Davis MD      pantoprazole  40 mg Oral Early Morning Akil Davis MD      pravastatin  80 mg Oral Daily With Dinner Akil Davis MD          Today, Patient Was Seen By: Victor Hugo Montague PA-C    **Please Note: This note may have been constructed using a voice recognition system.**

## 2024-03-02 NOTE — CASE MANAGEMENT
Case Management Assessment & Discharge Planning Note    Patient name Jina Norris  Location /-01 MRN [id number]  : 1945 Date 3/1/2024       Current Admission Date: 2024  Current Admission Diagnosis:Acute CVA (cerebrovascular accident) ([facility])   Patient Active Problem List    Diagnosis Date Noted    Acute CVA (cerebrovascular accident) ([facility]) 2024    Stage 3a chronic kidney disease ([facility]) 2023    INDIGO (acute kidney injury) ([facility]) 2023    Hypomagnesemia 2023    Fall 2023    Abnormal CT of the abdomen 2023    Metabolic encephalopathy 2023    Hypercalcemia 2023    PAD (peripheral artery disease) ([facility]) 2023    Asymptomatic bilateral carotid artery stenosis 2023    Chronic pain syndrome 2022    Lumbar radiculopathy 2022    Spinal stenosis of lumbar region     Severe obesity (BMI 35.0-39.9) with comorbidity ([facility]) 2021    Mixed hyperlipidemia 2020    Acquired hypothyroidism 2020    Type 2 diabetes mellitus with stage 3b chronic kidney disease, with long-term current use of insulin ([facility]) 2019    Essential hypertension 2019      LOS (days): 1  Geometric Mean LOS (GMLOS) (days): 1.9  Days to GMLOS:0.6     OBJECTIVE:    Risk of Unplanned Readmission Score: 14.95         Current admission status: Inpatient  Referral Reason: Other (d/c planning)    Preferred Pharmacy:   [id number] [place], [place] - [address]  [address] [address]  Phone: [phone] Fax: [phone]    Primary Care Provider: Lyssa Peres MD    Primary Insurance: MEDICARE  Secondary Insurance: COMMERCIAL MISCELLANEOUS    ASSESSMENT:  Active Health Care Proxies       Liu Norris [facility] Care Representative - Son   Primary Phone: [phone] (Mobile)                 Advance Directives  Does patient have a Health Care POA?: Yes  Does patient have Advance Directives?: Yes          Readmission Root Cause  30 Day Readmission: No    Patient Information  Admitted from:: Home  Mental Status: Alert  During Assessment patient was accompanied by: Not accompanied during assessment  Assessment information provided by:: Patient  Primary Caregiver: Self  Support Systems: Spouse/significant other, Son  County of Residence: [facility] city do you live in?: [place]  Home entry access options. Select all that apply.: Stairs  Number of steps to enter home.: 5  Do the steps have railings?: Yes  Type of Current Residence: [facility]  Living Arrangements: Lives w/ Spouse/significant other  Is patient a ?: No    Activities of Daily Living Prior to Admission  Functional Status: Independent  Completes ADLs independently?: Yes  Ambulates independently?: Yes  Does patient use assisted devices?: No  Does patient currently own DME?: Yes  What DME does the patient currently own?: Walker, Straight Cane, Other (Comment) (grab bars, glucometer, bp cuff)  Does patient have a history of Outpatient Therapy (PT/OT)?: Yes  Does the patient have a history of Short-Term Rehab?: No  Does patient have a history of HHC?: Yes (mila)  Does patient currently have HHC?: No         Patient Information Continued  Income Source: Pension/FCI  Does patient have prescription coverage?: Yes ([facility])  Does patient receive dialysis treatments?: No  Does patient have a history of substance abuse?: No  Does patient have a history of Mental Health Diagnosis?: No         Means of Transportation  Means of Transport to Appts:: Friends      Social Determinants of Health (SDOH)      Flowsheet Row Most Recent Value   Housing Stability    In the last 12 months, was there a time when you were not able to pay the mortgage or rent on time? N   In the last 12 months, how many places have you lived? 1   In the last 12 months, was there a time when you did not have a steady place to sleep or slept in a shelter (including now)? N    Transportation Needs    In the past 12 months, has lack of transportation kept you from medical appointments or from getting medications? no   In the past 12 months, has lack of transportation kept you from meetings, work, or from getting things needed for daily living? No   Food Insecurity    Within the past 12 months, you worried that your food would run out before you got the money to buy more. Never true   Within the past 12 months, the food you bought just didn't last and you didn't have money to get more. Never true   Utilities    In the past 12 months has the electric, gas, oil, or water company threatened to shut off services in your home? No            DISCHARGE DETAILS:    Discharge planning discussed with:: patient & spouse and Daisy ( her SO daughter) & cm attempted to reach her son and his mail box was full  Freedom of Choice: Yes  Comments - Freedom of Choice: recommendation was for [facility] was [facility] pt 's choice was [facility]- pt was evaluated again - [facility]- pt gave permission to send to [facility] -  pt was accepted  CM contacted family/caregiver?: Yes             Contacts  Patient Contacts: Escobar (SO), friend & Daisy ( SO daughter)  Relationship to Patient:: Other (Comment) (family & friend)  Contact Method: In Person  Reason/Outcome: Discharge Planning    Requested Home Health Care         Is the patient interested in HHC at discharge?: Yes (sent to [facility] d/c plan changed)    DME Referral Provided  Referral made for DME?: No    Other Referral/Resources/Interventions Provided:  Interventions: Acute Rehab  Referral Comments: pt accepted to [facility]    Would you like to participate in our [facility] service program?  : No - Declined    Treatment Team Recommendation: Acute Rehab ([facility] - tbd)      Pt's son is coming in this week to help with caring for her SO and herself                                   Family notified:: reviewed mariangel pink family & friend

## 2024-03-03 LAB
GLUCOSE SERPL-MCNC: 129 MG/DL (ref 65–140)
GLUCOSE SERPL-MCNC: 165 MG/DL (ref 65–140)
GLUCOSE SERPL-MCNC: 200 MG/DL (ref 65–140)
GLUCOSE SERPL-MCNC: 275 MG/DL (ref 65–140)

## 2024-03-03 PROCEDURE — 99232 SBSQ HOSP IP/OBS MODERATE 35: CPT | Performed by: INTERNAL MEDICINE

## 2024-03-03 PROCEDURE — 99232 SBSQ HOSP IP/OBS MODERATE 35: CPT

## 2024-03-03 PROCEDURE — 82948 REAGENT STRIP/BLOOD GLUCOSE: CPT

## 2024-03-03 RX ADMIN — PANTOPRAZOLE SODIUM 40 MG: 40 TABLET, DELAYED RELEASE ORAL at 05:35

## 2024-03-03 RX ADMIN — FLUTICASONE PROPIONATE 2 SPRAY: 50 SPRAY, METERED NASAL at 08:25

## 2024-03-03 RX ADMIN — NYSTATIN: 100000 POWDER TOPICAL at 18:26

## 2024-03-03 RX ADMIN — ASPIRIN 81 MG: 81 TABLET, COATED ORAL at 08:23

## 2024-03-03 RX ADMIN — DULOXETINE HYDROCHLORIDE 20 MG: 20 CAPSULE, DELAYED RELEASE ORAL at 08:23

## 2024-03-03 RX ADMIN — PRAVASTATIN SODIUM 80 MG: 20 TABLET ORAL at 16:53

## 2024-03-03 RX ADMIN — Medication 400 MCG: at 08:23

## 2024-03-03 RX ADMIN — INSULIN LISPRO 12 UNITS: 100 INJECTION, SOLUTION INTRAVENOUS; SUBCUTANEOUS at 08:28

## 2024-03-03 RX ADMIN — INSULIN DETEMIR 36 UNITS: 100 INJECTION, SOLUTION SUBCUTANEOUS at 08:24

## 2024-03-03 RX ADMIN — INSULIN LISPRO 12 UNITS: 100 INJECTION, SOLUTION INTRAVENOUS; SUBCUTANEOUS at 11:37

## 2024-03-03 RX ADMIN — INSULIN LISPRO 4 UNITS: 100 INJECTION, SOLUTION INTRAVENOUS; SUBCUTANEOUS at 11:38

## 2024-03-03 RX ADMIN — METOPROLOL SUCCINATE 50 MG: 50 TABLET, EXTENDED RELEASE ORAL at 08:23

## 2024-03-03 RX ADMIN — INSULIN LISPRO 12 UNITS: 100 INJECTION, SOLUTION INTRAVENOUS; SUBCUTANEOUS at 16:54

## 2024-03-03 RX ADMIN — CLOPIDOGREL 75 MG: 75 TABLET ORAL at 08:23

## 2024-03-03 RX ADMIN — ENOXAPARIN SODIUM 30 MG: 100 INJECTION SUBCUTANEOUS at 08:24

## 2024-03-03 RX ADMIN — NYSTATIN: 100000 POWDER TOPICAL at 08:26

## 2024-03-03 RX ADMIN — LEVOTHYROXINE SODIUM 50 MCG: 25 TABLET ORAL at 05:35

## 2024-03-03 RX ADMIN — INSULIN LISPRO 2 UNITS: 100 INJECTION, SOLUTION INTRAVENOUS; SUBCUTANEOUS at 08:26

## 2024-03-03 NOTE — ASSESSMENT & PLAN NOTE
-Blood pressures still slightly above goal continue metoprolol succinate and could consider reinitiation of low-dose amlodipine 2.5 mg daily  -Per neurology documentation goal would be normotension  -Counseled patient on dietary and lifestyle modifications

## 2024-03-03 NOTE — ASSESSMENT & PLAN NOTE
Lab Results   Component Value Date    HGBA1C 12.1 (H) 02/29/2024       Recent Labs     03/02/24  1047 03/02/24  1620 03/02/24 2050 03/03/24  0706   POCGLU 274* 185* 168* 200*         Blood Sugar Average: Last 72 hrs:  (P) 249.7021547374464486    -Counseled patient on dietary and lifestyle modifications   -plan of care per primary team

## 2024-03-03 NOTE — ASSESSMENT & PLAN NOTE
Lab Results   Component Value Date    HGBA1C 12.1 (H) 02/29/2024       Recent Labs     03/02/24  1620 03/02/24  2050 03/03/24  0706 03/03/24  1059   POCGLU 185* 168* 200* 275*         Blood Sugar Average: Last 72 hrs:  (P) [id number] increase basal insulin to 36 units   Increase Humalog to 12 units with meals plus sliding scale.  Patient will benefit from improved glycemic control, hemoglobin A1c goal less than 7.

## 2024-03-03 NOTE — PROGRESS NOTES
[facility]  Progress Note  Name: Jina Norris I  MRN: [id number]  Unit/Bed#: -01 I Date of Admission: 2/28/2024   Date of Service: 3/3/2024 I Hospital Day: 3    Assessment/Plan   Acquired hypothyroidism  Assessment & Plan  -Continue levothyroxine with plan of care per primary team    Mixed hyperlipidemia  Assessment & Plan  -In setting of recent CVA would recommend high intensity statin therapy  -Counseled patient on dietary and lifestyle modifications.    Essential hypertension  Assessment & Plan  -Blood pressures still slightly above goal continue metoprolol succinate and could consider reinitiation of low-dose amlodipine 2.5 mg daily  -Per neurology documentation goal would be normotension  -Counseled patient on dietary and lifestyle modifications    Type 2 diabetes mellitus with stage 3b chronic kidney disease, with long-term current use of insulin (HCC)  Assessment & Plan  Lab Results   Component Value Date    HGBA1C 12.1 (H) 02/29/2024       Recent Labs     03/02/24  1047 03/02/24  1620 03/02/24  2050 03/03/24  0706   POCGLU 274* 185* 168* 200*         Blood Sugar Average: Last 72 hrs:  (P) [id number]    -Counseled patient on dietary and lifestyle modifications   -plan of care per primary team        * Acute CVA (cerebrovascular accident) (HCC)  Assessment & Plan  -Plan for DILLAN 3/4/2024  -Continue to monitor patient on telemetry although no significant episodes of atrial fibrillation present so far during hospitalization  -Continue to monitor plan of care per primary team and neurology  -Currently on dual antiplatelet therapy          Subjective:   Patient seen and examined.  Per patient, denies any chest pain, palpitations, lightness or dizziness, loss consciousness, shortness of breath, lower extreme edema, orthopnea or bendopnea.  She still notes facial droop with some residual weakness in right hand and right upper extremity.      Summary comments:  -Patient should  "be n.p.o. after midnight sips with medications and will defer adjustment and insulin dosing to primary team  -Plan for DILLAN 3/4/2024 requested by neurology  -Risk, benefits, alternatives of procedure explained to patient in full and all questions answered.  Verbal consent to be obtained and written consent obtained earlier.  -Continue to monitor patient clinically with up titration of medical therapy as patient tolerates    Telemetry/ECG/Cardiac testing:   -Currently sinus rhythm on telemetry    Vitals: Blood pressure 133/73, pulse 81, temperature 98.5 °F (36.9 °C), temperature source Oral, resp. rate 17, height 5' 2\" (1.575 m), weight 77.1 kg (170 lb), SpO2 96%.,   Orthostatic Blood Pressures      Flowsheet Row Most Recent Value   Blood Pressure 133/73 filed at 03/03/2024 0707   Patient Position - Orthostatic VS Lying filed at 03/03/2024 0707        ,   Weight (last 2 days)       None            Physical Exam:  Physical Exam  Vitals reviewed.   Constitutional:       General: She is not in acute distress.     Appearance: She is obese. She is not diaphoretic.   HENT:      Head: Normocephalic and atraumatic.   Eyes:      General:         Right eye: No discharge.         Left eye: No discharge.   Neck:      Comments: Trachea midline, no JVD present  Cardiovascular:      Rate and Rhythm: Normal rate and regular rhythm.      Heart sounds:      No friction rub.   Pulmonary:      Effort: No respiratory distress.      Breath sounds: No wheezing.      Comments: Decreased breath sounds bilaterally  Chest:      Chest wall: No tenderness.   Abdominal:      General: Bowel sounds are normal.      Palpations: Abdomen is soft.      Tenderness: There is no abdominal tenderness. There is no rebound.   Musculoskeletal:      Right lower leg: No edema.      Left lower leg: No edema.   Skin:     General: Skin is warm and dry.   Neurological:      Mental Status: She is alert.      Comments: Awake, alert, able to answer questions " appropriately, right facial droop present with slight residual weakness in right hand and upper extremity however able to move extremities bilaterally.   Psychiatric:         Mood and Affect: Mood normal.         Behavior: Behavior normal.          Medications:      Current Facility-Administered Medications:     aspirin (ECOTRIN LOW STRENGTH) EC tablet 81 mg, 81 mg, Oral, Daily, Akil Davis MD, 81 mg at 03/03/24 0823    clopidogrel (PLAVIX) tablet 75 mg, 75 mg, Oral, Daily, Akil Davis MD, 75 mg at 03/03/24 0823    DULoxetine (CYMBALTA) delayed release capsule 20 mg, 20 mg, Oral, Daily, Akil Davis MD, 20 mg at 03/03/24 0823    enoxaparin (LOVENOX) subcutaneous injection 30 mg, 30 mg, Subcutaneous, Daily, Akil Davis MD, 30 mg at 03/03/24 0824    fluticasone (FLONASE) 50 mcg/act nasal spray 2 spray, 2 spray, Nasal, Daily, Akil Davis MD, 2 spray at 03/03/24 0825    folic acid (FOLVITE) tablet 400 mcg, 400 mcg, Oral, Daily, Akil Davis MD, 400 mcg at 03/03/24 0823    insulin detemir (LEVEMIR) subcutaneous injection 36 Units, 36 Units, Subcutaneous, Daily, Rolly Sol DO, 36 Units at 03/03/24 0824    insulin lispro (HumALOG/ADMELOG) 100 units/mL subcutaneous injection 1-6 Units, 1-6 Units, Subcutaneous, TID AC, 2 Units at 03/03/24 0826 **AND** Fingerstick Glucose (POCT), , , TID AC, Akil Davis MD    insulin lispro (HumALOG/ADMELOG) 100 units/mL subcutaneous injection 12 Units, 12 Units, Subcutaneous, TID With Meals, Rolly Sol DO, 12 Units at 03/03/24 0828    levothyroxine tablet 50 mcg, 50 mcg, Oral, Early Morning, Akil Davis MD, 50 mcg at 03/03/24 0535    metoprolol succinate (TOPROL-XL) 24 hr tablet 50 mg, 50 mg, Oral, Daily, Rolly Funes DO, 50 mg at 03/03/24 0823    nystatin (MYCOSTATIN) powder, , Topical, BID, Akil Davis MD, Given at 03/03/24 0851    pantoprazole (PROTONIX) EC tablet 40 mg, 40 mg, Oral, Early Morning, Akil Davis MD, 40 mg at 03/03/24 0531     pravastatin (PRAVACHOL) tablet 80 mg, 80 mg, Oral, Daily With Dinner, Akil Davis MD, 80 mg at 03/02/24 1708     Labs & Results:  Labs reviewed and prominent abnormalities reviewed above and/or below.  Troponins:    Results from last 7 days   Lab Units 02/28/24  1831 02/28/24  1551   HS TNI 0HR ng/L  --  7   HS TNI 2HR ng/L 6  --    HSTNI D2 ng/L -1  --         BNP:

## 2024-03-03 NOTE — PLAN OF CARE
Problem: Potential for Falls  Goal: Patient will remain free of falls  Description: INTERVENTIONS:  - Educate patient/family on patient safety including physical limitations  - Instruct patient to call for assistance with activity   - Consult OT/PT to assist with strengthening/mobility   - Keep Call bell within reach  - Keep bed low and locked with side rails adjusted as appropriate  - Keep care items and personal belongings within reach  - Initiate and maintain comfort rounds  - Make Fall Risk Sign visible to staff  - Offer Toileting every 2 Hours, in advance of need  - Initiate/Maintain bed alarm  - Obtain necessary fall risk management equipment  - Apply yellow socks and bracelet for high fall risk patients  - Consider moving patient to room near nurses station  Outcome: Progressing     Problem: Nutrition/Hydration-ADULT  Goal: Nutrient/Hydration intake appropriate for improving, restoring or maintaining nutritional needs  Description: Monitor and assess patient's nutrition/hydration status for malnutrition. Collaborate with interdisciplinary team and initiate plan and interventions as ordered.  Monitor patient's weight and dietary intake as ordered or per policy. Utilize nutrition screening tool and intervene as necessary. Determine patient's food preferences and provide high-protein, high-caloric foods as appropriate.     INTERVENTIONS:  - Monitor oral intake, urinary output, labs, and treatment plans  - Assess nutrition and hydration status and recommend course of action  - Evaluate amount of meals eaten  - Assist patient with eating if necessary   - Allow adequate time for meals  - Recommend/ encourage appropriate diets, oral nutritional supplements, and vitamin/mineral supplements  - Order, calculate, and assess calorie counts as needed  - Recommend, monitor, and adjust tube feedings and TPN/PPN based on assessed needs  - Assess need for intravenous fluids  - Provide specific nutrition/hydration education as  appropriate  - Include patient/family/caregiver in decisions related to nutrition  Outcome: Progressing     Problem: Prexisting or High Potential for Compromised Skin Integrity  Goal: Skin integrity is maintained or improved  Description: INTERVENTIONS:  - Identify patients at risk for skin breakdown  - Assess and monitor skin integrity  - Assess and monitor nutrition and hydration status  - Monitor labs   - Assess for incontinence   - Turn and reposition patient  - Assist with mobility/ambulation  - Relieve pressure over bony prominences  - Avoid friction and shearing  - Provide appropriate hygiene as needed including keeping skin clean and dry  - Evaluate need for skin moisturizer/barrier cream  - Collaborate with interdisciplinary team   - Patient/family teaching  - Consider wound care consult   Outcome: Progressing     Problem: PAIN - ADULT  Goal: Verbalizes/displays adequate comfort level or baseline comfort level  Description: Interventions:  - Encourage patient to monitor pain and request assistance  - Assess pain using appropriate pain scale  - Administer analgesics based on type and severity of pain and evaluate response  - Implement non-pharmacological measures as appropriate and evaluate response  - Consider cultural and social influences on pain and pain management  - Notify physician/advanced practitioner if interventions unsuccessful or patient reports new pain  Outcome: Progressing     Problem: INFECTION - ADULT  Goal: Absence or prevention of progression during hospitalization  Description: INTERVENTIONS:  - Assess and monitor for signs and symptoms of infection  - Monitor lab/diagnostic results  - Monitor all insertion sites, i.e. indwelling lines, tubes, and drains  - Monitor endotracheal if appropriate and nasal secretions for changes in amount and color  - [place] appropriate cooling/warming therapies per order  - Administer medications as ordered  - Instruct and encourage patient and family to  use good hand hygiene technique  - Identify and instruct in appropriate isolation precautions for identified infection/condition  Outcome: Progressing  Goal: Absence of fever/infection during neutropenic period  Description: INTERVENTIONS:  - Monitor WBC    Outcome: Progressing    Goal: Maintain or return to baseline ADL function  Description: INTERVENTIONS:  -  Assess patient's ability to carry out ADLs; assess patient's baseline for ADL function and identify physical deficits which impact ability to perform ADLs (bathing, care of mouth/teeth, toileting, grooming, dressing, etc.)  - Assess/evaluate cause of self-care deficits   - Assess range of motion  - Assess patient's mobility; develop plan if impaired  - Assess patient's need for assistive devices and provide as appropriate  - Encourage maximum independence but intervene and supervise when necessary  - Involve family in performance of ADLs  - Assess for home care needs following discharge   - Consider OT consult to assist with ADL evaluation and planning for discharge  - Provide patient education as appropriate  Outcome: Progressing  Goal: Maintains/Returns to pre admission functional level  Description: INTERVENTIONS:  - Perform AM-PAC 6 Click Basic Mobility/ Daily Activity assessment daily.  - Set and communicate daily mobility goal to care team and patient/family/caregiver.   - Collaborate with rehabilitation services on mobility goals if consulted  - Perform Range of Motion 3 times a day.  - Reposition patient every 2 hours.  - Dangle patient 3 times a day  - Stand patient 3 times a day  - Ambulate patient 3 times a day  - Out of bed to chair 3 times a day   - Out of bed for meals 3 times a day  - Out of bed for toileting  - Record patient progress and toleration of activity level   Outcome: Progressing     Problem: DISCHARGE PLANNING  Goal: Discharge to home or other facility with appropriate resources  Description: INTERVENTIONS:  - Identify barriers to  discharge w/patient and caregiver  - Arrange for needed discharge resources and transportation as appropriate  - Identify discharge learning needs (meds, wound care, etc.)  - Arrange for interpretive services to assist at discharge as needed  - Refer to Case Management Department for coordinating discharge planning if the patient needs post-hospital services based on physician/advanced practitioner order or complex needs related to functional status, cognitive ability, or social support system  Outcome: Progressing     Problem: Knowledge Deficit  Goal: Patient/family/caregiver demonstrates understanding of disease process, treatment plan, medications, and discharge instructions  Description: Complete learning assessment and assess knowledge base.  Interventions:  - Provide teaching at level of understanding  - Provide teaching via preferred learning methods  Outcome: Progressing     Problem: Neurological Deficit  Goal: Neurological status is stable or improving  Description: Interventions:  - Monitor and assess patient's level of consciousness, motor function, sensory function, and level of assistance needed for ADLs.   - Monitor and report changes from baseline. Collaborate with interdisciplinary team to initiate plan and implement interventions as ordered.   - Provide and maintain a safe environment.  - Consider seizure precautions.  - Consider fall precautions.  - Consider aspiration precautions.  - Consider bleeding precautions.  Outcome: Progressing     Problem: Activity Intolerance/Impaired Mobility  Goal: Mobility/activity is maintained at optimum level for patient  Description: Interventions:  - Assess and monitor patient  barriers to mobility and need for assistive/adaptive devices.  - Assess patient's emotional response to limitations.  - Collaborate with interdisciplinary team and initiate plans and interventions as ordered.  - Encourage independent activity per ability.  - Maintain proper body  alignment.  - Perform active/passive rom as tolerated/ordered.  - Plan activities to conserve energy.  - Turn patient as appropriate  Outcome: Progressing     Problem: Communication Impairment  Goal: Ability to express needs and understand communication  Description: Assess patient's communication skills and ability to understand information.  Patient will demonstrate use of effective communication techniques, alternative methods of communication and understanding even if not able to speak.     - Encourage communication and provide alternate methods of communication as needed.  - Collaborate with case management/ for discharge needs.  - Include patient/family/caregiver in decisions related to communication.  Outcome: Progressing     Problem: Potential for Aspiration  Goal: Non-ventilated patient's risk of aspiration is minimized  Description: Assess and monitor vital signs, respiratory status, and labs (WBC).  Monitor for signs of aspiration (tachypnea, cough, rales, wheezing, cyanosis, fever).    - Assess and monitor patient's ability to swallow.  - Place patient up in chair to eat if possible.  - HOB up at 90 degrees to eat if unable to get patient up into chair.  - Supervise patient during oral intake.   - Instruct patient/ family to take small bites.  - Instruct patient/ family to take small single sips when taking liquids.  - Follow patient-specific strategies generated by speech pathologist.  Outcome: Progressing  Goal: Ventilated patient's risk of aspiration is minimized  Description: Assess and monitor vital signs, respiratory status, airway cuff pressure, and labs (WBC).  Monitor for signs of aspiration (tachypnea, cough, rales, wheezing, cyanosis, fever).    - Elevate head of bed 30 degrees if patient has tube feeding.  - Monitor tube feeding.  Outcome: Progressing     Problem: Nutrition  Goal: Nutrition/Hydration status is improving  Description: Monitor and assess patient's  nutrition/hydration status for malnutrition (ex- brittle hair, bruises, dry skin, pale skin and conjunctiva, muscle wasting, smooth red tongue, and disorientation). Collaborate with interdisciplinary team and initiate plan and interventions as ordered.  Monitor patient's weight and dietary intake as ordered or per policy. Utilize nutrition screening tool and intervene per policy. Determine patient's food preferences and provide high-protein, high-caloric foods as appropriate.     - Assist patient with eating.  - Allow adequate time for meals.  - Encourage patient to take dietary supplement as ordered.  - Collaborate with clinical nutritionist.  - Include patient/family/caregiver in decisions related to nutrition.  Outcome: Progressing

## 2024-03-03 NOTE — ASSESSMENT & PLAN NOTE
-Plan for DILLAN 3/4/2024  -Continue to monitor patient on telemetry although no significant episodes of atrial fibrillation present so far during hospitalization  -Continue to monitor plan of care per primary team and neurology  -Currently on dual antiplatelet therapy

## 2024-03-03 NOTE — ASSESSMENT & PLAN NOTE
Request reevaluation by PT OT, patient with significant right upper extremity weakness, patient slightly unsteady on her feet, new facial droop, speech therapy recommended downgrade of diet, patient would likely benefit from acute rehab stay.    78-year-old female with history of hypertension, type 2 diabetes mellitus, hyperlipidemia, hypothyroidism, PAD, and CKD 3 who presented to our ED due to right hand weakness and fall.    Continue current dual antiplatelet therapy per neurology, continue with telemetry monitoring, concern for paroxysmal A-fib, plan for DILLAN Monday.    Continue statin.    Check TSH, hemoglobin A1c is not controlled,    MRI:   Impression:       Recent infarcts are identified in the right anterior deep frontal white matter, left insular cortex, and left posterior frontal deep white matter without evidence of hemorrhage. Embolic phenomenon should be considered given the bilateral involvement.    Moderate chronic microangiopathic changes.

## 2024-03-03 NOTE — PLAN OF CARE
Problem: Neurological Deficit  Goal: Neurological status is stable or improving  Description: Interventions:  - Monitor and assess patient's level of consciousness, motor function, sensory function, and level of assistance needed for ADLs.   - Monitor and report changes from baseline. Collaborate with interdisciplinary team to initiate plan and implement interventions as ordered.   - Provide and maintain a safe environment.  - Consider seizure precautions.  - Consider fall precautions.  - Consider aspiration precautions.  - Consider bleeding precautions.  Continue neuro checks  Outcome: Progressing

## 2024-03-03 NOTE — PROGRESS NOTES
[facility]  Progress Note  Name: Jina Norris I  MRN: [id number]  Unit/Bed#: -01 I Date of Admission: 2/28/2024   Date of Service: 3/3/2024 I Hospital Day: 3    Assessment/Plan   * Acute CVA (cerebrovascular accident) (HCC)  Assessment & Plan  Request reevaluation by PT OT, patient with significant right upper extremity weakness, patient slightly unsteady on her feet, new facial droop, speech therapy recommended downgrade of diet, patient would likely benefit from acute rehab stay.    78-year-old female with history of hypertension, type 2 diabetes mellitus, hyperlipidemia, hypothyroidism, PAD, and CKD 3 who presented to our ED due to right hand weakness and fall.    Continue current dual antiplatelet therapy per neurology, continue with telemetry monitoring, concern for paroxysmal A-fib, plan for DILLAN Monday.    Continue statin.    Check TSH, hemoglobin A1c is not controlled,    MRI:   Impression:       Recent infarcts are identified in the right anterior deep frontal white matter, left insular cortex, and left posterior frontal deep white matter without evidence of hemorrhage. Embolic phenomenon should be considered given the bilateral involvement.    Moderate chronic microangiopathic changes.       Stage 3a chronic kidney disease (HCC)  Assessment & Plan  Lab Results   Component Value Date    EGFR 40 03/02/2024    EGFR 42 03/01/2024    EGFR 40 02/29/2024    CREATININE 1.27 03/02/2024    CREATININE 1.23 03/01/2024    CREATININE 1.27 02/29/2024     Stable, does not meet INDIGO criteria at this time. Baseline appears to be between 1.1-1.4.    PAD (peripheral artery disease) (HCC)  Assessment & Plan  Continue aspirin / statin  Continue follow-up with vascular surgery    Acquired hypothyroidism  Assessment & Plan  Continue levothyroxine    TSH 2.99    Mixed hyperlipidemia  Assessment & Plan  Continue statin    Essential hypertension  Assessment & Plan  Continue metoprolol and amlodipine,  blood pressure control is currently adequate    Type 2 diabetes mellitus with stage 3b chronic kidney disease, with long-term current use of insulin ([facility])  Assessment & Plan  Lab Results   Component Value Date    HGBA1C 12.1 (H) 02/29/2024       Recent Labs     03/02/24  1620 03/02/24  2050 03/03/24  0706 03/03/24  1059   POCGLU 185* 168* 200* 275*         Blood Sugar Average: Last 72 hrs:  (P) [id number] increase basal insulin to 36 units   Increase Humalog to 12 units with meals plus sliding scale.  Patient will benefit from improved glycemic control, hemoglobin A1c goal less than 7.             VTE Pharmacologic Prophylaxis:   High Risk (Score >/= 5) - Pharmacological DVT Prophylaxis Ordered: enoxaparin (Lovenox). Sequential Compression Devices Ordered.    Mobility:   Basic Mobility Inpatient Raw Score: 18  JH-HLM Goal: 6: Walk 10 steps or more  JH-HLM Achieved: 6: Walk 10 steps or more  HLM Goal achieved. Continue to encourage appropriate mobility.    Patient Centered Rounds: I performed bedside rounds with nursing staff today.   Discussions with Specialists or Other Care Team Provider: Cardiology    Education and Discussions with Family / Patient: Patient declined call to .     Total Time Spent on Date of Encounter in care of patient: 35 mins. This time was spent on one or more of the following: performing physical exam; counseling and coordination of care; obtaining or reviewing history; documenting in the medical record; reviewing/ordering tests, medications or procedures; communicating with other healthcare professionals and discussing with patient's family/caregivers.    Current Length of Stay: 3 day(s)  Current Patient Status: Inpatient   Certification Statement: The patient will continue to require additional inpatient hospital stay due to awaiting DILLAN  Discharge Plan: Anticipate discharge tomorrow to home with home services.    Code Status: Level 1 - Full Code    Subjective:    Patient reports feeling well.  She denies chest pain/pressure, palpitations, lightheadedness, nausea, shortness of breath, or chills.  She continues to have complaints of right-sided weakness that has remained unchanged.    Objective:     Vitals:   Temp (24hrs), Av.2 °F (36.8 °C), Min:97.5 °F (36.4 °C), Max:98.7 °F (37.1 °C)    Temp:  [97.5 °F (36.4 °C)-98.7 °F (37.1 °C)] 98.5 °F (36.9 °C)  HR:  [81-95] 81  Resp:  [17-18] 17  BP: (133-158)/(65-85) 133/73  SpO2:  [91 %-96 %] 96 %  Body mass index is 31.09 kg/m².     Input and Output Summary (last 24 hours):     Intake/Output Summary (Last 24 hours) at 3/3/2024 1101  Last data filed at 3/3/2024 0900  Gross per 24 hour   Intake 600 ml   Output --   Net 600 ml       Physical Exam:   Physical Exam  Vitals and nursing note reviewed.   Constitutional:       Appearance: She is normal weight.   HENT:      Head: Normocephalic.      Nose: Nose normal.      Mouth/Throat:      Mouth: Mucous membranes are moist.      Pharynx: Oropharynx is clear.   Eyes:      General: No scleral icterus.     Conjunctiva/sclera: Conjunctivae normal.      Pupils: Pupils are equal, round, and reactive to light.   Cardiovascular:      Rate and Rhythm: Normal rate and regular rhythm.      Heart sounds: No murmur heard.     No friction rub. No gallop.   Pulmonary:      Effort: Pulmonary effort is normal. No respiratory distress.      Breath sounds: Normal breath sounds. No stridor. No wheezing, rhonchi or rales.   Abdominal:      General: Abdomen is flat.      Palpations: Abdomen is soft.   Musculoskeletal:         General: Normal range of motion.      Cervical back: Normal range of motion and neck supple.      Right lower leg: No edema.      Left lower leg: No edema.   Lymphadenopathy:      Cervical: No cervical adenopathy.   Skin:     General: Skin is warm.      Coloration: Skin is not jaundiced or pale.      Findings: No bruising, erythema or lesion.   Neurological:      General: No focal  deficit present.      Mental Status: She is alert and oriented to person, place, and time. Mental status is at baseline.      Cranial Nerves: No cranial nerve deficit.      Motor: Weakness (Right-sided weakness, tested by abduction/adduction of upper extremity, hand grasp, noted facial drooping) present.   Psychiatric:         Mood and Affect: Mood normal.         Behavior: Behavior normal.         Thought Content: Thought content normal.          Additional Data:     Labs:  Results from last 7 days   Lab Units 03/02/24  0438   WBC Thousand/uL 9.40   HEMOGLOBIN g/dL 11.4*   HEMATOCRIT % 35.5   PLATELETS Thousands/uL 168   NEUTROS PCT % 70   LYMPHS PCT % 21   MONOS PCT % 8   EOS PCT % 1     Results from last 7 days   Lab Units 03/02/24  0438   SODIUM mmol/L 132*   POTASSIUM mmol/L 3.9   CHLORIDE mmol/L 101   CO2 mmol/L 24   BUN mg/dL 26*   CREATININE mg/dL 1.27   ANION GAP mmol/L 7   CALCIUM mg/dL 8.8   ALBUMIN g/dL 3.4*   TOTAL BILIRUBIN mg/dL 0.46   ALK PHOS U/L 52   ALT U/L 7   AST U/L 11*   GLUCOSE RANDOM mg/dL 199*     Results from last 7 days   Lab Units 03/02/24  0438   INR  0.99     Results from last 7 days   Lab Units 03/03/24  1059 03/03/24  0706 03/02/24  2050 03/02/24  1620 03/02/24  1047 03/02/24  0652 03/01/24  2037 03/01/24  1621 03/01/24  1137 03/01/24  0654 02/29/24  2128 02/29/24  1619   POC GLUCOSE mg/dl 275* 200* 168* 185* 274* 200* 183* 205* 385* 179* 181* 303*     Results from last 7 days   Lab Units 02/29/24  0405   HEMOGLOBIN A1C % 12.1*     Results from last 7 days   Lab Units 02/28/24  1551   LACTIC ACID mmol/L 1.0       Lines/Drains:  Invasive Devices       Peripheral Intravenous Line  Duration             Peripheral IV 03/01/24 Dorsal (posterior);Right Hand 2 days                      Telemetry:  Telemetry Orders (From admission, onward)               24 Hour Telemetry Monitoring  Continuous x 24 Hours (Telem)        Expiring   Question:  Reason for 24 Hour Telemetry  Answer:   TIA/Suspected CVA/ Confirmed CVA                     Telemetry Reviewed: Normal Sinus Rhythm  Indication for Continued Telemetry Use: Acute CVA             Imaging: Reviewed radiology reports from this admission including: MRI brain    Recent Cultures (last 7 days):         Last 24 Hours Medication List:   Current Facility-Administered Medications   Medication Dose Route Frequency Provider Last Rate    aspirin  81 mg Oral Daily Akil Davis MD      clopidogrel  75 mg Oral Daily Akil Davis MD      DULoxetine  20 mg Oral Daily Akil Davis MD      enoxaparin  30 mg Subcutaneous Daily Akil Davis MD      fluticasone  2 spray Nasal Daily Akil Davis MD      folic acid  400 mcg Oral Daily Akil Davis MD      insulin detemir  36 Units Subcutaneous Daily Rolly Sol DO      insulin lispro  1-6 Units Subcutaneous TID AC Akil Davis MD      insulin lispro  12 Units Subcutaneous TID With Meals Rolly Sol DO      levothyroxine  50 mcg Oral Early Morning Akil Davis MD      metoprolol succinate  50 mg Oral Daily Rolly Funes DO      nystatin   Topical BID Akil aDvis MD      pantoprazole  40 mg Oral Early Morning Akil Davis MD      pravastatin  80 mg Oral Daily With Dinner Akil Davis MD          Today, Patient Was Seen By: Victor Hugo Montague PA-C    **Please Note: This note may have been constructed using a voice recognition system.**

## 2024-03-04 ENCOUNTER — APPOINTMENT (INPATIENT)
Dept: NON INVASIVE DIAGNOSTICS | Facility: HOSPITAL | Age: 79
DRG: 066 | End: 2024-03-04
Payer: MEDICARE

## 2024-03-04 LAB
ANION GAP SERPL CALCULATED.3IONS-SCNC: 8 MMOL/L
BUN SERPL-MCNC: 28 MG/DL (ref 5–25)
CALCIUM SERPL-MCNC: 9 MG/DL (ref 8.4–10.2)
CHLORIDE SERPL-SCNC: 101 MMOL/L (ref 96–108)
CO2 SERPL-SCNC: 25 MMOL/L (ref 21–32)
CREAT SERPL-MCNC: 1.29 MG/DL (ref 0.6–1.3)
ERYTHROCYTE [DISTWIDTH] IN BLOOD BY AUTOMATED COUNT: 13.7 % (ref 11.6–15.1)
GFR SERPL CREATININE-BSD FRML MDRD: 39 ML/MIN/1.73SQ M
GLUCOSE SERPL-MCNC: 113 MG/DL (ref 65–140)
GLUCOSE SERPL-MCNC: 121 MG/DL (ref 65–140)
GLUCOSE SERPL-MCNC: 128 MG/DL (ref 65–140)
GLUCOSE SERPL-MCNC: 185 MG/DL (ref 65–140)
GLUCOSE SERPL-MCNC: 200 MG/DL (ref 65–140)
GLUCOSE SERPL-MCNC: 201 MG/DL (ref 65–140)
HCT VFR BLD AUTO: 36.1 % (ref 34.8–46.1)
HGB BLD-MCNC: 11.5 G/DL (ref 11.5–15.4)
MCH RBC QN AUTO: 29.6 PG (ref 26.8–34.3)
MCHC RBC AUTO-ENTMCNC: 31.9 G/DL (ref 31.4–37.4)
MCV RBC AUTO: 93 FL (ref 82–98)
PLATELET # BLD AUTO: 161 THOUSANDS/UL (ref 149–390)
PMV BLD AUTO: 10.6 FL (ref 8.9–12.7)
POTASSIUM SERPL-SCNC: 4.4 MMOL/L (ref 3.5–5.3)
RBC # BLD AUTO: 3.88 MILLION/UL (ref 3.81–5.12)
SL CV LV EF: 55
SODIUM SERPL-SCNC: 134 MMOL/L (ref 135–147)
WBC # BLD AUTO: 9.47 THOUSAND/UL (ref 4.31–10.16)

## 2024-03-04 PROCEDURE — 93325 DOPPLER ECHO COLOR FLOW MAPG: CPT | Performed by: INTERNAL MEDICINE

## 2024-03-04 PROCEDURE — 99233 SBSQ HOSP IP/OBS HIGH 50: CPT | Performed by: NURSE PRACTITIONER

## 2024-03-04 PROCEDURE — 82948 REAGENT STRIP/BLOOD GLUCOSE: CPT

## 2024-03-04 PROCEDURE — 85027 COMPLETE CBC AUTOMATED: CPT

## 2024-03-04 PROCEDURE — 93320 DOPPLER ECHO COMPLETE: CPT | Performed by: INTERNAL MEDICINE

## 2024-03-04 PROCEDURE — 92526 ORAL FUNCTION THERAPY: CPT

## 2024-03-04 PROCEDURE — B24BZZ4 ULTRASONOGRAPHY OF HEART WITH AORTA, TRANSESOPHAGEAL: ICD-10-PCS | Performed by: HOSPITALIST

## 2024-03-04 PROCEDURE — 93312 ECHO TRANSESOPHAGEAL: CPT | Performed by: INTERNAL MEDICINE

## 2024-03-04 PROCEDURE — 80048 BASIC METABOLIC PNL TOTAL CA: CPT

## 2024-03-04 RX ORDER — CALCIUM CARBONATE 500 MG/1
500 TABLET, CHEWABLE ORAL DAILY PRN
Status: DISCONTINUED | OUTPATIENT
Start: 2024-03-04 | End: 2024-03-08 | Stop reason: HOSPADM

## 2024-03-04 RX ORDER — PROPOFOL 10 MG/ML
INJECTION, EMULSION INTRAVENOUS AS NEEDED
Status: DISCONTINUED | OUTPATIENT
Start: 2024-03-04 | End: 2024-03-04

## 2024-03-04 RX ORDER — PROPOFOL 10 MG/ML
INJECTION, EMULSION INTRAVENOUS CONTINUOUS PRN
Status: DISCONTINUED | OUTPATIENT
Start: 2024-03-04 | End: 2024-03-04

## 2024-03-04 RX ORDER — LIDOCAINE HYDROCHLORIDE 10 MG/ML
INJECTION, SOLUTION EPIDURAL; INFILTRATION; INTRACAUDAL; PERINEURAL AS NEEDED
Status: DISCONTINUED | OUTPATIENT
Start: 2024-03-04 | End: 2024-03-04

## 2024-03-04 RX ORDER — SODIUM CHLORIDE, SODIUM LACTATE, POTASSIUM CHLORIDE, CALCIUM CHLORIDE 600; 310; 30; 20 MG/100ML; MG/100ML; MG/100ML; MG/100ML
INJECTION, SOLUTION INTRAVENOUS CONTINUOUS PRN
Status: DISCONTINUED | OUTPATIENT
Start: 2024-03-04 | End: 2024-03-04

## 2024-03-04 RX ADMIN — DULOXETINE HYDROCHLORIDE 20 MG: 20 CAPSULE, DELAYED RELEASE ORAL at 09:45

## 2024-03-04 RX ADMIN — LEVOTHYROXINE SODIUM 50 MCG: 25 TABLET ORAL at 05:17

## 2024-03-04 RX ADMIN — NYSTATIN 1 APPLICATION: 100000 POWDER TOPICAL at 17:08

## 2024-03-04 RX ADMIN — INSULIN LISPRO 1 UNITS: 100 INJECTION, SOLUTION INTRAVENOUS; SUBCUTANEOUS at 12:25

## 2024-03-04 RX ADMIN — INSULIN DETEMIR 36 UNITS: 100 INJECTION, SOLUTION SUBCUTANEOUS at 09:44

## 2024-03-04 RX ADMIN — ASPIRIN 81 MG: 81 TABLET, COATED ORAL at 09:45

## 2024-03-04 RX ADMIN — INSULIN LISPRO 2 UNITS: 100 INJECTION, SOLUTION INTRAVENOUS; SUBCUTANEOUS at 07:31

## 2024-03-04 RX ADMIN — PROPOFOL 120 MCG/KG/MIN: 10 INJECTION, EMULSION INTRAVENOUS at 08:29

## 2024-03-04 RX ADMIN — Medication 400 MCG: at 09:45

## 2024-03-04 RX ADMIN — CALCIUM CARBONATE (ANTACID) CHEW TAB 500 MG 500 MG: 500 CHEW TAB at 14:34

## 2024-03-04 RX ADMIN — NYSTATIN: 100000 POWDER TOPICAL at 09:45

## 2024-03-04 RX ADMIN — CLOPIDOGREL 75 MG: 75 TABLET ORAL at 09:45

## 2024-03-04 RX ADMIN — PRAVASTATIN SODIUM 80 MG: 20 TABLET ORAL at 17:08

## 2024-03-04 RX ADMIN — INSULIN LISPRO 12 UNITS: 100 INJECTION, SOLUTION INTRAVENOUS; SUBCUTANEOUS at 07:31

## 2024-03-04 RX ADMIN — PANTOPRAZOLE SODIUM 40 MG: 40 TABLET, DELAYED RELEASE ORAL at 05:17

## 2024-03-04 RX ADMIN — METOPROLOL SUCCINATE 50 MG: 50 TABLET, EXTENDED RELEASE ORAL at 09:45

## 2024-03-04 RX ADMIN — ENOXAPARIN SODIUM 30 MG: 100 INJECTION SUBCUTANEOUS at 09:44

## 2024-03-04 RX ADMIN — INSULIN LISPRO 12 UNITS: 100 INJECTION, SOLUTION INTRAVENOUS; SUBCUTANEOUS at 17:08

## 2024-03-04 RX ADMIN — INSULIN LISPRO 12 UNITS: 100 INJECTION, SOLUTION INTRAVENOUS; SUBCUTANEOUS at 12:25

## 2024-03-04 RX ADMIN — SODIUM CHLORIDE, SODIUM LACTATE, POTASSIUM CHLORIDE, AND CALCIUM CHLORIDE: .6; .31; .03; .02 INJECTION, SOLUTION INTRAVENOUS at 08:04

## 2024-03-04 RX ADMIN — LIDOCAINE HYDROCHLORIDE 80 MG: 10 INJECTION, SOLUTION EPIDURAL; INFILTRATION; INTRACAUDAL; PERINEURAL at 08:29

## 2024-03-04 RX ADMIN — PROPOFOL 80 MG: 10 INJECTION, EMULSION INTRAVENOUS at 08:29

## 2024-03-04 NOTE — PLAN OF CARE
Problem: Potential for Falls  Goal: Patient will remain free of falls  Description: INTERVENTIONS:  - Educate patient/family on patient safety including physical limitations  - Instruct patient to call for assistance with activity   - Consult OT/PT to assist with strengthening/mobility   - Keep Call bell within reach  - Keep bed low and locked with side rails adjusted as appropriate  - Keep care items and personal belongings within reach  - Initiate and maintain comfort rounds  - Make Fall Risk Sign visible to staff  - Offer Toileting every 2 Hours, in advance of need  - Initiate/Maintain bed alarm  - Obtain necessary fall risk management equipment  - Apply yellow socks and bracelet for high fall risk patients  - Consider moving patient to room near nurses station  Outcome: Progressing     Problem: Nutrition/Hydration-ADULT  Goal: Nutrient/Hydration intake appropriate for improving, restoring or maintaining nutritional needs  Description: Monitor and assess patient's nutrition/hydration status for malnutrition. Collaborate with interdisciplinary team and initiate plan and interventions as ordered.  Monitor patient's weight and dietary intake as ordered or per policy. Utilize nutrition screening tool and intervene as necessary. Determine patient's food preferences and provide high-protein, high-caloric foods as appropriate.     INTERVENTIONS:  - Monitor oral intake, urinary output, labs, and treatment plans  - Assess nutrition and hydration status and recommend course of action  - Evaluate amount of meals eaten  - Assist patient with eating if necessary   - Allow adequate time for meals  - Recommend/ encourage appropriate diets, oral nutritional supplements, and vitamin/mineral supplements  - Order, calculate, and assess calorie counts as needed  - Recommend, monitor, and adjust tube feedings and TPN/PPN based on assessed needs  - Assess need for intravenous fluids  - Provide specific nutrition/hydration education as  appropriate  - Include patient/family/caregiver in decisions related to nutrition  Outcome: Progressing     Problem: Prexisting or High Potential for Compromised Skin Integrity  Goal: Skin integrity is maintained or improved  Description: INTERVENTIONS:  - Identify patients at risk for skin breakdown  - Assess and monitor skin integrity  - Assess and monitor nutrition and hydration status  - Monitor labs   - Assess for incontinence   - Turn and reposition patient  - Assist with mobility/ambulation  - Relieve pressure over bony prominences  - Avoid friction and shearing  - Provide appropriate hygiene as needed including keeping skin clean and dry  - Evaluate need for skin moisturizer/barrier cream  - Collaborate with interdisciplinary team   - Patient/family teaching  - Consider wound care consult   Outcome: Progressing     Problem: PAIN - ADULT  Goal: Verbalizes/displays adequate comfort level or baseline comfort level  Description: Interventions:  - Encourage patient to monitor pain and request assistance  - Assess pain using appropriate pain scale  - Administer analgesics based on type and severity of pain and evaluate response  - Implement non-pharmacological measures as appropriate and evaluate response  - Consider cultural and social influences on pain and pain management  - Notify physician/advanced practitioner if interventions unsuccessful or patient reports new pain  Outcome: Progressing     Problem: INFECTION - ADULT  Goal: Absence or prevention of progression during hospitalization  Description: INTERVENTIONS:  - Assess and monitor for signs and symptoms of infection  - Monitor lab/diagnostic results  - Monitor all insertion sites, i.e. indwelling lines, tubes, and drains  - Monitor endotracheal if appropriate and nasal secretions for changes in amount and color  - [place] appropriate cooling/warming therapies per order  - Administer medications as ordered  - Instruct and encourage patient and family to  Internal Medicine use good hand hygiene technique  - Identify and instruct in appropriate isolation precautions for identified infection/condition  Outcome: Progressing  Goal: Absence of fever/infection during neutropenic period  Description: INTERVENTIONS:  - Monitor WBC    Outcome: Progressing     Goal: Maintain or return to baseline ADL function  Description: INTERVENTIONS:  -  Assess patient's ability to carry out ADLs; assess patient's baseline for ADL function and identify physical deficits which impact ability to perform ADLs (bathing, care of mouth/teeth, toileting, grooming, dressing, etc.)  - Assess/evaluate cause of self-care deficits   - Assess range of motion  - Assess patient's mobility; develop plan if impaired  - Assess patient's need for assistive devices and provide as appropriate  - Encourage maximum independence but intervene and supervise when necessary  - Involve family in performance of ADLs  - Assess for home care needs following discharge   - Consider OT consult to assist with ADL evaluation and planning for discharge  - Provide patient education as appropriate  Outcome: Progressing  Goal: Maintains/Returns to pre admission functional level  Description: INTERVENTIONS:  - Perform AM-PAC 6 Click Basic Mobility/ Daily Activity assessment daily.  - Set and communicate daily mobility goal to care team and patient/family/caregiver.   - Collaborate with rehabilitation services on mobility goals if consulted  - Perform Range of Motion 3 times a day.  - Reposition patient every 2 hours.  - Dangle patient 3 times a day  - Stand patient 3 times a day  - Ambulate patient 3 times a day  - Out of bed to chair 3 times a day   - Out of bed for meals 3 times a day  - Out of bed for toileting  - Record patient progress and toleration of activity level   Outcome: Progressing     Problem: DISCHARGE PLANNING  Goal: Discharge to home or other facility with appropriate resources  Description: INTERVENTIONS:  - Identify barriers to  discharge w/patient and caregiver  - Arrange for needed discharge resources and transportation as appropriate  - Identify discharge learning needs (meds, wound care, etc.)  - Arrange for interpretive services to assist at discharge as needed  - Refer to Case Management Department for coordinating discharge planning if the patient needs post-hospital services based on physician/advanced practitioner order or complex needs related to functional status, cognitive ability, or social support system  Outcome: Progressing     Problem: Knowledge Deficit  Goal: Patient/family/caregiver demonstrates understanding of disease process, treatment plan, medications, and discharge instructions  Description: Complete learning assessment and assess knowledge base.  Interventions:  - Provide teaching at level of understanding  - Provide teaching via preferred learning methods  Outcome: Progressing     Problem: Neurological Deficit  Goal: Neurological status is stable or improving  Description: Interventions:  - Monitor and assess patient's level of consciousness, motor function, sensory function, and level of assistance needed for ADLs.   - Monitor and report changes from baseline. Collaborate with interdisciplinary team to initiate plan and implement interventions as ordered.   - Provide and maintain a safe environment.  - Consider seizure precautions.  - Consider fall precautions.  - Consider aspiration precautions.  - Consider bleeding precautions.  Outcome: Progressing     Problem: Activity Intolerance/Impaired Mobility  Goal: Mobility/activity is maintained at optimum level for patient  Description: Interventions:  - Assess and monitor patient  barriers to mobility and need for assistive/adaptive devices.  - Assess patient's emotional response to limitations.  - Collaborate with interdisciplinary team and initiate plans and interventions as ordered.  - Encourage independent activity per ability.  - Maintain proper body  alignment.  - Perform active/passive rom as tolerated/ordered.  - Plan activities to conserve energy.  - Turn patient as appropriate  Outcome: Progressing     Problem: Communication Impairment  Goal: Ability to express needs and understand communication  Description: Assess patient's communication skills and ability to understand information.  Patient will demonstrate use of effective communication techniques, alternative methods of communication and understanding even if not able to speak.     - Encourage communication and provide alternate methods of communication as needed.  - Collaborate with case management/ for discharge needs.  - Include patient/family/caregiver in decisions related to communication.  Outcome: Progressing     Problem: Potential for Aspiration  Goal: Non-ventilated patient's risk of aspiration is minimized  Description: Assess and monitor vital signs, respiratory status, and labs (WBC).  Monitor for signs of aspiration (tachypnea, cough, rales, wheezing, cyanosis, fever).    - Assess and monitor patient's ability to swallow.  - Place patient up in chair to eat if possible.  - HOB up at 90 degrees to eat if unable to get patient up into chair.  - Supervise patient during oral intake.   - Instruct patient/ family to take small bites.  - Instruct patient/ family to take small single sips when taking liquids.  - Follow patient-specific strategies generated by speech pathologist.  Outcome: Progressing  Goal: Ventilated patient's risk of aspiration is minimized  Description: Assess and monitor vital signs, respiratory status, airway cuff pressure, and labs (WBC).  Monitor for signs of aspiration (tachypnea, cough, rales, wheezing, cyanosis, fever).    - Elevate head of bed 30 degrees if patient has tube feeding.  - Monitor tube feeding.  Outcome: Progressing     Problem: Nutrition  Goal: Nutrition/Hydration status is improving  Description: Monitor and assess patient's  nutrition/hydration status for malnutrition (ex- brittle hair, bruises, dry skin, pale skin and conjunctiva, muscle wasting, smooth red tongue, and disorientation). Collaborate with interdisciplinary team and initiate plan and interventions as ordered.  Monitor patient's weight and dietary intake as ordered or per policy. Utilize nutrition screening tool and intervene per policy. Determine patient's food preferences and provide high-protein, high-caloric foods as appropriate.     - Assist patient with eating.  - Allow adequate time for meals.  - Encourage patient to take dietary supplement as ordered.  - Collaborate with clinical nutritionist.  - Include patient/family/caregiver in decisions related to nutrition.  Outcome: Progressing

## 2024-03-04 NOTE — PROGRESS NOTES
[facility]  Progress Note  Name: Jina Norris I  MRN: [id number]  Unit/Bed#: -01 I Date of Admission: 2/28/2024   Date of Service: 3/4/2024 I Hospital Day: 4    Assessment/Plan     * Acute CVA (cerebrovascular accident) ([facility])  Assessment & Plan  Presented for right hand weakness and fall   Stroke pathway completed  MRI brain: Recent infarcts are identified in the right anterior deep frontal white matter, left insular cortex, and left posterior frontal deep white matter without evidence of hemorrhage. Embolic phenomenon should be considered given the bilateral involvement. Moderate chronic microangiopathic changes.  Neurology recommendations appreciated. Suspect embolic given bilateral hemispheric involvement. Continue DAPT with aspirin and clopidogrel for 21 days and then continue with monotherapy with clopidogrel  DILLAN results appreciated  Continue statin  Therapy recommendations appreciated: Moderate resource intensity  Case management following, assistance appreciated.  Accepted by ARU          Stroke-like symptoms-resolved as of 3/1/2024  Assessment & Plan  Stroke pathway completed    Type 2 diabetes mellitus with stage 3b chronic kidney disease, with long-term current use of insulin ([facility])  Assessment & Plan  Lab Results   Component Value Date    HGBA1C 12.1 (H) 02/29/2024       Recent Labs     03/04/24  0658 03/04/24  0912 03/04/24  1118 03/04/24  1555   POCGLU 200* 128 185* 121         Blood Sugar Average: Last 72 hrs:  (P) 198.875     Hemoglobin A1c elevated, patient will benefit from improved glycemic control.  Previously on Januvia and Ozempic, patient reportedly stopped taking  Continue diabetic diet   Continue Levemir 36 units SQ bedtime   Continue Humalog 12 units with meals plus sliding scale  Continue to monitor glucose and adjust insulin as needed    Stage 3a chronic kidney disease (HCC)  Assessment & Plan  Lab Results   Component Value Date    EGFR 39 03/04/2024    EGFR  40 03/02/2024    EGFR 42 03/01/2024    CREATININE 1.29 03/04/2024    CREATININE 1.27 03/02/2024    CREATININE 1.23 03/01/2024     Stable, does not meet INDIGO criteria at this time. Baseline appears to be between 1.1-1.4.    PAD (peripheral artery disease) (HCC)  Assessment & Plan  Continue aspirin and statin  Continue follow-up with vascular surgery    Acquired hypothyroidism  Assessment & Plan  TSH 2.99  Continue levothyroxine    Essential hypertension  Assessment & Plan  BP reviewed   Continue metoprolol succinate 50 mg oral daily and amlodipine 5 mg oral daily   Monitor blood pressure             VTE Pharmacologic Prophylaxis:   High Risk (Score >/= 5) - Pharmacological DVT Prophylaxis Ordered: enoxaparin (Lovenox). Sequential Compression Devices Ordered.    Mobility:   Basic Mobility Inpatient Raw Score: 18  JH-HLM Goal: 6: Walk 10 steps or more  JH-HLM Achieved: 4: Move to chair/commode  HLM Goal achieved. Continue to encourage appropriate mobility.    Patient Centered Rounds: I performed bedside rounds with nursing staff today.   Discussions with Specialists or Other Care Team Provider: Case management    Education and Discussions with Family / Patient:  will update patient's boyfriend after rounds.     Total Time Spent on Date of Encounter in care of patient: 45 mins. This time was spent on one or more of the following: performing physical exam; counseling and coordination of care; obtaining or reviewing history; documenting in the medical record; reviewing/ordering tests, medications or procedures; communicating with other healthcare professionals and discussing with patient's family/caregivers.    Current Length of Stay: 4 day(s)  Current Patient Status: Inpatient   Certification Statement: The patient will continue to require additional inpatient hospital stay due to care coordination.  Discharge Plan: Anticipate discharge tomorrow to rehab facility.    Code Status: Level 1 - Full Code    Subjective:    Patient seen and examined.  She has no complaints offered.  She denies arm or leg weakness, visual changes, dizziness, shortness of breath, chest pain, abdominal pain, or nausea.  She does notice that her speech is still slightly garbled.     Objective:     Vitals:   Temp (24hrs), Av.7 °F (36.5 °C), Min:97.3 °F (36.3 °C), Max:98.1 °F (36.7 °C)    Temp:  [97.3 °F (36.3 °C)-98.1 °F (36.7 °C)] 97.3 °F (36.3 °C)  HR:  [67-85] 85  Resp:  [16-24] 20  BP: (123-199)/() 139/65  SpO2:  [93 %-99 %] 95 %  Body mass index is 31.09 kg/m².     Input and Output Summary (last 24 hours):     Intake/Output Summary (Last 24 hours) at 3/4/2024 1813  Last data filed at 3/4/2024 1223  Gross per 24 hour   Intake 360 ml   Output 150 ml   Net 210 ml       Physical Exam:   Physical Exam  Vitals and nursing note reviewed.   Constitutional:       General: She is not in acute distress.  HENT:      Head: Normocephalic and atraumatic.      Nose: Nose normal.      Mouth/Throat:      Mouth: Mucous membranes are moist.      Pharynx: Oropharynx is clear.   Eyes:      Pupils: Pupils are equal, round, and reactive to light.   Cardiovascular:      Rate and Rhythm: Normal rate and regular rhythm.      Pulses: Normal pulses.      Heart sounds: Normal heart sounds.   Pulmonary:      Effort: Pulmonary effort is normal. No respiratory distress.      Breath sounds: Normal breath sounds.   Abdominal:      General: Bowel sounds are normal.      Palpations: Abdomen is soft.      Tenderness: There is no abdominal tenderness.   Musculoskeletal:      Cervical back: Neck supple.      Right lower leg: No edema.      Left lower leg: No edema.   Skin:     General: Skin is warm and dry.      Capillary Refill: Capillary refill takes less than 2 seconds.   Neurological:      General: No focal deficit present.      Mental Status: She is alert and oriented to person, place, and time. Mental status is at baseline.      Cranial Nerves: Dysarthria present.       Comments: Subtle right hand weakness          Additional Data:     Labs:  Results from last 7 days   Lab Units 03/04/24  0517 03/02/24  0438   WBC Thousand/uL 9.47 9.40   HEMOGLOBIN g/dL 11.5 11.4*   HEMATOCRIT % 36.1 35.5   PLATELETS Thousands/uL 161 168   NEUTROS PCT %  --  70   LYMPHS PCT %  --  21   MONOS PCT %  --  8   EOS PCT %  --  1     Results from last 7 days   Lab Units 03/04/24  0517 03/02/24  0438   SODIUM mmol/L 134* 132*   POTASSIUM mmol/L 4.4 3.9   CHLORIDE mmol/L 101 101   CO2 mmol/L 25 24   BUN mg/dL 28* 26*   CREATININE mg/dL 1.29 1.27   ANION GAP mmol/L 8 7   CALCIUM mg/dL 9.0 8.8   ALBUMIN g/dL  --  3.4*   TOTAL BILIRUBIN mg/dL  --  0.46   ALK PHOS U/L  --  52   ALT U/L  --  7   AST U/L  --  11*   GLUCOSE RANDOM mg/dL 201* 199*     Results from last 7 days   Lab Units 03/02/24  0438   INR  0.99     Results from last 7 days   Lab Units 03/04/24  1555 03/04/24  1118 03/04/24  0912 03/04/24  0658 03/03/24  2029 03/03/24  1554 03/03/24  1059 03/03/24  0706 03/02/24  2050 03/02/24  1620 03/02/24  1047 03/02/24  0652   POC GLUCOSE mg/dl 121 185* 128 200* 165* 129 275* 200* 168* 185* 274* 200*     Results from last 7 days   Lab Units 02/29/24  0405   HEMOGLOBIN A1C % 12.1*     Results from last 7 days   Lab Units 02/28/24  1551   LACTIC ACID mmol/L 1.0       Lines/Drains:  Invasive Devices       Peripheral Intravenous Line  Duration             Peripheral IV 03/01/24 Dorsal (posterior);Right Hand 3 days    Peripheral IV 03/04/24 Dorsal (posterior);Right Forearm <1 day                          Imaging: Reviewed radiology reports from this admission including: CT head    Recent Cultures (last 7 days):         Last 24 Hours Medication List:   Current Facility-Administered Medications   Medication Dose Route Frequency Provider Last Rate    aspirin  81 mg Oral Daily Akil Davis MD      calcium carbonate  500 mg Oral Daily PRN LAURITA Bernal      clopidogrel  75 mg Oral Daily Akil Davis MD       DULoxetine  20 mg Oral Daily Akil Davis MD      enoxaparin  30 mg Subcutaneous Daily Akil Davis MD      fluticasone  2 spray Nasal Daily Akil Davis MD      folic acid  400 mcg Oral Daily Akil Davis MD      insulin detemir  36 Units Subcutaneous Daily Rolly Sol,       insulin lispro  1-6 Units Subcutaneous TID AC Akil Davis MD      insulin lispro  12 Units Subcutaneous TID With Meals Rolly Sol,       levothyroxine  50 mcg Oral Early Morning Akil Davis MD      metoprolol succinate  50 mg Oral Daily Rolly Funes DO      nystatin   Topical BID Akil Davis MD      pantoprazole  40 mg Oral Early Morning Akil Davis MD      pravastatin  80 mg Oral Daily With Dinner Akil Davis MD          Today, Patient Was Seen By: LAURITA Bernal    **Please Note: This note may have been constructed using a voice recognition system.**

## 2024-03-04 NOTE — ASSESSMENT & PLAN NOTE
BP reviewed   Continue metoprolol succinate 50 mg oral daily and amlodipine 5 mg oral daily   Monitor blood pressure

## 2024-03-04 NOTE — ASSESSMENT & PLAN NOTE
Presented for right hand weakness and fall   Stroke pathway completed  MRI brain: Recent infarcts are identified in the right anterior deep frontal white matter, left insular cortex, and left posterior frontal deep white matter without evidence of hemorrhage. Embolic phenomenon should be considered given the bilateral involvement. Moderate chronic microangiopathic changes.  Neurology recommendations appreciated. Suspect embolic given bilateral hemispheric involvement. Continue DAPT with aspirin and clopidogrel for 21 days and then continue with monotherapy with clopidogrel  DILLAN results appreciated  Continue statin  Therapy recommendations appreciated: Moderate resource intensity  Case management following, assistance appreciated.  Accepted by ARU

## 2024-03-04 NOTE — ASSESSMENT & PLAN NOTE
Pt came from ems from the [facility]. Pt was unconscious with shallow breathing. Pt wasn't responding when EMS arrived. Pt states they are withholding her meds.    TSH 2.99  Continue levothyroxine

## 2024-03-04 NOTE — PLAN OF CARE
Problem: Activity Intolerance/Impaired Mobility  Goal: Mobility/activity is maintained at optimum level for patient  Description: Interventions:  - Assess and monitor patient  barriers to mobility and need for assistive/adaptive devices.  - Assess patient's emotional response to limitations.  - Collaborate with interdisciplinary team and initiate plans and interventions as ordered.  - Encourage independent activity per ability.  - Maintain proper body alignment.  - Perform active/passive rom as tolerated/ordered.  - Plan activities to conserve energy.  - Turn patient as appropriate  Outcome: Progressing     Problem: Communication Impairment  Goal: Ability to express needs and understand communication  Description: Assess patient's communication skills and ability to understand information.  Patient will demonstrate use of effective communication techniques, alternative methods of communication and understanding even if not able to speak.     - Encourage communication and provide alternate methods of communication as needed.  - Collaborate with case management/ for discharge needs.  - Include patient/family/caregiver in decisions related to communication.  Outcome: Progressing     Problem: Potential for Aspiration  Goal: Non-ventilated patient's risk of aspiration is minimized  Description: Assess and monitor vital signs, respiratory status, and labs (WBC).  Monitor for signs of aspiration (tachypnea, cough, rales, wheezing, cyanosis, fever).    - Assess and monitor patient's ability to swallow.  - Place patient up in chair to eat if possible.  - HOB up at 90 degrees to eat if unable to get patient up into chair.  - Supervise patient during oral intake.   - Instruct patient/ family to take small bites.  - Instruct patient/ family to take small single sips when taking liquids.  - Follow patient-specific strategies generated by speech pathologist.  Outcome: Progressing

## 2024-03-04 NOTE — ANESTHESIA PREPROCEDURE EVALUATION
Procedure:  DILLAN OR/GI ([place] ONLY)    Relevant Problems   CARDIO   (+) Asymptomatic bilateral carotid artery stenosis   (+) Essential hypertension   (+) Mixed hyperlipidemia   (+) PAD (peripheral artery disease) ([facility])      ENDO   (+) Acquired hypothyroidism   (+) Type 2 diabetes mellitus with stage 3b chronic kidney disease, with long-term current use of insulin (HCC)      /RENAL   (+) INDIGO (acute kidney injury) ([facility])   (+) Stage 3a chronic kidney disease (HCC)      NEURO/PSYCH   (+) Acute CVA (cerebrovascular accident) ([facility])   (+) Chronic pain syndrome      Nervous and Auditory   (+) Lumbar radiculopathy      Other   (+) Severe obesity (BMI 35.0-39.9) with comorbidity ([facility])   (+) Spinal stenosis of lumbar region        Physical Exam    Airway    Mallampati score: II  TM Distance: >3 FB  Neck ROM: full     Dental       Cardiovascular  Cardiovascular exam normal    Pulmonary  Pulmonary exam normal     Other Findings  post-pubertal.      Anesthesia Plan  ASA Score- 3     Anesthesia Type- IV sedation with anesthesia with ASA Monitors.         Additional Monitors:     Airway Plan:            Plan Factors-Exercise tolerance (METS): >4 METS.    Chart reviewed. EKG reviewed. Imaging results reviewed. Existing labs reviewed. Patient summary reviewed.    Patient is not a current smoker.              Induction- intravenous.    Postoperative Plan-     Informed Consent- Anesthetic plan and risks discussed with patient.  I personally reviewed this patient with the CRNA. Discussed and agreed on the Anesthesia Plan with the CRNA..

## 2024-03-04 NOTE — ANESTHESIA POSTPROCEDURE EVALUATION
Post-Op Assessment Note    CV Status:  Stable  Pain Score: 0    Pain management: adequate       Mental Status:  Alert and awake   Hydration Status:  Euvolemic   PONV Controlled:  Controlled   Airway Patency:  Patent     Post Op Vitals Reviewed: Yes    No anethesia notable event occurred.    Staff: CRNA               BP   142/100   Temp   97.6   Pulse  78   Resp   20   SpO2   97

## 2024-03-04 NOTE — SPEECH THERAPY NOTE
Speech Language/Pathology    Speech/Language Pathology Progress Note    Patient Name: Jina Norris  Today's Date: 3/4/2024     Problem List  Principal Problem:    Acute CVA (cerebrovascular accident) (HCC)  Active Problems:    Type 2 diabetes mellitus with stage 3b chronic kidney disease, with long-term current use of insulin (HCC)    Essential hypertension    Mixed hyperlipidemia    Acquired hypothyroidism    PAD (peripheral artery disease) (HCC)    Stage 3a chronic kidney disease (HCC)       Past Medical History  Past Medical History:   Diagnosis Date    Benign essential hypertension     Chronic kidney disease, stage 3 (HCC)     Diabetes mellitus (HCC)     Disorder of gallbladder     Disorder of skin and subcutaneous tissue     Dyspnea     Essential hypertension     Hyperlipidemia     Hypokalemia     Malaise and fatigue     Mixed hyperlipidemia     Morbid obesity (HCC)     Pernicious anemia         Past Surgical History  Past Surgical History:   Procedure Laterality Date    BREAST BIOPSY Right     CARPAL TUNNEL RELEASE Bilateral     CHOLECYSTECTOMY      COLONOSCOPY      Dr. Apple     HYSTERECTOMY      LUMBAR EPIDURAL INJECTION      x3    PARTIAL HYSTERECTOMY      SKIN LESION EXCISION      scalp          Subjective:  Pt was alert and positioned upright.   Objective:  Pt was seen for f/u dysphagia therapy. Continues with facial droop. Unable to retract when cued. ST reviewed oral mech exercises at bedside utilized mirror from tray table. Trailed toast and thin liquids via straw. Mastication was mildly prolonged however functional breakdown. Bolus control, formation, and transfer were WNL. Swallows appeared prompt. No overt s/s of aspiration. No pocketing observed with intake.   Assessment:  Continues with droop. Mastication was mildly prolonged however functional. No overt s/s of aspiration.Pt was oriented to time with min verbal cues.     Plan/Recommendations:  Upgrade Regular and thin.   Ensure good oral care.    ST continue to f/u.

## 2024-03-04 NOTE — DISCHARGE INSTR - AVS FIRST PAGE
Please follow-up with your primary care physician within a week of discharge  Please call cardiology office to arrange for a ziopatch monitor on discharge  Please schedule a follow up appointment with neurology for 6 weeks from discharge. Per recommendations of neurology you will continue taking aspirin and plavix for 3 weeks (from starting date 2/29) then continue on just the plavix  You are receiving visiting nurses as arranged by case management  Your only new medication on discharge is Plavix.  There have been no other changes to your prehospital medications.

## 2024-03-04 NOTE — ASSESSMENT & PLAN NOTE
Lab Results   Component Value Date    HGBA1C 12.1 (H) 02/29/2024       Recent Labs     03/04/24  0658 03/04/24  0912 03/04/24  1118 03/04/24  1555   POCGLU 200* 128 185* 121         Blood Sugar Average: Last 72 hrs:  (P) 198.875     Hemoglobin A1c elevated, patient will benefit from improved glycemic control.  Previously on Januvia and Ozempic, patient reportedly stopped taking  Continue diabetic diet   Continue Levemir 36 units SQ bedtime   Continue Humalog 12 units with meals plus sliding scale  Continue to monitor glucose and adjust insulin as needed

## 2024-03-04 NOTE — ASSESSMENT & PLAN NOTE
Lab Results   Component Value Date    EGFR 39 03/04/2024    EGFR 40 03/02/2024    EGFR 42 03/01/2024    CREATININE 1.29 03/04/2024    CREATININE 1.27 03/02/2024    CREATININE 1.23 03/01/2024     Stable, does not meet INDIGO criteria at this time. Baseline appears to be between 1.1-1.4.

## 2024-03-04 NOTE — CASE MANAGEMENT
Case Management Discharge Planning Note    Patient name Jina Norris  Location /-01 MRN [id number]  : 1945 Date 3/4/2024       Current Admission Date: 2024  Current Admission Diagnosis:Acute CVA (cerebrovascular accident) ([facility])   Patient Active Problem List    Diagnosis Date Noted    Acute CVA (cerebrovascular accident) ([facility]) 2024    Stage 3a chronic kidney disease ([facility]) 2023    INDIGO (acute kidney injury) ([facility]) 2023    Hypomagnesemia 2023    Fall 2023    Abnormal CT of the abdomen 2023    Metabolic encephalopathy 2023    Hypercalcemia 2023    PAD (peripheral artery disease) ([facility]) 2023    Asymptomatic bilateral carotid artery stenosis 2023    Chronic pain syndrome 2022    Lumbar radiculopathy 2022    Spinal stenosis of lumbar region     Severe obesity (BMI 35.0-39.9) with comorbidity ([facility]) 2021    Mixed hyperlipidemia 2020    Acquired hypothyroidism 2020    Type 2 diabetes mellitus with stage 3b chronic kidney disease, with long-term current use of insulin ([facility]) 2019    Essential hypertension 2019      LOS (days): 4  Geometric Mean LOS (GMLOS) (days): 1.9  Days to GMLOS:-2.3     OBJECTIVE:  Risk of Unplanned Readmission Score: 19.24         Current admission status: Inpatient   Preferred Pharmacy:   [id number] [address] [address]  Phone: [phone] Fax: [phone]    Primary Care Provider: Lyssa Peres MD    Primary Insurance: MEDICARE  Secondary Insurance: COMMERCIAL MISCELLANEOUS    DISCHARGE DETAILS:    Discharge planning discussed with:: patient  Freedom of Choice: Yes  Comments - Freedom of Choice: recommendation is for rehab- aru accepted- pt needs to be evaluated by pt & ot tomorrow to make sure the pt still meet criteria for aru                               Other Referral/Resources/Interventions  Provided:  Interventions: Acute Rehab         Treatment Team Recommendation: Acute Rehab (aru -tbd)

## 2024-03-05 LAB
ANION GAP SERPL CALCULATED.3IONS-SCNC: 10 MMOL/L
BASOPHILS # BLD AUTO: 0.04 THOUSANDS/ÂΜL (ref 0–0.1)
BASOPHILS NFR BLD AUTO: 0 % (ref 0–1)
BUN SERPL-MCNC: 31 MG/DL (ref 5–25)
CALCIUM SERPL-MCNC: 8.7 MG/DL (ref 8.4–10.2)
CHLORIDE SERPL-SCNC: 102 MMOL/L (ref 96–108)
CO2 SERPL-SCNC: 22 MMOL/L (ref 21–32)
CREAT SERPL-MCNC: 1.31 MG/DL (ref 0.6–1.3)
EOSINOPHIL # BLD AUTO: 0.12 THOUSAND/ÂΜL (ref 0–0.61)
EOSINOPHIL NFR BLD AUTO: 1 % (ref 0–6)
ERYTHROCYTE [DISTWIDTH] IN BLOOD BY AUTOMATED COUNT: 13.7 % (ref 11.6–15.1)
GFR SERPL CREATININE-BSD FRML MDRD: 39 ML/MIN/1.73SQ M
GLUCOSE SERPL-MCNC: 105 MG/DL (ref 65–140)
GLUCOSE SERPL-MCNC: 138 MG/DL (ref 65–140)
GLUCOSE SERPL-MCNC: 206 MG/DL (ref 65–140)
GLUCOSE SERPL-MCNC: 208 MG/DL (ref 65–140)
GLUCOSE SERPL-MCNC: 210 MG/DL (ref 65–140)
HCT VFR BLD AUTO: 36.2 % (ref 34.8–46.1)
HGB BLD-MCNC: 11.3 G/DL (ref 11.5–15.4)
IMM GRANULOCYTES # BLD AUTO: 0.04 THOUSAND/UL (ref 0–0.2)
IMM GRANULOCYTES NFR BLD AUTO: 0 % (ref 0–2)
LYMPHOCYTES # BLD AUTO: 2.45 THOUSANDS/ÂΜL (ref 0.6–4.47)
LYMPHOCYTES NFR BLD AUTO: 27 % (ref 14–44)
MCH RBC QN AUTO: 29.1 PG (ref 26.8–34.3)
MCHC RBC AUTO-ENTMCNC: 31.2 G/DL (ref 31.4–37.4)
MCV RBC AUTO: 93 FL (ref 82–98)
MONOCYTES # BLD AUTO: 0.69 THOUSAND/ÂΜL (ref 0.17–1.22)
MONOCYTES NFR BLD AUTO: 8 % (ref 4–12)
NEUTROPHILS # BLD AUTO: 5.86 THOUSANDS/ÂΜL (ref 1.85–7.62)
NEUTS SEG NFR BLD AUTO: 64 % (ref 43–75)
NRBC BLD AUTO-RTO: 0 /100 WBCS
PLATELET # BLD AUTO: 163 THOUSANDS/UL (ref 149–390)
PMV BLD AUTO: 11 FL (ref 8.9–12.7)
POTASSIUM SERPL-SCNC: 4.4 MMOL/L (ref 3.5–5.3)
RBC # BLD AUTO: 3.88 MILLION/UL (ref 3.81–5.12)
SODIUM SERPL-SCNC: 134 MMOL/L (ref 135–147)
WBC # BLD AUTO: 9.2 THOUSAND/UL (ref 4.31–10.16)

## 2024-03-05 PROCEDURE — 97110 THERAPEUTIC EXERCISES: CPT

## 2024-03-05 PROCEDURE — 97530 THERAPEUTIC ACTIVITIES: CPT

## 2024-03-05 PROCEDURE — 92523 SPEECH SOUND LANG COMPREHEN: CPT

## 2024-03-05 PROCEDURE — 97535 SELF CARE MNGMENT TRAINING: CPT

## 2024-03-05 PROCEDURE — 80048 BASIC METABOLIC PNL TOTAL CA: CPT | Performed by: NURSE PRACTITIONER

## 2024-03-05 PROCEDURE — 97116 GAIT TRAINING THERAPY: CPT

## 2024-03-05 PROCEDURE — 99232 SBSQ HOSP IP/OBS MODERATE 35: CPT | Performed by: INTERNAL MEDICINE

## 2024-03-05 PROCEDURE — 97112 NEUROMUSCULAR REEDUCATION: CPT

## 2024-03-05 PROCEDURE — 99233 SBSQ HOSP IP/OBS HIGH 50: CPT | Performed by: NURSE PRACTITIONER

## 2024-03-05 PROCEDURE — 85025 COMPLETE CBC W/AUTO DIFF WBC: CPT | Performed by: NURSE PRACTITIONER

## 2024-03-05 PROCEDURE — 82948 REAGENT STRIP/BLOOD GLUCOSE: CPT

## 2024-03-05 RX ORDER — AMLODIPINE BESYLATE 5 MG/1
5 TABLET ORAL DAILY
Status: DISCONTINUED | OUTPATIENT
Start: 2024-03-05 | End: 2024-03-08 | Stop reason: HOSPADM

## 2024-03-05 RX ADMIN — AMLODIPINE BESYLATE 5 MG: 5 TABLET ORAL at 09:26

## 2024-03-05 RX ADMIN — INSULIN LISPRO 12 UNITS: 100 INJECTION, SOLUTION INTRAVENOUS; SUBCUTANEOUS at 08:18

## 2024-03-05 RX ADMIN — LEVOTHYROXINE SODIUM 50 MCG: 25 TABLET ORAL at 05:33

## 2024-03-05 RX ADMIN — CLOPIDOGREL 75 MG: 75 TABLET ORAL at 08:17

## 2024-03-05 RX ADMIN — DULOXETINE HYDROCHLORIDE 20 MG: 20 CAPSULE, DELAYED RELEASE ORAL at 08:17

## 2024-03-05 RX ADMIN — INSULIN LISPRO 12 UNITS: 100 INJECTION, SOLUTION INTRAVENOUS; SUBCUTANEOUS at 11:54

## 2024-03-05 RX ADMIN — ASPIRIN 81 MG: 81 TABLET, COATED ORAL at 08:17

## 2024-03-05 RX ADMIN — METOPROLOL SUCCINATE 50 MG: 50 TABLET, EXTENDED RELEASE ORAL at 08:17

## 2024-03-05 RX ADMIN — INSULIN LISPRO 2 UNITS: 100 INJECTION, SOLUTION INTRAVENOUS; SUBCUTANEOUS at 08:19

## 2024-03-05 RX ADMIN — Medication 400 MCG: at 08:17

## 2024-03-05 RX ADMIN — INSULIN DETEMIR 36 UNITS: 100 INJECTION, SOLUTION SUBCUTANEOUS at 08:18

## 2024-03-05 RX ADMIN — PANTOPRAZOLE SODIUM 40 MG: 40 TABLET, DELAYED RELEASE ORAL at 05:33

## 2024-03-05 RX ADMIN — PRAVASTATIN SODIUM 80 MG: 20 TABLET ORAL at 17:00

## 2024-03-05 RX ADMIN — INSULIN LISPRO 12 UNITS: 100 INJECTION, SOLUTION INTRAVENOUS; SUBCUTANEOUS at 17:01

## 2024-03-05 RX ADMIN — ENOXAPARIN SODIUM 30 MG: 100 INJECTION SUBCUTANEOUS at 08:18

## 2024-03-05 RX ADMIN — INSULIN LISPRO 2 UNITS: 100 INJECTION, SOLUTION INTRAVENOUS; SUBCUTANEOUS at 11:54

## 2024-03-05 RX ADMIN — NYSTATIN 1 APPLICATION: 100000 POWDER TOPICAL at 17:02

## 2024-03-05 RX ADMIN — NYSTATIN: 100000 POWDER TOPICAL at 08:17

## 2024-03-05 NOTE — PLAN OF CARE
Problem: PHYSICAL THERAPY ADULT  Goal: Performs mobility at highest level of function for planned discharge setting.  See evaluation for individualized goals.  Description: Treatment/Interventions: Functional transfer training, LE strengthening/ROM, Elevations, Therapeutic exercise, Endurance training, Patient/family training, Equipment eval/education, Bed mobility, Gait training, Cognitive reorientation, Spoke to nursing  Equipment Recommended: Walker (continue RW use)       See flowsheet documentation for full assessment, interventions and recommendations.  3/5/2024 1200 by So Meyers PTA  Outcome: Progressing  Note: Prognosis: Good  Problem List: Decreased strength, Decreased endurance, Impaired balance, Decreased mobility, Impaired judgement, Decreased safety awareness, Decreased cognition  Assessment: Pt seen for PT treatment session this date with interventions consisting of gait training w/ emphasis on improving pt's ability to ambulate level surfaces x 125 ftx1 with close S provided by therapist with RW, Therapeutic exercise consisting of: AROM 2 sets of 10 reps B LE in sitting position, and therapeutic activity consisting of training: bed mobility, supine<>sit transfers, sit<>stand transfers, static sitting tolerance at EOB for 10 minutes w/ unilateral UE support, vc and tactile cues for static sitting posture faciliation, and vc and tactile cues for static standing posture faciliation. Pt agreeable to PT treatment session upon arrival, pt found supine in bed w/ HOB elevated, in no apparent distress and responsive. In comparison to previous session, pt with improvements in distance ambulated . Post session: pt returned back to recliner, chair alarm engaged, all needs in reach, and RN notified of session findings/recommendations. Continue to recommend Level II (Moderate Resource Intensity at time of d/c in order to maximize pt's functional independence and safety w/ mobility. Pt continues to be  functioning below baseline level. PT will continue to see pt during current hospitalization in order to address the deficits listed above and provide interventions consistent w/ POC in effort to achieve STGs.    So Meyers PTA  Barriers to Discharge: Inaccessible home environment     Rehab Resource Intensity Level, PT: II (Moderate Resource Intensity)    See flowsheet documentation for full assessment.     3/5/2024 1200 by So Meyers PTA  Outcome: Progressing  Note: Prognosis: Good  Problem List: Decreased strength, Decreased endurance, Impaired balance, Decreased mobility, Impaired judgement, Decreased safety awareness, Decreased cognition  Assessment: Pt seen for PT treatment session this date with interventions consisting of gait training w/ emphasis on improving pt's ability to ambulate level surfaces x 125 ftx1 with close S provided by therapist with RW, Therapeutic exercise consisting of: AROM 2 sets of 10 reps B LE in sitting position, and therapeutic activity consisting of training: bed mobility, supine<>sit transfers, sit<>stand transfers, static sitting tolerance at EOB for 10 minutes w/ unilateral UE support, vc and tactile cues for static sitting posture faciliation, and vc and tactile cues for static standing posture faciliation. Pt agreeable to PT treatment session upon arrival, pt found supine in bed w/ HOB elevated, in no apparent distress and responsive. In comparison to previous session, pt with improvements in distance ambulated . Post session: pt returned back to recliner, chair alarm engaged, all needs in reach, and RN notified of session findings/recommendations. Continue to recommend Level II (Moderate Resource Intensity at time of d/c in order to maximize pt's functional independence and safety w/ mobility. Pt continues to be functioning below baseline level. PT will continue to see pt during current hospitalization in order to address the deficits listed above and provide  interventions consistent w/ POC in effort to achieve STGs.    So Meyers, PTA  Barriers to Discharge: Inaccessible home environment     Rehab Resource Intensity Level, PT: II (Moderate Resource Intensity)    See flowsheet documentation for full assessment.

## 2024-03-05 NOTE — ASSESSMENT & PLAN NOTE
Lab Results   Component Value Date    HGBA1C 12.1 (H) 02/29/2024       Recent Labs     03/04/24 2059 03/05/24  0655 03/05/24  1118 03/05/24  1614   POCGLU 113 208* 210* 105         Blood Sugar Average: Last 72 hrs:  (P) 179.125     Hemoglobin A1c elevated, patient will benefit from improved glycemic control.  Previously on Januvia and Ozempic, patient reportedly stopped taking  Continue diabetic diet   Continue Levemir 36 units SQ bedtime   Continue Humalog 12 units with meals plus sliding scale  Continue to monitor glucose and adjust insulin as needed

## 2024-03-05 NOTE — ASSESSMENT & PLAN NOTE
Lab Results   Component Value Date    HGBA1C 12.1 (H) 02/29/2024       Recent Labs     03/04/24  1118 03/04/24  1555 03/04/24 2059 03/05/24  0655   POCGLU 185* 121 113 208*         Blood Sugar Average: Last 72 hrs:  (P) [id number]    -Counseled patient on dietary and lifestyle modifications  -Plan of care per primary team

## 2024-03-05 NOTE — ARC ADMISSION
[facility] community Liaison called pt's BRITTNY Cody and caregiver  - introduced self, explained role, reviewed ARC program,services offered, acute rehab criteria, review of referral by ARC Medical Director, insurance authorization process, the three ARC locations and anticipated rehab length of stay.; all questions answered. Patient / family first choice is  [facility] and second is  [facility], refused [facility]. Family made aware ARC reviewer will communicate with the  who will keep patient updated on referral status.

## 2024-03-05 NOTE — ARC ADMISSION
Reviewed updated therapy notes and patient is now too functional for the ARC program.  Patient has now been denied for [facility]C.  CM has been updated in [facility].

## 2024-03-05 NOTE — CASE MANAGEMENT
Case Management Discharge Planning Note    Patient name Jina Norris  Location /-01 MRN [id number]  : 1945 Date 3/5/2024       Current Admission Date: 2024  Current Admission Diagnosis:Acute CVA (cerebrovascular accident) ([facility])   Patient Active Problem List    Diagnosis Date Noted    Acute CVA (cerebrovascular accident) ([facility]) 2024    Stage 3a chronic kidney disease ([facility]) 2023    INDIGO (acute kidney injury) ([facility]) 2023    Hypomagnesemia 2023    Fall 2023    Abnormal CT of the abdomen 2023    Metabolic encephalopathy 2023    Hypercalcemia 2023    PAD (peripheral artery disease) ([facility]) 2023    Asymptomatic bilateral carotid artery stenosis 2023    Chronic pain syndrome 2022    Lumbar radiculopathy 2022    Spinal stenosis of lumbar region     Severe obesity (BMI 35.0-39.9) with comorbidity ([facility]) 2021    Mixed hyperlipidemia 2020    Acquired hypothyroidism 2020    Type 2 diabetes mellitus with stage 3b chronic kidney disease, with long-term current use of insulin ([facility]) 2019    Essential hypertension 2019      LOS (days): 5  Geometric Mean LOS (GMLOS) (days): 1.9  Days to GMLOS:-3.2     OBJECTIVE:  Risk of Unplanned Readmission Score: 20.98         Current admission status: Inpatient   Preferred Pharmacy:   [id number] [address] [address]  Phone: [phone] Fax: [phone]    Primary Care Provider: Lyssa Peres MD    Primary Insurance: MEDICARE  Secondary Insurance: COMMERCIAL MISCELLANEOUS    DISCHARGE DETAILS:    Discharge planning discussed with:: patient  Freedom of Choice: Yes  Comments - Freedom of Choice: pt was accepted to [facility]- pt was reassessed today and does not meet ARU criteria- cm received permisson to send snf rehab referrals via reyna- cm received a call from Petra today from [facility]- I was  told the family is looking at having her SO go to the assited living and asked about her coming - cm will let them know the pt is interesteed - the pt may need snf rehab prior to go to the assisted living                               Other Referral/Resources/Interventions Provided:  Interventions: Assisted Living, Short Term Rehab  Referral Comments: assisted living with [facility] vs snf rehab,    Would you like to participate in our [facility] service program?  : No - Declined    Treatment Team Recommendation:  (d/c plan tbd-TBD)

## 2024-03-05 NOTE — ASSESSMENT & PLAN NOTE
Presented for right hand weakness and fall   Stroke pathway completed  MRI brain: Recent infarcts are identified in the right anterior deep frontal white matter, left insular cortex, and left posterior frontal deep white matter without evidence of hemorrhage. Embolic phenomenon should be considered given the bilateral involvement. Moderate chronic microangiopathic changes.  Neurology recommendations appreciated. Suspect embolic given bilateral hemispheric involvement. Continue DAPT with aspirin and clopidogrel for 21 days and then continue with monotherapy with clopidogrel  DILLAN results appreciated  Continue statin  Therapy recommendations appreciated: Moderate resource intensity  Case management following, assistance appreciated.  Previously accepted by ARU, but upon re-evaluation by therapy she no longer meets criteria

## 2024-03-05 NOTE — PLAN OF CARE
Problem: Potential for Falls  Goal: Patient will remain free of falls  Description: INTERVENTIONS:  - Educate patient/family on patient safety including physical limitations  - Instruct patient to call for assistance with activity   - Consult OT/PT to assist with strengthening/mobility   - Keep Call bell within reach  - Keep bed low and locked with side rails adjusted as appropriate  - Keep care items and personal belongings within reach  - Initiate and maintain comfort rounds  - Make Fall Risk Sign visible to staff  - Offer Toileting every  Hours, in advance of need  - Initiate/Maintain alarm  - Obtain necessary fall risk management equipment:   - Apply yellow socks and bracelet for high fall risk patients  - Consider moving patient to room near nurses station  Outcome: Progressing     Problem: Nutrition/Hydration-ADULT  Goal: Nutrient/Hydration intake appropriate for improving, restoring or maintaining nutritional needs  Description: Monitor and assess patient's nutrition/hydration status for malnutrition. Collaborate with interdisciplinary team and initiate plan and interventions as ordered.  Monitor patient's weight and dietary intake as ordered or per policy. Utilize nutrition screening tool and intervene as necessary. Determine patient's food preferences and provide high-protein, high-caloric foods as appropriate.     INTERVENTIONS:  - Monitor oral intake, urinary output, labs, and treatment plans  - Assess nutrition and hydration status and recommend course of action  - Evaluate amount of meals eaten  - Assist patient with eating if necessary   - Allow adequate time for meals  - Recommend/ encourage appropriate diets, oral nutritional supplements, and vitamin/mineral supplements  - Order, calculate, and assess calorie counts as needed  - Recommend, monitor, and adjust tube feedings and TPN/PPN based on assessed needs  - Assess need for intravenous fluids  - Provide specific nutrition/hydration education as  appropriate  - Include patient/family/caregiver in decisions related to nutrition  Outcome: Progressing     Problem: Prexisting or High Potential for Compromised Skin Integrity  Goal: Skin integrity is maintained or improved  Description: INTERVENTIONS:  - Identify patients at risk for skin breakdown  - Assess and monitor skin integrity  - Assess and monitor nutrition and hydration status  - Monitor labs   - Assess for incontinence   - Turn and reposition patient  - Assist with mobility/ambulation  - Relieve pressure over bony prominences  - Avoid friction and shearing  - Provide appropriate hygiene as needed including keeping skin clean and dry  - Evaluate need for skin moisturizer/barrier cream  - Collaborate with interdisciplinary team   - Patient/family teaching  - Consider wound care consult   Outcome: Progressing     Problem: PAIN - ADULT  Goal: Verbalizes/displays adequate comfort level or baseline comfort level  Description: Interventions:  - Encourage patient to monitor pain and request assistance  - Assess pain using appropriate pain scale  - Administer analgesics based on type and severity of pain and evaluate response  - Implement non-pharmacological measures as appropriate and evaluate response  - Consider cultural and social influences on pain and pain management  - Notify physician/advanced practitioner if interventions unsuccessful or patient reports new pain  Outcome: Progressing     Problem: INFECTION - ADULT  Goal: Absence or prevention of progression during hospitalization  Description: INTERVENTIONS:  - Assess and monitor for signs and symptoms of infection  - Monitor lab/diagnostic results  - Monitor all insertion sites, i.e. indwelling lines, tubes, and drains  - Monitor endotracheal if appropriate and nasal secretions for changes in amount and color  - [place] appropriate cooling/warming therapies per order  - Administer medications as ordered  - Instruct and encourage patient and family to  use good hand hygiene technique  - Identify and instruct in appropriate isolation precautions for identified infection/condition  Outcome: Progressing  Goal: Absence of fever/infection during neutropenic period  Description: INTERVENTIONS:  - Monitor WBC    Outcome: Progressing     Problem: SAFETY ADULT  Goal: Patient will remain free of falls  Description: INTERVENTIONS:  - Educate patient/family on patient safety including physical limitations  - Instruct patient to call for assistance with activity   - Consult OT/PT to assist with strengthening/mobility   - Keep Call bell within reach  - Keep bed low and locked with side rails adjusted as appropriate  - Keep care items and personal belongings within reach  - Initiate and maintain comfort rounds  - Make Fall Risk Sign visible to staff  - Offer Toileting every  Hours, in advance of need  - Initiate/Maintain alarm  - Obtain necessary fall risk management equipment:   - Apply yellow socks and bracelet for high fall risk patients  - Consider moving patient to room near nurses station  Outcome: Progressing  Goal: Maintain or return to baseline ADL function  Description: INTERVENTIONS:  -  Assess patient's ability to carry out ADLs; assess patient's baseline for ADL function and identify physical deficits which impact ability to perform ADLs (bathing, care of mouth/teeth, toileting, grooming, dressing, etc.)  - Assess/evaluate cause of self-care deficits   - Assess range of motion  - Assess patient's mobility; develop plan if impaired  - Assess patient's need for assistive devices and provide as appropriate  - Encourage maximum independence but intervene and supervise when necessary  - Involve family in performance of ADLs  - Assess for home care needs following discharge   - Consider OT consult to assist with ADL evaluation and planning for discharge  - Provide patient education as appropriate  Outcome: Progressing  Goal: Maintains/Returns to pre admission functional  level  Description: INTERVENTIONS:  - Perform AM-PAC 6 Click Basic Mobility/ Daily Activity assessment daily.  - Set and communicate daily mobility goal to care team and patient/family/caregiver.   - Collaborate with rehabilitation services on mobility goals if consulted  - Perform Range of Motion  times a day.  - Reposition patient every  hours.  - Dangle patient  times a day  - Stand patient  times a day  - Ambulate patient  times a day  - Out of bed to chair  times a day   - Out of bed for meals times a day  - Out of bed for toileting  - Record patient progress and toleration of activity level   Outcome: Progressing     Problem: DISCHARGE PLANNING  Goal: Discharge to home or other facility with appropriate resources  Description: INTERVENTIONS:  - Identify barriers to discharge w/patient and caregiver  - Arrange for needed discharge resources and transportation as appropriate  - Identify discharge learning needs (meds, wound care, etc.)  - Arrange for interpretive services to assist at discharge as needed  - Refer to Case Management Department for coordinating discharge planning if the patient needs post-hospital services based on physician/advanced practitioner order or complex needs related to functional status, cognitive ability, or social support system  Outcome: Progressing     Problem: Knowledge Deficit  Goal: Patient/family/caregiver demonstrates understanding of disease process, treatment plan, medications, and discharge instructions  Description: Complete learning assessment and assess knowledge base.  Interventions:  - Provide teaching at level of understanding  - Provide teaching via preferred learning methods  Outcome: Progressing     Problem: Neurological Deficit  Goal: Neurological status is stable or improving  Description: Interventions:  - Monitor and assess patient's level of consciousness, motor function, sensory function, and level of assistance needed for ADLs.   - Monitor and report changes  from baseline. Collaborate with interdisciplinary team to initiate plan and implement interventions as ordered.   - Provide and maintain a safe environment.  - Consider seizure precautions.  - Consider fall precautions.  - Consider aspiration precautions.  - Consider bleeding precautions.  Outcome: Progressing     Problem: Activity Intolerance/Impaired Mobility  Goal: Mobility/activity is maintained at optimum level for patient  Description: Interventions:  - Assess and monitor patient  barriers to mobility and need for assistive/adaptive devices.  - Assess patient's emotional response to limitations.  - Collaborate with interdisciplinary team and initiate plans and interventions as ordered.  - Encourage independent activity per ability.  - Maintain proper body alignment.  - Perform active/passive rom as tolerated/ordered.  - Plan activities to conserve energy.  - Turn patient as appropriate  Outcome: Progressing     Problem: Communication Impairment  Goal: Ability to express needs and understand communication  Description: Assess patient's communication skills and ability to understand information.  Patient will demonstrate use of effective communication techniques, alternative methods of communication and understanding even if not able to speak.     - Encourage communication and provide alternate methods of communication as needed.  - Collaborate with case management/ for discharge needs.  - Include patient/family/caregiver in decisions related to communication.  Outcome: Progressing     Problem: Potential for Aspiration  Goal: Non-ventilated patient's risk of aspiration is minimized  Description: Assess and monitor vital signs, respiratory status, and labs (WBC).  Monitor for signs of aspiration (tachypnea, cough, rales, wheezing, cyanosis, fever).    - Assess and monitor patient's ability to swallow.  - Place patient up in chair to eat if possible.  - HOB up at 90 degrees to eat if unable to get  patient up into chair.  - Supervise patient during oral intake.   - Instruct patient/ family to take small bites.  - Instruct patient/ family to take small single sips when taking liquids.  - Follow patient-specific strategies generated by speech pathologist.  Outcome: Progressing  Goal: Ventilated patient's risk of aspiration is minimized  Description: Assess and monitor vital signs, respiratory status, airway cuff pressure, and labs (WBC).  Monitor for signs of aspiration (tachypnea, cough, rales, wheezing, cyanosis, fever).    - Elevate head of bed 30 degrees if patient has tube feeding.  - Monitor tube feeding.  Outcome: Progressing     Problem: Nutrition  Goal: Nutrition/Hydration status is improving  Description: Monitor and assess patient's nutrition/hydration status for malnutrition (ex- brittle hair, bruises, dry skin, pale skin and conjunctiva, muscle wasting, smooth red tongue, and disorientation). Collaborate with interdisciplinary team and initiate plan and interventions as ordered.  Monitor patient's weight and dietary intake as ordered or per policy. Utilize nutrition screening tool and intervene per policy. Determine patient's food preferences and provide high-protein, high-caloric foods as appropriate.     - Assist patient with eating.  - Allow adequate time for meals.  - Encourage patient to take dietary supplement as ordered.  - Collaborate with clinical nutritionist.  - Include patient/family/caregiver in decisions related to nutrition.  Outcome: Progressing

## 2024-03-05 NOTE — SPEECH THERAPY NOTE
Speech Language/Pathology  Speech/Language Assessment    Patient Name: Jina Norris 78 y.o.  Today's Date: 3/5/2024      Problem List  Principal Problem:    Acute CVA (cerebrovascular accident) (HCC)  Active Problems:    Type 2 diabetes mellitus with stage 3b chronic kidney disease, with long-term current use of insulin (HCC)    Essential hypertension    Acquired hypothyroidism    PAD (peripheral artery disease) (HCC)    Stage 3a chronic kidney disease (HCC)    Past Medical History  Past Medical History:   Diagnosis Date    Benign essential hypertension     Chronic kidney disease, stage 3 (HCC)     Diabetes mellitus (HCC)     Disorder of gallbladder     Disorder of skin and subcutaneous tissue     Dyspnea     Essential hypertension     Hyperlipidemia     Hypokalemia     Malaise and fatigue     Mixed hyperlipidemia     Morbid obesity (HCC)     Pernicious anemia      Past Surgical History  Past Surgical History:   Procedure Laterality Date    BREAST BIOPSY Right     CARPAL TUNNEL RELEASE Bilateral     CHOLECYSTECTOMY      COLONOSCOPY      Dr. Apple     HYSTERECTOMY      LUMBAR EPIDURAL INJECTION      x3    PARTIAL HYSTERECTOMY      SKIN LESION EXCISION      scalp          Summary/Impression:  Pt presented with WNL expressive and receptive language skills. She is oriented to place and time. Pt was able to recall reason for hospitalization and events over stay. She demonstrated functional auditory comprehension skills in body part ID, following commands, answering yes/no's and sentence comprehension. Her oral, writing, and reading expression were WNL. Pt did have min difficulties with short term memory tasks however did often correct self. She demonstrated functional problem solving skills and safety awareness provided with scenarios. Provided with functional medication management activity pt was able to read and apply deductive reasoning to answer safety questions. ST reviewed with pt results of assessment pt  "understood.    Pt continues to present with min facial droop. Provided copy and reviewed with pt list of exercises at bedside. Pt understood.       Treatment Recommended and Frequency: f/u 1-2 times for dysphagia therapy   Therapy Prognosis: fair   Prognosis considerations:age, medical status   Impression and Recommendations reviewed with: Pt    Patient Stated Goal: \" Do you have any chapstick'    Education Initiated with: Pt     H&P/Admit info/ pertinent provider notes: (PMH noted above)  Jina Norris is a 78 y.o. female who presents with right hand weakness.     Patient is 78-year-old female with history of CKD 3, hypertension, hyperlipidemia, hypothyroidism, type 2 diabetes mellitus on insulin, and PAD who presented to our ED due to weakness.     Patient reports that she presented to the orthopedic office yesterday as a result of her right knee osteoarthritis.  She was injected with Kenalog and Marcaine.  She decided to [facility] to the [facility] after this procedure.  She suddenly felt weak with her right lower extremity and brought herself down to the floor gently.  Denies any head trauma.  However, patient noticed that she started developing right hand weakness and had trouble keeping her hand close to grasp things.  She would require her left hand to ensure that nothing falls.  She had trouble sleeping overnight and this continued to recur.  She presented to our ED for further evaluation today.  CT head showed findings of a 1 cm hypodensity within the right inferior frontal lobe which is nonspecific but may represent a small infarction potentially subacute to chronic.      Special Studies:  CTA head and neck w wo contrast (03/01/2024) impresison   CT:  1.  Grossly stable small foci of recent infarcts in the left corona radiata, left insula, and anterior inferior right frontal lobe. No intracranial mass effect or hemorrhage.  2.  Chronic microangiopathic change.  CTA:  3.  No intracranial large vessel occlusion. " "Severe atherosclerotic stenosis in bilateral supraclinoid ICAs.  4.  Left vertebral artery arises from the aortic arch (normal variant) with severe atherosclerotic stenosis at the origin.  5.  No hemodynamically significant stenosis in bilateral cervical carotid and right vertebral arteries.  6.  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  MRI brain wo contrast (02/28/2024) Impression   Recent infarcts are identified in the right anterior deep frontal white matter, left insular cortex, and left posterior frontal deep white matter without evidence of hemorrhage. Embolic phenomenon should be considered given the bilateral involvement.  Moderate chronic microangiopathic changes.   CT head without contrast (02/28/2024) Impression  There is a new approximately 1 cm hypodensity within the right inferior frontal lobe on series 2 image 17 compared to prior CT and MRI examinations from November 2023. This is nonspecific and may represent interval small infarction, now likely late   subacute to chronic. This could be confirmed with follow-up nonemergent MRI of the brain.  Stable additional white matter hypoattenuation within the cerebral hemispheres suggestive of chronic microangiopathy.  This examination was marked \"immediate notification\" in Epic in order to begin the standard process by which the radiology reading room liaison alerts the referring practitioner.   XR chest 1 view portable (03/01/2024) Impression   No acute cardiopulmonary disease.    Code Status: Level 1 Full code    Reason for consult:  Change in mental status  New neuro event  Stroke protocol    Precautions:  Fall    Current Diet: Regular and thin liquids    Premorbid diet: Regular and thin liquids   O2 requirement: Room Air    Social/Prior living Lives with spouse   Voice/Speech: WNL   Follows commands: Basic    Cognitive status: Alert      AUDITORY COMPREHENSION:  Body part ID: "   Object/Picture ID: 10/10  Simple yes/no ?'s:10/10  Complex y/n ?'s:  One step commands:  Two step commands:  Complex or 3 step commands:  Paragraph Comprehension:    READING COMPREHENSION:  Name recognition:  Single word comprehension/Match pic w/ a choice of 2 words:3/5  Simple direction followin/5  Sentence comprehension:  Paragraph comprehension:    VERBAL EXPRESSION/EXPR LANGUAGE:  Auto Sequences:   Counting 1-10: +   Days of Week:   Months of Year:  Word Repetition:    Phrase Completion:  Confrontation Naming:  Responsive Naming:  Divergent Naming:  Picture Description:  Ability to make needs known:   Conversation:    WRITTEN EXPRESSION:  Name:[facility]  Address:[facility]  Phone #:[facility]  Word level to dictation:3/3  Word level (written naming)3/3  Sentence to dictation:3/3  Sentence formulation:    ORAL MOTOR/SPEECH MECHANISM EXAM:    MOTOR SPEECH:  Dysarthria:  Breath support: WNL/WFL   Volume:WNL   Articulatory Precision:: min reduced intermittently    Rate:WNL   Nasality:n/a    Cognitive -linguistic skills:  Appeared to be grossly intact    Oriented to:  place   Long term memory: appeared WNL  Short term memory: min difficulties, however pt did often correct self  Immediate memory:  Problem solving: 10/10      Further evaluation suggested: no    Symptoms noted:  N/A

## 2024-03-05 NOTE — ASSESSMENT & PLAN NOTE
-As blood pressures were slightly elevated agree with reinitiation of amlodipine therapy and monitoring closely  -Per neurology documentation goal would be normotension  -Counseled patient on dietary and lifestyle modifications.

## 2024-03-05 NOTE — OCCUPATIONAL THERAPY NOTE
03/05/24 1036   OT Last Visit   OT Visit Date 03/05/24   Note Type   Note Type Treatment   Pain Assessment   Pain Assessment Tool 0-10   Pain Score No Pain   Restrictions/Precautions   Weight Bearing Precautions Per Order No   Other Precautions Fall Risk   Lifestyle   Reciprocal Relationships has a boyfriend (who has dementia, she assists/watches him PRN)  (mentioned a son who lives in [place], who will be coming to see her Thursday)   Intrinsic Gratification needlepoint; cross-stitch; watching game shows on TV; poker on my phone   ADL   Equipment Provided Long-handled shoe horn  (and back scratcher (which she reports she has used to retrieve things from the floor))   UB Bathing Assistance 5  Supervision/Setup  (*Simulated (patient already bathed this morning))   UB Bathing Deficit Setup;Supervision/safety;Increased time to complete   LB Bathing Assistance 4  Minimal Assistance   LB Bathing Deficit Setup;Verbal cueing;Supervision/safety;Increased time to complete;Right lower leg including foot;Left lower leg including foot   UB Dressing Assistance 4  Minimal Assistance   UB Dressing Deficit Setup;Increased time to complete;Pull around back;Pull down in back;Fasteners   LB Dressing Assistance 4  Minimal Assistance   LB Dressing Deficit Setup;Requires assistive device for steadying;Verbal cueing;Supervision/safety;Increased time to complete;Thread RLE into underwear   LB Dressing Comments *stood with RW support to pull up hospital pants  (cued for safety)   Transfers   Sit to Stand 5  Supervision   Additional items Assist x 1;Armrests;Verbal cues   Stand to Sit 5  Supervision   Additional items Assist x 1;Armrests;Impulsive;Verbal cues   Therapeutic Excerise-Strength   UE Strength Yes   Right Upper Extremity- Strength   R Shoulder Flexion;Horizontal ABduction;Other (Comment)  (horizontal adduction;  Pro/re-traction)   R Elbow Elbow flexion;Elbow extension  (in forward and reverse;  also: supin/pron-ation)   R Hand  "  (*patient provided with sponge for gross grasp strengthening exercise ( displays understanding of same ))   R Weight/Reps/Sets 10 reps With 2 pound weight Elbows;  10 reps WithOut weight shoulders   Left Upper Extremity-Strength   L Weights/Reps/Sets 15 reps elbows and 10 reps shoulders, all With 2 pound weight   LUE Strength Comment some of exercises done Bilaterally, Alternating R<>L towards coordination building   Coordination   Gross Motor impaired RUE   Dexterity impaired RUE   Subjective   Subjective \"I've been losing weight - It seems like I'm losing a pound a day\"   Cognition   Overall Cognitive Status WFL   Arousal/Participation Alert;Cooperative   Attention Within functional limits   Orientation Level Oriented X4   Following Commands Follows one step commands without difficulty   Comments **patient reports that prior to this stroke she passed a cognitive test (was working toward continuing her license for driving) >  NOW recognizes she will no longer drive, and plans to sell her car   Activity Tolerance   Activity Tolerance Patient limited by fatigue  (Muscle fatigue of RUE)   Medical Staff Made Aware PCA Neda aware of sessioin   Assessment   Assessment Patient participated in Skilled OT session this date with interventions consisting of ADL re training with the use of correct body mechnaics, safety awareness and fall prevention techniques, therapeutic exercise to: increase functional use of BUEs, increase BUE muscle strength ,  therapeutic activities to: increase activity tolerance, increase dynamic sit/ stand balance during functional activity , and increase postural control . Patient agreeable to OT treatment session, upon arrival patient was found seated OOB to Recliner. Patient requiring verbal cues for safety, verbal cues for correct technique, verbal cues for pacing thru activity steps, one step directives, and frequent rest periods, and self-care assistance as noted in flow sheet/AM-PAC. " Patient continues to be functioning below baseline level, occupational performance remains limited secondary to factors listed above and increased risk for falls and injury.   Patient to benefit from continued Occupational Therapy treatment while in the hospital to address deficits as defined above and maximize level of functional independence with ADLs and functional mobility.  (** Patient could benefit from higher level ADL practice/participation for returning Safely to baseline activity level and accomplishment (as able).  Impaired Dominant RUE.)   Plan   Treatment Interventions ADL retraining;UE strengthening/ROM;Patient/family training;Activityengagement;Neuromuscular reeducation   Goal Expiration Date 03/10/24   OT Treatment Day 2   Discharge Recommendation   Rehab Resource Intensity Level, OT II (Moderate Resource Intensity)   Additional Comments 2 The patient's raw score on the AM-PAC Daily Activity Inpatient Short Form is 20. A raw score of greater than or equal to 19 suggests the patient may benefit from discharge to home. Please refer to the recommendation of the Occupational Therapist for safe discharge planning.   AM-PAC Daily Activity Inpatient   Lower Body Dressing 3  (would benefit from LH reacher use)   Bathing 3   Toileting 3   Upper Body Dressing 3   Grooming 4   Eating 4   Daily Activity Raw Score 20   Daily Activity Standardized Score (Calc for Raw Score >=11) 42.03   AM-PAC Applied Cognition Inpatient   Following a Speech/Presentation 3   Understanding Ordinary Conversation 4   Taking Medications 3   Remembering Where Things Are Placed or Put Away 2   Remembering List of 4-5 Errands 2   Taking Care of Complicated Tasks 2   Applied Cognition Raw Score 16   Applied Cognition Standardized Score 35.03     SUMAN Willis/KAYCE

## 2024-03-05 NOTE — PHYSICAL THERAPY NOTE
"   PHYSICAL THERAPY TREATMENT NOTE  NAME:  Jina Norris  DATE: 03/05/24    Length Of Stay: 5  Performed at least 2 patient identifiers during session: Name and ID bracelet    TREATMENT:      03/05/24 1016   PT Last Visit   PT Visit Date 03/05/24   Note Type   Note Type Treatment   Pain Assessment   Pain Assessment Tool 0-10   Pain Score No Pain   Restrictions/Precautions   Weight Bearing Precautions Per Order No   Other Precautions Cognitive;Fall Risk   General   Chart Reviewed Yes   Family/Caregiver Present No   Cognition   Overall Cognitive Status Impaired   Arousal/Participation Alert;Cooperative   Attention Within functional limits   Orientation Level Oriented X4   Memory Decreased recall of precautions;Decreased recall of recent events   Following Commands Follows one step commands without difficulty   Comments pt agreeable to PT session   Subjective   Subjective \"I'm having trouble holding things with my right hand\"   Bed Mobility   Supine to Sit 5  Supervision   Additional items HOB elevated;Bedrails;Increased time required;Verbal cues   Additional Comments pt seated OOB in recliner upon conclusion   Transfers   Sit to Stand 5  Supervision   Additional items Assist x 1;Armrests;Increased time required;Verbal cues   Stand to Sit 5  Supervision   Additional items Increased time required;Verbal cues;Armrests   Additional Comments RW used; VC for safe hand placement, proper body mechanics and RW management   Ambulation/Elevation   Gait pattern Improper Weight shift;Decreased foot clearance;Short stride   Gait Assistance 5  Supervision   Additional items Assist x 1;Verbal cues   Assistive Device Rolling walker   Distance 125 ft x1   Ambulation/Elevation Additional Comments verbal cues required for proper RW management   Balance   Static Sitting Good   Dynamic Sitting Fair +   Static Standing Fair -   Dynamic Standing Fair -   Ambulatory Poor +   Endurance Deficit   Endurance Deficit Yes   Endurance Deficit " Description fatigues easily with limited mobility   Activity Tolerance   Activity Tolerance Patient limited by fatigue   Nurse Made Aware yes, RN made aware of outcomes   Exercises   Quad Sets Sitting;20 reps;AROM;Bilateral   Heelslides Sitting;20 reps;AROM;Bilateral   Glute Sets Sitting;20 reps;AROM;Bilateral   Hip Abduction Sitting;20 reps;AROM;Bilateral   Hip Adduction Sitting;20 reps;AROM;Bilateral   Knee AROM Long Arc Quad Sitting;20 reps;AROM;Bilateral   Ankle Pumps Sitting;20 reps;AROM;Bilateral   Marching Sitting;20 reps;AROM;Bilateral   Assessment   Prognosis Good   Problem List Decreased strength;Decreased endurance;Impaired balance;Decreased mobility;Impaired judgement;Decreased safety awareness;Decreased cognition   Assessment Pt seen for PT treatment session this date with interventions consisting of gait training w/ emphasis on improving pt's ability to ambulate level surfaces x 125 ftx1 with close S provided by therapist with RW, Therapeutic exercise consisting of: AROM 2 sets of 10 reps B LE in sitting position, and therapeutic activity consisting of training: bed mobility, supine<>sit transfers, sit<>stand transfers, static sitting tolerance at EOB for 10 minutes w/ unilateral UE support, vc and tactile cues for static sitting posture faciliation, and vc and tactile cues for static standing posture faciliation. Pt agreeable to PT treatment session upon arrival, pt found supine in bed w/ HOB elevated, in no apparent distress and responsive. In comparison to previous session, pt with improvements in distance ambulated . Post session: pt returned back to recliner, chair alarm engaged, all needs in reach, and RN notified of session findings/recommendations. Continue to recommend Level II (Moderate Resource Intensity at time of d/c in order to maximize pt's functional independence and safety w/ mobility. Pt continues to be functioning below baseline level. PT will continue to see pt during current  hospitalization in order to address the deficits listed above and provide interventions consistent w/ POC in effort to achieve STGs.    So Meyers PTA   Barriers to Discharge Inaccessible home environment   Goals   Patient Goals to go home   STG Expiration Date 03/10/24   PT Treatment Day 1   Plan   Treatment/Interventions Functional transfer training;LE strengthening/ROM;Therapeutic exercise;Endurance training;Bed mobility;Gait training;Compensatory technique education   PT Frequency 3-5x/wk   Discharge Recommendation   Rehab Resource Intensity Level, PT II (Moderate Resource Intensity)   AM-PAC Basic Mobility Inpatient   Turning in Flat Bed Without Bedrails 3   Lying on Back to Sitting on Edge of Flat Bed Without Bedrails 3   Moving Bed to Chair 3   Standing Up From Chair Using Arms 3   Walk in Room 3   Climb 3-5 Stairs With Railing 2   Basic Mobility Inpatient Raw Score 17   Basic Mobility Standardized Score 39.67   Highest Level Of Mobility   -HLM Goal 5: Stand one or more mins   JH-HLM Achieved 7: Walk 25 feet or more   Education   Education Provided Assistive device   Patient Demonstrates verbal understanding;Reinforcement needed   End of Consult   Patient Position at End of Consult Bedside chair;Bed/Chair alarm activated;All needs within reach       The patient's AM-PAC Basic Mobility Inpatient Short Form Raw Score is 17. A Raw score of greater than 16 suggests the patient may benefit from discharge to home. Please also refer to the recommendation of the Physical Therapist for safe discharge planning.      So Meyers PTA,PTA

## 2024-03-05 NOTE — PROGRESS NOTES
[facility]  Progress Note  Name: Jina Norris I  MRN: [id number]  Unit/Bed#: -01 I Date of Admission: 2/28/2024   Date of Service: 3/5/2024 I Hospital Day: 5    Assessment/Plan   Acquired hypothyroidism  Assessment & Plan  -Continue levothyroxine with plan of care per primary team    Essential hypertension  Assessment & Plan  -As blood pressures were slightly elevated agree with reinitiation of amlodipine therapy and monitoring closely  -Per neurology documentation goal would be normotension  -Counseled patient on dietary and lifestyle modifications.    Type 2 diabetes mellitus with stage 3b chronic kidney disease, with long-term current use of insulin (Formerly [facility])  Assessment & Plan  Lab Results   Component Value Date    HGBA1C 12.1 (H) 02/29/2024       Recent Labs     03/04/24  1118 03/04/24  1555 03/04/24  2059 03/05/24  0655   POCGLU 185* 121 113 208*         Blood Sugar Average: Last 72 hrs:  (P) [id number]    -Counseled patient on dietary and lifestyle modifications  -Plan of care per primary team      * Acute CVA (cerebrovascular accident) (Formerly [facility])  Assessment & Plan  -Transesophageal echocardiogram showing no left atrial, left ventricular left atrial appendage thrombus  -Would continue telemetry monitoring throughout hospital stay although no significant atrial fibrillation seen during admission so far  -Continue plan of care per primary team and neurology   -Will transition patient to high intensity statin given recent CVA.  -continue dual antiplatelet strategy further recommendations.        Subjective:   Patient seen and examined.  Per patient, denies any chest pain, palpitations, lightness or dizziness, loss of consciousness, shortness of breath, lower EXTR edema, orthopnea or bendopnea.  She still has some residual right-sided facial and right sided hand weakness but otherwise is feeling well.      Summary comments:  -Blood pressures were slightly elevated would reinitiate  "home amlodipine therapy and monitor for response  -Transesophageal echocardiogram without left atrial appendage, left atrial or left ventricular thrombus appreciated  -Continue dual antiplatelet strategy and would recommend transitioning patient to high intensity statin given recent CVA but will defer to neurology team for recommendations  -I have ordered ambulatory event monitor for patient and she should be followed up in the outpatient setting as if this is unrevealing would likely benefit from implantable loop recorder.        Vitals: Blood pressure 129/61, pulse 78, temperature 99.2 °F (37.3 °C), temperature source Oral, resp. rate 20, height 5' 2\" (1.575 m), weight 77.1 kg (170 lb), SpO2 93%.,   Orthostatic Blood Pressures      Flowsheet Row Most Recent Value   Blood Pressure 129/61 filed at 03/05/2024 0926   Patient Position - Orthostatic VS Lying filed at 03/03/2024 0707        ,   Weight (last 2 days)       None            Physical Exam:  Physical Exam  Vitals reviewed.   Constitutional:       General: She is not in acute distress.     Appearance: She is obese. She is not diaphoretic.   HENT:      Head: Normocephalic and atraumatic.   Eyes:      General:         Right eye: No discharge.         Left eye: No discharge.   Neck:      Comments: Trachea midline, neck obese, difficult to assess JVD  Cardiovascular:      Rate and Rhythm: Normal rate and regular rhythm.      Heart sounds:      No friction rub.   Pulmonary:      Effort: No respiratory distress.      Breath sounds: No wheezing.      Comments: Decreased breath sounds bilaterally  Chest:      Chest wall: No tenderness.   Abdominal:      General: Bowel sounds are normal.      Palpations: Abdomen is soft.      Tenderness: There is no abdominal tenderness. There is no rebound.   Musculoskeletal:      Right lower leg: No edema.      Left lower leg: No edema.   Skin:     General: Skin is warm and dry.   Neurological:      Mental Status: She is alert.      " Comments: Awake, alert, able to answer questions appropriately, right facial droop present along with slight weakness in right hand and right upper extremity but still able to move extremities bilaterally.   Psychiatric:         Mood and Affect: Mood normal.         Behavior: Behavior normal.          Medications:      Current Facility-Administered Medications:     amLODIPine (NORVASC) tablet 5 mg, 5 mg, Oral, Daily, LAURITA Bernal, 5 mg at 03/05/24 0926    aspirin (ECOTRIN LOW STRENGTH) EC tablet 81 mg, 81 mg, Oral, Daily, Akil Davis MD, 81 mg at 03/05/24 0817    calcium carbonate (TUMS) chewable tablet 500 mg, 500 mg, Oral, Daily PRN, LAURITA Bernal, 500 mg at 03/04/24 1434    clopidogrel (PLAVIX) tablet 75 mg, 75 mg, Oral, Daily, Akil Davis MD, 75 mg at 03/05/24 0817    DULoxetine (CYMBALTA) delayed release capsule 20 mg, 20 mg, Oral, Daily, Akil Davis MD, 20 mg at 03/05/24 0817    enoxaparin (LOVENOX) subcutaneous injection 30 mg, 30 mg, Subcutaneous, Daily, Akil Davis MD, 30 mg at 03/05/24 0818    fluticasone (FLONASE) 50 mcg/act nasal spray 2 spray, 2 spray, Nasal, Daily, Akil Davis MD, 2 spray at 03/03/24 0825    folic acid (FOLVITE) tablet 400 mcg, 400 mcg, Oral, Daily, Akil Davis MD, 400 mcg at 03/05/24 0817    insulin detemir (LEVEMIR) subcutaneous injection 36 Units, 36 Units, Subcutaneous, Daily, Rolly Sol DO, 36 Units at 03/05/24 0818    insulin lispro (HumALOG/ADMELOG) 100 units/mL subcutaneous injection 1-6 Units, 1-6 Units, Subcutaneous, TID AC, 2 Units at 03/05/24 0819 **AND** Fingerstick Glucose (POCT), , , TID AC, Akil Davis MD    insulin lispro (HumALOG/ADMELOG) 100 units/mL subcutaneous injection 12 Units, 12 Units, Subcutaneous, TID With Meals, Rolly Sol DO, 12 Units at 03/05/24 0818    levothyroxine tablet 50 mcg, 50 mcg, Oral, Early Morning, Akil Davis MD, 50 mcg at 03/05/24 0533    metoprolol succinate (TOPROL-XL) 24 hr tablet 50 mg,  50 mg, Oral, Daily, Rolly Funes DO, 50 mg at 03/05/24 0817    nystatin (MYCOSTATIN) powder, , Topical, BID, Akil Davis MD, Given at 03/05/24 0817    pantoprazole (PROTONIX) EC tablet 40 mg, 40 mg, Oral, Early Morning, Akil Davis MD, 40 mg at 03/05/24 0533    pravastatin (PRAVACHOL) tablet 80 mg, 80 mg, Oral, Daily With Dinner, Akil Davis MD, 80 mg at 03/04/24 1708     Labs & Results:  Labs reviewed and prominent abnormalities reviewed above and/or below.  Troponins:    Results from last 7 days   Lab Units 02/28/24  1831 02/28/24  1551   HS TNI 0HR ng/L  --  7   HS TNI 2HR ng/L 6  --    HSTNI D2 ng/L -1  --         BNP:

## 2024-03-05 NOTE — PLAN OF CARE
Problem: Potential for Falls  Goal: Patient will remain free of falls  Description: INTERVENTIONS:  - Educate patient/family on patient safety including physical limitations  - Instruct patient to call for assistance with activity   - Consult OT/PT to assist with strengthening/mobility   - Keep Call bell within reach  - Keep bed low and locked with side rails adjusted as appropriate  - Keep care items and personal belongings within reach  - Initiate and maintain comfort rounds  - Make Fall Risk Sign visible to staff  - Offer Toileting every 2 Hours, in advance of need  - Initiate/Maintain bed alarm  - Obtain necessary fall risk management equipment: yellow socks  - Apply yellow socks and bracelet for high fall risk patients  - Consider moving patient to room near nurses station  Outcome: Progressing     Problem: Nutrition/Hydration-ADULT  Goal: Nutrient/Hydration intake appropriate for improving, restoring or maintaining nutritional needs  Description: Monitor and assess patient's nutrition/hydration status for malnutrition. Collaborate with interdisciplinary team and initiate plan and interventions as ordered.  Monitor patient's weight and dietary intake as ordered or per policy. Utilize nutrition screening tool and intervene as necessary. Determine patient's food preferences and provide high-protein, high-caloric foods as appropriate.     INTERVENTIONS:  - Monitor oral intake, urinary output, labs, and treatment plans  - Assess nutrition and hydration status and recommend course of action  - Evaluate amount of meals eaten  - Assist patient with eating if necessary   - Allow adequate time for meals  - Recommend/ encourage appropriate diets, oral nutritional supplements, and vitamin/mineral supplements  - Order, calculate, and assess calorie counts as needed  - Recommend, monitor, and adjust tube feedings and TPN/PPN based on assessed needs  - Assess need for intravenous fluids  - Provide specific  nutrition/hydration education as appropriate  - Include patient/family/caregiver in decisions related to nutrition  Outcome: Progressing

## 2024-03-05 NOTE — ASSESSMENT & PLAN NOTE
Lab Results   Component Value Date    EGFR 39 03/05/2024    EGFR 39 03/04/2024    EGFR 40 03/02/2024    CREATININE 1.31 (H) 03/05/2024    CREATININE 1.29 03/04/2024    CREATININE 1.27 03/02/2024     Creatinine at baseline of around 1.1-1.4  Daily metabolic panel and trend creatinine

## 2024-03-05 NOTE — PLAN OF CARE
Problem: OCCUPATIONAL THERAPY ADULT  Goal: Performs self-care activities at highest level of function for planned discharge setting.  See evaluation for individualized goals.  Description: Treatment Interventions: ADL retraining, Functional transfer training, Endurance training, Patient/family training, Equipment evaluation/education, Compensatory technique education, Energy conservation, Activityengagement, Cognitive reorientation    See flowsheet documentation for full assessment, interventions and recommendations.   Outcome: Progressing  Note: Limitation: Decreased ADL status, Decreased UE strength, Decreased Safe judgement during ADL, Decreased endurance, Decreased fine motor control, Decreased self-care trans, Decreased high-level ADLs  Prognosis: Good  Assessment: Patient participated in Skilled OT session this date with interventions consisting of ADL re training with the use of correct body mechnaics, safety awareness and fall prevention techniques, therapeutic exercise to: increase functional use of BUEs, increase BUE muscle strength ,  therapeutic activities to: increase activity tolerance, increase dynamic sit/ stand balance during functional activity , and increase postural control . Patient agreeable to OT treatment session, upon arrival patient was found seated OOB to Recliner. Patient requiring verbal cues for safety, verbal cues for correct technique, verbal cues for pacing thru activity steps, one step directives, and frequent rest periods, and self-care assistance as noted in flow sheet/AM-PAC. Patient continues to be functioning below baseline level, occupational performance remains limited secondary to factors listed above and increased risk for falls and injury.   Patient to benefit from continued Occupational Therapy treatment while in the hospital to address deficits as defined above and maximize level of functional independence with ADLs and functional mobility. (** Patient could benefit  from higher level ADL practice/participation for returning Safely to baseline activity level and accomplishment (as able).  Impaired Dominant RUE.)     Rehab Resource Intensity Level, OT: II (Moderate Resource Intensity)        SUMAN Willis/KAYCE

## 2024-03-05 NOTE — ASSESSMENT & PLAN NOTE
-Transesophageal echocardiogram showing no left atrial, left ventricular left atrial appendage thrombus  -Would continue telemetry monitoring throughout hospital stay although no significant atrial fibrillation seen during admission so far  -Continue plan of care per primary team and neurology   -Will transition patient to high intensity statin given recent CVA.  -continue dual antiplatelet strategy further recommendations.

## 2024-03-05 NOTE — PROGRESS NOTES
[facility]  Progress Note  Name: Jina Norris I  MRN: [id number]  Unit/Bed#: -01 I Date of Admission: 2/28/2024   Date of Service: 3/5/2024 I Hospital Day: 5    Assessment/Plan     * Acute CVA (cerebrovascular accident) ([facility])  Assessment & Plan  Presented for right hand weakness and fall   Stroke pathway completed  MRI brain: Recent infarcts are identified in the right anterior deep frontal white matter, left insular cortex, and left posterior frontal deep white matter without evidence of hemorrhage. Embolic phenomenon should be considered given the bilateral involvement. Moderate chronic microangiopathic changes.  Neurology recommendations appreciated. Suspect embolic given bilateral hemispheric involvement. Continue DAPT with aspirin and clopidogrel for 21 days and then continue with monotherapy with clopidogrel  DILLAN results appreciated  Continue statin  Therapy recommendations appreciated: Moderate resource intensity  Case management following, assistance appreciated.  Previously accepted by ARU, but upon re-evaluation by therapy she no longer meets criteria          Stroke-like symptoms-resolved as of 3/1/2024  Assessment & Plan  Stroke pathway completed    Type 2 diabetes mellitus with stage 3b chronic kidney disease, with long-term current use of insulin ([facility])  Assessment & Plan  Lab Results   Component Value Date    HGBA1C 12.1 (H) 02/29/2024       Recent Labs     03/04/24  2059 03/05/24  0655 03/05/24  1118 03/05/24  1614   POCGLU 113 208* 210* 105         Blood Sugar Average: Last 72 hrs:  (P) 179.125     Hemoglobin A1c elevated, patient will benefit from improved glycemic control.  Previously on Januvia and Ozempic, patient reportedly stopped taking  Continue diabetic diet   Continue Levemir 36 units SQ bedtime   Continue Humalog 12 units with meals plus sliding scale  Continue to monitor glucose and adjust insulin as needed    Stage 3a chronic kidney disease  ([facility])  Assessment & Plan  Lab Results   Component Value Date    EGFR 39 03/05/2024    EGFR 39 03/04/2024    EGFR 40 03/02/2024    CREATININE 1.31 (H) 03/05/2024    CREATININE 1.29 03/04/2024    CREATININE 1.27 03/02/2024     Creatinine at baseline of around 1.1-1.4  Daily metabolic panel and trend creatinine    PAD (peripheral artery disease) ([facility])  Assessment & Plan  Continue aspirin and statin  Continue follow-up with vascular surgery    Acquired hypothyroidism  Assessment & Plan  TSH 2.99  Continue levothyroxine    Essential hypertension  Assessment & Plan  BP reviewed   Continue metoprolol succinate 50 mg oral daily and amlodipine 5 mg oral daily   Monitor blood pressure         VTE Pharmacologic Prophylaxis:   High Risk (Score >/= 5) - Pharmacological DVT Prophylaxis Ordered: enoxaparin (Lovenox). Sequential Compression Devices Ordered.    Mobility:   Basic Mobility Inpatient Raw Score: 17  JH-HLM Goal: 5: Stand one or more mins  JH-HLM Achieved: 4: Move to chair/commode  HLM Goal NOT achieved. Continue with multidisciplinary rounding and encourage appropriate mobility to improve upon HLM goals.    Patient Centered Rounds: I performed bedside rounds with nursing staff today.   Discussions with Specialists or Other Care Team Provider: CM    Education and Discussions with Family / Patient: Patient declined call to .     Total Time Spent on Date of Encounter in care of patient: 48 mins. This time was spent on one or more of the following: performing physical exam; counseling and coordination of care; obtaining or reviewing history; documenting in the medical record; reviewing/ordering tests, medications or procedures; communicating with other healthcare professionals and discussing with patient's family/caregivers.    Current Length of Stay: 5 day(s)  Current Patient Status: Inpatient   Certification Statement: The patient will continue to require additional inpatient hospital stay due to care  coordination.  Discharge Plan: Anticipate discharge in 24-48 hrs to D.     Code Status: Level 1 - Full Code    Subjective:   Patient seen and examined.  She has no physical complaints offered.     Objective:     Vitals:   Temp (24hrs), Av °F (37.2 °C), Min:98.7 °F (37.1 °C), Max:99.2 °F (37.3 °C)    Temp:  [98.7 °F (37.1 °C)-99.2 °F (37.3 °C)] 98.7 °F (37.1 °C)  HR:  [76-79] 76  Resp:  [18-20] 18  BP: (124-185)/(61-77) 124/61  SpO2:  [93 %-97 %] 95 %  Body mass index is 31.09 kg/m².     Input and Output Summary (last 24 hours):     Intake/Output Summary (Last 24 hours) at 3/5/2024 1721  Last data filed at 3/5/2024 1718  Gross per 24 hour   Intake 720 ml   Output 425 ml   Net 295 ml       Physical Exam:     Physical Exam  Vitals and nursing note reviewed.   Constitutional:       General: She is not in acute distress.  HENT:      Head: Normocephalic and atraumatic.      Nose: Nose normal.      Mouth/Throat:      Mouth: Mucous membranes are moist.      Pharynx: Oropharynx is clear.   Eyes:      Pupils: Pupils are equal, round, and reactive to light.   Cardiovascular:      Rate and Rhythm: Normal rate and regular rhythm.   Pulmonary:      Effort: Pulmonary effort is normal. No respiratory distress.      Breath sounds: Normal breath sounds.   Abdominal:      General: Bowel sounds are normal.      Palpations: Abdomen is soft.      Tenderness: There is no abdominal tenderness.   Musculoskeletal:      Cervical back: Neck supple.      Right lower leg: No edema.      Left lower leg: No edema.      Comments: Right hand grasp is subtly weaker than left.  Dysarthria persists although speech is largely understandable   Skin:     General: Skin is warm and dry.      Capillary Refill: Capillary refill takes less than 2 seconds.   Neurological:      General: No focal deficit present.      Mental Status: She is alert.        Additional Data:     Labs:  Results from last 7 days   Lab Units 24  0531   WBC Thousand/uL 9.20    HEMOGLOBIN g/dL 11.3*   HEMATOCRIT % 36.2   PLATELETS Thousands/uL 163   NEUTROS PCT % 64   LYMPHS PCT % 27   MONOS PCT % 8   EOS PCT % 1     Results from last 7 days   Lab Units 03/05/24  0531 03/04/24  0517 03/02/24  0438   SODIUM mmol/L 134*   < > 132*   POTASSIUM mmol/L 4.4   < > 3.9   CHLORIDE mmol/L 102   < > 101   CO2 mmol/L 22   < > 24   BUN mg/dL 31*   < > 26*   CREATININE mg/dL 1.31*   < > 1.27   ANION GAP mmol/L 10   < > 7   CALCIUM mg/dL 8.7   < > 8.8   ALBUMIN g/dL  --   --  3.4*   TOTAL BILIRUBIN mg/dL  --   --  0.46   ALK PHOS U/L  --   --  52   ALT U/L  --   --  7   AST U/L  --   --  11*   GLUCOSE RANDOM mg/dL 206*   < > 199*    < > = values in this interval not displayed.     Results from last 7 days   Lab Units 03/02/24  0438   INR  0.99     Results from last 7 days   Lab Units 03/05/24  1614 03/05/24  1118 03/05/24  0655 03/04/24  2059 03/04/24  1555 03/04/24  1118 03/04/24  0912 03/04/24  0658 03/03/24  2029 03/03/24  1554 03/03/24  1059 03/03/24  0706   POC GLUCOSE mg/dl 105 210* 208* 113 121 185* 128 200* 165* 129 275* 200*     Results from last 7 days   Lab Units 02/29/24  0405   HEMOGLOBIN A1C % 12.1*     Results from last 7 days   Lab Units 02/28/24  1551   LACTIC ACID mmol/L 1.0       Lines/Drains:  Invasive Devices       Peripheral Intravenous Line  Duration             Peripheral IV 03/01/24 Dorsal (posterior);Right Hand 4 days    Peripheral IV 03/04/24 Dorsal (posterior);Right Forearm 1 day                        Last 24 Hours Medication List:   Current Facility-Administered Medications   Medication Dose Route Frequency Provider Last Rate    amLODIPine  5 mg Oral Daily LAURITA Bernal      aspirin  81 mg Oral Daily Akil Davis MD      calcium carbonate  500 mg Oral Daily PRN Yady M Daisha, CRNP      clopidogrel  75 mg Oral Daily Akil Davis MD      DULoxetine  20 mg Oral Daily Akil Davis MD      enoxaparin  30 mg Subcutaneous Daily Akil Davis MD      fluticasone  2  spray Nasal Daily Akil Davis MD      folic acid  400 mcg Oral Daily Akil Davis MD      insulin detemir  36 Units Subcutaneous Daily Rolly Sol DO      insulin lispro  1-6 Units Subcutaneous TID AC Akil Davis MD      insulin lispro  12 Units Subcutaneous TID With Meals Rolly Sol DO      levothyroxine  50 mcg Oral Early Morning Akil Davis MD      metoprolol succinate  50 mg Oral Daily Rolly Funes DO      nystatin   Topical BID Akil Davis MD      pantoprazole  40 mg Oral Early Morning Akil Davis MD      pravastatin  80 mg Oral Daily With Dinner Akil Davis MD          Today, Patient Was Seen By: LAURITA Bernal    **Please Note: This note may have been constructed using a voice recognition system.**

## 2024-03-06 LAB
ANION GAP SERPL CALCULATED.3IONS-SCNC: 8 MMOL/L
BASOPHILS # BLD AUTO: 0.04 THOUSANDS/ÂΜL (ref 0–0.1)
BASOPHILS NFR BLD AUTO: 0 % (ref 0–1)
BUN SERPL-MCNC: 36 MG/DL (ref 5–25)
CALCIUM SERPL-MCNC: 8.8 MG/DL (ref 8.4–10.2)
CHLORIDE SERPL-SCNC: 103 MMOL/L (ref 96–108)
CO2 SERPL-SCNC: 24 MMOL/L (ref 21–32)
CREAT SERPL-MCNC: 1.56 MG/DL (ref 0.6–1.3)
EOSINOPHIL # BLD AUTO: 0.13 THOUSAND/ÂΜL (ref 0–0.61)
EOSINOPHIL NFR BLD AUTO: 1 % (ref 0–6)
ERYTHROCYTE [DISTWIDTH] IN BLOOD BY AUTOMATED COUNT: 13.8 % (ref 11.6–15.1)
GFR SERPL CREATININE-BSD FRML MDRD: 31 ML/MIN/1.73SQ M
GLUCOSE SERPL-MCNC: 122 MG/DL (ref 65–140)
GLUCOSE SERPL-MCNC: 154 MG/DL (ref 65–140)
GLUCOSE SERPL-MCNC: 173 MG/DL (ref 65–140)
GLUCOSE SERPL-MCNC: 251 MG/DL (ref 65–140)
GLUCOSE SERPL-MCNC: 367 MG/DL (ref 65–140)
HCT VFR BLD AUTO: 36.7 % (ref 34.8–46.1)
HGB BLD-MCNC: 11.5 G/DL (ref 11.5–15.4)
IMM GRANULOCYTES # BLD AUTO: 0.07 THOUSAND/UL (ref 0–0.2)
IMM GRANULOCYTES NFR BLD AUTO: 1 % (ref 0–2)
LYMPHOCYTES # BLD AUTO: 2.39 THOUSANDS/ÂΜL (ref 0.6–4.47)
LYMPHOCYTES NFR BLD AUTO: 24 % (ref 14–44)
MCH RBC QN AUTO: 29.4 PG (ref 26.8–34.3)
MCHC RBC AUTO-ENTMCNC: 31.3 G/DL (ref 31.4–37.4)
MCV RBC AUTO: 94 FL (ref 82–98)
MONOCYTES # BLD AUTO: 0.76 THOUSAND/ÂΜL (ref 0.17–1.22)
MONOCYTES NFR BLD AUTO: 8 % (ref 4–12)
NEUTROPHILS # BLD AUTO: 6.42 THOUSANDS/ÂΜL (ref 1.85–7.62)
NEUTS SEG NFR BLD AUTO: 66 % (ref 43–75)
NRBC BLD AUTO-RTO: 0 /100 WBCS
PLATELET # BLD AUTO: 168 THOUSANDS/UL (ref 149–390)
PMV BLD AUTO: 11 FL (ref 8.9–12.7)
POTASSIUM SERPL-SCNC: 4.3 MMOL/L (ref 3.5–5.3)
RBC # BLD AUTO: 3.91 MILLION/UL (ref 3.81–5.12)
SODIUM SERPL-SCNC: 135 MMOL/L (ref 135–147)
WBC # BLD AUTO: 9.81 THOUSAND/UL (ref 4.31–10.16)

## 2024-03-06 PROCEDURE — 80048 BASIC METABOLIC PNL TOTAL CA: CPT | Performed by: NURSE PRACTITIONER

## 2024-03-06 PROCEDURE — 97530 THERAPEUTIC ACTIVITIES: CPT

## 2024-03-06 PROCEDURE — 92526 ORAL FUNCTION THERAPY: CPT

## 2024-03-06 PROCEDURE — 97116 GAIT TRAINING THERAPY: CPT

## 2024-03-06 PROCEDURE — 99232 SBSQ HOSP IP/OBS MODERATE 35: CPT | Performed by: INTERNAL MEDICINE

## 2024-03-06 PROCEDURE — 82948 REAGENT STRIP/BLOOD GLUCOSE: CPT

## 2024-03-06 PROCEDURE — 99233 SBSQ HOSP IP/OBS HIGH 50: CPT

## 2024-03-06 PROCEDURE — 85025 COMPLETE CBC W/AUTO DIFF WBC: CPT | Performed by: NURSE PRACTITIONER

## 2024-03-06 RX ADMIN — INSULIN LISPRO 3 UNITS: 100 INJECTION, SOLUTION INTRAVENOUS; SUBCUTANEOUS at 08:47

## 2024-03-06 RX ADMIN — NYSTATIN: 100000 POWDER TOPICAL at 17:57

## 2024-03-06 RX ADMIN — NYSTATIN: 100000 POWDER TOPICAL at 08:47

## 2024-03-06 RX ADMIN — INSULIN LISPRO 6 UNITS: 100 INJECTION, SOLUTION INTRAVENOUS; SUBCUTANEOUS at 11:54

## 2024-03-06 RX ADMIN — Medication 400 MCG: at 08:46

## 2024-03-06 RX ADMIN — AMLODIPINE BESYLATE 5 MG: 5 TABLET ORAL at 08:46

## 2024-03-06 RX ADMIN — DULOXETINE HYDROCHLORIDE 20 MG: 20 CAPSULE, DELAYED RELEASE ORAL at 08:46

## 2024-03-06 RX ADMIN — INSULIN LISPRO 1 UNITS: 100 INJECTION, SOLUTION INTRAVENOUS; SUBCUTANEOUS at 16:55

## 2024-03-06 RX ADMIN — ASPIRIN 81 MG: 81 TABLET, COATED ORAL at 08:46

## 2024-03-06 RX ADMIN — LEVOTHYROXINE SODIUM 50 MCG: 25 TABLET ORAL at 05:19

## 2024-03-06 RX ADMIN — METOPROLOL SUCCINATE 50 MG: 50 TABLET, EXTENDED RELEASE ORAL at 08:46

## 2024-03-06 RX ADMIN — INSULIN LISPRO 12 UNITS: 100 INJECTION, SOLUTION INTRAVENOUS; SUBCUTANEOUS at 16:55

## 2024-03-06 RX ADMIN — PRAVASTATIN SODIUM 80 MG: 20 TABLET ORAL at 16:55

## 2024-03-06 RX ADMIN — INSULIN LISPRO 12 UNITS: 100 INJECTION, SOLUTION INTRAVENOUS; SUBCUTANEOUS at 11:55

## 2024-03-06 RX ADMIN — ENOXAPARIN SODIUM 30 MG: 100 INJECTION SUBCUTANEOUS at 08:46

## 2024-03-06 RX ADMIN — INSULIN LISPRO 12 UNITS: 100 INJECTION, SOLUTION INTRAVENOUS; SUBCUTANEOUS at 08:47

## 2024-03-06 RX ADMIN — CLOPIDOGREL 75 MG: 75 TABLET ORAL at 08:46

## 2024-03-06 RX ADMIN — FLUTICASONE PROPIONATE 2 SPRAY: 50 SPRAY, METERED NASAL at 08:47

## 2024-03-06 RX ADMIN — INSULIN DETEMIR 36 UNITS: 100 INJECTION, SOLUTION SUBCUTANEOUS at 08:46

## 2024-03-06 RX ADMIN — PANTOPRAZOLE SODIUM 40 MG: 40 TABLET, DELAYED RELEASE ORAL at 05:19

## 2024-03-06 NOTE — SPEECH THERAPY NOTE
Speech Language/Pathology    Speech/Language Pathology Progress Note    Patient Name: Jina Norris  Today's Date: 3/6/2024     Problem List  Principal Problem:    Acute CVA (cerebrovascular accident) (HCC)  Active Problems:    Type 2 diabetes mellitus with stage 3b chronic kidney disease, with long-term current use of insulin (HCC)    Essential hypertension    Acquired hypothyroidism    PAD (peripheral artery disease) (HCC)    Stage 3a chronic kidney disease (HCC)       Past Medical History  Past Medical History:   Diagnosis Date    Benign essential hypertension     Chronic kidney disease, stage 3 (HCC)     Diabetes mellitus (HCC)     Disorder of gallbladder     Disorder of skin and subcutaneous tissue     Dyspnea     Essential hypertension     Hyperlipidemia     Hypokalemia     Malaise and fatigue     Mixed hyperlipidemia     Morbid obesity (HCC)     Pernicious anemia         Past Surgical History  Past Surgical History:   Procedure Laterality Date    BREAST BIOPSY Right     CARPAL TUNNEL RELEASE Bilateral     CHOLECYSTECTOMY      COLONOSCOPY      Dr. Apple     HYSTERECTOMY      LUMBAR EPIDURAL INJECTION      x3    PARTIAL HYSTERECTOMY      SKIN LESION EXCISION      scalp          Subjective:  Pt was alert and positioned upright OOB in recliner.   Objective:  Pt was seen for f/u dsyphagia therapy at lunch. She fed herself chicken tenders and thin liquids via straw with single/successive sips. Mastication was mildly prolonged however functional. Bolus control, formation, and transfer were WNL. Swallows appeared prompt. No overt s/s of aspiration.  Pt continues with min facial droop. Completed 100% of meal tray. Pt recalled oral mech exercises reviewed in previous session independently.     Assessment:  Pt appears to be tolerating current diet. No overt s/s of aspiration. Continues with min facial droop.     Plan/Recommendations:  Recommend Regular and thin liquids  Ensure good oral care  ST will d/c please re  consult in change in pt status.

## 2024-03-06 NOTE — ASSESSMENT & PLAN NOTE
-Blood pressure is reasonably stable at this time  -Continue amlodipine and metoprolol therapy with up titration to normotension goal per neurology note  -Counseled patient on dietary and lifestyle modifications including sodium restriction to less than 1800 mg of sodium daily and weight reduction with goal BMI less than 29

## 2024-03-06 NOTE — PHYSICAL THERAPY NOTE
PHYSICAL THERAPY TREATMENT NOTE  NAME:  Jina Norris  DATE: 03/06/24    Length Of Stay: 6  Performed at least 2 patient identifiers during session: Name and ID bracelet    TREATMENT:      03/06/24 0815   PT Last Visit   PT Visit Date 03/06/24   Note Type   Note Type Treatment   Pain Assessment   Pain Assessment Tool 0-10   Pain Score No Pain   Restrictions/Precautions   Weight Bearing Precautions Per Order No   Other Precautions Fall Risk   General   Chart Reviewed Yes   Family/Caregiver Present No   Cognition   Overall Cognitive Status WFL   Arousal/Participation Alert;Cooperative   Attention Within functional limits   Orientation Level Oriented X4   Memory Decreased recall of precautions;Decreased recall of recent events;Decreased short term memory   Following Commands Follows one step commands without difficulty   Comments pt agreeable to PT session   Bed Mobility   Additional Comments pt OOB on commode upon arrival and remains OOB in recliner upon conclusion   Transfers   Sit to Stand 5  Supervision   Additional items Assist x 1;Armrests;Increased time required;Verbal cues   Stand to Sit 5  Supervision   Additional items Armrests;Impulsive;Verbal cues   Stand pivot 5  Supervision   Additional items Assist x 1;Impulsive;Verbal cues   Toilet transfer 5  Supervision   Additional items Assist x 1;Commode;Increased time required   Additional Comments RW used; VC for safe hand placement, proper body mechanics and RW management   Ambulation/Elevation   Gait pattern Improper Weight shift;Decreased foot clearance;Short stride   Gait Assistance 5  Supervision   Additional items Assist x 1;Verbal cues   Assistive Device Rolling walker   Distance 125 ft x1   Ambulation/Elevation Additional Comments verbal cues required for proper RW management   Balance   Static Sitting Good   Dynamic Sitting Fair +   Static Standing Fair -   Dynamic Standing Fair -   Ambulatory Poor +   Endurance Deficit   Endurance Deficit Yes    Endurance Deficit Description decreased activity tolerance   Activity Tolerance   Activity Tolerance Patient limited by fatigue   Nurse Made Aware yes, RN made aware of outcomes   Assessment   Prognosis Good   Problem List Decreased strength;Decreased endurance;Impaired balance;Decreased mobility;Decreased coordination;Impaired judgement;Decreased safety awareness   Assessment Pt seen for PT treatment session this date with interventions consisting of gait training w/ emphasis on improving pt's ability to ambulate level surfaces x 125 ftx1 with close S provided by therapist with RW and therapeutic activity consisting of training: sit<>stand transfers and commode transfer . Pt agreeable to PT treatment session upon arrival, pt found seated OOB on BSC, in no apparent distress and responsive. In comparison to previous session, pt with no improvements as evidenced by pt limited by fatigue . Post session: pt returned back to recliner, chair alarm engaged, all needs in reach, and RN notified of session findings/recommendations. Continue to recommend Level II (Moderate Resource Intensity at time of d/c in order to maximize pt's functional independence and safety w/ mobility. Pt continues to be functioning below baseline level. PT will continue to see pt during current hospitalization in order to address the deficits listed above and provide interventions consistent w/ POC in effort to achieve STGs.    So Meyers, PTA   Barriers to Discharge Inaccessible home environment   Goals   Patient Goals to go home   STG Expiration Date 03/10/24   Plan   Treatment/Interventions Functional transfer training;LE strengthening/ROM;Therapeutic exercise;Endurance training;Bed mobility;Gait training;Spoke to nursing   PT Frequency 3-5x/wk   Discharge Recommendation   Rehab Resource Intensity Level, PT II (Moderate Resource Intensity)   AM-PAC Basic Mobility Inpatient   Turning in Flat Bed Without Bedrails 3   Lying on Back to  Sitting on Edge of Flat Bed Without Bedrails 3   Moving Bed to Chair 3   Standing Up From Chair Using Arms 3   Walk in Room 3   Climb 3-5 Stairs With Railing 2   Basic Mobility Inpatient Raw Score 17   Basic Mobility Standardized Score 39.67   Highest Level Of Mobility   -HLM Goal 5: Stand one or more mins   JH-HLM Achieved 7: Walk 25 feet or more   End of Consult   Patient Position at End of Consult Bedside chair;Bed/Chair alarm activated;All needs within reach         The patient's AM-PAC Basic Mobility Inpatient Short Form Raw Score is 17. A Raw score of greater than 16 suggests the patient may benefit from discharge to home. Please also refer to the recommendation of the Physical Therapist for safe discharge planning.      So Meyers PTA,PTA

## 2024-03-06 NOTE — PLAN OF CARE
Problem: SAFETY ADULT  Goal: Patient will remain free of falls  Description: INTERVENTIONS:  - Educate patient/family on patient safety including physical limitations  - Instruct patient to call for assistance with activity   - Consult OT/PT to assist with strengthening/mobility   - Keep Call bell within reach  - Keep bed low and locked with side rails adjusted as appropriate  - Keep care items and personal belongings within reach  - Initiate and maintain comfort rounds  - Make Fall Risk Sign visible to staff  - Offer Toileting every 2 Hours, in advance of need  - Initiate/Maintain bed/chair alarm  - Apply yellow socks and bracelet for high fall risk patients  - Consider moving patient to room near nurses station  Outcome: Progressing

## 2024-03-06 NOTE — PROGRESS NOTES
[facility]  Progress Note  Name: Jina Norris I  MRN: [id number]  Unit/Bed#: -[facility] I Date of Admission: 2/28/2024   Date of Service: 3/6/2024 I Hospital Day: 6    Assessment/Plan   * Acute CVA (cerebrovascular accident) (HCC)  Assessment & Plan  Presented for right hand weakness and fall   CT head: There is a new approximately 1 cm hypodensity within the right inferior frontal lobe on series 2 image 17 compared to prior CT and MRI examinations from November 2023. This is nonspecific and may represent interval small infarction, now likely late subacute to chronic. This could be confirmed with follow-up nonemergent MRI of the brain.  Admitted to stroke pathway  MRI brain: Recent infarcts are identified in the right anterior deep frontal white matter, left insular cortex, and left posterior frontal deep white matter without evidence of hemorrhage. Embolic phenomenon should be considered given the bilateral involvement. Moderate chronic microangiopathic changes.  Neurology recommendations appreciated. Suspect embolic given bilateral hemispheric involvement. Continue DAPT with aspirin and clopidogrel for 21 days and then continue with monotherapy with clopidogrel  DILLAN results reviewed.  Negative for LA thrombus.  Neurology recommending long-term cardiac monitor on discharge  Continue statin  Therapy recommendations appreciated: Moderate resource intensity  Case management following, assistance appreciated.  Previously accepted by ARU, but upon re-evaluation by therapy she no longer meets criteria.  Referrals have been made to skilled facilities for rehab          Stage 3a chronic kidney disease (HCC)  Assessment & Plan  Lab Results   Component Value Date    EGFR 31 03/06/2024    EGFR 39 03/05/2024    EGFR 39 03/04/2024    CREATININE 1.56 (H) 03/06/2024    CREATININE 1.31 (H) 03/05/2024    CREATININE 1.29 03/04/2024     Creatinine at baseline of around 1.1-1.4  Creatinine today 1.56   Unclear  etiology-monitor fluid status closely, intake and output and daily weights  BMP a.m.    Essential hypertension  Assessment & Plan  BP controlled  Continue metoprolol succinate 50 mg oral daily and amlodipine 5 mg oral daily   Monitor blood pressure per protocol    Type 2 diabetes mellitus with stage 3b chronic kidney disease, with long-term current use of insulin ([facility])  Assessment & Plan  Lab Results   Component Value Date    HGBA1C 12.1 (H) 02/29/2024       Recent Labs     03/05/24  1614 03/05/24  2043 03/06/24  0841 03/06/24  1113   POCGLU 105 138 251* 367*         Blood Sugar Average: Last 72 hrs:  (P) [id number]     Poor glycemic control in outpatient setting Hemoglobin A1c elevated at 12.1.  Previously on Januvia and Ozempic, patient reportedly stopped taking  Continue diabetic diet   Target blood sugar inpatient 140-180  Blood sugars not currently optimized  Increase Levemir from 36 units to 40 units daily  Continue Humalog 12 units with meals plus sliding scale  Continue to monitor glucose and adjust insulin as needed    Acquired hypothyroidism  Assessment & Plan  TSH 2.99  Continue levothyroxine    PAD (peripheral artery disease) ([facility])  Assessment & Plan  Continue aspirin and statin  Continue outpatient follow-up with vascular surgery on discharge    Stroke-like symptoms-resolved as of 3/1/2024  Assessment & Plan  Stroke pathway completed               VTE Pharmacologic Prophylaxis: VTE Score: 9 High Risk (Score >/= 5) - Pharmacological DVT Prophylaxis Ordered: enoxaparin (Lovenox). Sequential Compression Devices Ordered.    Mobility:   Basic Mobility Inpatient Raw Score: 17  JH-HLM Goal: 5: Stand one or more mins  JH-HLM Achieved: 7: Walk 25 feet or more  HLM Goal achieved. Continue to encourage appropriate mobility.    Patient Centered Rounds: I performed bedside rounds with nursing staff today.   Discussions with Specialists or Other Care Team Provider: Nursing, PT/OT, case  management    Education and Discussions with Family / Patient: Updated  (son) via phone.    Total Time Spent on Date of Encounter in care of patient: 40 mins. This time was spent on one or more of the following: performing physical exam; counseling and coordination of care; obtaining or reviewing history; documenting in the medical record; reviewing/ordering tests, medications or procedures; communicating with other healthcare professionals and discussing with patient's family/caregivers.    Current Length of Stay: 6 day(s)  Current Patient Status: Inpatient   Certification Statement: The patient will continue to require additional inpatient hospital stay due to coordination of care, patient requires rehab placement on discharge  Discharge Plan:  Patient is here for acute CVA.  Will require rehab placement on discharge.  Referrals have been made by case management.  Creatinine slightly above baseline today, will monitor fluid status closely and repeat labs in the a.m.  Hopeful discharge to rehab facility next 24-48 hours.  Case management following aware.    Code Status: Level 1 - Full Code    Subjective:   Denies any dizziness, lightheadedness, chest pain or palpitations.  Denies any pain or discomfort.  Verbalizing needs.    Objective:     Vitals:   Temp (24hrs), Av.1 °F (36.7 °C), Min:97.7 °F (36.5 °C), Max:98.7 °F (37.1 °C)    Temp:  [97.7 °F (36.5 °C)-98.7 °F (37.1 °C)] 97.7 °F (36.5 °C)  HR:  [72-77] 77  Resp:  [18] 18  BP: (124-159)/(61-63) 159/63  SpO2:  [90 %-95 %] 93 %  Body mass index is 31.09 kg/m².     Input and Output Summary (last 24 hours):     Intake/Output Summary (Last 24 hours) at 3/6/2024 1419  Last data filed at 3/6/2024 1321  Gross per 24 hour   Intake 820 ml   Output 425 ml   Net 395 ml       Physical Exam:   Physical Exam  Vitals and nursing note reviewed.   Constitutional:       General: She is not in acute distress.     Appearance: She is well-developed. She is obese. She  is not ill-appearing.   HENT:      Head: Normocephalic and atraumatic.   Eyes:      Extraocular Movements: Extraocular movements intact.      Conjunctiva/sclera: Conjunctivae normal.      Pupils: Pupils are equal, round, and reactive to light.   Cardiovascular:      Rate and Rhythm: Normal rate and regular rhythm.      Pulses: Normal pulses.      Heart sounds: Normal heart sounds. No murmur heard.  Pulmonary:      Effort: Pulmonary effort is normal. No respiratory distress.      Breath sounds: Normal breath sounds. No wheezing or rales.   Abdominal:      General: There is no distension.      Palpations: Abdomen is soft.      Tenderness: There is no abdominal tenderness. There is no guarding.   Musculoskeletal:         General: No swelling. Normal range of motion.   Skin:     General: Skin is warm and dry.      Capillary Refill: Capillary refill takes less than 2 seconds.   Neurological:      General: No focal deficit present.      Mental Status: She is alert and oriented to person, place, and time. Mental status is at baseline.      Cranial Nerves: No cranial nerve deficit.      Sensory: No sensory deficit.      Motor: No weakness.      Coordination: Coordination normal.      Gait: Gait normal.   Psychiatric:         Mood and Affect: Mood normal.         Behavior: Behavior normal.         Thought Content: Thought content normal.         Judgment: Judgment normal.          Additional Data:     Labs:  Results from last 7 days   Lab Units 03/06/24  0431   WBC Thousand/uL 9.81   HEMOGLOBIN g/dL 11.5   HEMATOCRIT % 36.7   PLATELETS Thousands/uL 168   NEUTROS PCT % 66   LYMPHS PCT % 24   MONOS PCT % 8   EOS PCT % 1     Results from last 7 days   Lab Units 03/06/24  0431 03/04/24  0517 03/02/24  0438   SODIUM mmol/L 135   < > 132*   POTASSIUM mmol/L 4.3   < > 3.9   CHLORIDE mmol/L 103   < > 101   CO2 mmol/L 24   < > 24   BUN mg/dL 36*   < > 26*   CREATININE mg/dL 1.56*   < > 1.27   ANION GAP mmol/L 8   < > 7   CALCIUM  mg/dL 8.8   < > 8.8   ALBUMIN g/dL  --   --  3.4*   TOTAL BILIRUBIN mg/dL  --   --  0.46   ALK PHOS U/L  --   --  52   ALT U/L  --   --  7   AST U/L  --   --  11*   GLUCOSE RANDOM mg/dL 173*   < > 199*    < > = values in this interval not displayed.     Results from last 7 days   Lab Units 03/02/24  0438   INR  0.99     Results from last 7 days   Lab Units 03/06/24  1113 03/06/24  0841 03/05/24  2043 03/05/24  1614 03/05/24  1118 03/05/24  0655 03/04/24  2059 03/04/24  1555 03/04/24  1118 03/04/24  0912 03/04/24  0658 03/03/24 2029   POC GLUCOSE mg/dl 367* 251* 138 105 210* 208* 113 121 185* 128 200* 165*     Results from last 7 days   Lab Units 02/29/24  0405   HEMOGLOBIN A1C % 12.1*     Results from last 7 days   Lab Units 02/28/24  1551   LACTIC ACID mmol/L 1.0       Lines/Drains:  Invasive Devices       Peripheral Intravenous Line  Duration             Peripheral IV 03/04/24 Dorsal (posterior);Right Forearm 2 days                          Imaging: Reviewed radiology reports from this admission including: CT head and MRI brain    Recent Cultures (last 7 days):         Last 24 Hours Medication List:   Current Facility-Administered Medications   Medication Dose Route Frequency Provider Last Rate    amLODIPine  5 mg Oral Daily LAURITA Bernal      aspirin  81 mg Oral Daily Akil Davis MD      calcium carbonate  500 mg Oral Daily PRN LAURITA Bernal      clopidogrel  75 mg Oral Daily Akil Davis MD      DULoxetine  20 mg Oral Daily Akil Davis MD      enoxaparin  30 mg Subcutaneous Daily Akil Davis MD      fluticasone  2 spray Nasal Daily Akil Davis MD      folic acid  400 mcg Oral Daily Akil Davis MD      [START ON 3/7/2024] insulin detemir  40 Units Subcutaneous Daily LAURITA Wilburn      insulin lispro  1-6 Units Subcutaneous TID AC Akil Davis MD      insulin lispro  12 Units Subcutaneous TID With Meals Rolly Sol DO      levothyroxine  50 mcg Oral Early Morning Akil  MD Susan      metoprolol succinate  50 mg Oral Daily Rolly Funes DO      nystatin   Topical BID Akil Davis MD      pantoprazole  40 mg Oral Early Morning Akil Davis MD      pravastatin  80 mg Oral Daily With Dinner Akil Davis MD          Today, Patient Was Seen By: LAURITA Wilburn    **Please Note: This note may have been constructed using a voice recognition system.**

## 2024-03-06 NOTE — PROGRESS NOTES
[facility]  Progress Note  Name: Jina Norris I  MRN: [id number]  Unit/Bed#: -01 I Date of Admission: 2/28/2024   Date of Service: 3/6/2024 I Hospital Day: 6    Assessment/Plan   Stage 3a chronic kidney disease (HCC)  Assessment & Plan  Lab Results   Component Value Date    EGFR 31 03/06/2024    EGFR 39 03/05/2024    EGFR 39 03/04/2024    CREATININE 1.56 (H) 03/06/2024    CREATININE 1.31 (H) 03/05/2024    CREATININE 1.29 03/04/2024     -Creatinine continuing to slowly increase  -Plan of care per primary team however could consider nephrology evaluation if continues to rise and would avoid nephrotoxic agents.    Acquired hypothyroidism  Assessment & Plan  -Continue levothyroxine with plan of care per primary team      Essential hypertension  Assessment & Plan  -Blood pressure is reasonably stable at this time  -Continue amlodipine and metoprolol therapy with up titration to normotension goal per neurology note  -Counseled patient on dietary and lifestyle modifications including sodium restriction to less than 1800 mg of sodium daily and weight reduction with goal BMI less than 29      Type 2 diabetes mellitus with stage 3b chronic kidney disease, with long-term current use of insulin ([facility])  Assessment & Plan  Lab Results   Component Value Date    HGBA1C 12.1 (H) 02/29/2024       Recent Labs     03/05/24  0655 03/05/24  1118 03/05/24  1614 03/05/24  2043   POCGLU 208* 210* 105 138         Blood Sugar Average: Last 72 hrs:  (P) 167.6354954206239430    -Counseled patient on dietary lifestyle modifications  -Plan of care per primary team    * Acute CVA (cerebrovascular accident) ([facility])  Assessment & Plan  -Transesophageal echocardiogram showing no left atrial, left ventricular left atrial appendage thrombus  -Would recommend continuing telemetry monitoring throughout hospital stay although no significant atrial fibrillation was seen while on monitor  -Continue plan of care per primary team  "and neurology  -Would recommend transition to high intensity statin given recent CVA if cleared by neurology and primary team  -Will continue dual antiplatelet therapy with recommendations per neurology.  -Ambulatory event monitor orders placed and will send message to office staff to assist with this along with hospital follow-up and if unrevealing may benefit from implantable loop recorder.        Subjective:   Patient seen and examined.  Per patient, denies any chest pain, palpitations, lightness dizziness, loss consciousness, shortness of breath or lower extremity OSMIN.  She notes overall feeling reasonably well but continues to have right facial droop and slight weakness in right hand and upper extremity.    Summary comments:  -Will continue current medical therapy with dual antiplatelet strategy managed by neurology along with amlodipine and metoprolol therapy would recommend up titration to high intensity statin in setting of recent CVA  -Continue to uptitrate antihypertensive regimen as patient tolerates to maintain normotension per neurology with note  -Ambulatory event monitor ordered which if abnormal per neurology recommendations with patient would likely undergo implantable loop recorder at that time  -Patient counseled on dietary lifestyle modifications including sodium restriction to less than 1800 mg of sodium daily and weight reduction goal BMI less than 29  -I have sent message to office staff to help with scheduling hospital follow-up  -Cardiology will sign off at this time.        Vitals: Blood pressure 159/63, pulse 77, temperature 97.7 °F (36.5 °C), temperature source Oral, resp. rate 18, height 5' 2\" (1.575 m), weight 77.1 kg (170 lb), SpO2 93%.,   Orthostatic Blood Pressures      Flowsheet Row Most Recent Value   Blood Pressure 159/63 filed at 03/06/2024 0747   Patient Position - Orthostatic VS Lying filed at 03/06/2024 0747        ,   Weight (last 2 days)       None            Physical " Exam:  Physical Exam  Vitals reviewed.   Constitutional:       General: She is not in acute distress.     Appearance: She is obese. She is not diaphoretic.   HENT:      Head: Normocephalic and atraumatic.   Eyes:      General:         Right eye: No discharge.         Left eye: No discharge.   Neck:      Comments: Trachea midline, neck obese, difficult to assess JVD  Cardiovascular:      Rate and Rhythm: Normal rate and regular rhythm.      Heart sounds:      No friction rub.   Pulmonary:      Effort: No respiratory distress.      Breath sounds: No wheezing.      Comments: Decreased breath sounds bilaterally.  Chest:      Chest wall: No tenderness.   Abdominal:      General: Bowel sounds are normal.      Palpations: Abdomen is soft.      Tenderness: There is no abdominal tenderness. There is no rebound.   Musculoskeletal:      Right lower leg: No edema.      Left lower leg: No edema.   Skin:     General: Skin is warm and dry.   Neurological:      Mental Status: She is alert.      Comments: Awake, alert, able to answer questions appropriately, right facial droop present, right hand and upper extremity weakness present however patient able to move extremities bilaterally.   Psychiatric:         Mood and Affect: Mood normal.         Behavior: Behavior normal.          Medications:      Current Facility-Administered Medications:     amLODIPine (NORVASC) tablet 5 mg, 5 mg, Oral, Daily, LAURITA Bernal, 5 mg at 03/06/24 0846    aspirin (ECOTRIN LOW STRENGTH) EC tablet 81 mg, 81 mg, Oral, Daily, Akil Davis MD, 81 mg at 03/06/24 0846    calcium carbonate (TUMS) chewable tablet 500 mg, 500 mg, Oral, Daily PRN, LAURITA Bernal, 500 mg at 03/04/24 1434    clopidogrel (PLAVIX) tablet 75 mg, 75 mg, Oral, Daily, Akil Davis MD, 75 mg at 03/06/24 0846    DULoxetine (CYMBALTA) delayed release capsule 20 mg, 20 mg, Oral, Daily, Akil Davis MD, 20 mg at 03/06/24 0846    enoxaparin (LOVENOX) subcutaneous injection 30  mg, 30 mg, Subcutaneous, Daily, Akil Davis MD, 30 mg at 03/06/24 0846    fluticasone (FLONASE) 50 mcg/act nasal spray 2 spray, 2 spray, Nasal, Daily, Akil Davis MD, 2 spray at 03/06/24 0847    folic acid (FOLVITE) tablet 400 mcg, 400 mcg, Oral, Daily, Akil Davis MD, 400 mcg at 03/06/24 0846    insulin detemir (LEVEMIR) subcutaneous injection 36 Units, 36 Units, Subcutaneous, Daily, Rolly Sol DO, 36 Units at 03/06/24 0846    insulin lispro (HumALOG/ADMELOG) 100 units/mL subcutaneous injection 1-6 Units, 1-6 Units, Subcutaneous, TID AC, 3 Units at 03/06/24 0847 **AND** Fingerstick Glucose (POCT), , , TID AC, Akil Davis MD    insulin lispro (HumALOG/ADMELOG) 100 units/mL subcutaneous injection 12 Units, 12 Units, Subcutaneous, TID With Meals, Rolly Sol DO, 12 Units at 03/06/24 0847    levothyroxine tablet 50 mcg, 50 mcg, Oral, Early Morning, Akil Davis MD, 50 mcg at 03/06/24 0519    metoprolol succinate (TOPROL-XL) 24 hr tablet 50 mg, 50 mg, Oral, Daily, Rolly Funes DO, 50 mg at 03/06/24 0846    nystatin (MYCOSTATIN) powder, , Topical, BID, Akil Davis MD, Given at 03/06/24 0847    pantoprazole (PROTONIX) EC tablet 40 mg, 40 mg, Oral, Early Morning, Akil Davis MD, 40 mg at 03/06/24 0519    pravastatin (PRAVACHOL) tablet 80 mg, 80 mg, Oral, Daily With Dinner, Akil Davis MD, 80 mg at 03/05/24 1700     Labs & Results:  Labs reviewed and prominent abnormalities reviewed above and/or below.  Troponins:    Results from last 7 days   Lab Units 02/28/24  1831 02/28/24  1551   HS TNI 0HR ng/L  --  7   HS TNI 2HR ng/L 6  --    HSTNI D2 ng/L -1  --         BNP:

## 2024-03-06 NOTE — ASSESSMENT & PLAN NOTE
Lab Results   Component Value Date    EGFR 31 03/06/2024    EGFR 39 03/05/2024    EGFR 39 03/04/2024    CREATININE 1.56 (H) 03/06/2024    CREATININE 1.31 (H) 03/05/2024    CREATININE 1.29 03/04/2024     -Creatinine continuing to slowly increase  -Plan of care per primary team however could consider nephrology evaluation if continues to rise and would avoid nephrotoxic agents.

## 2024-03-06 NOTE — NUTRITION
03/06/24 1129   Biochemical Data,Medical Tests, and Procedures   Labs (Comment) 3/6/24 BUN 36, Creatinine 1.56, Glucose 173   Meds (Comment) norvasc, plavix, cymbalta, lovenox, folvite, levemir, insulin, levothyroxine, toprol-xl, mycostatin, protonix, pravachol   Nutrition-Focused Physical Exam   Nutrition-Focused Physical Exam Findings RN skin assessment reviewed;No skin issues documented;No edema documented   Medical-Related Concerns PMH reviewed.   Adequacy of Intake   Nutrition Modality PO   Feeding Route   PO Independent   Current PO Intake   Current Diet Order CCD2 diet, thin liquids.   Current Meal Intake %   Estimated calorie intake compared to estimated need Nutrient needs met.   PES Statement   Problem Continue previous diagnosis   Recommendations/Interventions   Malnutrition/BMI Present No   Summary Nutrition follow-up. Met with patient at chairside. Patient reports a good appetite. Denies any problems chewing or swallowing. Prescribed a CCD2 diet, thin liquids. Explained prescribed diet to patient. Patient noted that she has received DM education in the past. Patient offers no further nutrition related questions at this time.   Interventions/Recommendations Continue current diet order   Education Assessment   Education Patient declined nutrition education   Patient Nutrition Goals   Goal Improve to healthful diet

## 2024-03-06 NOTE — ASSESSMENT & PLAN NOTE
BP controlled  Continue metoprolol succinate 50 mg oral daily and amlodipine 5 mg oral daily   Monitor blood pressure per protocol

## 2024-03-06 NOTE — ASSESSMENT & PLAN NOTE
Presented for right hand weakness and fall   CT head: There is a new approximately 1 cm hypodensity within the right inferior frontal lobe on series 2 image 17 compared to prior CT and MRI examinations from November 2023. This is nonspecific and may represent interval small infarction, now likely late subacute to chronic. This could be confirmed with follow-up nonemergent MRI of the brain.  Admitted to stroke pathway  MRI brain: Recent infarcts are identified in the right anterior deep frontal white matter, left insular cortex, and left posterior frontal deep white matter without evidence of hemorrhage. Embolic phenomenon should be considered given the bilateral involvement. Moderate chronic microangiopathic changes.  Neurology recommendations appreciated. Suspect embolic given bilateral hemispheric involvement. Continue DAPT with aspirin and clopidogrel for 21 days and then continue with monotherapy with clopidogrel  DILLAN results reviewed.  Negative for LA thrombus.  Neurology recommending long-term cardiac monitor on discharge  Continue statin  Therapy recommendations appreciated: Moderate resource intensity  Case management following, assistance appreciated.  Previously accepted by ARU, but upon re-evaluation by therapy she no longer meets criteria.  Referrals have been made to skilled facilities for rehab

## 2024-03-06 NOTE — PLAN OF CARE
Problem: PAIN - ADULT  Goal: Verbalizes/displays adequate comfort level or baseline comfort level  Description: Interventions:  - Encourage patient to monitor pain and request assistance  - Assess pain using appropriate pain scale  - Administer analgesics based on type and severity of pain and evaluate response  - Implement non-pharmacological measures as appropriate and evaluate response  - Consider cultural and social influences on pain and pain management  - Notify physician/advanced practitioner if interventions unsuccessful or patient reports new pain  Outcome: Progressing     Problem: INFECTION - ADULT  Goal: Absence or prevention of progression during hospitalization  Description: INTERVENTIONS:  - Assess and monitor for signs and symptoms of infection  - Monitor lab/diagnostic results  - Monitor all insertion sites, i.e. indwelling lines, tubes, and drains  - Monitor endotracheal if appropriate and nasal secretions for changes in amount and color  - [place] appropriate cooling/warming therapies per order  - Administer medications as ordered  - Instruct and encourage patient and family to use good hand hygiene technique  - Identify and instruct in appropriate isolation precautions for identified infection/condition  Outcome: Progressing  Goal: Absence of fever/infection during neutropenic period  Description: INTERVENTIONS:  - Monitor WBC    Outcome: Progressing

## 2024-03-06 NOTE — PLAN OF CARE
Problem: PHYSICAL THERAPY ADULT  Goal: Performs mobility at highest level of function for planned discharge setting.  See evaluation for individualized goals.  Description: Treatment/Interventions: Functional transfer training, LE strengthening/ROM, Elevations, Therapeutic exercise, Endurance training, Patient/family training, Equipment eval/education, Bed mobility, Gait training, Cognitive reorientation, Spoke to nursing  Equipment Recommended: Walker (continue RW use)       See flowsheet documentation for full assessment, interventions and recommendations.  Outcome: Not Progressing  Note: Prognosis: Good  Problem List: Decreased strength, Decreased endurance, Impaired balance, Decreased mobility, Decreased coordination, Impaired judgement, Decreased safety awareness  Assessment: Pt seen for PT treatment session this date with interventions consisting of gait training w/ emphasis on improving pt's ability to ambulate level surfaces x 125 ftx1 with close S provided by therapist with RW and therapeutic activity consisting of training: sit<>stand transfers and commode transfer . Pt agreeable to PT treatment session upon arrival, pt found seated OOB on BSC, in no apparent distress and responsive. In comparison to previous session, pt with no improvements as evidenced by pt limited by fatigue . Post session: pt returned back to recliner, chair alarm engaged, all needs in reach, and RN notified of session findings/recommendations. Continue to recommend Level II (Moderate Resource Intensity at time of d/c in order to maximize pt's functional independence and safety w/ mobility. Pt continues to be functioning below baseline level. PT will continue to see pt during current hospitalization in order to address the deficits listed above and provide interventions consistent w/ POC in effort to achieve STGs.    So Meyers, PTA  Barriers to Discharge: Inaccessible home environment     Rehab Resource Intensity Level,  PT: II (Moderate Resource Intensity)    See flowsheet documentation for full assessment.

## 2024-03-06 NOTE — ASSESSMENT & PLAN NOTE
-Transesophageal echocardiogram showing no left atrial, left ventricular left atrial appendage thrombus  -Would recommend continuing telemetry monitoring throughout hospital stay although no significant atrial fibrillation was seen while on monitor  -Continue plan of care per primary team and neurology  -Would recommend transition to high intensity statin given recent CVA if cleared by neurology and primary team  -Will continue dual antiplatelet therapy with recommendations per neurology.  -Ambulatory event monitor orders placed and will send message to office staff to assist with this along with hospital follow-up and if unrevealing may benefit from implantable loop recorder.

## 2024-03-06 NOTE — ASSESSMENT & PLAN NOTE
Lab Results   Component Value Date    EGFR 31 03/06/2024    EGFR 39 03/05/2024    EGFR 39 03/04/2024    CREATININE 1.56 (H) 03/06/2024    CREATININE 1.31 (H) 03/05/2024    CREATININE 1.29 03/04/2024     Creatinine at baseline of around 1.1-1.4  Creatinine today 1.56   Unclear etiology-monitor fluid status closely, intake and output and daily weights  BMP a.m.

## 2024-03-06 NOTE — ASSESSMENT & PLAN NOTE
Lab Results   Component Value Date    HGBA1C 12.1 (H) 02/29/2024       Recent Labs     03/05/24  1614 03/05/24  2043 03/06/24  0841 03/06/24  1113   POCGLU 105 138 251* 367*         Blood Sugar Average: Last 72 hrs:  (P) 186.9566445826989548     Poor glycemic control in outpatient setting Hemoglobin A1c elevated at 12.1.  Previously on Januvia and Ozempic, patient reportedly stopped taking  Continue diabetic diet   Target blood sugar inpatient 140-180  Blood sugars not currently optimized  Increase Levemir from 36 units to 40 units daily  Continue Humalog 12 units with meals plus sliding scale  Continue to monitor glucose and adjust insulin as needed

## 2024-03-06 NOTE — ASSESSMENT & PLAN NOTE
Lab Results   Component Value Date    HGBA1C 12.1 (H) 02/29/2024       Recent Labs     03/05/24  0655 03/05/24  1118 03/05/24  1614 03/05/24 2043   POCGLU 208* 210* 105 138         Blood Sugar Average: Last 72 hrs:  (P) 167.1110706387652728    -Counseled patient on dietary lifestyle modifications  -Plan of care per primary team

## 2024-03-07 ENCOUNTER — TELEPHONE (OUTPATIENT)
Dept: CARDIOLOGY CLINIC | Facility: CLINIC | Age: 79
End: 2024-03-07

## 2024-03-07 ENCOUNTER — HOME HEALTH ADMISSION (OUTPATIENT)
Dept: HOME HEALTH SERVICES | Facility: HOME HEALTHCARE | Age: 79
End: 2024-03-07
Payer: MEDICARE

## 2024-03-07 DIAGNOSIS — I63.9 ACUTE CVA (CEREBROVASCULAR ACCIDENT) (HCC): Primary | ICD-10-CM

## 2024-03-07 LAB
ANION GAP SERPL CALCULATED.3IONS-SCNC: 8 MMOL/L
BASOPHILS # BLD AUTO: 0.05 THOUSANDS/ÂΜL (ref 0–0.1)
BASOPHILS NFR BLD AUTO: 1 % (ref 0–1)
BUN SERPL-MCNC: 41 MG/DL (ref 5–25)
CALCIUM SERPL-MCNC: 8.7 MG/DL (ref 8.4–10.2)
CHLORIDE SERPL-SCNC: 105 MMOL/L (ref 96–108)
CO2 SERPL-SCNC: 24 MMOL/L (ref 21–32)
CREAT SERPL-MCNC: 1.37 MG/DL (ref 0.6–1.3)
EOSINOPHIL # BLD AUTO: 0.13 THOUSAND/ÂΜL (ref 0–0.61)
EOSINOPHIL NFR BLD AUTO: 1 % (ref 0–6)
ERYTHROCYTE [DISTWIDTH] IN BLOOD BY AUTOMATED COUNT: 13.8 % (ref 11.6–15.1)
GFR SERPL CREATININE-BSD FRML MDRD: 36 ML/MIN/1.73SQ M
GLUCOSE SERPL-MCNC: 162 MG/DL (ref 65–140)
GLUCOSE SERPL-MCNC: 169 MG/DL (ref 65–140)
GLUCOSE SERPL-MCNC: 176 MG/DL (ref 65–140)
GLUCOSE SERPL-MCNC: 178 MG/DL (ref 65–140)
GLUCOSE SERPL-MCNC: 242 MG/DL (ref 65–140)
HCT VFR BLD AUTO: 37.1 % (ref 34.8–46.1)
HGB BLD-MCNC: 11.8 G/DL (ref 11.5–15.4)
IMM GRANULOCYTES # BLD AUTO: 0.05 THOUSAND/UL (ref 0–0.2)
IMM GRANULOCYTES NFR BLD AUTO: 1 % (ref 0–2)
LYMPHOCYTES # BLD AUTO: 2.53 THOUSANDS/ÂΜL (ref 0.6–4.47)
LYMPHOCYTES NFR BLD AUTO: 25 % (ref 14–44)
MCH RBC QN AUTO: 29.9 PG (ref 26.8–34.3)
MCHC RBC AUTO-ENTMCNC: 31.8 G/DL (ref 31.4–37.4)
MCV RBC AUTO: 94 FL (ref 82–98)
MONOCYTES # BLD AUTO: 0.78 THOUSAND/ÂΜL (ref 0.17–1.22)
MONOCYTES NFR BLD AUTO: 8 % (ref 4–12)
NEUTROPHILS # BLD AUTO: 6.78 THOUSANDS/ÂΜL (ref 1.85–7.62)
NEUTS SEG NFR BLD AUTO: 64 % (ref 43–75)
NRBC BLD AUTO-RTO: 0 /100 WBCS
PLATELET # BLD AUTO: 178 THOUSANDS/UL (ref 149–390)
PMV BLD AUTO: 10.9 FL (ref 8.9–12.7)
POTASSIUM SERPL-SCNC: 4.3 MMOL/L (ref 3.5–5.3)
RBC # BLD AUTO: 3.95 MILLION/UL (ref 3.81–5.12)
SODIUM SERPL-SCNC: 137 MMOL/L (ref 135–147)
WBC # BLD AUTO: 10.32 THOUSAND/UL (ref 4.31–10.16)

## 2024-03-07 PROCEDURE — 97530 THERAPEUTIC ACTIVITIES: CPT

## 2024-03-07 PROCEDURE — 85025 COMPLETE CBC W/AUTO DIFF WBC: CPT

## 2024-03-07 PROCEDURE — 80048 BASIC METABOLIC PNL TOTAL CA: CPT

## 2024-03-07 PROCEDURE — 82948 REAGENT STRIP/BLOOD GLUCOSE: CPT

## 2024-03-07 PROCEDURE — 97110 THERAPEUTIC EXERCISES: CPT

## 2024-03-07 PROCEDURE — 97535 SELF CARE MNGMENT TRAINING: CPT

## 2024-03-07 PROCEDURE — 99233 SBSQ HOSP IP/OBS HIGH 50: CPT

## 2024-03-07 RX ORDER — CLOPIDOGREL BISULFATE 75 MG/1
75 TABLET ORAL DAILY
Qty: 30 TABLET | Refills: 0 | Status: SHIPPED | OUTPATIENT
Start: 2024-03-08 | End: 2024-04-07

## 2024-03-07 RX ADMIN — Medication 400 MCG: at 09:26

## 2024-03-07 RX ADMIN — LEVOTHYROXINE SODIUM 50 MCG: 25 TABLET ORAL at 05:13

## 2024-03-07 RX ADMIN — NYSTATIN: 100000 POWDER TOPICAL at 17:21

## 2024-03-07 RX ADMIN — INSULIN LISPRO 1 UNITS: 100 INJECTION, SOLUTION INTRAVENOUS; SUBCUTANEOUS at 07:45

## 2024-03-07 RX ADMIN — INSULIN LISPRO 12 UNITS: 100 INJECTION, SOLUTION INTRAVENOUS; SUBCUTANEOUS at 07:45

## 2024-03-07 RX ADMIN — INSULIN LISPRO 3 UNITS: 100 INJECTION, SOLUTION INTRAVENOUS; SUBCUTANEOUS at 11:48

## 2024-03-07 RX ADMIN — ENOXAPARIN SODIUM 30 MG: 100 INJECTION SUBCUTANEOUS at 09:25

## 2024-03-07 RX ADMIN — NYSTATIN: 100000 POWDER TOPICAL at 09:26

## 2024-03-07 RX ADMIN — INSULIN LISPRO 12 UNITS: 100 INJECTION, SOLUTION INTRAVENOUS; SUBCUTANEOUS at 16:36

## 2024-03-07 RX ADMIN — METOPROLOL SUCCINATE 50 MG: 50 TABLET, EXTENDED RELEASE ORAL at 09:26

## 2024-03-07 RX ADMIN — CLOPIDOGREL 75 MG: 75 TABLET ORAL at 09:26

## 2024-03-07 RX ADMIN — ASPIRIN 81 MG: 81 TABLET, COATED ORAL at 09:26

## 2024-03-07 RX ADMIN — PANTOPRAZOLE SODIUM 40 MG: 40 TABLET, DELAYED RELEASE ORAL at 05:13

## 2024-03-07 RX ADMIN — AMLODIPINE BESYLATE 5 MG: 5 TABLET ORAL at 09:26

## 2024-03-07 RX ADMIN — INSULIN LISPRO 12 UNITS: 100 INJECTION, SOLUTION INTRAVENOUS; SUBCUTANEOUS at 11:49

## 2024-03-07 RX ADMIN — DULOXETINE HYDROCHLORIDE 20 MG: 20 CAPSULE, DELAYED RELEASE ORAL at 09:26

## 2024-03-07 RX ADMIN — INSULIN LISPRO 1 UNITS: 100 INJECTION, SOLUTION INTRAVENOUS; SUBCUTANEOUS at 16:35

## 2024-03-07 RX ADMIN — INSULIN DETEMIR 40 UNITS: 100 INJECTION, SOLUTION SUBCUTANEOUS at 09:25

## 2024-03-07 RX ADMIN — PRAVASTATIN SODIUM 80 MG: 20 TABLET ORAL at 17:21

## 2024-03-07 NOTE — ASSESSMENT & PLAN NOTE
Lab Results   Component Value Date    HGBA1C 12.1 (H) 02/29/2024       Recent Labs     03/06/24  2041 03/07/24  0740 03/07/24  1113 03/07/24  1618   POCGLU 122 169* 242* 176*         Blood Sugar Average: Last 72 hrs:  (P) 180.5625     Poor glycemic control in outpatient setting Hemoglobin A1c elevated at 12.1.  Previously on Januvia and Ozempic, patient reportedly stopped taking  Continue diabetic diet on discharge  Continue prehospital diabetic regimen on discharge  Encouraged to follow with endocrinology

## 2024-03-07 NOTE — PLAN OF CARE
Problem: OCCUPATIONAL THERAPY ADULT  Goal: Performs self-care activities at highest level of function for planned discharge setting.  See evaluation for individualized goals.  Description: Treatment Interventions: ADL retraining, Functional transfer training, Endurance training, Patient/family training, Equipment evaluation/education, Compensatory technique education, Energy conservation, Activityengagement, Cognitive reorientation    See flowsheet documentation for full assessment, interventions and recommendations.   Outcome: Progressing  Note: Limitation: Decreased ADL status, Decreased UE strength, Decreased Safe judgement during ADL, Decreased endurance, Decreased fine motor control, Decreased self-care trans, Decreased high-level ADLs  Prognosis: Good  Assessment: Patient participated in Skilled OT session this date with interventions consisting of ADL re training with the use of correct body mechnaics, safety awareness and fall prevention techniques, therapeutic exercise to: increase functional use of BUEs, increase BUE muscle strength ,  therapeutic activities to: increase activity tolerance, increase dynamic sit/ stand balance during functional activity , and increase OOB/ sitting tolerance . Patient agreeable to OT treatment session, upon arrival patient was found seated in bed (back supported).  In comparison to previous session, patient with improvements in self-care accomplishment and functional mobility safety.  Patient requiring ocassional safety reminders, occasional rest periods, occasional cues for correct technique, and self-care assistance as noted in flow sheet/AM-PAC. Patient continues to be functioning below baseline level, occupational performance remains limited secondary to factors listed above and increased risk for falls and injury.  Patient to benefit from continued Occupational Therapy treatment while in the hospital to address deficits as defined above and maximize level of  functional independence with ADLs and functional mobility.     Rehab Resource Intensity Level, OT: II (Moderate Resource Intensity)     SUMAN Willis/KAYCE

## 2024-03-07 NOTE — ASSESSMENT & PLAN NOTE
Presented for right hand weakness and fall   CT head: There is a new approximately 1 cm hypodensity within the right inferior frontal lobe on series 2 image 17 compared to prior CT and MRI examinations from November 2023. This is nonspecific and may represent interval small infarction, now likely late subacute to chronic. This could be confirmed with follow-up nonemergent MRI of the brain.  Admitted to stroke pathway  MRI brain: Recent infarcts are identified in the right anterior deep frontal white matter, left insular cortex, and left posterior frontal deep white matter without evidence of hemorrhage. Embolic phenomenon should be considered given the bilateral involvement. Moderate chronic microangiopathic changes.  Neurology recommendations appreciated. Suspect embolic given bilateral hemispheric involvement. Continue DAPT with aspirin and clopidogrel for 21 days and then continue with monotherapy with clopidogrel  DILLAN results reviewed.  Negative for LA thrombus.  Neurology recommending long-term cardiac monitor on discharge  Continue statin  Therapy recommendations appreciated: Moderate resource intensity  3/7 Case management following.  Patient is a refusing rehab placement on discharge.  She is alert and oriented x 4 on my exam.  Plan was to discharge patient home, however, family to pick her up and are refusing to take her home.  They states she does not have the capability of making good medical decisions.  Will consult neuropsych.  Family was made aware that pending the decision of neuropsych patient will be discharged home with home health care tomorrow.  3/8 evaluated by neuropsychology deemed capable of making medical decisions.  Patient was also awake alert and oriented x 4 on my exam followed commands.  She is medically stable and will be discharged home today with home health care as arranged by our case management team

## 2024-03-07 NOTE — OCCUPATIONAL THERAPY NOTE
"   03/07/24 0956   OT Last Visit   OT Visit Date 03/07/24   Note Type   Note Type Treatment   Pain Assessment   Pain Assessment Tool 0-10   Pain Score No Pain   Restrictions/Precautions   Weight Bearing Precautions Per Order No   Other Precautions Fall Risk   Lifestyle   Reciprocal Relationships patient reports that her son is on his way from [place] > \"stuck in some bad weather\"   Intrinsic Gratification patient verbalizes eagerness to return to usual activities of interest   ADL   Where Assessed Edge of bed  (and seated in recliner)   Grooming Comments patient brushed teeth and combed hair with good results  (practiced (and increased success with) toothpaste cap Open and Close)   UB Bathing Assistance 5  Supervision/Setup   UB Bathing Deficit Setup;Increased time to complete   LB Bathing Assistance 4  Minimal Assistance   LB Bathing Deficit Setup;Verbal cueing;Increased time to complete  (Both Feet)   UB Dressing Assistance 4  Minimal Assistance   UB Dressing Comments *for hospital gown change (unfamiliar aspects of same)   LB Dressing Assistance 4  Minimal Assistance   LB Dressing Deficit Setup;Requires assistive device for steadying;Verbal cueing;Supervision/safety;Increased time to complete;Don/doff R sock;Don/doff L sock;Pull up over hips   Bed Mobility   Supine to Sit 6  Modified independent   Additional items HOB elevated;Bedrails   Additional Comments patient remained OOB in recliner at end of session   Transfers   Sit to Stand 5  Supervision   Additional items Assist x 1;Bedrails;Increased time required   Stand to Sit 5  Supervision   Additional items Assist x 1;Armrests;Increased time required;Verbal cues   Stand pivot 5  Supervision   Additional items Assist x 1;Armrests;Increased time required;Verbal cues  (decreased Impulsivity during today's functional mobility)   Therapeutic Excerise-Strength   UE Strength Yes   Right Upper Extremity- Strength   R Shoulder Flexion;Horizontal ABduction;Other " (Comment)  (horizontal adduction;  Pro/re-traction)   R Elbow Elbow flexion;Elbow extension  (And supin/pron-ation)   R Weight/Reps/Sets 10 reps shoulders done Actively ; 15 reps elbows done With 2 pound weight   Left Upper Extremity-Strength   L Shoulder Flexion;Horizontal ABduction;Other (Comment)  (horizontal adduction;  Pro/re-traction)   L Elbow Elbow flexion;Elbow extension  (and supin/pron-ation)   L Weights/Reps/Sets 2 pound weight: 10 reps shoulders and 15 reps elbows   LUE Strength Comment some exercises done bilaterally toward increased coordination   Coordination   Fine Motor provided patient with handout of fine motor exercise suggestions that can be done at home > patient has impairment of R (dominant)  (*patient voiced understanding of above)   Cognition   Overall Cognitive Status WFL   Arousal/Participation Alert;Cooperative   Attention Within functional limits   Orientation Level Oriented X4   Following Commands Follows one step commands without difficulty   Activity Tolerance   Activity Tolerance Patient tolerated treatment well   Medical Staff Made Aware Nurse Patti aware of session.   Assessment   Assessment Patient participated in Skilled OT session this date with interventions consisting of ADL re training with the use of correct body mechnaics, safety awareness and fall prevention techniques, therapeutic exercise to: increase functional use of BUEs, increase BUE muscle strength ,  therapeutic activities to: increase activity tolerance, increase dynamic sit/ stand balance during functional activity , and increase OOB/ sitting tolerance . Patient agreeable to OT treatment session, upon arrival patient was found seated in bed (back supported).  In comparison to previous session, patient with improvements in self-care accomplishment and functional mobility safety.  Patient requiring ocassional safety reminders, occasional rest periods, occasional cues for correct technique, and self-care  assistance as noted in flow sheet/AM-PAC. Patient continues to be functioning below baseline level, occupational performance remains limited secondary to factors listed above and increased risk for falls and injury.  Patient to benefit from continued Occupational Therapy treatment while in the hospital to address deficits as defined above and maximize level of functional independence with ADLs and functional mobility.   Plan   Treatment Interventions ADL retraining;Functional transfer training;UE strengthening/ROM;Patient/family training;Neuromuscular reeducation;Activityengagement;Compensatory technique education   Goal Expiration Date 03/10/24   OT Treatment Day 3   Discharge Recommendation   Rehab Resource Intensity Level, OT II (Moderate Resource Intensity)   Additional Comments 2 The patient's raw score on the -PAC Daily Activity Inpatient Short Form is 20. A raw score of greater than or equal to 19 suggests the patient may benefit from discharge to home. Please refer to the recommendation of the Occupational Therapist for safe discharge planning.   AM-PAC Daily Activity Inpatient   Lower Body Dressing 3   Bathing 3   Toileting 3   Upper Body Dressing 3   Grooming 4   Eating 4   Daily Activity Raw Score 20   Daily Activity Standardized Score (Calc for Raw Score >=11) 42.03   AM-PAC Applied Cognition Inpatient   Following a Speech/Presentation 3   Understanding Ordinary Conversation 4   Taking Medications 3   Remembering Where Things Are Placed or Put Away 3   Remembering List of 4-5 Errands 2   Taking Care of Complicated Tasks 2   Applied Cognition Raw Score 17   Applied Cognition Standardized Score 36.52     SUMAN Willis/KAYCE

## 2024-03-07 NOTE — ASSESSMENT & PLAN NOTE
Lab Results   Component Value Date    EGFR 36 03/07/2024    EGFR 31 03/06/2024    EGFR 39 03/05/2024    CREATININE 1.37 (H) 03/07/2024    CREATININE 1.56 (H) 03/06/2024    CREATININE 1.31 (H) 03/05/2024     Creatinine at baseline of around 1.1-1.4  Creatinine at baseline today  Further monitoring of labs to be determined by PCP in outpatient setting

## 2024-03-07 NOTE — PLAN OF CARE
Problem: Potential for Falls  Goal: Patient will remain free of falls  Description: INTERVENTIONS:  - Educate patient/family on patient safety including physical limitations  - Instruct patient to call for assistance with activity   - Consult OT/PT to assist with strengthening/mobility   - Keep Call bell within reach  - Keep bed low and locked with side rails adjusted as appropriate  - Keep care items and personal belongings within reach  - Initiate and maintain comfort rounds  - Make Fall Risk Sign visible to staff  - Offer Toileting every 2 Hours, in advance of need  - Initiate/Maintain bed alarm  - Obtain necessary fall risk management equipment: walker   - Apply yellow socks and bracelet for high fall risk patients  - Consider moving patient to room near nurses station  Outcome: Progressing     Problem: Nutrition/Hydration-ADULT  Goal: Nutrient/Hydration intake appropriate for improving, restoring or maintaining nutritional needs  Description: Monitor and assess patient's nutrition/hydration status for malnutrition. Collaborate with interdisciplinary team and initiate plan and interventions as ordered.  Monitor patient's weight and dietary intake as ordered or per policy. Utilize nutrition screening tool and intervene as necessary. Determine patient's food preferences and provide high-protein, high-caloric foods as appropriate.     INTERVENTIONS:  - Monitor oral intake, urinary output, labs, and treatment plans  - Assess nutrition and hydration status and recommend course of action  - Evaluate amount of meals eaten  - Assist patient with eating if necessary   - Allow adequate time for meals  - Recommend/ encourage appropriate diets, oral nutritional supplements, and vitamin/mineral supplements  - Order, calculate, and assess calorie counts as needed  - Recommend, monitor, and adjust tube feedings and TPN/PPN based on assessed needs  - Assess need for intravenous fluids  - Provide specific nutrition/hydration  education as appropriate  - Include patient/family/caregiver in decisions related to nutrition  Outcome: Progressing     Problem: Prexisting or High Potential for Compromised Skin Integrity  Goal: Skin integrity is maintained or improved  Description: INTERVENTIONS:  - Identify patients at risk for skin breakdown  - Assess and monitor skin integrity  - Assess and monitor nutrition and hydration status  - Monitor labs   - Assess for incontinence   - Turn and reposition patient  - Assist with mobility/ambulation  - Relieve pressure over bony prominences  - Avoid friction and shearing  - Provide appropriate hygiene as needed including keeping skin clean and dry  - Evaluate need for skin moisturizer/barrier cream  - Collaborate with interdisciplinary team   - Patient/family teaching  - Consider wound care consult   Outcome: Progressing     Problem: PAIN - ADULT  Goal: Verbalizes/displays adequate comfort level or baseline comfort level  Description: Interventions:  - Encourage patient to monitor pain and request assistance  - Assess pain using appropriate pain scale  - Administer analgesics based on type and severity of pain and evaluate response  - Implement non-pharmacological measures as appropriate and evaluate response  - Consider cultural and social influences on pain and pain management  - Notify physician/advanced practitioner if interventions unsuccessful or patient reports new pain  Outcome: Progressing     Problem: INFECTION - ADULT  Goal: Absence or prevention of progression during hospitalization  Description: INTERVENTIONS:  - Assess and monitor for signs and symptoms of infection  - Monitor lab/diagnostic results  - Monitor all insertion sites, i.e. indwelling lines, tubes, and drains  - Monitor endotracheal if appropriate and nasal secretions for changes in amount and color  - [place] appropriate cooling/warming therapies per order  - Administer medications as ordered  - Instruct and encourage patient  and family to use good hand hygiene technique  - Identify and instruct in appropriate isolation precautions for identified infection/condition  Outcome: Progressing  Goal: Absence of fever/infection during neutropenic period  Description: INTERVENTIONS:  - Monitor WBC    Outcome: Progressing     Problem: SAFETY ADULT  Goal: Patient will remain free of falls  Description: INTERVENTIONS:  - Educate patient/family on patient safety including physical limitations  - Instruct patient to call for assistance with activity   - Consult OT/PT to assist with strengthening/mobility   - Keep Call bell within reach  - Keep bed low and locked with side rails adjusted as appropriate  - Keep care items and personal belongings within reach  - Initiate and maintain comfort rounds  - Make Fall Risk Sign visible to staff  - Offer Toileting every 2 Hours, in advance of need  - Initiate/Maintain bed alarm  - Obtain necessary fall risk management equipment: walker   - Apply yellow socks and bracelet for high fall risk patients  - Consider moving patient to room near nurses station  Outcome: Progressing  Goal: Maintain or return to baseline ADL function  Description: INTERVENTIONS:  -  Assess patient's ability to carry out ADLs; assess patient's baseline for ADL function and identify physical deficits which impact ability to perform ADLs (bathing, care of mouth/teeth, toileting, grooming, dressing, etc.)  - Assess/evaluate cause of self-care deficits   - Assess range of motion  - Assess patient's mobility; develop plan if impaired  - Assess patient's need for assistive devices and provide as appropriate  - Encourage maximum independence but intervene and supervise when necessary  - Involve family in performance of ADLs  - Assess for home care needs following discharge   - Consider OT consult to assist with ADL evaluation and planning for discharge  - Provide patient education as appropriate  Outcome: Progressing  Goal: Maintains/Returns to  pre admission functional level  Description: INTERVENTIONS:  - Perform AM-PAC 6 Click Basic Mobility/ Daily Activity assessment daily.  - Set and communicate daily mobility goal to care team and patient/family/caregiver.   - Collaborate with rehabilitation services on mobility goals if consulted  - Perform Range of Motion 3 times a day.  - Reposition patient every 2 hours.  - Dangle patient 3 times a day  - Stand patient 3 times a day  - Ambulate patient 3 times a day  - Out of bed to chair 3 times a day   - Out of bed for meals 3 times a day  - Out of bed for toileting  - Record patient progress and toleration of activity level   Outcome: Progressing     Problem: DISCHARGE PLANNING  Goal: Discharge to home or other facility with appropriate resources  Description: INTERVENTIONS:  - Identify barriers to discharge w/patient and caregiver  - Arrange for needed discharge resources and transportation as appropriate  - Identify discharge learning needs (meds, wound care, etc.)  - Arrange for interpretive services to assist at discharge as needed  - Refer to Case Management Department for coordinating discharge planning if the patient needs post-hospital services based on physician/advanced practitioner order or complex needs related to functional status, cognitive ability, or social support system  Outcome: Progressing     Problem: Knowledge Deficit  Goal: Patient/family/caregiver demonstrates understanding of disease process, treatment plan, medications, and discharge instructions  Description: Complete learning assessment and assess knowledge base.  Interventions:  - Provide teaching at level of understanding  - Provide teaching via preferred learning methods  Outcome: Progressing     Problem: Neurological Deficit  Goal: Neurological status is stable or improving  Description: Interventions:  - Monitor and assess patient's level of consciousness, motor function, sensory function, and level of assistance needed for  ADLs.   - Monitor and report changes from baseline. Collaborate with interdisciplinary team to initiate plan and implement interventions as ordered.   - Provide and maintain a safe environment.  - Consider seizure precautions.  - Consider fall precautions.  - Consider aspiration precautions.  - Consider bleeding precautions.  Outcome: Progressing     Problem: Activity Intolerance/Impaired Mobility  Goal: Mobility/activity is maintained at optimum level for patient  Description: Interventions:  - Assess and monitor patient  barriers to mobility and need for assistive/adaptive devices.  - Assess patient's emotional response to limitations.  - Collaborate with interdisciplinary team and initiate plans and interventions as ordered.  - Encourage independent activity per ability.  - Maintain proper body alignment.  - Perform active/passive rom as tolerated/ordered.  - Plan activities to conserve energy.  - Turn patient as appropriate  Outcome: Progressing     Problem: Communication Impairment  Goal: Ability to express needs and understand communication  Description: Assess patient's communication skills and ability to understand information.  Patient will demonstrate use of effective communication techniques, alternative methods of communication and understanding even if not able to speak.     - Encourage communication and provide alternate methods of communication as needed.  - Collaborate with case management/ for discharge needs.  - Include patient/family/caregiver in decisions related to communication.  Outcome: Progressing     Problem: Potential for Aspiration  Goal: Non-ventilated patient's risk of aspiration is minimized  Description: Assess and monitor vital signs, respiratory status, and labs (WBC).  Monitor for signs of aspiration (tachypnea, cough, rales, wheezing, cyanosis, fever).    - Assess and monitor patient's ability to swallow.  - Place patient up in chair to eat if possible.  - HOB up at  90 degrees to eat if unable to get patient up into chair.  - Supervise patient during oral intake.   - Instruct patient/ family to take small bites.  - Instruct patient/ family to take small single sips when taking liquids.  - Follow patient-specific strategies generated by speech pathologist.  Outcome: Progressing  Goal: Ventilated patient's risk of aspiration is minimized  Description: Assess and monitor vital signs, respiratory status, airway cuff pressure, and labs (WBC).  Monitor for signs of aspiration (tachypnea, cough, rales, wheezing, cyanosis, fever).    - Elevate head of bed 30 degrees if patient has tube feeding.  - Monitor tube feeding.  Outcome: Progressing     Problem: Nutrition  Goal: Nutrition/Hydration status is improving  Description: Monitor and assess patient's nutrition/hydration status for malnutrition (ex- brittle hair, bruises, dry skin, pale skin and conjunctiva, muscle wasting, smooth red tongue, and disorientation). Collaborate with interdisciplinary team and initiate plan and interventions as ordered.  Monitor patient's weight and dietary intake as ordered or per policy. Utilize nutrition screening tool and intervene per policy. Determine patient's food preferences and provide high-protein, high-caloric foods as appropriate.     - Assist patient with eating.  - Allow adequate time for meals.  - Encourage patient to take dietary supplement as ordered.  - Collaborate with clinical nutritionist.  - Include patient/family/caregiver in decisions related to nutrition.  Outcome: Progressing

## 2024-03-07 NOTE — TELEPHONE ENCOUNTER
----- Message from Rolly Funes DO sent at 3/6/2024  4:18 PM EST -----  MCOT 30 day      ----- Message -----  From: Jess Tobin  Sent: 3/6/2024   3:59 PM EST  To: Rolly Funes, DO    Hey just want to double check-- do you want an mcot for 30 days or 2 zio's?  ----- Message -----  From: Rolly Funes DO  Sent: 3/6/2024   9:17 AM EST  To:  [facility]    Once patient is discharged from hospital please schedule her for ambulatory event monitor and hospital follow-up once this is completed.

## 2024-03-07 NOTE — PROGRESS NOTES
[facility]  Progress Note  Name: Jina Norris I  MRN: [id number]  Unit/Bed#: -[facility] I Date of Admission: 2/28/2024   Date of Service: 3/7/2024 I Hospital Day: 7    Assessment/Plan   * Acute CVA (cerebrovascular accident) (HCC)  Assessment & Plan  Presented for right hand weakness and fall   CT head: There is a new approximately 1 cm hypodensity within the right inferior frontal lobe on series 2 image 17 compared to prior CT and MRI examinations from November 2023. This is nonspecific and may represent interval small infarction, now likely late subacute to chronic. This could be confirmed with follow-up nonemergent MRI of the brain.  Admitted to stroke pathway  MRI brain: Recent infarcts are identified in the right anterior deep frontal white matter, left insular cortex, and left posterior frontal deep white matter without evidence of hemorrhage. Embolic phenomenon should be considered given the bilateral involvement. Moderate chronic microangiopathic changes.  Neurology recommendations appreciated. Suspect embolic given bilateral hemispheric involvement. Continue DAPT with aspirin and clopidogrel for 21 days and then continue with monotherapy with clopidogrel  DILLAN results reviewed.  Negative for LA thrombus.  Neurology recommending long-term cardiac monitor on discharge  Continue statin  Therapy recommendations appreciated: Moderate resource intensity  Case management following.  Patient is a refusing rehab placement on discharge.  She is alert and oriented x 4 on my exam.  Plan was to discharge patient home, however, family to pick her up and are refusing to take her home.  They states she does not have the capability of making good medical decisions.  Will consult neuropsych.  Family was made aware that pending the decision of neuropsych patient will be discharged home with home health care tomorrow.          Stage 3a chronic kidney disease (HCC)  Assessment & Plan  Lab Results    Component Value Date    EGFR 36 03/07/2024    EGFR 31 03/06/2024    EGFR 39 03/05/2024    CREATININE 1.37 (H) 03/07/2024    CREATININE 1.56 (H) 03/06/2024    CREATININE 1.31 (H) 03/05/2024     Creatinine at baseline of around 1.1-1.4  Creatinine at baseline today    Essential hypertension  Assessment & Plan  BP controlled  Continue metoprolol succinate 50 mg oral daily and amlodipine 5 mg oral daily   Monitor blood pressure per protocol    Type 2 diabetes mellitus with stage 3b chronic kidney disease, with long-term current use of insulin ([facility])  Assessment & Plan  Lab Results   Component Value Date    HGBA1C 12.1 (H) 02/29/2024       Recent Labs     03/06/24  2041 03/07/24  0740 03/07/24  1113 03/07/24  1618   POCGLU 122 169* 242* 176*         Blood Sugar Average: Last 72 hrs:  (P) 180.5625     Poor glycemic control in outpatient setting Hemoglobin A1c elevated at 12.1.  Previously on Januvia and Ozempic, patient reportedly stopped taking  Continue diabetic diet   Continue Levemir 40 units daily   Continue Humalog 12 units with meals plus sliding scale  Continue to monitor glucose and adjust insulin as needed    Acquired hypothyroidism  Assessment & Plan  TSH 2.99  Continue levothyroxine    PAD (peripheral artery disease) ([facility])  Assessment & Plan  Continue aspirin and statin  Continue outpatient follow-up with vascular surgery on discharge               VTE Pharmacologic Prophylaxis: VTE Score: 9 High Risk (Score >/= 5) - Pharmacological DVT Prophylaxis Ordered: enoxaparin (Lovenox). Sequential Compression Devices Ordered.    Mobility:   Basic Mobility Inpatient Raw Score: 17  JH-HLM Goal: 5: Stand one or more mins  JH-HLM Achieved: 7: Walk 25 feet or more  HLM Goal achieved. Continue to encourage appropriate mobility.    Patient Centered Rounds: I performed bedside rounds with nursing staff today.   Discussions with Specialists or Other Care Team Provider: Nursing, PT/OT, case management    Education and  Discussions with Family / Patient: Updated  (friends and daughter of significant other) at bedside.  Have been able to reach the son via the telephone.  Voicemail box full.    Total Time Spent on Date of Encounter in care of patient: 45 mins. This time was spent on one or more of the following: performing physical exam; counseling and coordination of care; obtaining or reviewing history; documenting in the medical record; reviewing/ordering tests, medications or procedures; communicating with other healthcare professionals and discussing with patient's family/caregivers.    Current Length of Stay: 7 day(s)  Current Patient Status: Inpatient   Certification Statement: The patient will continue to require additional inpatient hospital stay due to coordination of care   Discharge Plan: Patient is here for acute CVA.  She is medically stable today.  Alert and oriented x 4 and exam and is refusing rehab placement.  Plan was to discharge patient home today with home health care.  Patient's family arrived and refusing to take patient home stating that she does not make good medical decisions.  Will consult neuropsych to determine capacity.  They are aware that if she is cleared by neuropsych that she will be discharged home with home health care likely tomorrow.    Code Status: Level 1 - Full Code    Subjective:   Denies any dizziness, lightheadedness, chest pain or palpitations.  Denies any pain or discomfort.  Verbalizing needs.    Objective:     Vitals:   Temp (24hrs), Av.8 °F (36.6 °C), Min:97.3 °F (36.3 °C), Max:98.2 °F (36.8 °C)    Temp:  [97.3 °F (36.3 °C)-98.2 °F (36.8 °C)] 97.3 °F (36.3 °C)  HR:  [74-81] 74  Resp:  [17-18] 17  BP: (115-140)/(77-78) 140/78  SpO2:  [95 %] 95 %  Body mass index is 30.56 kg/m².     Input and Output Summary (last 24 hours):     Intake/Output Summary (Last 24 hours) at 3/7/2024 1705  Last data filed at 3/7/2024 1558  Gross per 24 hour   Intake 897 ml   Output 350 ml    Net 547 ml       Physical Exam:   Physical Exam  Vitals and nursing note reviewed.   Constitutional:       General: She is not in acute distress.     Appearance: She is well-developed. She is obese. She is not ill-appearing.   HENT:      Head: Normocephalic and atraumatic.   Eyes:      Extraocular Movements: Extraocular movements intact.      Conjunctiva/sclera: Conjunctivae normal.      Pupils: Pupils are equal, round, and reactive to light.   Cardiovascular:      Rate and Rhythm: Normal rate and regular rhythm.      Pulses: Normal pulses.      Heart sounds: Normal heart sounds. No murmur heard.  Pulmonary:      Effort: Pulmonary effort is normal. No respiratory distress.      Breath sounds: Normal breath sounds. No wheezing or rales.   Abdominal:      General: There is no distension.      Palpations: Abdomen is soft.      Tenderness: There is no abdominal tenderness. There is no guarding.   Musculoskeletal:         General: No swelling. Normal range of motion.   Skin:     General: Skin is warm and dry.      Capillary Refill: Capillary refill takes less than 2 seconds.   Neurological:      General: No focal deficit present.      Mental Status: She is alert and oriented to person, place, and time. Mental status is at baseline.      Cranial Nerves: No cranial nerve deficit.      Sensory: No sensory deficit.      Motor: No weakness.      Coordination: Coordination normal.      Gait: Gait normal.   Psychiatric:         Mood and Affect: Mood normal.         Behavior: Behavior normal.         Thought Content: Thought content normal.         Judgment: Judgment normal.          Additional Data:     Labs:  Results from last 7 days   Lab Units 03/07/24  0509   WBC Thousand/uL 10.32*   HEMOGLOBIN g/dL 11.8   HEMATOCRIT % 37.1   PLATELETS Thousands/uL 178   NEUTROS PCT % 64   LYMPHS PCT % 25   MONOS PCT % 8   EOS PCT % 1     Results from last 7 days   Lab Units 03/07/24  0509 03/04/24  0517 03/02/24  0438   SODIUM mmol/L 137    < > 132*   POTASSIUM mmol/L 4.3   < > 3.9   CHLORIDE mmol/L 105   < > 101   CO2 mmol/L 24   < > 24   BUN mg/dL 41*   < > 26*   CREATININE mg/dL 1.37*   < > 1.27   ANION GAP mmol/L 8   < > 7   CALCIUM mg/dL 8.7   < > 8.8   ALBUMIN g/dL  --   --  3.4*   TOTAL BILIRUBIN mg/dL  --   --  0.46   ALK PHOS U/L  --   --  52   ALT U/L  --   --  7   AST U/L  --   --  11*   GLUCOSE RANDOM mg/dL 162*   < > 199*    < > = values in this interval not displayed.     Results from last 7 days   Lab Units 03/02/24  0438   INR  0.99     Results from last 7 days   Lab Units 03/07/24  1618 03/07/24  1113 03/07/24  0740 03/06/24  2041 03/06/24  1612 03/06/24  1113 03/06/24  0841 03/05/24  2043 03/05/24  1614 03/05/24  1118 03/05/24  0655 03/04/24  2059   POC GLUCOSE mg/dl 176* 242* 169* 122 154* 367* 251* 138 105 210* 208* 113                   Lines/Drains:  Invasive Devices       None                         Imaging: Reviewed radiology reports from this admission including: CT head and MRI brain    Recent Cultures (last 7 days):         Last 24 Hours Medication List:   Current Facility-Administered Medications   Medication Dose Route Frequency Provider Last Rate    amLODIPine  5 mg Oral Daily LAURITA Bernal      aspirin  81 mg Oral Daily Akil Davis MD      calcium carbonate  500 mg Oral Daily PRN LAURITA Bernal      clopidogrel  75 mg Oral Daily Akil Davis MD      DULoxetine  20 mg Oral Daily Akil Davis MD      enoxaparin  30 mg Subcutaneous Daily Akil Davis MD      fluticasone  2 spray Nasal Daily Akil Davis MD      folic acid  400 mcg Oral Daily Akil Davis MD      insulin detemir  40 Units Subcutaneous Daily LAURITA Wilburn      insulin lispro  1-6 Units Subcutaneous TID AC Akil Davis MD      insulin lispro  12 Units Subcutaneous TID With Meals Rolly Sol DO      levothyroxine  50 mcg Oral Early Morning Akil Davis MD      metoprolol succinate  50 mg Oral Daily Rolly Funes DO       nystatin   Topical BID Akil Davis MD      pantoprazole  40 mg Oral Early Morning Akil Davis MD      pravastatin  80 mg Oral Daily With Dinner Akil Davis MD          Today, Patient Was Seen By: LAURITA Wilburn    **Please Note: This note may have been constructed using a voice recognition system.**

## 2024-03-07 NOTE — PLAN OF CARE
Problem: Nutrition/Hydration-ADULT  Goal: Nutrient/Hydration intake appropriate for improving, restoring or maintaining nutritional needs  Description: Monitor and assess patient's nutrition/hydration status for malnutrition. Collaborate with interdisciplinary team and initiate plan and interventions as ordered.  Monitor patient's weight and dietary intake as ordered or per policy. Utilize nutrition screening tool and intervene as necessary. Determine patient's food preferences and provide high-protein, high-caloric foods as appropriate.     INTERVENTIONS:  - Monitor oral intake, urinary output, labs, and treatment plans  - Assess nutrition and hydration status and recommend course of action  - Evaluate amount of meals eaten  - Assist patient with eating if necessary   - Allow adequate time for meals  - Recommend/ encourage appropriate diets, oral nutritional supplements, and vitamin/mineral supplements  - Order, calculate, and assess calorie counts as needed  - Recommend, monitor, and adjust tube feedings and TPN/PPN based on assessed needs  - Assess need for intravenous fluids  - Provide specific nutrition/hydration education as appropriate  - Include patient/family/caregiver in decisions related to nutrition  Outcome: Progressing     Problem: Prexisting or High Potential for Compromised Skin Integrity  Goal: Skin integrity is maintained or improved  Description: INTERVENTIONS:  - Identify patients at risk for skin breakdown  - Assess and monitor skin integrity  - Assess and monitor nutrition and hydration status  - Monitor labs   - Assess for incontinence   - Turn and reposition patient  - Assist with mobility/ambulation  - Relieve pressure over bony prominences  - Avoid friction and shearing  - Provide appropriate hygiene as needed including keeping skin clean and dry  - Evaluate need for skin moisturizer/barrier cream  - Collaborate with interdisciplinary team   - Patient/family teaching  - Consider wound  care consult   Outcome: Progressing     Problem: PAIN - ADULT  Goal: Verbalizes/displays adequate comfort level or baseline comfort level  Description: Interventions:  - Encourage patient to monitor pain and request assistance  - Assess pain using appropriate pain scale  - Administer analgesics based on type and severity of pain and evaluate response  - Implement non-pharmacological measures as appropriate and evaluate response  - Consider cultural and social influences on pain and pain management  - Notify physician/advanced practitioner if interventions unsuccessful or patient reports new pain  Outcome: Progressing

## 2024-03-07 NOTE — CASE MANAGEMENT
Case Management Discharge Planning Note    Patient name Jina Norris  Location /-01 MRN [id number]  : 1945 Date 3/6/2024       Current Admission Date: 2024  Current Admission Diagnosis:Acute CVA (cerebrovascular accident) ([facility])   Patient Active Problem List    Diagnosis Date Noted    Acute CVA (cerebrovascular accident) ([facility]) 2024    Stage 3a chronic kidney disease ([facility]) 2023    INDIGO (acute kidney injury) ([facility]) 2023    Hypomagnesemia 2023    Fall 2023    Abnormal CT of the abdomen 2023    Metabolic encephalopathy 2023    Hypercalcemia 2023    PAD (peripheral artery disease) ([facility]) 2023    Asymptomatic bilateral carotid artery stenosis 2023    Chronic pain syndrome 2022    Lumbar radiculopathy 2022    Spinal stenosis of lumbar region     Severe obesity (BMI 35.0-39.9) with comorbidity ([facility]) 2021    Mixed hyperlipidemia 2020    Acquired hypothyroidism 2020    Type 2 diabetes mellitus with stage 3b chronic kidney disease, with long-term current use of insulin ([facility]) 2019    Essential hypertension 2019      LOS (days): 6  Geometric Mean LOS (GMLOS) (days): 1.9  Days to GMLOS:-4.3     OBJECTIVE:  Risk of Unplanned Readmission Score: 19.51         Current admission status: Inpatient   Preferred Pharmacy:   [id number] [address]  [address] [facility] [address]  Phone: [phone] Fax: [phone]    Primary Care Provider: Lyssa Peres MD    Primary Insurance: MEDICARE  Secondary Insurance: COMMERCIAL MISCELLANEOUS    DISCHARGE DETAILS:    Discharge planning discussed with:: rober & adrian has attempted to reach her son again and his phone his mail box is full - unable to leave a message- cm checksed the phone  Freedom of Choice: Yes  Comments - Freedom of Choice: rehab is recommended- list of accepting facilities was given - pt  wants to go  home with [facility]- family states she is not able to come home & care for herself & SO  CM contacted family/caregiver?: Yes             Contacts  Patient Contacts: Escobar (SO), friend & Daisy ( SO daughter) was called  Relationship to Patient:: Family  Contact Method: Phone  Phone Number: Daisy [phone]  Reason/Outcome: Discharge Planning         DME Referral Provided  Referral made for DME?: No    Other Referral/Resources/Interventions Provided:  Interventions: Short Term Rehab  Referral Comments: referrals sent for snf rehab-  Daisy has placed money down to hold a place for the pt & her SO at [facility]- she is touring another place- they are planning on selling the home-the daughter stated she is not able to care for herself or SO - she stated the house is a mess- she would like the pt to go for some rehab and then d/c to an assisted living- I called Cesar gonzalez (S) to get Liu's # and it is the same # cm has - cm was told last week that her son is traveling to [place]- he stated he will givn Ed my # and will tell him to call me to discuss d/c plan- pt is insisting on going home with [facility]- pt has been accepted by [facility]- if cm is notable to reach her son to see if he is coming to be at her place in [place]- she may needs a neuropysch eval - Daisy stated I need to make her go and I explained it is pt's choice    Would you like to participate in our [facility] service program?  : No - Declined    Treatment Team Recommendation:  (snf rehab vs [facility]- TBD)                                         Family notified:: cm attempted to reach her son

## 2024-03-08 VITALS
RESPIRATION RATE: 18 BRPM | HEIGHT: 62 IN | OXYGEN SATURATION: 91 % | HEART RATE: 80 BPM | WEIGHT: 171.52 LBS | TEMPERATURE: 97.9 F | SYSTOLIC BLOOD PRESSURE: 145 MMHG | BODY MASS INDEX: 31.56 KG/M2 | DIASTOLIC BLOOD PRESSURE: 65 MMHG

## 2024-03-08 LAB
GLUCOSE SERPL-MCNC: 133 MG/DL (ref 65–140)
GLUCOSE SERPL-MCNC: 186 MG/DL (ref 65–140)
GLUCOSE SERPL-MCNC: 234 MG/DL (ref 65–140)

## 2024-03-08 PROCEDURE — 99239 HOSP IP/OBS DSCHRG MGMT >30: CPT

## 2024-03-08 PROCEDURE — 82948 REAGENT STRIP/BLOOD GLUCOSE: CPT

## 2024-03-08 RX ADMIN — Medication 400 MCG: at 08:30

## 2024-03-08 RX ADMIN — INSULIN DETEMIR 40 UNITS: 100 INJECTION, SOLUTION SUBCUTANEOUS at 08:31

## 2024-03-08 RX ADMIN — INSULIN LISPRO 12 UNITS: 100 INJECTION, SOLUTION INTRAVENOUS; SUBCUTANEOUS at 12:01

## 2024-03-08 RX ADMIN — DULOXETINE HYDROCHLORIDE 20 MG: 20 CAPSULE, DELAYED RELEASE ORAL at 08:30

## 2024-03-08 RX ADMIN — METOPROLOL SUCCINATE 50 MG: 50 TABLET, EXTENDED RELEASE ORAL at 08:30

## 2024-03-08 RX ADMIN — AMLODIPINE BESYLATE 5 MG: 5 TABLET ORAL at 08:30

## 2024-03-08 RX ADMIN — LEVOTHYROXINE SODIUM 50 MCG: 25 TABLET ORAL at 05:38

## 2024-03-08 RX ADMIN — NYSTATIN: 100000 POWDER TOPICAL at 08:31

## 2024-03-08 RX ADMIN — PANTOPRAZOLE SODIUM 40 MG: 40 TABLET, DELAYED RELEASE ORAL at 05:38

## 2024-03-08 RX ADMIN — ASPIRIN 81 MG: 81 TABLET, COATED ORAL at 08:30

## 2024-03-08 RX ADMIN — CLOPIDOGREL 75 MG: 75 TABLET ORAL at 08:30

## 2024-03-08 RX ADMIN — INSULIN LISPRO 3 UNITS: 100 INJECTION, SOLUTION INTRAVENOUS; SUBCUTANEOUS at 12:01

## 2024-03-08 RX ADMIN — INSULIN LISPRO 1 UNITS: 100 INJECTION, SOLUTION INTRAVENOUS; SUBCUTANEOUS at 07:19

## 2024-03-08 RX ADMIN — INSULIN LISPRO 12 UNITS: 100 INJECTION, SOLUTION INTRAVENOUS; SUBCUTANEOUS at 07:19

## 2024-03-08 NOTE — NURSING NOTE
AVS reviewed with patient. Patient voices understanding of medications, follow-ups, and reasons to seek medical assistance. All questions answered. Patient discharged in stable condition with all belongings.

## 2024-03-08 NOTE — DISCHARGE SUMMARY
Critical access hospital  Discharge- Jina Norris 1945, 78 y.o. female MRN: [id number]  Unit/Bed#: [facility] Encounter: [id number]  Primary Care Provider: Lyssa Peres MD   Date and time admitted to hospital: 2/28/2024  3:15 PM    * Acute CVA (cerebrovascular accident) (HCC)  Assessment & Plan  Presented for right hand weakness and fall   CT head: There is a new approximately 1 cm hypodensity within the right inferior frontal lobe on series 2 image 17 compared to prior CT and MRI examinations from November 2023. This is nonspecific and may represent interval small infarction, now likely late subacute to chronic. This could be confirmed with follow-up nonemergent MRI of the brain.  Admitted to stroke pathway  MRI brain: Recent infarcts are identified in the right anterior deep frontal white matter, left insular cortex, and left posterior frontal deep white matter without evidence of hemorrhage. Embolic phenomenon should be considered given the bilateral involvement. Moderate chronic microangiopathic changes.  Neurology recommendations appreciated. Suspect embolic given bilateral hemispheric involvement. Continue DAPT with aspirin and clopidogrel for 21 days and then continue with monotherapy with clopidogrel  DILLAN results reviewed.  Negative for LA thrombus.  Neurology recommending long-term cardiac monitor on discharge  Continue statin  Therapy recommendations appreciated: Moderate resource intensity  3/7 Case management following.  Patient is a refusing rehab placement on discharge.  She is alert and oriented x 4 on my exam.  Plan was to discharge patient home, however, family to pick her up and are refusing to take her home.  They states she does not have the capability of making good medical decisions.  Will consult neuropsych.  Family was made aware that pending the decision of neuropsych patient will be discharged home with home health care tomorrow.  3/8 evaluated by neuropsychology  deemed capable of making medical decisions.  Patient was also awake alert and oriented x 4 on my exam followed commands.  She is medically stable and will be discharged home today with home health care as arranged by our case management team          Stage 3a chronic kidney disease (HCC)  Assessment & Plan  Lab Results   Component Value Date    EGFR 36 03/07/2024    EGFR 31 03/06/2024    EGFR 39 03/05/2024    CREATININE 1.37 (H) 03/07/2024    CREATININE 1.56 (H) 03/06/2024    CREATININE 1.31 (H) 03/05/2024     Creatinine at baseline of around 1.1-1.4  Creatinine at baseline today  Further monitoring of labs to be determined by PCP in outpatient setting    Essential hypertension  Assessment & Plan  BP controlled  Continue home antihypertensive regimen on discharge    Type 2 diabetes mellitus with stage 3b chronic kidney disease, with long-term current use of insulin ([facility])  Assessment & Plan  Lab Results   Component Value Date    HGBA1C 12.1 (H) 02/29/2024       Recent Labs     03/06/24  2041 03/07/24  0740 03/07/24  1113 03/07/24  1618   POCGLU 122 169* 242* 176*         Blood Sugar Average: Last 72 hrs:  (P) 180.5625     Poor glycemic control in outpatient setting Hemoglobin A1c elevated at 12.1.  Previously on Januvia and Ozempic, patient reportedly stopped taking  Continue diabetic diet on discharge  Continue prehospital diabetic regimen on discharge  Encouraged to follow with endocrinology    Acquired hypothyroidism  Assessment & Plan  TSH 2.99  Continue prehospital levothyroxine on discharge    PAD (peripheral artery disease) ([facility])  Assessment & Plan  Continue aspirin and statin  Continue outpatient follow-up with vascular surgery on discharge              Medical Problems       Resolved Problems  Date Reviewed: 3/8/2024   None         Admission Date:   Admission Orders (From admission, onward)       Ordered        02/29/24 1325  Inpatient Admission  Once            02/28/24 1757  Place in Observation  Once                             Admitting Diagnosis: Dehydration [E86.0]  Weakness [R53.1]  CVA (cerebral vascular accident) (HCC) [I63.9]  Generalized weakness [R53.1]  Stroke-like symptom [R29.90]    HPI: Patient is a 78-year-old female who presented with right hand weakness.  She has a history of CKD stage III, hypertension, hyperlipidemia, hypothyroidism, type 2 diabetes with poor glycemic control and PAD who presented to the ED with weakness.  Patient reported that she had an injection in her right knee due to arthritis in the orthopedic office the day prior and was at the supermarket felt weak in her right lower extremity and lowered herself to the floor.  This was followed by right hand weakness trouble grasping things.  Patient reported that she went to bed and had trouble sleeping and symptoms continued so she presented to the ED for further evaluation.  Head CT showed a 1 cm hypodensity right inferior frontal lobe nonspecific but could be small infarct potentially subacute to acute.  She was admitted to [facility] service on stroke pathway.     Procedures Performed:   Orders Placed This Encounter   Procedures    ED ECG Documentation Only       Summary of Hospital Course: For full details regarding patient stay please refer to the contents of the chart and the history and physical as dictated by Dr. Davis.  In brief, patient is a 78-year-old female who presented to the ED with right hand weakness.  See HPI above.  She was admitted to stroke pathway.  MRI confirmed recent infarct in the right anterior deep frontal matter, left insular cortex, and left posterior frontal deep white matter without evidence of hemorrhage.  Neurology followed patient felt likely embolic given bilateral hemispheric involvement.  Patient will continue on DAPT with aspirin and Plavix for total of 21 days then Plavix monotherapy as recommended by neurology.  Cardiology followed patient had teeth EEG which did not reveal a left atrial thrombus.   Per neurology recommendation she is being recommended to cardiology for a Zio patch on discharge.  PT/OT followed recommended moderate resource intensity.  Patient refused rehab placement.  Family was concerned that patient is not making good medical decisions.  Did obtain neuropsychology consult today and patient was deemed to have capacity to make medical decisions.  Patient will be discharged home today with home health care was arranged by case management.  Patient declined call to family.  She is going to call a friend for a ride home.    Significant Findings, Care, Treatment and Services Provided:   2/28 COVID/flu/RSV negative  2/28 UA negative  2/28 chest x-ray: No acute cardiopulmonary disease  2/28 CT head: New approximately 1 cm hypodensity right inferior frontal lobe, nonspecific may represent small infarction now likely late subacute to chronic recommend MRI brain  2/28 VAS carotids less than 50% stenosis bilateral internal carotid arteries  2/28 MRI of the brain: Recent infarcts identified in the right anterior deep frontal white matter, left insular cortex, left posterior frontal deep white matter without evidence of hemorrhage.  Embolic phenomenon should be considered given bilateral involvement.  Moderate chronic microangiopathic changes.  2/29 lipid panel: Cholesterol 106, triglycerides 158, HDL 43, LDL 31  2/29 hemoglobin A1c 12.1  2/29 CTA head neck: Grossly stable small foci of recent infarcts in the left corona radiata, left insula, and anterior inferior right frontal lobe.  No intracranial mass effect or hemorrhage.  Chronic microangiopathic changes.  No intracranial large vessel occlusion.  Severe atherosclerotic stenosis in bilateral supraclinoid ICAs.  Large vertebral artery arises from the aortic arch with severe atherosclerotic stenosis at the origin.  No hemodynamically significant stenosis in the bilateral cervical and vertebral arteries.  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      Provisions for Follow-Up Care:  See after visit summary for information related to follow-up care and any pertinent home health orders.      PCP: Lyssa Peres MD    Disposition:  Home with visiting nurses    Planned Readmission: No    Discharge Statement   I spent 40 minutes discharging the patient. This time was spent on the day of discharge. I had direct contact with the patient on the day of discharge. Additional documentation is required if more than 30 minutes were spent on discharge.     Discharge Medications:  See after visit summary for reconciled discharge medications provided to patient and family.

## 2024-03-08 NOTE — CASE MANAGEMENT
Case Management Discharge Planning Note    Patient name Jina Norris  Location /-01 MRN [id number]  : 1945 Date 3/8/2024       Current Admission Date: 2024  Current Admission Diagnosis:Acute CVA (cerebrovascular accident) ([facility])   Patient Active Problem List    Diagnosis Date Noted    Acute CVA (cerebrovascular accident) ([facility]) 2024    Stage 3a chronic kidney disease ([facility]) 2023    INDIGO (acute kidney injury) ([facility]) 2023    Hypomagnesemia 2023    Fall 2023    Abnormal CT of the abdomen 2023    Metabolic encephalopathy 2023    Hypercalcemia 2023    PAD (peripheral artery disease) ([facility]) 2023    Asymptomatic bilateral carotid artery stenosis 2023    Chronic pain syndrome 2022    Lumbar radiculopathy 2022    Spinal stenosis of lumbar region     Severe obesity (BMI 35.0-39.9) with comorbidity ([facility]) 2021    Mixed hyperlipidemia 2020    Acquired hypothyroidism 2020    Type 2 diabetes mellitus with stage 3b chronic kidney disease, with long-term current use of insulin ([facility]) 2019    Essential hypertension 2019      LOS (days): 8  Geometric Mean LOS (GMLOS) (days): 1.9  Days to GMLOS:-6.3     OBJECTIVE:  Risk of Unplanned Readmission Score: 20.28         Current admission status: Inpatient   Preferred Pharmacy:   [id number] [address] [address]  Phone: [phone] Fax: [phone]    Primary Care Provider: Lyssa Peres MD    Primary Insurance: MEDICARE  Secondary Insurance: COMMERCIAL MISCELLANEOUS    DISCHARGE DETAILS:    Discharge planning discussed with:: patient  & cm has not been able to reach her son  & Cesar was called 13:05pm & 14:55pm & Daisy at the bedside  Freedom of Choice: Yes  Comments - Freedom of Choice: rehab recommended- pt declined rehab- pt was accepted by [facility] pt's choice  CM contacted  family/caregiver?: Yes  Were Treatment Team discharge recommendations reviewed with patient/caregiver?: Yes  Did patient/caregiver verbalize understanding of patient care needs?: Yes  Were patient/caregiver advised of the risks associated with not following Treatment Team discharge recommendations?: Yes    Contacts  Patient Contacts: Daisy ( SO daughter)  Relationship to Patient:: Family (spouse & Daisy)  Contact Method: In Person  Reason/Outcome: Discharge Planning    Requested Home Health Care         Is the patient interested in HHC at discharge?: Yes         Other Referral/Resources/Interventions Provided:  Interventions: HHC  Referral Comments: pt was accepted to [facility]- Daisy was given the Area of [place] #-  Daisy & her spouse went for a tour to a [facility]- pt does andreas more open to going to a [facility] today- she was not willing yesterday - neuropsych consult completed and it stated that the pt appears to have capacity to make her own decisons-     pt's son is traveling to [place] and has not arrived yet    Would you like to participate in our [facility] service program?  : No - Declined    Treatment Team Recommendation: Home with Home Health Care (home with spouse & family & [facility]& outpt follow up- family)  Discharge Destination Plan:: Home with Home Health Care (home with spouse & family & [facility] & outpt follow up- family)   Pt & family are in agreement with the d/c & d/c plan

## 2024-03-08 NOTE — PLAN OF CARE
Problem: Potential for Falls  Goal: Patient will remain free of falls  Description: INTERVENTIONS:  - Educate patient/family on patient safety including physical limitations  - Instruct patient to call for assistance with activity   - Consult OT/PT to assist with strengthening/mobility   - Keep Call bell within reach  - Keep bed low and locked with side rails adjusted as appropriate  - Keep care items and personal belongings within reach  - Initiate and maintain comfort rounds  - Make Fall Risk Sign visible to staff  - Offer Toileting every 2 Hours, in advance of need  - Initiate/Maintain bed alarm  - Obtain necessary fall risk management equipment: socks  Problem: PAIN - ADULT  Goal: Verbalizes/displays adequate comfort level or baseline comfort level  Description: Interventions:  - Encourage patient to monitor pain and request assistance  - Assess pain using appropriate pain scale  - Administer analgesics based on type and severity of pain and evaluate response  - Implement non-pharmacological measures as appropriate and evaluate response  - Consider cultural and social influences on pain and pain management  - Notify physician/advanced practitioner if interventions unsuccessful or patient reports new pain  Outcome: Progressing     Problem: INFECTION - ADULT  Goal: Absence or prevention of progression during hospitalization  Description: INTERVENTIONS:  - Assess and monitor for signs and symptoms of infection  - Monitor lab/diagnostic results  - Monitor all insertion sites, i.e. indwelling lines, tubes, and drains  - Monitor endotracheal if appropriate and nasal secretions for changes in amount and color  - Loving appropriate cooling/warming therapies per order  - Administer medications as ordered  - Instruct and encourage patient and family to use good hand hygiene technique  - Identify and instruct in appropriate isolation precautions for identified infection/condition  Outcome: Progressing       Problem:  Nutrition  Goal: Nutrition/Hydration status is improving  Description: Monitor and assess patient's nutrition/hydration status for malnutrition (ex- brittle hair, bruises, dry skin, pale skin and conjunctiva, muscle wasting, smooth red tongue, and disorientation). Collaborate with interdisciplinary team and initiate plan and interventions as ordered.  Monitor patient's weight and dietary intake as ordered or per policy. Utilize nutrition screening tool and intervene per policy. Determine patient's food preferences and provide high-protein, high-caloric foods as appropriate.     - Assist patient with eating.  - Allow adequate time for meals.  - Encourage patient to take dietary supplement as ordered.  - Collaborate with clinical nutritionist.  - Include patient/family/caregiver in decisions related to nutrition.  Outcome: Progressing     Problem: Potential for Aspiration  Goal: Non-ventilated patient's risk of aspiration is minimized  Description: Assess and monitor vital signs, respiratory status, and labs (WBC).  Monitor for signs of aspiration (tachypnea, cough, rales, wheezing, cyanosis, fever).    - Assess and monitor patient's ability to swallow.  - Place patient up in chair to eat if possible.  - HOB up at 90 degrees to eat if unable to get patient up into chair.  - Supervise patient during oral intake.   - Instruct patient/ family to take small bites.  - Instruct patient/ family to take small single sips when taking liquids.  - Follow patient-specific strategies generated by speech pathologist.  Outcome: Progressing      - Apply yellow socks and bracelet for high fall risk patients  - Consider moving patient to room near nurses station  Outcome: Progressing

## 2024-03-08 NOTE — PLAN OF CARE
Problem: Potential for Falls  Goal: Patient will remain free of falls  Description: INTERVENTIONS:  - Educate patient/family on patient safety including physical limitations  - Instruct patient to call for assistance with activity   - Consult OT/PT to assist with strengthening/mobility   - Keep Call bell within reach  - Keep bed low and locked with side rails adjusted as appropriate  - Keep care items and personal belongings within reach  - Initiate and maintain comfort rounds  - Make Fall Risk Sign visible to staff  - Offer Toileting every 2 Hours, in advance of need  - Apply yellow socks and bracelet for high fall risk patients  - Consider moving patient to room near nurses station  Outcome: Progressing     Problem: Communication Impairment  Goal: Ability to express needs and understand communication  Description: Assess patient's communication skills and ability to understand information.  Patient will demonstrate use of effective communication techniques, alternative methods of communication and understanding even if not able to speak.     - Encourage communication and provide alternate methods of communication as needed.  - Collaborate with case management/ for discharge needs.  - Include patient/family/caregiver in decisions related to communication.  Outcome: Progressing     Problem: Activity Intolerance/Impaired Mobility  Goal: Mobility/activity is maintained at optimum level for patient  Description: Interventions:  - Assess and monitor patient  barriers to mobility and need for assistive/adaptive devices.  - Assess patient's emotional response to limitations.  - Collaborate with interdisciplinary team and initiate plans and interventions as ordered.  - Encourage independent activity per ability.  - Maintain proper body alignment.  - Perform active/passive rom as tolerated/ordered.  - Plan activities to conserve energy.  - Turn patient as appropriate  Outcome: Progressing

## 2024-03-08 NOTE — CONSULTS
Consultation - Neuropsychology/Psychology Department  Jina Norris 78 y.o. female MRN: [id number]  Unit/Bed#: -01 Encounter: [id number]        Reason for Consultation:  Jina Norris is a 78 y.o. year old female who was referred for a Neuropsychological Exam to assess cognitive functioning and comment on capacity to make informed medical decisions.      History of Present Illness  Cerebral Vascular Accident    Physician Requesting Consult: Jacqueline Hurtado MD    PROBLEM LIST:  Patient Active Problem List   Diagnosis    Type 2 diabetes mellitus with stage 3b chronic kidney disease, with long-term current use of insulin (HCC)    Essential hypertension    Mixed hyperlipidemia    Acquired hypothyroidism    Severe obesity (BMI 35.0-39.9) with comorbidity (HCC)    Spinal stenosis of lumbar region    Chronic pain syndrome    Lumbar radiculopathy    PAD (peripheral artery disease) (HCC)    Asymptomatic bilateral carotid artery stenosis    Hypercalcemia    Abnormal CT of the abdomen    Metabolic encephalopathy    Fall    Hypomagnesemia    Stage 3a chronic kidney disease (HCC)    INDIGO (acute kidney injury) (HCC)    Acute CVA (cerebrovascular accident) (HCC)         Historical Information   Past Medical History:   Diagnosis Date    Benign essential hypertension     Chronic kidney disease, stage 3 (HCC)     Diabetes mellitus (HCC)     Disorder of gallbladder     Disorder of skin and subcutaneous tissue     Dyspnea     Essential hypertension     Hyperlipidemia     Hypokalemia     Malaise and fatigue     Mixed hyperlipidemia     Morbid obesity (HCC)     Pernicious anemia      Past Surgical History:   Procedure Laterality Date    BREAST BIOPSY Right     CARPAL TUNNEL RELEASE Bilateral     CHOLECYSTECTOMY      COLONOSCOPY      Dr. Apple     HYSTERECTOMY      LUMBAR EPIDURAL INJECTION      x3    PARTIAL HYSTERECTOMY      SKIN LESION EXCISION      scalp      Social History   Social History     Substance and Sexual Activity    Alcohol Use Yes    Comment: Occasionally      Social History     Substance and Sexual Activity   Drug Use Never    Comment: Denies drug use - As per [facility]      Social History     Tobacco Use   Smoking Status Former    Current packs/day: 0.00    Average packs/day: 1 pack/day for 14.0 years (14.0 ttl pk-yrs)    Types: Cigarettes    Start date:     Quit date:     Years since quittin.2   Smokeless Tobacco Never     Family History:   Family History   Problem Relation Age of Onset    Stroke Mother     Atrial fibrillation Mother     Brain cancer Father     Other Brother         Twin brothers - Open heart    Other Brother         Twin brothers - Open heart    No Known Problems Maternal Grandmother     No Known Problems Paternal Grandmother        Meds/Allergies   current meds:   Current Facility-Administered Medications   Medication Dose Route Frequency    amLODIPine (NORVASC) tablet 5 mg  5 mg Oral Daily    aspirin (ECOTRIN LOW STRENGTH) EC tablet 81 mg  81 mg Oral Daily    calcium carbonate (TUMS) chewable tablet 500 mg  500 mg Oral Daily PRN    clopidogrel (PLAVIX) tablet 75 mg  75 mg Oral Daily    DULoxetine (CYMBALTA) delayed release capsule 20 mg  20 mg Oral Daily    enoxaparin (LOVENOX) subcutaneous injection 30 mg  30 mg Subcutaneous Daily    fluticasone (FLONASE) 50 mcg/act nasal spray 2 spray  2 spray Nasal Daily    folic acid (FOLVITE) tablet 400 mcg  400 mcg Oral Daily    insulin detemir (LEVEMIR) subcutaneous injection 40 Units  40 Units Subcutaneous Daily    insulin lispro (HumALOG/ADMELOG) 100 units/mL subcutaneous injection 1-6 Units  1-6 Units Subcutaneous TID AC    insulin lispro (HumALOG/ADMELOG) 100 units/mL subcutaneous injection 12 Units  12 Units Subcutaneous TID With Meals    levothyroxine tablet 50 mcg  50 mcg Oral Early Morning    metoprolol succinate (TOPROL-XL) 24 hr tablet 50 mg  50 mg Oral Daily    nystatin (MYCOSTATIN) powder   Topical BID    pantoprazole (PROTONIX) EC tablet  40 mg  40 mg Oral Early Morning    pravastatin (PRAVACHOL) tablet 80 mg  80 mg Oral Daily With Dinner       No Known Allergies      Family and Social Support:   Discharge planning discussed with:: patient- cm attmepted again to call the son no answer & unable to leave a message, Daisy was called at 13:21pm LM & called 15:52pm she stated she was coming up to the room & she was at the hospital  [place] of Choice: Yes  IMM Given (Date):: 03/07/24  IMM Given to:: Patient  Family notified:: family and friends & Elise from [place] for possible placement for both her & SO      Behavioral Observations: Patient was alert, oriented x 3, cooperative with pleasant affect; patient denied depressed mood and anxiety; no overt evidence of psychotic process or confused thought process; patient appeared aware of reason for hospitalization.    Cognitive Examination    General Cognitive Functioning MMSE = Adequate orientation, working memory and mild deficit in recall    Attention/Concentration Auditory Selective Attention = Within Normal Limits; Auditory Vigilance = Within Normal Limits; Information Processing Speed = Within Normal Limits    Frontal Systems/Executive Functioning Mental Flexibility/Cognitive Control = Within Normal Limits; Working Memory = Within Normal Limits Abstract Reasoning = Within Normal Limits; Generative Ability = Within Normal Limits,     Language Functioning Phonemic Fluency = Within Normal Limits; Semantic Retrieval = Within Normal Limits; Comprehension of Complex Ideational Material = Within Normal Limits; Vocabulary Knowledge = Within Normal Limits;     Memory Functioning Narrative Recall - Short Delay = Within Normal Limits; Long Delay Narrative Recall = Within Normal Limits;     Visuo-Spatial Abilities Not Assessed    Functional Knowledge  Health & Safety Knowledge = Within Normal Limits;     Summary/Impression:  Results of Neuropsychological Exam revealed adequate cognitive functioning and on a  measure assessing awareness of personal health status and ability to evaluate health problems, handle medical emergencies and take safety precautions, patient performed within normal limits. During this encounter, patient appears to have capacity to make informed medical decisions.

## 2024-03-08 NOTE — CASE MANAGEMENT
Case Management Discharge Planning Note    Patient name Jina Norris  Location /-01 MRN [id number]  : 1945 Date 3/7/2024       Current Admission Date: 2024  Current Admission Diagnosis:Acute CVA (cerebrovascular accident) ([facility])   Patient Active Problem List    Diagnosis Date Noted    Acute CVA (cerebrovascular accident) ([facility]) 2024    Stage 3a chronic kidney disease ([facility]) 2023    INDIGO (acute kidney injury) ([facility]) 2023    Hypomagnesemia 2023    Fall 2023    Abnormal CT of the abdomen 2023    Metabolic encephalopathy 2023    Hypercalcemia 2023    PAD (peripheral artery disease) ([facility]) 2023    Asymptomatic bilateral carotid artery stenosis 2023    Chronic pain syndrome 2022    Lumbar radiculopathy 2022    Spinal stenosis of lumbar region     Severe obesity (BMI 35.0-39.9) with comorbidity ([facility]) 2021    Mixed hyperlipidemia 2020    Acquired hypothyroidism 2020    Type 2 diabetes mellitus with stage 3b chronic kidney disease, with long-term current use of insulin ([facility]) 2019    Essential hypertension 2019      LOS (days): 7  Geometric Mean LOS (GMLOS) (days): 1.9  Days to GMLOS:-5.4     OBJECTIVE:  Risk of Unplanned Readmission Score: 20.03         Current admission status: Inpatient   Preferred Pharmacy:   [id number] [address] [address]  Phone: [phone] Fax: [phone]    Primary Care Provider: Lyssa Peres MD    Primary Insurance: MEDICARE  Secondary Insurance: COMMERCIAL MISCELLANEOUS    DISCHARGE DETAILS:    Discharge planning discussed with:: patient- cm attmepted again to call the son no answer & unable to leave a message, Daisy was called at 13:21pm LM & called 15:52pm she stated she was coming up to the room & she was at the hospital  [place] of Choice: Yes  Comments - [place] of Choice: rehab was   recommended- pt has declined - pt is in agreement with [facility]- a second referrla was snet in error new list was given - pt has more choices thsi time & she requesteed [facility]- she was happy they are available now  CM contacted family/caregiver?: Yes             Contacts  Patient Contacts: Daisy ( SO daughter) & friends at the bedside  Relationship to Patient:: Other (Comment) (family & friend)  Contact Method: In Person  Phone Number: Daisy [phone]  Reason/Outcome: Discharge Planning    Requested Home Health Care         Is the patient interested in [facility] at discharge?: Yes         Other Referral/Resources/Interventions Provided:  Interventions: [facility]  Referral Comments: pt is declining rehab- pt accepted by [facility]    Would you like to participate in our [facility] service program?  : No - Declined                                        IMM Given (Date):: 03/07/24  IMM Given to:: Patient  Family notified:: family and friends & Elise from [place] for possible placement for both her & SO  Additional Comments: friend & family demanded that I send her for rehab and stated she is not safe at home & she  cannot care for her SO that has dementia- I made them aware I cannot make her go- the pt has the right to make her own descision even if we do not think it is wise choice- neuropsych eval ordered -I told them that  ifis she able to make her own decisions then she needs to go home with [facility]- the daughter is going for a tour to [place] tomorrow- they could be admitted on Monday if [facility] family & pt agrees- she did meet apryl Olivares and [facility] pt was made aware they do accept cats

## 2024-03-10 ENCOUNTER — HOME CARE VISIT (OUTPATIENT)
Dept: HOME HEALTH SERVICES | Facility: HOME HEALTHCARE | Age: 79
End: 2024-03-10
Payer: MEDICARE

## 2024-03-10 VITALS
HEART RATE: 75 BPM | RESPIRATION RATE: 20 BRPM | DIASTOLIC BLOOD PRESSURE: 72 MMHG | OXYGEN SATURATION: 98 % | SYSTOLIC BLOOD PRESSURE: 128 MMHG | TEMPERATURE: 97.6 F

## 2024-03-10 PROCEDURE — 10330081 VN NO-PAY CLAIM PROCEDURE

## 2024-03-10 PROCEDURE — G0299 HHS/HOSPICE OF RN EA 15 MIN: HCPCS

## 2024-03-10 PROCEDURE — 400013 VN SOC

## 2024-03-11 ENCOUNTER — HOME CARE VISIT (OUTPATIENT)
Dept: HOME HEALTH SERVICES | Facility: HOME HEALTHCARE | Age: 79
End: 2024-03-11
Payer: MEDICARE

## 2024-03-11 PROCEDURE — G0152 HHCP-SERV OF OT,EA 15 MIN: HCPCS | Performed by: OCCUPATIONAL THERAPIST

## 2024-03-11 NOTE — CASE COMMUNICATION
[facility] has admitted your patient to Home Health service with the following disciplines:      PT, OT, MSW and SN  This report is informational only, no response is needed  Primary focus of home health care neuro assess med mgmt   Patient stated goals of care increase strength   Anticipated visit pattern and next visit date 2w2 next visit planned for Thurs 3/14  See medication list - meds in home differ from AVS-- patient has to p ick up Plavix and pantoprazole refill. Not in home at SOC. Has trouble getting scripts as she does not drive. May benefit from getting meds from a pharmacy that delivers.  Significant clinical findings NA   Potential barriers to goal achievement comorbidities  Other pertinent information NA     Thank you for allowing us to participate in the care of your patient.

## 2024-03-12 ENCOUNTER — HOME CARE VISIT (OUTPATIENT)
Dept: HOME HEALTH SERVICES | Facility: HOME HEALTHCARE | Age: 79
End: 2024-03-12
Payer: MEDICARE

## 2024-03-12 ENCOUNTER — TRANSITIONAL CARE MANAGEMENT (OUTPATIENT)
Dept: FAMILY MEDICINE CLINIC | Facility: CLINIC | Age: 79
End: 2024-03-12

## 2024-03-12 VITALS — DIASTOLIC BLOOD PRESSURE: 82 MMHG | SYSTOLIC BLOOD PRESSURE: 142 MMHG

## 2024-03-12 VITALS — OXYGEN SATURATION: 99 % | DIASTOLIC BLOOD PRESSURE: 60 MMHG | HEART RATE: 51 BPM | SYSTOLIC BLOOD PRESSURE: 122 MMHG

## 2024-03-12 PROCEDURE — G0151 HHCP-SERV OF PT,EA 15 MIN: HCPCS

## 2024-03-13 NOTE — CASE COMMUNICATION
PT initial evaluation completed.  Plan to see 2xwkx1 to provide education for stair training and hep

## 2024-03-13 NOTE — CASE COMMUNICATION
06/22/21      Thang Navarro  [address] [address] [place] [address]      Monica Griffin,    The clinic has been trying to contact you regarding your health concerns. We have not been able to reach you. If you can return a call to the clinic at [phone] at your earliest convenience. Thank You!     Sincerely,         ALEE Joyce, RN  [facility], [address] [place] [address]  Phone: [phone] OT continue 2 x per wk x 2 weeks for therapeutic exercise,  ADL training,  review of safety with transfer / mobility and recommendations for DME as appropriate.

## 2024-03-14 ENCOUNTER — HOME CARE VISIT (OUTPATIENT)
Dept: HOME HEALTH SERVICES | Facility: HOME HEALTHCARE | Age: 79
End: 2024-03-14
Payer: MEDICARE

## 2024-03-14 ENCOUNTER — TELEMEDICINE (OUTPATIENT)
Dept: FAMILY MEDICINE CLINIC | Facility: CLINIC | Age: 79
End: 2024-03-14
Payer: MEDICARE

## 2024-03-14 VITALS — DIASTOLIC BLOOD PRESSURE: 62 MMHG | SYSTOLIC BLOOD PRESSURE: 135 MMHG

## 2024-03-14 DIAGNOSIS — E11.22 TYPE 2 DIABETES MELLITUS WITH STAGE 3B CHRONIC KIDNEY DISEASE, WITH LONG-TERM CURRENT USE OF INSULIN (HCC): Chronic | ICD-10-CM

## 2024-03-14 DIAGNOSIS — I73.9 PAD (PERIPHERAL ARTERY DISEASE) (HCC): Chronic | ICD-10-CM

## 2024-03-14 DIAGNOSIS — N18.31 STAGE 3A CHRONIC KIDNEY DISEASE (HCC): ICD-10-CM

## 2024-03-14 DIAGNOSIS — N18.32 TYPE 2 DIABETES MELLITUS WITH STAGE 3B CHRONIC KIDNEY DISEASE, WITH LONG-TERM CURRENT USE OF INSULIN (HCC): Chronic | ICD-10-CM

## 2024-03-14 DIAGNOSIS — E83.42 HYPOMAGNESEMIA: ICD-10-CM

## 2024-03-14 DIAGNOSIS — E78.2 MIXED HYPERLIPIDEMIA: ICD-10-CM

## 2024-03-14 DIAGNOSIS — I63.9 ACUTE CVA (CEREBROVASCULAR ACCIDENT) (HCC): Primary | ICD-10-CM

## 2024-03-14 DIAGNOSIS — Z79.4 TYPE 2 DIABETES MELLITUS WITH STAGE 3B CHRONIC KIDNEY DISEASE, WITH LONG-TERM CURRENT USE OF INSULIN (HCC): Chronic | ICD-10-CM

## 2024-03-14 PROBLEM — G93.41 METABOLIC ENCEPHALOPATHY: Status: RESOLVED | Noted: 2023-11-21 | Resolved: 2024-03-14
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
## 2024-03-14 PROBLEM — E66.01 SEVERE OBESITY (BMI 35.0-39.9) WITH COMORBIDITY (HCC): Chronic | Status: RESOLVED | Noted: 2021-08-31 | Resolved: 2024-03-14

## 2024-03-14 PROBLEM — N17.9 AKI (ACUTE KIDNEY INJURY) (HCC): Status: RESOLVED | Noted: 2023-12-07 | Resolved: 2024-03-14

## 2024-03-14 PROCEDURE — G0151 HHCP-SERV OF PT,EA 15 MIN: HCPCS

## 2024-03-14 PROCEDURE — 99495 TRANSJ CARE MGMT MOD F2F 14D: CPT | Performed by: INTERNAL MEDICINE

## 2024-03-14 PROCEDURE — G0152 HHCP-SERV OF OT,EA 15 MIN: HCPCS | Performed by: OCCUPATIONAL THERAPIST

## 2024-03-14 RX ORDER — ROSUVASTATIN CALCIUM 10 MG/1
10 TABLET, COATED ORAL DAILY
Qty: 90 TABLET | Refills: 3 | Status: SHIPPED | OUTPATIENT
Start: 2024-03-14

## 2024-03-14 NOTE — PROGRESS NOTES
Virtual TCM Visit:    Verification of patient location:    Patient is located at Home in the following state in which I hold an active license [place]    Assessment/Plan:          Problem List Items Addressed This Visit     Type 2 diabetes mellitus with stage 3b chronic kidney disease, with long-term current use of insulin (HCC) (Chronic)    Relevant Medications    semaglutide, 0.25 or 0.5 mg/dose, (Ozempic) 2 mg/1.5 mL injection pen    Mixed hyperlipidemia    Relevant Medications    rosuvastatin (CRESTOR) 10 MG tablet    PAD (peripheral artery disease) (HCC) (Chronic)    Hypomagnesemia    Stage 3a chronic kidney disease (HCC)    Acute CVA (cerebrovascular accident) (HCC) - Primary        Reason for visit is TCM    Encounter provider Lyssa Peres MD     Provider located at [address] [address]  [phone]    Recent Visits  Date Type Provider Dept   03/14/24 Telemedicine Lyssa Peres MD [facility]   Showing recent visits within past 7 days and meeting all other requirements  Future Appointments  No visits were found meeting these conditions.  Showing future appointments within next 150 days and meeting all other requirements       After connecting through [employer], the patient was identified by name and date of birth. Jina Norris was informed that this is a telemedicine visit and that the visit is being conducted through Telephone.  My office door was closed. No one else was in the room.  She acknowledged consent and understanding of privacy and security of the video platform. The patient has agreed to participate and understands they can discontinue the visit at any time.    Patient is aware this is a billable service.    Transitional Care Management Review:  Jina Norris is a 78 y.o. female here for TCM follow up.     During the TCM phone call patient stated:    TCM Call     Date and time call was  made  3/12/2024  3:13 PM    Patient was hospitialized at  [facility]    Date of Admission  02/28/24    Date of discharge  03/08/24    Diagnosis  Acute CVA cerebrovascular accident    Disposition  Home    Current Symptoms  Fatigue    Fatigue severity  Moderate      TCM Call     Post hospital issues  None    Scheduled for follow up?  Yes    Did you obtain your prescribed medications  Yes    Do you need help managing your prescriptions or medications  No    Is transportation to your appointment needed  No    I have advised the patient to call PCP with any new or worsening symptoms  Reshma CARR    Living Arrangements  Family members    Support System  Family    The type of support provided  Emotional; Physical; Other (comment)    Do you have social support  Yes, as much as I need        Subjective:     Patient ID: Jina Norris is a 78 y.o. female.    77yo female with history of HTN, type 2 diabetes presents for hospital follow up.     Pt reports she was shopping at [employer] and felt dizzy and fell over. Friend took her home and she slept and still felt unwell the following day so she went to ER and found to have acute bihemisphere stroke. DILLAN negative for LV thrombus, has event monitor but waiting for nurse to place it. Taking low-dose ASA and plavix. Has home health, relying on friends for transportation.     Diabetes: missed endocrine appointment last month, still never received Bobby device. Out of Ozempic, using 35 units of levemir daily.     Reports she is getting around, able to make soup, frozen meals. Looking into ride share programs.       Review of Systems   Constitutional:  Negative for appetite change, chills, fatigue and fever.   Respiratory:  Negative for chest tightness and shortness of breath.    Neurological:  Positive for speech difficulty and weakness. Negative for dizziness, light-headedness and headaches.         Objective:    Vitals:    03/14/24 1006   BP: 135/62       Physical  Exam  Vitals reviewed.   Constitutional:       General: She is not in acute distress.  Pulmonary:      Effort: No respiratory distress.   Neurological:      Mental Status: She is alert.   Psychiatric:         Mood and Affect: Mood and affect normal.         Speech: Speech is delayed.         Behavior: Behavior normal. Behavior is cooperative.         Thought Content: Thought content normal.         Medications have been reviewed by provider in current encounter    I spent 15 minutes with the patient during this visit.    Lyssa Peres MD      VIRTUAL VISIT DISCLAIMER    Jina WENDY Norris verbally agrees to participate in Virtual Care Services. Pt is aware that Virtual Care Services could be limited without vital signs or the ability to perform a full hands-on physical exam. Jina A Jr understands she or the provider may request at any time to terminate the video visit and request the patient to seek care or treatment in person.

## 2024-03-15 ENCOUNTER — HOME CARE VISIT (OUTPATIENT)
Dept: HOME HEALTH SERVICES | Facility: HOME HEALTHCARE | Age: 79
End: 2024-03-15
Payer: MEDICARE

## 2024-03-15 VITALS
HEART RATE: 88 BPM | TEMPERATURE: 97.8 F | RESPIRATION RATE: 20 BRPM | SYSTOLIC BLOOD PRESSURE: 110 MMHG | OXYGEN SATURATION: 99 % | DIASTOLIC BLOOD PRESSURE: 60 MMHG | BODY MASS INDEX: 30 KG/M2 | WEIGHT: 164 LBS

## 2024-03-15 VITALS — DIASTOLIC BLOOD PRESSURE: 65 MMHG | SYSTOLIC BLOOD PRESSURE: 122 MMHG | OXYGEN SATURATION: 97 % | HEART RATE: 73 BPM

## 2024-03-15 VITALS — SYSTOLIC BLOOD PRESSURE: 140 MMHG | OXYGEN SATURATION: 99 % | HEART RATE: 71 BPM | DIASTOLIC BLOOD PRESSURE: 70 MMHG

## 2024-03-15 PROCEDURE — G0299 HHS/HOSPICE OF RN EA 15 MIN: HCPCS

## 2024-03-15 PROCEDURE — G0155 HHCP-SVS OF CSW,EA 15 MIN: HCPCS

## 2024-03-18 ENCOUNTER — HOME CARE VISIT (OUTPATIENT)
Dept: HOME HEALTH SERVICES | Facility: HOME HEALTHCARE | Age: 79
End: 2024-03-18
Payer: MEDICARE

## 2024-03-18 VITALS — SYSTOLIC BLOOD PRESSURE: 148 MMHG | HEART RATE: 75 BPM | OXYGEN SATURATION: 99 % | DIASTOLIC BLOOD PRESSURE: 88 MMHG

## 2024-03-18 PROCEDURE — G0152 HHCP-SERV OF OT,EA 15 MIN: HCPCS | Performed by: OCCUPATIONAL THERAPIST

## 2024-03-19 PROCEDURE — G0180 MD CERTIFICATION HHA PATIENT: HCPCS | Performed by: INTERNAL MEDICINE

## 2024-03-20 ENCOUNTER — HOME CARE VISIT (OUTPATIENT)
Dept: HOME HEALTH SERVICES | Facility: HOME HEALTHCARE | Age: 79
End: 2024-03-20
Payer: MEDICARE

## 2024-03-20 VITALS — SYSTOLIC BLOOD PRESSURE: 138 MMHG | DIASTOLIC BLOOD PRESSURE: 70 MMHG

## 2024-03-20 PROCEDURE — G0152 HHCP-SERV OF OT,EA 15 MIN: HCPCS | Performed by: OCCUPATIONAL THERAPIST

## 2024-03-22 ENCOUNTER — HOME CARE VISIT (OUTPATIENT)
Dept: HOME HEALTH SERVICES | Facility: HOME HEALTHCARE | Age: 79
End: 2024-03-22
Payer: MEDICARE

## 2024-03-23 ENCOUNTER — HOME CARE VISIT (OUTPATIENT)
Dept: HOME HEALTH SERVICES | Facility: HOME HEALTHCARE | Age: 79
End: 2024-03-23
Payer: MEDICARE

## 2024-03-25 NOTE — CASE COMMUNICATION
OT assisted patient with removal of her loop recorder to charge the loop recorder during this visit at [facility] nurses request.    Patient's significant other required emergency medical attention during OT visit and OT was required to complete visit for patient to assist her significant other.    OT contacted [facility] nurse to review that patient was going to assist  her significant other with going to hospital.  OT provided [facility] nurse with contac t information to for patient/ patient's son to reschedule visit later in the day to replace the loop recorder once the batteries are charged.  [facility] nurse notes she will reach out to patient later in the day to see when patient will be home from the hospital to have loop recorder replaced.

## 2024-03-26 DIAGNOSIS — N18.32 TYPE 2 DIABETES MELLITUS WITH STAGE 3B CHRONIC KIDNEY DISEASE, WITH LONG-TERM CURRENT USE OF INSULIN (HCC): ICD-10-CM

## 2024-03-26 DIAGNOSIS — Z79.4 TYPE 2 DIABETES MELLITUS WITH STAGE 3B CHRONIC KIDNEY DISEASE, WITH LONG-TERM CURRENT USE OF INSULIN (HCC): ICD-10-CM

## 2024-03-26 DIAGNOSIS — E11.22 TYPE 2 DIABETES MELLITUS WITH STAGE 3B CHRONIC KIDNEY DISEASE, WITH LONG-TERM CURRENT USE OF INSULIN (HCC): ICD-10-CM

## 2024-03-26 RX ORDER — BLOOD-GLUCOSE METER
1 KIT MISCELLANEOUS 3 TIMES DAILY
Qty: 300 EACH | Refills: 0 | Status: SHIPPED | OUTPATIENT
Start: 2024-03-26

## 2024-03-26 NOTE — TELEPHONE ENCOUNTER
Patient requesting refill(s) of: Freestyle test strips.     Last filled: 1/19/23  Last appt: 3/14/24  Next appt: 4/25/24  Pharmacy: [facility]

## 2024-03-28 ENCOUNTER — HOME CARE VISIT (OUTPATIENT)
Dept: HOME HEALTH SERVICES | Facility: HOME HEALTHCARE | Age: 79
End: 2024-03-28
Payer: MEDICARE

## 2024-03-28 VITALS
BODY MASS INDEX: 29.45 KG/M2 | TEMPERATURE: 97.4 F | DIASTOLIC BLOOD PRESSURE: 58 MMHG | HEART RATE: 84 BPM | OXYGEN SATURATION: 97 % | SYSTOLIC BLOOD PRESSURE: 110 MMHG | WEIGHT: 161 LBS

## 2024-03-28 PROCEDURE — G0299 HHS/HOSPICE OF RN EA 15 MIN: HCPCS

## 2024-04-02 DIAGNOSIS — I63.9 CVA (CEREBRAL VASCULAR ACCIDENT) (HCC): ICD-10-CM

## 2024-04-02 RX ORDER — CLOPIDOGREL BISULFATE 75 MG/1
75 TABLET ORAL DAILY
Qty: 90 TABLET | Refills: 0 | Status: SHIPPED | OUTPATIENT
Start: 2024-04-02

## 2024-04-02 NOTE — TELEPHONE ENCOUNTER
Patient requesting refill(s) of: clopidogel     Last filled: 3/8/24  Last appt: 3/14/24  Next appt: 4/25/24  Pharmacy: [facility]

## 2024-04-04 ENCOUNTER — HOME CARE VISIT (OUTPATIENT)
Dept: HOME HEALTH SERVICES | Facility: HOME HEALTHCARE | Age: 79
End: 2024-04-04
Payer: MEDICARE

## 2024-04-04 VITALS
RESPIRATION RATE: 18 BRPM | DIASTOLIC BLOOD PRESSURE: 58 MMHG | TEMPERATURE: 97.7 F | OXYGEN SATURATION: 98 % | SYSTOLIC BLOOD PRESSURE: 120 MMHG | HEART RATE: 78 BPM

## 2024-04-04 PROCEDURE — G0299 HHS/HOSPICE OF RN EA 15 MIN: HCPCS

## 2024-04-09 ENCOUNTER — TELEPHONE (OUTPATIENT)
Dept: NEUROLOGY | Facility: CLINIC | Age: 79
End: 2024-04-09

## 2024-04-09 NOTE — TELEPHONE ENCOUNTER
Called patient and spoke with her regarding HFU. Patient declined to schedule. I did advise her that she can schedule an HFU up to 1 yr from her d/c date, anything after that would be a new patient appt. She verbalized understanding.    Not scheduling HFU at this time       Thank you,     Sharmila        HFU/ SL CARBON/  acute onset R hand weakness, imbalance        DC- HOME- 3/8/2024    Jina NGUYEN Sandracaroline will need follow up in in 6 weeks with neurovascular attending or advance practitioner. She will not require outpatient neurological testing.

## 2024-04-17 ENCOUNTER — CLINICAL SUPPORT (OUTPATIENT)
Dept: CARDIOLOGY CLINIC | Facility: CLINIC | Age: 79
End: 2024-04-17
Payer: MEDICARE

## 2024-04-17 DIAGNOSIS — I63.9 ACUTE CVA (CEREBROVASCULAR ACCIDENT) (HCC): ICD-10-CM

## 2024-04-17 PROCEDURE — 93228 REMOTE 30 DAY ECG REV/REPORT: CPT | Performed by: INTERNAL MEDICINE

## 2024-04-18 DIAGNOSIS — E11.22 TYPE 2 DIABETES MELLITUS WITH STAGE 3A CHRONIC KIDNEY DISEASE, WITH LONG-TERM CURRENT USE OF INSULIN (HCC): ICD-10-CM

## 2024-04-18 DIAGNOSIS — N18.31 TYPE 2 DIABETES MELLITUS WITH STAGE 3A CHRONIC KIDNEY DISEASE, WITH LONG-TERM CURRENT USE OF INSULIN (HCC): ICD-10-CM

## 2024-04-18 DIAGNOSIS — E78.2 MIXED HYPERLIPIDEMIA: ICD-10-CM

## 2024-04-18 DIAGNOSIS — Z79.4 TYPE 2 DIABETES MELLITUS WITH STAGE 3A CHRONIC KIDNEY DISEASE, WITH LONG-TERM CURRENT USE OF INSULIN (HCC): ICD-10-CM

## 2024-04-18 RX ORDER — LEVOTHYROXINE SODIUM 0.05 MG/1
50 TABLET ORAL DAILY
Qty: 90 TABLET | Refills: 2 | Status: SHIPPED | OUTPATIENT
Start: 2024-04-18

## 2024-04-18 NOTE — TELEPHONE ENCOUNTER
Patient requesting refill(s) of: levothyroxine     Last filled: 7/20/23 #90 x2  Last appt: 12/7/23  Next appt: 4/25/24  [facility]

## 2024-04-25 ENCOUNTER — PATIENT OUTREACH (OUTPATIENT)
Dept: FAMILY MEDICINE CLINIC | Facility: CLINIC | Age: 79
End: 2024-04-25

## 2024-04-25 ENCOUNTER — OFFICE VISIT (OUTPATIENT)
Dept: FAMILY MEDICINE CLINIC | Facility: CLINIC | Age: 79
End: 2024-04-25
Payer: MEDICARE

## 2024-04-25 VITALS
BODY MASS INDEX: 29.81 KG/M2 | SYSTOLIC BLOOD PRESSURE: 122 MMHG | TEMPERATURE: 96.9 F | HEIGHT: 62 IN | HEART RATE: 74 BPM | DIASTOLIC BLOOD PRESSURE: 72 MMHG | WEIGHT: 162 LBS | OXYGEN SATURATION: 99 %

## 2024-04-25 DIAGNOSIS — I63.9 ACUTE CVA (CEREBROVASCULAR ACCIDENT) (HCC): ICD-10-CM

## 2024-04-25 DIAGNOSIS — Z59.82 TRANSPORTATION UNAVAILABLE: ICD-10-CM

## 2024-04-25 DIAGNOSIS — Z11.59 NEED FOR HEPATITIS C SCREENING TEST: ICD-10-CM

## 2024-04-25 DIAGNOSIS — Z12.31 ENCOUNTER FOR SCREENING MAMMOGRAM FOR BREAST CANCER: ICD-10-CM

## 2024-04-25 DIAGNOSIS — D64.9 ANEMIA, UNSPECIFIED TYPE: ICD-10-CM

## 2024-04-25 DIAGNOSIS — E78.2 MIXED HYPERLIPIDEMIA: ICD-10-CM

## 2024-04-25 DIAGNOSIS — E03.9 ACQUIRED HYPOTHYROIDISM: Chronic | ICD-10-CM

## 2024-04-25 DIAGNOSIS — N18.32 STAGE 3B CHRONIC KIDNEY DISEASE (HCC): ICD-10-CM

## 2024-04-25 DIAGNOSIS — Z59.9 NEED FOR FINANCIAL SUPPORT: ICD-10-CM

## 2024-04-25 DIAGNOSIS — Z79.4 TYPE 2 DIABETES MELLITUS WITH STAGE 3B CHRONIC KIDNEY DISEASE, WITH LONG-TERM CURRENT USE OF INSULIN (HCC): Primary | ICD-10-CM

## 2024-04-25 DIAGNOSIS — I10 ESSENTIAL HYPERTENSION: Chronic | ICD-10-CM

## 2024-04-25 DIAGNOSIS — I73.9 PAD (PERIPHERAL ARTERY DISEASE) (HCC): Chronic | ICD-10-CM

## 2024-04-25 DIAGNOSIS — E11.22 TYPE 2 DIABETES MELLITUS WITH STAGE 3B CHRONIC KIDNEY DISEASE, WITH LONG-TERM CURRENT USE OF INSULIN (HCC): Primary | ICD-10-CM

## 2024-04-25 DIAGNOSIS — N18.32 TYPE 2 DIABETES MELLITUS WITH STAGE 3B CHRONIC KIDNEY DISEASE, WITH LONG-TERM CURRENT USE OF INSULIN (HCC): Primary | ICD-10-CM

## 2024-04-25 PROCEDURE — 99215 OFFICE O/P EST HI 40 MIN: CPT | Performed by: STUDENT IN AN ORGANIZED HEALTH CARE EDUCATION/TRAINING PROGRAM

## 2024-04-25 PROCEDURE — G2211 COMPLEX E/M VISIT ADD ON: HCPCS | Performed by: STUDENT IN AN ORGANIZED HEALTH CARE EDUCATION/TRAINING PROGRAM

## 2024-04-25 SDOH — ECONOMIC STABILITY - INCOME SECURITY: PROBLEM RELATED TO HOUSING AND ECONOMIC CIRCUMSTANCES, UNSPECIFIED: Z59.9

## 2024-04-25 SDOH — ECONOMIC STABILITY - TRANSPORTATION SECURITY: TRANSPORTATION INSECURITY: Z59.82

## 2024-04-25 NOTE — PROGRESS NOTES
Covering SWCM received referral from patients PCP Santiago Melendez MD requesting that SWCM outreach patient and assess for needs. Per chart, patient needing assistance with transportation resources.    Attempted outreach, no answer and left message for return call at their earliest convenience.

## 2024-04-25 NOTE — PROGRESS NOTES
Name: Jina Norris      : 1945      MRN: [id number]  Encounter Provider: Santiago Melendez MD  Encounter Date: 2024   Encounter department: [facility] PRIMARY CARE    Assessment & Plan     1. Type 2 diabetes mellitus with stage 3b chronic kidney disease, with long-term current use of insulin (HCC)  -     Ambulatory Referral to Endocrinology; Future  -     Comprehensive metabolic panel; Future  -     Continuous Blood Gluc Sensor (FreeStyle Bobby 2 Sensor) MISC; Check blood sugars multiple times per day  -     Continuous Blood Gluc  (FreeStyle Bobby 2 [place]) RIYA; Check blood sugars multiple times per day  -     insulin detemir (LEVEMIR) 100 units/mL subcutaneous injection; Inject 30 Units under the skin daily Take this at bedtime.    2. Need for hepatitis C screening test  -     Hepatitis C Antibody; Future    3. Encounter for screening mammogram for breast cancer  -     Mammo screening bilateral w 3d & cad; Future; Expected date: 2024    4. Anemia, unspecified type  -     CBC and differential; Future  -     Iron Panel (Includes Ferritin, Iron Sat%, Iron, and TIBC); Future    5. Stage 3b chronic kidney disease (HCC)    6. Essential hypertension    7. Mixed hyperlipidemia  -     Lipid panel; Future    8. Acquired hypothyroidism    9. Acute CVA (cerebrovascular accident) (HCC)  -     Ambulatory Referral to Neurology; Future    10. PAD (peripheral artery disease) (HCC)  -     Ambulatory Referral to Vascular Surgery; Future    11. Transportation unavailable  -     Ambulatory Referral to Social Work Care Management Program; Future    12. Need for financial support  -     Ambulatory Referral to Social Work Care Management Program; Future      Hba1c 12.1 today  30 units at bedtime  Checks sugars in the AM, reports 123,140, 150  Has not seen endo since September, will re refill to endo  Tried farxiga/ozempic in past but to expensive   Will call every 2 weeks with sugars  On  rosuvastatin  Reports utd on eye  Foot exam today   Follows with Dr. Rinaldi for podiatry       CVA  Difficulty with transportation, did not make appointment  Recommend follow up     CKD3b  Saw nephro 12/7/2023  Needs f/u   Has scheduled appt in may   Avoid nephrotoxins     Hypertension  On metoprolol/norvasc  Well controlled      Chronic pain  Follows with Dr. Mohan  On cymbalta    Hypothyroid  On synthroid  Will recheck tsh     Needs Mammogram      Follows with Dr. Weller Doctor for PAD    Follows with [facility] for eyes last saw them 6 months     Follows with Dr. Portillo with ortho issues.          Subjective      HPI    This a 78-year-old female who presents the office as a new patient.  Overall patient states she feels well unfortunately she has a significant past medical history concerning for uncontrolled type 2 diabetes, stage IIIb chronic kidney disease recent stroke/CVA.  Hypertension.      Review of Systems   Constitutional:  Negative for activity change, appetite change, chills, fatigue and fever.   HENT:  Negative for congestion, dental problem, drooling, ear discharge, ear pain, facial swelling, postnasal drip, rhinorrhea and sinus pain.    Eyes:  Negative for photophobia, pain, discharge and itching.   Respiratory:  Negative for apnea, cough, chest tightness and shortness of breath.    Cardiovascular:  Negative for chest pain and leg swelling.   Gastrointestinal:  Negative for abdominal distention, abdominal pain, anal bleeding, constipation, diarrhea and nausea.   Endocrine: Negative for cold intolerance, heat intolerance and polydipsia.   Genitourinary:  Negative for difficulty urinating.   Musculoskeletal:  Negative for arthralgias, gait problem, joint swelling and myalgias.   Skin:  Negative for color change and pallor.   Allergic/Immunologic: Negative for immunocompromised state.   Neurological:  Negative for dizziness, seizures, facial asymmetry, weakness, light-headedness, numbness and  headaches.   Psychiatric/Behavioral:  Negative for agitation, behavioral problems, confusion, decreased concentration and dysphoric mood.    All other systems reviewed and are negative.      Current Outpatient Medications on File Prior to Visit   Medication Sig    amLODIPine (NORVASC) 5 mg tablet Take 1 tablet (5 mg total) by mouth daily    aspirin (ECOTRIN LOW STRENGTH) 81 mg EC tablet Take 81 mg by mouth daily    bacitracin topical ointment 500 units/g topical ointment Apply 1 large application topically daily to right 4th finger wound until healed    cholecalciferol (VITAMIN D3) 1,000 units tablet Take 800 Units by mouth daily      clopidogrel (PLAVIX) 75 mg tablet Take 1 tablet (75 mg total) by mouth daily    DULoxetine (CYMBALTA) 20 mg capsule take 1 capsule by mouth once daily    fluticasone (FLONASE) 50 mcg/act nasal spray 2 sprays into each nostril daily    folic acid (FOLVITE) 400 mcg tablet Take 400 mcg by mouth daily    glucose blood (FREESTYLE LITE) test strip Use 1 each 3 (three) times a day Use as instructed    glucose monitoring kit (FREESTYLE) monitoring kit 1 each by Does not apply route daily    Insulin Pen Needle 32G X 6 MM MISC Use in the morning    Lancets (freestyle) lancets Use as instructed -glucose monitor BID    levothyroxine 50 mcg tablet Take 1 tablet (50 mcg total) by mouth daily    magnesium oxide (MAG-OX) 400 mg tablet Take 1 tablet (400 mg total) by mouth 2 (two) times a day    metoprolol succinate (TOPROL-XL) 50 mg 24 hr tablet Take 1 tablet (50 mg total) by mouth daily    omega-3-acid ethyl esters (LOVAZA) 1 g capsule Take 2 g by mouth 2 (two) times a day    pantoprazole (PROTONIX) 40 mg tablet Take 1 tablet (40 mg total) by mouth daily in the early morning    psyllium ([employer] Daily Fiber) 0.52 g capsule Take 0.52 g by mouth 2 (two) times a day    rosuvastatin (CRESTOR) 10 MG tablet Take 1 tablet (10 mg total) by mouth daily    Urea 20 Intensive Hydrating 20 % cream     [DISCONTINUED]  "insulin detemir (LEVEMIR) 100 units/mL subcutaneous injection Inject 35 Units under the skin daily Take this at bedtime.    [DISCONTINUED] insulin aspart (NovoLOG FlexPen) 100 UNIT/ML injection pen Inject 4 Units under the skin daily with dinner    [DISCONTINUED] Januvia 50 MG tablet take 1 tablet by mouth once daily (Patient not taking: No sig reported)    [DISCONTINUED] linaGLIPtin 5 MG TABS Take 5 mg by mouth daily (Patient not taking: Reported on 7/27/2021)    [DISCONTINUED] Psyllium (FIBER) 0.52 g CAPS Take by mouth (Patient not taking: Reported on 4/25/2024)    [DISCONTINUED] semaglutide, 0.25 or 0.5 mg/dose, (Ozempic) 2 mg/1.5 mL injection pen Inject 0.25 mg once weekly for 4 weeks then increase to 0.5 mg once weekly. (Patient not taking: Reported on 4/15/2024)       Objective     /72 (BP Location: Left arm, Patient Position: Sitting, Cuff Size: Large)   Pulse 74   Temp (!) 96.9 °F (36.1 °C) (Tympanic)   Ht 5' 2\" (1.575 m)   Wt 73.5 kg (162 lb)   SpO2 99%   BMI 29.63 kg/m²     Physical Exam  Vitals and nursing note reviewed.   Constitutional:       Appearance: She is obese. She is not ill-appearing or diaphoretic.   HENT:      Head: Normocephalic and atraumatic.      Right Ear: Tympanic membrane normal.      Left Ear: Tympanic membrane normal.      Nose: Nose normal.      Mouth/Throat:      Mouth: Mucous membranes are moist.   Eyes:      Pupils: Pupils are equal, round, and reactive to light.   Cardiovascular:      Rate and Rhythm: Normal rate and regular rhythm.      Pulses: no weak pulses.           Dorsalis pedis pulses are 2+ on the right side and 2+ on the left side.        Posterior tibial pulses are 2+ on the right side and 2+ on the left side.   Pulmonary:      Effort: Pulmonary effort is normal.   Abdominal:      General: Abdomen is flat.   Musculoskeletal:         General: Normal range of motion.      Cervical back: Normal range of motion.   Feet:      Right foot:      Skin integrity: " Dry skin present. No ulcer, skin breakdown, erythema, warmth or callus.      Left foot:      Skin integrity: Dry skin present. No ulcer, skin breakdown, erythema, warmth or callus.   Skin:     General: Skin is warm.      Capillary Refill: Capillary refill takes less than 2 seconds.   Neurological:      General: No focal deficit present.      Mental Status: She is alert and oriented to person, place, and time.   Psychiatric:         Mood and Affect: Mood normal.     Patient's shoes and socks removed.    Right Foot/Ankle   Right Foot Inspection  Skin Exam: skin normal, skin intact and dry skin. No warmth, no callus, no erythema, no maceration, no abnormal color, no pre-ulcer, no ulcer and no callus.     Toe Exam: ROM and strength within normal limits.     Sensory   Vibration: diminished  Proprioception: diminished  Monofilament testing: diminished    Vascular  Capillary refills: < 3 seconds  The right DP pulse is 2+. The right PT pulse is 2+.     Left Foot/Ankle  Left Foot Inspection  Skin Exam: skin normal, skin intact and dry skin. No warmth, no erythema, no maceration, normal color, no pre-ulcer, no ulcer and no callus.     Toe Exam: ROM and strength within normal limits.     Sensory   Vibration: diminished  Proprioception: diminished  Monofilament testing: diminished    Vascular  Capillary refills: < 3 seconds  The left DP pulse is 2+. The left PT pulse is 2+.     Assign Risk Category  No deformity present  Loss of protective sensation  No weak pulses  Risk: 1      Santiago Melendez MD

## 2024-04-26 ENCOUNTER — NURSE TRIAGE (OUTPATIENT)
Dept: OTHER | Facility: OTHER | Age: 79
End: 2024-04-26

## 2024-04-26 ENCOUNTER — CONSULT (OUTPATIENT)
Dept: ENDOCRINOLOGY | Facility: CLINIC | Age: 79
End: 2024-04-26
Payer: MEDICARE

## 2024-04-26 VITALS
WEIGHT: 162 LBS | DIASTOLIC BLOOD PRESSURE: 78 MMHG | TEMPERATURE: 98.6 F | SYSTOLIC BLOOD PRESSURE: 120 MMHG | RESPIRATION RATE: 18 BRPM | BODY MASS INDEX: 29.81 KG/M2 | OXYGEN SATURATION: 98 % | HEIGHT: 62 IN | HEART RATE: 80 BPM

## 2024-04-26 DIAGNOSIS — N18.31 STAGE 3A CHRONIC KIDNEY DISEASE (HCC): ICD-10-CM

## 2024-04-26 DIAGNOSIS — N18.32 TYPE 2 DIABETES MELLITUS WITH STAGE 3B CHRONIC KIDNEY DISEASE, WITH LONG-TERM CURRENT USE OF INSULIN (HCC): Primary | Chronic | ICD-10-CM

## 2024-04-26 DIAGNOSIS — Z79.4 TYPE 2 DIABETES MELLITUS WITH STAGE 3B CHRONIC KIDNEY DISEASE, WITH LONG-TERM CURRENT USE OF INSULIN (HCC): Primary | Chronic | ICD-10-CM

## 2024-04-26 DIAGNOSIS — E11.22 TYPE 2 DIABETES MELLITUS WITH STAGE 3B CHRONIC KIDNEY DISEASE, WITH LONG-TERM CURRENT USE OF INSULIN (HCC): Primary | Chronic | ICD-10-CM

## 2024-04-26 DIAGNOSIS — E03.9 ACQUIRED HYPOTHYROIDISM: Chronic | ICD-10-CM

## 2024-04-26 DIAGNOSIS — I10 ESSENTIAL HYPERTENSION: Chronic | ICD-10-CM

## 2024-04-26 DIAGNOSIS — E78.2 MIXED HYPERLIPIDEMIA: ICD-10-CM

## 2024-04-26 PROCEDURE — 99213 OFFICE O/P EST LOW 20 MIN: CPT | Performed by: NURSE PRACTITIONER

## 2024-04-26 RX ORDER — INSULIN DEGLUDEC 100 U/ML
INJECTION, SOLUTION SUBCUTANEOUS
Qty: 30 ML | Refills: 1 | Status: SHIPPED | OUTPATIENT
Start: 2024-04-26

## 2024-04-26 NOTE — PROGRESS NOTES
New Patient Progress Note    Chief Complaint:  Diabetes follow up visit    Impression & Plan:    Problem List Items Addressed This Visit       Type 2 diabetes mellitus with stage 3b chronic kidney disease, with long-term current use of insulin ([facility]) - Primary (Chronic)     Patient is uncontrolled with persistent hyperglycemia. Counseled on pathophysiology of diabetes. Counseled on negative, long-term effects associated with uncontrolled diabetes including neuropathy, nephropathy, retinopathy, heart attack, and stroke.  Patient reports that she has made significant changes to her regimen. This morning, she reports a blood sugar of 68. Given that she is presently relying on only insulin therapy, she needs a CGM. She may be able to start a low dose DPP-4 or we will need to consider nateglinide/prandin as these will be more affordable options. For now, test blood sugars 3x daily and provide glucose log back to me in two weeks for insulin adjustments and further recommendations.  Referral provided for medical nutrition therapy as well and CGM education.  Patient is to notify me should she continue to see episodes of blood sugar less than 80 mg/dL.  Follow-up in 2 months.  Lab Results   Component Value Date    HGBA1C 12.1 (H) 02/29/2024            Relevant Medications    insulin degludec (Tresiba FlexTouch) 100 units/mL injection pen    Other Relevant Orders    Ambulatory referral to Diabetic Education    Hemoglobin A1C    Albumin / creatinine urine ratio    Basic metabolic panel    Essential hypertension (Chronic)     BP stable 120/78.  Continue 5 mg of amlodipine daily, 50 mg of metoprolol succinate daily.         Mixed hyperlipidemia     Continue 10 mg of rosuvastatin nightly.  Continue 1 g of Lovaza daily.         Acquired hypothyroidism (Chronic)     TSH is stable.  Continue 50 mcg of levothyroxine daily.         Stage 3a chronic kidney disease (HCC)     Lab Results   Component Value Date    EGFR 36 03/07/2024     EGFR 31 03/06/2024    EGFR 39 03/05/2024    CREATININE 1.37 (H) 03/07/2024    CREATININE 1.56 (H) 03/06/2024    CREATININE 1.31 (H) 03/05/2024   SGLT-2 are cost prohibitive.            History of Present Illness:   Jina Norris is a 78 y.o. female with Type 2 diabetes presenting to the office today for post hospitalization follow up.  Patient was last seen in the office on 11/10/2023.  At that appointment, patient was encouraged to resume her Ozempic.  Did order her continuous glucose monitor.  The plan was to transition her to basal insulin (Tresiba or Toujeo).    On 2/28/2024, patient was admitted to the hospital with an acute CVA.    Hemoglobin A1c was found to be severely elevated on admission at 12.1%.  According to admission notes, patient had stopped taking her Januvia and Ozempic prior to her admission.  Blood sugars were controlled during her hospitalization with insulin.    Discharged on Levemir 30 units nightly.    PCP ordered her a continuous glucose monitor-freestyle hanna 2.  She is not using this.        Home blood glucose readings: None for review; reports 68 mg/dL this morning.        Patient reports that she has been making changes to her diet.  Denies blood sugars over 300 in the last 2 weeks.     Current regimen:  Levemir 30 units nightly      Patient Active Problem List   Diagnosis    Type 2 diabetes mellitus with stage 3b chronic kidney disease, with long-term current use of insulin (HCC)    Essential hypertension    Mixed hyperlipidemia    Acquired hypothyroidism    Spinal stenosis of lumbar region    Chronic pain syndrome    Lumbar radiculopathy    PAD (peripheral artery disease) ([facility])    Asymptomatic bilateral carotid artery stenosis    Hypercalcemia    Abnormal CT of the abdomen    Hypomagnesemia    Stage 3a chronic kidney disease (HCC)    Acute CVA (cerebrovascular accident) ([facility])      Past Medical History:   Diagnosis Date    Chronic kidney disease, stage 3 (HCC)     CVA (cerebral  vascular accident) (HCC)     Diabetes mellitus (HCC)     Disorder of gallbladder     Disorder of skin and subcutaneous tissue     Dyspnea     Essential hypertension     Hypokalemia     Malaise and fatigue     Mixed hyperlipidemia     Morbid obesity (HCC)     Pernicious anemia       Past Surgical History:   Procedure Laterality Date    BREAST BIOPSY Right     CARPAL TUNNEL RELEASE Bilateral     CHOLECYSTECTOMY      COLONOSCOPY      Dr. Apple     HYSTERECTOMY      LUMBAR EPIDURAL INJECTION      x3    PARTIAL HYSTERECTOMY      SKIN LESION EXCISION      scalp       Family History   Problem Relation Age of Onset    Stroke Mother     Atrial fibrillation Mother     Brain cancer Father     Other Brother         Twin brothers - Open heart    Other Brother         Twin brothers - Open heart    No Known Problems Maternal Grandmother     No Known Problems Paternal Grandmother      Social History     Tobacco Use    Smoking status: Former     Current packs/day: 0.00     Average packs/day: 1 pack/day for 14.0 years (14.0 ttl pk-yrs)     Types: Cigarettes     Start date:      Quit date:      Years since quittin.3    Smokeless tobacco: Never   Substance Use Topics    Alcohol use: Yes     Comment: Occasionally      No Known Allergies      Current Outpatient Medications:     amLODIPine (NORVASC) 5 mg tablet, Take 1 tablet (5 mg total) by mouth daily, Disp: 90 tablet, Rfl: 3    aspirin (ECOTRIN LOW STRENGTH) 81 mg EC tablet, Take 81 mg by mouth daily, Disp: , Rfl:     bacitracin topical ointment 500 units/g topical ointment, Apply 1 large application topically daily to right 4th finger wound until healed, Disp: , Rfl:     cholecalciferol (VITAMIN D3) 1,000 units tablet, Take 800 Units by mouth daily  , Disp: , Rfl:     clopidogrel (PLAVIX) 75 mg tablet, Take 1 tablet (75 mg total) by mouth daily, Disp: 90 tablet, Rfl: 0    DULoxetine (CYMBALTA) 20 mg capsule, take 1 capsule by mouth once daily, Disp: 90 capsule, Rfl: 1     fluticasone (FLONASE) 50 mcg/act nasal spray, 2 sprays into each nostril daily, Disp: 16 g, Rfl: 6    folic acid (FOLVITE) 400 mcg tablet, Take 400 mcg by mouth daily, Disp: , Rfl:     glucose blood (FREESTYLE LITE) test strip, Use 1 each 3 (three) times a day Use as instructed, Disp: 300 each, Rfl: 0    glucose monitoring kit (FREESTYLE) monitoring kit, 1 each by Does not apply route daily, Disp: 1 each, Rfl: 0    insulin degludec (Tresiba FlexTouch) 100 units/mL injection pen, Inject 30 units nightly., Disp: 30 mL, Rfl: 1    Insulin Pen Needle 32G X 6 MM MISC, Use in the morning, Disp: 100 each, Rfl: 5    Lancets (freestyle) lancets, Use as instructed -glucose monitor BID, Disp: 100 each, Rfl: 5    levothyroxine 50 mcg tablet, Take 1 tablet (50 mcg total) by mouth daily, Disp: 90 tablet, Rfl: 2    magnesium oxide (MAG-OX) 400 mg tablet, Take 1 tablet (400 mg total) by mouth 2 (two) times a day, Disp: 60 tablet, Rfl: 3    metoprolol succinate (TOPROL-XL) 50 mg 24 hr tablet, Take 1 tablet (50 mg total) by mouth daily, Disp: 90 tablet, Rfl: 2    omega-3-acid ethyl esters (LOVAZA) 1 g capsule, Take 2 g by mouth 2 (two) times a day, Disp: , Rfl:     pantoprazole (PROTONIX) 40 mg tablet, Take 1 tablet (40 mg total) by mouth daily in the early morning, Disp: 90 tablet, Rfl: 1    psyllium ([employer]S Daily Fiber) 0.52 g capsule, Take 0.52 g by mouth 2 (two) times a day, Disp: , Rfl:     rosuvastatin (CRESTOR) 10 MG tablet, Take 1 tablet (10 mg total) by mouth daily, Disp: 90 tablet, Rfl: 3    Urea 20 Intensive Hydrating 20 % cream, , Disp: , Rfl:     Continuous Blood Gluc  (FreeStyle Bobby 2 [place]) RIYA, Check blood sugars multiple times per day (Patient not taking: Reported on 4/26/2024), Disp: 1 each, Rfl: 0    Continuous Blood Gluc Sensor (FreeStyle Bobby 2 Sensor) MISC, Check blood sugars multiple times per day (Patient not taking: Reported on 4/26/2024), Disp: 6 each, Rfl: 3    Review of Systems   Constitutional:   "Positive for fatigue. Negative for activity change, appetite change and unexpected weight change.   HENT:  Negative for dental problem, sore throat, trouble swallowing and voice change.    Eyes:  Negative for visual disturbance.   Respiratory:  Negative for cough, chest tightness and shortness of breath.    Cardiovascular:  Negative for chest pain, palpitations and leg swelling.   Gastrointestinal:  Negative for constipation, diarrhea, nausea and vomiting.   Endocrine: Negative for cold intolerance, heat intolerance, polydipsia, polyphagia and polyuria.   Genitourinary:  Positive for frequency.   Musculoskeletal:  Negative for arthralgias, back pain, gait problem and myalgias.   Skin:  Negative for wound.   Allergic/Immunologic: Negative for environmental allergies and food allergies.   Neurological:  Positive for numbness. Negative for dizziness, weakness, light-headedness and headaches.   Psychiatric/Behavioral:  Positive for decreased concentration. Negative for dysphoric mood and sleep disturbance. The patient is not nervous/anxious.        Physical Exam:  Body mass index is 29.63 kg/m².  /78   Pulse 80   Temp 98.6 °F (37 °C)   Resp 18   Ht 5' 2\" (1.575 m)   Wt 73.5 kg (162 lb)   SpO2 98%   BMI 29.63 kg/m²    Wt Readings from Last 3 Encounters:   04/26/24 73.5 kg (162 lb)   04/25/24 73.5 kg (162 lb)   04/15/24 74.4 kg (164 lb)       Physical Exam  Vitals reviewed.   Constitutional:       General: She is not in acute distress.     Appearance: She is well-developed. She is not ill-appearing.   HENT:      Head: Normocephalic and atraumatic.   Eyes:      Pupils: Pupils are equal, round, and reactive to light.   Neck:      Thyroid: No thyromegaly.   Cardiovascular:      Rate and Rhythm: Normal rate.      Pulses: Normal pulses.   Pulmonary:      Effort: Pulmonary effort is normal.   Musculoskeletal:      Cervical back: Normal range of motion and neck supple.      Right lower leg: No edema.      Left lower " leg: No edema.   Lymphadenopathy:      Cervical: No cervical adenopathy.   Skin:     General: Skin is warm and dry.      Capillary Refill: Capillary refill takes less than 2 seconds.   Neurological:      Mental Status: She is alert and oriented to person, place, and time.      Gait: Gait normal.   Psychiatric:         Mood and Affect: Mood normal.         Behavior: Behavior normal.           Labs:   Lab Results   Component Value Date    HGBA1C 12.1 (H) 02/29/2024    HGBA1C 9.5 (H) 12/07/2023    HGBA1C 9.9 (H) 10/26/2023     Lab Results   Component Value Date    CREATININE 1.37 (H) 03/07/2024    CREATININE 1.56 (H) 03/06/2024    CREATININE 1.31 (H) 03/05/2024    BUN 41 (H) 03/07/2024    K 4.3 03/07/2024     03/07/2024    CO2 24 03/07/2024     eGFR   Date Value Ref Range Status   03/07/2024 36 ml/min/1.73sq m Final     Lab Results   Component Value Date    HDL 43 (L) 02/29/2024    TRIG 158 (H) 02/29/2024     Lab Results   Component Value Date    ALT 7 03/02/2024    AST 11 (L) 03/02/2024    ALKPHOS 52 03/02/2024     Lab Results   Component Value Date    PKN6GXUIWICJ 2.992 03/01/2024    RCJ4TAHMBLDZ 2.614 11/26/2023    [id number] 2.227 11/19/2023     Lab Results   Component Value Date    FREET4 1.03 10/21/2020       Discussed with the patient and all questioned fully answered. She will call me if any problems arise.    Follow-up appointment in 3 months.     Counseled patient on diagnostic results, prognosis, risk and benefit of treatment options, instruction for management, importance of treatment compliance, Risk  factor reduction and impressions    LAURITA Lipscomb

## 2024-04-26 NOTE — PATIENT INSTRUCTIONS
Switch from levemir to Tresiba. Inject 30 units at the same time every night.  Test your blood sugar three times daily. Twice before meals and once before bed.  Record numbers on the glucose log I gave you. Please return that to me when complete (2 weeks)  Once I see that, I will make further recommendations.  Let me know if you get your freestyle hanna and we can get you in to learn how to use it.

## 2024-04-26 NOTE — TELEPHONE ENCOUNTER
"Regarding: glucose/ ranging 300-400  ----- Message from Jennifer Pitts sent at 4/26/2024  6:10 PM EDT -----  Patient's son called, \" my mo has diabetes and her glucose monitor is 17 years old. Her glucose has been very unstable, ranging from 300-400 and also going very low.\"    "

## 2024-04-26 NOTE — TELEPHONE ENCOUNTER
Son/Edward calling to make Jelly SHAY aware that his mother really does need a new sensor machine because her's is from 2007 and he feels it is giving false readings of her blood glucose. They were seen today in the office. 1500 today it read 278 and she only had a small bagel to eat, and then after she ate now it is 350. She will be taking her insulin shortly. Patient is asymptomatic.  [facility] does not have a machine to give him so he is asking that if they still do not have one by Monday -he will call back and ask Jelly SHAY to get them one from a different company. She had told them that she could do that if needed.

## 2024-04-29 ENCOUNTER — PATIENT OUTREACH (OUTPATIENT)
Dept: FAMILY MEDICINE CLINIC | Facility: CLINIC | Age: 79
End: 2024-04-29

## 2024-04-29 DIAGNOSIS — Z79.4 TYPE 2 DIABETES MELLITUS WITH STAGE 3B CHRONIC KIDNEY DISEASE, WITH LONG-TERM CURRENT USE OF INSULIN (HCC): Primary | Chronic | ICD-10-CM

## 2024-04-29 DIAGNOSIS — E11.22 TYPE 2 DIABETES MELLITUS WITH STAGE 3B CHRONIC KIDNEY DISEASE, WITH LONG-TERM CURRENT USE OF INSULIN (HCC): Primary | Chronic | ICD-10-CM

## 2024-04-29 DIAGNOSIS — Z74.8 ASSISTANCE NEEDED WITH TRANSPORTATION: ICD-10-CM

## 2024-04-29 DIAGNOSIS — N18.32 TYPE 2 DIABETES MELLITUS WITH STAGE 3B CHRONIC KIDNEY DISEASE, WITH LONG-TERM CURRENT USE OF INSULIN (HCC): Primary | Chronic | ICD-10-CM

## 2024-04-29 NOTE — TELEPHONE ENCOUNTER
Please order freestyle hanna 3 with reader through [facility] [employer].    If they do not trust their standard glucometer, can we please determine which one her insurance prefers and send that to the pharmacy?

## 2024-04-29 NOTE — ASSESSMENT & PLAN NOTE
Patient is uncontrolled with persistent hyperglycemia. Counseled on pathophysiology of diabetes. Counseled on negative, long-term effects associated with uncontrolled diabetes including neuropathy, nephropathy, retinopathy, heart attack, and stroke.  Patient reports that she has made significant changes to her regimen. This morning, she reports a blood sugar of 68. Given that she is presently relying on only insulin therapy, she needs a CGM. She may be able to start a low dose DPP-4 or we will need to consider nateglinide/prandin as these will be more affordable options. For now, test blood sugars 3x daily and provide glucose log back to me in two weeks for insulin adjustments and further recommendations.  Referral provided for medical nutrition therapy as well and CGM education.  Patient is to notify me should she continue to see episodes of blood sugar less than 80 mg/dL.  Follow-up in 2 months.  Lab Results   Component Value Date    HGBA1C 12.1 (H) 02/29/2024

## 2024-04-29 NOTE — ASSESSMENT & PLAN NOTE
Lab Results   Component Value Date    EGFR 36 03/07/2024    EGFR 31 03/06/2024    EGFR 39 03/05/2024    CREATININE 1.37 (H) 03/07/2024    CREATININE 1.56 (H) 03/06/2024    CREATININE 1.31 (H) 03/05/2024   SGLT-2 are cost prohibitive.

## 2024-04-29 NOTE — PROGRESS NOTES
VIKKI HENRY reviewed chart d/t receiving referral from PCP d/t need for transportation.    VIKKI HENRY outreached pt and introduced self. Pt shared that she does need assistance with transportation as she no longer has a license.  Pt has been getting rides from friends and her son. Son is from [place] and will be returning there soon. VIKKI HENRY offered information on [employer] and pt is interested in applying for senior shared ride.    Pt stated her boyfriend also needs to apply for [employer]. Will discuss with CM OC. Referral placed for CM OC.    Pt shared that her diabetes is not controlled. Stated she is working with endocrinology provider. VIKKI HENRY offered information on role of RN CM and pt expressed interest in referral. Referral placed for RN CM.    Pt stated that she could use help with cleaning her home. Pt does not need assistance with ADLs, but cannot get out to grocery shop at this time. VIKKI HENRY provided phone number for [employer] and encouraged pt to call to inquire about in home meals. Pt stated she will do so.    VIKKI HENRY will f/u within 3 weeks and remain available for any needs.

## 2024-04-30 ENCOUNTER — OFFICE VISIT (OUTPATIENT)
Dept: CARDIOLOGY CLINIC | Facility: CLINIC | Age: 79
End: 2024-04-30
Payer: MEDICARE

## 2024-04-30 ENCOUNTER — PATIENT OUTREACH (OUTPATIENT)
Dept: FAMILY MEDICINE CLINIC | Facility: CLINIC | Age: 79
End: 2024-04-30

## 2024-04-30 VITALS
DIASTOLIC BLOOD PRESSURE: 80 MMHG | BODY MASS INDEX: 29.63 KG/M2 | HEIGHT: 62 IN | HEART RATE: 62 BPM | WEIGHT: 161 LBS | SYSTOLIC BLOOD PRESSURE: 144 MMHG

## 2024-04-30 DIAGNOSIS — I35.1 AORTIC VALVE INSUFFICIENCY, ETIOLOGY OF CARDIAC VALVE DISEASE UNSPECIFIED: ICD-10-CM

## 2024-04-30 DIAGNOSIS — I63.9 ACUTE CVA (CEREBROVASCULAR ACCIDENT) (HCC): Primary | ICD-10-CM

## 2024-04-30 DIAGNOSIS — I10 ESSENTIAL HYPERTENSION: Chronic | ICD-10-CM

## 2024-04-30 DIAGNOSIS — E78.2 MIXED HYPERLIPIDEMIA: ICD-10-CM

## 2024-04-30 DIAGNOSIS — E66.3 OVERWEIGHT (BMI 25.0-29.9): ICD-10-CM

## 2024-04-30 PROCEDURE — 99214 OFFICE O/P EST MOD 30 MIN: CPT | Performed by: INTERNAL MEDICINE

## 2024-04-30 NOTE — PROGRESS NOTES
Order tracker will be shared with the patient    An SMS with a link to the order tracker will be sent to [phone]      Ordered darien Bobby 3 via ADS

## 2024-04-30 NOTE — PROGRESS NOTES
Cardiology Follow up    Jina Norris  [id number]  1945  [facility] [address] [address]      1. Acute CVA (cerebrovascular accident) (HCC)        2. Essential hypertension        3. Mixed hyperlipidemia        4. Overweight (BMI 25.0-29.9)        5. Aortic valve insufficiency, etiology of cardiac valve disease unspecified            Discussion/Summary:  1.  History of CVA  2.  Hypertension  3.  Hyperlipidemia  4.  Overweight  5.  CKD  6.  Aortic regurgitation      -Transthoracic echocardiogram 2/21/2024 showing left ventricular systolic function normal cemented LVEF 55% with grade 1 diastolic function, mildly dilated left atrium with mild aortic regurgitation and trace mitral regurgitation.  -Transesophageal echocardiogram 3/4/2024 showing left-ventricular systolic function normal estimated LVEF 55% with no evidence of left atrial, left atrial appendage or left ventricular thrombus appreciated and no significant patent foramen ovale detected  -Ambulatory event monitor 4/17/2024 showing predominantly sinus rhythm average heart rate 76 bpm with no significant atrial fibrillation appreciated  -Patient can continue dual antiplatelet regimen managed by neurology with aspirin and Plavix  -As patient was blood pressures at home are well-controlled can continue amlodipine 5 mg daily, metoprolol succinate 50 mg daily  -Patient will continue Crestor 10 mg daily and will follow-up repeat lipid panel for monitoring and adjustment  -After discussion with patient understanding my recommendations I believe she would benefit from long-term event monitoring with implantable loop recorder for evaluation/long-term monitoring for potential atrial fibrillation as this will affect overall treatment strategy for her.  Patient and son are in agreement and  risk/benefits/alternatives of procedure explained to patient and son in full and all questions answered.  Patient is agreeable to undergoing implantable loop recorder at next available opportunity  -I will see patient in 6 months or sooner if necessary  -Patient counseled if she were to have any warning or alarm type symptoms she should seek emergency medical care immediately.  -I did also counseled patient that she should have send emergency medical care for evaluation after she fell however if she is feeling well at this time wishes to hold off but notes if her symptoms worsen or has other issue she will then seek care.    History of Present Illness:  -Patient is a 78-year-old female with hypertension, hyperlipidemia, diabetes mellitus, CKD stage III, obesity and PAD who was hospitalized at [facility] in late February into early March 2024 who had concern for stroke.  During that time she was undergoing knee injections and had issues with dizziness with fall and therefore had also noticed speaking in facial issues and presented for evaluation.  During her hospitalization she was seen and evaluated by neurology with MRI showing recent infarcts identified with concern for potential embolic phenomenon and was even seen and evaluated with transesophageal echocardiogram showing no significant patent foramen ovale and no thrombus appreciated in atria or atrial appendage or ventricular cavity.  -She presents to the office today for follow-up and denies any chest pain, palpitations, lightness or dizziness, loss conscious, shortness of breath or lower extremity edema, orthopnea or bendopnea.  She still has some residual deficits of facial droop and slight slurring of speech but overall is able to move extremities bilaterally.  She does note having mechanical fall 2 days ago on wet surface but denies any loss of consciousness and while she still has some soreness in her joints particularly right elbow and  right ribs denied needing emergency evaluation or other bleeding.      Patient Active Problem List   Diagnosis    Type 2 diabetes mellitus with stage 3b chronic kidney disease, with long-term current use of insulin (HCC)    Essential hypertension    Mixed hyperlipidemia    Acquired hypothyroidism    Spinal stenosis of lumbar region    Chronic pain syndrome    Lumbar radiculopathy    PAD (peripheral artery disease) (HCC)    Asymptomatic bilateral carotid artery stenosis    Hypercalcemia    Abnormal CT of the abdomen    Hypomagnesemia    Stage 3a chronic kidney disease (HCC)    Acute CVA (cerebrovascular accident) (HCC)     Past Medical History:   Diagnosis Date    Chronic kidney disease, stage 3 (HCC)     CVA (cerebral vascular accident) (HCC)     Diabetes mellitus (HCC)     Disorder of gallbladder     Disorder of skin and subcutaneous tissue     Dyspnea     Essential hypertension     Hypokalemia     Malaise and fatigue     Mixed hyperlipidemia     Morbid obesity (HCC)     Pernicious anemia      Social History     Socioeconomic History    Marital status: /Civil Union     Spouse name: Thania     Number of children: Not on file    Years of education: Not on file    Highest education level: Not on file   Occupational History    Occupation: Retired    Tobacco Use    Smoking status: Former     Current packs/day: 0.00     Average packs/day: 1 pack/day for 14.0 years (14.0 ttl pk-yrs)     Types: Cigarettes     Start date:      Quit date:      Years since quittin.3    Smokeless tobacco: Never   Vaping Use    Vaping status: Never Used   Substance and Sexual Activity    Alcohol use: Yes     Comment: Occasionally     Drug use: Never     Comment: Denies drug use - As per [facility]     Sexual activity: Not Currently   Other Topics Concern    Not on file   Social History Narrative     - Thania is [place] and speaks St Helenian, common law marriage    Does not consume caffeine      Social Determinants of Health      Financial Resource Strain: Low Risk  (3/7/2023)    Overall Financial Resource Strain (CARDIA)     Difficulty of Paying Living Expenses: Not hard at all   Food Insecurity: No Food Insecurity (3/1/2024)    Hunger Vital Sign     Worried About Running Out of Food in the Last Year: Never true     Ran Out of Food in the Last Year: Never true   Transportation Needs: No Transportation Needs (3/1/2024)    PRAPARE - Transportation     Lack of Transportation (Medical): No     Lack of Transportation (Non-Medical): No   Physical Activity: Not on file   Stress: Not on file   Social Connections: Not on file   Intimate Partner Violence: Not on file   Housing Stability: Low Risk  (3/1/2024)    Housing Stability Vital Sign     Unable to Pay for Housing in the Last Year: No     Number of Places Lived in the Last Year: 1     Unstable Housing in the Last Year: No      Family History   Problem Relation Age of Onset    Stroke Mother     Atrial fibrillation Mother     Brain cancer Father     Other Brother         Twin brothers - Open heart    Other Brother         Twin brothers - Open heart    No Known Problems Maternal Grandmother     No Known Problems Paternal Grandmother      Past Surgical History:   Procedure Laterality Date    BREAST BIOPSY Right     CARPAL TUNNEL RELEASE Bilateral     CHOLECYSTECTOMY      COLONOSCOPY      Dr. Apple     HYSTERECTOMY      LUMBAR EPIDURAL INJECTION      x3    PARTIAL HYSTERECTOMY      SKIN LESION EXCISION      scalp        Current Outpatient Medications:     amLODIPine (NORVASC) 5 mg tablet, Take 1 tablet (5 mg total) by mouth daily, Disp: 90 tablet, Rfl: 3    aspirin (ECOTRIN LOW STRENGTH) 81 mg EC tablet, Take 81 mg by mouth daily, Disp: , Rfl:     bacitracin topical ointment 500 units/g topical ointment, Apply 1 large application topically daily to right 4th finger wound until healed, Disp: , Rfl:     cholecalciferol (VITAMIN D3) 1,000 units tablet, Take 800 Units by mouth daily  , Disp: , Rfl:      clopidogrel (PLAVIX) 75 mg tablet, Take 1 tablet (75 mg total) by mouth daily, Disp: 90 tablet, Rfl: 0    DULoxetine (CYMBALTA) 20 mg capsule, take 1 capsule by mouth once daily, Disp: 90 capsule, Rfl: 1    fluticasone (FLONASE) 50 mcg/act nasal spray, 2 sprays into each nostril daily, Disp: 16 g, Rfl: 6    folic acid (FOLVITE) 400 mcg tablet, Take 400 mcg by mouth daily, Disp: , Rfl:     glucose blood (FREESTYLE LITE) test strip, Use 1 each 3 (three) times a day Use as instructed, Disp: 300 each, Rfl: 0    glucose monitoring kit (FREESTYLE) monitoring kit, 1 each by Does not apply route daily, Disp: 1 each, Rfl: 0    insulin degludec (Tresiba FlexTouch) 100 units/mL injection pen, Inject 30 units nightly., Disp: 30 mL, Rfl: 1    Insulin Pen Needle 32G X 6 MM MISC, Use in the morning, Disp: 100 each, Rfl: 5    Lancets (freestyle) lancets, Use as instructed -glucose monitor BID, Disp: 100 each, Rfl: 5    levothyroxine 50 mcg tablet, Take 1 tablet (50 mcg total) by mouth daily, Disp: 90 tablet, Rfl: 2    magnesium oxide (MAG-OX) 400 mg tablet, Take 1 tablet (400 mg total) by mouth 2 (two) times a day, Disp: 60 tablet, Rfl: 3    metoprolol succinate (TOPROL-XL) 50 mg 24 hr tablet, Take 1 tablet (50 mg total) by mouth daily, Disp: 90 tablet, Rfl: 2    omega-3-acid ethyl esters (LOVAZA) 1 g capsule, Take 2 g by mouth 2 (two) times a day, Disp: , Rfl:     pantoprazole (PROTONIX) 40 mg tablet, Take 1 tablet (40 mg total) by mouth daily in the early morning, Disp: 90 tablet, Rfl: 1    psyllium ([employer] Daily Fiber) 0.52 g capsule, Take 0.52 g by mouth 2 (two) times a day, Disp: , Rfl:     rosuvastatin (CRESTOR) 10 MG tablet, Take 1 tablet (10 mg total) by mouth daily, Disp: 90 tablet, Rfl: 3    Urea 20 Intensive Hydrating 20 % cream, , Disp: , Rfl:     Continuous Blood Gluc  (FreeStyle Bobby 2 [place]) RIYA, Check blood sugars multiple times per day (Patient not taking: Reported on 4/26/2024), Disp: 1 each, Rfl: 0     "Continuous Blood Gluc Sensor (FreeStyle Bobby 2 Sensor) MISC, Check blood sugars multiple times per day (Patient not taking: Reported on 4/26/2024), Disp: 6 each, Rfl: 3  No Known Allergies      Labs:  Orders Only on 04/30/2024   Component Date Value    Supplier Name 04/30/2024 [employer]     Supplier Phone Number 04/30/2024 [phone]     Order Status 04/30/2024 Pending Authorization     Delivery Request Date 04/30/2024 04/30/2024     Item Description 04/30/2024 Bobby 3 Sensor, change every 14 days     Item Description 04/30/2024 Bobby 3 [place]    No results displayed because visit has over 200 results.           Imaging: No results found.    Review of Systems:  Review of Systems   Constitutional:  Negative for chills, diaphoresis, fatigue and fever.   HENT:  Negative for trouble swallowing and voice change.    Eyes:  Negative for pain and redness.   Respiratory:  Negative for shortness of breath and wheezing.    Cardiovascular:  Negative for chest pain, palpitations and leg swelling.   Gastrointestinal:  Negative for abdominal pain, blood in stool, constipation, diarrhea, nausea and vomiting.   Genitourinary:  Negative for dysuria.   Musculoskeletal:  Positive for arthralgias. Negative for neck pain and neck stiffness.   Skin:  Negative for rash.   Neurological:  Negative for dizziness, syncope, light-headedness and headaches.   Psychiatric/Behavioral:  Negative for agitation and confusion.    All other systems reviewed and are negative.        Vitals:    04/30/24 1311   BP: 144/80   Pulse: 62   Weight: 73 kg (161 lb)   Height: 5' 2\" (1.575 m)     Vitals:    04/30/24 1311   Weight: 73 kg (161 lb)     Height: 5' 2\" (157.5 cm)     Physical Exam:   Physical Exam  Vitals reviewed.   Constitutional:       General: She is not in acute distress.     Appearance: Normal appearance. She is not diaphoretic.   HENT:      Head: Normocephalic and atraumatic.   Eyes:      General:         Right eye: No " discharge.         Left eye: No discharge.   Neck:      Comments: Trachea midline, no JVD  Cardiovascular:      Rate and Rhythm: Normal rate and regular rhythm.      Heart sounds:      No friction rub.   Pulmonary:      Effort: Pulmonary effort is normal. No respiratory distress.      Breath sounds: No wheezing.   Chest:      Chest wall: No tenderness.   Abdominal:      General: Bowel sounds are normal.      Palpations: Abdomen is soft.      Tenderness: There is no abdominal tenderness. There is no rebound.   Musculoskeletal:      Right lower leg: No edema.      Left lower leg: No edema.   Skin:     General: Skin is warm and dry.   Neurological:      Mental Status: She is alert.      Comments: Awake,alert, able to answer questions appropriately, able to move extremities bilaterally.   Psychiatric:         Mood and Affect: Mood normal.         Behavior: Behavior normal.

## 2024-04-30 NOTE — TELEPHONE ENCOUNTER
Order tracker will be shared with the patient    An SMS with a link to the order tracker will be sent to [phone]    [facility] Bobby through ADS

## 2024-04-30 NOTE — PROGRESS NOTES
Outpatient Care Management Note    PCP direct referral- diabetes management. Chart reviewed. Note  referred to assist patient with transportation support.  Patient no longer with a license.   also referred to RN CM to assist patient with diabetes management.    OP RN CM placed outreach call and spoke with patient.  RN CM introduced self and reason for call.  Patient was receptive to referral and support with diabetes management.    Patient confirmed that she was recently seen by Endocrine on 4/26/24 and is working closely with them to have better control of her diabetes.    She reports that she was asked to keep a log of her blood sugars for two weeks and to call them or mail them with her blood sugars so that they can review.  Patient reports that her blood sugar yesterday morning was 156 and at dinner last night it was 164.  She states that blood sugar this morning was 157.  Patient states that she was instructed to check blood sugars twice a day. She states that the blood sugars have been pretty stable so far.  She added that she has really just started to really check them consistently.    Patient states that she is aware of the signs and symptoms of hypoglycemia and was able to verbalize.    Patient is currently using an old glucometer and notes that Endocrine was going to order her a CGM.  RN CM noted in chart that Endocrine placed an order for CGM- FreeStyle Bobby 3 system.    Patient confirmed that she is taking Tresiba as prescribed- Tresiba 30 units nightly. She confirmed that she has all of her medications and everything was reviewed at her recent appointments.  She has no questions or concerns at this time and declined need to review with RN CM at this time.    Patient reports that she is trying to do better with her diet and feels like she is doing pretty good so far eating the right foods.  She states that what makes that difficult at times is her inability to be able to get  out and shop for the right foods.  She confirmed that she is working with the SW CM on transportation to make that easier for her.    Patient declined need for educational material and resources regarding her diabetes at this time. She states that she has the information that she needs, she just needs to work on applying the information.  Patient is agreeable to continued outreach from RN CM for diabetes management support.  Patient was provided with RN CM contact information as well.    OP RN CM will schedule next outreach for 2 weeks and remains available for any needs prior to next outreach.

## 2024-05-07 ENCOUNTER — PATIENT OUTREACH (OUTPATIENT)
Dept: FAMILY MEDICINE CLINIC | Facility: CLINIC | Age: 79
End: 2024-05-07

## 2024-05-07 LAB
DME PARACHUTE DELIVERY DATE ACTUAL: NORMAL
DME PARACHUTE DELIVERY DATE REQUESTED: NORMAL
DME PARACHUTE ITEM DESCRIPTION: NORMAL
DME PARACHUTE ITEM DESCRIPTION: NORMAL
DME PARACHUTE ORDER STATUS: NORMAL
DME PARACHUTE SUPPLIER NAME: NORMAL
DME PARACHUTE SUPPLIER PHONE: NORMAL

## 2024-05-07 NOTE — PROGRESS NOTES
DEYSI received a referral from CARL Mullen to assist patient with applying for transportation services for herself and SO, Escobar.     No answer. Left message on voicemail with reason for call, this writer's contact information, and a request for a call back to assist.     Certification of disability form was sent to Escobar' PCP for review and completion. DEYSI completed a chart review on his chart as well and he may need to travel with Jina or a PCA d/t his health conditions.     Will outreach next week if no contact prior.

## 2024-05-09 ENCOUNTER — TELEPHONE (OUTPATIENT)
Dept: FAMILY MEDICINE CLINIC | Facility: CLINIC | Age: 79
End: 2024-05-09

## 2024-05-09 DIAGNOSIS — E11.22 TYPE 2 DIABETES MELLITUS WITH STAGE 3B CHRONIC KIDNEY DISEASE, WITH LONG-TERM CURRENT USE OF INSULIN (HCC): Primary | Chronic | ICD-10-CM

## 2024-05-09 DIAGNOSIS — N18.32 TYPE 2 DIABETES MELLITUS WITH STAGE 3B CHRONIC KIDNEY DISEASE, WITH LONG-TERM CURRENT USE OF INSULIN (HCC): Primary | Chronic | ICD-10-CM

## 2024-05-09 DIAGNOSIS — Z79.4 TYPE 2 DIABETES MELLITUS WITH STAGE 3B CHRONIC KIDNEY DISEASE, WITH LONG-TERM CURRENT USE OF INSULIN (HCC): Primary | Chronic | ICD-10-CM

## 2024-05-09 NOTE — TELEPHONE ENCOUNTER
Okay looks like she has an appointment with me on 5/29/2024.  Additionally I will send this message to her endocrinologist.  However with her exceedingly high A1c and high sugars in the evening she will likely need continued titration of her glycemic regiment however I will defer medical management changes to the endocrinology team

## 2024-05-09 NOTE — TELEPHONE ENCOUNTER
Pt called back, said at dinner, levels around 180-201, highest at dinner was 271. When she wakes up 87-97, highest sometimes when she wakes up 123 before breakfast. Does have the SADAR 3D system she received yesterday. Believes her old machine could be messing up her levels.

## 2024-05-09 NOTE — TELEPHONE ENCOUNTER
Patient is coming in tomorrow with sugars logs. She just received her CGM yesterday and will put it on tomorrow. She is taking 30 units of Tresiba nightly

## 2024-05-09 NOTE — TELEPHONE ENCOUNTER
Patient was supposed to call last week with her blood sugar readings.  Can we please reach out to patient and ascertain how her blood sugars have been

## 2024-05-09 NOTE — TELEPHONE ENCOUNTER
Thank you. I will have folks covering my Inbasket so if you would be so kind as to upload and send a message- including her 30 units of Tresiba for review, that would be great. The soonest I will get to it would be Tuesday or Wednesday. Thanks.

## 2024-05-13 ENCOUNTER — CLINICAL SUPPORT (OUTPATIENT)
Dept: ENDOCRINOLOGY | Facility: CLINIC | Age: 79
End: 2024-05-13
Payer: MEDICARE

## 2024-05-13 ENCOUNTER — HOSPITAL ENCOUNTER (OUTPATIENT)
Dept: SURGERY | Facility: HOSPITAL | Age: 79
Discharge: HOME | End: 2024-05-13
Attending: INTERNAL MEDICINE | Admitting: INTERNAL MEDICINE
Payer: MEDICARE

## 2024-05-13 VITALS — SYSTOLIC BLOOD PRESSURE: 140 MMHG | HEART RATE: 80 BPM | DIASTOLIC BLOOD PRESSURE: 70 MMHG

## 2024-05-13 VITALS
BODY MASS INDEX: 29.63 KG/M2 | OXYGEN SATURATION: 97 % | HEART RATE: 80 BPM | WEIGHT: 161 LBS | RESPIRATION RATE: 20 BRPM | SYSTOLIC BLOOD PRESSURE: 192 MMHG | DIASTOLIC BLOOD PRESSURE: 79 MMHG | TEMPERATURE: 98.6 F | HEIGHT: 62 IN

## 2024-05-13 DIAGNOSIS — N18.32 TYPE 2 DIABETES MELLITUS WITH STAGE 3B CHRONIC KIDNEY DISEASE, WITH LONG-TERM CURRENT USE OF INSULIN (HCC): Primary | ICD-10-CM

## 2024-05-13 DIAGNOSIS — E11.22 TYPE 2 DIABETES MELLITUS WITH STAGE 3B CHRONIC KIDNEY DISEASE, WITH LONG-TERM CURRENT USE OF INSULIN (HCC): Primary | ICD-10-CM

## 2024-05-13 DIAGNOSIS — Z79.4 TYPE 2 DIABETES MELLITUS WITH STAGE 3B CHRONIC KIDNEY DISEASE, WITH LONG-TERM CURRENT USE OF INSULIN (HCC): Primary | ICD-10-CM

## 2024-05-13 PROCEDURE — 33285 INSJ SUBQ CAR RHYTHM MNTR: CPT | Performed by: NURSE PRACTITIONER

## 2024-05-13 PROCEDURE — C1764 EVENT RECORDER, CARDIAC: HCPCS

## 2024-05-13 PROCEDURE — 99211 OFF/OP EST MAY X REQ PHY/QHP: CPT

## 2024-05-13 RX ORDER — LIDOCAINE HYDROCHLORIDE 10 MG/ML
5 INJECTION, SOLUTION EPIDURAL; INFILTRATION; INTRACAUDAL; PERINEURAL ONCE
Status: COMPLETED | OUTPATIENT
Start: 2024-05-13 | End: 2024-05-13

## 2024-05-13 RX ORDER — LIDOCAINE HYDROCHLORIDE 10 MG/ML
5 INJECTION, SOLUTION EPIDURAL; INFILTRATION; INTRACAUDAL; PERINEURAL ONCE
Status: DISCONTINUED | OUTPATIENT
Start: 2024-05-13 | End: 2024-05-13

## 2024-05-13 RX ORDER — LIDOCAINE HYDROCHLORIDE 10 MG/ML
INJECTION, SOLUTION EPIDURAL; INFILTRATION; INTRACAUDAL; PERINEURAL
Status: COMPLETED
Start: 2024-05-13 | End: 2024-05-13

## 2024-05-13 RX ADMIN — LIDOCAINE HYDROCHLORIDE 5 ML: 10 INJECTION, SOLUTION EPIDURAL; INFILTRATION; INTRACAUDAL; PERINEURAL at 10:48

## 2024-05-13 NOTE — PROCEDURES
INVASIVE CARDIOLOGY- LOOP RECORDER IMPLANTATION    PRE-OP DIAGNOSIS:  CVA    POST-OP DIAGNOSIS:  Same     :  LAURITA Arteaga    Device Details: Medtronic LNQ22 Serial [id number]      ANESTHESIA:  Local anesthesia with 1% lidocaine    COMPLICATIONS:  None.     The nature of the procedure, risks and alternatives were discussed with the patient who gave informed consent.    OPERATIVE TECHNIQUE:  The patient draped in a sterile fashion. The 4th intercostal space 2 cm from the left edge of the sternum was identified. Local anesthesia was performed with 1% Lidocaine . An incision was made with the push blade.   The insertion tool was placed through the incision and rotated 180 degrees.  The plunger was inserted and the device was deployed.   The skin was closed with dermabond.  Steri-strips were added.      Wound and device f/u arranged.      LAURITA Rubio

## 2024-05-14 ENCOUNTER — PATIENT OUTREACH (OUTPATIENT)
Dept: FAMILY MEDICINE CLINIC | Facility: CLINIC | Age: 79
End: 2024-05-14

## 2024-05-14 NOTE — TELEPHONE ENCOUNTER
Patient dropped off her glucose logs and has Bobby 3 put on and connected to the office now  Logs scanned into media

## 2024-05-14 NOTE — PROGRESS NOTES
CMOC contacted Jina to discuss the referral for transportation for herself and her SO, Escobar.     Jina is agreeable to services so applications for [employer] were completed for both. She disclosed her son is visiting from [place] and will be here for the next month to assist them with transportation and HH tasks.     [facility] On last outreach attempt, CMOC faxed the certification of disability [facility] to the [facility] for completion. Attempted to contact [facility] for a status update but was not able to connect with anyone who could confirm receipt of the form or whether it was completed. Form refaxed.    [facility] sent a staff message to Jina' PCP requesting a letter stating Jina needs to travel with her SO d/t his health condition.     CMOC will fax the applications to [employer] for processing and send the disability form or PCP letter when/if received.    Will outreach in two weeks for a status update.

## 2024-05-15 ENCOUNTER — TELEPHONE (OUTPATIENT)
Age: 79
End: 2024-05-15

## 2024-05-15 RX ORDER — REPAGLINIDE 0.5 MG/1
TABLET ORAL
Qty: 90 TABLET | Refills: 0 | Status: SHIPPED | OUTPATIENT
Start: 2024-05-15 | End: 2024-05-16 | Stop reason: ALTCHOICE

## 2024-05-15 NOTE — TELEPHONE ENCOUNTER
Fasting blood sugars have improved. Recommend starting low dose of prandin prior to dinner only. Take 0.5 mg nightly approximately 30 minutes before eating.       This will help her evening blood sugars be more well controlled. I have ordered this to her pharmacy. Thank you.

## 2024-05-15 NOTE — TELEPHONE ENCOUNTER
[facility] called to check the status of a DME supply request that was faxed to the office on 5/8/24.  Unable to locate this request, they will refax the request over.

## 2024-05-16 NOTE — TELEPHONE ENCOUNTER
Patient aware of new ordered Patient does not want to take the Prandin due to not liking the possible side effects of the medication. SHe said she will just keep doing what she has been with he sugars if they are to low she will eat a little.

## 2024-05-20 ENCOUNTER — PATIENT OUTREACH (OUTPATIENT)
Dept: FAMILY MEDICINE CLINIC | Facility: CLINIC | Age: 79
End: 2024-05-20

## 2024-05-20 NOTE — PROGRESS NOTES
VIKKI CM reviewed chart d/t pt identification on daily report. Pt completed transportation application with CM OC on 5/14. Care plan updated. VIKKI CM will continue to follow and remain available for any needs.

## 2024-05-21 ENCOUNTER — IN-CLINIC DEVICE VISIT (OUTPATIENT)
Dept: CARDIOLOGY CLINIC | Facility: CLINIC | Age: 79
End: 2024-05-21
Payer: MEDICARE

## 2024-05-21 DIAGNOSIS — Z95.818 STATUS POST PLACEMENT OF IMPLANTABLE LOOP RECORDER: Primary | ICD-10-CM

## 2024-05-21 PROCEDURE — 99211 OFF/OP EST MAY X REQ PHY/QHP: CPT | Performed by: PHYSICIAN ASSISTANT

## 2024-05-21 NOTE — PROGRESS NOTES
The patient is seen in the office for a post ILR wound check.   The site is clean and dry without erythema or swelling.  There is no discharge or tenderness  Steri-strips removed.  The patient is afebrile, normocardic and normotensive.     The patient is instructed to return to normal activity, normal ROM and normal bathing, and to report any sign or symptom of infection.

## 2024-05-28 ENCOUNTER — TELEPHONE (OUTPATIENT)
Age: 79
End: 2024-05-28

## 2024-05-28 NOTE — TELEPHONE ENCOUNTER
Lia from [facility] Direct calling to confirm medication notification was sent to provider.

## 2024-05-29 ENCOUNTER — OFFICE VISIT (OUTPATIENT)
Dept: FAMILY MEDICINE CLINIC | Facility: CLINIC | Age: 79
End: 2024-05-29
Payer: MEDICARE

## 2024-05-29 VITALS
DIASTOLIC BLOOD PRESSURE: 70 MMHG | WEIGHT: 163 LBS | OXYGEN SATURATION: 99 % | HEART RATE: 73 BPM | BODY MASS INDEX: 30 KG/M2 | SYSTOLIC BLOOD PRESSURE: 124 MMHG | HEIGHT: 62 IN

## 2024-05-29 DIAGNOSIS — E78.2 MIXED HYPERLIPIDEMIA: ICD-10-CM

## 2024-05-29 DIAGNOSIS — N18.32 TYPE 2 DIABETES MELLITUS WITH STAGE 3B CHRONIC KIDNEY DISEASE, WITH LONG-TERM CURRENT USE OF INSULIN (HCC): Primary | Chronic | ICD-10-CM

## 2024-05-29 DIAGNOSIS — I10 ESSENTIAL HYPERTENSION: Chronic | ICD-10-CM

## 2024-05-29 DIAGNOSIS — I73.9 PAD (PERIPHERAL ARTERY DISEASE) (HCC): Chronic | ICD-10-CM

## 2024-05-29 DIAGNOSIS — E03.9 ACQUIRED HYPOTHYROIDISM: Chronic | ICD-10-CM

## 2024-05-29 DIAGNOSIS — E11.22 TYPE 2 DIABETES MELLITUS WITH STAGE 3B CHRONIC KIDNEY DISEASE, WITH LONG-TERM CURRENT USE OF INSULIN (HCC): Primary | Chronic | ICD-10-CM

## 2024-05-29 DIAGNOSIS — Z79.4 TYPE 2 DIABETES MELLITUS WITH STAGE 3B CHRONIC KIDNEY DISEASE, WITH LONG-TERM CURRENT USE OF INSULIN (HCC): Primary | Chronic | ICD-10-CM

## 2024-05-29 DIAGNOSIS — E11.9 TYPE 2 DIABETES MELLITUS WITHOUT COMPLICATION, WITHOUT LONG-TERM CURRENT USE OF INSULIN (HCC): ICD-10-CM

## 2024-05-29 PROCEDURE — 11981 INSERTION DRUG DLVR IMPLANT: CPT | Performed by: STUDENT IN AN ORGANIZED HEALTH CARE EDUCATION/TRAINING PROGRAM

## 2024-05-29 PROCEDURE — 99214 OFFICE O/P EST MOD 30 MIN: CPT | Performed by: STUDENT IN AN ORGANIZED HEALTH CARE EDUCATION/TRAINING PROGRAM

## 2024-05-29 NOTE — PROGRESS NOTES
Ambulatory Visit  Name: Jina Norris      : 1945      MRN: [id number]  Encounter Provider: Santiago Melendez MD  Encounter Date: 2024   Encounter department: [facility] PRIMARY CARE    Assessment & Plan   1. Type 2 diabetes mellitus with stage 3b chronic kidney disease, with long-term current use of insulin (Formerly [facility])  Assessment & Plan:    Lab Results   Component Value Date    HGBA1C 12.1 (H) 2024      Freestyle hanna reviewed average glucose 220 over last 60 days  Still significantly above goal   30 units of insulin  Has not been using prandin, encouraged to resume  On statin   Follows with endo  On ACE  Needs to call every week with readings  Pt not complaint with medications or testing  Referral to dietician         Orders:  -     Ambulatory Referral to Nutrition Services; Future  2. Essential hypertension  Assessment & Plan:  Bp controlled   On losartan    3. Mixed hyperlipidemia  4. Acquired hypothyroidism  5. PAD (peripheral artery disease) (Formerly [facility])  6. Type 2 diabetes mellitus without complication, without long-term current use of insulin (Formerly [facility])  -     linaGLIPtin 5 MG TABS; Take 5 mg by mouth daily       History of Present Illness     HPI    This is 79-year-old female presents the office for 1 month diabetic follow-up.  At last appointment patient was seen as TCM secondary to stroke and elevated blood sugars.  She is doing better with her blood sugars especially now she has a CGM however still continues to be fairly noncompliant regarding diet.  She has no new issues or concerns however she is requesting that we help her install her new sensor today    Review of Systems   Constitutional:  Negative for activity change, appetite change, chills, fatigue and fever.   HENT:  Negative for congestion, dental problem, drooling, ear discharge, ear pain, facial swelling, postnasal drip, rhinorrhea and sinus pain.    Eyes:  Negative for photophobia, pain, discharge and itching.   Respiratory:   "Negative for apnea, cough, chest tightness and shortness of breath.    Cardiovascular:  Negative for chest pain and leg swelling.   Gastrointestinal:  Negative for abdominal distention, abdominal pain, anal bleeding, constipation, diarrhea and nausea.   Endocrine: Negative for cold intolerance, heat intolerance and polydipsia.   Genitourinary:  Negative for difficulty urinating.   Musculoskeletal:  Negative for arthralgias, gait problem, joint swelling and myalgias.   Skin:  Negative for color change and pallor.   Allergic/Immunologic: Negative for immunocompromised state.   Neurological:  Negative for dizziness, seizures, facial asymmetry, weakness, light-headedness, numbness and headaches.   Psychiatric/Behavioral:  Negative for agitation, behavioral problems, confusion, decreased concentration and dysphoric mood.    All other systems reviewed and are negative.    Pertinent Medical History         Objective     /70 (BP Location: Left arm, Patient Position: Sitting, Cuff Size: Adult)   Pulse 73   Ht 5' 2\" (1.575 m)   Wt 73.9 kg (163 lb)   SpO2 99%   BMI 29.81 kg/m²     Physical Exam  Vitals and nursing note reviewed.   Constitutional:       General: She is not in acute distress.     Appearance: She is well-developed. She is obese.   HENT:      Head: Normocephalic and atraumatic.   Eyes:      Conjunctiva/sclera: Conjunctivae normal.      Pupils: Pupils are equal, round, and reactive to light.   Cardiovascular:      Rate and Rhythm: Normal rate and regular rhythm.      Heart sounds: Normal heart sounds. No murmur heard.     No friction rub.   Pulmonary:      Effort: Pulmonary effort is normal.      Breath sounds: Normal breath sounds.   Abdominal:      General: Bowel sounds are normal.      Palpations: Abdomen is soft.   Musculoskeletal:         General: Normal range of motion.      Cervical back: Normal range of motion and neck supple.   Skin:     General: Skin is warm.      Capillary Refill: Capillary " "refill takes less than 2 seconds.   Neurological:      Mental Status: She is alert and oriented to person, place, and time.      Motor: No abnormal muscle tone.      Coordination: Coordination normal.   Psychiatric:         Behavior: Behavior normal.         Thought Content: Thought content normal.     Universal Protocol:  Consent: Verbal consent obtained.  Risks and benefits: risks, benefits and alternatives were discussed  Time out: Immediately prior to procedure a \"time out\" was called to verify the correct patient, procedure, equipment, support staff and site/side marked as required.  Patient understanding: patient states understanding of the procedure being performed  Patient consent: the patient's understanding of the procedure matches consent given  Procedure consent: procedure consent matches procedure scheduled  Relevant documents: relevant documents present and verified  Test results: test results available and properly labeled  Site marked: the operative site was marked  Radiology Images displayed and confirmed. If images not available, report reviewed: imaging studies available  Required items: required blood products, implants, devices, and special equipment available  Patient identity confirmed: verbally with patient  Remove and insert drug implant    Date/Time: 5/29/2024 1:20 PM    Performed by: Santiago Melendez MD  Authorized by: Santiago Melendez MD    Indication:     Indication comment:  Freestyle hanna sensor  Pre-procedure:     Pre-procedure timeout performed: yes    Procedure:     Procedure:  Insertion    \  Administrative Statements           "

## 2024-05-29 NOTE — ASSESSMENT & PLAN NOTE
Lab Results   Component Value Date    HGBA1C 12.1 (H) 02/29/2024      Freestyle hanna reviewed average glucose 220 over last 60 days  Still significantly above goal   30 units of insulin  Has not been using prandin, encouraged to resume  On statin   Follows with endo  On ACE  Needs to call every week with readings  Pt not complaint with medications or testing  Referral to dietician

## 2024-05-30 ENCOUNTER — PATIENT OUTREACH (OUTPATIENT)
Dept: FAMILY MEDICINE CLINIC | Facility: CLINIC | Age: 79
End: 2024-05-30

## 2024-05-30 NOTE — PROGRESS NOTES
Outpatient Care Management Note    OP RN CM outreach for care management follow up. Patient previously referred for diabetes management.    Patient had OV with PCP yesterday 5/29/24.    OP RN CM placed outreach call to patient. Spoke with patient who reports that she doing okay.  She confirmed that she was seen by PCP yesterday and medications were reviewed at that time.  Patient has no medication questions or concerns at this time.    Patient has CGM, Freestyle Bobby. She reports that she is monitoring blood sugars and keeping a log.  Patient shared blood sugar numbers from yesterday and today.  Patient states that yesterday her BS in the morning was 66- denies symptoms. It was 108 midday and 300 before bedtime insulin.  Today, she reports the following BS numbers, 107 before breakfast and 200 midday.    Patient able to verbalize symptoms of hypoglycemia and what to do.    Patient states that she would like information on diabetic diet and nutrition.  RN CM will send information to patient via US mail- patient verbalized understanding.    Patient following with Endocrine and PCP. Next Endocrine appointment 8/1/24 and PCP appointment on 7/8/24.    Per patient request, RN CM will route patient's blood sugar numbers to PCP and Endo as FYI.    Patient agreed to next RN CM outreach in 2-3 weeks.

## 2024-05-31 NOTE — PROGRESS NOTES
Please instruct patient to reduce her evening Lantus to 26 units as this is likely the cause of her morning low blood sugars.   Can we get her CGM data linked to the office?  It looks like her PCP ordered tradjenta to be taking daily. Is she able to afford this? It will help with after meal high readings.

## 2024-06-03 ENCOUNTER — TELEPHONE (OUTPATIENT)
Age: 79
End: 2024-06-03

## 2024-06-03 ENCOUNTER — TELEPHONE (OUTPATIENT)
Dept: ENDOCRINOLOGY | Facility: CLINIC | Age: 79
End: 2024-06-03

## 2024-06-03 NOTE — TELEPHONE ENCOUNTER
Called and left message for patient to call back for recommendations    Per provider  Please instruct patient to reduce her evening Lantus to 26 units as this is likely the cause of her morning low blood sugars.   Can we get her CGM data linked to the office?  It looks like her PCP ordered tradjenta to be taking daily. Is she able to afford this? It will help with after meal high readings.

## 2024-06-03 NOTE — TELEPHONE ENCOUNTER
Patient said she was calling with glucose  levels for  for Dr Melendez   6/1 A.M -103        Afternoon - 166        P.M -275 (before bed)    6/2 A.M 109        Afternoon - 142        P.M - 250 (before bed)     6/3 A.M - 110        Afternoon- 150

## 2024-06-03 NOTE — TELEPHONE ENCOUNTER
Pt called in and would like for her Repaglinide to be refilled. Her Endo dr use to refill it but the pt stated that Dr Melendez wants her to be taking it still. Please advise.

## 2024-06-03 NOTE — TELEPHONE ENCOUNTER
Ava Reaves,    I certainly do not want to step on your toes as it looks like the above patient is also following with you.  Looks like her evening BG's are still significantly elevated and her morning BG's are improving.  Suspect this is likely due to her diet which I have educated her about multiple times.  I would appreciate your insight regarding her current diabetic regiment.    Thanks   Huber

## 2024-06-03 NOTE — TELEPHONE ENCOUNTER
Called and left a message for patient to return call to see if she will be able to stop by the office to down loan her CGM if she is still using it.    Cyclophosphamide Counseling:  I discussed with the patient the risks of cyclophosphamide including but not limited to hair loss, hormonal abnormalities, decreased fertility, abdominal pain, diarrhea, nausea and vomiting, bone marrow suppression and infection. The patient understands that monitoring is required while taking this medication.

## 2024-06-03 NOTE — TELEPHONE ENCOUNTER
Francesco Ngo,    No worries about stepping on my toes as you are seeing Jina more frequently than I can! I agree with adding tradjenta. I had actually responded to a RN/CM note last week regarding her morning hypoglycemia and evening hyperglycemia.    No doubt, the diet presents a problem but I did recommend pulling back on the Lantus to 26 units nightly.     I expect that she will likely need to bolus insulin for meals. I will ask the staff to reach out to her again to see if we could get some CGM info for me to review. Even if we start a low dose of humalog/novolog prior to dinner only, that may help significantly.

## 2024-06-04 ENCOUNTER — TELEPHONE (OUTPATIENT)
Dept: FAMILY MEDICINE CLINIC | Facility: CLINIC | Age: 79
End: 2024-06-04

## 2024-06-04 ENCOUNTER — TELEPHONE (OUTPATIENT)
Age: 79
End: 2024-06-04

## 2024-06-04 NOTE — TELEPHONE ENCOUNTER
Phc from patient wrong med was sent yesterday was suppose to be repaglinde 0.5 takes 1 daily per endo was cross of med list but is taking daily uses [facility] marcial

## 2024-06-05 ENCOUNTER — TELEPHONE (OUTPATIENT)
Age: 79
End: 2024-06-05

## 2024-06-05 NOTE — TELEPHONE ENCOUNTER
Patient called in  she has recorded her glucose readings    Patient phone number [phone]    glucose readings     6/3 - morning 110, mid day 150, before bed 163  6/4 - morning 125, mid day 200, before bed 222  6/5- morning 101,     Please call patient back to advise.     Please Advise. Thank you.

## 2024-06-07 ENCOUNTER — TELEPHONE (OUTPATIENT)
Age: 79
End: 2024-06-07

## 2024-06-07 ENCOUNTER — PATIENT OUTREACH (OUTPATIENT)
Dept: FAMILY MEDICINE CLINIC | Facility: CLINIC | Age: 79
End: 2024-06-07

## 2024-06-07 NOTE — TELEPHONE ENCOUNTER
from  [employer] called, states they faxed over an order for patient's CGM at the beginning of the month. States the fax also requested the office visit note along with the order for the freestyle hanna 3.     RN advised to fax the order request to [phone].      verbalized understanding, has no further questions/concerns at this time. States someone will follow up in a few days.

## 2024-06-07 NOTE — PROGRESS NOTES
CMOC contacted [employer] for a status update on Jina and [employer] transportation applications. Per Mady, applications are in process. She recommends a call back next week for determination.     Did not contact patient on this outreach as there is no update to relay.     Will outreach in one week with for a status update.

## 2024-06-10 ENCOUNTER — PATIENT OUTREACH (OUTPATIENT)
Dept: FAMILY MEDICINE CLINIC | Facility: CLINIC | Age: 79
End: 2024-06-10

## 2024-06-10 DIAGNOSIS — I10 ESSENTIAL HYPERTENSION: ICD-10-CM

## 2024-06-10 DIAGNOSIS — K21.9 GERD (GASTROESOPHAGEAL REFLUX DISEASE): ICD-10-CM

## 2024-06-10 RX ORDER — PANTOPRAZOLE SODIUM 40 MG/1
40 TABLET, DELAYED RELEASE ORAL
Qty: 90 TABLET | Refills: 1 | Status: SHIPPED | OUTPATIENT
Start: 2024-06-10

## 2024-06-10 RX ORDER — METOPROLOL SUCCINATE 50 MG/1
50 TABLET, EXTENDED RELEASE ORAL DAILY
Qty: 90 TABLET | Refills: 1 | Status: SHIPPED | OUTPATIENT
Start: 2024-06-10

## 2024-06-10 NOTE — TELEPHONE ENCOUNTER
Medication: pantoprazole (PROTONIX) 40 mg tablet     Dose/Frequency:     Take 1 tablet (40 mg total) by mouth daily in the early morning       Quantity:  90 tablet     Pharmacy: [id number] [address]     Office:   [x] PCP/Provider - Lyssa Peres MD   [] Speciality/Provider -     Does the patient have enough for 3 days?   [] Yes   [x] No - Send as HP to POD        Medication: metoprolol succinate (TOPROL-XL) 50 mg 24 hr tablet     Dose/Frequency: Take 1 tablet (50 mg total) by mouth daily     Quantity: 90 tablet     Pharmacy: [id number] [address]     Office:   [x] PCP/Provider - Lyssa Peres MD   [] Speciality/Provider -     Does the patient have enough for 3 days?   [x] Yes   [] No - Send as HP to POD

## 2024-06-10 NOTE — TELEPHONE ENCOUNTER
Patient would like top report her blood sugar levels;    6/4 Morning -125   Mid Day -200    Bedtime- 222  6/5 Morning- 101   Midday-145   Bedtime-186  6/6 Morning-130  Midday-155   Bedtime-201    6/7 morning-112   Midday-209   Bedtime-311  6/8 Morning-111   Midday-215   Bedtime-280  6/9 Morning-123   Midday-150   Bedtime-225  6/10- Morning-115   Midday-154

## 2024-06-10 NOTE — PROGRESS NOTES
VIKKI CM received IB from [facility]. Pt's applications for [employer] are still processing. [facility] plans to f/u with pt on this matter.  CM available should pt have any needs and will f/u as needed.

## 2024-06-10 NOTE — TELEPHONE ENCOUNTER
Spoke to patient. She is not connected to the office. She said she could get to the office tomorrow so we can help her get connected and download CGM

## 2024-06-11 ENCOUNTER — CLINICAL SUPPORT (OUTPATIENT)
Dept: ENDOCRINOLOGY | Facility: CLINIC | Age: 79
End: 2024-06-11
Payer: MEDICARE

## 2024-06-11 ENCOUNTER — APPOINTMENT (OUTPATIENT)
Dept: LAB | Facility: CLINIC | Age: 79
End: 2024-06-11
Payer: MEDICARE

## 2024-06-11 VITALS
OXYGEN SATURATION: 98 % | HEART RATE: 80 BPM | DIASTOLIC BLOOD PRESSURE: 68 MMHG | SYSTOLIC BLOOD PRESSURE: 132 MMHG | TEMPERATURE: 98.1 F

## 2024-06-11 DIAGNOSIS — Z79.4 TYPE 2 DIABETES MELLITUS WITH STAGE 3B CHRONIC KIDNEY DISEASE, WITH LONG-TERM CURRENT USE OF INSULIN (HCC): Primary | ICD-10-CM

## 2024-06-11 DIAGNOSIS — D64.9 ANEMIA, UNSPECIFIED TYPE: ICD-10-CM

## 2024-06-11 DIAGNOSIS — Z11.59 NEED FOR HEPATITIS C SCREENING TEST: ICD-10-CM

## 2024-06-11 DIAGNOSIS — Z79.4 TYPE 2 DIABETES MELLITUS WITH STAGE 3B CHRONIC KIDNEY DISEASE, WITH LONG-TERM CURRENT USE OF INSULIN (HCC): ICD-10-CM

## 2024-06-11 DIAGNOSIS — N18.32 TYPE 2 DIABETES MELLITUS WITH STAGE 3B CHRONIC KIDNEY DISEASE, WITH LONG-TERM CURRENT USE OF INSULIN (HCC): ICD-10-CM

## 2024-06-11 DIAGNOSIS — N18.31 STAGE 3A CHRONIC KIDNEY DISEASE (HCC): ICD-10-CM

## 2024-06-11 DIAGNOSIS — E83.42 HYPOMAGNESEMIA: ICD-10-CM

## 2024-06-11 DIAGNOSIS — E11.22 TYPE 2 DIABETES MELLITUS WITH STAGE 3B CHRONIC KIDNEY DISEASE, WITH LONG-TERM CURRENT USE OF INSULIN (HCC): Primary | ICD-10-CM

## 2024-06-11 DIAGNOSIS — N18.32 TYPE 2 DIABETES MELLITUS WITH STAGE 3B CHRONIC KIDNEY DISEASE, WITH LONG-TERM CURRENT USE OF INSULIN (HCC): Primary | ICD-10-CM

## 2024-06-11 DIAGNOSIS — E78.2 MIXED HYPERLIPIDEMIA: ICD-10-CM

## 2024-06-11 DIAGNOSIS — E11.22 TYPE 2 DIABETES MELLITUS WITH STAGE 3B CHRONIC KIDNEY DISEASE, WITH LONG-TERM CURRENT USE OF INSULIN (HCC): ICD-10-CM

## 2024-06-11 LAB
ALBUMIN SERPL BCP-MCNC: 3.6 G/DL (ref 3.5–5)
ALP SERPL-CCNC: 71 U/L (ref 34–104)
ALT SERPL W P-5'-P-CCNC: 9 U/L (ref 7–52)
ANION GAP SERPL CALCULATED.3IONS-SCNC: 9 MMOL/L (ref 4–13)
AST SERPL W P-5'-P-CCNC: 12 U/L (ref 13–39)
BASOPHILS # BLD AUTO: 0.05 THOUSANDS/ÂΜL (ref 0–0.1)
BASOPHILS NFR BLD AUTO: 1 % (ref 0–1)
BILIRUB SERPL-MCNC: 0.42 MG/DL (ref 0.2–1)
BUN SERPL-MCNC: 17 MG/DL (ref 5–25)
CALCIUM SERPL-MCNC: 9.1 MG/DL (ref 8.4–10.2)
CHLORIDE SERPL-SCNC: 103 MMOL/L (ref 96–108)
CHOLEST SERPL-MCNC: 94 MG/DL
CO2 SERPL-SCNC: 30 MMOL/L (ref 21–32)
CREAT SERPL-MCNC: 1.12 MG/DL (ref 0.6–1.3)
CREAT UR-MCNC: 138.1 MG/DL
EOSINOPHIL # BLD AUTO: 0.13 THOUSAND/ÂΜL (ref 0–0.61)
EOSINOPHIL NFR BLD AUTO: 1 % (ref 0–6)
ERYTHROCYTE [DISTWIDTH] IN BLOOD BY AUTOMATED COUNT: 14.5 % (ref 11.6–15.1)
FERRITIN SERPL-MCNC: 47 NG/ML (ref 11–307)
GFR SERPL CREATININE-BSD FRML MDRD: 46 ML/MIN/1.73SQ M
GLUCOSE P FAST SERPL-MCNC: 108 MG/DL (ref 65–99)
HCT VFR BLD AUTO: 37.8 % (ref 34.8–46.1)
HCV AB SER QL: NORMAL
HDLC SERPL-MCNC: 41 MG/DL
HGB BLD-MCNC: 11.8 G/DL (ref 11.5–15.4)
IMM GRANULOCYTES # BLD AUTO: 0.05 THOUSAND/UL (ref 0–0.2)
IMM GRANULOCYTES NFR BLD AUTO: 1 % (ref 0–2)
IRON SATN MFR SERPL: 21 % (ref 15–50)
IRON SERPL-MCNC: 60 UG/DL (ref 50–212)
LDLC SERPL CALC-MCNC: 34 MG/DL (ref 0–100)
LYMPHOCYTES # BLD AUTO: 1.64 THOUSANDS/ÂΜL (ref 0.6–4.47)
LYMPHOCYTES NFR BLD AUTO: 15 % (ref 14–44)
MAGNESIUM SERPL-MCNC: 1.8 MG/DL (ref 1.9–2.7)
MCH RBC QN AUTO: 29.6 PG (ref 26.8–34.3)
MCHC RBC AUTO-ENTMCNC: 31.2 G/DL (ref 31.4–37.4)
MCV RBC AUTO: 95 FL (ref 82–98)
MICROALBUMIN UR-MCNC: <7 MG/L
MICROALBUMIN/CREAT 24H UR: <5 MG/G CREATININE (ref 0–30)
MONOCYTES # BLD AUTO: 0.65 THOUSAND/ÂΜL (ref 0.17–1.22)
MONOCYTES NFR BLD AUTO: 6 % (ref 4–12)
NEUTROPHILS # BLD AUTO: 8.33 THOUSANDS/ÂΜL (ref 1.85–7.62)
NEUTS SEG NFR BLD AUTO: 76 % (ref 43–75)
NONHDLC SERPL-MCNC: 53 MG/DL
NRBC BLD AUTO-RTO: 0 /100 WBCS
PLATELET # BLD AUTO: 175 THOUSANDS/UL (ref 149–390)
PMV BLD AUTO: 10.9 FL (ref 8.9–12.7)
POTASSIUM SERPL-SCNC: 4.3 MMOL/L (ref 3.5–5.3)
PROT SERPL-MCNC: 6.7 G/DL (ref 6.4–8.4)
RBC # BLD AUTO: 3.98 MILLION/UL (ref 3.81–5.12)
SODIUM SERPL-SCNC: 142 MMOL/L (ref 135–147)
TIBC SERPL-MCNC: 286 UG/DL (ref 250–450)
TRIGL SERPL-MCNC: 93 MG/DL
UIBC SERPL-MCNC: 226 UG/DL (ref 155–355)
WBC # BLD AUTO: 10.85 THOUSAND/UL (ref 4.31–10.16)

## 2024-06-11 PROCEDURE — 83550 IRON BINDING TEST: CPT

## 2024-06-11 PROCEDURE — 82570 ASSAY OF URINE CREATININE: CPT

## 2024-06-11 PROCEDURE — 85025 COMPLETE CBC W/AUTO DIFF WBC: CPT

## 2024-06-11 PROCEDURE — 36415 COLL VENOUS BLD VENIPUNCTURE: CPT

## 2024-06-11 PROCEDURE — 80061 LIPID PANEL: CPT

## 2024-06-11 PROCEDURE — 82728 ASSAY OF FERRITIN: CPT

## 2024-06-11 PROCEDURE — 83735 ASSAY OF MAGNESIUM: CPT

## 2024-06-11 PROCEDURE — 83540 ASSAY OF IRON: CPT

## 2024-06-11 PROCEDURE — 99211 OFF/OP EST MAY X REQ PHY/QHP: CPT

## 2024-06-11 PROCEDURE — 82043 UR ALBUMIN QUANTITATIVE: CPT

## 2024-06-11 PROCEDURE — 86803 HEPATITIS C AB TEST: CPT

## 2024-06-11 PROCEDURE — 80053 COMPREHEN METABOLIC PANEL: CPT

## 2024-06-17 ENCOUNTER — PATIENT OUTREACH (OUTPATIENT)
Dept: FAMILY MEDICINE CLINIC | Facility: CLINIC | Age: 79
End: 2024-06-17

## 2024-06-17 NOTE — PROGRESS NOTES
DEYSI contacted [employer] for a status update on the transportation applications.   Per David, applications are unable to be located in their system. He requests the applications be sent again.. DEYSI was previously told applications were in process.   DEYSI emailed both Jina and [employer] applications to [email or web address] and requested confirmation the applications were received.     Will follow up in 1-2 weeks for a status update.

## 2024-06-19 ENCOUNTER — PATIENT OUTREACH (OUTPATIENT)
Dept: FAMILY MEDICINE CLINIC | Facility: CLINIC | Age: 79
End: 2024-06-19

## 2024-06-19 NOTE — PROGRESS NOTES
Outpatient Care Management Note    OP RN CM outreach for care management follow up.  Previously referred for assistance with diabetes management.    RN CM placed outreach call to patient. No patient answer. Left VM message and included RN CM contact information and request for return call.    RN CM will schedule second outreach attempt in 1-2 weeks if no patient response received prior.

## 2024-06-20 ENCOUNTER — TELEPHONE (OUTPATIENT)
Age: 79
End: 2024-06-20

## 2024-06-20 NOTE — TELEPHONE ENCOUNTER
Dior called in stating she faxed over requests.6/4, 6/7, 6/10, and 6/12 requesting patients last 6 months of chart notes. Request is not urgent due to the delay. Please advise and fax asap. Fax: [phone]

## 2024-06-20 NOTE — TELEPHONE ENCOUNTER
Ruthie from [employer] called inquiring about fax sent on 6/18 regarding CGM. I did inform her that there was nothing in the patients chart as of yet. With any questions she can be reached at [phone] option 3.

## 2024-06-21 ENCOUNTER — OFFICE VISIT (OUTPATIENT)
Dept: NEPHROLOGY | Facility: CLINIC | Age: 79
End: 2024-06-21
Payer: MEDICARE

## 2024-06-21 VITALS
SYSTOLIC BLOOD PRESSURE: 116 MMHG | BODY MASS INDEX: 30.66 KG/M2 | OXYGEN SATURATION: 97 % | WEIGHT: 166.6 LBS | HEART RATE: 77 BPM | DIASTOLIC BLOOD PRESSURE: 78 MMHG | HEIGHT: 62 IN

## 2024-06-21 DIAGNOSIS — I10 ESSENTIAL HYPERTENSION: Chronic | ICD-10-CM

## 2024-06-21 DIAGNOSIS — E83.42 HYPOMAGNESEMIA: ICD-10-CM

## 2024-06-21 DIAGNOSIS — N18.31 STAGE 3A CHRONIC KIDNEY DISEASE (HCC): Primary | ICD-10-CM

## 2024-06-21 PROBLEM — E83.52 HYPERCALCEMIA: Status: RESOLVED | Noted: 2023-11-19 | Resolved: 2024-06-21

## 2024-06-21 PROCEDURE — G2211 COMPLEX E/M VISIT ADD ON: HCPCS | Performed by: INTERNAL MEDICINE

## 2024-06-21 PROCEDURE — 99214 OFFICE O/P EST MOD 30 MIN: CPT | Performed by: INTERNAL MEDICINE

## 2024-06-21 RX ORDER — BLOOD-GLUCOSE SENSOR
EACH MISCELLANEOUS
COMMUNITY
End: 2024-07-01 | Stop reason: SDUPTHER

## 2024-06-21 RX ORDER — KETOROLAC TROMETHAMINE 30 MG/ML
INJECTION, SOLUTION INTRAMUSCULAR; INTRAVENOUS
COMMUNITY

## 2024-06-21 RX ORDER — MAGNESIUM OXIDE 400 MG/1
400 TABLET ORAL 2 TIMES DAILY
Qty: 60 TABLET | Refills: 5 | Status: SHIPPED | OUTPATIENT
Start: 2024-06-21

## 2024-06-21 NOTE — PATIENT INSTRUCTIONS
You can take the magnesium oxide as needed for muscle spasms/cramps up to 2 times a day.    If your blood pressures are low, please hold the amlodipine.

## 2024-06-21 NOTE — PROGRESS NOTES
[facility] [place]  Gabino Traylor DO    Name: Jina Norris  YOB: 1945      Assessment/Plan:    Stage 3a chronic kidney disease (HCC)  Lab Results   Component Value Date    EGFR 46 06/11/2024    EGFR 36 03/07/2024    EGFR 31 03/06/2024    CREATININE 1.12 06/11/2024    CREATININE 1.37 (H) 03/07/2024    CREATININE 1.56 (H) 03/06/2024     Kidney function doing well at this time, baseline creatinine is around 1.1-1.3 mg/dL.  Continue to optimize care, avoid nephrotoxins.    Essential hypertension  Blood pressure well-controlled at this time, continue current medications including amlodipine, metoprolol, and focusing on low-sodium diet.  With respect to potentially initiating RAAS inhibition, patient previously experienced reduction in kidney function, currently hesitant to initiate angiotensin receptor blocker or ACE inhibitor.  But may consider this depending on she progresses from a clinical standpoint.    Hypomagnesemia  Continue magnesium supplementation, patient is taking the supplement without experiencing loose bowel movements.         Problem List Items Addressed This Visit          Cardiovascular and Mediastinum    Essential hypertension (Chronic)     Blood pressure well-controlled at this time, continue current medications including amlodipine, metoprolol, and focusing on low-sodium diet.  With respect to potentially initiating RAAS inhibition, patient previously experienced reduction in kidney function, currently hesitant to initiate angiotensin receptor blocker or ACE inhibitor.  But may consider this depending on she progresses from a clinical standpoint.            Genitourinary    Stage 3a chronic kidney disease (HCC) - Primary     Lab Results   Component Value Date    EGFR 46 06/11/2024    EGFR 36 03/07/2024    EGFR 31 03/06/2024    CREATININE 1.12 06/11/2024    CREATININE 1.37 (H) 03/07/2024    CREATININE 1.56 (H) 03/06/2024     Kidney function doing  well at this time, baseline creatinine is around 1.1-1.3 mg/dL.  Continue to optimize care, avoid nephrotoxins.         Relevant Orders    Albumin / creatinine urine ratio    Urinalysis with microscopic    Comprehensive metabolic panel    Magnesium    Phosphorus    PTH, intact       Other    Hypomagnesemia     Continue magnesium supplementation, patient is taking the supplement without experiencing loose bowel movements.         Relevant Medications    magnesium oxide (MAG-OX) 400 mg tablet       Patient is here for the renal standpoint.  Will see her back for regular appointment in about 6 months, blood work to be done prior to that appointment during this next visit.    Subjective:      Patient ID: Jina Norris is a 79 y.o. female.    Patient presents for follow up.    We reviewed labs in detail, most recent creatinine noted to be 1.12 mg/dL, eGFR 46 ml/min.    There were no significant electrolyte abnormalities noted.  Patient is taking all medications as prescribed with no specific side effects and denies use of nonsteroid anti-inflammatory medications.          Hypertension  This is a chronic problem. The current episode started more than 1 year ago. The problem is unchanged. The problem is controlled. Pertinent negatives include no chest pain, orthopnea or shortness of breath. There are no associated agents to hypertension. Risk factors for coronary artery disease include post-menopausal state. Past treatments include lifestyle changes and calcium channel blockers. There are no compliance problems.  Hypertensive end-organ damage includes kidney disease. Identifiable causes of hypertension include chronic renal disease.       The following portions of the patient's history were reviewed and updated as appropriate: allergies, current medications, past family history, past medical history, past social history, past surgical history and problem list.    Review of Systems   Respiratory:  Negative for shortness of  breath.    Cardiovascular:  Negative for chest pain and orthopnea.   All other systems reviewed and are negative.        Social History     Socioeconomic History    Marital status: /Civil Union     Spouse name: Thania     Number of children: None    Years of education: None    Highest education level: None   Occupational History    Occupation: Retired    Tobacco Use    Smoking status: Former     Current packs/day: 0.00     Average packs/day: 1 pack/day for 14.0 years (14.0 ttl pk-yrs)     Types: Cigarettes     Start date:      Quit date:      Years since quittin.5    Smokeless tobacco: Never   Vaping Use    Vaping status: Never Used   Substance and Sexual Activity    Alcohol use: Yes     Comment: Occasionally     Drug use: Never     Comment: Denies drug use - As per [employer]     Sexual activity: Not Currently   Other Topics Concern    None   Social History Narrative     - Thania is Syrian and speaks Bulgarian, common law marriage    Does not consume caffeine      Social Determinants of Health     Financial Resource Strain: Low Risk  (3/7/2023)    Overall Financial Resource Strain (CARDIA)     Difficulty of Paying Living Expenses: Not hard at all   Food Insecurity: No Food Insecurity (3/1/2024)    Hunger Vital Sign     Worried About Running Out of Food in the Last Year: Never true     Ran Out of Food in the Last Year: Never true   Transportation Needs: No Transportation Needs (3/1/2024)    PRAPARE - Transportation     Lack of Transportation (Medical): No     Lack of Transportation (Non-Medical): No   Physical Activity: Not on file   Stress: Not on file   Social Connections: Not on file   Intimate Partner Violence: Not on file   Housing Stability: Low Risk  (3/1/2024)    Housing Stability Vital Sign     Unable to Pay for Housing in the Last Year: No     Number of Times Moved in the Last Year: 1     Homeless in the Last Year: No     Past Medical History:   Diagnosis Date    Chronic kidney  disease, stage 3 (HCC)     CVA (cerebral vascular accident) (HCC)     Diabetes mellitus (HCC)     Disorder of gallbladder     Disorder of skin and subcutaneous tissue     Dyspnea     Essential hypertension     Hypercalcemia 11/19/2023    Hypokalemia     Malaise and fatigue     Mixed hyperlipidemia     Morbid obesity (HCC)     Pernicious anemia      Past Surgical History:   Procedure Laterality Date    BREAST BIOPSY Right     CARPAL TUNNEL RELEASE Bilateral     CHOLECYSTECTOMY      COLONOSCOPY      Dr. Apple     HYSTERECTOMY      LUMBAR EPIDURAL INJECTION      x3    PARTIAL HYSTERECTOMY      SKIN LESION EXCISION      scalp        Current Outpatient Medications:     amLODIPine (NORVASC) 5 mg tablet, Take 1 tablet (5 mg total) by mouth daily, Disp: 90 tablet, Rfl: 3    aspirin (ECOTRIN LOW STRENGTH) 81 mg EC tablet, Take 81 mg by mouth daily, Disp: , Rfl:     cholecalciferol (VITAMIN D3) 1,000 units tablet, Take 800 Units by mouth daily  , Disp: , Rfl:     clopidogrel (PLAVIX) 75 mg tablet, Take 1 tablet (75 mg total) by mouth daily, Disp: 90 tablet, Rfl: 0    Continuous Glucose  (FreeStyle Bobby 3 [place]) RIYA, Use, Disp: , Rfl:     Continuous Glucose Sensor (FreeStyle Bobby 3 Sensor) MISC, Use, Disp: , Rfl:     fluticasone (FLONASE) 50 mcg/act nasal spray, 2 sprays into each nostril daily, Disp: 16 g, Rfl: 6    folic acid (FOLVITE) 400 mcg tablet, Take 400 mcg by mouth daily, Disp: , Rfl:     glucose blood (FREESTYLE LITE) test strip, Use 1 each 3 (three) times a day Use as instructed, Disp: 300 each, Rfl: 0    glucose monitoring kit (FREESTYLE) monitoring kit, 1 each by Does not apply route daily, Disp: 1 each, Rfl: 0    insulin degludec (Tresiba FlexTouch) 100 units/mL injection pen, Inject 30 units nightly., Disp: 30 mL, Rfl: 1    Insulin Pen Needle 32G X 6 MM MISC, Use in the morning, Disp: 100 each, Rfl: 5    Lancets (freestyle) lancets, Use as instructed -glucose monitor BID, Disp: 100 each, Rfl: 5     levothyroxine 50 mcg tablet, Take 1 tablet (50 mcg total) by mouth daily, Disp: 90 tablet, Rfl: 2    linaGLIPtin 5 MG TABS, Take 5 mg by mouth daily, Disp: 30 tablet, Rfl: 1    magnesium oxide (MAG-OX) 400 mg tablet, Take 1 tablet (400 mg total) by mouth 2 (two) times a day, Disp: 60 tablet, Rfl: 5    metoprolol succinate (TOPROL-XL) 50 mg 24 hr tablet, Take 1 tablet (50 mg total) by mouth daily, Disp: 90 tablet, Rfl: 1    omega-3-acid ethyl esters (LOVAZA) 1 g capsule, Take 2 g by mouth 2 (two) times a day, Disp: , Rfl:     pantoprazole (PROTONIX) 40 mg tablet, Take 1 tablet (40 mg total) by mouth daily in the early morning, Disp: 90 tablet, Rfl: 1    psyllium ([employer] Daily Fiber) 0.52 g capsule, Take 0.52 g by mouth 2 (two) times a day, Disp: , Rfl:     rosuvastatin (CRESTOR) 10 MG tablet, Take 1 tablet (10 mg total) by mouth daily, Disp: 90 tablet, Rfl: 3    Urea 20 Intensive Hydrating 20 % cream, , Disp: , Rfl:     Lab Results   Component Value Date    SODIUM 142 06/11/2024    K 4.3 06/11/2024     06/11/2024    CO2 30 06/11/2024    AGAP 9 06/11/2024    BUN 17 06/11/2024    CREATININE 1.12 06/11/2024    GLUC 162 (H) 03/07/2024    GLUF 108 (H) 06/11/2024    CALCIUM 9.1 06/11/2024    AST 12 (L) 06/11/2024    ALT 9 06/11/2024    ALKPHOS 71 06/11/2024    TP 6.7 06/11/2024    TBILI 0.42 06/11/2024    EGFR 46 06/11/2024     Lab Results   Component Value Date    WBC 10.85 (H) 06/11/2024    HGB 11.8 06/11/2024    HCT 37.8 06/11/2024    MCV 95 06/11/2024     06/11/2024     Lab Results   Component Value Date    CHOLESTEROL 94 06/11/2024    CHOLESTEROL 106 02/29/2024    CHOLESTEROL 93 07/14/2023     Lab Results   Component Value Date    HDL 41 (L) 06/11/2024    HDL 43 (L) 02/29/2024    HDL 57 07/14/2023     Lab Results   Component Value Date    LDLCALC 34 06/11/2024    LDLCALC 31 02/29/2024    LDLCALC 13 07/14/2023     Lab Results   Component Value Date    TRIG 93 06/11/2024    TRIG 158 (H) 02/29/2024    TRIG 114  "07/14/2023     No results found for: \"CHOLHDL\"  Lab Results   Component Value Date    XHT1IXAPCDHP 2.992 03/01/2024     Lab Results   Component Value Date    PTH 31.9 12/07/2023    CALCIUM 9.1 06/11/2024    PHOS 3.8 03/02/2024     Lab Results   Component Value Date    SPEP See Comment 11/21/2023    UPEP See Comment 11/24/2023     No results found for: \"MICROALBUR\", \"[id number]\"        Objective:      /78 (BP Location: Left arm, Patient Position: Sitting)   Pulse 77   Ht 5' 2\" (1.575 m)   Wt 75.6 kg (166 lb 9.6 oz)   SpO2 97%   BMI 30.47 kg/m²          Physical Exam  Vitals reviewed.   Constitutional:       General: She is not in acute distress.     Appearance: Normal appearance.   HENT:      Head: Normocephalic and atraumatic.      Right Ear: External ear normal.      Left Ear: External ear normal.   Eyes:      Conjunctiva/sclera: Conjunctivae normal.   Cardiovascular:      Rate and Rhythm: Normal rate and regular rhythm.      Heart sounds: Normal heart sounds.   Pulmonary:      Effort: No respiratory distress.      Breath sounds: No wheezing.   Abdominal:      Palpations: Abdomen is soft.   Skin:     General: Skin is warm and dry.   Neurological:      General: No focal deficit present.      Mental Status: She is alert and oriented to person, place, and time.   Psychiatric:         Mood and Affect: Mood normal.         Behavior: Behavior normal.         "

## 2024-06-24 NOTE — ASSESSMENT & PLAN NOTE
Lab Results   Component Value Date    EGFR 46 06/11/2024    EGFR 36 03/07/2024    EGFR 31 03/06/2024    CREATININE 1.12 06/11/2024    CREATININE 1.37 (H) 03/07/2024    CREATININE 1.56 (H) 03/06/2024     Kidney function doing well at this time, baseline creatinine is around 1.1-1.3 mg/dL.  Continue to optimize care, avoid nephrotoxins.

## 2024-06-24 NOTE — ASSESSMENT & PLAN NOTE
Continue magnesium supplementation, patient is taking the supplement without experiencing loose bowel movements.

## 2024-06-24 NOTE — ASSESSMENT & PLAN NOTE
Blood pressure well-controlled at this time, continue current medications including amlodipine, metoprolol, and focusing on low-sodium diet.  With respect to potentially initiating RAAS inhibition, patient previously experienced reduction in kidney function, currently hesitant to initiate angiotensin receptor blocker or ACE inhibitor.  But may consider this depending on she progresses from a clinical standpoint.

## 2024-06-25 ENCOUNTER — TELEPHONE (OUTPATIENT)
Dept: FAMILY MEDICINE CLINIC | Facility: CLINIC | Age: 79
End: 2024-06-25

## 2024-06-26 NOTE — TELEPHONE ENCOUNTER
Okjcarlos Urbano reports that these are blood pressure readings... However they look more like blood glucoses to me... Can we call in a confirmation what exactly am looking at here because honestly this looks more like a blood sugar log than anything else.  Furthermore patient should have a continuous glucose monitor and will be much more beneficial for her to drop or bring her monitor to the office so we can physically examine it which has a a lot more information.  If they are blood sugars can we please ascertain how much insulin she is taking.    If in reality these are blood pressures she needs to be seen in the office immediately because her midday and bedtime numbers are significantly elevated however I am also not sure if this would be a systolic or diastolic number

## 2024-06-26 NOTE — TELEPHONE ENCOUNTER
Pt called back with blood pressure readings,, they are as follows                 Morning Midday  bedtime  6/10   115     154    261  6/11    95     150     222  6/12   111     188     232  6/13        92      186     251  6/14        99                    188                 270  6/15        98                     158                235  6/16       134                    161                200  6/17        99                      138              241  6/18        130                   180               221  6/19       113                     153               235  6/20        111                     139              234  6/21       113                      160              250  6/22       150                     209               280  6/23       120                    154                241  6/24       104                     202              240  6/25        113                    160               200  6/26         124

## 2024-07-01 ENCOUNTER — PATIENT OUTREACH (OUTPATIENT)
Dept: FAMILY MEDICINE CLINIC | Facility: CLINIC | Age: 79
End: 2024-07-01

## 2024-07-01 DIAGNOSIS — E11.22 TYPE 2 DIABETES MELLITUS WITH STAGE 3B CHRONIC KIDNEY DISEASE, WITH LONG-TERM CURRENT USE OF INSULIN (HCC): Primary | Chronic | ICD-10-CM

## 2024-07-01 DIAGNOSIS — Z79.4 TYPE 2 DIABETES MELLITUS WITH STAGE 3B CHRONIC KIDNEY DISEASE, WITH LONG-TERM CURRENT USE OF INSULIN (HCC): Primary | Chronic | ICD-10-CM

## 2024-07-01 DIAGNOSIS — N18.32 TYPE 2 DIABETES MELLITUS WITH STAGE 3B CHRONIC KIDNEY DISEASE, WITH LONG-TERM CURRENT USE OF INSULIN (HCC): Primary | Chronic | ICD-10-CM

## 2024-07-01 NOTE — PROGRESS NOTES
CMOC contacted [employer] for a status update on Jina and Escobar' transportation applications. Both applicants are approved for senior share ride services. Welcome packets for each were sent to the patient's home address.    CMOC attempted to contact Jina to discuss the above.   No answer. Left message on voicemail with reason for call and the above information, this writer's contact information, and a request for a call back to discuss.    Will outreach next week if no contact prior.

## 2024-07-02 RX ORDER — BLOOD-GLUCOSE SENSOR
EACH MISCELLANEOUS
Qty: 9 EACH | Refills: 1 | Status: SHIPPED | OUTPATIENT
Start: 2024-07-02

## 2024-07-05 ENCOUNTER — PATIENT OUTREACH (OUTPATIENT)
Dept: FAMILY MEDICINE CLINIC | Facility: CLINIC | Age: 79
End: 2024-07-05

## 2024-07-05 NOTE — PROGRESS NOTES
Outpatient Care Management Note    OP RN CM outreach for care management follow up. Chart reviewed.    RN CARL previous outreach attempt 6/19/24 unsuccessful. VM message left.    RN CM made second outreach call attempt now for care management follow up. No answer. Left VM message and included RN CM contact information and request for a return call.    Unable to reach letter sent to patient via [employer] and will be sent also by US mail.    RN CM will continue to monitor for patient response to above noted outreach attempts.

## 2024-07-05 NOTE — LETTER
Date: 07/05/24    Dear Jina Norris,   My name is Karla Cooper; I am a registered nurse care manager working with   [address] [address] [phone].    I have not been able to reach you and would like to set a time that I can talk with you over the phone.  My work is to help patients that have complex medical conditions get the care they need. This includes patients who may have been in the hospital or emergency room.    Please call me with any questions you may have. I look forward to speaking with you.  Sincerely,  Karla Allison  [phone]  Outpatient Care Manager

## 2024-07-08 ENCOUNTER — PATIENT OUTREACH (OUTPATIENT)
Dept: FAMILY MEDICINE CLINIC | Facility: CLINIC | Age: 79
End: 2024-07-08

## 2024-07-08 ENCOUNTER — OFFICE VISIT (OUTPATIENT)
Dept: FAMILY MEDICINE CLINIC | Facility: CLINIC | Age: 79
End: 2024-07-08
Payer: MEDICARE

## 2024-07-08 VITALS
BODY MASS INDEX: 31.03 KG/M2 | HEART RATE: 77 BPM | WEIGHT: 168.6 LBS | DIASTOLIC BLOOD PRESSURE: 72 MMHG | OXYGEN SATURATION: 97 % | SYSTOLIC BLOOD PRESSURE: 130 MMHG | HEIGHT: 62 IN | TEMPERATURE: 97.7 F

## 2024-07-08 DIAGNOSIS — E11.22 TYPE 2 DIABETES MELLITUS WITH STAGE 3B CHRONIC KIDNEY DISEASE, WITH LONG-TERM CURRENT USE OF INSULIN (HCC): Primary | ICD-10-CM

## 2024-07-08 DIAGNOSIS — E83.42 HYPOMAGNESEMIA: ICD-10-CM

## 2024-07-08 DIAGNOSIS — Z79.4 TYPE 2 DIABETES MELLITUS WITH STAGE 3B CHRONIC KIDNEY DISEASE, WITH LONG-TERM CURRENT USE OF INSULIN (HCC): Primary | ICD-10-CM

## 2024-07-08 DIAGNOSIS — E78.2 MIXED HYPERLIPIDEMIA: ICD-10-CM

## 2024-07-08 DIAGNOSIS — N18.32 TYPE 2 DIABETES MELLITUS WITH STAGE 3B CHRONIC KIDNEY DISEASE, WITH LONG-TERM CURRENT USE OF INSULIN (HCC): Primary | ICD-10-CM

## 2024-07-08 DIAGNOSIS — I63.9 CVA (CEREBRAL VASCULAR ACCIDENT) (HCC): ICD-10-CM

## 2024-07-08 DIAGNOSIS — N18.31 STAGE 3A CHRONIC KIDNEY DISEASE (HCC): ICD-10-CM

## 2024-07-08 DIAGNOSIS — I10 ESSENTIAL HYPERTENSION: Chronic | ICD-10-CM

## 2024-07-08 DIAGNOSIS — E03.9 ACQUIRED HYPOTHYROIDISM: Chronic | ICD-10-CM

## 2024-07-08 PROCEDURE — 99214 OFFICE O/P EST MOD 30 MIN: CPT | Performed by: STUDENT IN AN ORGANIZED HEALTH CARE EDUCATION/TRAINING PROGRAM

## 2024-07-08 PROCEDURE — G2211 COMPLEX E/M VISIT ADD ON: HCPCS | Performed by: STUDENT IN AN ORGANIZED HEALTH CARE EDUCATION/TRAINING PROGRAM

## 2024-07-08 RX ORDER — CLOPIDOGREL BISULFATE 75 MG/1
75 TABLET ORAL DAILY
Qty: 90 TABLET | Refills: 0 | Status: SHIPPED | OUTPATIENT
Start: 2024-07-08

## 2024-07-08 NOTE — ASSESSMENT & PLAN NOTE
Lab Results   Component Value Date    EGFR 46 06/11/2024    EGFR 36 03/07/2024    EGFR 31 03/06/2024    CREATININE 1.12 06/11/2024    CREATININE 1.37 (H) 03/07/2024    CREATININE 1.56 (H) 03/06/2024   Recently saw nephrology, notes reviewed   Loretta Estrella (:  2002) is a 23 JOSSY Angel

## 2024-07-08 NOTE — PROGRESS NOTES
Ambulatory Visit  Name: Jina Norris      : 1945      MRN: [id number]  Encounter Provider: Santiago Melendez MD  Encounter Date: 2024   Encounter department: [facility] PRIMARY CARE    Assessment & Plan   1. Type 2 diabetes mellitus with stage 3b chronic kidney disease, with long-term current use of insulin ([facility])  Comments:  CGM 61% above target - daily averages 230s-260s   fasting glucose 100-110s   mid-day, night glucose range of 200-300   compliant with medications  Assessment & Plan:    Lab Results   Component Value Date    HGBA1C 12.1 (H) 2024   A1c today 8.8  Continue current regimen as she has had significant decrease in A1c  CGM data reviewed  Continue follow-up with endocrinology  Continue stressed lifestyle modifications.      Current regiment: Linagliptan  5 mg daily  Tresiba 30 units at bedtime    Morning sugars 100's  Lunch 200's   Dinner 280's    Patient reports noncompliance with diabetic diet, excessive sweets at dinner and in the evening.  Hesistant to increase insulin given morning values    Extensive diet education provided     Orders:  -     Ambulatory Referral to Nutrition Services; Future  2. CVA (cerebral vascular accident) ([facility])  -     clopidogrel (PLAVIX) 75 mg tablet; Take 1 tablet (75 mg total) by mouth daily  3. Acquired hypothyroidism  Assessment & Plan:  Stable continue current dose of levothyroxine  4. Mixed hyperlipidemia  Assessment & Plan:  Doing well continue Crestor 10 mg no side effects noted  5. Stage 3a chronic kidney disease (HCC)  Assessment & Plan:  Lab Results   Component Value Date    EGFR 46 2024    EGFR 36 2024    EGFR 31 2024    CREATININE 1.12 2024    CREATININE 1.37 (H) 2024    CREATININE 1.56 (H) 2024   Recently saw nephrology, notes reviewed  6. Hypomagnesemia  Comments:  1.8 on    Pt stopped taking Mg after most recent hospitalization.   Nephrology cleared her and she is taking Mg again now  7.  "Essential hypertension  Assessment & Plan:  Currently on amlodipine 5 mg       History of Present Illness         This a 79-year-old female presents the office today for diabetic follow-up.  She was last seen 2 months ago with multiple unmedicated unmanaged conditions including profoundly uncontrolled diabetes.  As her last visit she has seen endocrinology, nephrology and has an upcoming appointment with cardiology.  Patient states she is trying to do better with her diet as well as compliance regarding her medications denies any new problems or issues    Review of Systems   Constitutional:  Negative for activity change, appetite change, chills, fatigue and fever.   HENT:  Negative for congestion, dental problem, drooling, ear discharge, ear pain, facial swelling, postnasal drip, rhinorrhea and sinus pain.    Eyes:  Negative for photophobia, pain, discharge and itching.   Respiratory:  Negative for apnea, cough, chest tightness and shortness of breath.    Cardiovascular:  Negative for chest pain and leg swelling.   Gastrointestinal:  Negative for abdominal distention, abdominal pain, anal bleeding, constipation, diarrhea and nausea.   Endocrine: Negative for cold intolerance, heat intolerance and polydipsia.   Genitourinary:  Negative for difficulty urinating.   Musculoskeletal:  Negative for arthralgias, gait problem, joint swelling and myalgias.   Skin:  Negative for color change and pallor.   Allergic/Immunologic: Negative for immunocompromised state.   Neurological:  Negative for dizziness, seizures, facial asymmetry, weakness, light-headedness, numbness and headaches.   Psychiatric/Behavioral:  Negative for agitation, behavioral problems, confusion, decreased concentration and dysphoric mood.    All other systems reviewed and are negative.    Pertinent Medical History         Objective     /72   Pulse 77   Temp 97.7 °F (36.5 °C) (Tympanic)   Ht 5' 2\" (1.575 m)   Wt 76.5 kg (168 lb 9.6 oz)   SpO2 97%  "  BMI 30.84 kg/m²     Physical Exam  Vitals and nursing note reviewed.   Constitutional:       General: She is not in acute distress.     Appearance: She is well-developed. She is obese.   HENT:      Head: Normocephalic and atraumatic.   Eyes:      Conjunctiva/sclera: Conjunctivae normal.      Pupils: Pupils are equal, round, and reactive to light.   Cardiovascular:      Rate and Rhythm: Normal rate and regular rhythm.      Heart sounds: Normal heart sounds. No murmur heard.     No friction rub.   Pulmonary:      Effort: Pulmonary effort is normal.      Breath sounds: Normal breath sounds.   Abdominal:      General: Bowel sounds are normal.      Palpations: Abdomen is soft.   Musculoskeletal:         General: Normal range of motion.      Cervical back: Normal range of motion and neck supple.   Skin:     General: Skin is warm.      Capillary Refill: Capillary refill takes less than 2 seconds.   Neurological:      Mental Status: She is alert and oriented to person, place, and time.      Motor: No abnormal muscle tone.      Coordination: Coordination normal.   Psychiatric:         Behavior: Behavior normal.         Thought Content: Thought content normal.       Administrative Statements

## 2024-07-08 NOTE — PROGRESS NOTES
DEYSI contacted Jina to relay transportation information.    Jina confirms receiving welcome packets for both herself and [facility]. She plans to utilize this service for upcoming appointments.     Confirmed there are no other questions or needs and informed Jina the referral will be closed at this time. She may contact this writer, the CM SW, or her PCP for any future needs. Breanna expressed understanding and thanks the Care Management team for their assistance.     No further outreaches will be made.

## 2024-07-08 NOTE — ASSESSMENT & PLAN NOTE
Lab Results   Component Value Date    HGBA1C 12.1 (H) 02/29/2024   A1c today 8.8  Continue current regimen as she has had significant decrease in A1c  CGM data reviewed  Continue follow-up with endocrinology  Continue stressed lifestyle modifications.      Current regiment: Linagliptan  5 mg daily  Tresiba 30 units at bedtime    Morning sugars 100's  Lunch 200's   Dinner 280's    Patient reports noncompliance with diabetic diet, excessive sweets at dinner and in the evening.  Hesistant to increase insulin given morning values    Extensive diet education provided

## 2024-07-09 ENCOUNTER — PATIENT OUTREACH (OUTPATIENT)
Dept: FAMILY MEDICINE CLINIC | Facility: CLINIC | Age: 79
End: 2024-07-09

## 2024-07-09 NOTE — PROGRESS NOTES
VIKKI HENRY received IB from [facility] with update. Pt approved for transportation and understands how to use the service. No other social needs identified at this time. VIKKI HENRY resolved SW episode and removed self from care team. Note routed to RN CM.

## 2024-07-09 NOTE — PROGRESS NOTES
Outpatient Care Management Note    IB reminder- Needs UTR reach letter sent via US mail.    Unable to reach patient during last two telephone outreach attempts. UTR letter was sent via [employer] on 7/5/24.    Sending UTR letter via US mail now, as patient has not been active on [employer] since February.    RN CM will monitor for patient response to above noted outreach attempts. RN CM will remove self from care team and close episode if no patient response received within next two weeks.

## 2024-07-09 NOTE — LETTER
Date: 07/09/24    Dear Jina Norris,   My name is Karla Cooper; I am a registered nurse care manager working with   [address] [address] [phone].     I have not been able to reach you and would like to set a time that I can talk with you over the phone.  My work is to help patients that have complex medical conditions get the care they need. This includes patients who may have been in the hospital or emergency room.    Please call me with any questions you may have. I look forward to speaking with you.  Sincerely,  Karla Allison  [phone]  Outpatient Care Manager

## 2024-07-17 ENCOUNTER — TELEPHONE (OUTPATIENT)
Age: 79
End: 2024-07-17

## 2024-07-17 NOTE — TELEPHONE ENCOUNTER
Patient called in and said [facility] is waiting for Dr. Melendez to sign and send them a script for the glucose sensors.  Patient ran out of them 3 days ago.  She is asking that Dr. Melendez sign the script and fax it to [facility].  Patient did not have a fax number for them, only a phone# [phone].

## 2024-07-18 DIAGNOSIS — E11.9 TYPE 2 DIABETES MELLITUS WITHOUT COMPLICATION, WITHOUT LONG-TERM CURRENT USE OF INSULIN (HCC): ICD-10-CM

## 2024-07-18 RX ORDER — LINAGLIPTIN 5 MG/1
5 TABLET, FILM COATED ORAL DAILY
Qty: 30 TABLET | Refills: 5 | Status: SHIPPED | OUTPATIENT
Start: 2024-07-18

## 2024-07-23 ENCOUNTER — PATIENT OUTREACH (OUTPATIENT)
Dept: CASE MANAGEMENT | Facility: OTHER | Age: 79
End: 2024-07-23

## 2024-07-23 NOTE — PROGRESS NOTES
Outpatient Care Management Note    IB reminder: UTR letter follow up. Chart reviewed. No patient response received.    RN CM initially received patient as a PCP direct referral for diabetes management. During last contact with patient on 5/30/24, patient reported that she was monitoring blood sugars and keeping a log. Information on diabetic diet and nutrition also mailed to patient.   Patient had scheduled follow up appointments with Endocrine and PCP.    RN CM unable to reach patient on last two telephone outreach attempts. VM messages left with no return call from patient.  UTR letter was sent to patient via [employer] and US mail with no patient response received.    RN CM will remove self from care team and close episode at this time due to no patient response to above noted outreach attempts.

## 2024-08-01 ENCOUNTER — OFFICE VISIT (OUTPATIENT)
Dept: ENDOCRINOLOGY | Facility: CLINIC | Age: 79
End: 2024-08-01
Payer: MEDICARE

## 2024-08-01 VITALS
SYSTOLIC BLOOD PRESSURE: 132 MMHG | BODY MASS INDEX: 30.99 KG/M2 | WEIGHT: 168.4 LBS | DIASTOLIC BLOOD PRESSURE: 70 MMHG | HEART RATE: 90 BPM | TEMPERATURE: 97.8 F | OXYGEN SATURATION: 98 % | HEIGHT: 62 IN

## 2024-08-01 DIAGNOSIS — Z79.4 TYPE 2 DIABETES MELLITUS WITH STAGE 3B CHRONIC KIDNEY DISEASE, WITH LONG-TERM CURRENT USE OF INSULIN (HCC): Primary | ICD-10-CM

## 2024-08-01 DIAGNOSIS — N18.32 TYPE 2 DIABETES MELLITUS WITH STAGE 3B CHRONIC KIDNEY DISEASE, WITH LONG-TERM CURRENT USE OF INSULIN (HCC): Primary | ICD-10-CM

## 2024-08-01 DIAGNOSIS — E78.2 MIXED HYPERLIPIDEMIA: ICD-10-CM

## 2024-08-01 DIAGNOSIS — I10 ESSENTIAL HYPERTENSION: Chronic | ICD-10-CM

## 2024-08-01 DIAGNOSIS — N18.31 STAGE 3A CHRONIC KIDNEY DISEASE (HCC): ICD-10-CM

## 2024-08-01 DIAGNOSIS — E11.22 TYPE 2 DIABETES MELLITUS WITH STAGE 3B CHRONIC KIDNEY DISEASE, WITH LONG-TERM CURRENT USE OF INSULIN (HCC): Primary | ICD-10-CM

## 2024-08-01 DIAGNOSIS — E03.9 ACQUIRED HYPOTHYROIDISM: Chronic | ICD-10-CM

## 2024-08-01 LAB — SL AMB POCT HEMOGLOBIN AIC: 8.6 (ref ?–6.5)

## 2024-08-01 PROCEDURE — 83036 HEMOGLOBIN GLYCOSYLATED A1C: CPT | Performed by: NURSE PRACTITIONER

## 2024-08-01 PROCEDURE — 99214 OFFICE O/P EST MOD 30 MIN: CPT | Performed by: NURSE PRACTITIONER

## 2024-08-01 PROCEDURE — 95251 CONT GLUC MNTR ANALYSIS I&R: CPT | Performed by: NURSE PRACTITIONER

## 2024-08-01 RX ORDER — INSULIN DEGLUDEC 100 U/ML
INJECTION, SOLUTION SUBCUTANEOUS
Qty: 30 ML | Refills: 1 | Status: SHIPPED | OUTPATIENT
Start: 2024-08-01

## 2024-08-01 RX ORDER — NATEGLINIDE 60 MG/1
60 TABLET ORAL
Qty: 270 TABLET | Refills: 1 | Status: SHIPPED | OUTPATIENT
Start: 2024-08-01

## 2024-08-01 NOTE — ASSESSMENT & PLAN NOTE
Well-controlled at 132/70.  Continue 5 mg of amlodipine daily and 50 mg of metoprolol succinate daily.

## 2024-08-01 NOTE — PATIENT INSTRUCTIONS
Reduce Tresiba to 24 units nightly  Continue 5 mg of Tradjenta  Start Starlix 60 mg. Take this pill three times daily before your meals. If you skip your meal, skip the dose.  Let me know how your blood sugars are in about two weeks.   Please complete labs prior your next appointment.

## 2024-08-01 NOTE — PROGRESS NOTES
Established Patient Progress Note    Chief Complaint:  Diabetes follow up visit    Impression & Plan:    Problem List Items Addressed This Visit       Type 2 diabetes mellitus with stage 3b chronic kidney disease, with long-term current use of insulin (HCC) - Primary (Chronic)     While A1C is improving, blood sugars remain highly variable with hyperglycemia in the evenings. Much of this is diet related. Reviewed the impact of high sugar/high carb foods on blood sugar. At this time, instructed patient to reduce Tresiba to 24 units nightly. Continue tradjenta. Start Starlix. Take 60 mg three times daily before meals. Reviewed MOA and increased risk of hypoglycemia. Instructed patient to skip dose if she skips a meal. Provided written instructions for all of this. Will reach out to patient in approximately 2 weeks to review blood sugar. Counseled on appropriate surveillance and treatment of hypoglycemia. Patient knows to notify me with persistent hyperglycemia or episodes of hypoglycemia.  F/U in 4 months. Complete labs prior.   Lab Results   Component Value Date    HGBA1C 12.1 (H) 02/29/2024            Relevant Medications    insulin degludec (Tresiba FlexTouch) 100 units/mL injection pen    nateglinide (STARLIX) 60 mg tablet    Other Relevant Orders    POCT hemoglobin A1c (Completed)    Hemoglobin A1C    Basic metabolic panel    Essential hypertension (Chronic)     Well-controlled at 132/70.  Continue 5 mg of amlodipine daily and 50 mg of metoprolol succinate daily.         Mixed hyperlipidemia     Continue 10 mg of rosuvastatin nightly.         Acquired hypothyroidism (Chronic)     Continue 50 mg of levothyroxine daily. Check TFT prior to follow up appointment.         Relevant Orders    TSH, 3rd generation    T4, free    Stage 3a chronic kidney disease (HCC)     Lab Results   Component Value Date    EGFR 46 06/11/2024    EGFR 36 03/07/2024    EGFR 31 03/06/2024    CREATININE 1.12 06/11/2024    CREATININE 1.37  (H) 03/07/2024    CREATININE 1.56 (H) 03/06/2024   Following with nephrology.  SGLT twos are cost prohibitive.  Continue to optimize blood sugar control.  Avoid nephrotoxic agents.            History of Present Illness:   Jina Norris is a 79 y.o. female with Type 2 diabetes presenting to the office today for late follow up.     Patient was last seen on 4/26/2024.  At that time, she was instructed to test her blood sugar at least 3 times daily and we did order her a continuous glucose monitor.  Low-dose DPP 4 was initiated.    A1c improved from 12.1% to 8.6%.    Had tried Ozempic- cost prohibitive. Farxiga was also cost prohibitive.       Current regimen:    Tradjenta 5 mg daily  Tresiba 30 units nightly    Jina Norris   Device used  Home use       Indication   Type 2 Diabetes    More than 72 hours of data was reviewed. Report to be scanned to chart.     Date Range: July 19, 2024-August 1, 2024    Analysis of data:   Average Glucose: 197 mg/dL  Coefficient of Variation: 36.7%   GMI: 8%   Time in Target Range: 47%   Time Above Range: 30% 181-250 mg/dL; 23% >250 mg/dL   Time Below Range: 0%     Interpretation of data: Patient's A1c and blood sugars have improved however, between the hours of 5 AM and 10 AM, her blood sugars run on the low end of normal whereas between noon and midnight, her blood sugars increasingly rise.  Patient does admit to snacking and eating sweet desserts prior to bed.      For hypothyroidism, she is taking 50 mcg daily.  TSH was stable on 3/1/2024.  Patient Active Problem List   Diagnosis    Type 2 diabetes mellitus with stage 3b chronic kidney disease, with long-term current use of insulin ([facility])    Essential hypertension    Mixed hyperlipidemia    Acquired hypothyroidism    Spinal stenosis of lumbar region    Chronic pain syndrome    Lumbar radiculopathy    PAD (peripheral artery disease) ([facility])    Asymptomatic bilateral carotid artery stenosis    Abnormal CT of the abdomen     Hypomagnesemia    Stage 3a chronic kidney disease (HCC)    Acute CVA (cerebrovascular accident) (HCC)      Past Medical History:   Diagnosis Date    Chronic kidney disease, stage 3 (HCC)     CVA (cerebral vascular accident) (HCC)     Diabetes mellitus (HCC)     Disorder of gallbladder     Disorder of skin and subcutaneous tissue     Dyspnea     Essential hypertension     Hypercalcemia 2023    Hypokalemia     Malaise and fatigue     Mixed hyperlipidemia     Morbid obesity (HCC)     Pernicious anemia       Past Surgical History:   Procedure Laterality Date    BREAST BIOPSY Right     CARPAL TUNNEL RELEASE Bilateral     CHOLECYSTECTOMY      COLONOSCOPY      Dr. Apple     HYSTERECTOMY      LUMBAR EPIDURAL INJECTION      x3    PARTIAL HYSTERECTOMY      SKIN LESION EXCISION      scalp       Family History   Problem Relation Age of Onset    Stroke Mother     Atrial fibrillation Mother     Brain cancer Father     Other Brother         Twin brothers - Open heart    Other Brother         Twin brothers - Open heart    No Known Problems Maternal Grandmother     No Known Problems Paternal Grandmother      Social History     Tobacco Use    Smoking status: Former     Current packs/day: 0.00     Average packs/day: 1 pack/day for 14.0 years (14.0 ttl pk-yrs)     Types: Cigarettes     Start date:      Quit date:      Years since quittin.6    Smokeless tobacco: Never   Substance Use Topics    Alcohol use: Yes     Comment: Occasionally      No Known Allergies      Current Outpatient Medications:     amLODIPine (NORVASC) 5 mg tablet, Take 1 tablet (5 mg total) by mouth daily, Disp: 90 tablet, Rfl: 3    aspirin (ECOTRIN LOW STRENGTH) 81 mg EC tablet, Take 81 mg by mouth daily, Disp: , Rfl:     cholecalciferol (VITAMIN D3) 1,000 units tablet, Take 800 Units by mouth daily  , Disp: , Rfl:     clopidogrel (PLAVIX) 75 mg tablet, Take 1 tablet (75 mg total) by mouth daily, Disp: 90 tablet, Rfl: 0    Continuous Glucose   (FreeStyle Bobby 3 [place]) RIYA, Use, Disp: , Rfl:     Continuous Glucose Sensor (FreeStyle Bobby 3 Sensor) MISC, Use 1 sensor every 14 days for glucose monitoring., Disp: 9 each, Rfl: 1    fluticasone (FLONASE) 50 mcg/act nasal spray, 2 sprays into each nostril daily, Disp: 16 g, Rfl: 6    glucose blood (FREESTYLE LITE) test strip, Use 1 each 3 (three) times a day Use as instructed, Disp: 300 each, Rfl: 0    glucose monitoring kit (FREESTYLE) monitoring kit, 1 each by Does not apply route daily, Disp: 1 each, Rfl: 0    insulin degludec (Tresiba FlexTouch) 100 units/mL injection pen, Inject 24 units nightly., Disp: 30 mL, Rfl: 1    Insulin Pen Needle 32G X 6 MM MISC, Use in the morning, Disp: 100 each, Rfl: 5    Lancets (freestyle) lancets, Use as instructed -glucose monitor BID, Disp: 100 each, Rfl: 5    levothyroxine 50 mcg tablet, Take 1 tablet (50 mcg total) by mouth daily, Disp: 90 tablet, Rfl: 2    linaGLIPtin (Tradjenta) 5 MG TABS, take 1 tablet by mouth once daily, Disp: 30 tablet, Rfl: 5    magnesium oxide (MAG-OX) 400 mg tablet, Take 1 tablet (400 mg total) by mouth 2 (two) times a day, Disp: 60 tablet, Rfl: 5    metoprolol succinate (TOPROL-XL) 50 mg 24 hr tablet, Take 1 tablet (50 mg total) by mouth daily, Disp: 90 tablet, Rfl: 1    nateglinide (STARLIX) 60 mg tablet, Take 1 tablet (60 mg total) by mouth 3 (three) times a day before meals, Disp: 270 tablet, Rfl: 1    omega-3-acid ethyl esters (LOVAZA) 1 g capsule, Take 2 g by mouth 2 (two) times a day, Disp: , Rfl:     pantoprazole (PROTONIX) 40 mg tablet, Take 1 tablet (40 mg total) by mouth daily in the early morning, Disp: 90 tablet, Rfl: 1    psyllium ([employer] Daily Fiber) 0.52 g capsule, Take 0.52 g by mouth 2 (two) times a day, Disp: , Rfl:     rosuvastatin (CRESTOR) 10 MG tablet, Take 1 tablet (10 mg total) by mouth daily, Disp: 90 tablet, Rfl: 3    Urea 20 Intensive Hydrating 20 % cream, , Disp: , Rfl:     folic acid (FOLVITE) 400 mcg  "tablet, Take 400 mcg by mouth daily (Patient not taking: Reported on 8/1/2024), Disp: , Rfl:     Review of Systems   Constitutional:  Negative for activity change, appetite change, fatigue and unexpected weight change.   HENT:  Negative for dental problem, sore throat, trouble swallowing and voice change.    Eyes:  Negative for visual disturbance.   Respiratory:  Negative for cough, chest tightness and shortness of breath.    Cardiovascular:  Negative for chest pain, palpitations and leg swelling.   Gastrointestinal:  Negative for constipation, diarrhea, nausea and vomiting.   Endocrine: Negative for cold intolerance, heat intolerance, polydipsia, polyphagia and polyuria.   Genitourinary:  Negative for frequency.   Musculoskeletal:  Negative for arthralgias, back pain, gait problem and myalgias.   Skin:  Negative for wound.   Allergic/Immunologic: Negative for environmental allergies and food allergies.   Neurological:  Negative for dizziness, weakness, light-headedness, numbness and headaches.   Psychiatric/Behavioral:  Negative for decreased concentration, dysphoric mood and sleep disturbance. The patient is not nervous/anxious.        Physical Exam:  Body mass index is 30.8 kg/m².  /70 (BP Location: Left arm, Patient Position: Sitting, Cuff Size: Adult)   Pulse 90   Temp 97.8 °F (36.6 °C)   Ht 5' 2\" (1.575 m)   Wt 76.4 kg (168 lb 6.4 oz)   SpO2 98%   BMI 30.80 kg/m²    Wt Readings from Last 3 Encounters:   08/01/24 76.4 kg (168 lb 6.4 oz)   07/08/24 76.5 kg (168 lb 9.6 oz)   06/21/24 75.6 kg (166 lb 9.6 oz)       Physical Exam  Vitals reviewed.   Constitutional:       General: She is not in acute distress.     Appearance: She is well-developed. She is obese. She is not ill-appearing.   HENT:      Head: Normocephalic and atraumatic.   Eyes:      Pupils: Pupils are equal, round, and reactive to light.   Neck:      Thyroid: No thyromegaly.   Cardiovascular:      Rate and Rhythm: Normal rate.      Pulses: " Normal pulses.   Pulmonary:      Effort: Pulmonary effort is normal.   Musculoskeletal:      Cervical back: Normal range of motion and neck supple.      Right lower leg: No edema.      Left lower leg: No edema.   Lymphadenopathy:      Cervical: No cervical adenopathy.   Skin:     General: Skin is warm and dry.      Capillary Refill: Capillary refill takes less than 2 seconds.   Neurological:      Mental Status: She is alert and oriented to person, place, and time.      Gait: Gait normal.   Psychiatric:         Mood and Affect: Mood normal.         Behavior: Behavior normal.           Labs:   Lab Results   Component Value Date    HGBA1C 8.6 (A) 08/01/2024    HGBA1C 12.1 (H) 02/29/2024    HGBA1C 9.5 (H) 12/07/2023     Lab Results   Component Value Date    CREATININE 1.12 06/11/2024    CREATININE 1.37 (H) 03/07/2024    CREATININE 1.56 (H) 03/06/2024    BUN 17 06/11/2024    K 4.3 06/11/2024     06/11/2024    CO2 30 06/11/2024     eGFR   Date Value Ref Range Status   06/11/2024 46 ml/min/1.73sq m Final     Lab Results   Component Value Date    HDL 41 (L) 06/11/2024    TRIG 93 06/11/2024     Lab Results   Component Value Date    ALT 9 06/11/2024    AST 12 (L) 06/11/2024    ALKPHOS 71 06/11/2024     Lab Results   Component Value Date    RWV1SFSZMNLA 2.992 03/01/2024    POC9FZJEPKXX 2.614 11/26/2023    IMX8GZKLUVNT 2.227 11/19/2023     Lab Results   Component Value Date    FREET4 1.03 10/21/2020       Discussed with the patient and all questioned fully answered. She will call me if any problems arise.    Follow-up appointment in 4 months.     Counseled patient on diagnostic results, prognosis, risk and benefit of treatment options, instruction for management, importance of treatment compliance, Risk  factor reduction and impressions    LAURITA Lipscomb

## 2024-08-01 NOTE — ASSESSMENT & PLAN NOTE
Lab Results   Component Value Date    EGFR 46 06/11/2024    EGFR 36 03/07/2024    EGFR 31 03/06/2024    CREATININE 1.12 06/11/2024    CREATININE 1.37 (H) 03/07/2024    CREATININE 1.56 (H) 03/06/2024   Following with nephrology.  SGLT twos are cost prohibitive.  Continue to optimize blood sugar control.  Avoid nephrotoxic agents.

## 2024-08-01 NOTE — ASSESSMENT & PLAN NOTE
While A1C is improving, blood sugars remain highly variable with hyperglycemia in the evenings. Much of this is diet related. Reviewed the impact of high sugar/high carb foods on blood sugar. At this time, instructed patient to reduce Tresiba to 24 units nightly. Continue tradjenta. Start Starlix. Take 60 mg three times daily before meals. Reviewed MOA and increased risk of hypoglycemia. Instructed patient to skip dose if she skips a meal. Provided written instructions for all of this. Will reach out to patient in approximately 2 weeks to review blood sugar. Counseled on appropriate surveillance and treatment of hypoglycemia. Patient knows to notify me with persistent hyperglycemia or episodes of hypoglycemia.  F/U in 4 months. Complete labs prior.   Lab Results   Component Value Date    HGBA1C 12.1 (H) 02/29/2024

## 2024-08-26 ENCOUNTER — REMOTE DEVICE CLINIC VISIT (OUTPATIENT)
Dept: CARDIOLOGY CLINIC | Facility: CLINIC | Age: 79
End: 2024-08-26
Payer: MEDICARE

## 2024-08-26 DIAGNOSIS — I63.9 ACUTE CVA (CEREBROVASCULAR ACCIDENT) (HCC): Primary | ICD-10-CM

## 2024-08-26 DIAGNOSIS — K21.9 GERD (GASTROESOPHAGEAL REFLUX DISEASE): ICD-10-CM

## 2024-08-26 PROCEDURE — 93298 REM INTERROG DEV EVAL SCRMS: CPT | Performed by: INTERNAL MEDICINE

## 2024-08-26 NOTE — TELEPHONE ENCOUNTER
Reason for call:   [x] Refill   [] Prior Auth  [] Other:     Office:   [x] PCP/Provider - Lyssa Peres MD   [] Specialty/Provider -     Medication: pantoprazole (PROTONIX) 40 mg tablet     Dose/Frequency: Take 1 tablet (40 mg total) by mouth daily in the early morning     Quantity: 90    Pharmacy: [id number]  [place] [address] [phone]     Does the patient have enough for 3 days?   [x] Yes   [] No - Send as HP to POD

## 2024-08-26 NOTE — PROGRESS NOTES
"MDT LOOP II/ACTIVE SYSTEM IS MRI CONDITIONAL   CARELINK TRANSMISSION: LOOP RECORDER. PRESENTING RHYTHM NSR @ 75 BPM. BATTERY STATUS \"OK.\" 1 DEVICE CLASSIFIED AF EPISODES, LONGEST EPISODE IS 32 MINS, AVAILABLE STRIPS DEMONSTRATE NO ATRIAL FIBRILLATION. INSTEAD EGM'S SHOW SR W/ PACs.  AF BURDEN IS 10.0%. NO PATIENT ACTIVATED EPISODES. NORMAL DEVICE FUNCTION. DL   "

## 2024-08-27 RX ORDER — PANTOPRAZOLE SODIUM 40 MG/1
40 TABLET, DELAYED RELEASE ORAL
Qty: 90 TABLET | Refills: 1 | Status: SHIPPED | OUTPATIENT
Start: 2024-08-27

## 2024-09-20 DIAGNOSIS — E11.9 TYPE 2 DIABETES MELLITUS WITHOUT COMPLICATION, WITHOUT LONG-TERM CURRENT USE OF INSULIN (HCC): ICD-10-CM

## 2024-09-20 RX ORDER — LINAGLIPTIN 5 MG/1
5 TABLET, FILM COATED ORAL DAILY
Qty: 90 TABLET | Refills: 1 | Status: SHIPPED | OUTPATIENT
Start: 2024-09-20

## 2024-10-03 ENCOUNTER — TELEPHONE (OUTPATIENT)
Dept: FAMILY MEDICINE CLINIC | Facility: CLINIC | Age: 79
End: 2024-10-03

## 2024-10-03 DIAGNOSIS — I63.9 CVA (CEREBRAL VASCULAR ACCIDENT) (HCC): ICD-10-CM

## 2024-10-03 RX ORDER — CLOPIDOGREL BISULFATE 75 MG/1
75 TABLET ORAL DAILY
Qty: 90 TABLET | Refills: 1 | Status: SHIPPED | OUTPATIENT
Start: 2024-10-03

## 2024-10-03 NOTE — TELEPHONE ENCOUNTER
Please call patient and schedule her for a diabetic follow-up as well as her annual wellness towards the end of November.  Thank you

## 2024-10-29 ENCOUNTER — TELEPHONE (OUTPATIENT)
Dept: FAMILY MEDICINE CLINIC | Facility: CLINIC | Age: 79
End: 2024-10-29

## 2024-11-07 ENCOUNTER — OFFICE VISIT (OUTPATIENT)
Dept: CARDIOLOGY CLINIC | Facility: CLINIC | Age: 79
End: 2024-11-07
Payer: MEDICARE

## 2024-11-07 VITALS
HEIGHT: 62 IN | SYSTOLIC BLOOD PRESSURE: 106 MMHG | WEIGHT: 175 LBS | DIASTOLIC BLOOD PRESSURE: 60 MMHG | HEART RATE: 80 BPM | BODY MASS INDEX: 32.2 KG/M2

## 2024-11-07 DIAGNOSIS — I35.1 NONRHEUMATIC AORTIC VALVE INSUFFICIENCY: ICD-10-CM

## 2024-11-07 DIAGNOSIS — E78.2 MIXED HYPERLIPIDEMIA: ICD-10-CM

## 2024-11-07 DIAGNOSIS — I10 ESSENTIAL HYPERTENSION: Primary | Chronic | ICD-10-CM

## 2024-11-07 DIAGNOSIS — I63.9 ACUTE CVA (CEREBROVASCULAR ACCIDENT) (HCC): ICD-10-CM

## 2024-11-07 DIAGNOSIS — R06.83 SNORING: ICD-10-CM

## 2024-11-07 PROCEDURE — 99214 OFFICE O/P EST MOD 30 MIN: CPT | Performed by: INTERNAL MEDICINE

## 2024-11-07 NOTE — ASSESSMENT & PLAN NOTE
-Patient notes improving overall residual symptoms  -Continue aspirin 81 mg daily, Plavix 75 mg daily managed by neurology team  -Continue to monitor

## 2024-11-07 NOTE — PROGRESS NOTES
Patient ID: Jina Norris is a 79 y.o. female.        Plan:      Assessment & Plan  Acute CVA (cerebrovascular accident) (HCC)  -Patient notes improving overall residual symptoms  -Continue aspirin 81 mg daily, Plavix 75 mg daily managed by neurology team  -Continue to monitor  Essential hypertension  -Patient has blood pressures at home well-controlled continue metoprolol succinate 50 mg daily, amlodipine 5 mg daily  -Counseled on dietary and lifestyle modifications  -Continue to monitor  Mixed hyperlipidemia  -Counseled patient on dietary and lifestyle modifications  -Continue Crestor 10 mg daily at this time  Snoring  -Given underlying risk factors will give patient referral to sleep medicine for additional evaluation of possible sleep apnea  -Continue to monitor and patient was counseled on dietary and lifestyle modifications.  Nonrheumatic aortic valve insufficiency  -Mild on transthoracic echocardiogram February 2024  -Continue to monitor      Follow up Plan/Other summary comments:  -After discussion with patient as device interrogation appears to show mostly sinus rhythm with PACs and no definitive EGM showing atrial fibrillation she wishes to hold off initiation of oral anticoagulation at this time but will continue aspirin 81 mg daily and Plavix 75 mg daily.  -As blood pressures are well-controlled continue amlodipine 5 mg daily, metoprolol succinate 50 mg daily  -Will continue Crestor 10 mg daily at this time  -Lipid panel 6/11/2024 showing total cholesterol 94, triglyceride 93, HDL 41, LDL 34  -Counseled patient on dietary and lifestyle modifications including following a low-salt, low-fat, heart healthy diet with sodium restriction to less than 1800 mg of sodium daily and weight reduction goal BMI less than 29  -Will give patient referral to sleep medicine for evaluation of sleep apnea  -Will see patient in 6 months or sooner if necessary  -Counseled patient if she were to have any warning or alarm type  symptoms she is to seek emergency medical care immediately.      HPI:   -Patient is a 79-year-old female with hypertension, hyperlipidemia, diabetes mellitus and CKD stage III, obesity and PAD who was hospitalized at [facility] in February into early March or 2024 with concerns for stroke.  During that time she was undergoing knee injections and had issues with dizziness and fall and therefore had noticed some speaking and facial issues and presented for evaluation.  During her hospitalization she was seen and evaluated by neurology with MRI showing recent infarcts identified with concern for potential embolic phenomenon was even seen and evaluated undergoing transesophageal echocardiogram which showed no significant patent foramen ovale and no thrombus appreciated in atria or atrial appendage or ventricular cavity.  She underwent implantable loop recorder and presents to the office today for follow-up.  -Implantable loop recorder does appear to be documenting atrial fibrillation however EGM's appear to show sinus rhythm with frequent PACs.  -Currently in the office today patient denies any chest pain, palpitations, lightness or dizziness, loss of consciousness or shortness of breath.  She continues to get stronger after her stroke and denies any exertional symptoms.  She does have some low back pain and chronic arthralgias but denies any other significant symptoms.      Most recent or relevant cardiac/vascular testing:    -Device interrogation 8/26/2024 showing sinus rhythm with class of atrial fibrillation episodes lasting the longest 32 minutes with available strips demonstrating no atrial fibrillation instead showing sinus rhythm with PACs    -Device interrogation 5/20/2024 showing 20 device classified atrial fibrillation episodes with EGM's showing no true atrial fibrillation and instead showing sinus rhythm with noise    -Device interrogation 5/16/2024 stating 51 device classified alerts for  "atrial fibrillation with longest episode 2 hours and 18 minutes however available strips did not demonstrate any atrial fibrillation and instead showed sinus rhythm with PACs with inability to completely and definitively rule out atrial fibrillation at this time.    -Transthoracic echocardiogram 2/21/2024 showing left ventricular systolic function normal estimated LVEF 55% with grade 1 diastolic dysfunction, mildly dilated left atrium with mild aortic regurgitation trace mitral regurgitation.    -Transesophageal echocardiogram 3//2024 showing left ventricular systolic function normal stated LVEF 55% with no evidence of left atrial, left atrial appendage or left ventricular thrombus appreciated no significant patent foramen ovale detected      Past Surgical History:   Procedure Laterality Date    BREAST BIOPSY Right     CARPAL TUNNEL RELEASE Bilateral     CHOLECYSTECTOMY      COLONOSCOPY      Dr. Apple     HYSTERECTOMY      LUMBAR EPIDURAL INJECTION      x3    PARTIAL HYSTERECTOMY      SKIN LESION EXCISION      scalp            Review of Systems   Review of Systems   Constitutional:  Negative for chills, diaphoresis, fatigue and fever.   HENT:  Negative for trouble swallowing and voice change.    Eyes:  Negative for pain and redness.   Respiratory:  Negative for shortness of breath and wheezing.    Cardiovascular:  Negative for chest pain, palpitations and leg swelling.   Gastrointestinal:  Negative for abdominal pain, constipation, diarrhea, nausea and vomiting.   Genitourinary:  Negative for dysuria.   Musculoskeletal:  Positive for arthralgias. Negative for neck pain and neck stiffness.   Skin:  Negative for rash.   Neurological:  Negative for dizziness, syncope, light-headedness and headaches.   Psychiatric/Behavioral:  Negative for agitation and confusion.    All other systems reviewed and are negative.         Objective:     /60   Pulse 80   Ht 5' 2\" (1.575 m)   Wt 79.4 kg (175 lb)   BMI 32.01 kg/m² "     PHYSICAL EXAM:  Physical Exam  Vitals reviewed.   Constitutional:       General: She is not in acute distress.     Appearance: She is obese. She is not diaphoretic.   HENT:      Head: Normocephalic and atraumatic.   Eyes:      General:         Right eye: No discharge.         Left eye: No discharge.   Neck:      Comments: Trachea midline, neck obese, difficult to assess JVD  Cardiovascular:      Rate and Rhythm: Normal rate and regular rhythm.      Heart sounds:      No friction rub.   Pulmonary:      Effort: Pulmonary effort is normal. No respiratory distress.      Breath sounds: No wheezing.   Chest:      Chest wall: No tenderness.   Abdominal:      General: Bowel sounds are normal.      Palpations: Abdomen is soft.      Tenderness: There is no abdominal tenderness. There is no rebound.   Musculoskeletal:      Right lower leg: No edema.      Left lower leg: No edema.   Skin:     General: Skin is warm and dry.   Neurological:      Mental Status: She is alert.      Comments: Awake, alert, able to answer questions appropriately.   Psychiatric:         Mood and Affect: Mood normal.         Behavior: Behavior normal.          Meds reviewed.  Current Outpatient Medications on File Prior to Visit   Medication Sig Dispense Refill    amLODIPine (NORVASC) 5 mg tablet Take 1 tablet (5 mg total) by mouth daily 90 tablet 3    aspirin (ECOTRIN LOW STRENGTH) 81 mg EC tablet Take 81 mg by mouth daily      cholecalciferol (VITAMIN D3) 1,000 units tablet Take 800 Units by mouth daily        clopidogrel (PLAVIX) 75 mg tablet take 1 tablet by mouth once daily 90 tablet 1    Continuous Glucose  (FreeStyle Bobby 3 [place]) RIYA Use      Continuous Glucose Sensor (FreeStyle Bobby 3 Sensor) MISC Use 1 sensor every 14 days for glucose monitoring. 9 each 1    fluticasone (FLONASE) 50 mcg/act nasal spray 2 sprays into each nostril daily 16 g 6    glucose blood (FREESTYLE LITE) test strip Use 1 each 3 (three) times a day Use as  instructed 300 each 0    glucose monitoring kit (FREESTYLE) monitoring kit 1 each by Does not apply route daily 1 each 0    insulin degludec (Tresiba FlexTouch) 100 units/mL injection pen Inject 24 units nightly. 30 mL 1    Insulin Pen Needle 32G X 6 MM MISC Use in the morning 100 each 5    Lancets (freestyle) lancets Use as instructed -glucose monitor  each 5    levothyroxine 50 mcg tablet Take 1 tablet (50 mcg total) by mouth daily 90 tablet 2    linaGLIPtin (Tradjenta) 5 MG TABS take 1 tablet by mouth once daily 90 tablet 1    magnesium oxide (MAG-OX) 400 mg tablet Take 1 tablet (400 mg total) by mouth 2 (two) times a day 60 tablet 5    metoprolol succinate (TOPROL-XL) 50 mg 24 hr tablet Take 1 tablet (50 mg total) by mouth daily 90 tablet 1    nateglinide (STARLIX) 60 mg tablet Take 1 tablet (60 mg total) by mouth 3 (three) times a day before meals 270 tablet 1    omega-3-acid ethyl esters (LOVAZA) 1 g capsule Take 2 g by mouth 2 (two) times a day      pantoprazole (PROTONIX) 40 mg tablet Take 1 tablet (40 mg total) by mouth daily in the early morning 90 tablet 1    rosuvastatin (CRESTOR) 10 MG tablet Take 1 tablet (10 mg total) by mouth daily 90 tablet 3    Urea 20 Intensive Hydrating 20 % cream       folic acid (FOLVITE) 400 mcg tablet Take 400 mcg by mouth daily (Patient not taking: Reported on 8/1/2024)      psyllium ([employer] Daily Fiber) 0.52 g capsule Take 0.52 g by mouth 2 (two) times a day (Patient not taking: Reported on 11/7/2024)      [DISCONTINUED] insulin aspart (NovoLOG FlexPen) 100 UNIT/ML injection pen Inject 4 Units under the skin daily with dinner 15 mL 1    [DISCONTINUED] Januvia 50 MG tablet take 1 tablet by mouth once daily (Patient not taking: No sig reported) 30 tablet 1     No current facility-administered medications on file prior to visit.      Past Medical History:   Diagnosis Date    Chronic kidney disease, stage 3 (HCC)     CVA (cerebral vascular accident) (HCC)     s/p [employer]  loop recorder 2024    Diabetes mellitus (HCC)     Disorder of gallbladder     Disorder of skin and subcutaneous tissue     Dyspnea     Essential hypertension     Hypercalcemia 2023    Hypokalemia     Malaise and fatigue     Mixed hyperlipidemia     Morbid obesity (HCC)     Pernicious anemia            Social History     Tobacco Use   Smoking Status Former    Current packs/day: 0.00    Average packs/day: 1 pack/day for 14.0 years (14.0 ttl pk-yrs)    Types: Cigarettes    Start date:     Quit date:     Years since quittin.8   Smokeless Tobacco Never       Family History   Problem Relation Age of Onset    Stroke Mother     Atrial fibrillation Mother     Brain cancer Father     Other Brother         Twin brothers - Open heart    Other Brother         Twin brothers - Open heart    No Known Problems Maternal Grandmother     No Known Problems Paternal Grandmother

## 2024-11-07 NOTE — ASSESSMENT & PLAN NOTE
-Patient has blood pressures at home well-controlled continue metoprolol succinate 50 mg daily, amlodipine 5 mg daily  -Counseled on dietary and lifestyle modifications  -Continue to monitor

## 2024-11-07 NOTE — ASSESSMENT & PLAN NOTE
-Counseled patient on dietary and lifestyle modifications  -Continue Crestor 10 mg daily at this time

## 2024-11-07 NOTE — ASSESSMENT & PLAN NOTE
-Given underlying risk factors will give patient referral to sleep medicine for additional evaluation of possible sleep apnea  -Continue to monitor and patient was counseled on dietary and lifestyle modifications.   headache

## 2024-11-22 ENCOUNTER — TELEPHONE (OUTPATIENT)
Dept: FAMILY MEDICINE CLINIC | Facility: CLINIC | Age: 79
End: 2024-11-22

## 2024-11-22 NOTE — TELEPHONE ENCOUNTER
Patient overdue for Medicare annual wellness as well as for 3-month diabetic checkup.  Could please call and schedule patient

## 2024-11-25 ENCOUNTER — REMOTE DEVICE CLINIC VISIT (OUTPATIENT)
Dept: CARDIOLOGY CLINIC | Facility: CLINIC | Age: 79
End: 2024-11-25
Payer: MEDICARE

## 2024-11-25 ENCOUNTER — RESULTS FOLLOW-UP (OUTPATIENT)
Dept: CARDIOLOGY CLINIC | Facility: CLINIC | Age: 79
End: 2024-11-25

## 2024-11-25 DIAGNOSIS — I63.9 ACUTE CVA (CEREBROVASCULAR ACCIDENT) (HCC): Primary | ICD-10-CM

## 2024-11-25 PROCEDURE — 93298 REM INTERROG DEV EVAL SCRMS: CPT | Performed by: INTERNAL MEDICINE

## 2024-11-25 NOTE — PROGRESS NOTES
"MDT LOOP II/ACTIVE SYSTEM IS MRI CONDITIONAL   CARELINK TRANSMISSION: LOOP RECORDER. PRESENTING RHYTHM NSR W/ PACs @ 60 BPM. BATTERY STATUS \"OK.\" 11 DEVICE CLASSIFIED AF EPISODES. NO EGRAMS DOCUMENTED. NO PATIENT ACTIVATED EPISODES.NORMAL DEVICE FUNCTION. DL   "

## 2024-12-05 DIAGNOSIS — I10 ESSENTIAL HYPERTENSION: ICD-10-CM

## 2024-12-05 RX ORDER — AMLODIPINE BESYLATE 5 MG/1
5 TABLET ORAL DAILY
Qty: 90 TABLET | Refills: 1 | Status: SHIPPED | OUTPATIENT
Start: 2024-12-05

## 2024-12-06 ENCOUNTER — TELEPHONE (OUTPATIENT)
Dept: NEPHROLOGY | Facility: CLINIC | Age: 79
End: 2024-12-06

## 2024-12-06 NOTE — TELEPHONE ENCOUNTER
I called and spoke with Jina regarding completing urine and blood studies prior to appt on 12/13/2024.

## 2024-12-13 ENCOUNTER — TELEPHONE (OUTPATIENT)
Age: 79
End: 2024-12-13

## 2024-12-13 ENCOUNTER — APPOINTMENT (OUTPATIENT)
Dept: LAB | Facility: CLINIC | Age: 79
End: 2024-12-13
Payer: MEDICARE

## 2024-12-13 DIAGNOSIS — Z79.4 TYPE 2 DIABETES MELLITUS WITH STAGE 3B CHRONIC KIDNEY DISEASE, WITH LONG-TERM CURRENT USE OF INSULIN (HCC): ICD-10-CM

## 2024-12-13 DIAGNOSIS — N18.31 STAGE 3A CHRONIC KIDNEY DISEASE (HCC): ICD-10-CM

## 2024-12-13 DIAGNOSIS — E11.22 TYPE 2 DIABETES MELLITUS WITH STAGE 3B CHRONIC KIDNEY DISEASE, WITH LONG-TERM CURRENT USE OF INSULIN (HCC): ICD-10-CM

## 2024-12-13 DIAGNOSIS — E03.9 ACQUIRED HYPOTHYROIDISM: Chronic | ICD-10-CM

## 2024-12-13 DIAGNOSIS — N18.32 TYPE 2 DIABETES MELLITUS WITH STAGE 3B CHRONIC KIDNEY DISEASE, WITH LONG-TERM CURRENT USE OF INSULIN (HCC): ICD-10-CM

## 2024-12-13 DIAGNOSIS — N63.32 UNSPECIFIED LUMP IN AXILLARY TAIL OF THE LEFT BREAST: ICD-10-CM

## 2024-12-13 DIAGNOSIS — N63.10 MASS OF RIGHT BREAST, UNSPECIFIED QUADRANT: Primary | ICD-10-CM

## 2024-12-13 PROCEDURE — 82043 UR ALBUMIN QUANTITATIVE: CPT

## 2024-12-13 PROCEDURE — 83970 ASSAY OF PARATHORMONE: CPT

## 2024-12-13 PROCEDURE — 84443 ASSAY THYROID STIM HORMONE: CPT

## 2024-12-13 PROCEDURE — 83735 ASSAY OF MAGNESIUM: CPT

## 2024-12-13 PROCEDURE — 81001 URINALYSIS AUTO W/SCOPE: CPT

## 2024-12-13 PROCEDURE — 80053 COMPREHEN METABOLIC PANEL: CPT

## 2024-12-13 PROCEDURE — 36415 COLL VENOUS BLD VENIPUNCTURE: CPT

## 2024-12-13 PROCEDURE — 84439 ASSAY OF FREE THYROXINE: CPT

## 2024-12-13 PROCEDURE — 84100 ASSAY OF PHOSPHORUS: CPT

## 2024-12-13 PROCEDURE — 83036 HEMOGLOBIN GLYCOSYLATED A1C: CPT

## 2024-12-13 PROCEDURE — 82570 ASSAY OF URINE CREATININE: CPT

## 2024-12-13 NOTE — TELEPHONE ENCOUNTER
Patient states sugar 109 this am will bring her book in with readings at her kim visit had her labs done this am

## 2024-12-13 NOTE — TELEPHONE ENCOUNTER
Pt found a lump on her left breast.  She has a mammogram scheduled for 1/10/25 but they are asking if Dr. Melendez wants to write a script for a diagnostic mammogram.  Pt would like a phone call back letting her know.

## 2024-12-13 NOTE — TELEPHONE ENCOUNTER
Mammo placed additionally patient was supposed to call the office with her blood sugars.  Can we please figure out where her blood sugars have been over the last several weeks.

## 2024-12-14 LAB
ALBUMIN SERPL BCG-MCNC: 4 G/DL (ref 3.5–5)
ALP SERPL-CCNC: 67 U/L (ref 34–104)
ALT SERPL W P-5'-P-CCNC: 12 U/L (ref 7–52)
ANION GAP SERPL CALCULATED.3IONS-SCNC: 8 MMOL/L (ref 4–13)
AST SERPL W P-5'-P-CCNC: 17 U/L (ref 13–39)
BACTERIA UR QL AUTO: ABNORMAL /HPF
BILIRUB SERPL-MCNC: 0.58 MG/DL (ref 0.2–1)
BILIRUB UR QL STRIP: NEGATIVE
BUN SERPL-MCNC: 14 MG/DL (ref 5–25)
CALCIUM SERPL-MCNC: 9.2 MG/DL (ref 8.4–10.2)
CHLORIDE SERPL-SCNC: 102 MMOL/L (ref 96–108)
CLARITY UR: CLEAR
CO2 SERPL-SCNC: 29 MMOL/L (ref 21–32)
COLOR UR: YELLOW
CREAT SERPL-MCNC: 1.43 MG/DL (ref 0.6–1.3)
CREAT UR-MCNC: 190.4 MG/DL
EST. AVERAGE GLUCOSE BLD GHB EST-MCNC: 235 MG/DL
GFR SERPL CREATININE-BSD FRML MDRD: 34 ML/MIN/1.73SQ M
GLUCOSE P FAST SERPL-MCNC: 98 MG/DL (ref 65–99)
GLUCOSE UR STRIP-MCNC: NEGATIVE MG/DL
HBA1C MFR BLD: 9.8 %
HGB UR QL STRIP.AUTO: NEGATIVE
KETONES UR STRIP-MCNC: NEGATIVE MG/DL
LEUKOCYTE ESTERASE UR QL STRIP: NEGATIVE
MAGNESIUM SERPL-MCNC: 1.9 MG/DL (ref 1.9–2.7)
MICROALBUMIN UR-MCNC: 31 MG/L
MICROALBUMIN/CREAT 24H UR: 16 MG/G CREATININE (ref 0–30)
MUCOUS THREADS UR QL AUTO: ABNORMAL
NITRITE UR QL STRIP: NEGATIVE
NON-SQ EPI CELLS URNS QL MICRO: ABNORMAL /HPF
PH UR STRIP.AUTO: 6.5 [PH]
PHOSPHATE SERPL-MCNC: 3.9 MG/DL (ref 2.3–4.1)
POTASSIUM SERPL-SCNC: 4.2 MMOL/L (ref 3.5–5.3)
PROT SERPL-MCNC: 7.1 G/DL (ref 6.4–8.4)
PROT UR STRIP-MCNC: ABNORMAL MG/DL
PTH-INTACT SERPL-MCNC: 52.5 PG/ML (ref 12–88)
RBC #/AREA URNS AUTO: ABNORMAL /HPF
SODIUM SERPL-SCNC: 139 MMOL/L (ref 135–147)
SP GR UR STRIP.AUTO: 1.02 (ref 1–1.03)
T4 FREE SERPL-MCNC: 1.18 NG/DL (ref 0.61–1.12)
TSH SERPL DL<=0.05 MIU/L-ACNC: 5.78 UIU/ML (ref 0.45–4.5)
UROBILINOGEN UR STRIP-ACNC: <2 MG/DL
WBC #/AREA URNS AUTO: ABNORMAL /HPF

## 2024-12-16 ENCOUNTER — RESULTS FOLLOW-UP (OUTPATIENT)
Age: 79
End: 2024-12-16

## 2024-12-26 NOTE — TELEPHONE ENCOUNTER
----- Message from LAURITA Buitrago sent at 12/24/2024  9:16 AM EST -----  Covering for Dr. Paz. Labs received. Kidney function on lower side of usual. She is due for follow-up visit please assist with scheduling

## 2024-12-26 NOTE — TELEPHONE ENCOUNTER
Called patient and went over the following information:    Covering for Dr. Paz. Labs received. Kidney function on lower side of usual. She is due for follow-up visit please assist with scheduling.    Patient stated she does not have time to schedule right now and will call back.

## 2024-12-31 DIAGNOSIS — N18.32 TYPE 2 DIABETES MELLITUS WITH STAGE 3B CHRONIC KIDNEY DISEASE, WITH LONG-TERM CURRENT USE OF INSULIN (HCC): ICD-10-CM

## 2024-12-31 DIAGNOSIS — E11.9 TYPE 2 DIABETES MELLITUS WITHOUT COMPLICATION, WITHOUT LONG-TERM CURRENT USE OF INSULIN (HCC): ICD-10-CM

## 2024-12-31 DIAGNOSIS — E78.2 MIXED HYPERLIPIDEMIA: ICD-10-CM

## 2024-12-31 DIAGNOSIS — K21.9 GERD (GASTROESOPHAGEAL REFLUX DISEASE): ICD-10-CM

## 2024-12-31 DIAGNOSIS — E11.22 TYPE 2 DIABETES MELLITUS WITH STAGE 3B CHRONIC KIDNEY DISEASE, WITH LONG-TERM CURRENT USE OF INSULIN (HCC): ICD-10-CM

## 2024-12-31 DIAGNOSIS — I10 ESSENTIAL HYPERTENSION: ICD-10-CM

## 2024-12-31 DIAGNOSIS — Z79.4 TYPE 2 DIABETES MELLITUS WITH STAGE 3B CHRONIC KIDNEY DISEASE, WITH LONG-TERM CURRENT USE OF INSULIN (HCC): ICD-10-CM

## 2024-12-31 RX ORDER — INSULIN DEGLUDEC 100 U/ML
INJECTION, SOLUTION SUBCUTANEOUS
Qty: 30 ML | Refills: 1 | Status: SHIPPED | OUTPATIENT
Start: 2024-12-31

## 2024-12-31 RX ORDER — PANTOPRAZOLE SODIUM 40 MG/1
40 TABLET, DELAYED RELEASE ORAL
Qty: 90 TABLET | Refills: 1 | Status: SHIPPED | OUTPATIENT
Start: 2024-12-31

## 2024-12-31 RX ORDER — ROSUVASTATIN CALCIUM 10 MG/1
10 TABLET, COATED ORAL DAILY
Qty: 90 TABLET | Refills: 1 | Status: SHIPPED | OUTPATIENT
Start: 2024-12-31

## 2024-12-31 RX ORDER — LINAGLIPTIN 5 MG/1
5 TABLET, FILM COATED ORAL DAILY
Qty: 90 TABLET | Refills: 1 | Status: SHIPPED | OUTPATIENT
Start: 2024-12-31

## 2024-12-31 RX ORDER — METOPROLOL SUCCINATE 50 MG/1
50 TABLET, EXTENDED RELEASE ORAL DAILY
Qty: 90 TABLET | Refills: 1 | Status: SHIPPED | OUTPATIENT
Start: 2024-12-31

## 2024-12-31 RX ORDER — AMLODIPINE BESYLATE 5 MG/1
5 TABLET ORAL DAILY
Qty: 90 TABLET | Refills: 1 | Status: SHIPPED | OUTPATIENT
Start: 2024-12-31

## 2024-12-31 NOTE — TELEPHONE ENCOUNTER
I spoke to pt, updated all her meds. She is out of her metoprolol x3days. She states her bs yesterday was 116 in the am and 125 in the afternoon. Has appt with you 1/30/25

## 2024-12-31 NOTE — TELEPHONE ENCOUNTER
Patient well overdue for appointment with both myself and endocrinology.  Labs that were ordered by nephrology showing A1c now close to 9.8 which is a significant elevation.  Please call her and get her an appointment and ensure that she is taking her medication as directed as compliance has been an issue in the past    Jelly: I am CCing you in case you want to reach out to her as well and see if she would be willing to see you as your office is significantly closer than mine and transportation has been an issue for her

## 2025-01-10 ENCOUNTER — HOSPITAL ENCOUNTER (OUTPATIENT)
Dept: MAMMOGRAPHY | Facility: HOSPITAL | Age: 80
Discharge: HOME | End: 2025-01-10

## 2025-01-10 DIAGNOSIS — Z12.31 ENCOUNTER FOR SCREENING MAMMOGRAM FOR BREAST CANCER: ICD-10-CM

## 2025-01-14 DIAGNOSIS — I73.9 PAD (PERIPHERAL ARTERY DISEASE) (HCC): Primary | Chronic | ICD-10-CM

## 2025-01-14 DIAGNOSIS — I65.23 ASYMPTOMATIC BILATERAL CAROTID ARTERY STENOSIS: ICD-10-CM

## 2025-01-16 ENCOUNTER — TELEPHONE (OUTPATIENT)
Dept: MAMMOGRAPHY | Facility: CLINIC | Age: 80
End: 2025-01-16

## 2025-01-21 ENCOUNTER — TELEPHONE (OUTPATIENT)
Dept: MAMMOGRAPHY | Facility: CLINIC | Age: 80
End: 2025-01-21

## 2025-02-14 ENCOUNTER — RA CDI HCC (OUTPATIENT)
Dept: OTHER | Facility: HOSPITAL | Age: 80
End: 2025-02-14

## 2025-02-14 NOTE — PROGRESS NOTES
E11.65   Z79.4   HCC coding opportunities          Chart Reviewed number of suggestions sent to Provider: 2     Patients Insurance     Medicare Insurance: Medicare

## 2025-02-24 ENCOUNTER — TELEPHONE (OUTPATIENT)
Age: 80
End: 2025-02-24

## 2025-02-24 ENCOUNTER — REMOTE DEVICE CLINIC VISIT (OUTPATIENT)
Dept: CARDIOLOGY CLINIC | Facility: CLINIC | Age: 80
End: 2025-02-24
Payer: MEDICARE

## 2025-02-24 DIAGNOSIS — I63.9 ACUTE CVA (CEREBROVASCULAR ACCIDENT) (HCC): Primary | ICD-10-CM

## 2025-02-24 PROCEDURE — 93298 REM INTERROG DEV EVAL SCRMS: CPT | Performed by: INTERNAL MEDICINE

## 2025-02-24 NOTE — TELEPHONE ENCOUNTER
Venessa from [facility] called asking if request from there office was received for updated office notes on 2/14. No Media files shown on file for anything on or after 2/14. They will be re faxing to office to be filled out and sent back.

## 2025-02-24 NOTE — PROGRESS NOTES
"MDT LOOP II/ACTIVE SYSTEM IS MRI CONDITIONAL   CARELINK TRANSMISSION: LOOP RECORDER. PRESENTING RHYTHM NSR W/ PACs @ 60 BPM. BATTERY STATUS \"OK.\" NO PATIENT OR DEVICE ACTIVATED EPISODES. HOWEVER THERE IS AN EGRAM SHOWING PACs. NORMAL DEVICE FUNCTION. DL   "

## 2025-02-25 ENCOUNTER — RESULTS FOLLOW-UP (OUTPATIENT)
Dept: CARDIOLOGY CLINIC | Facility: CLINIC | Age: 80
End: 2025-02-25

## 2025-02-28 ENCOUNTER — APPOINTMENT (OUTPATIENT)
Dept: MRI IMAGING | Facility: HOSPITAL | Age: 80
DRG: 066 | End: 2025-02-28
Payer: MEDICARE

## 2025-02-28 ENCOUNTER — HOSPITAL ENCOUNTER (INPATIENT)
Facility: HOSPITAL | Age: 80
LOS: 3 days | Discharge: NURSING, REHAB OR LONG-TERM CARE | DRG: 066 | End: 2025-03-04
Attending: EMERGENCY MEDICINE | Admitting: INTERNAL MEDICINE
Payer: MEDICARE

## 2025-02-28 ENCOUNTER — APPOINTMENT (EMERGENCY)
Dept: CT IMAGING | Facility: HOSPITAL | Age: 80
DRG: 066 | End: 2025-02-28
Payer: MEDICARE

## 2025-02-28 DIAGNOSIS — R47.81 SLURRED SPEECH: ICD-10-CM

## 2025-02-28 DIAGNOSIS — I10 ESSENTIAL HYPERTENSION: Chronic | ICD-10-CM

## 2025-02-28 DIAGNOSIS — I63.9 ACUTE CVA (CEREBROVASCULAR ACCIDENT) (HCC): ICD-10-CM

## 2025-02-28 DIAGNOSIS — R29.90 STROKE-LIKE SYMPTOMS: Primary | ICD-10-CM

## 2025-02-28 DIAGNOSIS — R29.810 FACIAL DROOP: ICD-10-CM

## 2025-02-28 LAB
2HR DELTA HS TROPONIN: 1 NG/L
ANION GAP SERPL CALCULATED.3IONS-SCNC: 7 MMOL/L (ref 4–13)
APTT PPP: 28 SECONDS (ref 23–34)
BUN SERPL-MCNC: 18 MG/DL (ref 5–25)
CALCIUM SERPL-MCNC: 8.7 MG/DL (ref 8.4–10.2)
CARDIAC TROPONIN I PNL SERPL HS: 8 NG/L (ref ?–50)
CARDIAC TROPONIN I PNL SERPL HS: 9 NG/L (ref ?–50)
CHLORIDE SERPL-SCNC: 100 MMOL/L (ref 96–108)
CO2 SERPL-SCNC: 26 MMOL/L (ref 21–32)
CREAT SERPL-MCNC: 1.61 MG/DL (ref 0.6–1.3)
ERYTHROCYTE [DISTWIDTH] IN BLOOD BY AUTOMATED COUNT: 14.2 % (ref 11.6–15.1)
GFR SERPL CREATININE-BSD FRML MDRD: 30 ML/MIN/1.73SQ M
GLUCOSE SERPL-MCNC: 105 MG/DL (ref 65–140)
GLUCOSE SERPL-MCNC: 145 MG/DL (ref 65–140)
GLUCOSE SERPL-MCNC: 163 MG/DL (ref 65–140)
GLUCOSE SERPL-MCNC: 165 MG/DL (ref 65–140)
HCT VFR BLD AUTO: 38.8 % (ref 34.8–46.1)
HGB BLD-MCNC: 12 G/DL (ref 11.5–15.4)
INR PPP: 0.93 (ref 0.85–1.19)
MCH RBC QN AUTO: 28.7 PG (ref 26.8–34.3)
MCHC RBC AUTO-ENTMCNC: 30.9 G/DL (ref 31.4–37.4)
MCV RBC AUTO: 93 FL (ref 82–98)
PLATELET # BLD AUTO: 166 THOUSANDS/UL (ref 149–390)
PMV BLD AUTO: 10.5 FL (ref 8.9–12.7)
POTASSIUM SERPL-SCNC: 4.5 MMOL/L (ref 3.5–5.3)
PROTHROMBIN TIME: 12.9 SECONDS (ref 12.3–15)
RBC # BLD AUTO: 4.18 MILLION/UL (ref 3.81–5.12)
SODIUM SERPL-SCNC: 133 MMOL/L (ref 135–147)
WBC # BLD AUTO: 9.25 THOUSAND/UL (ref 4.31–10.16)

## 2025-02-28 PROCEDURE — 82948 REAGENT STRIP/BLOOD GLUCOSE: CPT

## 2025-02-28 PROCEDURE — 99285 EMERGENCY DEPT VISIT HI MDM: CPT

## 2025-02-28 PROCEDURE — 70496 CT ANGIOGRAPHY HEAD: CPT

## 2025-02-28 PROCEDURE — 70498 CT ANGIOGRAPHY NECK: CPT

## 2025-02-28 PROCEDURE — 36415 COLL VENOUS BLD VENIPUNCTURE: CPT | Performed by: EMERGENCY MEDICINE

## 2025-02-28 PROCEDURE — 99223 1ST HOSP IP/OBS HIGH 75: CPT | Performed by: INTERNAL MEDICINE

## 2025-02-28 PROCEDURE — 85730 THROMBOPLASTIN TIME PARTIAL: CPT | Performed by: EMERGENCY MEDICINE

## 2025-02-28 PROCEDURE — 99285 EMERGENCY DEPT VISIT HI MDM: CPT | Performed by: EMERGENCY MEDICINE

## 2025-02-28 PROCEDURE — 85027 COMPLETE CBC AUTOMATED: CPT | Performed by: EMERGENCY MEDICINE

## 2025-02-28 PROCEDURE — 85610 PROTHROMBIN TIME: CPT | Performed by: EMERGENCY MEDICINE

## 2025-02-28 PROCEDURE — 84484 ASSAY OF TROPONIN QUANT: CPT | Performed by: EMERGENCY MEDICINE

## 2025-02-28 PROCEDURE — 70551 MRI BRAIN STEM W/O DYE: CPT

## 2025-02-28 PROCEDURE — 80048 BASIC METABOLIC PNL TOTAL CA: CPT | Performed by: EMERGENCY MEDICINE

## 2025-02-28 PROCEDURE — 93005 ELECTROCARDIOGRAM TRACING: CPT

## 2025-02-28 RX ORDER — LABETALOL HYDROCHLORIDE 5 MG/ML
5 INJECTION, SOLUTION INTRAVENOUS ONCE
Status: DISCONTINUED | OUTPATIENT
Start: 2025-02-28 | End: 2025-02-28

## 2025-02-28 RX ORDER — INSULIN LISPRO 100 [IU]/ML
1-6 INJECTION, SOLUTION INTRAVENOUS; SUBCUTANEOUS
Status: DISCONTINUED | OUTPATIENT
Start: 2025-02-28 | End: 2025-03-04 | Stop reason: HOSPADM

## 2025-02-28 RX ORDER — ACETAMINOPHEN 325 MG/1
650 TABLET ORAL EVERY 6 HOURS PRN
Status: DISCONTINUED | OUTPATIENT
Start: 2025-02-28 | End: 2025-03-04 | Stop reason: HOSPADM

## 2025-02-28 RX ORDER — INSULIN GLARGINE 100 [IU]/ML
18 INJECTION, SOLUTION SUBCUTANEOUS
Status: DISCONTINUED | OUTPATIENT
Start: 2025-02-28 | End: 2025-03-04

## 2025-02-28 RX ORDER — CLOPIDOGREL 300 MG/1
300 TABLET, FILM COATED ORAL ONCE
Status: COMPLETED | OUTPATIENT
Start: 2025-02-28 | End: 2025-02-28

## 2025-02-28 RX ORDER — ONDANSETRON 2 MG/ML
4 INJECTION INTRAMUSCULAR; INTRAVENOUS EVERY 6 HOURS PRN
Status: DISCONTINUED | OUTPATIENT
Start: 2025-02-28 | End: 2025-03-04 | Stop reason: HOSPADM

## 2025-02-28 RX ORDER — DIPHENHYDRAMINE HCL 25 MG
25 TABLET ORAL
Status: DISCONTINUED | OUTPATIENT
Start: 2025-02-28 | End: 2025-03-04 | Stop reason: HOSPADM

## 2025-02-28 RX ORDER — ATORVASTATIN CALCIUM 40 MG/1
40 TABLET, FILM COATED ORAL EVERY EVENING
Status: DISCONTINUED | OUTPATIENT
Start: 2025-02-28 | End: 2025-03-04 | Stop reason: HOSPADM

## 2025-02-28 RX ORDER — INSULIN LISPRO 100 [IU]/ML
1-5 INJECTION, SOLUTION INTRAVENOUS; SUBCUTANEOUS
Status: DISCONTINUED | OUTPATIENT
Start: 2025-02-28 | End: 2025-03-04 | Stop reason: HOSPADM

## 2025-02-28 RX ORDER — CLOPIDOGREL BISULFATE 75 MG/1
75 TABLET ORAL DAILY
Status: DISCONTINUED | OUTPATIENT
Start: 2025-03-01 | End: 2025-03-01

## 2025-02-28 RX ORDER — PANTOPRAZOLE SODIUM 40 MG/1
40 TABLET, DELAYED RELEASE ORAL
Status: DISCONTINUED | OUTPATIENT
Start: 2025-03-01 | End: 2025-03-04 | Stop reason: HOSPADM

## 2025-02-28 RX ORDER — ASPIRIN 81 MG/1
81 TABLET ORAL DAILY
Status: DISCONTINUED | OUTPATIENT
Start: 2025-02-28 | End: 2025-02-28

## 2025-02-28 RX ORDER — FLUTICASONE PROPIONATE 50 MCG
2 SPRAY, SUSPENSION (ML) NASAL DAILY
Status: DISCONTINUED | OUTPATIENT
Start: 2025-02-28 | End: 2025-03-04 | Stop reason: HOSPADM

## 2025-02-28 RX ORDER — ASPIRIN 81 MG/1
81 TABLET, CHEWABLE ORAL DAILY
Status: DISCONTINUED | OUTPATIENT
Start: 2025-02-28 | End: 2025-03-01

## 2025-02-28 RX ORDER — LEVOTHYROXINE SODIUM 50 UG/1
50 TABLET ORAL
Status: DISCONTINUED | OUTPATIENT
Start: 2025-03-01 | End: 2025-03-04 | Stop reason: HOSPADM

## 2025-02-28 RX ORDER — ENOXAPARIN SODIUM 100 MG/ML
30 INJECTION SUBCUTANEOUS DAILY
Status: DISCONTINUED | OUTPATIENT
Start: 2025-02-28 | End: 2025-03-01

## 2025-02-28 RX ORDER — METOPROLOL SUCCINATE 50 MG/1
50 TABLET, EXTENDED RELEASE ORAL DAILY
Status: DISCONTINUED | OUTPATIENT
Start: 2025-03-01 | End: 2025-03-01

## 2025-02-28 RX ADMIN — DIPHENHYDRAMINE HYDROCHLORIDE 25 MG: 25 TABLET ORAL at 23:58

## 2025-02-28 RX ADMIN — IOHEXOL 85 ML: 350 INJECTION, SOLUTION INTRAVENOUS at 13:03

## 2025-02-28 RX ADMIN — ENOXAPARIN SODIUM 30 MG: 30 INJECTION SUBCUTANEOUS at 14:55

## 2025-02-28 RX ADMIN — INSULIN GLARGINE 18 UNITS: 100 INJECTION, SOLUTION SUBCUTANEOUS at 21:20

## 2025-02-28 RX ADMIN — INSULIN LISPRO 1 UNITS: 100 INJECTION, SOLUTION INTRAVENOUS; SUBCUTANEOUS at 21:20

## 2025-02-28 RX ADMIN — ATORVASTATIN CALCIUM 40 MG: 40 TABLET, FILM COATED ORAL at 17:51

## 2025-02-28 RX ADMIN — CLOPIDOGREL BISULFATE 300 MG: 300 TABLET, FILM COATED ORAL at 13:26

## 2025-02-28 NOTE — ASSESSMENT & PLAN NOTE
Patient with difficulty speaking this morning upon awakening  Stroke alert initiated in the ER  CTA head/neck performed, no acute findings noted  Per ER, case reviewed with neurology.  Patient was not a TNK candidate  Will admit under stroke pathway  Continue neurochecks per protocol  Aspirin/statin/Plavix  PT/OT/speech eval  MRI brain  Check A1c/lipid panel

## 2025-02-28 NOTE — ASSESSMENT & PLAN NOTE
Lab Results   Component Value Date    HGBA1C 9.8 (H) 12/13/2024       Recent Labs     02/28/25  1259   POCGLU 145*       Blood Sugar Average: Last 72 hrs:  (P) 145    Insulin sliding scale  Lantus 18 units at bedtime  Diabetic diet

## 2025-02-28 NOTE — ED PROVIDER NOTES
Time reflects when diagnosis was documented in both MDM as applicable and the Disposition within this note       Time User Action Codes Description Comment    2/28/2025 12:54 PM Rory Lennon Add [R29.90] Stroke-like symptoms     2/28/2025  1:17 PM Rory Lennon Add [R29.810] Facial droop     2/28/2025  1:17 PM Rory Lennon Add [R47.81] Slurred speech           ED Disposition       ED Disposition   Admit    Condition   Stable    Date/Time   Fri Feb 28, 2025  1:17 PM    Comment   Case was discussed with PEGGY and the patient's admission status was agreed to be Admission Status: observation status to the service of Dr. Paulino .               Assessment & Plan       Medical Decision Making  79-year-old female presenting as prehospital stroke alert for speech difficulty, left-sided facial droop.  Symptoms have pretty much resolved.  NIH is 0.  Stroke alert was called  CT imaging shows no acute abnormality.  I spoke with Dr. Wisdom of neurology who recommended Plavix load and admitting for stroke pathway.  Per neurology, they said to allow permissive hypertension less than 210.     Problems Addressed:  Facial droop: acute illness or injury  Slurred speech: acute illness or injury  Stroke-like symptoms: acute illness or injury    Amount and/or Complexity of Data Reviewed  Labs: ordered.  Radiology: ordered.    Risk  Prescription drug management.  Decision regarding hospitalization.        ED Course as of 02/28/25 1548   Fri Feb 28, 2025   1301 I spoke with Dr. Wisdom of neurology, he said to load with Plavix and admit to the stroke pathway.       Medications   clopidogrel (PLAVIX) tablet 300 mg (300 mg Oral Given 2/28/25 1326)   iohexol (OMNIPAQUE) 350 MG/ML injection (MULTI-DOSE) 85 mL (85 mL Intravenous Given 2/28/25 1303)       ED Risk Strat Scores         Stroke Assessment       Row Name 02/28/25 1320             NIH Stroke Scale    Interval Baseline      Level of Consciousness (1a.) 0      LOC Questions (1b.) 0      LOC  Commands (1c.) 0      Best Gaze (2.) 0      Visual (3.) 0      Facial Palsy (4.) 0      Motor Arm, Left (5a.) 0      Motor Arm, Right (5b.) 0      Motor Leg, Left (6a.) 0      Motor Leg, Right (6b.) 0      Limb Ataxia (7.) 0      Sensory (8.) 0      Best Language (9.) 0      Dysarthria (10.) 0      Extinction and Inattention (11.) (Formerly Neglect) 0      Total 0                    Flowsheet Row Most Recent Value   Thrombolytic Decision Options    Thrombolytic Decision Patient not a candidate.   Patient is not a candidate options Symptoms resolved/clearly non disabling.                            SBIRT 22yo+      Flowsheet Row Most Recent Value   Initial Alcohol Screen: US AUDIT-C     1. How often do you have a drink containing alcohol? 0 Filed at: 02/28/2025 1317   2. How many drinks containing alcohol do you have on a typical day you are drinking?  0 Filed at: 02/28/2025 1317   3b. FEMALE Any Age, or MALE 65+: How often do you have 4 or more drinks on one occassion? 0 Filed at: 02/28/2025 1317   Audit-C Score 0 Filed at: 02/28/2025 1317   MICHAEL: How many times in the past year have you...    Used an illegal drug or used a prescription medication for non-medical reasons? Never Filed at: 02/28/2025 1317                            History of Present Illness       Chief Complaint   Patient presents with    Facial Droop     According to the patient she had a facial droop starting at 0930 and resolved at 1227.       Past Medical History:   Diagnosis Date    Chronic kidney disease, stage 3 (HCC)     CVA (cerebral vascular accident) (HCC)     s/p medtronic loop recorder 5/13/2024    Diabetes mellitus (HCC)     Disorder of gallbladder     Disorder of skin and subcutaneous tissue     Dyspnea     Essential hypertension     Hypercalcemia 11/19/2023    Hypokalemia     Malaise and fatigue     Mixed hyperlipidemia     Morbid obesity (HCC)     Pernicious anemia       Past Surgical History:   Procedure Laterality Date    BREAST  "BIOPSY Right     CARPAL TUNNEL RELEASE Bilateral     CHOLECYSTECTOMY      COLONOSCOPY      Dr. Apple     HYSTERECTOMY      LUMBAR EPIDURAL INJECTION      x3    PARTIAL HYSTERECTOMY      SKIN LESION EXCISION      scalp       Family History   Problem Relation Age of Onset    Stroke Mother     Atrial fibrillation Mother     Brain cancer Father     Other Brother         Twin brothers - Open heart    Other Brother         Twin brothers - Open heart    No Known Problems Maternal Grandmother     No Known Problems Paternal Grandmother       Social History     Tobacco Use    Smoking status: Former     Current packs/day: 0.00     Average packs/day: 1 pack/day for 14.0 years (14.0 ttl pk-yrs)     Types: Cigarettes     Start date:      Quit date:      Years since quittin.1    Smokeless tobacco: Never   Vaping Use    Vaping status: Never Used   Substance Use Topics    Alcohol use: Yes     Comment: Occasionally     Drug use: Never     Comment: Denies drug use - As per [facility]       E-Cigarette/Vaping    E-Cigarette Use Never User       E-Cigarette/Vaping Substances    Nicotine No     THC No     CBD No     Flavoring No     Other No     Unknown No       I have reviewed and agree with the history as documented.     Patient is a 79-year-old female with a history of stroke who presents for strokelike symptoms.  Patient was made a prehospital stroke alert.  He per EMS, patient started to develop some slurred speech and difficulty finding words around 9 AM this morning.  When EMS arrived, they said that she had a significant left-sided facial droop.  While EMS was there, the patient's symptoms pretty much resolved.  They said that her speech seemed back to normal and she no longer had the facial droop.  Patient says that she still feels like her speech is \"a little bit off\" but I am not able to notice anything on exam.  She denied ever having any numbness or ting or weakness in her extremities.  On exam, there are no focal " neurologic deficits.        Review of Systems   Constitutional:  Negative for fever and unexpected weight change.   HENT:  Negative for congestion, ear pain, sore throat and trouble swallowing.    Eyes:  Negative for pain and redness.   Respiratory:  Negative for cough, chest tightness and shortness of breath.    Cardiovascular:  Negative for chest pain and leg swelling.   Gastrointestinal:  Negative for abdominal distention, abdominal pain, diarrhea and vomiting.   Endocrine: Negative for polyuria.   Genitourinary:  Negative for dysuria, hematuria, pelvic pain and vaginal bleeding.   Musculoskeletal:  Negative for back pain and myalgias.   Skin:  Negative for rash.   Neurological:  Positive for facial asymmetry and speech difficulty. Negative for dizziness, syncope, weakness, light-headedness and headaches.           Objective       ED Triage Vitals   Temperature Pulse Blood Pressure Respirations SpO2 Patient Position - Orthostatic VS   02/28/25 1303 02/28/25 1258 02/28/25 1300 02/28/25 1258 02/28/25 1258 02/28/25 1315   (!) 96.7 °F (35.9 °C) 75 (!) 182/77 16 98 % Lying      Temp Source Heart Rate Source BP Location FiO2 (%) Pain Score    02/28/25 1303 02/28/25 1258 02/28/25 1315 -- 02/28/25 1303    Tympanic Monitor Right arm  No Pain      Vitals      Date and Time Temp Pulse SpO2 Resp BP Pain Score FACES Pain Rating User   02/28/25 1545 98.1 °F (36.7 °C) 72 98 % 18 136/68 No Pain -- ED   02/28/25 1445 98 °F (36.7 °C) 71 98 % 18 171/69 No Pain -- ED   02/28/25 1345 -- -- -- -- -- No Pain -- ED   02/28/25 1345 98.5 °F (36.9 °C) 72 97 % 18 162/67 -- -- ES   02/28/25 1330 -- -- -- -- -- No Pain -- ZENAIDA   02/28/25 1321 -- 82 94 % 20 202/80 -- -- ZENAIDA   02/28/25 1318 -- 87 93 % 22 209/82 -- -- ZENAIDA   02/28/25 1315 -- 87 95 % 24 222/89 No Pain -- ZENAIDA   02/28/25 1304 -- 83 94 % 18 222/89 No Pain -- ZENAIDA   02/28/25 1303 96.7 °F (35.9 °C) 86 96 % 16 194/85 No Pain -- ZENAIDA   02/28/25 1300 -- 71 96 % -- 182/77 -- -- ZENAIDA   02/28/25  1258 -- 75 98 % 16 -- -- -- TG            Physical Exam  Vitals and nursing note reviewed.   Constitutional:       General: She is not in acute distress.     Appearance: She is well-developed.   HENT:      Head: Normocephalic and atraumatic.      Right Ear: External ear normal.      Left Ear: External ear normal.      Nose: Nose normal.      Mouth/Throat:      Mouth: Mucous membranes are moist.      Pharynx: No oropharyngeal exudate.   Eyes:      Conjunctiva/sclera: Conjunctivae normal.      Pupils: Pupils are equal, round, and reactive to light.   Cardiovascular:      Rate and Rhythm: Normal rate and regular rhythm.      Heart sounds: Normal heart sounds. No murmur heard.     No friction rub. No gallop.   Pulmonary:      Effort: Pulmonary effort is normal. No respiratory distress.      Breath sounds: Normal breath sounds. No wheezing or rales.   Abdominal:      General: There is no distension.      Palpations: Abdomen is soft.      Tenderness: There is no abdominal tenderness. There is no guarding.   Musculoskeletal:         General: No swelling, tenderness or deformity. Normal range of motion.      Cervical back: Normal range of motion and neck supple.   Lymphadenopathy:      Cervical: No cervical adenopathy.   Skin:     General: Skin is warm and dry.   Neurological:      General: No focal deficit present.      Mental Status: She is alert and oriented to person, place, and time. Mental status is at baseline.      Cranial Nerves: No cranial nerve deficit.      Sensory: No sensory deficit.      Motor: No weakness or abnormal muscle tone.      Coordination: Coordination normal.      Comments: NIH 0         Results Reviewed       Procedure Component Value Units Date/Time    HS Troponin I 2hr [[id number]]  (Normal) Collected: 02/28/25 1459    Lab Status: Final result Specimen: Blood from Hand, Left Updated: 02/28/25 1528     hs TnI 2hr 9 ng/L      Delta 2hr hsTnI 1 ng/L     HS Troponin I 4hr [[id number]]     Lab Status:  No result Specimen: Blood     HS Troponin 0hr (reflex protocol) [[id number]]  (Normal) Collected: 02/28/25 1233    Lab Status: Final result Specimen: Blood from Arm, Right Updated: 02/28/25 1341     hs TnI 0hr 8 ng/L     Basic metabolic panel [[id number]]  (Abnormal) Collected: 02/28/25 1233    Lab Status: Final result Specimen: Blood from Arm, Right Updated: 02/28/25 1333     Sodium 133 mmol/L      Potassium 4.5 mmol/L      Chloride 100 mmol/L      CO2 26 mmol/L      ANION GAP 7 mmol/L      BUN 18 mg/dL      Creatinine 1.61 mg/dL      Glucose 165 mg/dL      Calcium 8.7 mg/dL      eGFR 30 ml/min/1.73sq m     Narrative:      National Kid[employer] Foundation guidelines for Chronic Kidney Disease (CKD):     Stage 1 with normal or high GFR (GFR > 90 mL/min/1.73 square meters)    Stage 2 Mild CKD (GFR = 60-89 mL/min/1.73 square meters)    Stage 3A Moderate CKD (GFR = 45-59 mL/min/1.73 square meters)    Stage 3B Moderate CKD (GFR = 30-44 mL/min/1.73 square meters)    Stage 4 Severe CKD (GFR = 15-29 mL/min/1.73 square meters)    Stage 5 End Stage CKD (GFR <15 mL/min/1.73 square meters)  Note: GFR calculation is accurate only with a steady state creatinine    Protime-INR [[id number]]  (Normal) Collected: 02/28/25 1233    Lab Status: Final result Specimen: Blood from Arm, Right Updated: 02/28/25 1323     Protime 12.9 seconds      INR 0.93    Narrative:      INR Therapeutic Range    Indication                                             INR Range      Atrial Fibrillation                                               2.0-3.0  Hypercoagulable State                                    2.0.2.3  Left Ventricular Asist Device                            2.0-3.0  Mechanical Heart Valve                                  -    Aortic(with afib, MI, embolism, HF, LA enlargement,    and/or coagulopathy)                                     2.0-3.0 (2.5-3.5)     Mitral                                                              2.5-3.5  Prosthetic/Bioprosthetic Heart Valve               2.0-3.0  Venous thromboembolism (VTE: VT, PE        2.0-3.0    APTT [[id number]]  (Normal) Collected: 02/28/25 1233    Lab Status: Final result Specimen: Blood from Arm, Right Updated: 02/28/25 1323     PTT 28 seconds     CBC and Platelet [[id number]]  (Abnormal) Collected: 02/28/25 1233    Lab Status: Final result Specimen: Blood from Arm, Right Updated: 02/28/25 1309     WBC 9.25 Thousand/uL      RBC 4.18 Million/uL      Hemoglobin 12.0 g/dL      Hematocrit 38.8 %      MCV 93 fL      MCH 28.7 pg      MCHC 30.9 g/dL      RDW 14.2 %      Platelets 166 Thousands/uL      MPV 10.5 fL     Fingerstick Glucose (POCT) [[id number]]  (Abnormal) Collected: 02/28/25 1259    Lab Status: Final result Specimen: Blood Updated: 02/28/25 1300     POC Glucose 145 mg/dl             CTA stroke alert (head/neck)   Final Interpretation by E. Alec Schoenberger, MD (02/28 1318)   No significant stenosis of the cervical carotid arteries.   Stable severe stenosis left vertebral artery origin.   Stable stenosis in the intracranial internal carotid arteries, severe on the right and moderate on the left.   Stable multifocal stenosis in the left PCA.   No acute large vessel occlusion.               Findings were directly discussed with Dov Wisdom at 1:15 p.m.      Workstation performed: [id number]         CT stroke alert brain   Final Interpretation by E. Alec Schoenberger, MD (02/28 1321)   No acute infarct or hemorrhage      Findings were directly discussed with Dov Wisdom at 1:15 p.m.      Workstation performed: [id number]         MRI Inpatient Order    (Results Pending)       Procedures    ED Medication and Procedure Management   Prior to Admission Medications   Prescriptions Last Dose Informant Patient Reported? Taking?   Continuous Glucose  (FreeStyle Bobby 3 [place]) RIYA  Self Yes No   Sig: Use   Continuous Glucose Sensor (FreeStyle Bobby 3 Sensor) MISC  Self No No   Sig:  Use 1 sensor every 14 days for glucose monitoring.   Insulin Pen Needle 32G X 6 MM MISC  Self No No   Sig: Use in the morning   Lancets (freestyle) lancets  Self No No   Sig: Use as instructed -glucose monitor BID   Urea 20 Intensive Hydrating 20 % cream  Self Yes No   amLODIPine (NORVASC) 5 mg tablet 2025  No Yes   Sig: Take 1 tablet (5 mg total) by mouth daily   aspirin (ECOTRIN LOW STRENGTH) 81 mg EC tablet 2025 Self Yes Yes   Sig: Take 81 mg by mouth daily   cholecalciferol (VITAMIN D3) 1,000 units tablet Not Taking Self Yes No   Sig: Take 800 Units by mouth daily     Patient not taking: Reported on 2025   clopidogrel (PLAVIX) 75 mg tablet 2025  No Yes   Sig: take 1 tablet by mouth once daily   fluticasone (FLONASE) 50 mcg/act nasal spray  Self No No   Si sprays into each nostril daily   folic acid (FOLVITE) 400 mcg tablet 2025 Self Yes Yes   Sig: Take 400 mcg by mouth daily   glucose blood (FREESTYLE LITE) test strip  Self No No   Sig: Use 1 each 3 (three) times a day Use as instructed   glucose monitoring kit (FREESTYLE) monitoring kit  Self No No   Si each by Does not apply route daily   insulin degludec (Tresiba FlexTouch) 100 units/mL injection pen 2025  No Yes   Sig: Inject 24 units nightly.   levothyroxine 50 mcg tablet 2025 Self No Yes   Sig: Take 1 tablet (50 mcg total) by mouth daily   linaGLIPtin (Tradjenta) 5 MG TABS 2025  No Yes   Sig: Take 5 mg by mouth daily   magnesium oxide (MAG-OX) 400 mg tablet 2025 Self No Yes   Sig: Take 1 tablet (400 mg total) by mouth 2 (two) times a day   metoprolol succinate (TOPROL-XL) 50 mg 24 hr tablet 2025  No Yes   Sig: Take 1 tablet (50 mg total) by mouth daily   Patient taking differently: Take 25 mg by mouth daily   nateglinide (STARLIX) 60 mg tablet 2025 Self No Yes   Sig: Take 1 tablet (60 mg total) by mouth 3 (three) times a day before meals   omega-3-acid ethyl esters (LOVAZA) 1 g capsule Not  Taking Self Yes No   Sig: Take 2 g by mouth 2 (two) times a day   Patient not taking: Reported on 2/28/2025   pantoprazole (PROTONIX) 40 mg tablet 2/28/2025  No Yes   Sig: Take 1 tablet (40 mg total) by mouth daily in the early morning   psyllium ([employer] Daily Fiber) 0.52 g capsule  Self Yes No   Sig: Take 0.52 g by mouth 2 (two) times a day   Patient not taking: Reported on 11/7/2024   rosuvastatin (CRESTOR) 10 MG tablet 2/28/2025  No Yes   Sig: Take 1 tablet (10 mg total) by mouth daily      Facility-Administered Medications: None     Current Discharge Medication List        CONTINUE these medications which have NOT CHANGED    Details   amLODIPine (NORVASC) 5 mg tablet Take 1 tablet (5 mg total) by mouth daily  Qty: 90 tablet, Refills: 1    Associated Diagnoses: Essential hypertension      aspirin (ECOTRIN LOW STRENGTH) 81 mg EC tablet Take 81 mg by mouth daily      clopidogrel (PLAVIX) 75 mg tablet take 1 tablet by mouth once daily  Qty: 90 tablet, Refills: 1    Associated Diagnoses: CVA (cerebral vascular accident) ([facility])      folic acid (FOLVITE) 400 mcg tablet Take 400 mcg by mouth daily      insulin degludec (Tresiba FlexTouch) 100 units/mL injection pen Inject 24 units nightly.  Qty: 30 mL, Refills: 1    Associated Diagnoses: Type 2 diabetes mellitus with stage 3b chronic kidney disease, with long-term current use of insulin ([facility])      levothyroxine 50 mcg tablet Take 1 tablet (50 mcg total) by mouth daily  Qty: 90 tablet, Refills: 2    Associated Diagnoses: Type 2 diabetes mellitus with stage 3a chronic kidney disease, with long-term current use of insulin ([facility]); Mixed hyperlipidemia      linaGLIPtin (Tradjenta) 5 MG TABS Take 5 mg by mouth daily  Qty: 90 tablet, Refills: 1    Associated Diagnoses: Type 2 diabetes mellitus without complication, without long-term current use of insulin ([facility])      magnesium oxide (MAG-OX) 400 mg tablet Take 1 tablet (400 mg total) by mouth 2 (two) times a day  Qty: 60 tablet,  Refills: 5    Associated Diagnoses: Hypomagnesemia      metoprolol succinate (TOPROL-XL) 50 mg 24 hr tablet Take 1 tablet (50 mg total) by mouth daily  Qty: 90 tablet, Refills: 1    Associated Diagnoses: Essential hypertension      nateglinide (STARLIX) 60 mg tablet Take 1 tablet (60 mg total) by mouth 3 (three) times a day before meals  Qty: 270 tablet, Refills: 1    Associated Diagnoses: Type 2 diabetes mellitus with stage 3b chronic kidney disease, with long-term current use of insulin ([facility])      pantoprazole (PROTONIX) 40 mg tablet Take 1 tablet (40 mg total) by mouth daily in the early morning  Qty: 90 tablet, Refills: 1    Associated Diagnoses: GERD (gastroesophageal reflux disease)      rosuvastatin (CRESTOR) 10 MG tablet Take 1 tablet (10 mg total) by mouth daily  Qty: 90 tablet, Refills: 1    Associated Diagnoses: Mixed hyperlipidemia      cholecalciferol (VITAMIN D3) 1,000 units tablet Take 800 Units by mouth daily        Continuous Glucose  (FreeStyle Bobby 3 [place]) RIYA Use      Continuous Glucose Sensor (FreeStyle Bobby 3 Sensor) MISC Use 1 sensor every 14 days for glucose monitoring.  Qty: 9 each, Refills: 1    Associated Diagnoses: Type 2 diabetes mellitus with stage 3b chronic kidney disease, with long-term current use of insulin ([facility])      fluticasone (FLONASE) 50 mcg/act nasal spray 2 sprays into each nostril daily  Qty: 16 g, Refills: 6    Associated Diagnoses: Nasal congestion      glucose blood (FREESTYLE LITE) test strip Use 1 each 3 (three) times a day Use as instructed  Qty: 300 each, Refills: 0    Associated Diagnoses: Type 2 diabetes mellitus with stage 3b chronic kidney disease, with long-term current use of insulin ([facility])      glucose monitoring kit (FREESTYLE) monitoring kit 1 each by Does not apply route daily  Qty: 1 each, Refills: 0    Associated Diagnoses: Type 2 diabetes mellitus without complication, without long-term current use of insulin ([facility])      Insulin Pen Needle 32G  X 6 MM MISC Use in the morning  Qty: 100 each, Refills: 5    Associated Diagnoses: Type 2 diabetes mellitus without complication, without long-term current use of insulin (HCC)      Lancets (freestyle) lancets Use as instructed -glucose monitor BID  Qty: 100 each, Refills: 5    Associated Diagnoses: Type 2 diabetes mellitus with stage 3a chronic kidney disease, with long-term current use of insulin (HCC)      omega-3-acid ethyl esters (LOVAZA) 1 g capsule Take 2 g by mouth 2 (two) times a day      psyllium ([employer] Daily Fiber) 0.52 g capsule Take 0.52 g by mouth 2 (two) times a day      Urea 20 Intensive Hydrating 20 % cream            No discharge procedures on file.  ED SEPSIS DOCUMENTATION   Time reflects when diagnosis was documented in both MDM as applicable and the Disposition within this note       Time User Action Codes Description Comment    2/28/2025 12:54 PM Rory Lennon [R29.90] Stroke-like symptoms     2/28/2025  1:17 PM Rory Lennon [R29.810] Facial droop     2/28/2025  1:17 PM Rory Lennon [R47.81] Slurred speech                  Rory Lennon DO  02/28/25 1549

## 2025-02-28 NOTE — H&P
H&P - Hospitalist   Name: Jina Norris 79 y.o. female I MRN: [id number]  Unit/Bed#: -01 I Date of Admission: 2/28/2025   Date of Service: 2/28/2025 I Hospital Day: 0     Assessment & Plan  Stroke-like symptom  Patient with difficulty speaking this morning upon awakening  Stroke alert initiated in the ER  CTA head/neck performed, no acute findings noted  Per ER, case reviewed with neurology.  Patient was not a TNK candidate  Will admit under stroke pathway  Continue neurochecks per protocol  Aspirin/statin/Plavix  PT/OT/speech eval  MRI brain  Check A1c/lipid panel  Type 2 diabetes mellitus with stage 3b chronic kidney disease, with long-term current use of insulin ([facility])  Lab Results   Component Value Date    HGBA1C 9.8 (H) 12/13/2024       Recent Labs     02/28/25  1259   POCGLU 145*       Blood Sugar Average: Last 72 hrs:  (P) 145    Insulin sliding scale  Lantus 18 units at bedtime  Diabetic diet  Essential hypertension  BP elevated  Will hold antihypertensives for now, allow for permissive hypertension given suspected stroke  Resume antihypertensives as tolerated  Acquired hypothyroidism  Continue levothyroxine      VTE Pharmacologic Prophylaxis:   Moderate Risk (Score 3-4) - Pharmacological DVT Prophylaxis Ordered: enoxaparin (Lovenox).  Code Status: Level 1 - Full Code   Discussion with family: Attempted to update  () via phone. Unable to contact.    Anticipated Length of Stay: Patient will be admitted on an observation basis with an anticipated length of stay of less than 2 midnights secondary to stroke work up.    History of Present Illness   Chief Complaint: Dysarthria    Jina Norris is a 79 y.o. female with a PMH of CKD stage III, history of CVA, hypertension who presents with dysarthria.  Patient states that she woke up this morning and was having difficulty speaking.  Patient denies any weakness.  No double vision.  No dizziness or lightheadedness.  No chest pain or shortness  of breath.  Patient went to bed in her usual state of health.  In the ER stroke alert was called.  Was not a TNK candidate due to rapid improvement in symptoms    Review of Systems   Neurological:  Positive for speech difficulty.   All other systems reviewed and are negative.      Historical Information   Past Medical History:   Diagnosis Date    Chronic kidney disease, stage 3 (HCC)     CVA (cerebral vascular accident) (HCC)     s/p medtronic loop recorder 2024    Diabetes mellitus (HCC)     Disorder of gallbladder     Disorder of skin and subcutaneous tissue     Dyspnea     Essential hypertension     Hypercalcemia 2023    Hypokalemia     Malaise and fatigue     Mixed hyperlipidemia     Morbid obesity (HCC)     Pernicious anemia      Past Surgical History:   Procedure Laterality Date    BREAST BIOPSY Right     CARPAL TUNNEL RELEASE Bilateral     CHOLECYSTECTOMY      COLONOSCOPY      Dr. Apple     HYSTERECTOMY      LUMBAR EPIDURAL INJECTION      x3    PARTIAL HYSTERECTOMY      SKIN LESION EXCISION      scalp      Social History     Tobacco Use    Smoking status: Former     Current packs/day: 0.00     Average packs/day: 1 pack/day for 14.0 years (14.0 ttl pk-yrs)     Types: Cigarettes     Start date:      Quit date:      Years since quittin.1    Smokeless tobacco: Never   Vaping Use    Vaping status: Never Used   Substance and Sexual Activity    Alcohol use: Yes     Comment: Occasionally     Drug use: Never     Comment: Denies drug use - As per [employer]     Sexual activity: Not Currently     E-Cigarette/Vaping    E-Cigarette Use Never User      E-Cigarette/Vaping Substances    Nicotine No     THC No     CBD No     Flavoring No     Other No     Unknown No      Family History   Problem Relation Age of Onset    Stroke Mother     Atrial fibrillation Mother     Brain cancer Father     Other Brother         Twin brothers - Open heart    Other Brother         Twin brothers - Open heart    No Known  Problems Maternal Grandmother     No Known Problems Paternal Grandmother      Social History:  Marital Status: /Civil Union   Occupation: none  Patient Pre-hospital Living Situation: Home  Patient Pre-hospital Level of Mobility: walks  Patient Pre-hospital Diet Restrictions: none    Meds/Allergies   I have reviewed home medications with patient personally.  Prior to Admission medications    Medication Sig Start Date End Date Taking? Authorizing Provider   amLODIPine (NORVASC) 5 mg tablet Take 1 tablet (5 mg total) by mouth daily 12/31/24  Yes LAURITA Lipscomb   aspirin (ECOTRIN LOW STRENGTH) 81 mg EC tablet Take 81 mg by mouth daily   Yes Historical Provider, MD   clopidogrel (PLAVIX) 75 mg tablet take 1 tablet by mouth once daily 10/3/24  Yes Santiago Melendez MD   folic acid (FOLVITE) 400 mcg tablet Take 400 mcg by mouth daily   Yes Historical Provider, MD   insulin degludec (Tresiba FlexTouch) 100 units/mL injection pen Inject 24 units nightly. 12/31/24  Yes LAURITA Lipscomb   levothyroxine 50 mcg tablet Take 1 tablet (50 mcg total) by mouth daily 4/18/24  Yes Lyssa Peres MD   linaGLIPtin (Tradjenta) 5 MG TABS Take 5 mg by mouth daily 12/31/24  Yes LAURITA Lipscomb   magnesium oxide (MAG-OX) 400 mg tablet Take 1 tablet (400 mg total) by mouth 2 (two) times a day 6/21/24  Yes Gabino Traylor DO   metoprolol succinate (TOPROL-XL) 50 mg 24 hr tablet Take 1 tablet (50 mg total) by mouth daily  Patient taking differently: Take 25 mg by mouth daily 12/31/24  Yes LAURITA Lipscomb   nateglinide (STARLIX) 60 mg tablet Take 1 tablet (60 mg total) by mouth 3 (three) times a day before meals 8/1/24  Yes LAURITA Lipscomb   pantoprazole (PROTONIX) 40 mg tablet Take 1 tablet (40 mg total) by mouth daily in the early morning 12/31/24  Yes LAURITA Lipscomb   rosuvastatin (CRESTOR) 10 MG tablet Take 1 tablet (10 mg total) by mouth daily  12/31/24  Yes LAURITA Lipscomb   cholecalciferol (VITAMIN D3) 1,000 units tablet Take 800 Units by mouth daily    Patient not taking: Reported on 2/28/2025    Historical Provider, MD   Continuous Glucose  (FreeStyle Bobby 3 Sutton) RIYA Use    Historical MD Deondre   Continuous Glucose Sensor (FreeStyle Bobby 3 Sensor) MISC Use 1 sensor every 14 days for glucose monitoring. 7/2/24   LAURITA Lipscomb   fluticasone (FLONASE) 50 mcg/act nasal spray 2 sprays into each nostril daily 11/30/22   Akil Chery PA-C   glucose blood (FREESTYLE LITE) test strip Use 1 each 3 (three) times a day Use as instructed 3/26/24   Lyssa Peres MD   glucose monitoring kit (FREESTYLE) monitoring kit 1 each by Does not apply route daily 6/3/20   Lena Taylor MD   Insulin Pen Needle 32G X 6 MM MISC Use in the morning 2/8/22   Lyssa Peres MD   Lancets (freestyle) lancets Use as instructed -glucose monitor BID 9/7/21   Lyssa Peres MD   vdvar-7-avdn ethyl esters (LOVAZA) 1 g capsule Take 2 g by mouth 2 (two) times a day  Patient not taking: Reported on 2/28/2025    Historical Provider, MD   psyllium (CVS Daily Fiber) 0.52 g capsule Take 0.52 g by mouth 2 (two) times a day  Patient not taking: Reported on 11/7/2024    Historical Provider, MD   Urea 20 Intensive Hydrating 20 % cream  12/29/22   Historical Provider, MD   insulin aspart (NovoLOG FlexPen) 100 UNIT/ML injection pen Inject 4 Units under the skin daily with dinner 8/18/22 8/18/22  Lyssa Peres MD   Januvia 50 MG tablet take 1 tablet by mouth once daily  Patient not taking: No sig reported 11/16/20 2/25/21  Lena Taylor MD     No Known Allergies    Objective :  Temp:  [96.7 °F (35.9 °C)-98.5 °F (36.9 °C)] 98 °F (36.7 °C)  HR:  [71-87] 71  BP: (162-222)/(67-89) 171/69  Resp:  [16-24] 18  SpO2:  [93 %-98 %] 98 %  O2 Device: None (Room air)    Physical Exam  Constitutional:       General: She is not in acute  distress.  HENT:      Head: Normocephalic and atraumatic.      Nose: Nose normal.      Mouth/Throat:      Mouth: Mucous membranes are moist.   Eyes:      Extraocular Movements: Extraocular movements intact.      Conjunctiva/sclera: Conjunctivae normal.   Cardiovascular:      Rate and Rhythm: Normal rate and regular rhythm.   Pulmonary:      Effort: Pulmonary effort is normal. No respiratory distress.   Abdominal:      Palpations: Abdomen is soft.      Tenderness: There is no abdominal tenderness.   Musculoskeletal:         General: Normal range of motion.      Cervical back: Normal range of motion and neck supple.   Skin:     General: Skin is warm and dry.   Neurological:      Mental Status: She is alert.      Comments: Patient is awake and alert.  Answering questions appropriately.  Moving all 4 extremities.   Psychiatric:         Mood and Affect: Mood normal.         Behavior: Behavior normal.          Lines/Drains:      Lab Results: I have reviewed the following results:  Results from last 7 days   Lab Units 02/28/25  1233   WBC Thousand/uL 9.25   HEMOGLOBIN g/dL 12.0   HEMATOCRIT % 38.8   PLATELETS Thousands/uL 166     Results from last 7 days   Lab Units 02/28/25  1233   SODIUM mmol/L 133*   POTASSIUM mmol/L 4.5   CHLORIDE mmol/L 100   CO2 mmol/L 26   BUN mg/dL 18   CREATININE mg/dL 1.61*   ANION GAP mmol/L 7   CALCIUM mg/dL 8.7   GLUCOSE RANDOM mg/dL 165*     Results from last 7 days   Lab Units 02/28/25  1233   INR  0.93     Results from last 7 days   Lab Units 02/28/25  1259   POC GLUCOSE mg/dl 145*     Lab Results   Component Value Date    HGBA1C 9.8 (H) 12/13/2024    HGBA1C 8.6 (A) 08/01/2024    HGBA1C 12.1 (H) 02/29/2024           Imaging Results Review: I reviewed radiology reports from this admission including: CT head.  Other Study Results Review: No additional pertinent studies reviewed.    Administrative Statements       ** Please Note: This note has been constructed using a voice recognition system.  **

## 2025-02-28 NOTE — ASSESSMENT & PLAN NOTE
BP elevated  Will hold antihypertensives for now, allow for permissive hypertension given suspected stroke  Resume antihypertensives as tolerated

## 2025-02-28 NOTE — PLAN OF CARE
Problem: Neurological Deficit  Goal: Neurological status is stable or improving  Description: Interventions:  - Monitor and assess patient's level of consciousness, motor function, sensory function, and level of assistance needed for ADLs.   - Monitor and report changes from baseline. Collaborate with interdisciplinary team to initiate plan and implement interventions as ordered.   - Provide and maintain a safe environment.  - Consider seizure precautions.  - Consider fall precautions.  - Consider aspiration precautions.  - Consider bleeding precautions.  Outcome: Progressing     Problem: Activity Intolerance/Impaired Mobility  Goal: Mobility/activity is maintained at optimum level for patient  Description: Interventions:  - Assess and monitor patient  barriers to mobility and need for assistive/adaptive devices.  - Assess patient's emotional response to limitations.  - Collaborate with interdisciplinary team and initiate plans and interventions as ordered.  - Encourage independent activity per ability.  - Maintain proper body alignment.  - Perform active/passive rom as tolerated/ordered.  - Plan activities to conserve energy.  - Turn patient as appropriate  Outcome: Progressing     Problem: Communication Impairment  Goal: Ability to express needs and understand communication  Description: Assess patient's communication skills and ability to understand information.  Patient will demonstrate use of effective communication techniques, alternative methods of communication and understanding even if not able to speak.     - Encourage communication and provide alternate methods of communication as needed.  - Collaborate with case management/ for discharge needs.  - Include patient/family/caregiver in decisions related to communication.  Outcome: Progressing     Problem: Potential for Aspiration  Goal: Non-ventilated patient's risk of aspiration is minimized  Description: Assess and monitor vital signs,  respiratory status, and labs (WBC).  Monitor for signs of aspiration (tachypnea, cough, rales, wheezing, cyanosis, fever).    - Assess and monitor patient's ability to swallow.  - Place patient up in chair to eat if possible.  - HOB up at 90 degrees to eat if unable to get patient up into chair.  - Supervise patient during oral intake.   - Instruct patient/ family to take small bites.  - Instruct patient/ family to take small single sips when taking liquids.  - Follow patient-specific strategies generated by speech pathologist.  Outcome: Progressing     Problem: Nutrition  Goal: Nutrition/Hydration status is improving  Description: Monitor and assess patient's nutrition/hydration status for malnutrition (ex- brittle hair, bruises, dry skin, pale skin and conjunctiva, muscle wasting, smooth red tongue, and disorientation). Collaborate with interdisciplinary team and initiate plan and interventions as ordered.  Monitor patient's weight and dietary intake as ordered or per policy. Utilize nutrition screening tool and intervene per policy. Determine patient's food preferences and provide high-protein, high-caloric foods as appropriate.     - Assist patient with eating.  - Allow adequate time for meals.  - Encourage patient to take dietary supplement as ordered.  - Collaborate with clinical nutritionist.  - Include patient/family/caregiver in decisions related to nutrition.  Outcome: Progressing     Problem: PAIN - ADULT  Goal: Verbalizes/displays adequate comfort level or baseline comfort level  Description: Interventions:  - Encourage patient to monitor pain and request assistance  - Assess pain using appropriate pain scale  - Administer analgesics based on type and severity of pain and evaluate response  - Implement non-pharmacological measures as appropriate and evaluate response  - Consider cultural and social influences on pain and pain management  - Notify physician/advanced practitioner if interventions  unsuccessful or patient reports new pain  Outcome: Progressing     Problem: SAFETY ADULT  Goal: Patient will remain free of falls  Description: INTERVENTIONS:  - Educate patient/family on patient safety including physical limitations  - Instruct patient to call for assistance with activity   - Consult OT/PT to assist with strengthening/mobility   - Keep Call bell within reach  - Keep bed low and locked with side rails adjusted as appropriate  - Keep care items and personal belongings within reach  - Initiate and maintain comfort rounds  - Make Fall Risk Sign visible to staff  - Offer Toileting every 2 Hours, in advance of need  - Initiate/Maintain bed/chair alarm  - Obtain necessary fall risk management equipment: non-slip socks  - Apply yellow socks and bracelet for high fall risk patients  - Consider moving patient to room near nurses station  Outcome: Progressing  Goal: Maintain or return to baseline ADL function  Description: INTERVENTIONS:  -  Assess patient's ability to carry out ADLs; assess patient's baseline for ADL function and identify physical deficits which impact ability to perform ADLs (bathing, care of mouth/teeth, toileting, grooming, dressing, etc.)  - Assess/evaluate cause of self-care deficits   - Assess range of motion  - Assess patient's mobility; develop plan if impaired  - Assess patient's need for assistive devices and provide as appropriate  - Encourage maximum independence but intervene and supervise when necessary  - Involve family in performance of ADLs  - Assess for home care needs following discharge   - Consider OT consult to assist with ADL evaluation and planning for discharge  - Provide patient education as appropriate  Outcome: Progressing  Goal: Maintains/Returns to pre admission functional level  Description: INTERVENTIONS:  - Perform AM-PAC 6 Click Basic Mobility/ Daily Activity assessment daily.  - Set and communicate daily mobility goal to care team and  patient/family/caregiver.   - Collaborate with rehabilitation services on mobility goals if consulted  - Perform Range of Motion 3 times a day.  - Ambulate patient 3 times a day  - Out of bed to chair 3 times a day for meals  - Out of bed for toileting  - Record patient progress and toleration of activity level   Outcome: Progressing     Problem: DISCHARGE PLANNING  Goal: Discharge to home or other facility with appropriate resources  Description: INTERVENTIONS:  - Identify barriers to discharge w/patient and caregiver  - Arrange for needed discharge resources and transportation as appropriate  - Identify discharge learning needs (meds, wound care, etc.)  - Arrange for interpretive services to assist at discharge as needed  - Refer to Case Management Department for coordinating discharge planning if the patient needs post-hospital services based on physician/advanced practitioner order or complex needs related to functional status, cognitive ability, or social support system  Outcome: Progressing     Problem: Knowledge Deficit  Goal: Patient/family/caregiver demonstrates understanding of disease process, treatment plan, medications, and discharge instructions  Description: Complete learning assessment and assess knowledge base.  Interventions:  - Provide teaching at level of understanding  - Provide teaching via preferred learning methods  Outcome: Progressing     Problem: METABOLIC, FLUID AND ELECTROLYTES - ADULT  Goal: Glucose maintained within target range  Description: INTERVENTIONS:  - Monitor Blood Glucose as ordered  - Assess for signs and symptoms of hyperglycemia and hypoglycemia  - Administer ordered medications to maintain glucose within target range  - Assess nutritional intake and initiate nutrition service referral as needed  Outcome: Progressing

## 2025-03-01 ENCOUNTER — APPOINTMENT (OUTPATIENT)
Dept: CT IMAGING | Facility: HOSPITAL | Age: 80
DRG: 066 | End: 2025-03-01
Payer: MEDICARE

## 2025-03-01 LAB
ANION GAP SERPL CALCULATED.3IONS-SCNC: 4 MMOL/L (ref 4–13)
BUN SERPL-MCNC: 19 MG/DL (ref 5–25)
CALCIUM SERPL-MCNC: 8.7 MG/DL (ref 8.4–10.2)
CHLORIDE SERPL-SCNC: 104 MMOL/L (ref 96–108)
CHOLEST SERPL-MCNC: 131 MG/DL (ref ?–200)
CO2 SERPL-SCNC: 28 MMOL/L (ref 21–32)
CREAT SERPL-MCNC: 1.64 MG/DL (ref 0.6–1.3)
ERYTHROCYTE [DISTWIDTH] IN BLOOD BY AUTOMATED COUNT: 14.3 % (ref 11.6–15.1)
EST. AVERAGE GLUCOSE BLD GHB EST-MCNC: 223 MG/DL
GFR SERPL CREATININE-BSD FRML MDRD: 29 ML/MIN/1.73SQ M
GLUCOSE P FAST SERPL-MCNC: 122 MG/DL (ref 65–99)
GLUCOSE SERPL-MCNC: 112 MG/DL (ref 65–140)
GLUCOSE SERPL-MCNC: 122 MG/DL (ref 65–140)
GLUCOSE SERPL-MCNC: 197 MG/DL (ref 65–140)
GLUCOSE SERPL-MCNC: 205 MG/DL (ref 65–140)
GLUCOSE SERPL-MCNC: 222 MG/DL (ref 65–140)
HBA1C MFR BLD: 9.4 %
HCT VFR BLD AUTO: 34.7 % (ref 34.8–46.1)
HDLC SERPL-MCNC: 30 MG/DL
HGB BLD-MCNC: 10.9 G/DL (ref 11.5–15.4)
LDLC SERPL CALC-MCNC: 63 MG/DL (ref 0–100)
MAGNESIUM SERPL-MCNC: 1.8 MG/DL (ref 1.9–2.7)
MCH RBC QN AUTO: 28.9 PG (ref 26.8–34.3)
MCHC RBC AUTO-ENTMCNC: 31.4 G/DL (ref 31.4–37.4)
MCV RBC AUTO: 92 FL (ref 82–98)
PLATELET # BLD AUTO: 139 THOUSANDS/UL (ref 149–390)
PMV BLD AUTO: 9.6 FL (ref 8.9–12.7)
POTASSIUM SERPL-SCNC: 4 MMOL/L (ref 3.5–5.3)
RBC # BLD AUTO: 3.77 MILLION/UL (ref 3.81–5.12)
SODIUM SERPL-SCNC: 136 MMOL/L (ref 135–147)
TRIGL SERPL-MCNC: 191 MG/DL (ref ?–150)
WBC # BLD AUTO: 7.18 THOUSAND/UL (ref 4.31–10.16)

## 2025-03-01 PROCEDURE — 99232 SBSQ HOSP IP/OBS MODERATE 35: CPT | Performed by: INTERNAL MEDICINE

## 2025-03-01 PROCEDURE — 80048 BASIC METABOLIC PNL TOTAL CA: CPT | Performed by: INTERNAL MEDICINE

## 2025-03-01 PROCEDURE — 99215 OFFICE O/P EST HI 40 MIN: CPT | Performed by: PSYCHIATRY & NEUROLOGY

## 2025-03-01 PROCEDURE — 97163 PT EVAL HIGH COMPLEX 45 MIN: CPT

## 2025-03-01 PROCEDURE — 71250 CT THORAX DX C-: CPT

## 2025-03-01 PROCEDURE — 83735 ASSAY OF MAGNESIUM: CPT | Performed by: INTERNAL MEDICINE

## 2025-03-01 PROCEDURE — 85027 COMPLETE CBC AUTOMATED: CPT | Performed by: INTERNAL MEDICINE

## 2025-03-01 PROCEDURE — 83036 HEMOGLOBIN GLYCOSYLATED A1C: CPT | Performed by: INTERNAL MEDICINE

## 2025-03-01 PROCEDURE — 80061 LIPID PANEL: CPT | Performed by: INTERNAL MEDICINE

## 2025-03-01 PROCEDURE — 82948 REAGENT STRIP/BLOOD GLUCOSE: CPT

## 2025-03-01 PROCEDURE — 74176 CT ABD & PELVIS W/O CONTRAST: CPT

## 2025-03-01 RX ORDER — METOPROLOL SUCCINATE 25 MG/1
25 TABLET, EXTENDED RELEASE ORAL DAILY
Status: DISCONTINUED | OUTPATIENT
Start: 2025-03-01 | End: 2025-03-04 | Stop reason: HOSPADM

## 2025-03-01 RX ADMIN — INSULIN LISPRO 2 UNITS: 100 INJECTION, SOLUTION INTRAVENOUS; SUBCUTANEOUS at 12:09

## 2025-03-01 RX ADMIN — ASPIRIN 81 MG: 81 TABLET, CHEWABLE ORAL at 08:37

## 2025-03-01 RX ADMIN — FLUTICASONE PROPIONATE 2 SPRAY: 50 SPRAY, METERED NASAL at 08:38

## 2025-03-01 RX ADMIN — ENOXAPARIN SODIUM 30 MG: 30 INJECTION SUBCUTANEOUS at 08:38

## 2025-03-01 RX ADMIN — LEVOTHYROXINE SODIUM 50 MCG: 0.05 TABLET ORAL at 06:06

## 2025-03-01 RX ADMIN — CLOPIDOGREL 75 MG: 75 TABLET ORAL at 08:38

## 2025-03-01 RX ADMIN — ATORVASTATIN CALCIUM 40 MG: 40 TABLET, FILM COATED ORAL at 17:19

## 2025-03-01 RX ADMIN — INSULIN GLARGINE 18 UNITS: 100 INJECTION, SOLUTION SUBCUTANEOUS at 21:45

## 2025-03-01 RX ADMIN — INSULIN LISPRO 1 UNITS: 100 INJECTION, SOLUTION INTRAVENOUS; SUBCUTANEOUS at 21:45

## 2025-03-01 RX ADMIN — METOPROLOL SUCCINATE 25 MG: 25 TABLET, EXTENDED RELEASE ORAL at 10:25

## 2025-03-01 RX ADMIN — PANTOPRAZOLE SODIUM 40 MG: 40 TABLET, DELAYED RELEASE ORAL at 06:06

## 2025-03-01 RX ADMIN — INSULIN LISPRO 2 UNITS: 100 INJECTION, SOLUTION INTRAVENOUS; SUBCUTANEOUS at 17:19

## 2025-03-01 RX ADMIN — APIXABAN 5 MG: 5 TABLET, FILM COATED ORAL at 15:52

## 2025-03-01 NOTE — CASE MANAGEMENT
Case Management Assessment & Discharge Planning Note    Patient name Jina Norris  Location /-01 MRN [id number]  : 1945 Date 3/1/2025       Current Admission Date: 2025  Current Admission Diagnosis:Stroke-like symptom   Patient Active Problem List    Diagnosis Date Noted Date Diagnosed    Snoring 2024     Nonrheumatic aortic valve insufficiency 2024     Acute CVA (cerebrovascular accident) ([facility]) 2024     Stroke-like symptom 2024     Stage 3a chronic kidney disease ([facility]) 2023     Hypomagnesemia 2023     Abnormal CT of the abdomen 2023     PAD (peripheral artery disease) ([facility]) 2023     Asymptomatic bilateral carotid artery stenosis 2023     Chronic pain syndrome 2022     Lumbar radiculopathy 2022     Spinal stenosis of lumbar region      Mixed hyperlipidemia 2020     Acquired hypothyroidism 2020     Type 2 diabetes mellitus with stage 3b chronic kidney disease, with long-term current use of insulin ([facility]) 2019     Essential hypertension 2019       LOS (days): 0  Geometric Mean LOS (GMLOS) (days):   Days to GMLOS:     OBJECTIVE:              Current admission status: Observation  Referral Reason: Other (Discharge planning)    Preferred Pharmacy:   [id number] - [place] - [address]  [address] [address]  Phone: [phone] Fax: [phone]    [id number] - [place] - [address]  [address] [address]  Phone: [phone] Fax: [phone]    [facility] - [employer] [place] [employer] - [address]  [address] [address]  Phone: [phone] Fax: [phone]    Primary Care Provider: Santiago Melendez MD    Primary Insurance: MEDICARE  Secondary Insurance: COMMERCIAL MISCELLANEOUS    ASSESSMENT:  Active Health Care Proxies       [facility] Care Representative - Son    Primary Phone: [phone] (Mobile)                 Advance Directives  Does patient have a Health Care POA?: Yes  Does patient have Advance Directives?: Yes  Advance Directives: Living will, Power of  for health care  Primary Contact: Escobar Alejandre Jr - SO         Readmission Root Cause  30 Day Readmission: No    Patient Information  Admitted from:: Home  Mental Status: Alert  During Assessment patient was accompanied by: Not accompanied during assessment  Assessment information provided by:: Patient  Primary Caregiver: Self  Support Systems: Spouse/significant other, Son (CG for SO with Dementia)  County of Residence: [place]  What city do you live in?: [place]  Home entry access options. Select all that apply.: Stairs  Number of steps to enter home.: 5  Do the steps have railings?: Yes  Type of Current Residence: [address]  Living Arrangements: Lives w/ Spouse/significant other  Is patient a ?: No    Activities of Daily Living Prior to Admission  Functional Status: Independent  Completes ADLs independently?: Yes  Ambulates independently?: Yes  Does patient use assisted devices?: No  Does patient currently own DME?: Yes  What DME does the patient currently own?: Walker, Straight Cane  Does patient have a history of Outpatient Therapy (PT/OT)?: Yes  Does the patient have a history of Short-Term Rehab?: No  Does patient have a history of HHC?: Yes ([facility])  Does patient currently have HHC?: No         Patient Information Continued  Income Source: Pension/skilled nursing  Does patient have prescription coverage?: Yes  Does patient receive dialysis treatments?: No  Does patient have a history of substance abuse?: No  Does patient have a history of Mental Health Diagnosis?: No         Means of Transportation  Means of Transport to [facility]:: Drives Self (Pt drives short distances, neighbors drives otherwise)          DISCHARGE DETAILS:    Discharge planning discussed with:: Pt  Freedom of Choice:  Yes  Comments - Freedom of Choice: Minimum resource intensity recommended by therapy after eval. Pt aware and in agreement. She would like HHC and reports she has had [facility] in the past and would like same again. Pt agreeable to blanket HHC referrals in case [facility] unable to accommodate. Referrals sent via [facility]. Await accepting agency.             Requested Home Health Care         Is the patient interested in HHC at discharge?: Yes  Home Health Discipline requested:: Nursing, Occupational Therapy, Physical Therapy  Home Health Follow-Up Provider:: PCP  Home Health Services Needed:: Strengthening/Theraputic Exercises to Improve Function, Evaluate Functional Status and Safety, Gait/ADL Training  Homebound Criteria Met:: Requires the Assistance of Another Person for Safe Ambulation or to Leave the Home, Uses an Assist Device (i.e. cane, walker, etc)  Supporting Clincal Findings:: Limited Endurance, Fatigues Easliy in Short Distances    DME Referral Provided  Referral made for DME?: No         Would you like to participate in our [facility] service program?  : No - Declined    Treatment Team Recommendation: Home with Home Health Care  Discharge Destination Plan:: Home with Home Health Care  Transport at Discharge :  (Friend will transport home today if it is not too late in the evening per pt)

## 2025-03-01 NOTE — CASE MANAGEMENT
Case Management Discharge Planning Note    Patient name Jina Norris  Location /-01 MRN [id number]  : 1945 Date 3/1/2025       Current Admission Date: 2025  Current Admission Diagnosis:Stroke-like symptom   Patient Active Problem List    Diagnosis Date Noted Date Diagnosed    Snoring 2024     Nonrheumatic aortic valve insufficiency 2024     Acute CVA (cerebrovascular accident) ([facility]) 2024     Stroke-like symptom 2024     Stage 3a chronic kidney disease ([facility]) 2023     Hypomagnesemia 2023     Abnormal CT of the abdomen 2023     PAD (peripheral artery disease) ([facility]) 2023     Asymptomatic bilateral carotid artery stenosis 2023     Chronic pain syndrome 2022     Lumbar radiculopathy 2022     Spinal stenosis of lumbar region      Mixed hyperlipidemia 2020     Acquired hypothyroidism 2020     Type 2 diabetes mellitus with stage 3b chronic kidney disease, with long-term current use of insulin ([facility]) 2019     Essential hypertension 2019       LOS (days): 0  Geometric Mean LOS (GMLOS) (days):   Days to GMLOS:     OBJECTIVE:            Current admission status: Inpatient   Preferred Pharmacy:   [id number] - [place], [place] - [address]  [address] [address]  Phone: [phone] Fax: [phone]    [id number] [place] [facility] [facility] [address]  [address] [address]  Phone: [phone] Fax: [phone]    [facility] - [place], [place] - [address]  [address] [address]  Phone: [phone] Fax: [phone]    Primary Care Provider: Santiago Melendez MD    Primary Insurance: MEDICARE  Secondary Insurance: COMMERCIAL MISCELLANEOUS    DISCHARGE DETAILS:    Discharge planning discussed with:: Pt  Freedom of Choice: Yes  Comments - Freedom of Choice: Pt given [facility] choice list. [facility] unable to  accpet due to staffing. Pt chose [facility]. They were reserved in [place]. Pt for discharge home tomorrow per Dr. Ramon. Pt indicates she has no transport home tomorrow. CM requested [employer] transport tomorrow with [facility]. [facility] only able to do 1500  time. Pt and nurse aware.                     Requested Home Health Care         Home Health Discipline requested:: Occupational Therapy, Nursing, Physical Therapy  [facility] Reason (only applicable if external HHA name selected): Services not provided in network or near patient location  Home Health Follow-Up Provider:: PCP  Home Health Services Needed:: Strengthening/Theraputic Exercises to Improve Function, Evaluate Functional Status and Safety, Gait/ADL Training  Homebound Criteria Met:: Requires the Assistance of Another Person for Safe Ambulation or to Leave the Home, Uses an Assist Device (i.e. cane, walker, etc)  Supporting Clincal Findings:: Limited Endurance, Fatigues Easliy in Short Distances         Other Referral/Resources/Interventions Provided:  Interventions: Prescription Price Check  Referral Comments: CM price checked Eliquis at [facility]. Per pharmacy pt has $12 copay and it is in stock. Pt aware and in agreement.               Transport at Discharge : Other (Comment)        Transported by (Company and Unit #): [employer] [facility] [employer]  ETA of Transport (Date): 03/02/25  ETA of Transport (Time): 1500

## 2025-03-01 NOTE — ASSESSMENT & PLAN NOTE
BP elevated, BP meds initially held to allow for permissive hypertension  Home Toprol resumed  Resume Norvasc in the next 24 to 48 hours

## 2025-03-01 NOTE — PLAN OF CARE
Problem: Knowledge Deficit  Goal: Patient/family/caregiver demonstrates understanding of disease process, treatment plan, medications, and discharge instructions  Description: Complete learning assessment and assess knowledge base.  Interventions:  - Provide teaching at level of understanding  - Provide teaching via preferred learning methods  Outcome: Progressing     Problem: METABOLIC, FLUID AND ELECTROLYTES - ADULT  Goal: Glucose maintained within target range  Description: INTERVENTIONS:  - Monitor Blood Glucose as ordered  - Assess for signs and symptoms of hyperglycemia and hypoglycemia  - Administer ordered medications to maintain glucose within target range  - Assess nutritional intake and initiate nutrition service referral as needed  Outcome: Progressing     Problem: Neurological Deficit  Goal: Neurological status is stable or improving  Description: Interventions:  - Monitor and assess patient's level of consciousness, motor function, sensory function, and level of assistance needed for ADLs.   - Monitor and report changes from baseline. Collaborate with interdisciplinary team to initiate plan and implement interventions as ordered.   - Provide and maintain a safe environment.  - Consider seizure precautions.  - Consider fall precautions.  - Consider aspiration precautions.  - Consider bleeding precautions.  Outcome: Progressing

## 2025-03-01 NOTE — ASSESSMENT & PLAN NOTE
Continue levothyroxine   Layla Michael. Subjective:     [de-identified] F with NARCISO,  

## 2025-03-01 NOTE — NURSING NOTE
Pt bathed oral care done physical therapy took her for a walk in the li way back into bed call bell I reach bed alarm on RN aware

## 2025-03-01 NOTE — ASSESSMENT & PLAN NOTE
MRI brain confirmed acute stroke  Reviewed case with neurology  Aspirin/Plavix switched to Eliquis 5 mg twice daily  Will request loop recorder interrogation  Continue statin  PT/OT/speech eval  Neurology recommends CT chest abdomen/pelvis to rule out any obvious malignancy that would lead to hypercoagulable state  Case management for discharge planning

## 2025-03-01 NOTE — CONSULTS
TeleConsultation - Neurology   Jina Norris 79 y.o. female MRN: [id number]  Unit/Bed#: -01 Encounter: [id number]      REQUIRED DOCUMENTATION:     1. This service was provided via Telemedicine.  2. Provider located in [place].  3. TeleMed provider: Dov Wisdom MD.  4. Identify all parties in room with patient during tele consult:  None  5. After connecting through televideo, patient was identified by name and date of birth and assistant checked wristband.  Patient was then informed that this was a Telemedicine visit and that the exam was being conducted confidentially over secure lines. My office door was closed. No one else was in the room.  Patient acknowledged consent and understanding of privacy and security of the Telemedicine visit, and gave us permission to have the assistant stay in the room in order to assist with the history and to conduct the exam.  I informed the patient that I have reviewed their record in Epic and presented the opportunity for them to ask any questions regarding the visit today.  The patient agreed to participate.       Assessment & Plan   Assessment:  A 78 y/o female with history of recurrent bihemispheric infarcts who presented with slurred speech and hand weakness.    NIHSS 1 at this point for aphasia  MRI brain personally reviewed: bihemispheric cortical infarcts in acute and subacute stages suggestive of embolic etiology  CTA reviewed: Intracranial atherosclerotic disease. No LVO or clinically significant vessel disease  Loop recorder interrogation: last on 2/24/2025: no reported arrhythmia  LDL 63  DILLAN done last admission in 2024:     Left Ventricle: Left ventricular cavity size is normal. The left ventricular ejection fraction is 55%. Systolic function is normal. Wall motion is normal.    Left Atrium: The atrium is dilated. There is no thrombus.    Atrial Septum: No patent foramen ovale detected, confirmed at rest using agitated saline contrast, confirmed by provocation with  abdominal compression, using agitated saline contrast.    Left Atrial Appendage: There is normal function. There is no thrombus.      Plan:  1) Patient has suffered numerous embolic looking infarcts even when on antiplatelets. Even with the lack of A-fib on interrogation, anticoagulation appears reasonable and justifiable. We suggest switching anti-platelets to Eliquis 5 mg BID  2) Obtain a loop interrogation  3) Obtain pan CT C/A/P with contrast to look for malignancy for hypercoagulability  4) Obtain antiphospholipid antibdoies  5) If all work-up is negative, we recommend follow-up with PCP after discharge for rest of cancer screening including colonoscopy and mammography  6) PT and speech therapy.   7) Normotensive BP.  8) C/w Statin    History of Present Illness     Reason for Consult / Principal Problem: stroke  Hx and PE limited by: none  HPI: Jina Norris is a 79 y.o. female who presents with speech difficulties that was noted yesterday. She suffered a stroke last year and was found to have bihemispheric foci so she had a thorough work-up including DILLAN and then a loop recorder was placed. She fully recovered from the previous stroke symptom-wise.  Yesterday, she noted sudden onset of speech difficulties and a facial droop at 0930 am. By the time she presented to ED, she noted it had significantly improved, so TNK was not given. She was hypertensive SBP>220 at that time as well.    Patient currently feels close to her baseline, but has some slowness in speech.    Inpatient consult to Neurology  Consult performed by: Dov Wisdom MD  Consult ordered by: Osman Ramon MD      Inpatient consult to Neurology  Consult performed by: Dov Wisdom MD  Consult ordered by: Rory Lennon DO           Review of Systems  Complete ROS was done and is negative other than what is mentioned in HPI    Historical Information   Past Medical History:   Diagnosis Date    Chronic kidney disease, stage 3 (HCC)     CVA  (cerebral vascular accident) (HCC)     s/p medtronic loop recorder 2024    Diabetes mellitus (HCC)     Disorder of gallbladder     Disorder of skin and subcutaneous tissue     Dyspnea     Essential hypertension     Hypercalcemia 2023    Hypokalemia     Malaise and fatigue     Mixed hyperlipidemia     Morbid obesity (HCC)     Pernicious anemia      Past Surgical History:   Procedure Laterality Date    BREAST BIOPSY Right     CARPAL TUNNEL RELEASE Bilateral     CHOLECYSTECTOMY      COLONOSCOPY      Dr. Apple     HYSTERECTOMY      LUMBAR EPIDURAL INJECTION      x3    PARTIAL HYSTERECTOMY      SKIN LESION EXCISION      scalp      Social History   Social History     Substance and Sexual Activity   Alcohol Use Yes    Comment: Occasionally      Social History     Substance and Sexual Activity   Drug Use Never    Comment: Denies drug use - As per [facility]      E-Cigarette/Vaping    E-Cigarette Use Never User      E-Cigarette/Vaping Substances    Nicotine No     THC No     CBD No     Flavoring No     Other No     Unknown No      Social History     Tobacco Use   Smoking Status Former    Current packs/day: 0.00    Average packs/day: 1 pack/day for 14.0 years (14.0 ttl pk-yrs)    Types: Cigarettes    Start date:     Quit date:     Years since quittin.1   Smokeless Tobacco Never     Family History: Family history non-contributory    Review of previous medical records was completed.     Meds/Allergies   current meds:   Current Facility-Administered Medications:     acetaminophen (TYLENOL) tablet 650 mg, Q6H PRN    aspirin chewable tablet 81 mg, Daily    atorvastatin (LIPITOR) tablet 40 mg, QPM    clopidogrel (PLAVIX) tablet 75 mg, Daily    diphenhydrAMINE (BENADRYL) tablet 25 mg, HS PRN    enoxaparin (LOVENOX) subcutaneous injection 30 mg, Daily    fluticasone (FLONASE) 50 mcg/act nasal spray 2 spray, Daily    insulin glargine (LANTUS) subcutaneous injection 18 Units 0.18 mL, HS    insulin lispro  "(HumALOG/ADMELOG) 100 units/mL subcutaneous injection 1-5 Units, HS    insulin lispro (HumALOG/ADMELOG) 100 units/mL subcutaneous injection 1-6 Units, TID AC    levothyroxine tablet 50 mcg, Early Morning    metoprolol succinate (TOPROL-XL) 24 hr tablet 25 mg, Daily    ondansetron (ZOFRAN) injection 4 mg, Q6H PRN    pantoprazole (PROTONIX) EC tablet 40 mg, Early Morning    No Known Allergies    Objective   Vitals:Blood pressure 159/70, pulse 72, temperature 97.7 °F (36.5 °C), temperature source Oral, resp. rate 18, height 5' 2\" (1.575 m), weight 76.6 kg (168 lb 14 oz), SpO2 98%.,Body mass index is 30.89 kg/m².    Intake/Output Summary (Last 24 hours) at 3/1/2025 1216  Last data filed at 3/1/2025 0900  Gross per 24 hour   Intake 340 ml   Output 1275 ml   Net -935 ml       Invasive Devices:   Invasive Devices       Peripheral Intravenous Line  Duration             Peripheral IV 02/28/25 Left;Proximal;Ventral (anterior) Forearm <1 day                    Physical Exam NAD  Skin: no seen lesions  HEENT: ATNC  Chest: breathing comfortably on room air  GI: no apparent distension.    Neurological Exam Alert and oriented for self, place and time. Memory is intact to recent and remote events. Language is intact to comprehension and repetition. Slightly impaired expression. No neglect. Speech is normal. EOMI. Pupils are symmetric. Visual field appears intact. Face is symmetric. Tongue is midline. Able to move all extremities against gravity. No dysmetria noted.      Lab Results: CBC:   Results from last 7 days   Lab Units 03/01/25  0512 02/28/25  1233   WBC Thousand/uL 7.18 9.25   RBC Million/uL 3.77* 4.18   HEMOGLOBIN g/dL 10.9* 12.0   HEMATOCRIT % 34.7* 38.8   MCV fL 92 93   PLATELETS Thousands/uL 139* 166   , BMP/CMP:   Results from last 7 days   Lab Units 03/01/25  0512 02/28/25  1233   SODIUM mmol/L 136 133*   POTASSIUM mmol/L 4.0 4.5   CHLORIDE mmol/L 104 100   CO2 mmol/L 28 26   BUN mg/dL 19 18   CREATININE mg/dL 1.64* " 1.61*   CALCIUM mg/dL 8.7 8.7   EGFR ml/min/1.73sq m 29 30   , Vitamin B12:   , HgBA1C:   , TSH:   , Coagulation:   Results from last 7 days   Lab Units 02/28/25  1233   INR  0.93   , Lipid Profile:   Results from last 7 days   Lab Units 03/01/25  0512   HDL mg/dL 30*   LDL CALC mg/dL 63   TRIGLYCERIDES mg/dL 191*     Imaging Studies: Results Review Statement: I personally reviewed the following image studies/reports in PACS and discussed pertinent findings with Radiology: CTA and MRI. My interpretation of the radiology images/reports is: as above.  EKG, Pathology, and Other Studies: Results Review Statement: I reviewed radiology reports from this admission including: EP reports, DILLAN.  VTE Prophylaxis: VTE covered by:  enoxaparin, Subcutaneous, 30 mg at 03/01/25 0838       Code Status: Level 1 - Full Code  Advance Directive and Living Will:      Power of : Yes  POLST:      Counseling / Coordination of Care  N/A

## 2025-03-01 NOTE — ASSESSMENT & PLAN NOTE
Lab Results   Component Value Date    HGBA1C 9.8 (H) 12/13/2024       Recent Labs     02/28/25  1601 02/28/25 2039 03/01/25  0720 03/01/25  1055   POCGLU 105 163* 112 205*       Blood Sugar Average: Last 72 hrs:  (P) 146    Insulin sliding scale  Lantus 18 units at bedtime  Diabetic diet

## 2025-03-01 NOTE — PROGRESS NOTES
Progress Note - Hospitalist   Name: Jina Norris 79 y.o. female I MRN: [id number]  Unit/Bed#: MS Ajit-01 I Date of Admission: 2/28/2025   Date of Service: 3/1/2025 I Hospital Day: 0    Assessment & Plan  Stroke-like symptom  MRI brain confirmed acute stroke  Reviewed case with neurology  Aspirin/Plavix switched to Eliquis 5 mg twice daily  Will request loop recorder interrogation  Continue statin  PT/OT/speech eval  Neurology recommends CT chest abdomen/pelvis to rule out any obvious malignancy that would lead to hypercoagulable state  Case management for discharge planning  Type 2 diabetes mellitus with stage 3b chronic kidney disease, with long-term current use of insulin ([facility])  Lab Results   Component Value Date    HGBA1C 9.8 (H) 12/13/2024       Recent Labs     02/28/25  1601 02/28/25  2039 03/01/25  0720 03/01/25  1055   POCGLU 105 163* 112 205*       Blood Sugar Average: Last 72 hrs:  (P) 146    Insulin sliding scale  Lantus 18 units at bedtime  Diabetic diet  Essential hypertension  BP elevated, BP meds initially held to allow for permissive hypertension  Home Toprol resumed  Resume Norvasc in the next 24 to 48 hours  Acquired hypothyroidism  Continue levothyroxine    VTE Pharmacologic Prophylaxis:   Moderate Risk (Score 3-4) - Pharmacological DVT Prophylaxis Ordered: apixaban (Eliquis).    Mobility:   Basic Mobility Inpatient Raw Score: 18  JH-HLM Goal: 6: Walk 10 steps or more  JH-HLM Achieved: 7: Walk 25 feet or more  JH-HLM Goal achieved. Continue to encourage appropriate mobility.    Patient Centered Rounds: I performed bedside rounds with nursing staff today.   Discussions with Specialists or Other Care Team Provider: yes    Education and Discussions with Family / Patient:  Updated patient regarding plan of care.     Current Length of Stay: 0 day(s)  Current Patient Status: Inpatient   Certification Statement: The patient will continue to require additional inpatient hospital stay due to  stroke  Discharge Plan: Anticipate discharge tomorrow to home with home services.    Code Status: Level 1 - Full Code    Subjective   No overnight events noted    Objective :  Temp:  [97.7 °F (36.5 °C)-98.8 °F (37.1 °C)] 97.7 °F (36.5 °C)  HR:  [64-86] 72  BP: (130-171)/(56-77) 159/70  Resp:  [18] 18  SpO2:  [94 %-99 %] 98 %  O2 Device: None (Room air)    Body mass index is 30.89 kg/m².     Input and Output Summary (last 24 hours):     Intake/Output Summary (Last 24 hours) at 3/1/2025 1442  Last data filed at 3/1/2025 1252  Gross per 24 hour   Intake 560 ml   Output 1025 ml   Net -465 ml       Physical Exam  Constitutional:       General: She is not in acute distress.  HENT:      Head: Normocephalic and atraumatic.      Nose: Nose normal.      Mouth/Throat:      Mouth: Mucous membranes are moist.   Eyes:      Extraocular Movements: Extraocular movements intact.      Conjunctiva/sclera: Conjunctivae normal.   Cardiovascular:      Rate and Rhythm: Normal rate and regular rhythm.   Pulmonary:      Effort: Pulmonary effort is normal. No respiratory distress.   Abdominal:      Palpations: Abdomen is soft.      Tenderness: There is no abdominal tenderness.   Musculoskeletal:         General: Normal range of motion.      Cervical back: Normal range of motion and neck supple.   Skin:     General: Skin is warm and dry.   Neurological:      Mental Status: She is alert.      Comments: Patient awake and alert.  Moving all 4 extremities.  Dysarthria noted   Psychiatric:         Mood and Affect: Mood normal.         Behavior: Behavior normal.           Lines/Drains:        Lab Results: I have reviewed the following results:   Results from last 7 days   Lab Units 03/01/25  0512   WBC Thousand/uL 7.18   HEMOGLOBIN g/dL 10.9*   HEMATOCRIT % 34.7*   PLATELETS Thousands/uL 139*     Results from last 7 days   Lab Units 03/01/25  0512   SODIUM mmol/L 136   POTASSIUM mmol/L 4.0   CHLORIDE mmol/L 104   CO2 mmol/L 28   BUN mg/dL 19    CREATININE mg/dL 1.64*   ANION GAP mmol/L 4   CALCIUM mg/dL 8.7   GLUCOSE RANDOM mg/dL 122     Results from last 7 days   Lab Units 02/28/25  1233   INR  0.93     Results from last 7 days   Lab Units 03/01/25  1055 03/01/25  0720 02/28/25  2039 02/28/25  1601 02/28/25  1259   POC GLUCOSE mg/dl 205* 112 163* 105 145*               Recent Cultures (last 7 days):         Imaging Results Review: No pertinent imaging studies reviewed.  Other Study Results Review: No additional pertinent studies reviewed.    Last 24 Hours Medication List:     Current Facility-Administered Medications:     acetaminophen (TYLENOL) tablet 650 mg, Q6H PRN    apixaban (ELIQUIS) tablet 5 mg, BID    atorvastatin (LIPITOR) tablet 40 mg, QPM    diphenhydrAMINE (BENADRYL) tablet 25 mg, HS PRN    fluticasone (FLONASE) 50 mcg/act nasal spray 2 spray, Daily    insulin glargine (LANTUS) subcutaneous injection 18 Units 0.18 mL, HS    insulin lispro (HumALOG/ADMELOG) 100 units/mL subcutaneous injection 1-5 Units, HS    insulin lispro (HumALOG/ADMELOG) 100 units/mL subcutaneous injection 1-6 Units, TID AC    levothyroxine tablet 50 mcg, Early Morning    metoprolol succinate (TOPROL-XL) 24 hr tablet 25 mg, Daily    ondansetron (ZOFRAN) injection 4 mg, Q6H PRN    pantoprazole (PROTONIX) EC tablet 40 mg, Early Morning    Administrative Statements   Today, Patient Was Seen By: Osman Ramon MD      **Please Note: This note may have been constructed using a voice recognition system.**

## 2025-03-01 NOTE — PLAN OF CARE
Problem: Neurological Deficit  Goal: Neurological status is stable or improving  Description: Interventions:  - Monitor and assess patient's level of consciousness, motor function, sensory function, and level of assistance needed for ADLs.   - Monitor and report changes from baseline. Collaborate with interdisciplinary team to initiate plan and implement interventions as ordered.   - Provide and maintain a safe environment.  - Consider seizure precautions.  - Consider fall precautions.  - Consider aspiration precautions.  - Consider bleeding precautions.  Outcome: Progressing     Problem: Activity Intolerance/Impaired Mobility  Goal: Mobility/activity is maintained at optimum level for patient  Description: Interventions:  - Assess and monitor patient  barriers to mobility and need for assistive/adaptive devices.  - Assess patient's emotional response to limitations.  - Collaborate with interdisciplinary team and initiate plans and interventions as ordered.  - Encourage independent activity per ability.  - Maintain proper body alignment.  - Perform active/passive rom as tolerated/ordered.  - Plan activities to conserve energy.  - Turn patient as appropriate  Outcome: Progressing     Problem: Communication Impairment  Goal: Ability to express needs and understand communication  Description: Assess patient's communication skills and ability to understand information.  Patient will demonstrate use of effective communication techniques, alternative methods of communication and understanding even if not able to speak.     - Encourage communication and provide alternate methods of communication as needed.  - Collaborate with case management/ for discharge needs.  - Include patient/family/caregiver in decisions related to communication.  Outcome: Progressing     Problem: Potential for Aspiration  Goal: Non-ventilated patient's risk of aspiration is minimized  Description: Assess and monitor vital signs,  respiratory status, and labs (WBC).  Monitor for signs of aspiration (tachypnea, cough, rales, wheezing, cyanosis, fever).    - Assess and monitor patient's ability to swallow.  - Place patient up in chair to eat if possible.  - HOB up at 90 degrees to eat if unable to get patient up into chair.  - Supervise patient during oral intake.   - Instruct patient/ family to take small bites.  - Instruct patient/ family to take small single sips when taking liquids.  - Follow patient-specific strategies generated by speech pathologist.  Outcome: Progressing     Problem: Nutrition  Goal: Nutrition/Hydration status is improving  Description: Monitor and assess patient's nutrition/hydration status for malnutrition (ex- brittle hair, bruises, dry skin, pale skin and conjunctiva, muscle wasting, smooth red tongue, and disorientation). Collaborate with interdisciplinary team and initiate plan and interventions as ordered.  Monitor patient's weight and dietary intake as ordered or per policy. Utilize nutrition screening tool and intervene per policy. Determine patient's food preferences and provide high-protein, high-caloric foods as appropriate.     - Assist patient with eating.  - Allow adequate time for meals.  - Encourage patient to take dietary supplement as ordered.  - Collaborate with clinical nutritionist.  - Include patient/family/caregiver in decisions related to nutrition.  Outcome: Progressing

## 2025-03-01 NOTE — QUICK NOTE
Stroke alert called at 12:51 on  Neuro responded immediately      A 78 y/o female presented with dysarthria and left facial droop. Symptoms resolved upon arrival to ED. NIHSS 0    CT and CTA showed no acute findings    TNK not given: rapidly resolving symptoms

## 2025-03-01 NOTE — PLAN OF CARE
Problem: PHYSICAL THERAPY ADULT  Goal: Performs mobility at highest level of function for planned discharge setting.  See evaluation for individualized goals.  Description: Treatment/Interventions: Functional transfer training, Therapeutic exercise, Endurance training, Gait training, Bed mobility, Equipment eval/education, Elevations  Equipment Recommended: Walker       See flowsheet documentation for full assessment, interventions and recommendations.  Outcome: Progressing  Note: Prognosis: Fair     Assessment: Pt is 79 y.o. female seen for PT evaluation s/p admit to [facility] on 2/28/2025 w/ Stroke-like symptom. PT consulted to assess pt's functional mobility and d/c needs. Order placed for PT eval and tx, w/ out of bed to chair order. Pt agreeable to PT  session upon arrival, pt found supine in bed.  PTA, pt was independent w/ all functional mobility w/ no AD, ambulates community distances and elevations, has 5 MAIKEL, lives w/ SO in 1 level home, and retired.  Pt to benefit from continued PT tx to address deficits and maximize level of functional independent mobility and consistency. Upon conclusion pt  supine in bed. Complexity: Comorbidities affecting pt's physical performance at time of assessment include: DM, htn, and CVA. Personal factors affecting pt at time of IE include: limited mobility, limited insight into impairments, ambulating with assistive device, steps to enter home, limited home support, and unable to perform caregiver tasks. Please find objective findings from PT assessment regarding body systems outlined above with impairments and limitations including impaired balance, decreased endurance, gait deviations, decreased activity tolerance, decreased functional mobility tolerance, decreased safety awareness, and fall risk.  Pt's clinical presentation is currently unstable/unpredictable seen in pt's presentation of abnormal H&H, confusion, telemetry use, and + CVA . The patient's AM-PAC  Basic Mobility Inpatient Short Form Raw Score is 18.  Based on patient presentations and impairments, pt would most appropriately benefit from Level 3 resource intensity upon discharge. A Raw score of greater than 16 suggests the patient may benefit from discharge to home. Please also refer to the recommendation of the Physical Therapist for safe discharge planning. RN verbalized pt appropriate for PT session.  Barriers to Discharge: Inaccessible home environment, Decreased caregiver support     Rehab Resource Intensity Level, PT: III (Minimum Resource Intensity)    See flowsheet documentation for full assessment.

## 2025-03-01 NOTE — PHYSICAL THERAPY NOTE
PHYSICAL THERAPY EVALUATION  NAME:  Jina Norris  DATE: 03/01/25    AGE:   79 y.o.  Mrn:   [id number]  ADMIT DX:  Slurred speech [R47.81]  Facial droop [R29.810]  Stroke-like symptoms [R29.90]  Problem List:   Patient Active Problem List   Diagnosis    Type 2 diabetes mellitus with stage 3b chronic kidney disease, with long-term current use of insulin (HCC)    Essential hypertension    Mixed hyperlipidemia    Acquired hypothyroidism    Spinal stenosis of lumbar region    Chronic pain syndrome    Lumbar radiculopathy    PAD (peripheral artery disease) ([facility])    Asymptomatic bilateral carotid artery stenosis    Abnormal CT of the abdomen    Hypomagnesemia    Stage 3a chronic kidney disease ([facility])    Stroke-like symptom    Acute CVA (cerebrovascular accident) ([facility])    Snoring    Nonrheumatic aortic valve insufficiency       Past Medical History  Past Medical History:   Diagnosis Date    Chronic kidney disease, stage 3 ([facility])     CVA (cerebral vascular accident) ([facility])     s/p medtronic loop recorder 5/13/2024    Diabetes mellitus ([facility])     Disorder of gallbladder     Disorder of skin and subcutaneous tissue     Dyspnea     Essential hypertension     Hypercalcemia 11/19/2023    Hypokalemia     Malaise and fatigue     Mixed hyperlipidemia     Morbid obesity ([facility])     Pernicious anemia        Past Surgical History  Past Surgical History:   Procedure Laterality Date    BREAST BIOPSY Right     CARPAL TUNNEL RELEASE Bilateral     CHOLECYSTECTOMY      COLONOSCOPY      Dr. Apple     HYSTERECTOMY      LUMBAR EPIDURAL INJECTION      x3    PARTIAL HYSTERECTOMY      SKIN LESION EXCISION      scalp        Length Of Stay: 0  Performed at least 2 patient identifiers during session: Name and ID bracelet         03/01/25 0858   PT Last Visit   PT Visit Date 03/01/25   Note Type   Note type Evaluation   Pain Assessment   Pain Assessment Tool 0-10   Pain Score No Pain   Restrictions/Precautions   Weight Bearing Precautions Per Order No    Other Precautions Fall Risk;Telemetry   Home Living   Type of Home House   Home Layout One level;Stairs to enter with rails  (5 MAIKEL)   Bathroom Shower/Tub Walk-in shower   Bathroom Toilet Standard   Bathroom Equipment Grab bars in shower   Home Equipment Cane;Walker  (no AD used at baseline)   Prior Function   Level of [place] Independent with ADLs;Independent with functional mobility;Independent with IADLS   Lives With Significant other  (caregiver for boyfriend with dementia)   Receives Help From Neighbor   IADLs Family/Friend/Other provides transportation;Independent with medication management;Independent with meal prep  (pt drives short distances only; neighbor assists with transportation)   Falls in the last 6 months 0   Vocational Retired   General   Family/Caregiver Present No   Cognition   Overall Cognitive Status WFL   Arousal/Participation Alert   Orientation Level Oriented X4   Memory Within functional limits   Following Commands Follows one step commands without difficulty   Comments slight slurred speech noted   RLE Assessment   RLE Assessment WFL   LLE Assessment   LLE Assessment WFL   Vision-Basic Assessment   Current Vision Wears glasses all the time   Coordination   Sensation WFL   Bed Mobility   Supine to Sit 5  Supervision   Additional items Assist x 1;Bedrails;Verbal cues;Increased time required   Sit to Supine 5  Supervision   Additional items Assist x 1;LE management;Verbal cues;Increased time required;HOB elevated   Additional Comments pt denied dizziness with transitional movement   Transfers   Sit to Stand 5  Supervision   Additional items Verbal cues;Increased time required   Stand to Sit 5  Supervision   Additional items Increased time required;Verbal cues   Additional Comments pt required cues for proper hand placement and slow transitions   Ambulation/Elevation   Gait pattern Improper Weight shift;Decreased foot clearance;Inconsistent radha   Gait Assistance   (Min A with no AD;  Supervision with RW; pt unsteady without AD)   Assistive Device Rolling walker  (pt educated to use RW upon d/ c home)   Distance   (20 ft with no AD; 80 ft with RW)   Ambulation/Elevation Additional Comments fair safety awareness noted; pt seeemed unaware of unsteadiness during ambulation   Balance   Static Sitting Good   Dynamic Sitting Fair +   Static Standing Fair   Dynamic Standing Fair -   Ambulatory Fair -   Endurance Deficit   Endurance Deficit Yes   Endurance Deficit Description pt reported fatigue with activity   Activity Tolerance   Activity Tolerance Patient limited by fatigue   Assessment   Prognosis Fair   Assessment Pt is 79 y.o. female seen for PT evaluation s/p admit to [facility] on 2/28/2025 w/ Stroke-like symptom. PT consulted to assess pt's functional mobility and d/c needs. Order placed for PT eval and tx, w/ out of bed to chair order. Pt agreeable to PT  session upon arrival, pt found supine in bed.  PTA, pt was independent w/ all functional mobility w/ no AD, ambulates community distances and elevations, has 5 MAIKEL, lives w/ SO in 1 level home, and retired.  Pt to benefit from continued PT tx to address deficits and maximize level of functional independent mobility and consistency. Upon conclusion pt  supine in bed. Complexity: Comorbidities affecting pt's physical performance at time of assessment include: DM, htn, and CVA. Personal factors affecting pt at time of IE include: limited mobility, limited insight into impairments, ambulating with assistive device, steps to enter home, limited home support, and unable to perform caregiver tasks. Please find objective findings from PT assessment regarding body systems outlined above with impairments and limitations including impaired balance, decreased endurance, gait deviations, decreased activity tolerance, decreased functional mobility tolerance, decreased safety awareness, and fall risk.  Pt's clinical presentation is currently  unstable/unpredictable seen in pt's presentation of abnormal H&H, confusion, telemetry use, and + CVA . The patient's AM-PAC Basic Mobility Inpatient Short Form Raw Score is 18.  Based on patient presentations and impairments, pt would most appropriately benefit from Level 3 resource intensity upon discharge. A Raw score of greater than 16 suggests the patient may benefit from discharge to home. Please also refer to the recommendation of the Physical Therapist for safe discharge planning. RN verbalized pt appropriate for PT session.   Barriers to Discharge Inaccessible home environment;Decreased caregiver support   Goals   Patient Goals to get washed up   LTG Expiration Date 03/11/25   Long Term Goal #1 Pt will: Perform bed mobility tasks to modified I to improve ease of bed mobility. Perform transfers to modified I to improve ease of transfers. Perform ambulation with MI and LRAD for 250 feet to increase Indep in home environment. Increase dynamic standing balance to F+ to decrease fall risk. Increase OOB activity tolerance to 10 minutes without s/s of exertion to decrease fall risk. Navigate up and down  5 steps with MI so patient can enter and exit home.   Plan   Treatment/Interventions Functional transfer training;Therapeutic exercise;Endurance training;Gait training;Bed mobility;Equipment eval/education;Elevations   PT Frequency 3-5x/wk   Discharge Recommendation   Rehab Resource Intensity Level, PT III (Minimum Resource Intensity)   Equipment Recommended Walker   Walker Package Recommended Wheeled walker   AM-PAC Basic Mobility Inpatient   Turning in Flat Bed Without Bedrails 4   Lying on Back to Sitting on Edge of Flat Bed Without Bedrails 3   Moving Bed to Chair 3   Standing Up From Chair Using Arms 3   Walk in Room 3   Climb 3-5 Stairs With Railing 2   Basic Mobility Inpatient Raw Score 18   Basic Mobility Standardized Score 41.05   [facility] Level Of Mobility   [facility] Goal 6: Walk 10 steps or  more   JH-HLM Achieved 7: Walk 25 feet or more     Time In: 0840  Time Out: 0858  Total Evaluation Minutes: 18    Katt Saldana, PT

## 2025-03-02 ENCOUNTER — APPOINTMENT (INPATIENT)
Dept: CT IMAGING | Facility: HOSPITAL | Age: 80
DRG: 066 | End: 2025-03-02
Payer: MEDICARE

## 2025-03-02 PROBLEM — N64.9 BREAST LESION: Status: ACTIVE | Noted: 2025-03-02

## 2025-03-02 LAB
ANION GAP SERPL CALCULATED.3IONS-SCNC: 6 MMOL/L (ref 4–13)
BUN SERPL-MCNC: 22 MG/DL (ref 5–25)
CALCIUM SERPL-MCNC: 8.7 MG/DL (ref 8.4–10.2)
CHLORIDE SERPL-SCNC: 103 MMOL/L (ref 96–108)
CO2 SERPL-SCNC: 27 MMOL/L (ref 21–32)
CREAT SERPL-MCNC: 1.6 MG/DL (ref 0.6–1.3)
ERYTHROCYTE [DISTWIDTH] IN BLOOD BY AUTOMATED COUNT: 14.4 % (ref 11.6–15.1)
GFR SERPL CREATININE-BSD FRML MDRD: 30 ML/MIN/1.73SQ M
GLUCOSE SERPL-MCNC: 178 MG/DL (ref 65–140)
GLUCOSE SERPL-MCNC: 181 MG/DL (ref 65–140)
GLUCOSE SERPL-MCNC: 239 MG/DL (ref 65–140)
GLUCOSE SERPL-MCNC: 250 MG/DL (ref 65–140)
GLUCOSE SERPL-MCNC: 256 MG/DL (ref 65–140)
HCT VFR BLD AUTO: 36.3 % (ref 34.8–46.1)
HGB BLD-MCNC: 11.5 G/DL (ref 11.5–15.4)
MCH RBC QN AUTO: 29 PG (ref 26.8–34.3)
MCHC RBC AUTO-ENTMCNC: 31.7 G/DL (ref 31.4–37.4)
MCV RBC AUTO: 91 FL (ref 82–98)
PLATELET # BLD AUTO: 157 THOUSANDS/UL (ref 149–390)
PMV BLD AUTO: 10.3 FL (ref 8.9–12.7)
POTASSIUM SERPL-SCNC: 4.1 MMOL/L (ref 3.5–5.3)
RBC # BLD AUTO: 3.97 MILLION/UL (ref 3.81–5.12)
SODIUM SERPL-SCNC: 136 MMOL/L (ref 135–147)
WBC # BLD AUTO: 7.49 THOUSAND/UL (ref 4.31–10.16)

## 2025-03-02 PROCEDURE — 82948 REAGENT STRIP/BLOOD GLUCOSE: CPT

## 2025-03-02 PROCEDURE — 99232 SBSQ HOSP IP/OBS MODERATE 35: CPT | Performed by: PSYCHIATRY & NEUROLOGY

## 2025-03-02 PROCEDURE — 85027 COMPLETE CBC AUTOMATED: CPT | Performed by: INTERNAL MEDICINE

## 2025-03-02 PROCEDURE — 99239 HOSP IP/OBS DSCHRG MGMT >30: CPT | Performed by: INTERNAL MEDICINE

## 2025-03-02 PROCEDURE — 80048 BASIC METABOLIC PNL TOTAL CA: CPT | Performed by: INTERNAL MEDICINE

## 2025-03-02 PROCEDURE — NC001 PR NO CHARGE

## 2025-03-02 PROCEDURE — 70450 CT HEAD/BRAIN W/O DYE: CPT

## 2025-03-02 RX ORDER — METOPROLOL SUCCINATE 25 MG/1
25 TABLET, EXTENDED RELEASE ORAL DAILY
Start: 2025-03-02

## 2025-03-02 RX ORDER — AMLODIPINE BESYLATE 5 MG/1
5 TABLET ORAL DAILY
Status: DISCONTINUED | OUTPATIENT
Start: 2025-03-02 | End: 2025-03-04 | Stop reason: HOSPADM

## 2025-03-02 RX ADMIN — PANTOPRAZOLE SODIUM 40 MG: 40 TABLET, DELAYED RELEASE ORAL at 06:26

## 2025-03-02 RX ADMIN — ATORVASTATIN CALCIUM 40 MG: 40 TABLET, FILM COATED ORAL at 17:04

## 2025-03-02 RX ADMIN — FLUTICASONE PROPIONATE 2 SPRAY: 50 SPRAY, METERED NASAL at 08:19

## 2025-03-02 RX ADMIN — LEVOTHYROXINE SODIUM 50 MCG: 0.05 TABLET ORAL at 06:26

## 2025-03-02 RX ADMIN — INSULIN LISPRO 1 UNITS: 100 INJECTION, SOLUTION INTRAVENOUS; SUBCUTANEOUS at 08:17

## 2025-03-02 RX ADMIN — APIXABAN 5 MG: 5 TABLET, FILM COATED ORAL at 17:04

## 2025-03-02 RX ADMIN — INSULIN GLARGINE 18 UNITS: 100 INJECTION, SOLUTION SUBCUTANEOUS at 22:35

## 2025-03-02 RX ADMIN — INSULIN LISPRO 2 UNITS: 100 INJECTION, SOLUTION INTRAVENOUS; SUBCUTANEOUS at 22:35

## 2025-03-02 RX ADMIN — APIXABAN 5 MG: 5 TABLET, FILM COATED ORAL at 08:17

## 2025-03-02 RX ADMIN — AMLODIPINE BESYLATE 5 MG: 5 TABLET ORAL at 08:17

## 2025-03-02 RX ADMIN — INSULIN LISPRO 3 UNITS: 100 INJECTION, SOLUTION INTRAVENOUS; SUBCUTANEOUS at 12:21

## 2025-03-02 RX ADMIN — INSULIN LISPRO 3 UNITS: 100 INJECTION, SOLUTION INTRAVENOUS; SUBCUTANEOUS at 17:03

## 2025-03-02 RX ADMIN — METOPROLOL SUCCINATE 25 MG: 25 TABLET, EXTENDED RELEASE ORAL at 08:17

## 2025-03-02 NOTE — PROGRESS NOTES
TeleProgress Note - Neurology   Jina Norris 79 y.o. female MRN: [id number]  Unit/Bed#: -01 Encounter: [id number]      VIRTUAL CARE DOCUMENTATION:     1. This service was provided via Telemedicine using [employer]     2. Parties in the room with patient during teleconsult Patient only    3. Confidentiality My office door was closed     4. Participants No one else was in the room    5. Patient acknowledged consent and understanding of privacy and security of the  Telemedicine consult. I informed the patient that I have reviewed their record in Epic and presented the opportunity for them to ask any questions regarding the visit today.  The patient agreed to participate.    6. Time spent 30       Assessment:  Cryptogenic infarcts    Etiology is unknown, possibly hypercoagulability in setting of breast mass seen on CT chest  C/w Eliquis 5 mg BID    Today while she was being discharge, nurse noted more slurred speech, for which we obtained stat CT head. It was stable. Slurred speech appears to have resolved      Subjective:   Feeling almost normal    Meds/Allergies   current meds:   Current Facility-Administered Medications:     acetaminophen (TYLENOL) tablet 650 mg, Q6H PRN    amLODIPine (NORVASC) tablet 5 mg, Daily    apixaban (ELIQUIS) tablet 5 mg, BID    atorvastatin (LIPITOR) tablet 40 mg, QPM    diphenhydrAMINE (BENADRYL) tablet 25 mg, HS PRN    fluticasone (FLONASE) 50 mcg/act nasal spray 2 spray, Daily    insulin glargine (LANTUS) subcutaneous injection 18 Units 0.18 mL, HS    insulin lispro (HumALOG/ADMELOG) 100 units/mL subcutaneous injection 1-5 Units, HS    insulin lispro (HumALOG/ADMELOG) 100 units/mL subcutaneous injection 1-6 Units, TID AC    levothyroxine tablet 50 mcg, Early Morning    metoprolol succinate (TOPROL-XL) 24 hr tablet 25 mg, Daily    ondansetron (ZOFRAN) injection 4 mg, Q6H PRN    pantoprazole (PROTONIX) EC tablet 40 mg, Early Morning    No Known Allergies        Vitals: Blood  "pressure 120/90, pulse 83, temperature 98.5 °F (36.9 °C), temperature source Oral, resp. rate 18, height 5' 2\" (1.575 m), weight 76.6 kg (168 lb 14 oz), SpO2 97%.,Body mass index is 30.89 kg/m².    Physical Exam:   Alert and oriented. Minimal expressive aphasia. No dysarthria.    Lab, Imaging and other studies: CBC:   Results from last 7 days   Lab Units 03/02/25  0503 03/01/25  0512 02/28/25  1233   WBC Thousand/uL 7.49 7.18 9.25   RBC Million/uL 3.97 3.77* 4.18   HEMOGLOBIN g/dL 11.5 10.9* 12.0   HEMATOCRIT % 36.3 34.7* 38.8   MCV fL 91 92 93   PLATELETS Thousands/uL 157 139* 166   , BMP/CMP:   Results from last 7 days   Lab Units 03/02/25  0503 03/01/25  0512 02/28/25  1233   SODIUM mmol/L 136 136 133*   POTASSIUM mmol/L 4.1 4.0 4.5   CHLORIDE mmol/L 103 104 100   CO2 mmol/L 27 28 26   BUN mg/dL 22 19 18   CREATININE mg/dL 1.60* 1.64* 1.61*   CALCIUM mg/dL 8.7 8.7 8.7   EGFR ml/min/1.73sq m 30 29 30   , Vitamin B12:   , HgBA1C:   Results from last 7 days   Lab Units 03/01/25  0512   HEMOGLOBIN A1C % 9.4*     VTE Prophylaxis: Sequential compression device (Venodyne)     Counseling / Coordination of Care  N/A    "

## 2025-03-02 NOTE — ASSESSMENT & PLAN NOTE
Patient found to have right breast lesion on CT imaging.  Reviewed finding with patient.  She understands finding and need for follow-up.  Patient states that she has a mammogram scheduled for April 1.  Advised to make sure that she follows up with this mammogram  Follow-up with PCP for further management

## 2025-03-02 NOTE — NURSING NOTE
AVS reviewed virtually with patient. Patient verbalized understanding of same, including new prescribed medications and side effects, recommended follow-up appointments, and reasons to seek medical assistance. All questions answered. Patient stated that she does not have transportation to doctor appointments. Message sent to Kellee OSEI RN to follow up with case management.

## 2025-03-02 NOTE — RAPID RESPONSE
Rapid Response Note  Jina Norris 79 y.o. female MRN: [id number]  Unit/Bed#: -01 Encounter: [id number]    Rapid Response Notification(s):   Response called date/time:  3/2/2025 8:33 AM  Response team arrival date/time:  3/2/2025 8:33 AM  Response end date/time:  3/2/2025 8:40 AM  [facility]  Rapid response location:  [facility] unit  Primary reason for rapid response call:  Acute change in neuro status    Rapid Response Intervention(s):   Airway:  None  Breathing:  None  Circulation:  None  Fluids administered:  None  Medications administered:  None       Assessment:   RRT for change in mental status  H/o multiple strokes  Today with new dysarthria  Slim noting a change in her speech and reporting that the patient had been started on anticoagulation yesterday    Plan:   Obtain CT head to r/o bleed  Frequent neuroexams     Rapid Response Outcome:   Transfer:  Remain on floor  Code Status: Level 1 (Full Code)      Family notified: Primary team to notify Family Member       Background/Situation:   Jina Norris is a 79 y.o. female who is a rapid response for change in mental status.  Noted to have new dysarthria per nursing and Slim attending.  Patient admitted for multiple strokes and started on anticoagulation yesterday.  Plan to obtain CT head to rule out bleeding.     Review of Systems    Objective:   Vitals:    03/02/25 0700 03/02/25 0834 03/02/25 1058 03/02/25 1100   BP: 160/68 120/90 134/53 134/53   BP Location: Right arm   Right arm   Pulse: 73 83 82 77   Resp: 18   18   Temp: 98.5 °F (36.9 °C)      TempSrc: Oral      SpO2: 95% 97% 97% 95%   Weight:       Height:         Physical Exam

## 2025-03-02 NOTE — PLAN OF CARE
Interval History: NAEON. O2 sats this morning 95% on room air. Patient reports improvement in abdominal pain/cramping and nausea. She has been tolerating clear liquid diet well and would like to try regular diet today. Will continue to monitor respiratory status and consider discharge tomorrow.    Review of Systems  Objective:     Vital Signs (Most Recent):  Temp: 98.3 °F (36.8 °C) (11/28/24 1117)  Pulse: 97 (11/28/24 1117)  Resp: 17 (11/28/24 1117)  BP: 132/80 (11/28/24 1117)  SpO2: 96 % (11/28/24 1117) Vital Signs (24h Range):  Temp:  [98.3 °F (36.8 °C)-99.5 °F (37.5 °C)] 98.3 °F (36.8 °C)  Pulse:  [] 97  Resp:  [17-20] 17  SpO2:  [90 %-96 %] 96 %  BP: (113-153)/(69-85) 132/80     Weight: 68 kg (150 lb)  Body mass index is 25.73 kg/m².    Intake/Output Summary (Last 24 hours) at 11/28/2024 1311  Last data filed at 11/27/2024 2308  Gross per 24 hour   Intake 240 ml   Output 1600 ml   Net -1360 ml         Physical Exam  Constitutional:       Appearance: She is ill-appearing.   HENT:      Mouth/Throat:      Mouth: Mucous membranes are moist.      Pharynx: Oropharynx is clear.   Eyes:      Conjunctiva/sclera: Conjunctivae normal.   Cardiovascular:      Rate and Rhythm: Normal rate and regular rhythm.      Pulses: Normal pulses.      Heart sounds: Normal heart sounds. No murmur heard.  Pulmonary:      Effort: Pulmonary effort is normal.      Breath sounds: Normal breath sounds.   Abdominal:      General: Bowel sounds are normal.      Palpations: Abdomen is soft.      Tenderness: There is abdominal tenderness (epigastric). There is no guarding or rebound.   Musculoskeletal:      Right lower leg: No edema.      Left lower leg: No edema.   Skin:     General: Skin is warm and dry.   Neurological:      Mental Status: She is alert and oriented to person, place, and time.             Significant Labs: All pertinent labs within the past 24 hours have been reviewed.    Significant Imaging: I have reviewed all pertinent  Problem: Neurological Deficit  Goal: Neurological status is stable or improving  Description: Interventions:  - Monitor and assess patient's level of consciousness, motor function, sensory function, and level of assistance needed for ADLs.   - Monitor and report changes from baseline. Collaborate with interdisciplinary team to initiate plan and implement interventions as ordered.   - Provide and maintain a safe environment.  - Consider seizure precautions.  - Consider fall precautions.  - Consider aspiration precautions.  - Consider bleeding precautions.  Outcome: Adequate for Discharge     Problem: Communication Impairment  Goal: Ability to express needs and understand communication  Description: Assess patient's communication skills and ability to understand information.  Patient will demonstrate use of effective communication techniques, alternative methods of communication and understanding even if not able to speak.   - Encourage communication and provide alternate methods of communication as needed.  - Collaborate with case management/ for discharge needs.  - Include patient/family/caregiver in decisions related to communication.  Outcome: Adequate for Discharge     Problem: Activity Intolerance/Impaired Mobility  Goal: Mobility/activity is maintained at optimum level for patient  Description: Interventions:  - Assess and monitor patient  barriers to mobility and need for assistive/adaptive devices.  - Assess patient's emotional response to limitations.  - Collaborate with interdisciplinary team and initiate plans and interventions as ordered.  - Encourage independent activity per ability.  - Maintain proper body alignment.  - Perform active/passive rom as tolerated/ordered.  - Plan activities to conserve energy.  - Turn patient as appropriate  Outcome: Adequate for Discharge    Problem: DISCHARGE PLANNING  Goal: Discharge to home or other facility with appropriate resources  Description:  INTERVENTIONS:  - Identify barriers to discharge w/patient and caregiver  - Arrange for needed discharge resources and transportation as appropriate  - Identify discharge learning needs (meds, wound care, etc.)  - Arrange for interpretive services to assist at discharge as needed  - Refer to Case Management Department for coordinating discharge planning if the patient needs post-hospital services based on physician/advanced practitioner order or complex needs related to functional status, cognitive ability, or social support system  Outcome: Adequate for Discharge      imaging results/findings within the past 24 hours.

## 2025-03-02 NOTE — CASE MANAGEMENT
Case Management Discharge Planning Note    Patient name Jina Norris  Location /-01 MRN [id number]  : 1945 Date 3/2/2025       Current Admission Date: 2025  Current Admission Diagnosis:Stroke-like symptom   Patient Active Problem List    Diagnosis Date Noted Date Diagnosed    Breast lesion 2025     Snoring 2024     Nonrheumatic aortic valve insufficiency 2024     Acute CVA (cerebrovascular accident) ([facility]) 2024     Stroke-like symptom 2024     Stage 3a chronic kidney disease ([facility]) 2023     Hypomagnesemia 2023     Abnormal CT of the abdomen 2023     PAD (peripheral artery disease) ([facility]) 2023     Asymptomatic bilateral carotid artery stenosis 2023     Chronic pain syndrome 2022     Lumbar radiculopathy 2022     Spinal stenosis of lumbar region      Mixed hyperlipidemia 2020     Acquired hypothyroidism 2020     Type 2 diabetes mellitus with stage 3b chronic kidney disease, with long-term current use of insulin ([facility]) 2019     Essential hypertension 2019       LOS (days): 1  Geometric Mean LOS (GMLOS) (days):   Days to GMLOS:     OBJECTIVE:  Risk of Unplanned Readmission Score: 11.68         Current admission status: Inpatient   Preferred Pharmacy:   [id number] - [place], [place] - [address]  [address] [address]  Phone: [phone] Fax: [phone]    [id number] [place], [place] - [address]  [address] [address]  Phone: [phone] Fax: [phone]    [facility] - [place], [place] - [address]  [address] [address]  Phone: [phone] Fax: [phone]    Primary Care Provider: Santiago Melendez MD    Primary Insurance: MEDICARE  Secondary Insurance: COMMERCIAL MISCELLANEOUS    DISCHARGE DETAILS:      Additional Comments: CM made aware pt SO in currently ED. SO with  dementia and had no care giver due to pt being admitted. SO held overnight for discharge home with SO today. Pt cleared for dischagre per PEGGY. CM called [facility] and they are able to transport both pt and SO home today at the same time via Med car. Earliest transport available is 3 PM. Nursing supervisor and pt nurse Kellee rae.

## 2025-03-02 NOTE — QUICK NOTE
Patient had rapid response this morning around 8:30 AM due to change in patient's speech.  Patient initially admitted with stroke noted on MRI.  Patient's initial symptom on admission patient was dysarthria.  Patient was started on Eliquis yesterday.  Reviewed case with neurology who recommended CT head.  CT head negative for acute findings.  Reviewed with neurology after CT was obtained.  No further needs for inpatient workup.  Continue Eliquis as outpatient.  Advised to return with any worsening symptoms.  Patient stable for discharge home    When nurse was attempting to discharge patient around 3 PM today patient developed worsening dysarthria.  Came to evaluate patient.  Patient with no other neurologic findings aside from speech deficits.  Called neurology and discussed case with neurology.  Neurology spoke with patient.  Neurology agrees that patient does have a significant decline in her speech from earlier today.  Will cancel discharge and obtain MRI brain as well as EEG.  Neurology to follow-up tomorrow

## 2025-03-02 NOTE — ASSESSMENT & PLAN NOTE
Home antihypertensives have been resumed  Follow-up with PCP as outpatient for routine BP management

## 2025-03-02 NOTE — QUICK NOTE
This note is an addendum to the progress note from today.    I was contacted by the team as the patient again developed slurring of speech. I spoke to the patient over phone, and it was noticeable that she had developed moderate dysarthria that was not noticeable few hours before when I met her on tele. This is the second event for today (see my progress note for details on the first one). Neither event was associated with drastic change in BP in either direction.    We elected to postpone discharge, repeat MRI brain, and also obtain EEG in case these episodes are epileptic. We elected not to activate stroke alert as this is her second episode today, and she was placed on Eliquis already, which would make her ineligible for TNK. Her NIHSS is only 1 (for moderate dysarthria), so CTA was deferred as well as the risk of kidney injury (she has CKD with Cr of 1.6 following first CTA yesterday) does not overweigh the benefits (she is unlikely to be a candidate for thrombectomy anyway with such low NIHSS).    All of this was discussed with Dr. Ramon in person.

## 2025-03-02 NOTE — ASSESSMENT & PLAN NOTE
HISTORY OF PRESENT ILLNESS:    Tomer Weiss is a 86 year old male who is seen in follow up for   Chief Complaint   Patient presents with     RECHECK     Open Reduction Internal Fixation Left Femur.  DOS: 3/3/18 (5 months postop) - [facility]     Surgical Followup     PREOPERATIVE DIAGNOSIS:  Left supracondylar femur fracture above a total knee arthroplasty.POSTOPERATIVE DIAGNOSIS:  Left supracondylar femur fracture above a total knee arthroplasty. PROCEDURE:  Open reduction and internal fixation, left supracondylar femur fracture above a total knee arthroplasty   Interval: Patient is 5 months status post open reduction internal fixation of the left distal femur/periprosthetic fracture.  Patient reports he still has a noticeable limp.  He states he feels limited strength in his left knee.  He does continue to have swelling of the left knee however this has improved in the past couple weeks.  He does admit he is on his feet a lot.  He has been working on strength of his left leg by leading with his left leg to climb stairs.  Patient reports he has been  for about 3 weeks now.  Patient evaluation done with Dr. Silver    Physical Exam:  Vitals: /86 (BP Location: Left arm, Patient Position: Chair, Cuff Size: Adult Regular)  Pulse 81  Ht 1.829 m (6')  Wt 91.6 kg (202 lb)  BMI 27.4 kg/m2  BMI= Body mass index is 27.4 kg/(m^2).  Constitutional: healthy, alert and no acute distress   Psychiatric: mentation appears normal and affect normal/bright  NEURO: no focal deficits  Location of surgery: Left knee  Swelling is still present today in the left knee and thigh.  Patient with no pain on palpation of the knee or thigh.  Range of motion: Patient is able to obtain full extension and approximately 100  of flexion.  Patient gait is significant with a limp.  It appears patient does have an element of hip involvement in his limp.  Left hip is also evaluated.  Rocking of the legs does not reveal any  Lab Results   Component Value Date    HGBA1C 9.4 (H) 03/01/2025       Recent Labs     03/01/25  1055 03/01/25  1609 03/01/25 2039 03/02/25  0742   POCGLU 205* 197* 222* 178*       Blood Sugar Average: Last 72 hrs:  (P) 165.875    Resume home diabetic regimen on discharge   discomfort in the left hip.  Flexion, internal rotation and external rotation does not cause any discomfort in the hip but does cause discomfort in the knee.  Patient is weak in abduction.  He has no pain at the greater trochanter.    IMAGING INTERPRETATION: Images from June x-rays were reviewed which revealed good healing and alignment of distal femur.  Patient had previous hip x-rays available.  There is some narrowing present of the left hip.  Independent visualization of the images was performed.     ASSESSMENT:    Chief Complaint   Patient presents with     RECHECK     Open Reduction Internal Fixation Left Femur.  DOS: 3/3/18 (5 months postop) - [facility]     Surgical Followup     PREOPERATIVE DIAGNOSIS:  Left supracondylar femur fracture above a total knee arthroplasty.POSTOPERATIVE DIAGNOSIS:  Left supracondylar femur fracture above a total knee arthroplasty. PROCEDURE:  Open reduction and internal fixation, left supracondylar femur fracture above a total knee arthroplasty       ICD-10-CM    1. Closed displaced supracondylar fracture of distal end of left femur without intracondylar extension with routine healing, subsequent encounter S72.452D    2. Status post total left knee replacement Z96.652    3. Periprosthetic fracture around internal prosthetic left knee joint, subsequent encounter M97.12XD      Patient is 5 months status post left distal femur fracture that is periprosthetic in nature.  Patient is still struggling with a considerable limp and swelling.  Swelling does hinder his progress in range of motion.  Believe a big contributing factor to patient's current limp is hip arthritis/weakness.    Plan:   Patient is advised of the above.  Patient to continue to work on range of motion as it is very possible he can obtain greater flexion.  Patient can continue strengthening of the left lower leg with his stair work.  Patient is also asked to add in hip exercises of left hip abduction.  Continue use of  the cane in the right hand as needed  Return to clinic 2 months for follow-up    BP Readings from Last 1 Encounters:   08/14/18 136/86     BP noted to be well controlled today in office.     Kylah Bose PA-C   8/14/2018  3:23 PM      I attest I have seen and evaluated the patient.  I agree with above impression and plan.    Deonte Silver MD

## 2025-03-02 NOTE — DISCHARGE SUMMARY
Discharge Summary - Hospitalist   Name: Jina Norris 79 y.o. female I MRN: [id number]  Unit/Bed#: [facility]-01 I Date of Admission: 2/28/2025   Date of Service: 3/2/2025 I Hospital Day: 1     Assessment & Plan  Stroke-like symptom  MRI brain confirmed acute stroke  Reviewed case with neurology  Aspirin/Plavix switched to Eliquis 5 mg twice daily  Loop recorder interrogation requested.  Patient just had a loop recorder interrogation 10 days ago, no obvious A-fib noted  Continue statin  Neurology recommends CT chest abdomen/pelvis to rule out any obvious malignancy that would lead to hypercoagulable state.  Patient found to have right breast lesion.  States that she has appointment for mammogram April 1  Patient stable for discharge home  Outpatient follow-up with neurology  Ambulatory referral to hematology for hypercoagulable workup  Type 2 diabetes mellitus with stage 3b chronic kidney disease, with long-term current use of insulin ([facility])  Lab Results   Component Value Date    HGBA1C 9.4 (H) 03/01/2025       Recent Labs     03/01/25  1055 03/01/25  1609 03/01/25  2039 03/02/25  0742   POCGLU 205* 197* 222* 178*       Blood Sugar Average: Last 72 hrs:  (P) 165.875    Resume home diabetic regimen on discharge  Essential hypertension  Home antihypertensives have been resumed  Follow-up with PCP as outpatient for routine BP management  Acquired hypothyroidism  Continue levothyroxine  Breast lesion  Patient found to have right breast lesion on CT imaging.  Reviewed finding with patient.  She understands finding and need for follow-up.  Patient states that she has a mammogram scheduled for April 1.  Advised to make sure that she follows up with this mammogram  Follow-up with PCP for further management     Medical Problems       Resolved Problems  Date Reviewed: 11/7/2024   None       Discharging Physician / Practitioner: Osman Ramon MD  PCP: Santiaog Melendez MD  Admission Date:   Admission Orders (From admission,  onward)       Ordered        03/01/25 1437  INPATIENT ADMISSION  Once            02/28/25 1318  Place in Observation  Once                          Discharge Date: 03/02/25    Consultations During Hospital Stay:  Neurology    Procedures Performed:   None    Significant Findings / Test Results:   Acute stroke    Incidental Findings:   Right breast lesion  I reviewed the above mentioned incidental findings with the patient and/or family and they expressed understanding.    Test Results Pending at Discharge (will require follow up):   None     Outpatient Tests Requested:  Routine labs as needed with PCP.  Patient is scheduled for mammogram on April 1    Complications:  none    Reason for Admission: stroke    Hospital Course:   Jina Norris is a 79 y.o. female patient who originally presented to the hospital on 2/28/2025 due to difficulty speaking.  Patient admitted for stroke workup.  MRI brain did show acute stroke and multiple territories.  Reviewed with neurology who recommended interrogation of loop recorder.  Neurology also recommended switching aspirin/Plavix to Eliquis.  Eliquis was initiated.  Patient tolerating.  Requested cardiology to interrogate loop recorder.  Of note patient just had a loop recorder interrogation performed 10 days ago.  Patient has clinically improved.  CT chest/abdomen/pelvis also performed to rule out any signs of malignancy.  Patient noted to have right breast lesion.  Scheduled for mammogram on April 1.  Patient doing well at this time, cleared for discharge home with outpatient follow-up.  Ambulatory referral to neurology.  Ambulatory referral to hematology for hypercoagulable workup.    Please see above list of diagnoses and related plan for additional information.     Condition at Discharge: stable    Discharge Day Visit / Exam:   Subjective: No complaints at this time  Vitals: Blood Pressure: 160/68 (03/02/25 0700)  Pulse: 73 (03/02/25 0700)  Temperature: 98.5 °F (36.9 °C)  "(03/02/25 0700)  Temp Source: Oral (03/02/25 0700)  Respirations: 18 (03/02/25 0700)  Height: 5' 2\" (157.5 cm) (02/28/25 1345)  Weight - Scale: 76.6 kg (168 lb 14 oz) (02/28/25 1345)  SpO2: 95 % (03/02/25 0700)  Physical Exam  Constitutional:       General: She is not in acute distress.  HENT:      Head: Normocephalic and atraumatic.      Nose: Nose normal.      Mouth/Throat:      Mouth: Mucous membranes are moist.   Eyes:      Extraocular Movements: Extraocular movements intact.      Conjunctiva/sclera: Conjunctivae normal.   Cardiovascular:      Rate and Rhythm: Normal rate and regular rhythm.   Pulmonary:      Effort: Pulmonary effort is normal. No respiratory distress.   Abdominal:      Palpations: Abdomen is soft.      Tenderness: There is no abdominal tenderness.   Musculoskeletal:         General: Normal range of motion.      Cervical back: Normal range of motion and neck supple.   Skin:     General: Skin is warm and dry.   Neurological:      Mental Status: She is alert.      Comments: Patient awake and alert.  Moving all 4 extremities.  Answering questions appropriately   Psychiatric:         Mood and Affect: Mood normal.         Behavior: Behavior normal.          Discussion with Family:  Updated patient regarding discharge plan.     Discharge instructions/Information to patient and family:   See after visit summary for information provided to patient and family.      Provisions for Follow-Up Care:  See after visit summary for information related to follow-up care and any pertinent home health orders.      Mobility at time of Discharge:   Basic Mobility Inpatient Raw Score: 18  JH-HLM Goal: 6: Walk 10 steps or more  JH-HLM Achieved: 7: Walk 25 feet or more  HLM Goal achieved. Continue to encourage appropriate mobility.     Disposition:   Home with VNA Services (Reminder: Complete face to face encounter)    Planned Readmission: no    Discharge Medications:  See after visit summary for reconciled discharge " medications provided to patient and/or family.      Administrative Statements   Discharge Statement:  I have spent a total time of 35 minutes in caring for this patient on the day of the visit/encounter. >30 minutes of time was spent on: Counseling / Coordination of care, Documenting in the medical record, Reviewing / ordering tests, medicine, procedures  , and Communicating with other healthcare professionals .    **Please Note: This note may have been constructed using a voice recognition system**

## 2025-03-03 ENCOUNTER — APPOINTMENT (INPATIENT)
Dept: MRI IMAGING | Facility: HOSPITAL | Age: 80
DRG: 066 | End: 2025-03-03
Payer: MEDICARE

## 2025-03-03 PROBLEM — I63.9 STROKE (CEREBRUM) (HCC): Status: ACTIVE | Noted: 2024-02-28

## 2025-03-03 LAB
GLUCOSE SERPL-MCNC: 153 MG/DL (ref 65–140)
GLUCOSE SERPL-MCNC: 208 MG/DL (ref 65–140)
GLUCOSE SERPL-MCNC: 240 MG/DL (ref 65–140)
GLUCOSE SERPL-MCNC: 303 MG/DL (ref 65–140)

## 2025-03-03 PROCEDURE — 99232 SBSQ HOSP IP/OBS MODERATE 35: CPT | Performed by: INTERNAL MEDICINE

## 2025-03-03 PROCEDURE — 99448 NTRPROF PH1/NTRNET/EHR 21-30: CPT

## 2025-03-03 PROCEDURE — 70551 MRI BRAIN STEM W/O DYE: CPT

## 2025-03-03 PROCEDURE — 97164 PT RE-EVAL EST PLAN CARE: CPT

## 2025-03-03 PROCEDURE — 92610 EVALUATE SWALLOWING FUNCTION: CPT

## 2025-03-03 PROCEDURE — 82948 REAGENT STRIP/BLOOD GLUCOSE: CPT

## 2025-03-03 RX ADMIN — INSULIN GLARGINE 18 UNITS: 100 INJECTION, SOLUTION SUBCUTANEOUS at 21:00

## 2025-03-03 RX ADMIN — INSULIN LISPRO 1 UNITS: 100 INJECTION, SOLUTION INTRAVENOUS; SUBCUTANEOUS at 21:00

## 2025-03-03 RX ADMIN — LEVOTHYROXINE SODIUM 50 MCG: 0.05 TABLET ORAL at 05:52

## 2025-03-03 RX ADMIN — INSULIN LISPRO 1 UNITS: 100 INJECTION, SOLUTION INTRAVENOUS; SUBCUTANEOUS at 08:02

## 2025-03-03 RX ADMIN — ATORVASTATIN CALCIUM 40 MG: 40 TABLET, FILM COATED ORAL at 17:26

## 2025-03-03 RX ADMIN — AMLODIPINE BESYLATE 5 MG: 5 TABLET ORAL at 08:02

## 2025-03-03 RX ADMIN — INSULIN LISPRO 4 UNITS: 100 INJECTION, SOLUTION INTRAVENOUS; SUBCUTANEOUS at 12:03

## 2025-03-03 RX ADMIN — APIXABAN 5 MG: 5 TABLET, FILM COATED ORAL at 08:02

## 2025-03-03 RX ADMIN — INSULIN LISPRO 3 UNITS: 100 INJECTION, SOLUTION INTRAVENOUS; SUBCUTANEOUS at 17:26

## 2025-03-03 RX ADMIN — ACETAMINOPHEN 650 MG: 325 TABLET ORAL at 08:14

## 2025-03-03 RX ADMIN — PANTOPRAZOLE SODIUM 40 MG: 40 TABLET, DELAYED RELEASE ORAL at 05:52

## 2025-03-03 RX ADMIN — APIXABAN 5 MG: 5 TABLET, FILM COATED ORAL at 17:26

## 2025-03-03 RX ADMIN — FLUTICASONE PROPIONATE 2 SPRAY: 50 SPRAY, METERED NASAL at 09:48

## 2025-03-03 RX ADMIN — METOPROLOL SUCCINATE 25 MG: 25 TABLET, EXTENDED RELEASE ORAL at 08:02

## 2025-03-03 NOTE — CASE MANAGEMENT
Case Management Discharge Planning Note    Patient name Jina Norris  Location /-01 MRN [id number]  : 1945 Date 3/3/2025       Current Admission Date: 2025  Current Admission Diagnosis:Embolic stroke (HCC)   Patient Active Problem List    Diagnosis Date Noted Date Diagnosed    Breast lesion 2025     Snoring 2024     Nonrheumatic aortic valve insufficiency 2024     Acute CVA (cerebrovascular accident) (HCC) 2024     Embolic stroke (HCC) 2024     Stage 3a chronic kidney disease ([facility]) 2023     Hypomagnesemia 2023     Abnormal CT of the abdomen 2023     PAD (peripheral artery disease) ([facility]) 2023     Asymptomatic bilateral carotid artery stenosis 2023     Chronic pain syndrome 2022     Lumbar radiculopathy 2022     Spinal stenosis of lumbar region      Mixed hyperlipidemia 2020     Acquired hypothyroidism 2020     Type 2 diabetes mellitus with stage 3b chronic kidney disease, with long-term current use of insulin ([facility]) 2019     Essential hypertension 2019       LOS (days): 2  Geometric Mean LOS (GMLOS) (days): 1.9  Days to GMLOS:-0.1     OBJECTIVE:  Risk of Unplanned Readmission Score: 12.02         Current admission status: Inpatient   Preferred Pharmacy:   [id number] [place], [place] - [address]  [address] [address]  Phone: [phone] Fax: [phone]    [id number] - [place], [place] - [address]  [address] [address]  Phone: [phone] Fax: [phone]    [facility] - [address] [address]  Phone: [phone] Fax: [phone]    Primary Care Provider: Santiago Melendez MD    Primary Insurance: MEDICARE  Secondary Insurance: COMMERCIAL MISCELLANEOUS    DISCHARGE DETAILS:    Discharge planning discussed with:: patient  Freedom of Choice:  Yes  Comments - Freedom of Choice: Patient evaluated by PMR and PT. Recommendation has changed to Moderate Intensity (STR). CM met with pt at bedside to discuss same. Pt agreeable to STR. Pt in agreement with blanket referral to acute rehab and SNF. Pt prefers local facility if available. [place] STR referrals sent via [facility]. Pt also updated on status of her DAKOTA Cody who is currently admitted. CM has been working with DAKOTA's family to get SO into an assisted living facility for respite while she is admitted and at STR. Pt in agreement.        Contacts  Patient Contacts: Liu Norris - son - message left to discuss discharge planning  Relationship to Patient:: Family  Contact Method: Phone  Phone Number: [phone]         Treatment Team Recommendation: Short Term Rehab  Discharge Destination Plan:: Short Term Rehab

## 2025-03-03 NOTE — TELEMEDICINE
"e-Consult (IPC) - PMR   Name: Jina Norris 79 y.o. female I MRN: [id number]  Unit/Bed#: -01 I Date of Admission: 2/28/2025   Date of Service: 3/3/2025 I Hospital Day: 2   Inpatient consult to Physical Medicine Rehab  Consult performed by: Roque Philippe MD  Consult ordered by: Osman Ramon MD        Physician Requesting Evaluation: Homero Dinh DO   Reason for Evaluation / Principal Problem: Stroke-like symptoms       Assessment & Plan  Embolic stroke (HCC)  - P/w: Dysarthria, aphasia, facial droop     - Hx of prior strokes   - Imaging:   MRI Brain -  Extension of previously seen right perisylvian infarction, and new punctate acute infarct along the right precentral gyrus.  Stable posterior left frontal lobe infarction. Decreased conspicuity of previously seen right frontal cortical infarct.  Chronic bilateral cerebellar infarctions. Chronic small vessel ischemic changes in the angela.   - Per neuro \"bihemispheric cortical infarcts in acute and subacute stages suggestive of embolic etiology \"  - CTA did not show LVO but did show stable severe stenosis left vertebral artery origin, stable stenosis in the intracranial internal carotid arteries, severe on the right and moderate on the left, stable multifocal stenosis in the left PCA.    - Status and residual impairments:    Gait abnormality, impaired safety awareness, cog impairment, speech abnormality, aphasia   - Med/surgical management:   Eliquis per neuro    - Consider addition of antiplatelet at discretion of neuro given severity of atherosclerotic d/s   Statin   Optimal BP control/DM mgmt   - Neuropsych Med review:    NA     - Consider MOCA cognitive screen to eval for any significant cog deficits and to better assess needs   - Continue PT, OT in acute setting to improve ADLs, mobility, strength, conditioning, and decrease risks of immobility as well as to assist with dispo recommendations   - Decrease risks of complications related to " decreased mobility status and monitor for them during course  - MSK - atrophy, contractures, bone demineralization, CV - DVT/PE, orthostasis, decreased CO, Resp - atelectasis, PNA, GI - constipation, reduced appetite,  - retention, incontinence, Skin - pressure injuries  - Stroke complication risks in mod-severe stroke: HP 50-85%, aphasia 20-40%, cog impairment 20-50%, dysphagia 30-65%, DVT 20-40%, PNA 10-30%, UTI 15-25%, Shoulder pain/sublx 20-50%, depression 30-50%, spasticity 20-30%, pressure ulcers 10-15%, seizures 5-10%, incontinence 15-20%, fatigue 30-70%, falls & fractures 30-50%, shoulder-hand syndrome (CPRS) 12-25%  - If available in acute setting, recommend cognitive therapy with SLP and OT along with SLP/OT in acute rehab setting  - Supportive counseling and updates to family (as appropriate)   - Fall precautions - if needed increase rounding or consider virtual sitter or in-person sitter  - Overall mgmt per primary team currently, recommend optimal mgmt during rehab process as well  - Remains at risk for increased confusion/delirium, restlessness, agitation, and fall - continue to monitor for concerns/changes  - Monitor neuro-exam, wakefulness, mood, cognition, insight into deficits and safety awareness   - Monitor and ensure optimal management electrolytes, nutrition, and hydration  - Monitor for signs or symptoms of infection, medication intolerances, other systemic etiologies  - Additional labs, imaging, specialist follow-up as needed per primary team currently   - Overstimulation precautions, frequent re-orientation, re-direction, re-assurance  - Optimal mood, pain, and sleep management  - If impaired sleep or behavior recommend sleep log and agitation monitoring    - Limit sedating medications when possible  - For routine restlessness, anxiety, irritability focus on non-pharmacologic management      Prior Level of Function and Social history:    Patient lives in 1  with 5 MAIKEL with BF who has  dementia and who she cares for  Independent with ADLs  Independent with ambulation  Patient drives short distances but neighbor otherwise assists with transport    Current Level of Function:    Transfers supervision  Ambulation min assist without AD; supervision with RW 80 ft  Pending assessment by OT    Disposition recommendation:  SNF unless has adequate supervision at home  - She remains fall risk with new functional deficits after acute b/l stroke particularly without adequate supervision.    - Would recommend PT, OT and ST for cog/speech needs   - The patient is too high functionally to qualify for ARC/IRF at this time  - If going home would recommend HH       Type 2 diabetes mellitus with stage 3b chronic kidney disease, with long-term current use of insulin ([facility])  Lab Results   Component Value Date    HGBA1C 9.4 (H) 03/01/2025       Recent Labs     03/02/25  1624 03/02/25  2039 03/03/25  0747 03/03/25  1112   POCGLU 250* 239* 153* 303*       Blood Sugar Average: Last 72 hrs:  (P) 194.4526981284223717    Essential hypertension  - Current management at discretion of primary team   - Ensure optimal management during rehab process  - Monitor vitals with and without activity; monitor for orthostasis  - Monitor hemoglobin, electrolytes, kidney function, hydration status   - Current meds: per other providers    Acquired hypothyroidism  Levothyroxine   Breast lesion  Mgmt per primary    Encounter for skin care  - Increased risk of skin wounds/breakdown/rashes due to recent immobility and co-morbidities   - Turn patient Q2H in bed (use pillows or wedges), encourage wt shifts every 10-15 minutes in chair  - Patient and if available caregiver education   - Hydragaurd (or other appropriate barrier cream) to buttocks and sacrum BID & PRN   - Allevyn foam to heels and/or float heels when in bed   - Optimal bowel/bladder hygiene; keep skin clean and dry   - EHOB waffle cushion to chair/WC when OOB  - Rec nursing to document  in chart and Notify MD if buttock, sacrum, heel, or other skin site develops erythema, skin breakdown, or rashes as soon as possible.  If patient is soiling themselves with urine or stool notify MD.  If you are unable to maintain skin integrity and prevent erythema due to frequency of soiling notify MD as soon as possible as well  - Consult would care for any wounds or significant concerns for skin integrity     VTE prophylaxis   As outlined by primary team      Other medical issues:     As per primary service (and relevant consultants if applicable)    History of Present Illness:   Jina Norris is a 79 y.o. female with PMH of prior strokes, HTN, obesity, hypothyroidism, breast lesion who developed altered speech and facial droop and presented to hospital.  CTH did not show acute stroke.  CTA H/N did not show acute LVO but did show stable severe stenosis left vertebral artery origin, stable stenosis in the intracranial internal carotid arteries, severe on the right and moderate on the left, stable multifocal stenosis in the left PCA.  MRI brain showed bihemispheric cortical infarct in acute and subacute stages suggestive of embolic etiology per neuro.  Neuro recommended AC with Eliquis.  CT C/A/P showed 1 cm R breast mass.         21-30 minutes, >50% of the total time devoted to medical consultative verbal/EMR discussion between providers. Written report will be generated in the EMR.    Thank you for allowing the PM&R service to participate in the care of this patient. We will continue to follow. Please do not hesitate to call with questions or concerns.     Roque Philippe MD, MS  [facility]  Physical Medicine and Rehabilitation  Brain Injury Medicine

## 2025-03-03 NOTE — PHYSICAL THERAPY NOTE
PHYSICAL THERAPY RE-EVALUATION  NAME:  Jina Norris  DATE: 03/03/25    AGE:   79 y.o.  Mrn:   [id number]  ADMIT DX:  Slurred speech [R47.81]  Facial droop [R29.810]  Stroke-like symptoms [R29.90]  Problem List:   Patient Active Problem List   Diagnosis    Type 2 diabetes mellitus with stage 3b chronic kidney disease, with long-term current use of insulin (HCC)    Essential hypertension    Mixed hyperlipidemia    Acquired hypothyroidism    Spinal stenosis of lumbar region    Chronic pain syndrome    Lumbar radiculopathy    PAD (peripheral artery disease) (HCC)    Asymptomatic bilateral carotid artery stenosis    Abnormal CT of the abdomen    Hypomagnesemia    Stage 3a chronic kidney disease (HCC)    Embolic stroke (HCC)    Acute CVA (cerebrovascular accident) ([facility])    Snoring    Nonrheumatic aortic valve insufficiency    Breast lesion       Past Medical History  Past Medical History:   Diagnosis Date    Chronic kidney disease, stage 3 (HCC)     CVA (cerebral vascular accident) ([facility])     s/p medtronic loop recorder 5/13/2024    Diabetes mellitus ([facility])     Disorder of gallbladder     Disorder of skin and subcutaneous tissue     Dyspnea     Essential hypertension     Hypercalcemia 11/19/2023    Hypokalemia     Malaise and fatigue     Mixed hyperlipidemia     Morbid obesity (HCC)     Pernicious anemia        Past Surgical History  Past Surgical History:   Procedure Laterality Date    BREAST BIOPSY Right     CARPAL TUNNEL RELEASE Bilateral     CHOLECYSTECTOMY      COLONOSCOPY      Dr. Apple     HYSTERECTOMY      LUMBAR EPIDURAL INJECTION      x3    PARTIAL HYSTERECTOMY      SKIN LESION EXCISION      scalp        Length Of Stay: 2  Performed at least 2 patient identifiers during session: Name and ID bracelet       03/03/25 1031   PT Last Visit   PT Visit Date 03/03/25   Note Type   Note type Re-Evaluation  (pt had extension of previous CVA and new acute infaract of the R  precentral areasonce previous evaluation)   Pain  02-Feb-2024 16:33 13-Feb-2024 06:15 Assessment   Pain Assessment Tool 0-10   Pain Score No Pain   Restrictions/Precautions   Weight Bearing Precautions Per Order No   Other Precautions Chair Alarm;Bed Alarm;Fall Risk   Prior Function   Comments no change in home living environment   General   Family/Caregiver Present No   Cognition   Overall Cognitive Status Impaired   Arousal/Participation Responsive   Orientation Level Oriented to person;Oriented to place;Disoriented to situation;Oriented to time   Following Commands Follows one step commands without difficulty   Comments slurred speech noted   RLE Assessment   RLE Assessment WFL   LLE Assessment   LLE Assessment WFL   Vision-Basic Assessment   Current Vision Wears glasses all the time   Coordination   Sensation WFL   Bed Mobility   Supine to Sit 5  Supervision   Additional items HOB elevated;Impulsive   Additional Comments pt denied dizziness with transitional movement   Transfers   Sit to Stand   (CGA)   Additional items Assist x 1;Verbal cues;Increased time required   Stand to Sit   (CGA)   Additional items Assist x 1;Verbal cues;Increased time required   Additional Comments pt required cues for safety awareness; impulsivity noted   Ambulation/Elevation   Gait pattern Improper Weight shift;Decreased foot clearance;Inconsistent radha   Gait Assistance 4  Minimal assist   Additional items Assist x 1;Verbal cues   Assistive Device Rolling walker   Distance 80 ft   Ambulation/Elevation Additional Comments poor safety awareness noted  (pt had LOB x 2 with ambulation)   Balance   Static Sitting Good   Dynamic Sitting Fair +   Static Standing Fair -   Dynamic Standing Poor +   Ambulatory Poor +   Endurance Deficit   Endurance Deficit Yes   Endurance Deficit Description pt reported fatigue with activity   Activity Tolerance   Activity Tolerance Patient limited by fatigue   Assessment   Prognosis Fair   Assessment Pt is 79 y.o. female seen for PT re-evaluation s/p admit to [facility] on 2/28/2025  w/ Embolic stroke (HCC). PT consulted to assess pt's functional mobility and d/c needs.  Pt agreeable to PT  session upon arrival, pt found supine in bed.    Pt to benefit from continued PT tx to address deficits and maximize level of functional independent mobility and consistency. Upon conclusion pt  seated in recliner.  The patient's AM-PAC Basic Mobility Inpatient Short Form Raw Score is 19.  Based on patient presentations and impairments, pt would most appropriately benefit from Level 2 resource intensity upon discharge. A Raw score of greater than 16 suggests the patient may benefit from discharge to home. Please also refer to the recommendation of the Physical Therapist for safe discharge planning. RN verbalized pt appropriate for PT session.   Barriers to Discharge Inaccessible home environment;Decreased caregiver support   Goals   Patient Goals to get washed up   LTG Expiration Date 03/11/25   Long Term Goal #1 goals remain appropriate   Plan   Treatment/Interventions Functional transfer training;Therapeutic exercise;Endurance training;Gait training;Bed mobility;Equipment eval/education   PT Frequency 3-5x/wk   Discharge Recommendation   Rehab Resource Intensity Level, PT II (Moderate Resource Intensity)   Equipment Recommended Walker   Walker Package Recommended Wheeled walker   AM-[facility] Basic Mobility Inpatient   Turning in Flat Bed Without Bedrails 4   Lying on Back to Sitting on Edge of Flat Bed Without Bedrails 4   Moving Bed to Chair 3   Standing Up From Chair Using Arms 3   Walk in Room 3   Climb 3-5 Stairs With Railing 2   Basic Mobility Inpatient Raw Score 19   Basic Mobility Standardized Score 42.48   [facility] Highest Level Of Mobility   -HLM Goal 6: Walk 10 steps or more   JH-HLM Achieved 7: Walk 25 feet or more         Time In: 1006  Time Out: 1031  Total RE-Evaluation Minutes: 25    Katt Saldana, PT     31-Jan-2024 13:27

## 2025-03-03 NOTE — PLAN OF CARE
Problem: PHYSICAL THERAPY ADULT  Goal: Performs mobility at highest level of function for planned discharge setting.  See evaluation for individualized goals.  Description: Treatment/Interventions: Functional transfer training, Therapeutic exercise, Endurance training, Gait training, Bed mobility, Equipment eval/education  Equipment Recommended: Walker       See flowsheet documentation for full assessment, interventions and recommendations.  Outcome: Progressing  Note: Prognosis: Fair     Assessment: Pt is 79 y.o. female seen for PT re-evaluation s/p admit to [facility] on 2/28/2025 w/ Embolic stroke (HCC). PT consulted to assess pt's functional mobility and d/c needs.  Pt agreeable to PT  session upon arrival, pt found supine in bed.    Pt to benefit from continued PT tx to address deficits and maximize level of functional independent mobility and consistency. Upon conclusion pt  seated in recliner.  The patient's AM-PAC Basic Mobility Inpatient Short Form Raw Score is 19.  Based on patient presentations and impairments, pt would most appropriately benefit from Level 2 resource intensity upon discharge. A Raw score of greater than 16 suggests the patient may benefit from discharge to home. Please also refer to the recommendation of the Physical Therapist for safe discharge planning. RN verbalized pt appropriate for PT session.  Barriers to Discharge: Inaccessible home environment, Decreased caregiver support     Rehab Resource Intensity Level, PT: II (Moderate Resource Intensity)    See flowsheet documentation for full assessment.

## 2025-03-03 NOTE — NURSING NOTE
This nurse was preparing to discharge Pt when she developed worsening dysarthria. See flowsheet for full neuro assessment. VSS. SLIM provider called to bedside to evaluate. Discharge canceled for today.      Case management aware that transport was cancelled for today and that Pt reported she doesn't have her key and is unsure if her home is unlocked.     RN expressed concern to  about current discharge plan. This RN is concerned about Pt's ability to care for herself and significant other.

## 2025-03-03 NOTE — ASSESSMENT & PLAN NOTE
"- P/w: Dysarthria, aphasia, facial droop     - Hx of prior strokes   - Imaging:   MRI Brain -  Extension of previously seen right perisylvian infarction, and new punctate acute infarct along the right precentral gyrus.  Stable posterior left frontal lobe infarction. Decreased conspicuity of previously seen right frontal cortical infarct.  Chronic bilateral cerebellar infarctions. Chronic small vessel ischemic changes in the angela.   - Per neuro \"bihemispheric cortical infarcts in acute and subacute stages suggestive of embolic etiology \"  - CTA did not show LVO but did show stable severe stenosis left vertebral artery origin, stable stenosis in the intracranial internal carotid arteries, severe on the right and moderate on the left, stable multifocal stenosis in the left PCA.    - Status and residual impairments:    Gait abnormality, impaired safety awareness, cog impairment, speech abnormality, aphasia   - Med/surgical management:   Eliquis per neuro    - Consider addition of antiplatelet at discretion of neuro given severity of atherosclerotic d/s   Statin   Optimal BP control/DM mgmt   - Neuropsych Med review:    NA     - Consider MOCA cognitive screen to eval for any significant cog deficits and to better assess needs   - Continue PT, OT in acute setting to improve ADLs, mobility, strength, conditioning, and decrease risks of immobility as well as to assist with dispo recommendations   - Decrease risks of complications related to decreased mobility status and monitor for them during course  - MSK - atrophy, contractures, bone demineralization, CV - DVT/PE, orthostasis, decreased CO, Resp - atelectasis, PNA, GI - constipation, reduced appetite,  - retention, incontinence, Skin - pressure injuries  - Stroke complication risks in mod-severe stroke: HP 50-85%, aphasia 20-40%, cog impairment 20-50%, dysphagia 30-65%, DVT 20-40%, PNA 10-30%, UTI 15-25%, Shoulder pain/sublx 20-50%, depression 30-50%, spasticity " 20-30%, pressure ulcers 10-15%, seizures 5-10%, incontinence 15-20%, fatigue 30-70%, falls & fractures 30-50%, shoulder-hand syndrome (CPRS) 12-25%  - If available in acute setting, recommend cognitive therapy with SLP and OT along with SLP/OT in acute rehab setting  - Supportive counseling and updates to family (as appropriate)   - Fall precautions - if needed increase rounding or consider virtual sitter or in-person sitter  - Overall mgmt per primary team currently, recommend optimal mgmt during rehab process as well  - Remains at risk for increased confusion/delirium, restlessness, agitation, and fall - continue to monitor for concerns/changes  - Monitor neuro-exam, wakefulness, mood, cognition, insight into deficits and safety awareness   - Monitor and ensure optimal management electrolytes, nutrition, and hydration  - Monitor for signs or symptoms of infection, medication intolerances, other systemic etiologies  - Additional labs, imaging, specialist follow-up as needed per primary team currently   - Overstimulation precautions, frequent re-orientation, re-direction, re-assurance  - Optimal mood, pain, and sleep management  - If impaired sleep or behavior recommend sleep log and agitation monitoring    - Limit sedating medications when possible  - For routine restlessness, anxiety, irritability focus on non-pharmacologic management      Prior Level of Function and Social history:    Patient lives in 1  with 5 MAIKEL with BF who has dementia and who she cares for  Independent with ADLs  Independent with ambulation  Patient drives short distances but neighbor otherwise assists with transport    Current Level of Function:    Transfers supervision  Ambulation min assist without AD; supervision with RW 80 ft  Pending assessment by OT    Disposition recommendation:  SNF unless has adequate supervision at home  - She remains fall risk with new functional deficits after acute b/l stroke particularly without adequate  supervision.    - Would recommend PT, OT and ST for cog/speech needs   - The patient is too high functionally to qualify for ARC/IRF at this time  - If going home would recommend HH

## 2025-03-03 NOTE — ASSESSMENT & PLAN NOTE
Lab Results   Component Value Date    HGBA1C 9.4 (H) 03/01/2025       Recent Labs     03/02/25  1624 03/02/25 2039 03/03/25  0747 03/03/25  1112   POCGLU 250* 239* 153* 303*       Blood Sugar Average: Last 72 hrs:  (P) [id number]

## 2025-03-03 NOTE — NUTRITION
03/03/25 1439   Biochemical Data,Medical Tests, and Procedures   Biochemical Data/Medical Tests/Procedures Lab values reviewed;Meds reviewed   Labs (Comment) 3/3/2025 glucose 303, creatinine 1.6   Meds (Comment) Norvasc, Eliquis, atorvastatin, insulin, levothyroxine, Toprol, Protonix   Nutrition-Focused Physical Exam   Nutrition-Focused Physical Exam Findings RN skin assessment reviewed;No edema documented;No skin issues documented   Nutrition-Focused Physical Exam Findings Appears adequately nourished   Medical-Related Concerns type 2 diabetes, hypertension, mixed hyperlipidemia, stage III CKD   Adequacy of Intake   Nutrition Modality PO   Feeding Route   PO Independent   Current PO Intake   Current Diet Order CCD 2 diet, thin liquids   Current Meal Intake %   Estimated calorie intake compared to estimated need Nutrient needs are met   PES Statement   Problem Behavioral-Environmental   Knowledge and Beliefs (1) Food- and nutrition-related knowledge deficit NB-1.1   Related to Other (Comment)  (Cardiac pattern of eating for stroke prevention)   As evidenced by: Per patient/family interview   Recommendations/Interventions   Malnutrition/BMI Present No   Summary Consult-stroke; ST consulted; hemoglobin A1c 9.4.  Presents with left-sided facial droop.  Stroke pathway - positive for stroke.  Past medical history significant for type 2 diabetes, hypertension, mixed hyperlipidemia, stage III CKD.  Weight history reviewed.  No significant changes.  No edema.  No pressure areas.  Prescribed a CCD 2 diet, thin liquids.  Meal completion %.  Nutrient needs are met.  Patient reports having a good appetite.  Usually has 2.5 meals daily.  Avoids bread.  Enjoys salad and fruit often.  NKFA.  Denies dysphagia.  Patient cooks and grocery shops.  Does not use salt.  Reports weight is stable at 165#.  Describes intentional weight loss.  Wears a continuous glucose monitor and takes insulin as prescribed.  RD discussed  "diet as prescribed. RD provided and discussed \"Stroke Nutrition Therapy\" handout. Advised seeking medical attention if sudden onset of dysphagia occurs. Encouraged low sodium, low fat, low dietary cholesterol choices. Discussed foods to choose and foods to avoid. Pt verbalizes understanding. RD to follow and address education needs PRN.  Recommend adding cardiac diet restriction.   Interventions/Recommendations Adjust diet order;Monitor I & O's   Education Assessment   Education Education initiated/ completed   Education Notes RD provided and discussed \"Stroke Nutrition Therapy\" handout. Advised seeking medical attention if sudden onset of dysphagia occurs. Encouraged low sodium, low fat, low dietary cholesterol choices. Discussed foods to choose and foods to avoid. Pt verbalizes understanding. RD to follow and address education needs PRN.   Patient Nutrition Goals   Goal Comprehend education;Improve to healthful diet;Adequate hydration;Adequate intake   Goal Status Initiated   Timeframe to complete goal by next f/u   Nutrition Complexity Risk   Nutrition complexity level Low risk   Follow up date 03/10/25       "

## 2025-03-03 NOTE — ASSESSMENT & PLAN NOTE
MRI brain confirmed acute stroke  Repeat MRI brain with extension of previously seen right perisylvian infarction and new punctate acute infarcts along the right precentral gyrus  Aspirin/Plavix switched to Eliquis 5 mg twice daily  Patient just had a loop recorder interrogation 10 days ago, no obvious A-fib noted  Continue statin therapy  Neurology recommends CT chest abdomen/pelvis to rule out any obvious malignancy that would lead to hypercoagulable state ; no evidence of malignancy or metastatic disease in the abdomen or pelvis  Patient with 1.0 cm right upper outer quadrant breast mass ; patient should undergo mammography in the outpatient setting  Patient found to have right breast lesion  States that she has appointment for mammogram April 1  Outpatient follow-up with Neurology  Ambulatory referral to hematology for hypercoagulable workup  PT/OT evaluation and treatment

## 2025-03-03 NOTE — PLAN OF CARE
Problem: Neurological Deficit  Goal: Neurological status is stable or improving  Description: Interventions:  - Monitor and assess patient's level of consciousness, motor function, sensory function, and level of assistance needed for ADLs.   - Monitor and report changes from baseline. Collaborate with interdisciplinary team to initiate plan and implement interventions as ordered.   - Provide and maintain a safe environment.  - Consider seizure precautions.  - Consider fall precautions.  - Consider aspiration precautions.  - Consider bleeding precautions.  Outcome: Progressing     Problem: Activity Intolerance/Impaired Mobility  Goal: Mobility/activity is maintained at optimum level for patient  Description: Interventions:  - Assess and monitor patient  barriers to mobility and need for assistive/adaptive devices.  - Assess patient's emotional response to limitations.  - Collaborate with interdisciplinary team and initiate plans and interventions as ordered.  - Encourage independent activity per ability.  - Maintain proper body alignment.  - Perform active/passive rom as tolerated/ordered.  - Plan activities to conserve energy.  - Turn patient as appropriate  Outcome: Progressing     Problem: Communication Impairment  Goal: Ability to express needs and understand communication  Description: Assess patient's communication skills and ability to understand information.  Patient will demonstrate use of effective communication techniques, alternative methods of communication and understanding even if not able to speak.     - Encourage communication and provide alternate methods of communication as needed.  - Collaborate with case management/ for discharge needs.  - Include patient/family/caregiver in decisions related to communication.  Outcome: Progressing     Problem: Potential for Aspiration  Goal: Non-ventilated patient's risk of aspiration is minimized  Description: Assess and monitor vital signs,  respiratory status, and labs (WBC).  Monitor for signs of aspiration (tachypnea, cough, rales, wheezing, cyanosis, fever).    - Assess and monitor patient's ability to swallow.  - Place patient up in chair to eat if possible.  - HOB up at 90 degrees to eat if unable to get patient up into chair.  - Supervise patient during oral intake.   - Instruct patient/ family to take small bites.  - Instruct patient/ family to take small single sips when taking liquids.  - Follow patient-specific strategies generated by speech pathologist.  Outcome: Progressing  Goal: Ventilated patient's risk of aspiration is minimized  Description: Assess and monitor vital signs, respiratory status, airway cuff pressure, and labs (WBC).  Monitor for signs of aspiration (tachypnea, cough, rales, wheezing, cyanosis, fever).    - Elevate head of bed 30 degrees if patient has tube feeding.  - Monitor tube feeding.  Outcome: Progressing     Problem: Nutrition  Goal: Nutrition/Hydration status is improving  Description: Monitor and assess patient's nutrition/hydration status for malnutrition (ex- brittle hair, bruises, dry skin, pale skin and conjunctiva, muscle wasting, smooth red tongue, and disorientation). Collaborate with interdisciplinary team and initiate plan and interventions as ordered.  Monitor patient's weight and dietary intake as ordered or per policy. Utilize nutrition screening tool and intervene per policy. Determine patient's food preferences and provide high-protein, high-caloric foods as appropriate.     - Assist patient with eating.  - Allow adequate time for meals.  - Encourage patient to take dietary supplement as ordered.  - Collaborate with clinical nutritionist.  - Include patient/family/caregiver in decisions related to nutrition.  Outcome: Progressing     Problem: PAIN - ADULT  Goal: Verbalizes/displays adequate comfort level or baseline comfort level  Description: Interventions:  - Encourage patient to monitor pain and  request assistance  - Assess pain using appropriate pain scale  - Administer analgesics based on type and severity of pain and evaluate response  - Implement non-pharmacological measures as appropriate and evaluate response  - Consider cultural and social influences on pain and pain management  - Notify physician/advanced practitioner if interventions unsuccessful or patient reports new pain  Outcome: Progressing     Problem: INFECTION - ADULT  Goal: Absence or prevention of progression during hospitalization  Description: INTERVENTIONS:  - Assess and monitor for signs and symptoms of infection  - Monitor lab/diagnostic results  - Monitor all insertion sites, i.e. indwelling lines, tubes, and drains  - Monitor endotracheal if appropriate and nasal secretions for changes in amount and color  - [place] appropriate cooling/warming therapies per order  - Administer medications as ordered  - Instruct and encourage patient and family to use good hand hygiene technique  - Identify and instruct in appropriate isolation precautions for identified infection/condition  Outcome: Progressing  Goal: Absence of fever/infection during neutropenic period  Description: INTERVENTIONS:  - Monitor WBC    Outcome: Progressing     Problem: SAFETY ADULT  Goal: Patient will remain free of falls  Description: INTERVENTIONS:  - Educate patient/family on patient safety including physical limitations  - Instruct patient to call for assistance with activity   - Consult OT/PT to assist with strengthening/mobility   - Keep Call bell within reach  - Keep bed low and locked with side rails adjusted as appropriate  - Keep care items and personal belongings within reach  - Initiate and maintain comfort rounds  - Make Fall Risk Sign visible to staff  - Offer Toileting every 2 Hours, in advance of need  - Initiate/Maintain bed alarm  - Obtain necessary fall risk management equipment: nonslip socks  - Apply yellow socks and bracelet for high fall risk  patients  - Consider moving patient to room near nurses station  Outcome: Progressing  Goal: Maintain or return to baseline ADL function  Description: INTERVENTIONS:  -  Assess patient's ability to carry out ADLs; assess patient's baseline for ADL function and identify physical deficits which impact ability to perform ADLs (bathing, care of mouth/teeth, toileting, grooming, dressing, etc.)  - Assess/evaluate cause of self-care deficits   - Assess range of motion  - Assess patient's mobility; develop plan if impaired  - Assess patient's need for assistive devices and provide as appropriate  - Encourage maximum independence but intervene and supervise when necessary  - Involve family in performance of ADLs  - Assess for home care needs following discharge   - Consider OT consult to assist with ADL evaluation and planning for discharge  - Provide patient education as appropriate  Outcome: Progressing  Goal: Maintains/Returns to pre admission functional level  Description: INTERVENTIONS:  - Perform AM-PAC 6 Click Basic Mobility/ Daily Activity assessment daily.  - Set and communicate daily mobility goal to care team and patient/family/caregiver.   - Collaborate with rehabilitation services on mobility goals if consulted  - Perform Range of Motion 3 times a day.  - Reposition patient every 2 hours.  - Dangle patient 3 times a day  - Stand patient 3 times a day  - Ambulate patient 3 times a day  - Out of bed to chair 3 times a day   - Out of bed for meals 3 times a day  - Out of bed for toileting  - Record patient progress and toleration of activity level   Outcome: Progressing     Problem: DISCHARGE PLANNING  Goal: Discharge to home or other facility with appropriate resources  Description: INTERVENTIONS:  - Identify barriers to discharge w/patient and caregiver  - Arrange for needed discharge resources and transportation as appropriate  - Identify discharge learning needs (meds, wound care, etc.)  - Arrange for  interpretive services to assist at discharge as needed  - Refer to Case Management Department for coordinating discharge planning if the patient needs post-hospital services based on physician/advanced practitioner order or complex needs related to functional status, cognitive ability, or social support system  Outcome: Progressing     Problem: Knowledge Deficit  Goal: Patient/family/caregiver demonstrates understanding of disease process, treatment plan, medications, and discharge instructions  Description: Complete learning assessment and assess knowledge base.  Interventions:  - Provide teaching at level of understanding  - Provide teaching via preferred learning methods  Outcome: Progressing     Problem: NEUROSENSORY - ADULT  Goal: Achieves stable or improved neurological status  Description: INTERVENTIONS  - Monitor and report changes in neurological status  - Monitor vital signs such as temperature, blood pressure, glucose, and any other labs ordered   - Initiate measures to prevent increased intracranial pressure  - Monitor for seizure activity and implement precautions if appropriate      Outcome: Progressing  Goal: Remains free of injury related to seizures activity  Description: INTERVENTIONS  - Maintain airway, patient safety  and administer oxygen as ordered  - Monitor patient for seizure activity, document and report duration and description of seizure to physician/advanced practitioner  - If seizure occurs,  ensure patient safety during seizure  - Reorient patient post seizure  - Seizure pads on all 4 side rails  - Instruct patient/family to notify RN of any seizure activity including if an aura is experienced  - Instruct patient/family to call for assistance with activity based on nursing assessment  - Administer anti-seizure medications if ordered    Outcome: Progressing  Goal: Achieves maximal functionality and self care  Description: INTERVENTIONS  - Monitor swallowing and airway patency with  patient fatigue and changes in neurological status  - Encourage and assist patient to increase activity and self care.   - Encourage visually impaired, hearing impaired and aphasic patients to use assistive/communication devices  Outcome: Progressing     Problem: METABOLIC, FLUID AND ELECTROLYTES - ADULT  Goal: Glucose maintained within target range  Description: INTERVENTIONS:  - Monitor Blood Glucose as ordered  - Assess for signs and symptoms of hyperglycemia and hypoglycemia  - Administer ordered medications to maintain glucose within target range  - Assess nutritional intake and initiate nutrition service referral as needed  Outcome: Progressing

## 2025-03-03 NOTE — ASSESSMENT & PLAN NOTE
Lab Results   Component Value Date    HGBA1C 9.4 (H) 03/01/2025       Recent Labs     03/02/25  1059 03/02/25  1624 03/02/25 2039 03/03/25  0747   POCGLU 256* 250* 239* 153*       Blood Sugar Average: Last 72 hrs:  (P) [id number]  Resume home diabetic regimen on discharge

## 2025-03-03 NOTE — SPEECH THERAPY NOTE
Speech Language/Pathology  Speech/Language Pathology  Assessment    Patient Name: Jina Norris  Today's Date: 3/3/2025     Problem List  Principal Problem:    Embolic stroke (HCC)  Active Problems:    Type 2 diabetes mellitus with stage 3b chronic kidney disease, with long-term current use of insulin (HCC)    Essential hypertension    Acquired hypothyroidism    Breast lesion    Past Medical History  Past Medical History:   Diagnosis Date    Chronic kidney disease, stage 3 (HCC)     CVA (cerebral vascular accident) (HCC)     s/p medtronic loop recorder 5/13/2024    Diabetes mellitus (HCC)     Disorder of gallbladder     Disorder of skin and subcutaneous tissue     Dyspnea     Essential hypertension     Hypercalcemia 11/19/2023    Hypokalemia     Malaise and fatigue     Mixed hyperlipidemia     Morbid obesity (HCC)     Pernicious anemia      Past Surgical History  Past Surgical History:   Procedure Laterality Date    BREAST BIOPSY Right     CARPAL TUNNEL RELEASE Bilateral     CHOLECYSTECTOMY      COLONOSCOPY      Dr. Apple     HYSTERECTOMY      LUMBAR EPIDURAL INJECTION      x3    PARTIAL HYSTERECTOMY      SKIN LESION EXCISION      scalp       Bedside Swallow Evaluation:    Summary:  Pt presented w/ mild oral dysphagia and pharyngeal phase of swallow WNL. Positioned upright and alert. Pt oral mech exam WNL. Voice mildly hoarse, pt reported sore throat. Not slurred speech present at bedside. Pt fed herself soft solids and thin liquids via straw with single/successive sips. Mastication was min prolonged however function for current diet level. Bolus control, formation, and transfer were WNL. Swallows appeared prompt. No overt s/s of aspiration. Min intake overall. Denied change in appetite. Pt known to this ST from prior admit does have hx of min increased difficulties with short term memory. She is oriented to place, time, and appeared disoriented to situation as pt initially attempting to get out of bed currently  fall risk precautions. ST reviewed safe swallow strategies and diet recommendations as listed below, pt understood.  ST lang/cog eval to follow as appropriate.     Recommendations:  Diet: Regular   Liquid:thin   Meds:whole with thin liquids   Supervision:distant   Positioning:Upright  Strategies: slow rate, alternate liquids with solids, swallow prior to additional po   Pt to take PO/Meds only when fully alert and upright.   Oral care:  Aspiration precautions  Reflux precautions  Therapy Prognosis:fair   Prognosis considerations:age, medical status   Frequency:1-3 times weekly, f/u with meal     Consider consult w/:  Rehab  Nutrition    Goal(s):  Dysphagia LTG  -Patient will demonstrate safe and effective oral intake (without overt s/s significant oral/pharyngeal dysphagia including s/s penetration or aspiration) for the highest appropriate diet level.     1.Pt will tolerate least restrictive diet w/out s/s aspiration or oral/pharyngeal difficulties.   2.Pt will will effectively manipulate/masticate and transfer purees/solids w/out s/s dysphagia/aspiration.   3.Pt will tolerate thin liquids w/out s/s aspiration.   -If indicated, patient will comply with a Video/Modified Barium Swallow study for more complete assessment of swallowing anatomy/physiology/aspiration risk and to assess efficacy of treatment techniques so as to best guide treatment plan     H&P/Admit info/ pertinent provider notes: (PMH noted above)  Jina Norris is a 79 y.o. female with a PMH of CKD stage III, history of CVA, hypertension who presents with dysarthria.  Patient states that she woke up this morning and was having difficulty speaking.  Patient denies any weakness.  No double vision.  No dizziness or lightheadedness.  No chest pain or shortness of breath.  Patient went to bed in her usual state of health.  In the ER stroke alert was called.  Was not a TNK candidate due to rapid improvement in symptoms    Special Studies:  MRI BRAIN WITHOUT  "CONTRAST 03/03/2025  IMPRESSION:  1.  Extension of previously seen right perisylvian infarction, and new punctate acute infarct along the right precentral gyrus.  2.  Stable posterior left frontal lobe infarction. Decreased conspicuity of previously seen right frontal cortical infarct.  3.  No acute hemorrhage or mass effect.  4.  Chronic microangiopathic changes and chronic infarctions as above.  CT BRAIN - WITHOUT CONTRAST 03/02/02025  IMPRESSION:  No acute intracranial hemorrhage or evidence of new, acute large vessel territorial infarct.  Developing hypodensity right frontal operculum (best seen on sagittal imaging), likely reflecting evolution of recently described infarct on MRI. Additional foci of infarct described on MRI are not well visualized on this exam.  Chronic microangiopathic changes redemonstrated.  CT CHEST, ABDOMEN AND PELVIS WITHOUT IV CONTRAST 03/01/2025  IMPRESSION:  A 1.0 cm right upper outer quadrant breast mass. Recommend further evaluation with diagnostic mammography.  No evidence of occult malignancy or metastatic disease in the abdomen and pelvi    Code Status : level 1 full code     Previous MBS:none     Patient's goal: \" you have a good rest of your day\"    Did the pt report pain? no  If yes, was nursing notified/was it addressed? N/a    Reason for consult:  R/o aspiration  Determine safest and least restrictive diet  New neuro event  Stroke protocol    Precautions:  Fall    Food Allergies: No known    Current Diet: Regular and thin    Premorbid diet: Regular and thin    O2 requirement: Room air    Social/Prior living Lives with spouse    Voice/Speech: WNL   Follows commands: Basic    Cognitive status: Alert oriented to time/place      Oral [facility] exam:  Dentition:Natural   Lips (VII):WNL  Tongue (XII):midline   Mandible (V):adequate ROM   Face/oral sensation (V):WNL  Velum (X):WNL    Items administered:  Soft solids and thin liquids via straw with single/successive sips     Oral " stage:  Lip closure:WNL  Mastication:min prolonged   Bolus formation:WNL  Bolus control:WNL  Transfer:WNL    Pharyngeal stage:  Swallow promptness: prompt   Laryngeal rise::adequate   No overt s/s aspiration    Esophageal stage:  No s/s reported  H/o GERD    Results d/w:  Pt, nursing,

## 2025-03-03 NOTE — CASE MANAGEMENT
Case Management Discharge Planning Note    Patient name Jina Norris  Location /-01 MRN [id number]  : 1945 Date 3/3/2025       Current Admission Date: 2025  Current Admission Diagnosis:Stroke (cerebrum) (Formerly [facility])   Patient Active Problem List    Diagnosis Date Noted Date Diagnosed    Breast lesion 2025     Snoring 2024     Nonrheumatic aortic valve insufficiency 2024     Acute CVA (cerebrovascular accident) ([facility]) 2024     Stroke (cerebrum) ([facility]ly [facility]) 2024     Stage 3a chronic kidney disease ([facility]) 2023     Hypomagnesemia 2023     Abnormal CT of the abdomen 2023     PAD (peripheral artery disease) ([facility]) 2023     Asymptomatic bilateral carotid artery stenosis 2023     Chronic pain syndrome 2022     Lumbar radiculopathy 2022     Spinal stenosis of lumbar region      Mixed hyperlipidemia 2020     Acquired hypothyroidism 2020     Type 2 diabetes mellitus with stage 3b chronic kidney disease, with long-term current use of insulin ([facility]) 2019     Essential hypertension 2019       LOS (days): 2  Geometric Mean LOS (GMLOS) (days): 1.9  Days to GMLOS:-0.1     OBJECTIVE:  Risk of Unplanned Readmission Score: 12.02         Current admission status: Inpatient   Preferred Pharmacy:   [id number] [place], [place] - [address]  [address] [address]  Phone: [phone] Fax: [phone]    [id number] [place], [place] - [address]  [address] [address]  Phone: [phone] Fax: [phone]    [facility] - [place], [place] - [address]  [address] [address]  Phone: [phone] Fax: [phone]    Primary Care Provider: Santiago Melendez MD    Primary Insurance: MEDICARE  Secondary Insurance: COMMERCIAL MISCELLANEOUS    DISCHARGE DETAILS:    Comments - Freedom of Choice: Per nusing when pt was  about to be discharged she developed worsening dysarthria and discharge was cancelled. CM to follow up in AM on 3/3 to re-assess discharge plan.

## 2025-03-03 NOTE — PLAN OF CARE
Problem: Neurological Deficit  Goal: Neurological status is stable or improving  Description: Interventions:  - Monitor and assess patient's level of consciousness, motor function, sensory function, and level of assistance needed for ADLs.   - Monitor and report changes from baseline. Collaborate with interdisciplinary team to initiate plan and implement interventions as ordered.   - Provide and maintain a safe environment.  - Consider seizure precautions.  - Consider fall precautions.  - Consider aspiration precautions.  - Consider bleeding precautions.  Outcome: Progressing     Problem: Activity Intolerance/Impaired Mobility  Goal: Mobility/activity is maintained at optimum level for patient  Description: Interventions:  - Assess and monitor patient  barriers to mobility and need for assistive/adaptive devices.  - Assess patient's emotional response to limitations.  - Collaborate with interdisciplinary team and initiate plans and interventions as ordered.  - Encourage independent activity per ability.  - Maintain proper body alignment.  - Perform active/passive rom as tolerated/ordered.  - Plan activities to conserve energy.  - Turn patient as appropriate  Outcome: Progressing     Problem: NEUROSENSORY - ADULT  Goal: Achieves maximal functionality and self care  Description: INTERVENTIONS  - Monitor swallowing and airway patency with patient fatigue and changes in neurological status  - Encourage and assist patient to increase activity and self care.   - Encourage visually impaired, hearing impaired and aphasic patients to use assistive/communication devices  Outcome: Progressing

## 2025-03-03 NOTE — PROGRESS NOTES
Progress Note - Hospitalist   Name: Jina Norris 79 y.o. female I MRN: [id number]  Unit/Bed#: [facility] I Date of Admission: 2/28/2025   Date of Service: 3/3/2025 I Hospital Day: 2     Assessment & Plan  Stroke-like symptom  MRI brain confirmed acute stroke  Repeat MRI brain with extension of previously seen right perisylvian infarction and new punctate acute infarcts along the right precentral gyrus  Aspirin/Plavix switched to Eliquis 5 mg twice daily  Patient just had a loop recorder interrogation 10 days ago, no obvious A-fib noted  Continue statin therapy  Neurology recommends CT chest abdomen/pelvis to rule out any obvious malignancy that would lead to hypercoagulable state ; no evidence of malignancy or metastatic disease in the abdomen or pelvis  Patient with 1.0 cm right upper outer quadrant breast mass ; patient should undergo mammography in the outpatient setting  Patient found to have right breast lesion  States that she has appointment for mammogram April 1  Outpatient follow-up with Neurology  Ambulatory referral to hematology for hypercoagulable workup  PT/OT evaluation and treatment  Type 2 diabetes mellitus with stage 3b chronic kidney disease, with long-term current use of insulin ([facility])  Lab Results   Component Value Date    HGBA1C 9.4 (H) 03/01/2025       Recent Labs     03/02/25  1059 03/02/25  1624 03/02/25  2039 03/03/25  0747   POCGLU 256* 250* 239* 153*       Blood Sugar Average: Last 72 hrs:  (P) 185.3187399757619874  Resume home diabetic regimen on discharge  Essential hypertension  Home antihypertensives have been resumed  Follow-up with PCP as outpatient for routine BP management  Acquired hypothyroidism  Continue levothyroxine  Breast lesion  Patient found to have right breast lesion on CT imaging.  Reviewed finding with patient.  She understands finding and need for follow-up.  Patient states that she has a mammogram scheduled for April 1.  Advised to make sure that she follows up with  this mammogram  Follow-up with PCP for further management    VTE Pharmacologic Prophylaxis: VTE Score: 4 Moderate Risk (Score 3-4) - Pharmacological DVT Prophylaxis Ordered: apixaban (Eliquis).    Mobility:   Basic Mobility Inpatient Raw Score: 18  JH-HLM Goal: 6: Walk 10 steps or more  JH-HLM Achieved: 7: Walk 25 feet or more  JH-HLM Goal achieved. Continue to encourage appropriate mobility.    Patient Centered Rounds: I performed bedside rounds with nursing staff today.     Discussions with Specialists or Other Care Team Provider: Neurology    Education and Discussions with Family / Patient: Updated  (son) via phone.    Current Length of Stay: 2 day(s)  Current Patient Status: Inpatient   Certification Statement: The patient will continue to require additional inpatient hospital stay due to acute CVA  Discharge Plan: Anticipate discharge in 24-48 hrs to discharge location to be determined pending rehab evaluations.    Code Status: Level 1 - Full Code    Subjective   Patient seen and examined at bedside.  Repeat MRI brain with extension of right perisylvian infarction and new punctate acute infarcts along the right precentral gyrus.    Objective :  Temp:  [97.3 °F (36.3 °C)-98.3 °F (36.8 °C)] 98 °F (36.7 °C)  HR:  [73-86] 73  BP: (134-165)/(53-78) 165/78  Resp:  [17-18] 18  SpO2:  [93 %-100 %] 99 %  O2 Device: None (Room air)    Body mass index is 30.89 kg/m².     Input and Output Summary (last 24 hours):     Intake/Output Summary (Last 24 hours) at 3/3/2025 1032  Last data filed at 3/3/2025 0900  Gross per 24 hour   Intake 780 ml   Output 250 ml   Net 530 ml       Physical Exam  Vitals and nursing note reviewed.   Constitutional:       General: She is not in acute distress.     Appearance: She is well-developed.   HENT:      Head: Normocephalic and atraumatic.   Eyes:      Conjunctiva/sclera: Conjunctivae normal.   Cardiovascular:      Rate and Rhythm: Normal rate and regular rhythm.      Heart  sounds: No murmur heard.  Pulmonary:      Effort: Pulmonary effort is normal. No respiratory distress.      Breath sounds: Normal breath sounds.   Abdominal:      Palpations: Abdomen is soft.      Tenderness: There is no abdominal tenderness.   Musculoskeletal:         General: No swelling.      Cervical back: Neck supple.   Skin:     General: Skin is warm and dry.      Capillary Refill: Capillary refill takes less than 2 seconds.   Neurological:      Mental Status: She is alert.   Psychiatric:         Mood and Affect: Mood normal.         Lab Results: I have reviewed the following results:   Results from last 7 days   Lab Units 03/02/25  0503   WBC Thousand/uL 7.49   HEMOGLOBIN g/dL 11.5   HEMATOCRIT % 36.3   PLATELETS Thousands/uL 157     Results from last 7 days   Lab Units 03/02/25  0503   SODIUM mmol/L 136   POTASSIUM mmol/L 4.1   CHLORIDE mmol/L 103   CO2 mmol/L 27   BUN mg/dL 22   CREATININE mg/dL 1.60*   ANION GAP mmol/L 6   CALCIUM mg/dL 8.7   GLUCOSE RANDOM mg/dL 181*     Results from last 7 days   Lab Units 02/28/25  1233   INR  0.93     Results from last 7 days   Lab Units 03/03/25  0747 03/02/25  2039 03/02/25  1624 03/02/25  1059 03/02/25  0742 03/01/25  2039 03/01/25  1609 03/01/25  1055 03/01/25  0720 02/28/25  2039 02/28/25  1601 02/28/25  1259   POC GLUCOSE mg/dl 153* 239* 250* 256* 178* 222* 197* 205* 112 163* 105 145*     Results from last 7 days   Lab Units 03/01/25  0512   HEMOGLOBIN A1C % 9.4*           Recent Cultures (last 7 days):         Imaging Results Review: I reviewed radiology reports from this admission including: MRI brain.  Other Study Results Review: No additional pertinent studies reviewed.    Last 24 Hours Medication List:     Current Facility-Administered Medications:     acetaminophen (TYLENOL) tablet 650 mg, Q6H PRN    amLODIPine (NORVASC) tablet 5 mg, Daily    apixaban (ELIQUIS) tablet 5 mg, BID    atorvastatin (LIPITOR) tablet 40 mg, QPM    diphenhydrAMINE (BENADRYL) tablet  25 mg, HS PRN    fluticasone (FLONASE) 50 mcg/act nasal spray 2 spray, Daily    insulin glargine (LANTUS) subcutaneous injection 18 Units 0.18 mL, HS    insulin lispro (HumALOG/ADMELOG) 100 units/mL subcutaneous injection 1-5 Units, HS    insulin lispro (HumALOG/ADMELOG) 100 units/mL subcutaneous injection 1-6 Units, TID AC    levothyroxine tablet 50 mcg, Early Morning    metoprolol succinate (TOPROL-XL) 24 hr tablet 25 mg, Daily    ondansetron (ZOFRAN) injection 4 mg, Q6H PRN    pantoprazole (PROTONIX) EC tablet 40 mg, Early Morning    Administrative Statements   Today, Patient Was Seen By: Homero Dinh, DO  I have spent a total time of 35 minutes in caring for this patient on the day of the visit/encounter including Diagnostic results, Prognosis, Risks and benefits of tx options, Instructions for management, Patient and family education, Importance of tx compliance, Risk factor reductions, Impressions, Counseling / Coordination of care, Documenting in the medical record, Reviewing/placing orders in the medical record (including tests, medications, and/or procedures), Obtaining or reviewing history  , and Communicating with other healthcare professionals .    **Please Note: This note may have been constructed using a voice recognition system.**

## 2025-03-04 ENCOUNTER — TELEPHONE (OUTPATIENT)
Age: 80
End: 2025-03-04

## 2025-03-04 VITALS
DIASTOLIC BLOOD PRESSURE: 64 MMHG | TEMPERATURE: 98.4 F | BODY MASS INDEX: 31.08 KG/M2 | WEIGHT: 168.87 LBS | OXYGEN SATURATION: 96 % | HEART RATE: 73 BPM | HEIGHT: 62 IN | RESPIRATION RATE: 19 BRPM | SYSTOLIC BLOOD PRESSURE: 152 MMHG

## 2025-03-04 LAB
ANION GAP SERPL CALCULATED.3IONS-SCNC: 6 MMOL/L (ref 4–13)
ATRIAL RATE: 84 BPM
BASOPHILS # BLD AUTO: 0.04 THOUSANDS/ÂΜL (ref 0–0.1)
BASOPHILS NFR BLD AUTO: 1 % (ref 0–1)
BUN SERPL-MCNC: 29 MG/DL (ref 5–25)
CALCIUM SERPL-MCNC: 8.5 MG/DL (ref 8.4–10.2)
CHLORIDE SERPL-SCNC: 107 MMOL/L (ref 96–108)
CO2 SERPL-SCNC: 25 MMOL/L (ref 21–32)
CREAT SERPL-MCNC: 1.37 MG/DL (ref 0.6–1.3)
EOSINOPHIL # BLD AUTO: 0.13 THOUSAND/ÂΜL (ref 0–0.61)
EOSINOPHIL NFR BLD AUTO: 2 % (ref 0–6)
ERYTHROCYTE [DISTWIDTH] IN BLOOD BY AUTOMATED COUNT: 14.5 % (ref 11.6–15.1)
FLUAV AG UPPER RESP QL IA.RAPID: NEGATIVE
FLUBV AG UPPER RESP QL IA.RAPID: NEGATIVE
GFR SERPL CREATININE-BSD FRML MDRD: 36 ML/MIN/1.73SQ M
GLUCOSE SERPL-MCNC: 151 MG/DL (ref 65–140)
GLUCOSE SERPL-MCNC: 153 MG/DL (ref 65–140)
GLUCOSE SERPL-MCNC: 193 MG/DL (ref 65–140)
HCT VFR BLD AUTO: 35.5 % (ref 34.8–46.1)
HGB BLD-MCNC: 11.2 G/DL (ref 11.5–15.4)
IMM GRANULOCYTES # BLD AUTO: 0.04 THOUSAND/UL (ref 0–0.2)
IMM GRANULOCYTES NFR BLD AUTO: 1 % (ref 0–2)
LYMPHOCYTES # BLD AUTO: 2.26 THOUSANDS/ÂΜL (ref 0.6–4.47)
LYMPHOCYTES NFR BLD AUTO: 27 % (ref 14–44)
MAGNESIUM SERPL-MCNC: 1.8 MG/DL (ref 1.9–2.7)
MCH RBC QN AUTO: 29.2 PG (ref 26.8–34.3)
MCHC RBC AUTO-ENTMCNC: 31.5 G/DL (ref 31.4–37.4)
MCV RBC AUTO: 93 FL (ref 82–98)
MONOCYTES # BLD AUTO: 0.72 THOUSAND/ÂΜL (ref 0.17–1.22)
MONOCYTES NFR BLD AUTO: 9 % (ref 4–12)
NEUTROPHILS # BLD AUTO: 5.33 THOUSANDS/ÂΜL (ref 1.85–7.62)
NEUTS SEG NFR BLD AUTO: 60 % (ref 43–75)
NRBC BLD AUTO-RTO: 0 /100 WBCS
P AXIS: 48 DEGREES
PHOSPHATE SERPL-MCNC: 3.2 MG/DL (ref 2.3–4.1)
PLATELET # BLD AUTO: 151 THOUSANDS/UL (ref 149–390)
PMV BLD AUTO: 10.6 FL (ref 8.9–12.7)
POTASSIUM SERPL-SCNC: 3.9 MMOL/L (ref 3.5–5.3)
PR INTERVAL: 154 MS
QRS AXIS: -12 DEGREES
QRSD INTERVAL: 92 MS
QT INTERVAL: 390 MS
QTC INTERVAL: 460 MS
RBC # BLD AUTO: 3.83 MILLION/UL (ref 3.81–5.12)
SARS-COV+SARS-COV-2 AG RESP QL IA.RAPID: NEGATIVE
SODIUM SERPL-SCNC: 138 MMOL/L (ref 135–147)
T WAVE AXIS: 23 DEGREES
VENTRICULAR RATE: 84 BPM
WBC # BLD AUTO: 8.52 THOUSAND/UL (ref 4.31–10.16)

## 2025-03-04 PROCEDURE — 80048 BASIC METABOLIC PNL TOTAL CA: CPT | Performed by: INTERNAL MEDICINE

## 2025-03-04 PROCEDURE — 93010 ELECTROCARDIOGRAM REPORT: CPT | Performed by: INTERNAL MEDICINE

## 2025-03-04 PROCEDURE — 97166 OT EVAL MOD COMPLEX 45 MIN: CPT

## 2025-03-04 PROCEDURE — 83735 ASSAY OF MAGNESIUM: CPT | Performed by: INTERNAL MEDICINE

## 2025-03-04 PROCEDURE — 85025 COMPLETE CBC W/AUTO DIFF WBC: CPT | Performed by: INTERNAL MEDICINE

## 2025-03-04 PROCEDURE — 99239 HOSP IP/OBS DSCHRG MGMT >30: CPT | Performed by: INTERNAL MEDICINE

## 2025-03-04 PROCEDURE — 84100 ASSAY OF PHOSPHORUS: CPT | Performed by: INTERNAL MEDICINE

## 2025-03-04 PROCEDURE — 87811 SARS-COV-2 COVID19 W/OPTIC: CPT | Performed by: INTERNAL MEDICINE

## 2025-03-04 PROCEDURE — 87804 INFLUENZA ASSAY W/OPTIC: CPT | Performed by: INTERNAL MEDICINE

## 2025-03-04 PROCEDURE — 82948 REAGENT STRIP/BLOOD GLUCOSE: CPT

## 2025-03-04 RX ORDER — INSULIN GLARGINE 100 [IU]/ML
20 INJECTION, SOLUTION SUBCUTANEOUS
Status: DISCONTINUED | OUTPATIENT
Start: 2025-03-04 | End: 2025-03-04 | Stop reason: HOSPADM

## 2025-03-04 RX ORDER — CLOPIDOGREL BISULFATE 75 MG/1
75 TABLET ORAL DAILY
Status: DISCONTINUED | OUTPATIENT
Start: 2025-03-04 | End: 2025-03-04 | Stop reason: HOSPADM

## 2025-03-04 RX ADMIN — INSULIN LISPRO 2 UNITS: 100 INJECTION, SOLUTION INTRAVENOUS; SUBCUTANEOUS at 07:48

## 2025-03-04 RX ADMIN — PANTOPRAZOLE SODIUM 40 MG: 40 TABLET, DELAYED RELEASE ORAL at 06:14

## 2025-03-04 RX ADMIN — METOPROLOL SUCCINATE 25 MG: 25 TABLET, EXTENDED RELEASE ORAL at 08:28

## 2025-03-04 RX ADMIN — CLOPIDOGREL 75 MG: 75 TABLET ORAL at 11:50

## 2025-03-04 RX ADMIN — AMLODIPINE BESYLATE 5 MG: 5 TABLET ORAL at 08:28

## 2025-03-04 RX ADMIN — APIXABAN 5 MG: 5 TABLET, FILM COATED ORAL at 08:28

## 2025-03-04 RX ADMIN — INSULIN LISPRO 1 UNITS: 100 INJECTION, SOLUTION INTRAVENOUS; SUBCUTANEOUS at 11:50

## 2025-03-04 RX ADMIN — LEVOTHYROXINE SODIUM 50 MCG: 0.05 TABLET ORAL at 06:14

## 2025-03-04 RX ADMIN — FLUTICASONE PROPIONATE 2 SPRAY: 50 SPRAY, METERED NASAL at 08:28

## 2025-03-04 NOTE — DISCHARGE SUMMARY
Discharge Summary - Hospitalist   Name: Jina Norris 79 y.o. female I MRN: [id number]  Unit/Bed#: [facility]-01 I Date of Admission: 2/28/2025   Date of Service: 3/4/2025 I Hospital Day: 3     Assessment & Plan  Embolic stroke (HCC)  MRI brain confirmed acute stroke  Repeat MRI brain with extension of previously seen right perisylvian infarction and new punctate acute infarcts along the right precentral gyrus  Aspirin switched to Eliquis 5 mg twice daily  Continue home Plavix and statin therapy  Patient just had a loop recorder interrogation 10 days ago, no obvious A-fib noted  Neurology recommends CT chest abdomen/pelvis to rule out any obvious malignancy that would lead to hypercoagulable state ; no evidence of malignancy or metastatic disease in the abdomen or pelvis  Patient with 1.0 cm right upper outer quadrant breast mass ; patient should undergo mammography in the outpatient setting  Patient found to have right breast lesion  States that she has appointment for mammogram April 1  Outpatient follow-up with Neurology  Ambulatory referral to hematology for hypercoagulable workup  PT/OT evaluation and treatment  Type 2 diabetes mellitus with stage 3b chronic kidney disease, with long-term current use of insulin ([facility])  Lab Results   Component Value Date    HGBA1C 9.4 (H) 03/01/2025       Recent Labs     03/03/25  1559 03/03/25  2043 03/04/25  0702 03/04/25  1136   POCGLU 240* 208* 193* 153*       Blood Sugar Average: Last 72 hrs:  (P) 207.8325367450596840  Resume home diabetic regimen on discharge  Essential hypertension  Home antihypertensives have been resumed  Follow-up with PCP as outpatient for routine BP management  Acquired hypothyroidism  Continue levothyroxine  Breast lesion  Patient found to have right breast lesion on CT imaging.  Reviewed finding with patient.  She understands finding and need for follow-up.  Patient states that she has a mammogram scheduled for April 1.  Advised to make sure that she  follows up with this mammogram  Follow-up with PCP for further management     Medical Problems       Resolved Problems  Date Reviewed: 11/7/2024   None       Discharging Physician / Practitioner: Homero Dinh DO  PCP: Santiago Melendez MD  Admission Date:   Admission Orders (From admission, onward)       Ordered        03/01/25 1437  INPATIENT ADMISSION  Once            02/28/25 1318  Place in Observation  Once                          Discharge Date: 03/04/25    Consultations During Hospital Stay:  Neurology    Procedures Performed:   Not applicable    Significant Findings / Test Results:   Not applicable    Incidental Findings:    Finding: CT chest abdomen pelvis wo contrast: A 1.0 cm right upper outer quadrant breast mass. Recommend further evaluation with diagnostic mammography., No evidence of occult malignancy or metastatic disease in the abdomen and pelvis., The study was marked in EPIC for immediate notification., Workstation performed: [id number]   I reviewed the above mentioned incidental findings with the patient and/or family and they expressed understanding.    Test Results Pending at Discharge (will require follow up):   Not applicable     Outpatient Tests Requested:  Mammogram    Complications: Not applicable    Reason for Admission: Strokelike symptoms    Hospital Course:   Jina Norris is a 79 y.o. female with a PMH of CKD stage III, history of CVA, hypertension who presents with dysarthria.  Patient states that she woke up this morning and was having difficulty speaking.  Patient denies any weakness.  No double vision.  No dizziness or lightheadedness.  No chest pain or shortness of breath.  Patient went to bed in her usual state of health.  In the ER stroke alert was called.  Was not a TNK candidate due to rapid improvement in symptoms.    MRI brain concerning for embolic CVAs.  As per neurology, the patient was initiated on Eliquis and was also encouraged to resume home Plavix.  There is some  "concern for possible breast cancer which may be causing the patient to thrombose which may have led to the patient's embolic appearing CVAs.  The patient will be discharged to short-term rehab at the recommendation of PT/OT.  The patient will be on Eliquis and Plavix for CVA prophylaxis and should follow-up closely with Dr. Santiago Melendez who is her primary care physician for mammogram and further workup of thrombosis requiring DOAC.  The patient should take all medications as per AVS.  She is hemodynamically stable and saturating well on room air.  This serves as a brief discharge summary.  Please see full medical record for more details.    Condition at Discharge: stable    Discharge Day Visit / Exam:   Subjective:  Patient seen and examined at bedside. No acute events overnight. Denies chest pain, SOB, diaphoresis, nausea/vomiting/diarrhea, fevers/chills.     Vitals: Blood Pressure: 152/64 (03/04/25 0828)  Pulse: 73 (03/04/25 0828)  Temperature: 98.4 °F (36.9 °C) (03/04/25 0658)  Temp Source: Oral (03/04/25 0300)  Respirations: 19 (03/04/25 0658)  Height: 5' 2\" (157.5 cm) (02/28/25 1345)  Weight - Scale: 76.6 kg (168 lb 14 oz) (02/28/25 1345)  SpO2: 96 % (03/04/25 0835)    Physical Exam  Vitals and nursing note reviewed.   Constitutional:       General: She is not in acute distress.     Appearance: She is well-developed.   HENT:      Head: Normocephalic and atraumatic.   Eyes:      Conjunctiva/sclera: Conjunctivae normal.   Cardiovascular:      Rate and Rhythm: Normal rate and regular rhythm.      Heart sounds: No murmur heard.  Pulmonary:      Effort: Pulmonary effort is normal. No respiratory distress.      Breath sounds: Normal breath sounds.   Abdominal:      Palpations: Abdomen is soft.      Tenderness: There is no abdominal tenderness.   Musculoskeletal:         General: No swelling.      Cervical back: Neck supple.   Skin:     General: Skin is warm and dry.      Capillary Refill: Capillary refill takes less " than 2 seconds.   Neurological:      Mental Status: She is alert.   Psychiatric:         Mood and Affect: Mood normal.       Discussion with Family: Updated  (son) via phone.    Discharge instructions/Information to patient and family:   See after visit summary for information provided to patient and family.      Provisions for Follow-Up Care:  See after visit summary for information related to follow-up care and any pertinent home health orders.      Mobility at time of Discharge:   Basic Mobility Inpatient Raw Score: 22  JH-HLM Goal: 7: Walk 25 feet or more  JH-HLM Achieved: 4: Move to chair/commode  HLM Goal NOT achieved. Continue to encourage mobility in post discharge setting.     Disposition:   Other: Short-term rehab    Planned Readmission: Not applicable    Discharge Medications:  See after visit summary for reconciled discharge medications provided to patient and/or family.      Administrative Statements   Discharge Statement:  I have spent a total time of 65 minutes in caring for this patient on the day of the visit/encounter. >30 minutes of time was spent on: Diagnostic results, Prognosis, Risks and benefits of tx options, Instructions for management, Patient and family education, Importance of tx compliance, Risk factor reductions, Impressions, Counseling / Coordination of care, Documenting in the medical record, Reviewing / ordering tests, medicine, procedures  , and Communicating with other healthcare professionals .    **Please Note: This note may have been constructed using a voice recognition system**

## 2025-03-04 NOTE — INCIDENTAL FINDINGS
The following findings require follow up:  Radiographic finding   Finding: CT chest abdomen pelvis wo contrast: A 1.0 cm right upper outer quadrant breast mass. Recommend further evaluation with diagnostic mammography., No evidence of occult malignancy or metastatic disease in the abdomen and pelvis., The study was marked in EPIC for immediate notification., Workstation performed: [id number]    Follow up required: Mammogram   Follow up should be done within 4 week(s)    Please notify the following clinician to assist with the follow up:   PCP    Incidental finding results were discussed with the Patient's POA by Homero Dinh DO on 03/04/25.   They expressed understanding and all questions answered.

## 2025-03-04 NOTE — ASSESSMENT & PLAN NOTE
MRI brain confirmed acute stroke  Repeat MRI brain with extension of previously seen right perisylvian infarction and new punctate acute infarcts along the right precentral gyrus  Aspirin switched to Eliquis 5 mg twice daily  Continue home Plavix and statin therapy  Patient just had a loop recorder interrogation 10 days ago, no obvious A-fib noted  Neurology recommends CT chest abdomen/pelvis to rule out any obvious malignancy that would lead to hypercoagulable state ; no evidence of malignancy or metastatic disease in the abdomen or pelvis  Patient with 1.0 cm right upper outer quadrant breast mass ; patient should undergo mammography in the outpatient setting  Patient found to have right breast lesion  States that she has appointment for mammogram April 1  Outpatient follow-up with Neurology  Ambulatory referral to hematology for hypercoagulable workup  PT/OT evaluation and treatment

## 2025-03-04 NOTE — PLAN OF CARE
Problem: OCCUPATIONAL THERAPY ADULT  Goal: Performs self-care activities at highest level of function for planned discharge setting.  See evaluation for individualized goals.  Description: Treatment Interventions: ADL retraining, UE strengthening/ROM, Functional transfer training, Endurance training, Patient/family training, Energy conservation, Activityengagement       See flowsheet documentation for full assessment, interventions and recommendations.   Note: Limitation: Decreased ADL status, Decreased UE strength, Decreased Safe judgement during ADL, Decreased endurance, Decreased self-care trans, Decreased high-level ADLs  Prognosis: Good  Assessment: Pt is a 79 y.o. female seen for OT evaluation s/p admit to [facility] on 2/28/2025 w/ Embolic stroke (HCC).  Comorbidities affecting pt's functional performance at time of assessment include: type 2 diabetes, HTN, hyperlipidemia, chronic pain syndrome, CKD. Personal factors affecting pt at time of IE include:steps to enter environment, difficulty performing ADLS, difficulty performing IADLS , decreased initiation and engagement , and health management . Prior to admission, pt was I with ADLs and requires occasional A with IADLs. Upon evaluation: the following deficits impact occupational performance: weakness, decreased strength, decreased balance, decreased tolerance, and decreased safety awareness. Pt to benefit from continued skilled OT tx while in the hospital to address deficits as defined above and maximize level of functional independence w ADL's and functional mobility. Occupational Performance areas to address include: grooming, bathing/shower, toilet hygiene, dressing, functional mobility, community mobility, and clothing management. From OT standpoint, recommendation at time of d/c would with moderate intensity OT resources.     Rehab Resource Intensity Level, OT: II (Moderate Resource Intensity)     Sosa Oconnor OTR/L

## 2025-03-04 NOTE — NURSING NOTE
Pt DC to [facility] via transporter and [facility] in stable condition. All belongings packed by PCA. IV catheter removed fully intact. AVS sent with pt, and report called in and given to Sandrita. All questions answered.

## 2025-03-04 NOTE — PLAN OF CARE
Problem: Neurological Deficit  Goal: Neurological status is stable or improving  Description: Interventions:  - Monitor and assess patient's level of consciousness, motor function, sensory function, and level of assistance needed for ADLs.   - Monitor and report changes from baseline. Collaborate with interdisciplinary team to initiate plan and implement interventions as ordered.   - Provide and maintain a safe environment.  - Consider seizure precautions.  - Consider fall precautions.  - Consider aspiration precautions.  - Consider bleeding precautions.  Outcome: Progressing     Problem: Communication Impairment  Goal: Ability to express needs and understand communication  Description: Assess patient's communication skills and ability to understand information.  Patient will demonstrate use of effective communication techniques, alternative methods of communication and understanding even if not able to speak.     - Encourage communication and provide alternate methods of communication as needed.  - Collaborate with case management/ for discharge needs.  - Include patient/family/caregiver in decisions related to communication.  Outcome: Progressing     Problem: SAFETY ADULT  Goal: Maintain or return to baseline ADL function  Description: INTERVENTIONS:  -  Assess patient's ability to carry out ADLs; assess patient's baseline for ADL function and identify physical deficits which impact ability to perform ADLs (bathing, care of mouth/teeth, toileting, grooming, dressing, etc.)  - Assess/evaluate cause of self-care deficits   - Assess range of motion  - Assess patient's mobility; develop plan if impaired  - Assess patient's need for assistive devices and provide as appropriate  - Encourage maximum independence but intervene and supervise when necessary  - Involve family in performance of ADLs  - Assess for home care needs following discharge   - Consider OT consult to assist with ADL evaluation and  planning for discharge  - Provide patient education as appropriate  Outcome: Progressing     Problem: NEUROSENSORY - ADULT  Goal: Achieves maximal functionality and self care  Description: INTERVENTIONS  - Monitor swallowing and airway patency with patient fatigue and changes in neurological status  - Encourage and assist patient to increase activity and self care.   - Encourage visually impaired, hearing impaired and aphasic patients to use assistive/communication devices  Outcome: Progressing

## 2025-03-04 NOTE — OCCUPATIONAL THERAPY NOTE
Occupational Therapy Evaluation     Patient Name: Jina Norris  Today's Date: 3/4/2025  Problem List  Principal Problem:    Embolic stroke (HCC)  Active Problems:    Type 2 diabetes mellitus with stage 3b chronic kidney disease, with long-term current use of insulin (HCC)    Essential hypertension    Acquired hypothyroidism    Breast lesion    Past Medical History  Past Medical History:   Diagnosis Date    Chronic kidney disease, stage 3 (HCC)     CVA (cerebral vascular accident) (HCC)     s/p medtronic loop recorder 5/13/2024    Diabetes mellitus (HCC)     Disorder of gallbladder     Disorder of skin and subcutaneous tissue     Dyspnea     Essential hypertension     Hypercalcemia 11/19/2023    Hypokalemia     Malaise and fatigue     Mixed hyperlipidemia     Morbid obesity (HCC)     Pernicious anemia      Past Surgical History  Past Surgical History:   Procedure Laterality Date    BREAST BIOPSY Right     CARPAL TUNNEL RELEASE Bilateral     CHOLECYSTECTOMY      COLONOSCOPY      Dr. Apple     HYSTERECTOMY      LUMBAR EPIDURAL INJECTION      x3    PARTIAL HYSTERECTOMY      SKIN LESION EXCISION      scalp         03/04/25 1005   OT Last Visit   OT Visit Date 03/04/25   Note Type   Note type Evaluation   Pain Assessment   Pain Assessment Tool 0-10   Pain Score No Pain   Restrictions/Precautions   Weight Bearing Precautions Per Order No   Other Precautions Chair Alarm;Bed Alarm;Fall Risk   Home Living   Type of Home House   Home Layout One level;Stairs to enter with rails  (5 MAIKEL w/ HR)   Bathroom Shower/Tub Walk-in shower   Bathroom Toilet Standard   Bathroom Equipment Grab bars in shower   Bathroom Accessibility Accessible   Home Equipment Walker;Cane  (Pt denies use of AD at baseline)   Prior Function   Level of Weinert Independent with ADLs;Independent with functional mobility;Independent with IADLS   Lives With Significant other  (caregier for boyfriend with dementia)   Receives Help From Neighbor   IADLs  "Family/Friend/Other provides transportation;Independent with medication management;Independent with meal prep  (pt drives short distances only; neighbor assist with transportation)   Falls in the last 6 months 0   Vocational Retired   Lifestyle   Autonomy Pt resides in one level house w/ significant other; I with ADLs and IADLs - pt is caregiver of significant other; -    Reciprocal Relationships supportive family   Service to Others retired   Intrinsic Gratification watching tv   Subjective   Subjective \"I need to work on my handwriting\"   ADL   Where Assessed Edge of bed   Eating Assistance 7  Independent   Grooming Assistance 6  Modified Independent   UB Bathing Assistance 5  Supervision/Setup   LB Bathing Assistance 4  Minimal Assistance   UB Dressing Assistance 5  Supervision/Setup   LB Dressing Assistance 4  Minimal Assistance   LB Dressing Deficit Don/doff L sock;Don/doff R sock   Toileting Assistance  4  Minimal Assistance   Toileting Deficit Perineal hygiene;Bedside commode  (min A for toileting hygiene thoroughness while in stance)   Additional Comments Given functional performance skills + medical complexity, therapist suspects via clinical judgement + skilled analysis; pt currently requires stated assist above to perform each area of ADLs d/t limitations including: functional transfers, bed mobility, balance, coordination, functional activity tolerance and overall cognition   Bed Mobility   Supine to Sit 5  Supervision   Additional items Assist x 1;Increased time required;Verbal cues   Sit to Supine   (DNT; pt seated in recliner upon conclusion of session)   Additional Comments VC for bedrail utilization and proper body mechanics; denied dizziness with transitional movements   Transfers   Sit to Stand 5  Supervision   Additional items Assist x 1;Increased time required;Verbal cues   Stand to Sit 5  Supervision   Additional items Assist x 1;Increased time required;Verbal cues   Toilet transfer "   (CGA)   Additional items Assist x 1;Increased time required;Commode;Verbal cues   Additional Comments RW used; VC for safe hand placement, proper body mechanics and overall RW management during directional turns - impulsivity noted   Balance   Static Sitting Good   Dynamic Sitting Fair +   Static Standing Fair   Dynamic Standing Fair -   Ambulatory Fair -   Activity Tolerance   Activity Tolerance Patient limited by fatigue   Medical Staff Made Aware Yes, CM made aware of d/c recs   Nurse Made Aware Yes, nursing staff made aware of session outcomes   RUE Assessment   RUE Assessment WFL   RUE Strength   RUE Overall Strength   (3+/5)   LUE Assessment   LUE Assessment WFL   LUE Strength   LUE Overall Strength   (3+/5)   Hand Function   Gross Motor Coordination Functional   Fine Motor Coordination Functional   Sensation   Additional Comments denies numbness/tingling   Vision-Basic Assessment   Current Vision Wears glasses all the time   Psychosocial   Psychosocial (WDL) WDL   Cognition   Overall Cognitive Status WFL   Arousal/Participation Alert;Responsive;Cooperative   Attention Attends with cues to redirect   Orientation Level Oriented X4   Memory Decreased recall of precautions;Decreased recall of recent events   Following Commands Follows one step commands without difficulty   Comments Pt agreeable to OT evaluation, slight slurred speech noted - new baseline from + CVA   Assessment   Limitation Decreased ADL status;Decreased UE strength;Decreased Safe judgement during ADL;Decreased endurance;Decreased self-care trans;Decreased high-level ADLs   Prognosis Good   Assessment Pt is a 79 y.o. female seen for OT evaluation s/p admit to [facility] on 2/28/2025 w/ Embolic stroke (HCC).  Comorbidities affecting pt's functional performance at time of assessment include: type 2 diabetes, HTN, hyperlipidemia, chronic pain syndrome, CKD. Personal factors affecting pt at time of IE include:steps to enter environment, difficulty  performing ADLS, difficulty performing IADLS , decreased initiation and engagement , and health management . Prior to admission, pt was I with ADLs and requires occasional A with IADLs. Upon evaluation: the following deficits impact occupational performance: weakness, decreased strength, decreased balance, decreased tolerance, and decreased safety awareness. Pt to benefit from continued skilled OT tx while in the hospital to address deficits as defined above and maximize level of functional independence w ADL's and functional mobility. Occupational Performance areas to address include: grooming, bathing/shower, toilet hygiene, dressing, functional mobility, community mobility, and clothing management. From OT standpoint, recommendation at time of d/c would with moderate intensity OT resources.   Goals   Patient Goals to feel better   Plan   Treatment Interventions ADL retraining;UE strengthening/ROM;Functional transfer training;Endurance training;Patient/family training;Energy conservation;Activityengagement   Goal Expiration Date 03/14/25   OT Treatment Day 0   OT Frequency 2-3x/wk   Discharge Recommendation   Rehab Resource Intensity Level, OT II (Moderate Resource Intensity)   Additional Comments  The patient's raw score on the AM-PAC Daily Activity inpatient short form is 18, standardized score is 38.66, less than 39.4. Patients at this level are likely to benefit from discharge with moderate intensity OT resources. Please refer to the recommendation of the Occupational Therapist for safe discharge planning.   AM-PAC Daily Activity Inpatient   Lower Body Dressing 2   Bathing 3   Toileting 3   Upper Body Dressing 3   Grooming 3   Eating 4   Daily Activity Raw Score 18   Daily Activity Standardized Score (Calc for Raw Score >=11) 38.66   AM-PAC Applied Cognition Inpatient   Following a Speech/Presentation 3   Understanding Ordinary Conversation 4   Taking Medications 3   Remembering Where Things Are Placed or Put  Away 3   Remembering List of 4-5 Errands 2   Taking Care of Complicated Tasks 2   Applied Cognition Raw Score 17   Applied Cognition Standardized Score 36.52   Mini-Cog Assessment   Word Version Version 1: Banana, Sunrise, Chair   Clock Draw (CDT) 2   Word Recall 3   Mini-Cog Score 5   Mini-Cog Results Negative screen for dementia     GOALS:  Pt will achieve the following within specified time frame: LTG  Pt will achieve the following goals within 10 days    *ADL transfers with (I) for inc'd independence with ADLs/purposeful tasks    *UB ADL with (I) for inc'd independence with self cares    *LB ADL with (I) using AE prn for inc'd independence with self cares    *Toileting with (I) for clothing management and hygiene for return to PLOF with personal care    *Increase static stand balance and dyn stand balance to G- for inc'd safety with standing purposeful tasks    *Increase stand tolerance x7 m for inc'd tolerance with standing purposeful tasks    *Bed mobility- (I) for inc'd independence to manage own comfort and initiate EOB & OOB purposeful tasks    *Participate in 10-15m UE therex to increase overall stamina/activity tolerance for purposeful tasks      Sosa Oconnor, MS, OTR/L

## 2025-03-04 NOTE — TELEPHONE ENCOUNTER
1ST ATTEMPT,     Called pt no answer, LMOM.    Thank you,     Sharmila COLLIERU/ PENELOPE CARBON/STROKE     DC-HOME- 3/2/2025     ----- Message from Jarad Brandt MD sent at 3/4/2025 11:11 AM EST -----  Regarding: Hospital Follow up  Jina Norris will need follow-up in in 4 weeks with neurovascular team for Other= ATTENDING  in 60 minute appointment. They will not require outpatient neurological testing at this time.

## 2025-03-04 NOTE — QUICK NOTE
Neurology Quick Note    Contacted by primary team to review MRI brain that was completed. She was seen last week by Dr. Wisdom and had repeat MRI brain recommended. On review, pt presented on 2/28/25 for evaluation of dysarthria and left facial droop, although symptoms resolved by the time she came to the ED. She was formally seen on 3/1/25 and was felt to have slightly impaired verbal expression but otherwise no significant issues noted. She had a CTA H/N completed which showed intracranial atherosclerotic disease in the ICAs as well as PCAs. MRI brain was completed on 2/28/25 which showed 3 foci of recent/acute infarction in the left frontal lobe, right frontal lobe and right frontal operculum. She was started on Eliquis on 3/1/25 in the afternoon. She was on aspirin at home and reported to be compliant. She had initially been loaded with Plavix 300 mg on 2/28/25, but this was not continued since. On 3/2/25, she developed dysarthria in the afternoon after follow up evaluation earlier in the day by Neurology. CT head was repeated and did not show any acute findings. MRI brain and EEG were recommended. MRI brain was completed and showed a small increase in size/extension of her right perisylvian infarct, in addition to new punctate infarct in the right precentral gyrus. Otherwise, MRI brain was stable compared to the one performed on 2/28/25. On discussion with primary team, today pt is improved but still has some residual dysarthria, otherwise stable. As Eliquis was just started the day prior to change in symptoms, would not consider this to be an Eliquis failure at this time. She does additionally have intracranial atherosclerotic disease anteriorly, worse on the right. Would recommend addition of antiplatelet. She was taking aspirin at home only, confirmed with primary team. Therefore, would start Plavix 75 mg daily in addition to Eliquis. Continue to monitor closely for changes in examination. Can allow for  systolic BP up to 160 systolic for today, would be mindful of not overcorrecting her BP. Can slowly reduce BP goal tomorrow to normotension as tolerated. EEG pending. Would have a lower suspicion for seizure in light of findings on MRI brain and continued (although improved) symptoms, but can consider if she has recurrent stereotypical events. Rest of care per primary team.     Jina WENDY Norirs will need follow-up in in 4 weeks with neurovascular team for Other in 60 minute appointment. They will not require outpatient neurological testing at this time.

## 2025-03-04 NOTE — CASE MANAGEMENT
Case Management Discharge Planning Note    Patient name Jina Norris  Location /-01 MRN [id number]  : 1945 Date 3/4/2025       Current Admission Date: 2025  Current Admission Diagnosis:Embolic stroke (HCC)   Patient Active Problem List    Diagnosis Date Noted Date Diagnosed    Breast lesion 2025     Snoring 2024     Nonrheumatic aortic valve insufficiency 2024     Acute CVA (cerebrovascular accident) (HCC) 2024     Embolic stroke (HCC) 2024     Stage 3a chronic kidney disease ([facility]) 2023     Hypomagnesemia 2023     Abnormal CT of the abdomen 2023     PAD (peripheral artery disease) ([facility]) 2023     Asymptomatic bilateral carotid artery stenosis 2023     Chronic pain syndrome 2022     Lumbar radiculopathy 2022     Spinal stenosis of lumbar region      Mixed hyperlipidemia 2020     Acquired hypothyroidism 2020     Type 2 diabetes mellitus with stage 3b chronic kidney disease, with long-term current use of insulin ([facility]) 2019     Essential hypertension 2019       LOS (days): 3  Geometric Mean LOS (GMLOS) (days): 1.9  Days to GMLOS:-1     OBJECTIVE:  Risk of Unplanned Readmission Score: 16.8         Current admission status: Inpatient   Preferred Pharmacy:   [id number] [place], [place] - [address]  [address] [address]  Phone: [phone] Fax: [phone]    [id number] [place] [facility] [address]  [address] [address]  Phone: [phone] Fax: [phone]    [facility] - [address] [address]  Phone: [phone] Fax: [phone]    Primary Care Provider: Santiago Melendez MD    Primary Insurance: MEDICARE  Secondary Insurance: COMMERCIAL MISCELLANEOUS    DISCHARGE DETAILS:    Discharge planning discussed with:: Patient  Freedom of Choice:  Yes  Comments - Freedom of Choice: Pt stable for discharge today per [facility]. [facility] unable to accept, recommended lower level of care. Pt given SNF choice list from [facility]. Pt chose [facility] and they were reserved in [facility]. They are able to accept today with negative COVID test. Dr. Dinh ordered same. Pt son updated and in agreement with pt going to [facility] for STR today. He was given info about [facility]. Pt SO will be going to assisted living for respite while pt is at STR. Pt aware and in agreement. Transport arranged for 1500 via [employer]. Pt nurse aware and will call report to [facility]          Contacts  Patient Contacts: Liu Norris - son  Relationship to Patient:: Family  Contact Method: Phone  Phone Number: [phone]  Reason/Outcome: Discharge Planning         Treatment Team Recommendation: Short Term Rehab  Discharge Destination Plan:: Short Term Rehab  Transport at Discharge : Wheelchair van        Transported by (Company and Unit #): [employer]  ETA of Transport (Date): 03/04/25  ETA of Transport (Time): 1500          IMM Given (Date):: 03/04/25  IMM Given to:: Family  Family notified:: Son       Accepting Facility Name, City & State : [facility] -  [address] [place] [address]  Receiving Facility/Agency Phone Number: [phone]  Facility/Agency Fax Number: [phone]

## 2025-03-04 NOTE — ASSESSMENT & PLAN NOTE
Lab Results   Component Value Date    HGBA1C 9.4 (H) 03/01/2025       Recent Labs     03/03/25  1559 03/03/25  2043 03/04/25  0702 03/04/25  1136   POCGLU 240* 208* 193* 153*       Blood Sugar Average: Last 72 hrs:  (P) [id number]  Resume home diabetic regimen on discharge

## 2025-03-04 NOTE — PLAN OF CARE
Problem: Communication Impairment  Goal: Ability to express needs and understand communication  Description: Assess patient's communication skills and ability to understand information.  Patient will demonstrate use of effective communication techniques, alternative methods of communication and understanding even if not able to speak.     - Encourage communication and provide alternate methods of communication as needed.  - Collaborate with case management/ for discharge needs.  - Include patient/family/caregiver in decisions related to communication.  Outcome: Progressing     Problem: Activity Intolerance/Impaired Mobility  Goal: Mobility/activity is maintained at optimum level for patient  Description: Interventions:  - Assess and monitor patient  barriers to mobility and need for assistive/adaptive devices.  - Assess patient's emotional response to limitations.  - Collaborate with interdisciplinary team and initiate plans and interventions as ordered.  - Encourage independent activity per ability.  - Maintain proper body alignment.  - Perform active/passive rom as tolerated/ordered.  - Plan activities to conserve energy.  - Turn patient as appropriate  Outcome: Progressing

## 2025-03-04 NOTE — PLAN OF CARE
Problem: Neurological Deficit  Goal: Neurological status is stable or improving  Description: Interventions:  - Monitor and assess patient's level of consciousness, motor function, sensory function, and level of assistance needed for ADLs.   - Monitor and report changes from baseline. Collaborate with interdisciplinary team to initiate plan and implement interventions as ordered.   - Provide and maintain a safe environment.  - Consider seizure precautions.  - Consider fall precautions.  - Consider aspiration precautions.  - Consider bleeding precautions.  3/4/2025 1249 by Susanna Jacob RN  Outcome: Adequate for Discharge  3/4/2025 0957 by Susanna Jacob RN  Outcome: Progressing     Problem: Activity Intolerance/Impaired Mobility  Goal: Mobility/activity is maintained at optimum level for patient  Description: Interventions:  - Assess and monitor patient  barriers to mobility and need for assistive/adaptive devices.  - Assess patient's emotional response to limitations.  - Collaborate with interdisciplinary team and initiate plans and interventions as ordered.  - Encourage independent activity per ability.  - Maintain proper body alignment.  - Perform active/passive rom as tolerated/ordered.  - Plan activities to conserve energy.  - Turn patient as appropriate  3/4/2025 1249 by Susanna Jacob RN  Outcome: Adequate for Discharge  3/4/2025 0957 by Susanna Jacob RN  Outcome: Progressing     Problem: Communication Impairment  Goal: Ability to express needs and understand communication  Description: Assess patient's communication skills and ability to understand information.  Patient will demonstrate use of effective communication techniques, alternative methods of communication and understanding even if not able to speak.     - Encourage communication and provide alternate methods of communication as needed.  - Collaborate with case management/ for discharge needs.  - Include patient/family/caregiver in  decisions related to communication.  3/4/2025 1249 by Susanna Jacob RN  Outcome: Adequate for Discharge  3/4/2025 0957 by Susanna Jacob RN  Outcome: Progressing     Problem: Potential for Aspiration  Goal: Non-ventilated patient's risk of aspiration is minimized  Description: Assess and monitor vital signs, respiratory status, and labs (WBC).  Monitor for signs of aspiration (tachypnea, cough, rales, wheezing, cyanosis, fever).    - Assess and monitor patient's ability to swallow.  - Place patient up in chair to eat if possible.  - HOB up at 90 degrees to eat if unable to get patient up into chair.  - Supervise patient during oral intake.   - Instruct patient/ family to take small bites.  - Instruct patient/ family to take small single sips when taking liquids.  - Follow patient-specific strategies generated by speech pathologist.  3/4/2025 1249 by Susanna Jacob RN  Outcome: Adequate for Discharge  3/4/2025 0957 by Susanna Jacob RN  Outcome: Progressing  Goal: Ventilated patient's risk of aspiration is minimized  Description: Assess and monitor vital signs, respiratory status, airway cuff pressure, and labs (WBC).  Monitor for signs of aspiration (tachypnea, cough, rales, wheezing, cyanosis, fever).    - Elevate head of bed 30 degrees if patient has tube feeding.  - Monitor tube feeding.  3/4/2025 1249 by Susanna Jacob RN  Outcome: Adequate for Discharge  3/4/2025 0957 by Susanna Jacob RN  Outcome: Progressing     Problem: Nutrition  Goal: Nutrition/Hydration status is improving  Description: Monitor and assess patient's nutrition/hydration status for malnutrition (ex- brittle hair, bruises, dry skin, pale skin and conjunctiva, muscle wasting, smooth red tongue, and disorientation). Collaborate with interdisciplinary team and initiate plan and interventions as ordered.  Monitor patient's weight and dietary intake as ordered or per policy. Utilize nutrition screening tool and intervene per policy. Determine patient's food  preferences and provide high-protein, high-caloric foods as appropriate.     - Assist patient with eating.  - Allow adequate time for meals.  - Encourage patient to take dietary supplement as ordered.  - Collaborate with clinical nutritionist.  - Include patient/family/caregiver in decisions related to nutrition.  3/4/2025 1249 by Susanna Jacob RN  Outcome: Adequate for Discharge  3/4/2025 0957 by Susanna Jacob RN  Outcome: Progressing     Problem: PAIN - ADULT  Goal: Verbalizes/displays adequate comfort level or baseline comfort level  Description: Interventions:  - Encourage patient to monitor pain and request assistance  - Assess pain using appropriate pain scale  - Administer analgesics based on type and severity of pain and evaluate response  - Implement non-pharmacological measures as appropriate and evaluate response  - Consider cultural and social influences on pain and pain management  - Notify physician/advanced practitioner if interventions unsuccessful or patient reports new pain  3/4/2025 1249 by Susanna Jacob RN  Outcome: Adequate for Discharge  3/4/2025 0957 by Susanna Jacob RN  Outcome: Progressing     Problem: INFECTION - ADULT  Goal: Absence or prevention of progression during hospitalization  Description: INTERVENTIONS:  - Assess and monitor for signs and symptoms of infection  - Monitor lab/diagnostic results  - Monitor all insertion sites, i.e. indwelling lines, tubes, and drains  - Monitor endotracheal if appropriate and nasal secretions for changes in amount and color  - [place] appropriate cooling/warming therapies per order  - Administer medications as ordered  - Instruct and encourage patient and family to use good hand hygiene technique  - Identify and instruct in appropriate isolation precautions for identified infection/condition  3/4/2025 1249 by Susanna Jacob RN  Outcome: Adequate for Discharge  3/4/2025 0957 by Susanna Jacob RN  Outcome: Progressing  Goal: Absence of fever/infection  during neutropenic period  Description: INTERVENTIONS:  - Monitor WBC    3/4/2025 1249 by Susanna Jacob RN  Outcome: Adequate for Discharge  3/4/2025 0957 by Susanna Jacob RN  Outcome: Progressing     Problem: SAFETY ADULT  Goal: Patient will remain free of falls  Description: INTERVENTIONS:  - Educate patient/family on patient safety including physical limitations  - Instruct patient to call for assistance with activity   - Consult OT/PT to assist with strengthening/mobility   - Keep Call bell within reach  - Keep bed low and locked with side rails adjusted as appropriate  - Keep care items and personal belongings within reach  - Initiate and maintain comfort rounds  - Make Fall Risk Sign visible to staff  - Offer Toileting every  Hours, in advance of need  - Initiate/Maintain alarm  - Obtain necessary fall risk management equipment:   - Apply yellow socks and bracelet for high fall risk patients  - Consider moving patient to room near nurses station  3/4/2025 1249 by Susanna Jacob RN  Outcome: Adequate for Discharge  3/4/2025 0957 by Susanna Jacob RN  Outcome: Progressing  Goal: Maintain or return to baseline ADL function  Description: INTERVENTIONS:  -  Assess patient's ability to carry out ADLs; assess patient's baseline for ADL function and identify physical deficits which impact ability to perform ADLs (bathing, care of mouth/teeth, toileting, grooming, dressing, etc.)  - Assess/evaluate cause of self-care deficits   - Assess range of motion  - Assess patient's mobility; develop plan if impaired  - Assess patient's need for assistive devices and provide as appropriate  - Encourage maximum independence but intervene and supervise when necessary  - Involve family in performance of ADLs  - Assess for home care needs following discharge   - Consider OT consult to assist with ADL evaluation and planning for discharge  - Provide patient education as appropriate  3/4/2025 1249 by Susanna Jacob RN  Outcome: Adequate for  Discharge  3/4/2025 0957 by Susanna Jacob RN  Outcome: Progressing  Goal: Maintains/Returns to pre admission functional level  Description: INTERVENTIONS:  - Perform AM-PAC 6 Click Basic Mobility/ Daily Activity assessment daily.  - Set and communicate daily mobility goal to care team and patient/family/caregiver.   - Collaborate with rehabilitation services on mobility goals if consulted  - Perform Range of Motion  times a day.  - Reposition patient every  hours.  - Dangle patient  times a day  - Stand patient  times a day  - Ambulate patient  times a day  - Out of bed to chair  times a day   - Out of bed for meals  times a day  - Out of bed for toileting  - Record patient progress and toleration of activity level   3/4/2025 1249 by Susanna Jacob RN  Outcome: Adequate for Discharge  3/4/2025 0957 by Susanna Jacob RN  Outcome: Progressing     Problem: DISCHARGE PLANNING  Goal: Discharge to home or other facility with appropriate resources  Description: INTERVENTIONS:  - Identify barriers to discharge w/patient and caregiver  - Arrange for needed discharge resources and transportation as appropriate  - Identify discharge learning needs (meds, wound care, etc.)  - Arrange for interpretive services to assist at discharge as needed  - Refer to Case Management Department for coordinating discharge planning if the patient needs post-hospital services based on physician/advanced practitioner order or complex needs related to functional status, cognitive ability, or social support system  3/4/2025 1249 by Susanna Jacob RN  Outcome: Adequate for Discharge  3/4/2025 0957 by Susanna Jacob RN  Outcome: Progressing     Problem: Knowledge Deficit  Goal: Patient/family/caregiver demonstrates understanding of disease process, treatment plan, medications, and discharge instructions  Description: Complete learning assessment and assess knowledge base.  Interventions:  - Provide teaching at level of understanding  - Provide teaching via  preferred learning methods  3/4/2025 1249 by Susanna Jacob RN  Outcome: Adequate for Discharge  3/4/2025 0957 by Susanna Jacob RN  Outcome: Progressing     Problem: NEUROSENSORY - ADULT  Goal: Achieves stable or improved neurological status  Description: INTERVENTIONS  - Monitor and report changes in neurological status  - Monitor vital signs such as temperature, blood pressure, glucose, and any other labs ordered   - Initiate measures to prevent increased intracranial pressure  - Monitor for seizure activity and implement precautions if appropriate      3/4/2025 1249 by Susanna Jacob RN  Outcome: Adequate for Discharge  3/4/2025 0957 by Susanna Jacob RN  Outcome: Progressing  Goal: Remains free of injury related to seizures activity  Description: INTERVENTIONS  - Maintain airway, patient safety  and administer oxygen as ordered  - Monitor patient for seizure activity, document and report duration and description of seizure to physician/advanced practitioner  - If seizure occurs,  ensure patient safety during seizure  - Reorient patient post seizure  - Seizure pads on all 4 side rails  - Instruct patient/family to notify RN of any seizure activity including if an aura is experienced  - Instruct patient/family to call for assistance with activity based on nursing assessment  - Administer anti-seizure medications if ordered    3/4/2025 1249 by Susanna Jacob RN  Outcome: Adequate for Discharge  3/4/2025 0957 by Susanna Jacob RN  Outcome: Progressing  Goal: Achieves maximal functionality and self care  Description: INTERVENTIONS  - Monitor swallowing and airway patency with patient fatigue and changes in neurological status  - Encourage and assist patient to increase activity and self care.   - Encourage visually impaired, hearing impaired and aphasic patients to use assistive/communication devices  3/4/2025 1249 by Susanna Jacob RN  Outcome: Adequate for Discharge  3/4/2025 0957 by Susanna Jacob RN  Outcome: Progressing      Problem: METABOLIC, FLUID AND ELECTROLYTES - ADULT  Goal: Glucose maintained within target range  Description: INTERVENTIONS:  - Monitor Blood Glucose as ordered  - Assess for signs and symptoms of hyperglycemia and hypoglycemia  - Administer ordered medications to maintain glucose within target range  - Assess nutritional intake and initiate nutrition service referral as needed  3/4/2025 1249 by Susanna Jacob RN  Outcome: Adequate for Discharge  3/4/2025 0957 by Susanna Jacob RN  Outcome: Progressing     Problem: SKIN/TISSUE INTEGRITY - ADULT  Goal: Skin Integrity remains intact(Skin Breakdown Prevention)  Description: Assess:  -Perform Dustin assessment every   -Clean and moisturize skin every   -Inspect skin when repositioning, toileting, and assisting with ADLS  -Assess under medical devices such as  every   -Assess extremities for adequate circulation and sensation     Bed Management:  -Have minimal linens on bed & keep smooth, unwrinkled  -Change linens as needed when moist or perspiring  -Avoid sitting or lying in one position for more than  hours while in bed  -Keep HOB at degrees     Toileting:  -Offer bedside commode  -Assess for incontinence every   -Use incontinent care products after each incontinent episode such as     Activity:  -Mobilize patient  times a day  -Encourage activity and walks on unit  -Encourage or provide ROM exercises   -Turn and reposition patient every  Hours  -Use appropriate equipment to lift or move patient in bed  -Instruct/ Assist with weight shifting every  when out of bed in chair  -Consider limitation of chair time  hour intervals    Skin Care:  -Avoid use of baby powder, tape, friction and shearing, hot water or constrictive clothing  -Relieve pressure over bony prominences using   -Do not massage red bony areas    Next Steps:  -Teach patient strategies to minimize risks such as    -Consider consults to  interdisciplinary teams such as   3/4/2025 1249 by Susanna Jacob  RN  Outcome: Adequate for Discharge  3/4/2025 0957 by Susanna Jacob RN  Outcome: Progressing     Problem: PHYSICAL THERAPY ADULT  Goal: Performs mobility at highest level of function for planned discharge setting.  See evaluation for individualized goals.  Description: Treatment/Interventions: Functional transfer training, Therapeutic exercise, Endurance training, Gait training, Bed mobility, Equipment eval/education  Equipment Recommended: Walker       See flowsheet documentation for full assessment, interventions and recommendations.  Outcome: Adequate for Discharge     Problem: OCCUPATIONAL THERAPY ADULT  Goal: Performs self-care activities at highest level of function for planned discharge setting.  See evaluation for individualized goals.  Description: Treatment Interventions: ADL retraining, UE strengthening/ROM, Functional transfer training, Endurance training, Patient/family training, Energy conservation, Activityengagement          See flowsheet documentation for full assessment, interventions and recommendations.   Outcome: Adequate for Discharge

## 2025-03-07 ENCOUNTER — LAB REQUISITION (OUTPATIENT)
Dept: LAB | Facility: HOSPITAL | Age: 80
End: 2025-03-07

## 2025-03-07 DIAGNOSIS — N18.32 CHRONIC KIDNEY DISEASE, STAGE 3B (HCC): ICD-10-CM

## 2025-03-07 LAB
ANION GAP SERPL CALCULATED.3IONS-SCNC: 8 MMOL/L (ref 4–13)
BASOPHILS # BLD AUTO: 0.04 THOUSANDS/ÂΜL (ref 0–0.1)
BASOPHILS NFR BLD AUTO: 0 % (ref 0–1)
BUN SERPL-MCNC: 38 MG/DL (ref 5–25)
CALCIUM SERPL-MCNC: 9.1 MG/DL (ref 8.4–10.2)
CHLORIDE SERPL-SCNC: 104 MMOL/L (ref 96–108)
CO2 SERPL-SCNC: 25 MMOL/L (ref 21–32)
CREAT SERPL-MCNC: 1.59 MG/DL (ref 0.6–1.3)
EOSINOPHIL # BLD AUTO: 0.11 THOUSAND/ÂΜL (ref 0–0.61)
EOSINOPHIL NFR BLD AUTO: 1 % (ref 0–6)
ERYTHROCYTE [DISTWIDTH] IN BLOOD BY AUTOMATED COUNT: 14.6 % (ref 11.6–15.1)
GFR SERPL CREATININE-BSD FRML MDRD: 30 ML/MIN/1.73SQ M
GLUCOSE SERPL-MCNC: 179 MG/DL (ref 65–140)
HCT VFR BLD AUTO: 42.1 % (ref 34.8–46.1)
HGB BLD-MCNC: 13 G/DL (ref 11.5–15.4)
IMM GRANULOCYTES # BLD AUTO: 0.02 THOUSAND/UL (ref 0–0.2)
IMM GRANULOCYTES NFR BLD AUTO: 0 % (ref 0–2)
LYMPHOCYTES # BLD AUTO: 2.02 THOUSANDS/ÂΜL (ref 0.6–4.47)
LYMPHOCYTES NFR BLD AUTO: 19 % (ref 14–44)
MCH RBC QN AUTO: 29 PG (ref 26.8–34.3)
MCHC RBC AUTO-ENTMCNC: 30.9 G/DL (ref 31.4–37.4)
MCV RBC AUTO: 94 FL (ref 82–98)
MONOCYTES # BLD AUTO: 0.76 THOUSAND/ÂΜL (ref 0.17–1.22)
MONOCYTES NFR BLD AUTO: 7 % (ref 4–12)
NEUTROPHILS # BLD AUTO: 7.85 THOUSANDS/ÂΜL (ref 1.85–7.62)
NEUTS SEG NFR BLD AUTO: 73 % (ref 43–75)
NRBC BLD AUTO-RTO: 0 /100 WBCS
PLATELET # BLD AUTO: 173 THOUSANDS/UL (ref 149–390)
PMV BLD AUTO: 11.1 FL (ref 8.9–12.7)
POTASSIUM SERPL-SCNC: 4.5 MMOL/L (ref 3.5–5.3)
RBC # BLD AUTO: 4.49 MILLION/UL (ref 3.81–5.12)
SODIUM SERPL-SCNC: 137 MMOL/L (ref 135–147)
WBC # BLD AUTO: 10.8 THOUSAND/UL (ref 4.31–10.16)

## 2025-03-07 PROCEDURE — 80048 BASIC METABOLIC PNL TOTAL CA: CPT | Performed by: INTERNAL MEDICINE

## 2025-03-07 PROCEDURE — 85025 COMPLETE CBC W/AUTO DIFF WBC: CPT | Performed by: INTERNAL MEDICINE

## 2025-03-07 NOTE — TELEPHONE ENCOUNTER
Hello Good afternoon,     Can you please help schedule sooner?    Thank you in advance,     Sharmila

## 2025-03-07 NOTE — TELEPHONE ENCOUNTER
SCHEDULED BY :    Name: Jina Norris MRN: [id number]   Date: 8/13/2025 Status: Nikki   Time: 12:00 PM Length: 60   Visit Type: HOSP FOLLOW UP LONG [facility] [[id number]] Copay: $0.00   Provider: Charline Benjamin MD [facility]  Department Phone: [phone]   Referral Number:   Referral Status:     Notes: HFU/ [facility]ON/STROKE    DC-HOME- 3/2/2025   Made On:  Change Notes: 3/5/2025 2:22 PM  3/5/2025 2:32 PM By:  By: CASSI PRUITT [[id number]] (ES)  CASSI PRUITT [[id number]] (ES)

## 2025-03-12 ENCOUNTER — LAB REQUISITION (OUTPATIENT)
Dept: LAB | Facility: HOSPITAL | Age: 80
End: 2025-03-12

## 2025-03-12 DIAGNOSIS — N18.32 CHRONIC KIDNEY DISEASE, STAGE 3B (HCC): ICD-10-CM

## 2025-03-12 LAB
ANION GAP SERPL CALCULATED.3IONS-SCNC: 9 MMOL/L (ref 4–13)
BASOPHILS # BLD AUTO: 0.06 THOUSANDS/ÂΜL (ref 0–0.1)
BASOPHILS NFR BLD AUTO: 1 % (ref 0–1)
BUN SERPL-MCNC: 43 MG/DL (ref 5–25)
CALCIUM SERPL-MCNC: 9 MG/DL (ref 8.4–10.2)
CHLORIDE SERPL-SCNC: 103 MMOL/L (ref 96–108)
CO2 SERPL-SCNC: 24 MMOL/L (ref 21–32)
CREAT SERPL-MCNC: 1.74 MG/DL (ref 0.6–1.3)
EOSINOPHIL # BLD AUTO: 0.23 THOUSAND/ÂΜL (ref 0–0.61)
EOSINOPHIL NFR BLD AUTO: 2 % (ref 0–6)
ERYTHROCYTE [DISTWIDTH] IN BLOOD BY AUTOMATED COUNT: 14.6 % (ref 11.6–15.1)
GFR SERPL CREATININE-BSD FRML MDRD: 27 ML/MIN/1.73SQ M
GLUCOSE SERPL-MCNC: 156 MG/DL (ref 65–140)
HCT VFR BLD AUTO: 41.1 % (ref 34.8–46.1)
HGB BLD-MCNC: 12.6 G/DL (ref 11.5–15.4)
IMM GRANULOCYTES # BLD AUTO: 0.04 THOUSAND/UL (ref 0–0.2)
IMM GRANULOCYTES NFR BLD AUTO: 0 % (ref 0–2)
LYMPHOCYTES # BLD AUTO: 2.46 THOUSANDS/ÂΜL (ref 0.6–4.47)
LYMPHOCYTES NFR BLD AUTO: 24 % (ref 14–44)
MCH RBC QN AUTO: 29 PG (ref 26.8–34.3)
MCHC RBC AUTO-ENTMCNC: 30.7 G/DL (ref 31.4–37.4)
MCV RBC AUTO: 95 FL (ref 82–98)
MONOCYTES # BLD AUTO: 0.76 THOUSAND/ÂΜL (ref 0.17–1.22)
MONOCYTES NFR BLD AUTO: 7 % (ref 4–12)
NEUTROPHILS # BLD AUTO: 6.72 THOUSANDS/ÂΜL (ref 1.85–7.62)
NEUTS SEG NFR BLD AUTO: 66 % (ref 43–75)
NRBC BLD AUTO-RTO: 0 /100 WBCS
PLATELET # BLD AUTO: 178 THOUSANDS/UL (ref 149–390)
PMV BLD AUTO: 10.9 FL (ref 8.9–12.7)
POTASSIUM SERPL-SCNC: 4.6 MMOL/L (ref 3.5–5.3)
RBC # BLD AUTO: 4.35 MILLION/UL (ref 3.81–5.12)
SODIUM SERPL-SCNC: 136 MMOL/L (ref 135–147)
WBC # BLD AUTO: 10.27 THOUSAND/UL (ref 4.31–10.16)

## 2025-03-12 PROCEDURE — 85025 COMPLETE CBC W/AUTO DIFF WBC: CPT | Performed by: INTERNAL MEDICINE

## 2025-03-12 PROCEDURE — 80048 BASIC METABOLIC PNL TOTAL CA: CPT | Performed by: INTERNAL MEDICINE

## 2025-03-13 NOTE — TELEPHONE ENCOUNTER
3/13/25 - Called pt to provide sooner appt., No answer, LMOM offering 5/13/25 at 1p; however, I did not realize pt had an appt with Cardiology for the same day/time - Called pt back, still no answer LMOM to disregard previous message and to cb to sched sooner appt

## 2025-04-09 ENCOUNTER — TELEPHONE (OUTPATIENT)
Dept: FAMILY MEDICINE CLINIC | Facility: CLINIC | Age: 80
End: 2025-04-09

## 2025-04-09 NOTE — TELEPHONE ENCOUNTER
"SPOKE TO PATIENT TO TRY AND FIND OUT WHY SHE DOES NOT KEEP HER APPOINTMENTS.  LOOKS LIKE THE LAST TIME SHE WAS SEEN IN AN OFFICE WAS DECEMBER 2024 BY CARDIOLOGY.  SHE SAID SHE HAD 2 STROKES SINNCE THEN.  WAS IN THE [facility] AND THEN GOT TRANSFERRED TO \" THE [facility] \" AND IS STILL THERE, HAS NO IDEA YET IF IT WILL BE PERMANENT.  HER  IS IN A HOSPITAL IN [place], DIEING. BY TALKING TO THE PATIENT, IT DOES NOT SOUND LIKE SHE WILL BE LEAVING THE PERSONAL CARE HOME WHERE SHE IS  "

## 2025-04-09 NOTE — TELEPHONE ENCOUNTER
Clinical staff can we please call patient's pharmacy and find out who the prescriber is for her medication?, I have not filled her medications in many months I am curious where she is getting it from as it is not from me

## 2025-04-15 ENCOUNTER — APPOINTMENT (EMERGENCY)
Dept: CT IMAGING | Facility: HOSPITAL | Age: 80
End: 2025-04-15
Payer: MEDICARE

## 2025-04-15 ENCOUNTER — HOSPITAL ENCOUNTER (EMERGENCY)
Facility: HOSPITAL | Age: 80
Discharge: HOME | End: 2025-04-16
Attending: EMERGENCY MEDICINE
Payer: MEDICARE

## 2025-04-15 VITALS
OXYGEN SATURATION: 96 % | WEIGHT: 170.86 LBS | TEMPERATURE: 98.5 F | DIASTOLIC BLOOD PRESSURE: 60 MMHG | RESPIRATION RATE: 16 BRPM | SYSTOLIC BLOOD PRESSURE: 137 MMHG | BODY MASS INDEX: 31.25 KG/M2 | HEART RATE: 74 BPM
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
## 2025-04-15 DIAGNOSIS — S09.90XA INJURY OF HEAD, INITIAL ENCOUNTER: ICD-10-CM

## 2025-04-15 PROCEDURE — 99284 EMERGENCY DEPT VISIT MOD MDM: CPT

## 2025-04-15 PROCEDURE — 72125 CT NECK SPINE W/O DYE: CPT

## 2025-04-15 PROCEDURE — 70450 CT HEAD/BRAIN W/O DYE: CPT

## 2025-04-16 ENCOUNTER — TELEPHONE (OUTPATIENT)
Dept: FAMILY MEDICINE CLINIC | Facility: CLINIC | Age: 80
End: 2025-04-16

## 2025-04-16 PROCEDURE — 99284 EMERGENCY DEPT VISIT MOD MDM: CPT | Performed by: EMERGENCY MEDICINE

## 2025-04-16 NOTE — DISCHARGE INSTRUCTIONS
A  personal message from Dr. Shalom Whitmore,  Thank you so much for allowing me to care for you today.    I pride myself in the care and attention I give all my patients.  I hope you were a witness to this tonight.   If for any reason your condition does not improve or worsens, or you have a question that was not answered during your visit you can feel free to text me on my personal phone #  # [phone].   I will answer to your message and continue your care past your emergency room visit.     Please understand that although you are being discharged because your condition has been deemed stable and able to be managed on an outpatient setting. However your condition may worsen as part of the natural progression of the illness/condition, if this occurs please come back to the emergency department for a repeat evaluation.

## 2025-04-16 NOTE — ED PROVIDER NOTES
Time reflects when diagnosis was documented in both MDM as applicable and the Disposition within this note       Time User Action Codes Description Comment    4/15/2025 11:35 PM Shalom Whitmore [W19.XXXA] Fall, initial encounter     4/15/2025 11:35 PM Shalom Whitmore [S09.90XA] Injury of head, initial encounter           ED Disposition       ED Disposition   Discharge    Condition   Stable    Date/Time   Tue Apr 15, 2025 11:35 PM    Comment   Jina Norris discharge to home/self care.                   Assessment & Plan       Medical Decision Making  Patient able to be cleared via [facility] rules:  1.) GCS 15 within 2 hours of injury.  2.) There is no open or depressed skull fracture on physical exam.  3.) No signs of basilar skull fracture.  4.) There was not more than 1 episode of vomiting.  5.) There is no retrograde amnesia to greater than 30 minutes prior to the event.  6.) No dangerous mechanism (fall greater than 3 feet or struck as pedetrian)  XXX) Patient is less than 65 years old and older than 17.  8.) Patient is not on anticoagulants. I      Problems Addressed:  Fall, initial encounter: acute illness or injury    Amount and/or Complexity of Data Reviewed  Radiology: ordered.             Medications - No data to display    ED Risk Strat Scores                    No data recorded                            History of Present Illness       Chief Complaint   Patient presents with    Fall     Out of bed       Past Medical History:   Diagnosis Date    Chronic kidney disease, stage 3 (HCC)     CVA (cerebral vascular accident) (HCC)     s/p medtronic loop recorder 5/13/2024    Diabetes mellitus (HCC)     Disorder of gallbladder     Disorder of skin and subcutaneous tissue     Dyspnea     Essential hypertension     Hypercalcemia 11/19/2023    Hypokalemia     Malaise and fatigue     Mixed hyperlipidemia     Morbid obesity (HCC)     Pernicious anemia       Past Surgical History:   Procedure Laterality Date     BREAST BIOPSY Right     CARPAL TUNNEL RELEASE Bilateral     CHOLECYSTECTOMY      COLONOSCOPY      Dr. Apple     HYSTERECTOMY      LUMBAR EPIDURAL INJECTION      x3    PARTIAL HYSTERECTOMY      SKIN LESION EXCISION      scalp       Family History   Problem Relation Age of Onset    Stroke Mother     Atrial fibrillation Mother     Brain cancer Father     Other Brother         Twin brothers - Open heart    Other Brother         Twin brothers - Open heart    No Known Problems Maternal Grandmother     No Known Problems Paternal Grandmother       Social History     Tobacco Use    Smoking status: Former     Current packs/day: 0.00     Average packs/day: 1 pack/day for 14.0 years (14.0 ttl pk-yrs)     Types: Cigarettes     Start date:      Quit date:      Years since quittin.3    Smokeless tobacco: Never   Vaping Use    Vaping status: Never Used   Substance Use Topics    Alcohol use: Yes     Comment: Occasionally     Drug use: Never     Comment: Denies drug use - As per [facility]       E-Cigarette/Vaping    E-Cigarette Use Never User       E-Cigarette/Vaping Substances    Nicotine No     THC No     CBD No     Flavoring No     Other No     Unknown No       I have reviewed and agree with the history as documented.     Jina Norris is a 79 y.o.  year old female  Past Medical History:  No date: Chronic kidney disease, stage 3 (HCC)  No date: CVA (cerebral vascular accident) (HCC)      Comment:  s/p medtronic loop recorder 2024  No date: Diabetes mellitus (HCC)  No date: Disorder of gallbladder  No date: Disorder of skin and subcutaneous tissue  No date: Dyspnea  No date: Essential hypertension  2023: Hypercalcemia  No date: Hypokalemia  No date: Malaise and fatigue  No date: Mixed hyperlipidemia  No date: Morbid obesity (HCC)  No date: Pernicious anemia  Social History    Tobacco Use      Smoking status: Former        Packs/day: 0.00        Years: 1 pack/day for 14.0 years (14.0 ttl pk-yrs)        Types:  Cigarettes        Start date:         Quit date:         Years since quittin.3      Smokeless tobacco: Never    Vaping Use      Vaping status: Never Used    Alcohol use: Yes      Comment: Occasionally     Drug use: Never      Comment: Denies [facility] use - [facility]     Patient presents with:  Fall: Out of bed (rolled out)  Hit head, no neck pain  No n/v no diarrhea   No chest or abd pain, no back pain                    History provided by:  Patient   used: No    Fall  Associated symptoms: headaches    Associated symptoms: no abdominal pain, no back pain, no chest pain, no seizures and no vomiting        Review of Systems   Constitutional:  Negative for chills and fever.   HENT:  Negative for ear pain and sore throat.    Eyes:  Negative for pain and visual disturbance.   Respiratory:  Negative for cough and shortness of breath.    Cardiovascular:  Negative for chest pain and palpitations.   Gastrointestinal:  Negative for abdominal pain and vomiting.   Genitourinary:  Negative for dysuria and hematuria.   Musculoskeletal:  Negative for arthralgias and back pain.   Skin:  Negative for color change and rash.   Neurological:  Positive for headaches. Negative for seizures and syncope.   All other systems reviewed and are negative.          Objective       ED Triage Vitals [04/15/25 2242]   Temperature Pulse Blood Pressure Respirations SpO2 Patient Position - Orthostatic VS   98.5 °F (36.9 °C) 84 122/51 18 100 % --      Temp Source Heart Rate Source BP Location FiO2 (%) Pain Score    Temporal Monitor -- -- --      Vitals      Date and Time Temp Pulse SpO2 Resp BP Pain Score FACES Pain Rating User   04/15/25 2335 -- 74 96 % -- -- -- -- NAD   04/15/25 2331 -- -- -- -- 137/60 -- -- NAD   04/15/25 2330 -- 76 96 % -- -- -- -- NAD   04/15/25 2325 -- 78 96 % -- -- -- -- NAD   04/15/25 2320 -- 79 96 % -- -- -- -- NAD   04/15/25 2315 -- 76 97 % -- -- -- -- NAD   04/15/25 2310 -- 80 96 % -- -- --  -- NAD   04/15/25 2305 -- 77 96 % -- -- -- -- NAD   04/15/25 2301 -- 80 97 % 16 121/55 -- -- NAD   04/15/25 2300 -- 76 98 % 16 121/55 -- -- NAD   04/15/25 2257 -- 82 98 % -- -- -- -- NAD   04/15/25 2245 -- -- -- -- 122/51 -- -- NAD   04/15/25 2245 -- 75 97 % -- -- -- -- NAD   04/15/25 2242 98.5 °F (36.9 °C) 84 100 % 18 122/51 -- -- NAD            Physical Exam  Vitals and nursing note reviewed.   Constitutional:       General: She is not in acute distress.     Appearance: Normal appearance. She is well-developed. She is obese.   HENT:      Head: Normocephalic and atraumatic.      Right Ear: External ear normal.      Left Ear: External ear normal.      Mouth/Throat:      Mouth: Mucous membranes are moist.   Eyes:      Conjunctiva/sclera: Conjunctivae normal.      Pupils: Pupils are equal, round, and reactive to light.   Cardiovascular:      Rate and Rhythm: Normal rate and regular rhythm.      Heart sounds: No murmur heard.  Pulmonary:      Effort: Pulmonary effort is normal. No respiratory distress.      Breath sounds: Normal breath sounds.   Abdominal:      Palpations: Abdomen is soft.      Tenderness: There is no abdominal tenderness.   Musculoskeletal:         General: No swelling.      Cervical back: Neck supple. No rigidity or tenderness.   Skin:     General: Skin is warm and dry.      Capillary Refill: Capillary refill takes less than 2 seconds.   Neurological:      General: No focal deficit present.      Mental Status: She is alert and oriented to person, place, and time.   Psychiatric:         Mood and Affect: Mood normal.         Results Reviewed       None            CT head without contrast   Final Interpretation by Arthur Nieto MD (04/15 2315)      No acute intracranial abnormality.      Interval evolution of multiple small chronic foci of ischemia involving the left frontal and right frontal and parietal lobes described on a prior MRI brain dated March 3, 2025.      Chronic microangiopathic  changes redemonstrated.            Workstation performed: [id number]         CT spine cervical without contrast   Final Interpretation by Arthur Nieto MD (04/15 0929)      No cervical spine fracture or traumatic malalignment.                  Workstation performed: [id number]             Procedures    ED Medication and Procedure Management   Prior to Admission Medications   Prescriptions Last Dose Informant Patient Reported? Taking?   Continuous Glucose  (FreeStyle Bobby 3 [place]) RIYA  Self Yes No   Sig: Use   Continuous Glucose Sensor (FreeStyle Bobby 3 Sensor) MISC  Self No No   Sig: Use 1 sensor every 14 days for glucose monitoring.   Insulin Pen Needle 32G X 6 MM MISC  Self No No   Sig: Use in the morning   Lancets (freestyle) lancets  Self No No   Sig: Use as instructed -glucose monitor BID   Urea 20 Intensive Hydrating 20 % cream  Self Yes No   amLODIPine (NORVASC) 5 mg tablet   No No   Sig: Take 1 tablet (5 mg total) by mouth daily   apixaban (ELIQUIS) 5 mg   No No   Sig: Take 1 tablet (5 mg total) by mouth 2 (two) times a day   clopidogrel (PLAVIX) 75 mg tablet   No No   Sig: take 1 tablet by mouth once daily   fluticasone (FLONASE) 50 mcg/act nasal spray  Self No No   Si sprays into each nostril daily   folic acid (FOLVITE) 400 mcg tablet  Self Yes No   Sig: Take 400 mcg by mouth daily   glucose blood (FREESTYLE LITE) test strip  Self No No   Sig: Use 1 each 3 (three) times a day Use as instructed   glucose monitoring kit (FREESTYLE) monitoring kit  Self No No   Si each by Does not apply route daily   insulin degludec (Tresiba FlexTouch) 100 units/mL injection pen   No No   Sig: Inject 24 units nightly.   levothyroxine 50 mcg tablet  Self No No   Sig: Take 1 tablet (50 mcg total) by mouth daily   linaGLIPtin (Tradjenta) 5 MG TABS   No No   Sig: Take 5 mg by mouth daily   magnesium oxide (MAG-OX) 400 mg tablet  Self No No   Sig: Take 1 tablet (400 mg total) by mouth 2 (two) times a day    metoprolol succinate (TOPROL-XL) 25 mg 24 hr tablet   No No   Sig: Take 1 tablet (25 mg total) by mouth daily   nateglinide (STARLIX) 60 mg tablet  Self No No   Sig: Take 1 tablet (60 mg total) by mouth 3 (three) times a day before meals   pantoprazole (PROTONIX) 40 mg tablet   No No   Sig: Take 1 tablet (40 mg total) by mouth daily in the early morning   rosuvastatin (CRESTOR) 10 MG tablet   No No   Sig: Take 1 tablet (10 mg total) by mouth daily      Facility-Administered Medications: None     Discharge Medication List as of 4/15/2025 11:35 PM        CONTINUE these medications which have NOT CHANGED    Details   amLODIPine (NORVASC) 5 mg tablet Take 1 tablet (5 mg total) by mouth daily, Starting Tue 12/31/2024, Normal      apixaban (ELIQUIS) 5 mg Take 1 tablet (5 mg total) by mouth 2 (two) times a day, Starting Tue 3/4/2025, No Print      clopidogrel (PLAVIX) 75 mg tablet take 1 tablet by mouth once daily, Starting Thu 10/3/2024, Normal      Continuous Glucose  (FreeStyle Bobby 3 [place]) RIYA Use, Historical Med      Continuous Glucose Sensor (FreeStyle Bobby 3 Sensor) MISC Use 1 sensor every 14 days for glucose monitoring., Normal      fluticasone (FLONASE) 50 mcg/act nasal spray 2 sprays into each nostril daily, Starting Wed 11/30/2022, Normal      folic acid (FOLVITE) 400 mcg tablet Take 400 mcg by mouth daily, Historical Med      glucose blood (FREESTYLE LITE) test strip Use 1 each 3 (three) times a day Use as instructed, Starting Tue 3/26/2024, Normal      glucose monitoring kit (FREESTYLE) monitoring kit 1 each by Does not apply route daily, Starting Wed 6/3/2020, Normal      insulin degludec (Tresiba FlexTouch) 100 units/mL injection pen Inject 24 units nightly., Normal      Insulin Pen Needle 32G X 6 MM MISC Use in the morning, Starting Tue 2/8/2022, Normal      Lancets (freestyle) lancets Use as instructed -glucose monitor BID, Normal      levothyroxine 50 mcg tablet Take 1 tablet (50 mcg total)  by mouth daily, Starting Thu 4/18/2024, Normal      linaGLIPtin (Tradjenta) 5 MG TABS Take 5 mg by mouth daily, Starting Tue 12/31/2024, Normal      magnesium oxide (MAG-OX) 400 mg tablet Take 1 tablet (400 mg total) by mouth 2 (two) times a day, Starting Fri 6/21/2024, Normal      metoprolol succinate (TOPROL-XL) 25 mg 24 hr tablet Take 1 tablet (25 mg total) by mouth daily, Starting Sun 3/2/2025, No Print      nateglinide (STARLIX) 60 mg tablet Take 1 tablet (60 mg total) by mouth 3 (three) times a day before meals, Starting Thu 8/1/2024, Normal      pantoprazole (PROTONIX) 40 mg tablet Take 1 tablet (40 mg total) by mouth daily in the early morning, Starting Tue 12/31/2024, Normal      rosuvastatin (CRESTOR) 10 MG tablet Take 1 tablet (10 mg total) by mouth daily, Starting Tue 12/31/2024, Normal      Urea 20 Intensive Hydrating 20 % cream Starting Thu 12/29/2022, Historical Med           No discharge procedures on file.  ED SEPSIS DOCUMENTATION   Time reflects when diagnosis was documented in both MDM as applicable and the Disposition within this note       Time User Action Codes Description Comment    4/15/2025 11:35 PM Shalom Whitmore [W19.XXXA] Fall, initial encounter     4/15/2025 11:35 PM Shalom Whitmore [S09.90XA] Injury of head, initial encounter                  Shalom Whitmore MD  04/16/25 0236

## 2025-04-18 ENCOUNTER — TELEPHONE (OUTPATIENT)
Dept: FAMILY MEDICINE CLINIC | Facility: CLINIC | Age: 80
End: 2025-04-18

## 2025-04-18 NOTE — TELEPHONE ENCOUNTER
Called patient and left detailed message that Dr Melendez received a request for alcohol pads and Pen [place] from [employer].  I do not see that the Pen Needles were sent to this company in the past.  Asked for patient to call office and verify.

## 2025-04-23 NOTE — TELEPHONE ENCOUNTER
Called patient and left message for patient to call in regards to receiving a form for diabetic supplies.

## 2025-05-16 ENCOUNTER — REMOTE DEVICE CLINIC VISIT (OUTPATIENT)
Dept: CARDIOLOGY CLINIC | Facility: CLINIC | Age: 80
End: 2025-05-16
Payer: MEDICARE

## 2025-05-16 DIAGNOSIS — I63.9 CRYPTOGENIC STROKE (HCC): Primary | ICD-10-CM

## 2025-05-16 DIAGNOSIS — I48.0 PAROXYSMAL ATRIAL FIBRILLATION (HCC): ICD-10-CM

## 2025-05-16 PROCEDURE — 93298 REM INTERROG DEV EVAL SCRMS: CPT | Performed by: INTERNAL MEDICINE

## 2025-05-16 NOTE — PROGRESS NOTES
"Results for orders placed or performed in visit on 05/16/25   Cardiac EP device report    Narrative    MDT LOOP II/ACTIVE SYSTEM IS MRI CONDITIONAL  CARELINK TRANSMISSION: BATTERY STATUS \"GOOD.\" DEVICE CLASSIFIED 1 NEW AF EPISODE SINCE LAST REMOTE, AVG HR- 74 BPM, MAX DURATION 1.1 HRS- NSR W/ PAC'S ON ECG; NO AF. AF BURDEN SINCE 2/13/25- 1.8%. PT ON ELIQUIS, CLOPIDOGREL & METOPROLOL. NO PT ACTIVATED EPISODES. PRESENTING RHYTHM NSR W/ PAC'S. NORMAL DEVICE FUNCTION. GV        "

## 2025-05-19 ENCOUNTER — RESULTS FOLLOW-UP (OUTPATIENT)
Dept: CARDIOLOGY CLINIC | Facility: CLINIC | Age: 80
End: 2025-05-19

## 2025-06-26 ENCOUNTER — VBI (OUTPATIENT)
Dept: ADMINISTRATIVE | Facility: OTHER | Age: 80
End: 2025-06-26

## 2025-06-26 NOTE — TELEPHONE ENCOUNTER
06/26/25 3:17 PM    Patient was called to schedule a diabetic follow up apointment ; a message was left for the patient to return the call.    Thank you.  Courtney Carias MA  [facility]

## 2025-07-08 ENCOUNTER — TELEPHONE (OUTPATIENT)
Dept: NEUROLOGY | Facility: CLINIC | Age: 80
End: 2025-07-08

## 2025-07-08 NOTE — TELEPHONE ENCOUNTER
Called to notify appt on 8/13/25 will need to be r/s due to provider not coming in to the office. Offered sooner opening tomorrow requested a call back as soon as possible if agreeable  to sooner appt pls secure chat Vanessa Amezcua practice coordinator to schedule

## 2025-07-22 ENCOUNTER — HOSPITAL ENCOUNTER (EMERGENCY)
Facility: HOSPITAL | Age: 80
Discharge: HOME | End: 2025-07-22
Attending: EMERGENCY MEDICINE | Admitting: EMERGENCY MEDICINE
Payer: MEDICARE

## 2025-07-22 VITALS
DIASTOLIC BLOOD PRESSURE: 78 MMHG | SYSTOLIC BLOOD PRESSURE: 191 MMHG | TEMPERATURE: 97.3 F | BODY MASS INDEX: 31.28 KG/M2 | RESPIRATION RATE: 20 BRPM | HEIGHT: 62 IN | HEART RATE: 77 BPM | OXYGEN SATURATION: 99 % | WEIGHT: 170 LBS

## 2025-07-22 DIAGNOSIS — I77.6 VASCULITIS (HCC): Primary | ICD-10-CM

## 2025-07-22 LAB
ALBUMIN SERPL BCG-MCNC: 3.4 G/DL (ref 3.5–5)
ALP SERPL-CCNC: 62 U/L (ref 34–104)
ALT SERPL W P-5'-P-CCNC: 7 U/L (ref 7–52)
ANION GAP SERPL CALCULATED.3IONS-SCNC: 6 MMOL/L (ref 4–13)
APTT PPP: 36 SECONDS (ref 23–34)
AST SERPL W P-5'-P-CCNC: 14 U/L (ref 13–39)
BACTERIA UR QL AUTO: NORMAL /HPF
BASOPHILS # BLD AUTO: 0.04 THOUSANDS/ÂΜL (ref 0–0.1)
BASOPHILS NFR BLD AUTO: 0 % (ref 0–1)
BILIRUB SERPL-MCNC: 0.31 MG/DL (ref 0.2–1)
BILIRUB UR QL STRIP: NEGATIVE
BUN SERPL-MCNC: 28 MG/DL (ref 5–25)
CALCIUM ALBUM COR SERPL-MCNC: 9.2 MG/DL (ref 8.3–10.1)
CALCIUM SERPL-MCNC: 8.7 MG/DL (ref 8.4–10.2)
CHLORIDE SERPL-SCNC: 101 MMOL/L (ref 96–108)
CLARITY UR: CLEAR
CO2 SERPL-SCNC: 26 MMOL/L (ref 21–32)
COLOR UR: YELLOW
CREAT SERPL-MCNC: 1.53 MG/DL (ref 0.6–1.3)
CRP SERPL QL: 13.3 MG/L
EOSINOPHIL # BLD AUTO: 0.17 THOUSAND/ÂΜL (ref 0–0.61)
EOSINOPHIL NFR BLD AUTO: 2 % (ref 0–6)
ERYTHROCYTE [DISTWIDTH] IN BLOOD BY AUTOMATED COUNT: 14.4 % (ref 11.6–15.1)
ERYTHROCYTE [SEDIMENTATION RATE] IN BLOOD: 25 MM/HOUR (ref 0–29)
GFR SERPL CREATININE-BSD FRML MDRD: 31 ML/MIN/1.73SQ M
GLUCOSE SERPL-MCNC: 180 MG/DL (ref 65–140)
GLUCOSE UR STRIP-MCNC: NEGATIVE MG/DL
HCT VFR BLD AUTO: 36.2 % (ref 34.8–46.1)
HGB BLD-MCNC: 11.1 G/DL (ref 11.5–15.4)
HGB UR QL STRIP.AUTO: ABNORMAL
IMM GRANULOCYTES # BLD AUTO: 0.03 THOUSAND/UL (ref 0–0.2)
IMM GRANULOCYTES NFR BLD AUTO: 0 % (ref 0–2)
INR PPP: 1.19 (ref 0.85–1.19)
KETONES UR STRIP-MCNC: NEGATIVE MG/DL
LEUKOCYTE ESTERASE UR QL STRIP: NEGATIVE
LYMPHOCYTES # BLD AUTO: 1.96 THOUSANDS/ÂΜL (ref 0.6–4.47)
LYMPHOCYTES NFR BLD AUTO: 21 % (ref 14–44)
MCH RBC QN AUTO: 28.3 PG (ref 26.8–34.3)
MCHC RBC AUTO-ENTMCNC: 30.7 G/DL (ref 31.4–37.4)
MCV RBC AUTO: 92 FL (ref 82–98)
MONOCYTES # BLD AUTO: 0.72 THOUSAND/ÂΜL (ref 0.17–1.22)
MONOCYTES NFR BLD AUTO: 8 % (ref 4–12)
NEUTROPHILS # BLD AUTO: 6.53 THOUSANDS/ÂΜL (ref 1.85–7.62)
NEUTS SEG NFR BLD AUTO: 69 % (ref 43–75)
NITRITE UR QL STRIP: NEGATIVE
NON-SQ EPI CELLS URNS QL MICRO: NORMAL /HPF
NRBC BLD AUTO-RTO: 0 /100 WBCS
PH UR STRIP.AUTO: 6 [PH]
PLATELET # BLD AUTO: 168 THOUSANDS/UL (ref 149–390)
PMV BLD AUTO: 10.1 FL (ref 8.9–12.7)
POTASSIUM SERPL-SCNC: 4.7 MMOL/L (ref 3.5–5.3)
PROT SERPL-MCNC: 6.8 G/DL (ref 6.4–8.4)
PROT UR STRIP-MCNC: NEGATIVE MG/DL
PROTHROMBIN TIME: 15.3 SECONDS (ref 12.3–15)
RBC # BLD AUTO: 3.92 MILLION/UL (ref 3.81–5.12)
RBC #/AREA URNS AUTO: NORMAL /HPF
SODIUM SERPL-SCNC: 133 MMOL/L (ref 135–147)
SP GR UR STRIP.AUTO: <=1.005
UROBILINOGEN UR QL STRIP.AUTO: 0.2 E.U./DL
WBC # BLD AUTO: 9.45 THOUSAND/UL (ref 4.31–10.16)
WBC #/AREA URNS AUTO: NORMAL /HPF

## 2025-07-22 PROCEDURE — 85652 RBC SED RATE AUTOMATED: CPT | Performed by: EMERGENCY MEDICINE

## 2025-07-22 PROCEDURE — 81001 URINALYSIS AUTO W/SCOPE: CPT | Performed by: EMERGENCY MEDICINE

## 2025-07-22 PROCEDURE — 86140 C-REACTIVE PROTEIN: CPT | Performed by: EMERGENCY MEDICINE

## 2025-07-22 PROCEDURE — 86162 COMPLEMENT TOTAL (CH50): CPT | Performed by: EMERGENCY MEDICINE

## 2025-07-22 PROCEDURE — 99284 EMERGENCY DEPT VISIT MOD MDM: CPT | Performed by: EMERGENCY MEDICINE

## 2025-07-22 PROCEDURE — 99283 EMERGENCY DEPT VISIT LOW MDM: CPT

## 2025-07-22 PROCEDURE — 85025 COMPLETE CBC W/AUTO DIFF WBC: CPT | Performed by: EMERGENCY MEDICINE

## 2025-07-22 PROCEDURE — 81003 URINALYSIS AUTO W/O SCOPE: CPT | Performed by: EMERGENCY MEDICINE

## 2025-07-22 PROCEDURE — 85730 THROMBOPLASTIN TIME PARTIAL: CPT | Performed by: EMERGENCY MEDICINE

## 2025-07-22 PROCEDURE — 80053 COMPREHEN METABOLIC PANEL: CPT | Performed by: EMERGENCY MEDICINE

## 2025-07-22 PROCEDURE — 86160 COMPLEMENT ANTIGEN: CPT | Performed by: EMERGENCY MEDICINE

## 2025-07-22 PROCEDURE — 36415 COLL VENOUS BLD VENIPUNCTURE: CPT | Performed by: EMERGENCY MEDICINE

## 2025-07-22 PROCEDURE — 85610 PROTHROMBIN TIME: CPT | Performed by: EMERGENCY MEDICINE

## 2025-07-22 RX ORDER — TRIAMCINOLONE ACETONIDE 1 MG/G
OINTMENT TOPICAL 2 TIMES DAILY
Qty: 80 G | Refills: 0 | Status: SHIPPED | OUTPATIENT
Start: 2025-07-22 | End: 2025-08-05

## 2025-07-22 NOTE — ED PROVIDER NOTES
"Time reflects when diagnosis was documented in both MDM as applicable and the Disposition within this note       Time User Action Codes Description Comment    7/22/2025  7:23 PM Santiago Goel Add [I77.6] Vasculitis (HCC)           ED Disposition       ED Disposition   Discharge    Condition   Stable    Date/Time   Tue Jul 22, 2025  7:23 PM    Comment   Jina Norris discharge to home/self care.                   Assessment & Plan       Medical Decision Making  Amount and/or Complexity of Data Reviewed  Labs: ordered.    Risk  Prescription drug management.      Please see photos inserted in the chart, case discussed with dermatology, prescient their input, follow-up with dermatology clinic within the next 96 hours to 1 week, start triamcinolone 0.1 % cream BID x 2 week, advised blood work, see ED course for specific.  After patient was initially evaluated brief focused differential diagnosis in this patient is as follows: vasculitis versus ITP vs. other platelet abnormality.    Portions of the record may have been created with voice recognition software. Occasional wrong word or \"sound a like\" substitutions may have occurred due to the inherent limitations of voice recognition software. Read the chart carefully and recognize, using context, where substitutions have occurred.   ED Course as of 07/22/25 2006 Tue Jul 22, 2025   1832 Images sent to dermatology on-call   1920 As per Dr. Jessica Mcintyre, dermatology on call, suggested to get complement level: C3, C4, total complement level, UA, BMP. Can use topical triamcinolone 0.1% twice daily for 2 weeks only if symptomatic. Otherwise we will try to get her an appointment Friday for follow up.       Medications - No data to display    ED Risk Strat Scores                    (ISAR) Identification of Seniors at Risk  Before the illness or injury that brought you to the Emergency, did you need someone to help you on a regular basis?: 0  In the last 24 hours, have you " needed more help than usual?: 1  Have you been hospitalized for one or more nights during the past 6 months?: 0  In general, do you see well?: 0  In general, do you have serious problems with your memory?: 0  Do you take more than three different medications every day?: 1  ISAR Score: 2            SBIRT 22yo+      Flowsheet Row Most Recent Value   Initial Alcohol Screen: US AUDIT-C     1. How often do you have a drink containing alcohol? 0 Filed at: 07/22/2025 1752   2. How many drinks containing alcohol do you have on a typical day you are drinking?  0 Filed at: 07/22/2025 1752   3a. Male UNDER 65: How often do you have five or more drinks on one occasion? 0 Filed at: 07/22/2025 1752   3b. FEMALE Any Age, or MALE 65+: How often do you have 4 or more drinks on one occassion? 0 Filed at: 07/22/2025 1752   Audit-C Score 0 Filed at: 07/22/2025 1752   MICHAEL: How many times in the past year have you...    Used an illegal drug or used a prescription medication for non-medical reasons? Never Filed at: 07/22/2025 1752                            History of Present Illness       Chief Complaint   Patient presents with    Rash     Pt has a rash bilaterally that started a week ago       Past Medical History[1]   Past Surgical History[2]   Family History[3]   Social History[4]   E-Cigarette/Vaping    E-Cigarette Use Never User       E-Cigarette/Vaping Substances    Nicotine No     THC No     CBD No     Flavoring No     Other No     Unknown No       I have reviewed and agree with the history as documented.     HPI    80-year-old female presents emergency department 1 week history of a rash noted to the bilateral extremities tending upwards to the mid leg region.  No history of fevers, chills, sloughing the skin, change in medications.  Patient had outpatient workup today when she had blood work was results are not known and was sent to the emergency department for further evaluation.  Denies any trauma or recent travels at the  geographic area, patient is from the [facility].    Remaining 12 point review of systems is unremarkable.  Review of Systems   Constitutional: Negative.  Negative for chills, diaphoresis, fatigue and fever.   HENT: Negative.     Eyes: Negative.    Respiratory: Negative.  Negative for chest tightness and shortness of breath.    Cardiovascular: Negative.  Negative for chest pain, palpitations and leg swelling.   Gastrointestinal: Negative.  Negative for abdominal pain, nausea and vomiting.   Endocrine: Negative.    Genitourinary: Negative.    Musculoskeletal: Negative.    Skin:  Positive for rash.   Allergic/Immunologic: Negative.    Neurological: Negative.    Hematological: Negative.    Psychiatric/Behavioral: Negative.             Objective       ED Triage Vitals [07/22/25 1755]   Temperature Pulse Blood Pressure Respirations SpO2 Patient Position - Orthostatic VS   (!) 97.3 °F (36.3 °C) 77 (!) 191/78 20 99 % --      Temp Source Heart Rate Source BP Location FiO2 (%) Pain Score    Temporal Monitor -- -- --      Vitals      Date and Time Temp Pulse SpO2 Resp BP Pain Score FACES Pain Rating User   07/22/25 1755 97.3 °F (36.3 °C) 77 99 % 20 191/78 -- -- AC            Physical Exam  Vitals and nursing note reviewed.   Constitutional:       General: She is not in acute distress.     Appearance: Normal appearance. She is normal weight. She is not ill-appearing, toxic-appearing or diaphoretic.   HENT:      Head: Normocephalic and atraumatic.      Right Ear: External ear normal.      Left Ear: External ear normal.      Nose: Nose normal.      Mouth/Throat:      Mouth: Mucous membranes are moist.      Pharynx: Oropharynx is clear.     Eyes:      Extraocular Movements: Extraocular movements intact.      Pupils: Pupils are equal, round, and reactive to light.       Cardiovascular:      Rate and Rhythm: Normal rate and regular rhythm.      Pulses: Normal pulses.      Heart sounds: Normal heart sounds.    Pulmonary:      Effort: Pulmonary effort is normal.      Breath sounds: Normal breath sounds.   Abdominal:      General: Abdomen is flat.     Musculoskeletal:         General: Normal range of motion.      Cervical back: Normal range of motion.     Skin:     General: Skin is warm.      Capillary Refill: Capillary refill takes less than 2 seconds.      Coloration: Skin is not jaundiced.      Findings: Rash present.      Comments: No erythema, induration, fluctuance, ecchymosis, crepitus, deformity noted on overlying skin exam.  Full range of motion over affected area with no evidence of pain out of proportion w/ palpation.   Purpura noted b/l lower extremities.     Neurological:      General: No focal deficit present.      Mental Status: She is alert and oriented to person, place, and time. Mental status is at baseline.     Psychiatric:         Mood and Affect: Mood normal.         Behavior: Behavior normal.         Thought Content: Thought content normal.           Results Reviewed       Procedure Component Value Units Date/Time    Urine Microscopic [[id number]]  (Normal) Collected: 07/22/25 1947    Lab Status: Final result Specimen: Urine, Clean Catch Updated: 07/22/25 2004     RBC, UA 0-5 /hpf      WBC, UA 0-1 /hpf      Epithelial Cells Occasional /hpf      Bacteria, UA None Seen /hpf     UA w Reflex to Microscopic w Reflex to Culture [[id number]]  (Abnormal) Collected: 07/22/25 1947    Lab Status: Final result Specimen: Urine, Clean Catch Updated: 07/22/25 1954     Color, UA Yellow     Clarity, UA Clear     Specific Gravity, UA <=1.005     pH, UA 6.0     Leukocytes, UA Negative     Nitrite, UA Negative     Protein, UA Negative mg/dl      Glucose, UA Negative mg/dl      Ketones, UA Negative mg/dl      Urobilinogen, UA 0.2 E.U./dl      Bilirubin, UA Negative     Occult Blood, UA 1+    Complement, total [[id number]] Collected: 07/1945    Lab Status: In process Specimen: Blood from Arm, Left Updated: 07/22/25  1950    C4 complement [[id number]] Collected: 07/1945    Lab Status: In process Specimen: Blood from Arm, Left Updated: 07/22/25 1950    C3 complement [[id number]] Collected: 07/1945    Lab Status: In process Specimen: Blood from Arm, Left Updated: 07/22/25 1950    Protime-INR [[id number]]  (Abnormal) Collected: 07/22/25 1823    Lab Status: Final result Specimen: Blood from Arm, Left Updated: 07/22/25 1911     Protime 15.3 seconds      INR 1.19    Narrative:      INR Therapeutic Range    Indication                                             INR Range      Atrial Fibrillation                                               2.0-3.0  Hypercoagulable State                                    2.0.2.3  Left Ventricular Asist Device                            2.0-3.0  Mechanical Heart Valve                                  -    Aortic(with afib, MI, embolism, HF, LA enlargement,    and/or coagulopathy)                                     2.0-3.0 (2.5-3.5)     Mitral                                                             2.5-3.5  Prosthetic/Bioprosthetic Heart Valve               2.0-3.0  Venous thromboembolism (VTE: VT, PE        2.0-3.0    APTT [[id number]]  (Abnormal) Collected: 07/22/25 1823    Lab Status: Final result Specimen: Blood from Arm, Left Updated: 07/22/25 1911     PTT 36 seconds     Comprehensive metabolic panel [[id number]]  (Abnormal) Collected: 07/22/25 1823    Lab Status: Final result Specimen: Blood from Arm, Left Updated: 07/22/25 1843     Sodium 133 mmol/L      Potassium 4.7 mmol/L      Chloride 101 mmol/L      CO2 26 mmol/L      ANION GAP 6 mmol/L      BUN 28 mg/dL      Creatinine 1.53 mg/dL      Glucose 180 mg/dL      Calcium 8.7 mg/dL      Corrected Calcium 9.2 mg/dL      AST 14 U/L      ALT 7 U/L      Alkaline Phosphatase 62 U/L      Total Protein 6.8 g/dL      Albumin 3.4 g/dL      Total Bilirubin 0.31 mg/dL      eGFR 31 ml/min/1.73sq m     Narrative:      Slightly Hemolyzed:Results may  be affected.  National Kidney Disease Foundation guidelines for Chronic Kidney Disease (CKD):     Stage 1 with normal or high GFR (GFR > 90 mL/min/1.73 square meters)    Stage 2 Mild CKD (GFR = 60-89 mL/min/1.73 square meters)    Stage 3A Moderate CKD (GFR = 45-59 mL/min/1.73 square meters)    Stage 3B Moderate CKD (GFR = 30-44 mL/min/1.73 square meters)    Stage 4 Severe CKD (GFR = 15-29 mL/min/1.73 square meters)    Stage 5 End Stage CKD (GFR <15 mL/min/1.73 square meters)  Note: GFR calculation is accurate only with a steady state creatinine    C-reactive protein [[id number]]  (Abnormal) Collected: 07/22/25 1823    Lab Status: Final result Specimen: Blood from Arm, Left Updated: 07/22/25 1843     CRP 13.3 mg/L     Narrative:      Slightly Hemolyzed:Results may be affected.    Sedimentation rate, automated [[id number]]  (Normal) Collected: 07/22/25 1823    Lab Status: Final result Specimen: Blood from Arm, Left Updated: 07/22/25 1833     Sed Rate 25 mm/hour     CBC and differential [[id number]]  (Abnormal) Collected: 07/22/25 1823    Lab Status: Final result Specimen: Blood from Arm, Left Updated: 07/22/25 1828     WBC 9.45 Thousand/uL      RBC 3.92 Million/uL      Hemoglobin 11.1 g/dL      Hematocrit 36.2 %      MCV 92 fL      MCH 28.3 pg      MCHC 30.7 g/dL      RDW 14.4 %      MPV 10.1 fL      Platelets 168 Thousands/uL      nRBC 0 /100 WBCs      Segmented % 69 %      Immature Grans % 0 %      Lymphocytes % 21 %      Monocytes % 8 %      Eosinophils Relative 2 %      Basophils Relative 0 %      Absolute Neutrophils 6.53 Thousands/µL      Absolute Immature Grans 0.03 Thousand/uL      Absolute Lymphocytes 1.96 Thousands/µL      Absolute Monocytes 0.72 Thousand/µL      Eosinophils Absolute 0.17 Thousand/µL      Basophils Absolute 0.04 Thousands/µL             No orders to display       Procedures    ED Medication and Procedure Management   Prior to Admission Medications   Prescriptions Last Dose Informant Patient  Reported? Taking?   Continuous Glucose  (FreeStyle Bobby 3 [place]) RIYA  Self Yes No   Sig: Use   Patient not taking: Reported on 2025   Continuous Glucose Sensor (FreeStyle Bobby 3 Sensor) MISC  Self No No   Sig: Use 1 sensor every 14 days for glucose monitoring.   Patient not taking: Reported on 2025   Insulin Pen Needle 32G X 6 MM MISC  Self No No   Sig: Use in the morning   Lancets (freestyle) lancets  Self No No   Sig: Use as instructed -glucose monitor BID   Urea 20 Intensive Hydrating 20 % cream  Self Yes No   Patient not taking: Reported on 2025   amLODIPine (NORVASC) 5 mg tablet   No No   Sig: Take 1 tablet (5 mg total) by mouth daily   apixaban (ELIQUIS) 5 mg   No No   Sig: Take 1 tablet (5 mg total) by mouth 2 (two) times a day   clopidogrel (PLAVIX) 75 mg tablet   No No   Sig: take 1 tablet by mouth once daily   fluticasone (FLONASE) 50 mcg/act nasal spray  Self No No   Si sprays into each nostril daily   folic acid (FOLVITE) 400 mcg tablet  Self Yes No   Sig: Take 400 mcg by mouth daily   Patient not taking: Reported on 2025   glucose blood (FREESTYLE LITE) test strip  Self No No   Sig: Use 1 each 3 (three) times a day Use as instructed   glucose monitoring kit (FREESTYLE) monitoring kit  Self No No   Si each by Does not apply route daily   Patient not taking: Reported on 2025   insulin degludec (Tresiba FlexTouch) 100 units/mL injection pen   No No   Sig: Inject 24 units nightly.   levothyroxine 50 mcg tablet  Self No No   Sig: Take 1 tablet (50 mcg total) by mouth daily   linaGLIPtin (Tradjenta) 5 MG TABS   No No   Sig: Take 5 mg by mouth daily   magnesium oxide (MAG-OX) 400 mg tablet  Self No No   Sig: Take 1 tablet (400 mg total) by mouth 2 (two) times a day   metoprolol succinate (TOPROL-XL) 25 mg 24 hr tablet   No No   Sig: Take 1 tablet (25 mg total) by mouth daily   nateglinide (STARLIX) 60 mg tablet  Self No No   Sig: Take 1 tablet (60 mg total) by mouth 3  (three) times a day before meals   pantoprazole (PROTONIX) 40 mg tablet   No No   Sig: Take 1 tablet (40 mg total) by mouth daily in the early morning   rosuvastatin (CRESTOR) 10 MG tablet   No No   Sig: Take 1 tablet (10 mg total) by mouth daily      Facility-Administered Medications: None     Patient's Medications   Discharge Prescriptions    TRIAMCINOLONE (KENALOG) 0.1 % OINTMENT    Apply topically 2 (two) times a day for 14 days       Start Date: 7/22/2025 End Date: 8/5/2025       Order Dose: --       Quantity: 80 g    Refills: 0       ED SEPSIS DOCUMENTATION   Time reflects when diagnosis was documented in both MDM as applicable and the Disposition within this note       Time User Action Codes Description Comment    7/22/2025  7:23 PM Santiago Goel Add [I77.6] Vasculitis (HCC)                        [1]   Past Medical History:  Diagnosis Date    Chronic kidney disease, stage 3 (HCC)     CVA (cerebral vascular accident) (HCC)     s/p medtronic loop recorder 5/13/2024    Diabetes mellitus (HCC)     Disorder of gallbladder     Disorder of skin and subcutaneous tissue     Dyspnea     Essential hypertension     Hypercalcemia 11/19/2023    Hypokalemia     Malaise and fatigue     Mixed hyperlipidemia     Morbid obesity (HCC)     Pernicious anemia    [2]   Past Surgical History:  Procedure Laterality Date    BREAST BIOPSY Right     CARPAL TUNNEL RELEASE Bilateral     CHOLECYSTECTOMY      COLONOSCOPY      Dr. Apple     HYSTERECTOMY      LUMBAR EPIDURAL INJECTION      x3    PARTIAL HYSTERECTOMY      SKIN LESION EXCISION      scalp    [3]   Family History  Problem Relation Name Age of Onset    Stroke Mother      Atrial fibrillation Mother      Brain cancer Father      Other Brother          Twin brothers - Open heart    Other Brother          Twin brothers - Open heart    No Known Problems Maternal Grandmother      No Known Problems Paternal Grandmother     [4]   Social History  Tobacco Use    Smoking status: Former      Current packs/day: 0.00     Average packs/day: 1 pack/day for 14.0 years (14.0 ttl pk-yrs)     Types: Cigarettes     Start date:      Quit date:      Years since quittin.5    Smokeless tobacco: Never   Vaping Use    Vaping status: Never Used   Substance Use Topics    Alcohol use: Yes     Comment: Occasionally     Drug use: Never     Comment: Denies drug use - As per Sandra Goel III, DO  25

## 2025-07-23 ENCOUNTER — TELEPHONE (OUTPATIENT)
Dept: DERMATOLOGY | Facility: CLINIC | Age: 80
End: 2025-07-23

## 2025-07-23 LAB
C3 SERPL-MCNC: 123 MG/DL (ref 87–200)
C4 SERPL-MCNC: 45 MG/DL (ref 19–52)

## 2025-07-23 NOTE — TELEPHONE ENCOUNTER
I left a v/m for them to call back if they want to be scheduled.         MD Josselin Coy; [facility]  Please schedule into [facility] on Friday of patient able for vasculitis follow up, thank you!

## 2025-07-24 LAB — CH50 SERPL-ACNC: 58 U/ML

## 2025-07-25 ENCOUNTER — TELEPHONE (OUTPATIENT)
Age: 80
End: 2025-07-25

## 2025-07-25 NOTE — TELEPHONE ENCOUNTER
Nicol who is a care giver of patient called office to schedule a consult for patient to be seen after she was in the ED for vasculitis. I offered soonest available we had for January 2026 in [facility] but too far out.     Patient is not able to wait this long. Referral is routine check. They declined scheduling for now.

## 2025-08-05 ENCOUNTER — TELEPHONE (OUTPATIENT)
Dept: NEUROLOGY | Facility: CLINIC | Age: 80
End: 2025-08-05

## 2025-08-07 ENCOUNTER — DOCUMENTATION (OUTPATIENT)
Dept: ADMINISTRATIVE | Facility: OTHER | Age: 80
End: 2025-08-07

## 2025-08-13 ENCOUNTER — TELEPHONE (OUTPATIENT)
Age: 80
End: 2025-08-13

## 2025-08-14 ENCOUNTER — TELEPHONE (OUTPATIENT)
Dept: CARDIOLOGY CLINIC | Facility: CLINIC | Age: 80
End: 2025-08-14